# Patient Record
Sex: FEMALE | Race: WHITE | NOT HISPANIC OR LATINO | Employment: OTHER | URBAN - METROPOLITAN AREA
[De-identification: names, ages, dates, MRNs, and addresses within clinical notes are randomized per-mention and may not be internally consistent; named-entity substitution may affect disease eponyms.]

---

## 2017-08-14 ENCOUNTER — RECORDS - HEALTHEAST (OUTPATIENT)
Dept: LAB | Facility: CLINIC | Age: 57
End: 2017-08-14

## 2017-08-14 LAB
CHOLEST SERPL-MCNC: 222 MG/DL
FASTING STATUS PATIENT QL REPORTED: ABNORMAL
HDLC SERPL-MCNC: 45 MG/DL
LDLC SERPL CALC-MCNC: 131 MG/DL
TRIGL SERPL-MCNC: 232 MG/DL

## 2017-08-23 ENCOUNTER — RECORDS - HEALTHEAST (OUTPATIENT)
Dept: ADMINISTRATIVE | Facility: OTHER | Age: 57
End: 2017-08-23

## 2017-08-23 LAB
BKR LAB AP ABNORMAL BLEEDING: NO
BKR LAB AP BIRTH CONTROL/HORMONES: ABNORMAL
BKR LAB AP CERVICAL APPEARANCE: NORMAL
BKR LAB AP GYN ADEQUACY: ABNORMAL
BKR LAB AP GYN INTERPRETATION: ABNORMAL
BKR LAB AP HPV REFLEX: ABNORMAL
BKR LAB AP LMP: 2010
BKR LAB AP PATIENT STATUS: ABNORMAL
BKR LAB AP PREVIOUS ABNORMAL: ABNORMAL
BKR LAB AP PREVIOUS NORMAL: ABNORMAL
HIGH RISK?: YES
PATH REPORT.COMMENTS IMP SPEC: ABNORMAL
RESULT FLAG (HE HISTORICAL CONVERSION): ABNORMAL

## 2017-08-28 LAB
HPV INTERPRETATION - HISTORICAL: NORMAL
HPV INTERPRETER - HISTORICAL: NORMAL

## 2019-04-29 ENCOUNTER — RECORDS - HEALTHEAST (OUTPATIENT)
Dept: LAB | Facility: CLINIC | Age: 59
End: 2019-04-29

## 2019-04-29 LAB
ALBUMIN SERPL-MCNC: 4.2 G/DL (ref 3.5–5)
ALP SERPL-CCNC: 92 U/L (ref 45–120)
ALT SERPL W P-5'-P-CCNC: 16 U/L (ref 0–45)
ANION GAP SERPL CALCULATED.3IONS-SCNC: 16 MMOL/L (ref 5–18)
AST SERPL W P-5'-P-CCNC: 18 U/L (ref 0–40)
BILIRUB SERPL-MCNC: 0.6 MG/DL (ref 0–1)
BUN SERPL-MCNC: 12 MG/DL (ref 8–22)
CALCIUM SERPL-MCNC: 10.2 MG/DL (ref 8.5–10.5)
CHLORIDE BLD-SCNC: 100 MMOL/L (ref 98–107)
CHOLEST SERPL-MCNC: 244 MG/DL
CO2 SERPL-SCNC: 20 MMOL/L (ref 22–31)
CREAT SERPL-MCNC: 0.69 MG/DL (ref 0.6–1.1)
FASTING STATUS PATIENT QL REPORTED: ABNORMAL
GFR SERPL CREATININE-BSD FRML MDRD: >60 ML/MIN/1.73M2
GLUCOSE BLD-MCNC: 66 MG/DL (ref 70–125)
HDLC SERPL-MCNC: 52 MG/DL
LDLC SERPL CALC-MCNC: 152 MG/DL
POTASSIUM BLD-SCNC: 4.2 MMOL/L (ref 3.5–5)
PROT SERPL-MCNC: 7.5 G/DL (ref 6–8)
SODIUM SERPL-SCNC: 136 MMOL/L (ref 136–145)
TRIGL SERPL-MCNC: 199 MG/DL
TSH SERPL DL<=0.005 MIU/L-ACNC: 0.96 UIU/ML (ref 0.3–5)

## 2019-04-30 LAB
HPV SOURCE: NORMAL
HUMAN PAPILLOMA VIRUS 16 DNA: NEGATIVE
HUMAN PAPILLOMA VIRUS 18 DNA: NEGATIVE
HUMAN PAPILLOMA VIRUS FINAL DIAGNOSIS: NORMAL
HUMAN PAPILLOMA VIRUS OTHER HR: NEGATIVE
SPECIMEN DESCRIPTION: NORMAL

## 2019-05-06 LAB
BKR LAB AP ABNORMAL BLEEDING: NO
BKR LAB AP BIRTH CONTROL/HORMONES: NORMAL
BKR LAB AP CERVICAL APPEARANCE: NORMAL
BKR LAB AP GYN ADEQUACY: NORMAL
BKR LAB AP GYN INTERPRETATION: NORMAL
BKR LAB AP HPV REFLEX: NORMAL
BKR LAB AP LMP: NORMAL
BKR LAB AP PATIENT STATUS: NORMAL
BKR LAB AP PREVIOUS ABNORMAL: NORMAL
BKR LAB AP PREVIOUS NORMAL: NORMAL
HIGH RISK?: YES
PATH REPORT.COMMENTS IMP SPEC: NORMAL
RESULT FLAG (HE HISTORICAL CONVERSION): NORMAL

## 2021-05-27 ENCOUNTER — RECORDS - HEALTHEAST (OUTPATIENT)
Dept: ADMINISTRATIVE | Facility: CLINIC | Age: 61
End: 2021-05-27

## 2021-06-05 ENCOUNTER — RECORDS - HEALTHEAST (OUTPATIENT)
Dept: MAMMOGRAPHY | Facility: HOSPITAL | Age: 61
End: 2021-06-05

## 2021-06-05 DIAGNOSIS — N63.0 LUMP OR MASS IN BREAST: ICD-10-CM

## 2021-07-22 ENCOUNTER — RECORDS - HEALTHEAST (OUTPATIENT)
Dept: SCHEDULING | Facility: CLINIC | Age: 61
End: 2021-07-22

## 2021-07-22 DIAGNOSIS — Z12.31 OTHER SCREENING MAMMOGRAM: ICD-10-CM

## 2022-02-14 ENCOUNTER — HOSPITAL ENCOUNTER (OUTPATIENT)
Dept: MAMMOGRAPHY | Facility: CLINIC | Age: 62
Discharge: HOME | End: 2022-02-14
Attending: FAMILY MEDICINE | Admitting: FAMILY MEDICINE
Payer: COMMERCIAL

## 2022-02-14 DIAGNOSIS — Z12.31 VISIT FOR SCREENING MAMMOGRAM: ICD-10-CM

## 2022-02-14 PROCEDURE — 77067 SCR MAMMO BI INCL CAD: CPT

## 2022-02-21 ENCOUNTER — HOSPITAL ENCOUNTER (OUTPATIENT)
Dept: MAMMOGRAPHY | Facility: CLINIC | Age: 62
Discharge: HOME | End: 2022-02-21
Attending: FAMILY MEDICINE | Admitting: FAMILY MEDICINE
Payer: COMMERCIAL

## 2022-02-21 DIAGNOSIS — R92.0 MICROCALCIFICATION OF BREAST: ICD-10-CM

## 2022-02-21 PROCEDURE — 77065 DX MAMMO INCL CAD UNI: CPT | Mod: LT

## 2022-02-23 ENCOUNTER — LAB REQUISITION (OUTPATIENT)
Dept: LAB | Facility: CLINIC | Age: 62
End: 2022-02-23

## 2022-02-23 DIAGNOSIS — E78.2 MIXED HYPERLIPIDEMIA: ICD-10-CM

## 2022-02-23 DIAGNOSIS — T14.8XXA OTHER INJURY OF UNSPECIFIED BODY REGION, INITIAL ENCOUNTER: ICD-10-CM

## 2022-02-23 DIAGNOSIS — R87.612 LOW GRADE SQUAMOUS INTRAEPITHELIAL LESION ON CYTOLOGIC SMEAR OF CERVIX (LGSIL): ICD-10-CM

## 2022-02-23 DIAGNOSIS — I10 ESSENTIAL (PRIMARY) HYPERTENSION: ICD-10-CM

## 2022-02-23 DIAGNOSIS — E03.9 HYPOTHYROIDISM, UNSPECIFIED: ICD-10-CM

## 2022-02-23 LAB
ERYTHROCYTE [DISTWIDTH] IN BLOOD BY AUTOMATED COUNT: 14.3 % (ref 10–15)
HCT VFR BLD AUTO: 39.2 % (ref 35–47)
HGB BLD-MCNC: 13.9 G/DL (ref 11.7–15.7)
MCH RBC QN AUTO: 40.2 PG (ref 26.5–33)
MCHC RBC AUTO-ENTMCNC: 35.5 G/DL (ref 31.5–36.5)
MCV RBC AUTO: 113 FL (ref 78–100)
PLATELET # BLD AUTO: 236 10E3/UL (ref 150–450)
RBC # BLD AUTO: 3.46 10E6/UL (ref 3.8–5.2)
WBC # BLD AUTO: 7.5 10E3/UL (ref 4–11)

## 2022-02-23 PROCEDURE — 80061 LIPID PANEL: CPT | Performed by: FAMILY MEDICINE

## 2022-02-23 PROCEDURE — G0123 SCREEN CERV/VAG THIN LAYER: HCPCS | Performed by: FAMILY MEDICINE

## 2022-02-23 PROCEDURE — 84443 ASSAY THYROID STIM HORMONE: CPT | Performed by: FAMILY MEDICINE

## 2022-02-23 PROCEDURE — 87624 HPV HI-RISK TYP POOLED RSLT: CPT | Performed by: FAMILY MEDICINE

## 2022-02-23 PROCEDURE — G0124 SCREEN C/V THIN LAYER BY MD: HCPCS | Performed by: PATHOLOGY

## 2022-02-23 PROCEDURE — 82310 ASSAY OF CALCIUM: CPT | Performed by: FAMILY MEDICINE

## 2022-02-23 PROCEDURE — 85027 COMPLETE CBC AUTOMATED: CPT | Performed by: FAMILY MEDICINE

## 2022-02-24 LAB
ANION GAP SERPL CALCULATED.3IONS-SCNC: 10 MMOL/L (ref 5–18)
BUN SERPL-MCNC: 11 MG/DL (ref 8–22)
CALCIUM SERPL-MCNC: 10.5 MG/DL (ref 8.5–10.5)
CHLORIDE BLD-SCNC: 102 MMOL/L (ref 98–107)
CHOLEST SERPL-MCNC: 188 MG/DL
CO2 SERPL-SCNC: 24 MMOL/L (ref 22–31)
CREAT SERPL-MCNC: 0.65 MG/DL (ref 0.6–1.1)
GFR SERPL CREATININE-BSD FRML MDRD: >90 ML/MIN/1.73M2
GLUCOSE BLD-MCNC: 108 MG/DL (ref 70–125)
HDLC SERPL-MCNC: 56 MG/DL
LDLC SERPL CALC-MCNC: 97 MG/DL
POTASSIUM BLD-SCNC: 3.5 MMOL/L (ref 3.5–5)
SODIUM SERPL-SCNC: 136 MMOL/L (ref 136–145)
TRIGL SERPL-MCNC: 176 MG/DL
TSH SERPL DL<=0.005 MIU/L-ACNC: 0.82 UIU/ML (ref 0.3–5)

## 2022-02-25 LAB
HUMAN PAPILLOMA VIRUS 16 DNA: NEGATIVE
HUMAN PAPILLOMA VIRUS 18 DNA: NEGATIVE
HUMAN PAPILLOMA VIRUS FINAL DIAGNOSIS: NORMAL
HUMAN PAPILLOMA VIRUS OTHER HR: NEGATIVE

## 2022-03-03 LAB
BKR LAB AP GYN ADEQUACY: ABNORMAL
BKR LAB AP GYN INTERPRETATION: ABNORMAL
BKR LAB AP HPV REFLEX: ABNORMAL
BKR LAB AP LMP: ABNORMAL
BKR LAB AP PREVIOUS ABNL DX: ABNORMAL
BKR LAB AP PREVIOUS ABNORMAL: ABNORMAL
PATH REPORT.COMMENTS IMP SPEC: ABNORMAL
PATH REPORT.COMMENTS IMP SPEC: ABNORMAL
PATH REPORT.RELEVANT HX SPEC: ABNORMAL

## 2022-03-04 ENCOUNTER — ANCILLARY PROCEDURE (OUTPATIENT)
Dept: MAMMOGRAPHY | Facility: CLINIC | Age: 62
End: 2022-03-04
Attending: FAMILY MEDICINE
Payer: COMMERCIAL

## 2022-03-04 DIAGNOSIS — R92.0 MICROCALCIFICATION OF LEFT BREAST ON MAMMOGRAM: ICD-10-CM

## 2022-03-04 PROCEDURE — 250N000009 HC RX 250: Performed by: FAMILY MEDICINE

## 2022-03-04 PROCEDURE — 272N000715 MA STEREOTACTIC BREAST BIOPSY VACUUM LT

## 2022-03-04 PROCEDURE — 88305 TISSUE EXAM BY PATHOLOGIST: CPT | Mod: TC | Performed by: FAMILY MEDICINE

## 2022-03-04 RX ORDER — LIDOCAINE HYDROCHLORIDE AND EPINEPHRINE 10; 10 MG/ML; UG/ML
10 INJECTION, SOLUTION INFILTRATION; PERINEURAL ONCE
Status: COMPLETED | OUTPATIENT
Start: 2022-03-04 | End: 2022-03-04

## 2022-03-04 RX ADMIN — LIDOCAINE HYDROCHLORIDE 10 ML: 10 INJECTION, SOLUTION EPIDURAL; INFILTRATION; INTRACAUDAL; PERINEURAL at 11:09

## 2022-03-04 RX ADMIN — LIDOCAINE HYDROCHLORIDE,EPINEPHRINE BITARTRATE 10 ML: 10; .01 INJECTION, SOLUTION INFILTRATION; PERINEURAL at 11:09

## 2022-03-04 NOTE — DISCHARGE INSTRUCTIONS

## 2022-03-07 ENCOUNTER — TELEPHONE (OUTPATIENT)
Dept: MAMMOGRAPHY | Facility: CLINIC | Age: 62
End: 2022-03-07
Payer: COMMERCIAL

## 2022-03-07 LAB
PATH REPORT.COMMENTS IMP SPEC: NORMAL
PATH REPORT.FINAL DX SPEC: NORMAL
PATH REPORT.GROSS SPEC: NORMAL
PATH REPORT.MICROSCOPIC SPEC OTHER STN: NORMAL
PATH REPORT.RELEVANT HX SPEC: NORMAL
PHOTO IMAGE: NORMAL

## 2022-03-07 PROCEDURE — 88305 TISSUE EXAM BY PATHOLOGIST: CPT | Mod: 26 | Performed by: PATHOLOGY

## 2022-03-07 NOTE — TELEPHONE ENCOUNTER
At the request of the Breast Center Radiologist, Dr. Garcia,  I phoned patient and discussed the following benign breast biopsy results:    BREAST, LEFT, 1200, MIDDLE DEPTH, STEREOTACTIC GUIDED NEEDLE CORE BIOPSY:        - FIBROADENOMA WITH MICROCALCIFICATIONS       Patient was advised that she may return to her routine breast screening schedule.    Patient denies any concerns at her biopsy site. Questions were answered and my phone number given if she has further questions or concerns.  Ordering provider was informed of these results.  Patient verbalized understanding and agrees with the plan of care.         Lillian Ga RN, BSN, PHN  Breast Center Imaging Nurse Coordinator   [facility]  [address]  [place] [address]  [phone]

## 2022-12-23 ENCOUNTER — APPOINTMENT (OUTPATIENT)
Dept: CT IMAGING | Facility: HOSPITAL | Age: 62
End: 2022-12-23
Attending: EMERGENCY MEDICINE
Payer: COMMERCIAL

## 2022-12-23 ENCOUNTER — HOSPITAL ENCOUNTER (EMERGENCY)
Facility: HOSPITAL | Age: 62
Discharge: HOME | End: 2022-12-23
Attending: EMERGENCY MEDICINE | Admitting: EMERGENCY MEDICINE
Payer: COMMERCIAL

## 2022-12-23 ENCOUNTER — APPOINTMENT (OUTPATIENT)
Dept: RADIOLOGY | Facility: HOSPITAL | Age: 62
End: 2022-12-23
Attending: EMERGENCY MEDICINE
Payer: COMMERCIAL

## 2022-12-23 VITALS
RESPIRATION RATE: 32 BRPM | HEIGHT: 67 IN | TEMPERATURE: 98 F | DIASTOLIC BLOOD PRESSURE: 60 MMHG | WEIGHT: 180 LBS | HEART RATE: 71 BPM | OXYGEN SATURATION: 95 % | BODY MASS INDEX: 28.25 KG/M2 | SYSTOLIC BLOOD PRESSURE: 125 MMHG

## 2022-12-23 DIAGNOSIS — R07.9 CHEST PAIN, UNSPECIFIED TYPE: ICD-10-CM

## 2022-12-23 LAB
ALBUMIN SERPL BCG-MCNC: 4 G/DL (ref 3.5–5.2)
ALP SERPL-CCNC: 89 U/L (ref 35–104)
ALT SERPL W P-5'-P-CCNC: 11 U/L (ref 10–35)
ANION GAP SERPL CALCULATED.3IONS-SCNC: 10 MMOL/L (ref 7–15)
APTT PPP: 30 SECONDS (ref 22–38)
AST SERPL W P-5'-P-CCNC: 20 U/L (ref 10–35)
BASOPHILS # BLD AUTO: 0 10E3/UL (ref 0–0.2)
BASOPHILS NFR BLD AUTO: 0 %
BILIRUB SERPL-MCNC: 0.7 MG/DL
BUN SERPL-MCNC: 6.8 MG/DL (ref 8–23)
CALCIUM SERPL-MCNC: 9.8 MG/DL (ref 8.8–10.2)
CHLORIDE SERPL-SCNC: 100 MMOL/L (ref 98–107)
CREAT SERPL-MCNC: 0.5 MG/DL (ref 0.51–0.95)
D DIMER PPP FEU-MCNC: 0.79 UG/ML FEU (ref 0–0.5)
DEPRECATED HCO3 PLAS-SCNC: 26 MMOL/L (ref 22–29)
EOSINOPHIL # BLD AUTO: 0 10E3/UL (ref 0–0.7)
EOSINOPHIL NFR BLD AUTO: 0 %
ERYTHROCYTE [DISTWIDTH] IN BLOOD BY AUTOMATED COUNT: 17.1 % (ref 10–15)
GFR SERPL CREATININE-BSD FRML MDRD: >90 ML/MIN/1.73M2
GLUCOSE SERPL-MCNC: 104 MG/DL (ref 70–99)
HCT VFR BLD AUTO: 35.2 % (ref 35–47)
HGB BLD-MCNC: 12.5 G/DL (ref 11.7–15.7)
HOLD SPECIMEN: NORMAL
IMM GRANULOCYTES # BLD: 0 10E3/UL
IMM GRANULOCYTES NFR BLD: 0 %
INR PPP: 1.05 (ref 0.85–1.15)
LIPASE SERPL-CCNC: 12 U/L (ref 13–60)
LYMPHOCYTES # BLD AUTO: 0.7 10E3/UL (ref 0.8–5.3)
LYMPHOCYTES NFR BLD AUTO: 15 %
MAGNESIUM SERPL-MCNC: 1.8 MG/DL (ref 1.7–2.3)
MCH RBC QN AUTO: 39.2 PG (ref 26.5–33)
MCHC RBC AUTO-ENTMCNC: 35.5 G/DL (ref 31.5–36.5)
MCV RBC AUTO: 110 FL (ref 78–100)
MONOCYTES # BLD AUTO: 0.3 10E3/UL (ref 0–1.3)
MONOCYTES NFR BLD AUTO: 7 %
NEUTROPHILS # BLD AUTO: 3.5 10E3/UL (ref 1.6–8.3)
NEUTROPHILS NFR BLD AUTO: 78 %
NRBC # BLD AUTO: 0 10E3/UL
NRBC BLD AUTO-RTO: 0 /100
PLATELET # BLD AUTO: 234 10E3/UL (ref 150–450)
POTASSIUM SERPL-SCNC: 3.7 MMOL/L (ref 3.4–5.3)
PROT SERPL-MCNC: 6.7 G/DL (ref 6.4–8.3)
RBC # BLD AUTO: 3.19 10E6/UL (ref 3.8–5.2)
SODIUM SERPL-SCNC: 136 MMOL/L (ref 136–145)
TROPONIN T SERPL HS-MCNC: 7 NG/L
TROPONIN T SERPL HS-MCNC: 8 NG/L
WBC # BLD AUTO: 4.5 10E3/UL (ref 4–11)

## 2022-12-23 PROCEDURE — 36415 COLL VENOUS BLD VENIPUNCTURE: CPT | Performed by: EMERGENCY MEDICINE

## 2022-12-23 PROCEDURE — 71045 X-RAY EXAM CHEST 1 VIEW: CPT

## 2022-12-23 PROCEDURE — 85610 PROTHROMBIN TIME: CPT | Performed by: EMERGENCY MEDICINE

## 2022-12-23 PROCEDURE — 84132 ASSAY OF SERUM POTASSIUM: CPT | Performed by: EMERGENCY MEDICINE

## 2022-12-23 PROCEDURE — 82947 ASSAY GLUCOSE BLOOD QUANT: CPT | Performed by: EMERGENCY MEDICINE

## 2022-12-23 PROCEDURE — 84484 ASSAY OF TROPONIN QUANT: CPT | Performed by: EMERGENCY MEDICINE

## 2022-12-23 PROCEDURE — 250N000013 HC RX MED GY IP 250 OP 250 PS 637: Performed by: EMERGENCY MEDICINE

## 2022-12-23 PROCEDURE — 250N000011 HC RX IP 250 OP 636: Performed by: EMERGENCY MEDICINE

## 2022-12-23 PROCEDURE — 93005 ELECTROCARDIOGRAM TRACING: CPT | Performed by: EMERGENCY MEDICINE

## 2022-12-23 PROCEDURE — 85379 FIBRIN DEGRADATION QUANT: CPT | Performed by: EMERGENCY MEDICINE

## 2022-12-23 PROCEDURE — 71275 CT ANGIOGRAPHY CHEST: CPT

## 2022-12-23 PROCEDURE — 99285 EMERGENCY DEPT VISIT HI MDM: CPT | Mod: 25

## 2022-12-23 PROCEDURE — 250N000009 HC RX 250: Performed by: EMERGENCY MEDICINE

## 2022-12-23 PROCEDURE — 85025 COMPLETE CBC W/AUTO DIFF WBC: CPT | Performed by: EMERGENCY MEDICINE

## 2022-12-23 PROCEDURE — 83735 ASSAY OF MAGNESIUM: CPT | Performed by: EMERGENCY MEDICINE

## 2022-12-23 PROCEDURE — 85730 THROMBOPLASTIN TIME PARTIAL: CPT | Performed by: EMERGENCY MEDICINE

## 2022-12-23 PROCEDURE — 96374 THER/PROPH/DIAG INJ IV PUSH: CPT | Mod: 59

## 2022-12-23 PROCEDURE — 96375 TX/PRO/DX INJ NEW DRUG ADDON: CPT | Mod: 59

## 2022-12-23 PROCEDURE — 83690 ASSAY OF LIPASE: CPT | Performed by: EMERGENCY MEDICINE

## 2022-12-23 RX ORDER — ONDANSETRON 2 MG/ML
4 INJECTION INTRAMUSCULAR; INTRAVENOUS ONCE
Status: COMPLETED | OUTPATIENT
Start: 2022-12-23 | End: 2022-12-23

## 2022-12-23 RX ORDER — NITROGLYCERIN 0.4 MG/1
0.4 TABLET SUBLINGUAL EVERY 5 MIN PRN
Status: DISCONTINUED | OUTPATIENT
Start: 2022-12-23 | End: 2022-12-23 | Stop reason: HOSPADM

## 2022-12-23 RX ORDER — KETOROLAC TROMETHAMINE 15 MG/ML
15 INJECTION, SOLUTION INTRAMUSCULAR; INTRAVENOUS ONCE
Status: COMPLETED | OUTPATIENT
Start: 2022-12-23 | End: 2022-12-23

## 2022-12-23 RX ORDER — IOPAMIDOL 755 MG/ML
100 INJECTION, SOLUTION INTRAVASCULAR ONCE
Status: COMPLETED | OUTPATIENT
Start: 2022-12-23 | End: 2022-12-23

## 2022-12-23 RX ORDER — ACETAMINOPHEN 325 MG/1
650 TABLET ORAL ONCE
Status: COMPLETED | OUTPATIENT
Start: 2022-12-23 | End: 2022-12-23

## 2022-12-23 RX ADMIN — FAMOTIDINE 20 MG: 10 INJECTION, SOLUTION INTRAVENOUS at 03:39

## 2022-12-23 RX ADMIN — KETOROLAC TROMETHAMINE 15 MG: 15 INJECTION, SOLUTION INTRAMUSCULAR; INTRAVENOUS at 04:56

## 2022-12-23 RX ADMIN — NITROGLYCERIN 0.4 MG: 0.4 TABLET, ORALLY DISINTEGRATING SUBLINGUAL at 02:55

## 2022-12-23 RX ADMIN — IOPAMIDOL 100 ML: 755 INJECTION, SOLUTION INTRAVENOUS at 05:14

## 2022-12-23 RX ADMIN — ONDANSETRON 4 MG: 2 INJECTION INTRAMUSCULAR; INTRAVENOUS at 02:39

## 2022-12-23 RX ADMIN — ALUMINA, MAGNESIA, AND SIMETHICONE ORAL SUSPENSION REGULAR STRENGTH 30 ML: 1200; 1200; 120 SUSPENSION ORAL at 03:40

## 2022-12-23 RX ADMIN — ACETAMINOPHEN 650 MG: 325 TABLET ORAL at 03:07

## 2022-12-23 ASSESSMENT — ENCOUNTER SYMPTOMS
VOMITING: 0
NUMBNESS: 1
SHORTNESS OF BREATH: 1
NAUSEA: 1
DIAPHORESIS: 0
LIGHT-HEADEDNESS: 1

## 2022-12-23 ASSESSMENT — ACTIVITIES OF DAILY LIVING (ADL)
ADLS_ACUITY_SCORE: 35
ADLS_ACUITY_SCORE: 35

## 2022-12-23 NOTE — ED NOTES
Bed: JN-10  Expected date: 12/23/22  Expected time: 2:09 AM  Means of arrival: Ambulance  Comments:  WBL  63 yo F chest pain

## 2022-12-23 NOTE — ED TRIAGE NOTES
Patient presents via EMS, ongoing chest pain since 1800 last night. States it started like 'indigetstion' but hasn't gone away. Tingling-like feeling bilateral to hands noted. Rates chest pain 5/10.     Triage Assessment     Row Name 12/23/22 0224       Triage Assessment (Adult)    Airway WDL WDL       Respiratory WDL    Respiratory WDL WDL       Skin Circulation/Temperature WDL    Skin Circulation/Temperature WDL WDL       Cardiac WDL    Cardiac WDL X;chest pain;rhythm    Pulse Rate & Regularity radial pulse regular    Cardiac Rhythm NSR;ST       Chest Pain Assessment    Chest Pain Location midsternal    Chest Pain Radiation hand    Character aching    Precipitating Factors at rest       Cognitive/Neuro/Behavioral WDL    Cognitive/Neuro/Behavioral WDL WDL

## 2022-12-23 NOTE — ED NOTES
"EMERGENCY DEPARTMENT SIGN OUT NOTE        ED COURSE AND MEDICAL DECISION MAKING  Patient was signed out to me by Dr Jamie Shook at 5:00 AM    In brief, Linh Mustafa is a 62 year old female who initially presented via EMS for evaluation of chest pain.     Around 6:00 pm last evening~8 hours ago, the patient had a sudden onset of center left chest pain with accompanied  bilateral hand numbness. The patient was cooking when this happened. She went to bed but was awaken to ongoing worsening chest pain at 1:00 am~an hour ago. She initially thought this was indigestion but her symptoms didn't go away. This has never happened before. Patient also feels lightheaded and nauseous but has not vomited. Patient took 2 baby aspirin. Currently, the patient endorses being mildly short of breath. Chest pain is still present and describes it as \"something sitting on her chest\" and a \"squeezing\" chest pain. Patient is a smoker and has 2 alcoholic drinks per day. Denies medication changes, however she only takes them when she remembers. Denies diaphoresis and any other symptoms. No known history of heart disease.     At time of sign out, disposition was pending CT PE results and troponin.    Update: Serial troponin is negative and CT PE study is unremarkable.  Referral placed to rapid access cardiology by Dr. Shook and we will plan for discharge with return precautions at this time.    FINAL IMPRESSION    1. Chest pain, unspecified type        ED MEDS  Medications   nitroGLYcerin (NITROSTAT) sublingual tablet 0.4 mg (0.4 mg Sublingual Given 12/23/22 0255)   ondansetron (ZOFRAN) injection 4 mg (4 mg Intravenous Given 12/23/22 0239)   acetaminophen (TYLENOL) tablet 650 mg (650 mg Oral Given 12/23/22 0307)   lidocaine (viscous) (XYLOCAINE) 2 % 15 mL, alum & mag hydroxide-simethicone (MAALOX) 15 mL GI Cocktail (30 mLs Oral Given 12/23/22 0340)   famotidine (PEPCID) injection 20 mg (20 mg Intravenous Given 12/23/22 0339)   ketorolac " (TORADOL) injection 15 mg (15 mg Intravenous Given 12/23/22 5606)   iopamidol (ISOVUE-370) solution 100 mL (100 mLs Intravenous Given 12/23/22 7344)       LAB  Labs Ordered and Resulted from Time of ED Arrival to Time of ED Departure   COMPREHENSIVE METABOLIC PANEL - Abnormal       Result Value    Sodium 136      Potassium 3.7      Chloride 100      Carbon Dioxide (CO2) 26      Anion Gap 10      Urea Nitrogen 6.8 (*)     Creatinine 0.50 (*)     Calcium 9.8      Glucose 104 (*)     Alkaline Phosphatase 89      AST 20      ALT 11      Protein Total 6.7      Albumin 4.0      Bilirubin Total 0.7      GFR Estimate >90     CBC WITH PLATELETS AND DIFFERENTIAL - Abnormal    WBC Count 4.5      RBC Count 3.19 (*)     Hemoglobin 12.5      Hematocrit 35.2       (*)     MCH 39.2 (*)     MCHC 35.5      RDW 17.1 (*)     Platelet Count 234      % Neutrophils 78      % Lymphocytes 15      % Monocytes 7      % Eosinophils 0      % Basophils 0      % Immature Granulocytes 0      NRBCs per 100 WBC 0      Absolute Neutrophils 3.5      Absolute Lymphocytes 0.7 (*)     Absolute Monocytes 0.3      Absolute Eosinophils 0.0      Absolute Basophils 0.0      Absolute Immature Granulocytes 0.0      Absolute NRBCs 0.0     LIPASE - Abnormal    Lipase 12 (*)    D DIMER QUANTITATIVE - Abnormal    D-Dimer Quantitative 0.79 (*)    INR - Normal    INR 1.05     PARTIAL THROMBOPLASTIN TIME - Normal    aPTT 30     MAGNESIUM - Normal    Magnesium 1.8     TROPONIN T, HIGH SENSITIVITY - Normal    Troponin T, High Sensitivity 8     TROPONIN T, HIGH SENSITIVITY       EKG      RADIOLOGY    CT Chest Pulmonary Embolism w Contrast   Final Result   IMPRESSION:   1.  No evidence of pulmonary embolus or other acute process in the chest.      XR Chest Port 1 View   Final Result   IMPRESSION: Shallow inspiration. Cardiomediastinal silhouette within normal limits. No focal consolidation or significant effusion. Atherosclerotic aorta.          DISCHARGE MEDS  New  Prescriptions    No medications on file       Juanita Frank M.D.  [facility] EMERGENCY DEPARTMENT  [facility] [id number] [phone]     Juanita Frank MD  12/23/22 0570

## 2022-12-23 NOTE — ED PROVIDER NOTES
EMERGENCY DEPARTMENT ENCOUNTER      NAME: Cary Silverman  AGE: 62 year old female  YOB: 1960  MRN: [id number]  EVALUATION DATE & TIME: 2022  2:19 AM    PCP: No primary care provider on file.    ED PROVIDER: Jamie Shook DO      Chief Complaint   Patient presents with     Chest Pain         FINAL IMPRESSION:  1. Chest pain, unspecified type          ED COURSE & MEDICAL DECISION MAKIN-year-old female presented to the ED for evaluation of substernal chest pressure that began yesterday evening and worsened in the middle of the night while laying in bed.  The patient reports associated nausea.  She also reports experiencing shortness of breath followed by bilateral hand and foot paresthesias.  Upon arrival to the ED the patient was noted to be hypertensive but she was otherwise hemodynamically stable.  She was anxious and slightly uncomfortable appearing.  However, she did not appear to be in acute distress.  The patient's physical exam was unremarkable.  Following her initial evaluation the patient was given sublingual nitroglycerin to treat her chest pressure.    An EKG was obtained which revealed a normal sinus rhythm with diffuse ST segment depressions noted.  There is no previous EKG available for comparison.      The patient reported no change in her chest pain with the 2 doses of sublingual nitroglycerin.  The patient was then given a GI cocktail and IV famotidine.    CBC, BMP, reticulocyte panel, lipase, troponin, and magnesium were all reassuring.  Chest x-ray was nondiagnostic.      The patient was reevaluated and informed of the initial laboratory EKG, and chest x-ray results.  The patient did not appear to be in any obvious distress.  However, she again noted minimal change in her pain with the GI cocktail and IV famotidine.  Patient was given a dose of IV Toradol to treat her pain    The patient's D-dimer was positive.  The patient will be sent down for a chest CT scan to  evaluate for possible PE.  A 2-hour repeat troponin is also pending.  The patient's care was turned over to Dr. Salamanca who will follow up on the CT and troponin results and disposition the patient accordingly.    Pertinent Labs & Imaging studies reviewed. (See chart for details)  2:30 AM I met with the patient to gather history and to perform my initial exam. We discussed plans for the ED course, including diagnostic testing and treatment.       At the conclusion of the encounter I discussed the results of all of the tests and the disposition. The questions were answered. The patient or family acknowledged understanding and was agreeable with the care plan.   Medical Decision Making    History:    Supplemental history from: Family Member/Significant Other    Work Up:    Chart documentation includes differential considered and any EKGs or imaging independently interpreted by provider.    Complicating factors:    Care impacted by chronic illness: N/A    Care affected by social determinants of health: N/A      PPE worn: n95 mask, goggles    MEDICATIONS GIVEN IN THE EMERGENCY:  Medications   nitroGLYcerin (NITROSTAT) sublingual tablet 0.4 mg (0.4 mg Sublingual Given 12/23/22 0255)   iopamidol (ISOVUE-370) solution 100 mL (has no administration in time range)   ondansetron (ZOFRAN) injection 4 mg (4 mg Intravenous Given 12/23/22 0239)   acetaminophen (TYLENOL) tablet 650 mg (650 mg Oral Given 12/23/22 0307)   lidocaine (viscous) (XYLOCAINE) 2 % 15 mL, alum & mag hydroxide-simethicone (MAALOX) 15 mL GI Cocktail (30 mLs Oral Given 12/23/22 0340)   famotidine (PEPCID) injection 20 mg (20 mg Intravenous Given 12/23/22 0339)   ketorolac (TORADOL) injection 15 mg (15 mg Intravenous Given 12/23/22 0456)       NEW PRESCRIPTIONS STARTED AT TODAY'S ER VISIT  New Prescriptions    No medications on file          =================================================================    HPI    Patient information was obtained from:  "patient    Use of : N/A       Cary Silverman is a 62 year old female with no pertinent history who presents to this ED via EMS for evaluation of chest pain.      Around 6:00 pm last evening~8 hours ago, the patient had a sudden onset of center left chest pain with accompanied  bilateral hand numbness. The patient was cooking when this happened. She went to bed but was awaken to ongoing worsening chest pain at 1:00 am~an hour ago. She initially thought this was indigestion but her symptoms didn't go away. This has never happened before. Patient also feels lightheaded and nauseous but has not vomited. Patient took 2 baby aspirin. Currently, the patient endorses being mildly short of breath. Chest pain is still present and describes it as \"something sitting on her chest\" and a \"squeezing\" chest pain. Patient is a smoker and has 2 alcoholic drinks per day. Denies medication changes, however she only takes them when she remembers. Denies diaphoresis and any other symptoms. No known history of heart disease.      REVIEW OF SYSTEMS   Review of Systems   Constitutional: Negative for diaphoresis.   Respiratory: Positive for shortness of breath.    Cardiovascular: Positive for chest pain (center left chest pain).   Gastrointestinal: Positive for nausea. Negative for vomiting.   Neurological: Positive for light-headedness and numbness (bilateral hand numbness).   All other systems reviewed and are negative.       PAST MEDICAL HISTORY:  History reviewed. No pertinent past medical history.    PAST SURGICAL HISTORY:  History reviewed. No pertinent surgical history.        CURRENT MEDICATIONS:    No current outpatient medications on file.      ALLERGIES:  No Known Allergies    FAMILY HISTORY:  No family history on file.    SOCIAL HISTORY:   Social History     Socioeconomic History     Marital status: Single     Spouse name: None     Number of children: None     Years of education: None     Highest education level: None " "      VITALS:  /62   Pulse 80   Temp 98  F (36.7  C) (Oral)   Resp 29   Ht 1.702 m (5' 7\")   Wt 81.6 kg (180 lb)   SpO2 94%   BMI 28.19 kg/m      PHYSICAL EXAM    General presentation: Alert, Vital signs reviewed. NAD. Mildly anxious appearing.  HENT: ENT inspection is normal. Oropharynx is moist and clear.   Eye: Pupils are equal and reactive to light. EOMI  Neck: The neck is supple, with full ROM, with no evidence of meningismus.  Pulmonary: Currently in no acute respiratory distress. Normal, non labored respirations, the lung sounds are normal with good equal air movement. Clear to auscultation bilaterally.   Circulatory: Regular rate and rhythm. Peripheral pulses are strong and equal. No murmurs, rubs, or gallops.   Abdominal: The abdomen is soft. Nontender. No rigidity, guarding, or rebound. Bowel sounds normal.   Neurologic: Alert, oriented to person, place, and time. No motor deficit. No sensory deficit. Cranial nerves II through XII are intact.  Musculoskeletal: No extremity tenderness. Full range of motion in all extremities. No extremity edema.   Skin: Skin color is normal. No rash. Warm. Dry to touch.      LAB:  All pertinent labs reviewed and interpreted.  Results for orders placed or performed during the hospital encounter of 12/23/22   XR Chest Port 1 View    Impression    IMPRESSION: Shallow inspiration. Cardiomediastinal silhouette within normal limits. No focal consolidation or significant effusion. Atherosclerotic aorta.   Extra Blue Top Tube   Result Value Ref Range    Hold Specimen JIC    Extra Red Top Tube   Result Value Ref Range    Hold Specimen JIC    Extra Green Top (Lithium Heparin) Tube   Result Value Ref Range    Hold Specimen JIC    Extra Purple Top Tube   Result Value Ref Range    Hold Specimen JIC    Result Value Ref Range    INR 1.05 0.85 - 1.15   Partial thromboplastin time   Result Value Ref Range    aPTT 30 22 - 38 Seconds   Comprehensive metabolic panel   Result Value " Ref Range    Sodium 136 136 - 145 mmol/L    Potassium 3.7 3.4 - 5.3 mmol/L    Chloride 100 98 - 107 mmol/L    Carbon Dioxide (CO2) 26 22 - 29 mmol/L    Anion Gap 10 7 - 15 mmol/L    Urea Nitrogen 6.8 (L) 8.0 - 23.0 mg/dL    Creatinine 0.50 (L) 0.51 - 0.95 mg/dL    Calcium 9.8 8.8 - 10.2 mg/dL    Glucose 104 (H) 70 - 99 mg/dL    Alkaline Phosphatase 89 35 - 104 U/L    AST 20 10 - 35 U/L    ALT 11 10 - 35 U/L    Protein Total 6.7 6.4 - 8.3 g/dL    Albumin 4.0 3.5 - 5.2 g/dL    Bilirubin Total 0.7 <=1.2 mg/dL    GFR Estimate >90 >60 mL/min/1.73m2   Result Value Ref Range    Magnesium 1.8 1.7 - 2.3 mg/dL   Result Value Ref Range    Troponin T, High Sensitivity 8 <=14 ng/L   CBC with platelets and differential   Result Value Ref Range    WBC Count 4.5 4.0 - 11.0 10e3/uL    RBC Count 3.19 (L) 3.80 - 5.20 10e6/uL    Hemoglobin 12.5 11.7 - 15.7 g/dL    Hematocrit 35.2 35.0 - 47.0 %     (H) 78 - 100 fL    MCH 39.2 (H) 26.5 - 33.0 pg    MCHC 35.5 31.5 - 36.5 g/dL    RDW 17.1 (H) 10.0 - 15.0 %    Platelet Count 234 150 - 450 10e3/uL    % Neutrophils 78 %    % Lymphocytes 15 %    % Monocytes 7 %    % Eosinophils 0 %    % Basophils 0 %    % Immature Granulocytes 0 %    NRBCs per 100 WBC 0 <1 /100    Absolute Neutrophils 3.5 1.6 - 8.3 10e3/uL    Absolute Lymphocytes 0.7 (L) 0.8 - 5.3 10e3/uL    Absolute Monocytes 0.3 0.0 - 1.3 10e3/uL    Absolute Eosinophils 0.0 0.0 - 0.7 10e3/uL    Absolute Basophils 0.0 0.0 - 0.2 10e3/uL    Absolute Immature Granulocytes 0.0 <=0.4 10e3/uL    Absolute NRBCs 0.0 10e3/uL   Result Value Ref Range    Lipase 12 (L) 13 - 60 U/L   D dimer quantitative   Result Value Ref Range    D-Dimer Quantitative 0.79 (H) 0.00 - 0.50 ug/mL FEU       RADIOLOGY:  Reviewed all pertinent imaging. Please see official radiology report.  XR Chest Port 1 View   Final Result   IMPRESSION: Shallow inspiration. Cardiomediastinal silhouette within normal limits. No focal consolidation or significant effusion.  Atherosclerotic aorta.      CT Chest Pulmonary Embolism w Contrast    (Results Pending)       EKG:    Normal sinus rhythm.  Rate of 94.  Normal QRS.  Normal QT.  Diffuse ST segment changes noted.  No previous EKGs available for comparison    I have independently reviewed and interpreted the EKG(s) documented above.      I, Twyla Mai , am serving as a scribe to document services personally performed by Jamie Shook DO based on my observation and the provider's statements to me. I, Jamie Shook, attest that Twyla Mia is acting in a scribe capacity, has observed my performance of the services and has documented them in accordance with my direction.    Jamie Shook DO  Emergency Medicine  [facility] EMERGENCY DEPARTMENT  [address] [place] [id number] [phone]       Jamie Shook DO  12/23/22 1037

## 2022-12-23 NOTE — ED NOTES
Pt returned from CT, re-hooked to monitors.  Pt states left cp remains and  doesn't feel like pain med did anything.

## 2023-01-03 ENCOUNTER — OFFICE VISIT (OUTPATIENT)
Dept: CARDIOLOGY | Facility: CLINIC | Age: 63
End: 2023-01-03
Payer: COMMERCIAL

## 2023-01-03 VITALS
BODY MASS INDEX: 26.34 KG/M2 | HEIGHT: 67 IN | SYSTOLIC BLOOD PRESSURE: 156 MMHG | WEIGHT: 167.8 LBS | RESPIRATION RATE: 16 BRPM | HEART RATE: 95 BPM | DIASTOLIC BLOOD PRESSURE: 72 MMHG | OXYGEN SATURATION: 97 %

## 2023-01-03 DIAGNOSIS — Z72.0 TOBACCO ABUSE: ICD-10-CM

## 2023-01-03 DIAGNOSIS — I25.10 CORONARY ARTERY CALCIFICATION SEEN ON CAT SCAN: ICD-10-CM

## 2023-01-03 DIAGNOSIS — I10 PRIMARY HYPERTENSION: ICD-10-CM

## 2023-01-03 DIAGNOSIS — R07.9 CHEST PAIN, UNSPECIFIED TYPE: Primary | ICD-10-CM

## 2023-01-03 PROCEDURE — 99204 OFFICE O/P NEW MOD 45 MIN: CPT | Performed by: INTERNAL MEDICINE

## 2023-01-03 RX ORDER — LEVOTHYROXINE SODIUM 200 UG/1
200 TABLET ORAL DAILY
COMMUNITY

## 2023-01-03 RX ORDER — AMLODIPINE BESYLATE 5 MG/1
TABLET ORAL
COMMUNITY
End: 2023-01-19

## 2023-01-03 NOTE — PROGRESS NOTES
Thank you, Dr. Jamie Shook, for asking the [facility] team to see Ms. Linh Mustafa to evaluate chest discomfort.    Assessment/Recommendations   Assessment:    1.  Chest discomfort, etiology unclear.  ECG in the ED reportedly showed diffuse ST abnormality; however, the EKG is not in the electronic system and cannot be reviewed.  2 troponin levels were within normal limits.  She does have moderate coronary artery calcification by chest CT as well as evidence of emphysema which she was not aware of.  At this point, recommended stress echo study to evaluate for ischemic etiology as she continues to have intermittent episodes of chest discomfort.  2.  Coronary artery calcification, indicative of cholesterol plaquing in the coronary arteries but does not assess severity.  Again we will pursue stress echo study.  If this is normal, would suggest her lesions are less than 70% stenosis.  She will need risk factor modification with continued good control of her blood pressure as well as good lipid control.  Also encourage smoking cessation.  3.  Labile hypertension.  Blood pressure was initially elevated but came back into normal range at the end of her visit.  No change in amlodipine dosing.  4.  Ongoing tobacco abuse.  Patient continues to smoke 3/4 to 1 pack of cigarettes per day and has done so since age of 18.  CT scan demonstrates evidence of emphysema.  This may be contributing to chest tightness.    Plan:  1.  Continue current medications  2.  Schedule stress echo study with further recommendations to follow  3.  Encourage smoking cessation.  Follow-up with primary care regarding lipids and chest CT findings.       History of Present Illness    Ms. Linh Mustafa is a 62 year old female with history of essential hypertension and mild hypercholesterolemia who recently presented to the ED for evaluation of chest discomfort.  Patient states she was awakened in the early morning hours with  "substernal discomfort without clear radiation into the jaw, back or arms.  Did feel short of breath.  Cannot recall what that the discomfort was increased by deep breaths or movement.  In the ED, she reportedly had subtle diffuse ST depression on ECG although ECG was not transmitted into the electronic system and is not reviewable.  Was given 2 sublingual nitroglycerin without benefit along with a GI cocktail and IV famotidine again without benefit.  2 troponins drawn 2 hours apart were within normal limits.  Subsequently discharged after IV Toradol to be seen in outpatient clinic.  Has had intermittent episodes of chest discomfort since then which do appear to occur during periods of increased stress.  Happens both with activity as well as at rest.    ECG (personally reviewed): No ECG available for review    Cardiac Imaging Studies (personally reviewed): No prior cardiac imaging     Physical Examination Review of Systems   BP (!) 156/72 (BP Location: Left arm, Patient Position: Sitting, Cuff Size: Adult Regular)   Pulse 95   Resp 16   Ht 1.702 m (5' 7\")   Wt 76.1 kg (167 lb 12.8 oz)   SpO2 97%   BMI 26.28 kg/m    Body mass index is 26.28 kg/m .  Wt Readings from Last 3 Encounters:   01/03/23 76.1 kg (167 lb 12.8 oz)   12/23/22 81.6 kg (180 lb)     General Appearance:   Awake, Alert, No acute distress.   HEENT:  No scleral icterus; the mucous membranes were pink and moist.   Neck: No cervical bruits or jugular venous distention    Chest: The spine was straight. The chest was symmetric.   Lungs:   Respirations unlabored; the lungs are clear to auscultation. No wheezing   Cardiovascular:    Regular rate and rhythm.  S1, S2 normal.  No murmur or gallop   Abdomen:  No organomegaly, masses, bruits, or tenderness. Bowels sounds are present   Extremities:  No peripheral edema, clubbing or cyanosis   Skin: No xanthelasma. Warm, Dry.   Musculoskeletal: No tenderness.   Neurologic: Mood and affect are appropriate.    " "Enc Vitals  BP: (!) 156/72  Pulse: 95  Resp: 16  SpO2: 97 %  Weight: 76.1 kg (167 lb 12.8 oz)  Height: 170.2 cm (5' 7\")                                         Medical History  Surgical History Family History Social History   Past Medical History:   Diagnosis Date     Hyperlipidemia      Hypertension     Past Surgical History:   Procedure Laterality Date     BIOPSY BREAST Right     age 30 benign fibrous    Family History   Problem Relation Age of Onset     Chronic Obstructive Pulmonary Disease Father      Colon Cancer Paternal Grandmother 70    Social History     Socioeconomic History     Marital status: Single     Spouse name: Not on file     Number of children: Not on file     Years of education: Not on file     Highest education level: Not on file   Occupational History     Not on file   Tobacco Use     Smoking status: Every Day     Packs/day: 1.00     Types: Cigarettes     Start date: 1977     Smokeless tobacco: Not on file   Substance and Sexual Activity     Alcohol use: Not on file     Comment: occasional     Drug use: Never     Sexual activity: Not on file   Other Topics Concern     Not on file   Social History Narrative    ** Merged History Encounter **          Social Determinants of Health     Financial Resource Strain: Not on file   Food Insecurity: Not on file   Transportation Needs: Not on file   Physical Activity: Not on file   Stress: Not on file   Social Connections: Not on file   Intimate Partner Violence: Not on file   Housing Stability: Not on file          Medications  Allergies   Current Outpatient Medications   Medication Sig Dispense Refill     amLODIPine (NORVASC) 5 MG tablet take 1 tablet by mouth every day for 30 days daily       levothyroxine (SYNTHROID/LEVOTHROID) 200 MCG tablet take 1 tablet by mouth every day for 30 days        Allergies   Allergen Reactions     Varenicline      Other reaction(s): crying         Lab Results    Chemistry/lipid CBC Cardiac Enzymes/BNP/TSH/INR   Recent " Labs   Lab Test 12/23/22 0238 02/23/22  1409   TRIG  --  176*   LDL  --  97   BUN 6.8* 11    136   CO2 26 24    Recent Labs   Lab Test 12/23/22 0238   WBC 4.5   HGB 12.5   HCT 35.2   *       Recent Labs   Lab Test 12/23/22 0238 02/23/22  1409   TSH  --  0.82   INR 1.05  --         A total of 44 minutes was spent reviewing patient's medical records, obtaining history and performing examination, as well as discussing diagnoses/ recommendations with patient and answering all questions.

## 2023-01-03 NOTE — LETTER
1/3/2023    Benedicto Oliveira MD  [address] [place] [address]    RE: Linh Mustafa       Dear Colleague,     I had the pleasure of seeing Linh Mustafa in the [facility].      Thank you, Dr. Jamie Shook, for asking the [facility] team to see Ms. Linh Mustafa to evaluate chest discomfort.    Assessment/Recommendations   Assessment:    1.  Chest discomfort, etiology unclear.  ECG in the ED reportedly showed diffuse ST abnormality; however, the EKG is not in the electronic system and cannot be reviewed.  2 troponin levels were within normal limits.  She does have moderate coronary artery calcification by chest CT as well as evidence of emphysema which she was not aware of.  At this point, recommended stress echo study to evaluate for ischemic etiology as she continues to have intermittent episodes of chest discomfort.  2.  Coronary artery calcification, indicative of cholesterol plaquing in the coronary arteries but does not assess severity.  Again we will pursue stress echo study.  If this is normal, would suggest her lesions are less than 70% stenosis.  She will need risk factor modification with continued good control of her blood pressure as well as good lipid control.  Also encourage smoking cessation.  3.  Labile hypertension.  Blood pressure was initially elevated but came back into normal range at the end of her visit.  No change in amlodipine dosing.  4.  Ongoing tobacco abuse.  Patient continues to smoke 3/4 to 1 pack of cigarettes per day and has done so since age of 18.  CT scan demonstrates evidence of emphysema.  This may be contributing to chest tightness.    Plan:  1.  Continue current medications  2.  Schedule stress echo study with further recommendations to follow  3.  Encourage smoking cessation.  Follow-up with primary care regarding lipids and chest CT findings.       History of Present Illness    Ms. Linh Mustafa is a 62 year old  "female with history of essential hypertension and mild hypercholesterolemia who recently presented to the ED for evaluation of chest discomfort.  Patient states she was awakened in the early morning hours with substernal discomfort without clear radiation into the jaw, back or arms.  Did feel short of breath.  Cannot recall what that the discomfort was increased by deep breaths or movement.  In the ED, she reportedly had subtle diffuse ST depression on ECG although ECG was not transmitted into the electronic system and is not reviewable.  Was given 2 sublingual nitroglycerin without benefit along with a GI cocktail and IV famotidine again without benefit.  2 troponins drawn 2 hours apart were within normal limits.  Subsequently discharged after IV Toradol to be seen in outpatient clinic.  Has had intermittent episodes of chest discomfort since then which do appear to occur during periods of increased stress.  Happens both with activity as well as at rest.    ECG (personally reviewed): No ECG available for review    Cardiac Imaging Studies (personally reviewed): No prior cardiac imaging     Physical Examination Review of Systems   BP (!) 156/72 (BP Location: Left arm, Patient Position: Sitting, Cuff Size: Adult Regular)   Pulse 95   Resp 16   Ht 1.702 m (5' 7\")   Wt 76.1 kg (167 lb 12.8 oz)   SpO2 97%   BMI 26.28 kg/m    Body mass index is 26.28 kg/m .  Wt Readings from Last 3 Encounters:   01/03/23 76.1 kg (167 lb 12.8 oz)   12/23/22 81.6 kg (180 lb)     General Appearance:   Awake, Alert, No acute distress.   HEENT:  No scleral icterus; the mucous membranes were pink and moist.   Neck: No cervical bruits or jugular venous distention    Chest: The spine was straight. The chest was symmetric.   Lungs:   Respirations unlabored; the lungs are clear to auscultation. No wheezing   Cardiovascular:    Regular rate and rhythm.  S1, S2 normal.  No murmur or gallop   Abdomen:  No organomegaly, masses, bruits, or " "tenderness. Bowels sounds are present   Extremities:  No peripheral edema, clubbing or cyanosis   Skin: No xanthelasma. Warm, Dry.   Musculoskeletal: No tenderness.   Neurologic: Mood and affect are appropriate.    Enc Vitals  BP: (!) 156/72  Pulse: 95  Resp: 16  SpO2: 97 %  Weight: 76.1 kg (167 lb 12.8 oz)  Height: 170.2 cm (5' 7\")                                         Medical History  Surgical History Family History Social History   Past Medical History:   Diagnosis Date     Hyperlipidemia      Hypertension     Past Surgical History:   Procedure Laterality Date     BIOPSY BREAST Right     age 30 benign fibrous    Family History   Problem Relation Age of Onset     Chronic Obstructive Pulmonary Disease Father      Colon Cancer Paternal Grandmother 70    Social History     Socioeconomic History     Marital status: Single     Spouse name: Not on file     Number of children: Not on file     Years of education: Not on file     Highest education level: Not on file   Occupational History     Not on file   Tobacco Use     Smoking status: Every Day     Packs/day: 1.00     Types: Cigarettes     Start date: 1977     Smokeless tobacco: Not on file   Substance and Sexual Activity     Alcohol use: Not on file     Comment: occasional     Drug use: Never     Sexual activity: Not on file   Other Topics Concern     Not on file   Social History Narrative    ** Merged History Encounter **          Social Determinants of Health     Financial Resource Strain: Not on file   Food Insecurity: Not on file   Transportation Needs: Not on file   Physical Activity: Not on file   Stress: Not on file   Social Connections: Not on file   Intimate Partner Violence: Not on file   Housing Stability: Not on file          Medications  Allergies   Current Outpatient Medications   Medication Sig Dispense Refill     amLODIPine (NORVASC) 5 MG tablet take 1 tablet by mouth every day for 30 days daily       levothyroxine (SYNTHROID/LEVOTHROID) 200 MCG " tablet take 1 tablet by mouth every day for 30 days        Allergies   Allergen Reactions     Varenicline      Other reaction(s): crying         Lab Results    Chemistry/lipid CBC Cardiac Enzymes/BNP/TSH/INR   Recent Labs   Lab Test 12/23/22 0238 02/23/22  1409   TRIG  --  176*   LDL  --  97   BUN 6.8* 11    136   CO2 26 24    Recent Labs   Lab Test 12/23/22 0238   WBC 4.5   HGB 12.5   HCT 35.2   *       Recent Labs   Lab Test 12/23/22 0238 02/23/22  1409   TSH  --  0.82   INR 1.05  --         A total of 44 minutes was spent reviewing patient's medical records, obtaining history and performing examination, as well as discussing diagnoses/ recommendations with patient and answering all questions.                    Thank you for allowing me to participate in the care of your patient.      Sincerely,     Fauzia Nuñez MD     [facility]  cc:   Jamie Shook DO  [address] [place] [address]

## 2023-01-17 ENCOUNTER — HOSPITAL ENCOUNTER (OUTPATIENT)
Dept: CARDIOLOGY | Facility: HOSPITAL | Age: 63
Discharge: HOME | End: 2023-01-17
Attending: INTERNAL MEDICINE | Admitting: INTERNAL MEDICINE
Payer: COMMERCIAL

## 2023-01-17 DIAGNOSIS — R07.9 CHEST PAIN, UNSPECIFIED TYPE: ICD-10-CM

## 2023-01-17 PROCEDURE — 93325 DOPPLER ECHO COLOR FLOW MAPG: CPT | Mod: TC

## 2023-01-17 PROCEDURE — 93016 CV STRESS TEST SUPVJ ONLY: CPT | Performed by: INTERNAL MEDICINE

## 2023-01-17 PROCEDURE — 93321 DOPPLER ECHO F-UP/LMTD STD: CPT | Mod: 26 | Performed by: INTERNAL MEDICINE

## 2023-01-17 PROCEDURE — 93018 CV STRESS TEST I&R ONLY: CPT | Performed by: INTERNAL MEDICINE

## 2023-01-17 PROCEDURE — 93350 STRESS TTE ONLY: CPT | Mod: 26 | Performed by: INTERNAL MEDICINE

## 2023-01-17 PROCEDURE — 93321 DOPPLER ECHO F-UP/LMTD STD: CPT | Mod: TC

## 2023-01-17 PROCEDURE — C8928 TTE W OR W/O FOL W/CON,STRES: HCPCS

## 2023-01-17 PROCEDURE — 93352 ADMIN ECG CONTRAST AGENT: CPT | Performed by: INTERNAL MEDICINE

## 2023-01-17 PROCEDURE — 93325 DOPPLER ECHO COLOR FLOW MAPG: CPT | Mod: 26 | Performed by: INTERNAL MEDICINE

## 2023-01-17 PROCEDURE — 255N000002 HC RX 255 OP 636: Performed by: INTERNAL MEDICINE

## 2023-01-17 RX ADMIN — PERFLUTREN 5 ML: 6.52 INJECTION, SUSPENSION INTRAVENOUS at 09:40

## 2023-01-19 DIAGNOSIS — I10 PRIMARY HYPERTENSION: Primary | ICD-10-CM

## 2023-01-19 DIAGNOSIS — R07.9 CHEST PAIN, UNSPECIFIED TYPE: ICD-10-CM

## 2023-01-19 DIAGNOSIS — I25.10 CORONARY ARTERY CALCIFICATION SEEN ON CAT SCAN: ICD-10-CM

## 2023-01-19 RX ORDER — AMLODIPINE BESYLATE 5 MG/1
10 TABLET ORAL DAILY
Start: 2023-01-19 | End: 2023-01-23

## 2023-01-22 LAB
ABO/RH(D): NORMAL
ANTIBODY SCREEN: NEGATIVE
SPECIMEN EXPIRATION DATE: NORMAL

## 2023-01-23 ENCOUNTER — LAB (OUTPATIENT)
Dept: CARDIOLOGY | Facility: CLINIC | Age: 63
End: 2023-01-23
Payer: COMMERCIAL

## 2023-01-23 ENCOUNTER — OFFICE VISIT (OUTPATIENT)
Dept: CARDIOLOGY | Facility: CLINIC | Age: 63
End: 2023-01-23
Payer: COMMERCIAL

## 2023-01-23 ENCOUNTER — PREP FOR PROCEDURE (OUTPATIENT)
Dept: CARDIOLOGY | Facility: CLINIC | Age: 63
End: 2023-01-23

## 2023-01-23 ENCOUNTER — TELEPHONE (OUTPATIENT)
Dept: CARDIOLOGY | Facility: CLINIC | Age: 63
End: 2023-01-23

## 2023-01-23 VITALS
DIASTOLIC BLOOD PRESSURE: 79 MMHG | BODY MASS INDEX: 25.43 KG/M2 | HEART RATE: 94 BPM | SYSTOLIC BLOOD PRESSURE: 131 MMHG | WEIGHT: 162 LBS | HEIGHT: 67 IN | OXYGEN SATURATION: 95 %

## 2023-01-23 DIAGNOSIS — Z01.812 PRE-PROCEDURE LAB EXAM: ICD-10-CM

## 2023-01-23 DIAGNOSIS — R94.39 ABNORMAL STRESS ECHOCARDIOGRAM: Primary | ICD-10-CM

## 2023-01-23 DIAGNOSIS — E78.00 HYPERCHOLESTEROLEMIA: ICD-10-CM

## 2023-01-23 DIAGNOSIS — I25.10 CORONARY ARTERY CALCIFICATION SEEN ON CAT SCAN: ICD-10-CM

## 2023-01-23 DIAGNOSIS — R94.39 ABNORMAL STRESS ECHOCARDIOGRAM: ICD-10-CM

## 2023-01-23 DIAGNOSIS — R07.9 CHEST PAIN, UNSPECIFIED TYPE: ICD-10-CM

## 2023-01-23 DIAGNOSIS — I10 PRIMARY HYPERTENSION: ICD-10-CM

## 2023-01-23 LAB
ANION GAP SERPL CALCULATED.3IONS-SCNC: 8 MMOL/L (ref 5–18)
BUN SERPL-MCNC: 12 MG/DL (ref 8–22)
CALCIUM SERPL-MCNC: 10.6 MG/DL (ref 8.5–10.5)
CHLORIDE BLD-SCNC: 104 MMOL/L (ref 98–107)
CO2 SERPL-SCNC: 25 MMOL/L (ref 22–31)
CREAT SERPL-MCNC: 0.64 MG/DL (ref 0.6–1.1)
ERYTHROCYTE [DISTWIDTH] IN BLOOD BY AUTOMATED COUNT: 16.6 % (ref 10–15)
GFR SERPL CREATININE-BSD FRML MDRD: >90 ML/MIN/1.73M2
GLUCOSE BLD-MCNC: 109 MG/DL (ref 70–125)
HCT VFR BLD AUTO: 38.1 % (ref 35–47)
HGB BLD-MCNC: 13.6 G/DL (ref 11.7–15.7)
MCH RBC QN AUTO: 39.2 PG (ref 26.5–33)
MCHC RBC AUTO-ENTMCNC: 35.7 G/DL (ref 31.5–36.5)
MCV RBC AUTO: 110 FL (ref 78–100)
PLATELET # BLD AUTO: 237 10E3/UL (ref 150–450)
POTASSIUM BLD-SCNC: 3.9 MMOL/L (ref 3.5–5)
RBC # BLD AUTO: 3.47 10E6/UL (ref 3.8–5.2)
SODIUM SERPL-SCNC: 137 MMOL/L (ref 136–145)
WBC # BLD AUTO: 7.3 10E3/UL (ref 4–11)

## 2023-01-23 PROCEDURE — 99215 OFFICE O/P EST HI 40 MIN: CPT | Performed by: INTERNAL MEDICINE

## 2023-01-23 PROCEDURE — 85027 COMPLETE CBC AUTOMATED: CPT

## 2023-01-23 PROCEDURE — 86900 BLOOD TYPING SEROLOGIC ABO: CPT

## 2023-01-23 PROCEDURE — 86850 RBC ANTIBODY SCREEN: CPT

## 2023-01-23 PROCEDURE — 36415 COLL VENOUS BLD VENIPUNCTURE: CPT

## 2023-01-23 PROCEDURE — 86901 BLOOD TYPING SEROLOGIC RH(D): CPT

## 2023-01-23 PROCEDURE — 80048 BASIC METABOLIC PNL TOTAL CA: CPT

## 2023-01-23 RX ORDER — DIAZEPAM 10 MG
10 TABLET ORAL
Status: CANCELLED | OUTPATIENT
Start: 2023-01-25

## 2023-01-23 RX ORDER — ASPIRIN 325 MG
325 TABLET ORAL ONCE
Status: CANCELLED | OUTPATIENT
Start: 2023-01-25 | End: 2023-01-23

## 2023-01-23 RX ORDER — ASPIRIN 81 MG/1
81 TABLET, CHEWABLE ORAL DAILY
COMMUNITY
End: 2024-05-01

## 2023-01-23 RX ORDER — ASPIRIN 81 MG/1
243 TABLET, CHEWABLE ORAL ONCE
Status: CANCELLED | OUTPATIENT
Start: 2023-01-25

## 2023-01-23 RX ORDER — LIDOCAINE 40 MG/G
CREAM TOPICAL
Status: CANCELLED | OUTPATIENT
Start: 2023-01-23

## 2023-01-23 RX ORDER — FENTANYL CITRATE 50 UG/ML
25 INJECTION, SOLUTION INTRAMUSCULAR; INTRAVENOUS
Status: CANCELLED | OUTPATIENT
Start: 2023-01-25

## 2023-01-23 RX ORDER — SODIUM CHLORIDE 9 MG/ML
INJECTION, SOLUTION INTRAVENOUS CONTINUOUS
Status: CANCELLED | OUTPATIENT
Start: 2023-01-25

## 2023-01-23 RX ORDER — NITROGLYCERIN 0.4 MG/1
TABLET SUBLINGUAL
Qty: 25 TABLET | Refills: 3 | Status: SHIPPED | OUTPATIENT
Start: 2023-01-23 | End: 2024-05-01

## 2023-01-23 RX ORDER — ATORVASTATIN CALCIUM 40 MG/1
40 TABLET, FILM COATED ORAL DAILY
Qty: 30 TABLET | Refills: 6 | Status: SHIPPED | OUTPATIENT
Start: 2023-01-23 | End: 2023-02-15

## 2023-01-23 RX ORDER — AMLODIPINE BESYLATE 10 MG/1
10 TABLET ORAL DAILY
Qty: 30 TABLET | Refills: 6 | Status: SHIPPED | OUTPATIENT
Start: 2023-01-23 | End: 2023-02-15

## 2023-01-23 RX ORDER — METOPROLOL SUCCINATE 25 MG/1
25 TABLET, EXTENDED RELEASE ORAL DAILY
Qty: 30 TABLET | Refills: 3 | Status: SHIPPED | OUTPATIENT
Start: 2023-01-23 | End: 2023-02-15

## 2023-01-23 NOTE — PROGRESS NOTES
Thank you, Dr. Oliveira, for asking the [facility] team to see Ms. Linh Mustafa to follow-up on abnormal stress echo study.    Assessment/Recommendations   Assessment:    1.  Abnormal stress echo study.  This demonstrated subtle area of hypokinesis involving the anteroseptal wall with no improvement in overall LV function.  She did report chest discomfort and severe shortness of breath with exercise which resolved in recovery.  No ischemic ECG changes.  Given her symptoms and coronary artery calcification, I feel it would be best to proceed with angiography to define her anatomy and potential need for revascularization.  I did ask her to increase her dose of amlodipine to 10 mg daily late last week.  She continues to report frequent episodes of chest discomfort.  We will begin low-dose beta-blocker and have asked her to make certain she is taking a baby aspirin every day.  We will plan to pursue the angiogram as soon as possible.  We will also provide a prescription for sublingual nitroglycerin to have on hand.  2.  Essential hypertension, improved on amlodipine.  Can we will add low-dose beta-blocker which will help with heart rate control.  3.  Hypercholesterolemia.  Her last lipid profile was nearly a year ago demonstrating an LDL in the 90s.  Will initiate atorvastatin 40 mg daily to drive LDL below 70.  4.  Ongoing tobacco abuse.  Patient needs strong encouragement regarding importance of smoking cessation given her coronary artery disease as well as finding of emphysema on stress test done in the ED.    Plan:  1.  Continue current dose of amlodipine.  Add metoprolol succinate 25 mg daily.  2.  Initiate atorvastatin 40 mg daily to lower LDL below 70.  3.  Schedule coronary angiography as soon as possible to define her anatomy and potential need for revascularization.  Risks and benefits have been discussed and she is agreeable to proceed.       History of Present Illness    Ms.  "Linh Mustafa is a 62 year old female with history of essential, labile hypertension, mild hypercholesterolemia and ongoing tobacco abuse who I saw in early January in our rapid access clinic.  It was unclear whether her symptoms were related to poor blood pressure control; however, given risk factors including moderate coronary calcification on chest CT, I recommended a stress echo study to evaluate for an ischemic cause.  This was performed recently and was felt to be abnormal with the patient complaining of 6 out of 10 chest discomfort with evidence of low exercise capacity, hypertensive response and no increase in LV function with exercise along with suggestion of anteroseptal hypokinesis.  She is now here to discuss those results.  She continues to report episodes of chest discomfort on a daily basis occurring throughout most days.  Cannot clearly associate it with breathing, activity.  Did get better with 2 baby aspirin this morning.  Was advised last week to increase her amlodipine to 10 mg daily.  Unclear whether this provides any benefit.    ECG (personally reviewed): No ECG today    Cardiac Imaging Studies (personally reviewed):   Procedure  Stress Echo Complete. Definity (NDC #66064-885) given intravenously.  ______________________________________________________________________________  Interpretation Summary  1. Abnormal stress echocardiogram without evidence of stress induced ischemia.     2. Normal resting LV systolic performance with an ejection fraction of 55-60%.  There is no improvement of LV systolic function with hint of anterior septal  hypokinesia with exercise  3. Nondiagnostic but suspicious for ischemia EKG changes  4. Anginal chest pain reported with exercise.  5. Poor functional capacity for age.  6. Hypertensive response to exercise       Physical Examination Review of Systems   /79   Pulse 94   Ht 1.702 m (5' 7\")   Wt 73.5 kg (162 lb)   SpO2 95%   BMI 25.37 kg/m    Body " "mass index is 25.37 kg/m .  Wt Readings from Last 3 Encounters:   01/23/23 73.5 kg (162 lb)   01/03/23 76.1 kg (167 lb 12.8 oz)   12/23/22 81.6 kg (180 lb)     General Appearance:   Awake, Alert, No acute distress.   HEENT:  No scleral icterus; the mucous membranes were pink and moist.   Neck: No cervical bruits or jugular venous distention    Chest: The spine was straight. The chest was symmetric.  Moderate kyphosis noted.   Lungs:   Respirations unlabored.  There are diminished breath sounds in both lung fields.  No wheezes.   Cardiovascular:    Regular rate and rhythm.  S1, S2 normal.  No murmur or gallop   Abdomen:  No organomegaly, masses, bruits, or tenderness. Bowels sounds are present   Extremities:  No peripheral edema bilaterally   Skin: No xanthelasma. Warm, Dry.   Musculoskeletal: No tenderness.   Neurologic: Mood and affect are appropriate.    Enc Vitals  BP: 131/79  Pulse: 94  SpO2: 95 %  Weight: 73.5 kg (162 lb)  Height: 170.2 cm (5' 7\")                                         Medical History  Surgical History Family History Social History   Past Medical History:   Diagnosis Date     Hyperlipidemia      Hypertension     Past Surgical History:   Procedure Laterality Date     BIOPSY BREAST Right     age 30 benign fibrous    Family History   Problem Relation Age of Onset     Chronic Obstructive Pulmonary Disease Father      Colon Cancer Paternal Grandmother 70    Social History     Socioeconomic History     Marital status: Single     Spouse name: Not on file     Number of children: Not on file     Years of education: Not on file     Highest education level: Not on file   Occupational History     Not on file   Tobacco Use     Smoking status: Every Day     Packs/day: 1.00     Types: Cigarettes     Start date: 1977     Smokeless tobacco: Not on file   Substance and Sexual Activity     Alcohol use: Not on file     Comment: occasional     Drug use: Never     Sexual activity: Not on file   Other Topics Concern "     Not on file   Social History Narrative    ** Merged History Encounter **          Social Determinants of Health     Financial Resource Strain: Not on file   Food Insecurity: Not on file   Transportation Needs: Not on file   Physical Activity: Not on file   Stress: Not on file   Social Connections: Not on file   Intimate Partner Violence: Not on file   Housing Stability: Not on file          Medications  Allergies   Current Outpatient Medications   Medication Sig Dispense Refill     amLODIPine (NORVASC) 10 MG tablet Take 1 tablet (10 mg) by mouth daily 30 tablet 6     aspirin (ASA) 81 MG chewable tablet Take 81 mg by mouth daily       atorvastatin (LIPITOR) 40 MG tablet Take 1 tablet (40 mg) by mouth daily 30 tablet 6     levothyroxine (SYNTHROID/LEVOTHROID) 200 MCG tablet take 1 tablet by mouth every day for 30 days        Allergies   Allergen Reactions     Varenicline      Other reaction(s): crying         Lab Results    Chemistry/lipid CBC Cardiac Enzymes/BNP/TSH/INR   Recent Labs   Lab Test 12/23/22 0238 02/23/22  1409   TRIG  --  176*   LDL  --  97   BUN 6.8* 11    136   CO2 26 24    Recent Labs   Lab Test 12/23/22 0238   WBC 4.5   HGB 12.5   HCT 35.2   *       Recent Labs   Lab Test 12/23/22 0238 02/23/22  1409   TSH  --  0.82   INR 1.05  --         A total of 53 minutes was spent reviewing patient's medical records, obtaining history and performing examination, as well as discussing diagnoses/ recommendations with patient and answering all questions.

## 2023-01-23 NOTE — TELEPHONE ENCOUNTER
Linh Mustafa  [address]  [place] [facility] [address]  [phone] (home)     PCP:  Benedicto Oliveira  H&P completed by:  Dr. Nuñez 1/23/2023  Admit date 1/25/2023 Arrival time:  06:30  Anticoagulation:  none  Previous PCI: No  Bypass Grafts: No  Renal Issues: No  Diabetic?: No  Device?: No   Ambulation status: independent    Reason for Visit:  Patient seen for pre-procedure education in preparation for: Coronary Angiogram with Possible PCI    Procedure Prep:  EKG results obtained, dated: To be completed on day of cath lab procedure  Hemogram results obtained: Completed on 1/23/2023  Basic Metabolic Panel results obtained: Completed on 1/23/2023  Pertinent cardiac test results: abnormal stress echo 1/17/2023      Patient Education    1. Your arrival time is 06:30.  Location is [facility] - [address] [place] [id number] - Main Entrance of the Hospital  2. Please plan on being at the hospital all day.  3. At any time, emergencies and/or urgent cases may come up which could delay the start of your procedure.    COVID Testing Instructions  *Mandatory COVID Testing:   ALL Patients will need to complete a COVID test no sooner than 4 days prior to their procedure (regardless of vaccination status).      To schedule COVID testing Please call [phone]    If you want to complete this at an outside facility please call them to find out if they will have the results within the appropriate time frame and their fax number.  You will need to provide us with that information so we can send the order.    The facility completing the test will need to fax the results to [phone]    If you are running into and issues please call us.     Pre-procedure instructions  Take your temperature in the morning prior to coming in.  If your temperature is 100 F please call  [facility] [phone] and notify them.  If you do not have access to a thermometer at home, please come in for testing.  If  you are running a temperature your procedure may be rescheduled.  Patient instructed to not Eat or Drink after midnight.  Patient instructed to shower the evening before or the morning of the procedure.  Patient instructed to arrange for transportation home following procedure from a responsible family member or friend. No driving for at least 24 hours.  Patient instructed to have a responsible adult with them for 24 hours post-procedure.  Post-procedure follow up process.  Conscious sedation discussed.      Pre-procedure medication instructions.  Continue medications as scheduled, with a small amount of water on the day of the procedure unless indicated. (NO Diabetic Medications or Blood Thinners)  Pt instructed not to consume Alcohol, Tobacco, Caffeine, or Carbonated beverages 12 hours prior to procedure.  Patient instructed to take 325 mg of Aspirin the night before and morning of procedure: Yes  Other medication: instructed to only take metoprolol, amlodipine, levothyroxine a.m. of the procedure.    Patient's daughter, Yulisa, will be the  the day of the procedure and will stay with her for 24 hours after.    Patient states understanding of procedure and agrees to proceed.    *PATIENTS RECORDS AVAILABLE IN [employer] UNLESS OTHERWISE INDICATED*      There is no problem list on file for this patient.      Current Outpatient Medications   Medication Sig Dispense Refill     amLODIPine (NORVASC) 10 MG tablet Take 1 tablet (10 mg) by mouth daily 30 tablet 6     aspirin (ASA) 81 MG chewable tablet Take 81 mg by mouth daily       atorvastatin (LIPITOR) 40 MG tablet Take 1 tablet (40 mg) by mouth daily 30 tablet 6     levothyroxine (SYNTHROID/LEVOTHROID) 200 MCG tablet take 1 tablet by mouth every day for 30 days       metoprolol succinate ER (TOPROL XL) 25 MG 24 hr tablet Take 1 tablet (25 mg) by mouth daily 30 tablet 3     nitroGLYcerin (NITROSTAT) 0.4 MG sublingual tablet For chest pain place 1 tablet under the  tongue every 5 minutes for 3 doses. If symptoms persist 5 minutes after 1st dose call 439. 25 tablet 3       Allergies   Allergen Reactions     Varenicline      Other reaction(s): crying     Debby Palacios Kelly, RN  Case type: LHC/CA POSS PCI     Procedure Physician(s): TAMIKA/KASSIDY     Procedure Date and Patient Arrival Time: 1/25 6:30AM ARRIVAL     H&P: 1/23 KML     Alerts: NONE     Pre-Procedure Lab Appt: 1/23  [facility]     THANKS!   ELENA REESE RN on 1/23/2023 at 2:37 PM

## 2023-01-23 NOTE — LETTER
1/23/2023    Benedicto Oliveira MD  [address] [place] [address]    RE: Linh Norwoodcayetano       Dear Colleague,     I had the pleasure of seeing Linh Mustafa in the [facility].      Thank you, Dr. Oliveira, for asking the [facility] team to see Ms. Linh Mustafa to follow-up on abnormal stress echo study.    Assessment/Recommendations   Assessment:    1.  Abnormal stress echo study.  This demonstrated subtle area of hypokinesis involving the anteroseptal wall with no improvement in overall LV function.  She did report chest discomfort and severe shortness of breath with exercise which resolved in recovery.  No ischemic ECG changes.  Given her symptoms and coronary artery calcification, I feel it would be best to proceed with angiography to define her anatomy and potential need for revascularization.  I did ask her to increase her dose of amlodipine to 10 mg daily late last week.  She continues to report frequent episodes of chest discomfort.  We will begin low-dose beta-blocker and have asked her to make certain she is taking a baby aspirin every day.  We will plan to pursue the angiogram as soon as possible.  We will also provide a prescription for sublingual nitroglycerin to have on hand.  2.  Essential hypertension, improved on amlodipine.  Can we will add low-dose beta-blocker which will help with heart rate control.  3.  Hypercholesterolemia.  Her last lipid profile was nearly a year ago demonstrating an LDL in the 90s.  Will initiate atorvastatin 40 mg daily to drive LDL below 70.  4.  Ongoing tobacco abuse.  Patient needs strong encouragement regarding importance of smoking cessation given her coronary artery disease as well as finding of emphysema on stress test done in the ED.    Plan:  1.  Continue current dose of amlodipine.  Add metoprolol succinate 25 mg daily.  2.  Initiate atorvastatin 40 mg daily to lower LDL below 70.  3.  Schedule coronary  angiography as soon as possible to define her anatomy and potential need for revascularization.  Risks and benefits have been discussed and she is agreeable to proceed.       History of Present Illness    Ms. Linh Mustafa is a 62 year old female with history of essential, labile hypertension, mild hypercholesterolemia and ongoing tobacco abuse who I saw in early January in our rapid access clinic.  It was unclear whether her symptoms were related to poor blood pressure control; however, given risk factors including moderate coronary calcification on chest CT, I recommended a stress echo study to evaluate for an ischemic cause.  This was performed recently and was felt to be abnormal with the patient complaining of 6 out of 10 chest discomfort with evidence of low exercise capacity, hypertensive response and no increase in LV function with exercise along with suggestion of anteroseptal hypokinesis.  She is now here to discuss those results.  She continues to report episodes of chest discomfort on a daily basis occurring throughout most days.  Cannot clearly associate it with breathing, activity.  Did get better with 2 baby aspirin this morning.  Was advised last week to increase her amlodipine to 10 mg daily.  Unclear whether this provides any benefit.    ECG (personally reviewed): No ECG today    Cardiac Imaging Studies (personally reviewed):   Procedure  Stress Echo Complete. Definity (NDC #57621-436) given intravenously.  ______________________________________________________________________________  Interpretation Summary  1. Abnormal stress echocardiogram without evidence of stress induced ischemia.     2. Normal resting LV systolic performance with an ejection fraction of 55-60%.  There is no improvement of LV systolic function with hint of anterior septal  hypokinesia with exercise  3. Nondiagnostic but suspicious for ischemia EKG changes  4. Anginal chest pain reported with exercise.  5. Poor functional  "capacity for age.  6. Hypertensive response to exercise       Physical Examination Review of Systems   /79   Pulse 94   Ht 1.702 m (5' 7\")   Wt 73.5 kg (162 lb)   SpO2 95%   BMI 25.37 kg/m    Body mass index is 25.37 kg/m .  Wt Readings from Last 3 Encounters:   01/23/23 73.5 kg (162 lb)   01/03/23 76.1 kg (167 lb 12.8 oz)   12/23/22 81.6 kg (180 lb)     General Appearance:   Awake, Alert, No acute distress.   HEENT:  No scleral icterus; the mucous membranes were pink and moist.   Neck: No cervical bruits or jugular venous distention    Chest: The spine was straight. The chest was symmetric.  Moderate kyphosis noted.   Lungs:   Respirations unlabored.  There are diminished breath sounds in both lung fields.  No wheezes.   Cardiovascular:    Regular rate and rhythm.  S1, S2 normal.  No murmur or gallop   Abdomen:  No organomegaly, masses, bruits, or tenderness. Bowels sounds are present   Extremities:  No peripheral edema bilaterally   Skin: No xanthelasma. Warm, Dry.   Musculoskeletal: No tenderness.   Neurologic: Mood and affect are appropriate.    Enc Vitals  BP: 131/79  Pulse: 94  SpO2: 95 %  Weight: 73.5 kg (162 lb)  Height: 170.2 cm (5' 7\")                                         Medical History  Surgical History Family History Social History   Past Medical History:   Diagnosis Date     Hyperlipidemia      Hypertension     Past Surgical History:   Procedure Laterality Date     BIOPSY BREAST Right     age 30 benign fibrous    Family History   Problem Relation Age of Onset     Chronic Obstructive Pulmonary Disease Father      Colon Cancer Paternal Grandmother 70    Social History     Socioeconomic History     Marital status: Single     Spouse name: Not on file     Number of children: Not on file     Years of education: Not on file     Highest education level: Not on file   Occupational History     Not on file   Tobacco Use     Smoking status: Every Day     Packs/day: 1.00     Types: Cigarettes     " Start date: 1977     Smokeless tobacco: Not on file   Substance and Sexual Activity     Alcohol use: Not on file     Comment: occasional     Drug use: Never     Sexual activity: Not on file   Other Topics Concern     Not on file   Social History Narrative    ** Merged History Encounter **          Social Determinants of Health     Financial Resource Strain: Not on file   Food Insecurity: Not on file   Transportation Needs: Not on file   Physical Activity: Not on file   Stress: Not on file   Social Connections: Not on file   Intimate Partner Violence: Not on file   Housing Stability: Not on file          Medications  Allergies   Current Outpatient Medications   Medication Sig Dispense Refill     amLODIPine (NORVASC) 10 MG tablet Take 1 tablet (10 mg) by mouth daily 30 tablet 6     aspirin (ASA) 81 MG chewable tablet Take 81 mg by mouth daily       atorvastatin (LIPITOR) 40 MG tablet Take 1 tablet (40 mg) by mouth daily 30 tablet 6     levothyroxine (SYNTHROID/LEVOTHROID) 200 MCG tablet take 1 tablet by mouth every day for 30 days        Allergies   Allergen Reactions     Varenicline      Other reaction(s): crying         Lab Results    Chemistry/lipid CBC Cardiac Enzymes/BNP/TSH/INR   Recent Labs   Lab Test 12/23/22 0238 02/23/22  1409   TRIG  --  176*   LDL  --  97   BUN 6.8* 11    136   CO2 26 24    Recent Labs   Lab Test 12/23/22 0238   WBC 4.5   HGB 12.5   HCT 35.2   *       Recent Labs   Lab Test 12/23/22 0238 02/23/22  1409   TSH  --  0.82   INR 1.05  --         A total of 53 minutes was spent reviewing patient's medical records, obtaining history and performing examination, as well as discussing diagnoses/ recommendations with patient and answering all questions.                      Thank you for allowing me to participate in the care of your patient.      Sincerely,     Fauzia Nuñez MD     [facility]  cc:   No referring provider  defined for this encounter.

## 2023-01-23 NOTE — H&P (VIEW-ONLY)
Thank you, Dr. Oliveira, for asking the [facility] team to see Ms. Linh Mustafa to follow-up on abnormal stress echo study.    Assessment/Recommendations   Assessment:    1.  Abnormal stress echo study.  This demonstrated subtle area of hypokinesis involving the anteroseptal wall with no improvement in overall LV function.  She did report chest discomfort and severe shortness of breath with exercise which resolved in recovery.  No ischemic ECG changes.  Given her symptoms and coronary artery calcification, I feel it would be best to proceed with angiography to define her anatomy and potential need for revascularization.  I did ask her to increase her dose of amlodipine to 10 mg daily late last week.  She continues to report frequent episodes of chest discomfort.  We will begin low-dose beta-blocker and have asked her to make certain she is taking a baby aspirin every day.  We will plan to pursue the angiogram as soon as possible.  We will also provide a prescription for sublingual nitroglycerin to have on hand.  2.  Essential hypertension, improved on amlodipine.  Can we will add low-dose beta-blocker which will help with heart rate control.  3.  Hypercholesterolemia.  Her last lipid profile was nearly a year ago demonstrating an LDL in the 90s.  Will initiate atorvastatin 40 mg daily to drive LDL below 70.  4.  Ongoing tobacco abuse.  Patient needs strong encouragement regarding importance of smoking cessation given her coronary artery disease as well as finding of emphysema on stress test done in the ED.    Plan:  1.  Continue current dose of amlodipine.  Add metoprolol succinate 25 mg daily.  2.  Initiate atorvastatin 40 mg daily to lower LDL below 70.  3.  Schedule coronary angiography as soon as possible to define her anatomy and potential need for revascularization.  Risks and benefits have been discussed and she is agreeable to proceed.       History of Present Illness    Ms.  "Linh Mustafa is a 62 year old female with history of essential, labile hypertension, mild hypercholesterolemia and ongoing tobacco abuse who I saw in early January in our rapid access clinic.  It was unclear whether her symptoms were related to poor blood pressure control; however, given risk factors including moderate coronary calcification on chest CT, I recommended a stress echo study to evaluate for an ischemic cause.  This was performed recently and was felt to be abnormal with the patient complaining of 6 out of 10 chest discomfort with evidence of low exercise capacity, hypertensive response and no increase in LV function with exercise along with suggestion of anteroseptal hypokinesis.  She is now here to discuss those results.  She continues to report episodes of chest discomfort on a daily basis occurring throughout most days.  Cannot clearly associate it with breathing, activity.  Did get better with 2 baby aspirin this morning.  Was advised last week to increase her amlodipine to 10 mg daily.  Unclear whether this provides any benefit.    ECG (personally reviewed): No ECG today    Cardiac Imaging Studies (personally reviewed):   Procedure  Stress Echo Complete. Definity (NDC #24454-810) given intravenously.  ______________________________________________________________________________  Interpretation Summary  1. Abnormal stress echocardiogram without evidence of stress induced ischemia.     2. Normal resting LV systolic performance with an ejection fraction of 55-60%.  There is no improvement of LV systolic function with hint of anterior septal  hypokinesia with exercise  3. Nondiagnostic but suspicious for ischemia EKG changes  4. Anginal chest pain reported with exercise.  5. Poor functional capacity for age.  6. Hypertensive response to exercise       Physical Examination Review of Systems   /79   Pulse 94   Ht 1.702 m (5' 7\")   Wt 73.5 kg (162 lb)   SpO2 95%   BMI 25.37 kg/m    Body " "mass index is 25.37 kg/m .  Wt Readings from Last 3 Encounters:   01/23/23 73.5 kg (162 lb)   01/03/23 76.1 kg (167 lb 12.8 oz)   12/23/22 81.6 kg (180 lb)     General Appearance:   Awake, Alert, No acute distress.   HEENT:  No scleral icterus; the mucous membranes were pink and moist.   Neck: No cervical bruits or jugular venous distention    Chest: The spine was straight. The chest was symmetric.  Moderate kyphosis noted.   Lungs:   Respirations unlabored.  There are diminished breath sounds in both lung fields.  No wheezes.   Cardiovascular:    Regular rate and rhythm.  S1, S2 normal.  No murmur or gallop   Abdomen:  No organomegaly, masses, bruits, or tenderness. Bowels sounds are present   Extremities:  No peripheral edema bilaterally   Skin: No xanthelasma. Warm, Dry.   Musculoskeletal: No tenderness.   Neurologic: Mood and affect are appropriate.    Enc Vitals  BP: 131/79  Pulse: 94  SpO2: 95 %  Weight: 73.5 kg (162 lb)  Height: 170.2 cm (5' 7\")                                         Medical History  Surgical History Family History Social History   Past Medical History:   Diagnosis Date     Hyperlipidemia      Hypertension     Past Surgical History:   Procedure Laterality Date     BIOPSY BREAST Right     age 30 benign fibrous    Family History   Problem Relation Age of Onset     Chronic Obstructive Pulmonary Disease Father      Colon Cancer Paternal Grandmother 70    Social History     Socioeconomic History     Marital status: Single     Spouse name: Not on file     Number of children: Not on file     Years of education: Not on file     Highest education level: Not on file   Occupational History     Not on file   Tobacco Use     Smoking status: Every Day     Packs/day: 1.00     Types: Cigarettes     Start date: 1977     Smokeless tobacco: Not on file   Substance and Sexual Activity     Alcohol use: Not on file     Comment: occasional     Drug use: Never     Sexual activity: Not on file   Other Topics Concern "     Not on file   Social History Narrative    ** Merged History Encounter **          Social Determinants of Health     Financial Resource Strain: Not on file   Food Insecurity: Not on file   Transportation Needs: Not on file   Physical Activity: Not on file   Stress: Not on file   Social Connections: Not on file   Intimate Partner Violence: Not on file   Housing Stability: Not on file          Medications  Allergies   Current Outpatient Medications   Medication Sig Dispense Refill     amLODIPine (NORVASC) 10 MG tablet Take 1 tablet (10 mg) by mouth daily 30 tablet 6     aspirin (ASA) 81 MG chewable tablet Take 81 mg by mouth daily       atorvastatin (LIPITOR) 40 MG tablet Take 1 tablet (40 mg) by mouth daily 30 tablet 6     levothyroxine (SYNTHROID/LEVOTHROID) 200 MCG tablet take 1 tablet by mouth every day for 30 days        Allergies   Allergen Reactions     Varenicline      Other reaction(s): crying         Lab Results    Chemistry/lipid CBC Cardiac Enzymes/BNP/TSH/INR   Recent Labs   Lab Test 12/23/22 0238 02/23/22  1409   TRIG  --  176*   LDL  --  97   BUN 6.8* 11    136   CO2 26 24    Recent Labs   Lab Test 12/23/22 0238   WBC 4.5   HGB 12.5   HCT 35.2   *       Recent Labs   Lab Test 12/23/22 0238 02/23/22  1409   TSH  --  0.82   INR 1.05  --         A total of 53 minutes was spent reviewing patient's medical records, obtaining history and performing examination, as well as discussing diagnoses/ recommendations with patient and answering all questions.

## 2023-01-25 ENCOUNTER — HOSPITAL ENCOUNTER (OUTPATIENT)
Facility: HOSPITAL | Age: 63
Discharge: HOME | End: 2023-01-25
Attending: INTERNAL MEDICINE | Admitting: INTERNAL MEDICINE
Payer: COMMERCIAL

## 2023-01-25 VITALS
RESPIRATION RATE: 20 BRPM | TEMPERATURE: 98.5 F | DIASTOLIC BLOOD PRESSURE: 58 MMHG | OXYGEN SATURATION: 93 % | HEART RATE: 56 BPM | HEIGHT: 67 IN | SYSTOLIC BLOOD PRESSURE: 123 MMHG | BODY MASS INDEX: 25.9 KG/M2 | WEIGHT: 165 LBS

## 2023-01-25 DIAGNOSIS — E78.00 HYPERCHOLESTEROLEMIA: ICD-10-CM

## 2023-01-25 DIAGNOSIS — R07.9 CHEST PAIN, UNSPECIFIED TYPE: ICD-10-CM

## 2023-01-25 DIAGNOSIS — R94.39 ABNORMAL STRESS ECHOCARDIOGRAM: ICD-10-CM

## 2023-01-25 DIAGNOSIS — I10 PRIMARY HYPERTENSION: ICD-10-CM

## 2023-01-25 DIAGNOSIS — I25.10 CORONARY ARTERY CALCIFICATION SEEN ON CAT SCAN: ICD-10-CM

## 2023-01-25 LAB
ATRIAL RATE - MUSE: 58 BPM
DIASTOLIC BLOOD PRESSURE - MUSE: NORMAL MMHG
INTERPRETATION ECG - MUSE: NORMAL
P AXIS - MUSE: 30 DEGREES
PR INTERVAL - MUSE: 170 MS
QRS DURATION - MUSE: 74 MS
QT - MUSE: 428 MS
QTC - MUSE: 420 MS
R AXIS - MUSE: -3 DEGREES
SYSTOLIC BLOOD PRESSURE - MUSE: NORMAL MMHG
T AXIS - MUSE: 24 DEGREES
VENTRICULAR RATE- MUSE: 58 BPM

## 2023-01-25 PROCEDURE — C1894 INTRO/SHEATH, NON-LASER: HCPCS | Performed by: INTERNAL MEDICINE

## 2023-01-25 PROCEDURE — 93005 ELECTROCARDIOGRAM TRACING: CPT

## 2023-01-25 PROCEDURE — 250N000011 HC RX IP 250 OP 636: Performed by: INTERNAL MEDICINE

## 2023-01-25 PROCEDURE — 93458 L HRT ARTERY/VENTRICLE ANGIO: CPT | Mod: 26 | Performed by: INTERNAL MEDICINE

## 2023-01-25 PROCEDURE — 93458 L HRT ARTERY/VENTRICLE ANGIO: CPT | Performed by: INTERNAL MEDICINE

## 2023-01-25 PROCEDURE — 99152 MOD SED SAME PHYS/QHP 5/>YRS: CPT | Performed by: INTERNAL MEDICINE

## 2023-01-25 PROCEDURE — 255N000002 HC RX 255 OP 636: Performed by: INTERNAL MEDICINE

## 2023-01-25 PROCEDURE — 250N000009 HC RX 250: Performed by: INTERNAL MEDICINE

## 2023-01-25 PROCEDURE — 258N000003 HC RX IP 258 OP 636: Performed by: INTERNAL MEDICINE

## 2023-01-25 PROCEDURE — 999N000054 HC STATISTIC EKG NON-CHARGEABLE

## 2023-01-25 PROCEDURE — 93010 ELECTROCARDIOGRAM REPORT: CPT | Performed by: INTERNAL MEDICINE

## 2023-01-25 PROCEDURE — C1887 CATHETER, GUIDING: HCPCS | Performed by: INTERNAL MEDICINE

## 2023-01-25 PROCEDURE — 250N000013 HC RX MED GY IP 250 OP 250 PS 637: Performed by: INTERNAL MEDICINE

## 2023-01-25 PROCEDURE — 272N000001 HC OR GENERAL SUPPLY STERILE: Performed by: INTERNAL MEDICINE

## 2023-01-25 RX ORDER — OXYCODONE HYDROCHLORIDE 5 MG/1
5 TABLET ORAL EVERY 4 HOURS PRN
Status: DISCONTINUED | OUTPATIENT
Start: 2023-01-25 | End: 2023-01-25 | Stop reason: HOSPADM

## 2023-01-25 RX ORDER — OXYCODONE HYDROCHLORIDE 5 MG/1
10 TABLET ORAL EVERY 4 HOURS PRN
Status: DISCONTINUED | OUTPATIENT
Start: 2023-01-25 | End: 2023-01-25 | Stop reason: HOSPADM

## 2023-01-25 RX ORDER — LIDOCAINE 40 MG/G
CREAM TOPICAL
Status: DISCONTINUED | OUTPATIENT
Start: 2023-01-25 | End: 2023-01-25 | Stop reason: HOSPADM

## 2023-01-25 RX ORDER — FENTANYL CITRATE 50 UG/ML
25 INJECTION, SOLUTION INTRAMUSCULAR; INTRAVENOUS
Status: DISCONTINUED | OUTPATIENT
Start: 2023-01-25 | End: 2023-01-25 | Stop reason: HOSPADM

## 2023-01-25 RX ORDER — ACETAMINOPHEN 325 MG/1
650 TABLET ORAL EVERY 4 HOURS PRN
Status: DISCONTINUED | OUTPATIENT
Start: 2023-01-25 | End: 2023-01-25 | Stop reason: HOSPADM

## 2023-01-25 RX ORDER — IODIXANOL 320 MG/ML
INJECTION, SOLUTION INTRAVASCULAR
Status: DISCONTINUED | OUTPATIENT
Start: 2023-01-25 | End: 2023-01-25 | Stop reason: HOSPADM

## 2023-01-25 RX ORDER — ATROPINE SULFATE 0.1 MG/ML
0.5 INJECTION INTRAVENOUS
Status: DISCONTINUED | OUTPATIENT
Start: 2023-01-25 | End: 2023-01-25 | Stop reason: HOSPADM

## 2023-01-25 RX ORDER — FLUMAZENIL 0.1 MG/ML
0.2 INJECTION, SOLUTION INTRAVENOUS
Status: DISCONTINUED | OUTPATIENT
Start: 2023-01-25 | End: 2023-01-25 | Stop reason: HOSPADM

## 2023-01-25 RX ORDER — DIAZEPAM 10 MG
10 TABLET ORAL
Status: COMPLETED | OUTPATIENT
Start: 2023-01-25 | End: 2023-01-25

## 2023-01-25 RX ORDER — SODIUM CHLORIDE 9 MG/ML
INJECTION, SOLUTION INTRAVENOUS CONTINUOUS
Status: DISCONTINUED | OUTPATIENT
Start: 2023-01-25 | End: 2023-01-25 | Stop reason: HOSPADM

## 2023-01-25 RX ORDER — ASPIRIN 81 MG/1
243 TABLET, CHEWABLE ORAL ONCE
Status: COMPLETED | OUTPATIENT
Start: 2023-01-25 | End: 2023-01-25

## 2023-01-25 RX ORDER — ASPIRIN 325 MG
325 TABLET ORAL ONCE
Status: COMPLETED | OUTPATIENT
Start: 2023-01-25 | End: 2023-01-25

## 2023-01-25 RX ORDER — FENTANYL CITRATE 50 UG/ML
INJECTION, SOLUTION INTRAMUSCULAR; INTRAVENOUS
Status: DISCONTINUED | OUTPATIENT
Start: 2023-01-25 | End: 2023-01-25 | Stop reason: HOSPADM

## 2023-01-25 RX ORDER — NALOXONE HYDROCHLORIDE 0.4 MG/ML
0.4 INJECTION, SOLUTION INTRAMUSCULAR; INTRAVENOUS; SUBCUTANEOUS
Status: DISCONTINUED | OUTPATIENT
Start: 2023-01-25 | End: 2023-01-25 | Stop reason: HOSPADM

## 2023-01-25 RX ORDER — NALOXONE HYDROCHLORIDE 0.4 MG/ML
0.2 INJECTION, SOLUTION INTRAMUSCULAR; INTRAVENOUS; SUBCUTANEOUS
Status: DISCONTINUED | OUTPATIENT
Start: 2023-01-25 | End: 2023-01-25 | Stop reason: HOSPADM

## 2023-01-25 RX ADMIN — DIAZEPAM 10 MG: 5 TABLET ORAL at 08:12

## 2023-01-25 RX ADMIN — SODIUM CHLORIDE: 9 INJECTION, SOLUTION INTRAVENOUS at 07:43

## 2023-01-25 ASSESSMENT — ACTIVITIES OF DAILY LIVING (ADL)
ADLS_ACUITY_SCORE: 35
ADLS_ACUITY_SCORE: 35
ADLS_ACUITY_SCORE: 33
ADLS_ACUITY_SCORE: 35

## 2023-01-25 ASSESSMENT — EJECTION FRACTION: EF_VALUE: .19

## 2023-01-25 NOTE — PRE-PROCEDURE
GENERAL PRE-PROCEDURE:   Procedure:  Coronary angiogram with possible PCI, [facility]  Date/Time:  1/25/2023 7:03 AM    Written consent obtained?: Yes    Risks and benefits: Risks, benefits and alternatives were discussed    Consent given by:  Patient  Patient states understanding of procedure being performed: Yes    Patient's understanding of procedure matches consent: Yes    Procedure consent matches procedure scheduled: Yes    Expected level of sedation:  Moderate  Appropriately NPO:  Yes  ASA Class:  3 (abnormal stress echo, moderate coronary calcifications on CT scan, HTN, HLD, current tobacco use)  Mallampati  :  Grade 2- soft palate, base of uvula, tonsillar pillars, and portion of posterior pharyngeal wall visible  Lungs:  Lungs clear with good breath sounds bilaterally  Heart:  Normal heart sounds and rate  History & Physical reviewed:  History and physical reviewed and no updates needed  Statement of review:  I have reviewed the lab findings, diagnostic data, medications, and the plan for sedation

## 2023-01-25 NOTE — INTERVAL H&P NOTE
"I have reviewed the surgical (or preoperative) H&P that is linked to this encounter, and examined the patient. There are no significant changes    Clinical Conditions Present on Arrival:  Clinically Significant Risk Factors Present on Admission            # Hypercalcemia: Highest Ca = 10.6 mg/dL in last 2 days, will monitor as appropriate         # Overweight: Estimated body mass index is 25.84 kg/m  as calculated from the following:    Height as of this encounter: 1.702 m (5' 7\").    Weight as of this encounter: 74.8 kg (165 lb).       "

## 2023-01-25 NOTE — PROGRESS NOTES
Admitted to [facility] for Coronary Angiogram. Pt states a good understanding and agrees to proceed. Prepped and ready.

## 2023-01-25 NOTE — Clinical Note
The DP pulses are 2+ bilaterally. The PT pulses are 2+ bilaterally. The radial pulses are 2+ bilaterally.

## 2023-01-25 NOTE — DISCHARGE INSTRUCTIONS

## 2023-02-03 ENCOUNTER — DOCUMENTATION ONLY (OUTPATIENT)
Dept: OTHER | Facility: CLINIC | Age: 63
End: 2023-02-03
Payer: COMMERCIAL

## 2023-02-14 DIAGNOSIS — E78.00 HYPERCHOLESTEROLEMIA: ICD-10-CM

## 2023-02-14 DIAGNOSIS — I25.10 CORONARY ARTERY CALCIFICATION SEEN ON CAT SCAN: ICD-10-CM

## 2023-02-14 DIAGNOSIS — I10 PRIMARY HYPERTENSION: ICD-10-CM

## 2023-02-14 DIAGNOSIS — R07.9 CHEST PAIN, UNSPECIFIED TYPE: ICD-10-CM

## 2023-02-14 DIAGNOSIS — R94.39 ABNORMAL STRESS ECHOCARDIOGRAM: ICD-10-CM

## 2023-02-15 RX ORDER — METOPROLOL SUCCINATE 25 MG/1
TABLET, EXTENDED RELEASE ORAL
Qty: 30 TABLET | Refills: 3 | Status: SHIPPED | OUTPATIENT
Start: 2023-02-15 | End: 2023-02-20

## 2023-02-15 RX ORDER — AMLODIPINE BESYLATE 10 MG/1
10 TABLET ORAL DAILY
Qty: 30 TABLET | Refills: 6 | Status: SHIPPED | OUTPATIENT
Start: 2023-02-15 | End: 2023-08-15

## 2023-02-15 RX ORDER — ATORVASTATIN CALCIUM 40 MG/1
TABLET, FILM COATED ORAL
Qty: 30 TABLET | Refills: 6 | Status: SHIPPED | OUTPATIENT
Start: 2023-02-15 | End: 2023-08-15

## 2023-02-20 DIAGNOSIS — I10 PRIMARY HYPERTENSION: ICD-10-CM

## 2023-02-20 DIAGNOSIS — R94.39 ABNORMAL STRESS ECHOCARDIOGRAM: ICD-10-CM

## 2023-02-20 DIAGNOSIS — I25.10 CORONARY ARTERY CALCIFICATION SEEN ON CAT SCAN: ICD-10-CM

## 2023-02-20 RX ORDER — METOPROLOL SUCCINATE 25 MG/1
TABLET, EXTENDED RELEASE ORAL
Qty: 90 TABLET | Refills: 1 | Status: SHIPPED | OUTPATIENT
Start: 2023-02-20 | End: 2024-05-01

## 2023-03-28 ENCOUNTER — OFFICE VISIT (OUTPATIENT)
Dept: CARDIOLOGY | Facility: CLINIC | Age: 63
End: 2023-03-28
Payer: COMMERCIAL

## 2023-03-28 VITALS
BODY MASS INDEX: 25.27 KG/M2 | WEIGHT: 161 LBS | DIASTOLIC BLOOD PRESSURE: 60 MMHG | RESPIRATION RATE: 16 BRPM | HEART RATE: 92 BPM | HEIGHT: 67 IN | SYSTOLIC BLOOD PRESSURE: 124 MMHG

## 2023-03-28 DIAGNOSIS — I10 PRIMARY HYPERTENSION: ICD-10-CM

## 2023-03-28 DIAGNOSIS — I25.10 CORONARY ARTERY CALCIFICATION SEEN ON CAT SCAN: Primary | ICD-10-CM

## 2023-03-28 DIAGNOSIS — E78.00 HYPERCHOLESTEROLEMIA: ICD-10-CM

## 2023-03-28 DIAGNOSIS — R07.9 CHEST PAIN, UNSPECIFIED TYPE: ICD-10-CM

## 2023-03-28 PROCEDURE — 99213 OFFICE O/P EST LOW 20 MIN: CPT | Performed by: INTERNAL MEDICINE

## 2023-03-28 RX ORDER — FLUTICASONE PROPIONATE 50 MCG
1-2 SPRAY, SUSPENSION (ML) NASAL PRN
COMMUNITY
Start: 2022-12-10 | End: 2024-06-05

## 2023-03-28 NOTE — PATIENT INSTRUCTIONS
Decrease metoprolol to every other day dosing for 7 days then stop  Continue other medications as is  Follow up with PCP as planned  Follow up with me as needed

## 2023-03-28 NOTE — PROGRESS NOTES
Thank you, Dr. Benedicto Oliveira, for asking the [facility] team to see Ms. Linh Mustafa to follow-up on recent coronary angiogram for abnormal stress echo study and exertional dyspnea.    Assessment/Recommendations   Assessment:    1.  Abnormal stress echo with exertional dyspnea and chest discomfort.  Patient underwent coronary angiography in January demonstrating no significant epicardial disease.  Has been feeling well but continues to report episodes of chest discomfort which tend to come on with eating or emotional stress.  She will be following up with primary care later this week and likely would benefit from a GI evaluation.  If this is unrevealing, would consider the possibility of microvascular disease for her symptoms.  Amlodipine does not provide vascular dilatation and should help with that.  We also talked about trying sublingual nitroglycerin prior to any activity that might precipitate discomfort but will hold on that until any further GI evaluation is undertaken.  2.  Essential hypertension, well controlled.  We did add metoprolol succinate because of the concern of epicardial disease which was not identified.  I suggested we discontinue the metoprolol and will have her take it every other day for a week then discontinue.  She should remain on current dose of amlodipine.  3.  Hypercholesterolemia with previous LDL in the 90s.  She was initiated on atorvastatin 40 mg daily.  We will follow-up with primary care regarding lipids.    Plan:  1.  Decrease metoprolol to every other day dosing for 7 days then discontinue  2.  Continue all other medications as his  3.  Follow-up with primary care for lipid profile as well as to review her symptoms.  Consider GI evaluation.  Follow-up.     History of Present Illness    Ms. Linh Mustafa is a 62 year old female with history of essential hypertension, hypercholesterolemia and tobacco abuse who was seen in December for symptoms of  "exertional dyspnea and chest discomfort.  Subsequently underwent stress echo study demonstrating an area of hypokinesis involving the anteroseptal wall with no improvement in overall LV function with exercise.  This led to coronary angiography demonstrating no evidence of significant epicardial disease.  Since her angiogram in January, she reports continued episodes of chest discomfort although most appear to occur after eating or with emotional stress.  Denies any clear exertional chest discomfort.    ECG (personally reviewed): No ECG today    Cardiac Imaging Studies (personally reviewed): No new imaging     Physical Examination Review of Systems   /60 (BP Location: Left arm, Patient Position: Sitting, Cuff Size: Adult Regular)   Pulse 92   Resp 16   Ht 1.702 m (5' 7\")   Wt 73 kg (161 lb)   BMI 25.22 kg/m    Body mass index is 25.22 kg/m .  Wt Readings from Last 3 Encounters:   03/28/23 73 kg (161 lb)   01/25/23 74.8 kg (165 lb)   01/23/23 73.5 kg (162 lb)     General Appearance:   Awake, Alert, No acute distress.   HEENT:  No scleral icterus; the mucous membranes were pink and moist.   Neck: No cervical bruits or jugular venous distention    Chest: The spine was straight. The chest was symmetric.   Lungs:   Respirations unlabored; the lungs are clear to auscultation. No wheezing   Cardiovascular:    Regular rate and rhythm.  S1, S2 normal.  No murmur or gallop   Abdomen:  No organomegaly, masses, bruits, or tenderness. Bowels sounds are present   Extremities:  No peripheral edema bilaterally   Skin: No xanthelasma. Warm, Dry.   Musculoskeletal: No tenderness.   Neurologic: Mood and affect are appropriate.    Enc Vitals  BP: 124/60  Pulse: 92  Resp: 16  Weight: 73 kg (161 lb)  Height: 170.2 cm (5' 7\")                                         Medical History  Surgical History Family History Social History   Past Medical History:   Diagnosis Date     Hyperlipidemia      Hypertension     Past Surgical " History:   Procedure Laterality Date     BIOPSY BREAST Right     age 30 benign fibrous     CV CORONARY ANGIOGRAM N/A 1/25/2023    Procedure: Coronary Angiogram;  Surgeon: Lukas Irizarry MD;  Location: [facility] LAB CV     CV LEFT HEART CATH N/A 1/25/2023    Procedure: Left Heart Catheterization;  Surgeon: Lukas Irizarry MD;  Location: [facility] LAB CV    Family History   Problem Relation Age of Onset     Chronic Obstructive Pulmonary Disease Father      Colon Cancer Paternal Grandmother 70    Social History     Socioeconomic History     Marital status: Single     Spouse name: Not on file     Number of children: Not on file     Years of education: Not on file     Highest education level: Not on file   Occupational History     Not on file   Tobacco Use     Smoking status: Every Day     Packs/day: 1.00     Types: Cigarettes     Start date: 1977     Smokeless tobacco: Not on file   Vaping Use     Vaping Use: Never used   Substance and Sexual Activity     Alcohol use: Not on file     Comment: occasional     Drug use: Never     Sexual activity: Not on file   Other Topics Concern     Not on file   Social History Narrative    ** Merged History Encounter **          Social Determinants of Health     Financial Resource Strain: Not on file   Food Insecurity: Not on file   Transportation Needs: Not on file   Physical Activity: Not on file   Stress: Not on file   Social Connections: Not on file   Intimate Partner Violence: Not on file   Housing Stability: Not on file          Medications  Allergies   Current Outpatient Medications   Medication Sig Dispense Refill     amLODIPine (NORVASC) 10 MG tablet TAKE 1 TABLET (10 MG) BY MOUTH DAILY. 30 tablet 6     aspirin (ASA) 81 MG chewable tablet Take 81 mg by mouth daily       atorvastatin (LIPITOR) 40 MG tablet TAKE 1 TABLET BY MOUTH EVERY DAY 30 tablet 6     levothyroxine (SYNTHROID/LEVOTHROID) 200 MCG tablet take 1 tablet by mouth every day for 30 days       metoprolol  succinate ER (TOPROL XL) 25 MG 24 hr tablet TAKE 1 TABLET BY MOUTH EVERY DAY 90 tablet 1     nitroGLYcerin (NITROSTAT) 0.4 MG sublingual tablet For chest pain place 1 tablet under the tongue every 5 minutes for 3 doses. If symptoms persist 5 minutes after 1st dose call 911. 25 tablet 3     fluticasone (FLONASE) 50 MCG/ACT nasal spray Spray 1-2 sprays into one nostril alternating nostrils as needed        No Known Allergies      Lab Results    Chemistry/lipid CBC Cardiac Enzymes/BNP/TSH/INR   Recent Labs   Lab Test 01/23/23  1451 12/23/22  0238 02/23/22  1409   TRIG  --   --  176*   LDL  --   --  97   BUN 12   < > 11      < > 136   CO2 25   < > 24    < > = values in this interval not displayed.    Recent Labs   Lab Test 01/23/23  1451   WBC 7.3   HGB 13.6   HCT 38.1   *       Recent Labs   Lab Test 12/23/22  0238 02/23/22  1409   TSH  --  0.82   INR 1.05  --         A total of 25 minutes was spent reviewing patient's medical records, obtaining history and performing examination, as well as discussing diagnoses/ recommendations with patient and answering all questions.

## 2023-03-28 NOTE — LETTER
3/28/2023    Benedicto Oliveira MD  [address] [place] [address]    RE: Linh LEONARD Ramsey       Dear Colleague,     I had the pleasure of seeing Linh Mustafa in the [facility].      Thank you, Dr. Benedicto Oliveira, for asking the [facility] team to see Ms. Linh Mustafa to follow-up on recent coronary angiogram for abnormal stress echo study and exertional dyspnea.    Assessment/Recommendations   Assessment:    1.  Abnormal stress echo with exertional dyspnea and chest discomfort.  Patient underwent coronary angiography in January demonstrating no significant epicardial disease.  Has been feeling well but continues to report episodes of chest discomfort which tend to come on with eating or emotional stress.  She will be following up with primary care later this week and likely would benefit from a GI evaluation.  If this is unrevealing, would consider the possibility of microvascular disease for her symptoms.  Amlodipine does not provide vascular dilatation and should help with that.  We also talked about trying sublingual nitroglycerin prior to any activity that might precipitate discomfort but will hold on that until any further GI evaluation is undertaken.  2.  Essential hypertension, well controlled.  We did add metoprolol succinate because of the concern of epicardial disease which was not identified.  I suggested we discontinue the metoprolol and will have her take it every other day for a week then discontinue.  She should remain on current dose of amlodipine.  3.  Hypercholesterolemia with previous LDL in the 90s.  She was initiated on atorvastatin 40 mg daily.  We will follow-up with primary care regarding lipids.    Plan:  1.  Decrease metoprolol to every other day dosing for 7 days then discontinue  2.  Continue all other medications as his  3.  Follow-up with primary care for lipid profile as well as to review her symptoms.  Consider GI evaluation.  " Follow-up.     History of Present Illness    Ms. Linh Mustafa is a 62 year old female with history of essential hypertension, hypercholesterolemia and tobacco abuse who was seen in December for symptoms of exertional dyspnea and chest discomfort.  Subsequently underwent stress echo study demonstrating an area of hypokinesis involving the anteroseptal wall with no improvement in overall LV function with exercise.  This led to coronary angiography demonstrating no evidence of significant epicardial disease.  Since her angiogram in January, she reports continued episodes of chest discomfort although most appear to occur after eating or with emotional stress.  Denies any clear exertional chest discomfort.    ECG (personally reviewed): No ECG today    Cardiac Imaging Studies (personally reviewed): No new imaging     Physical Examination Review of Systems   /60 (BP Location: Left arm, Patient Position: Sitting, Cuff Size: Adult Regular)   Pulse 92   Resp 16   Ht 1.702 m (5' 7\")   Wt 73 kg (161 lb)   BMI 25.22 kg/m    Body mass index is 25.22 kg/m .  Wt Readings from Last 3 Encounters:   03/28/23 73 kg (161 lb)   01/25/23 74.8 kg (165 lb)   01/23/23 73.5 kg (162 lb)     General Appearance:   Awake, Alert, No acute distress.   HEENT:  No scleral icterus; the mucous membranes were pink and moist.   Neck: No cervical bruits or jugular venous distention    Chest: The spine was straight. The chest was symmetric.   Lungs:   Respirations unlabored; the lungs are clear to auscultation. No wheezing   Cardiovascular:    Regular rate and rhythm.  S1, S2 normal.  No murmur or gallop   Abdomen:  No organomegaly, masses, bruits, or tenderness. Bowels sounds are present   Extremities:  No peripheral edema bilaterally   Skin: No xanthelasma. Warm, Dry.   Musculoskeletal: No tenderness.   Neurologic: Mood and affect are appropriate.    Enc Vitals  BP: 124/60  Pulse: 92  Resp: 16  Weight: 73 kg (161 lb)  Height: 170.2 cm " "(5' 7\")                                         Medical History  Surgical History Family History Social History   Past Medical History:   Diagnosis Date     Hyperlipidemia      Hypertension     Past Surgical History:   Procedure Laterality Date     BIOPSY BREAST Right     age 30 benign fibrous     CV CORONARY ANGIOGRAM N/A 1/25/2023    Procedure: Coronary Angiogram;  Surgeon: Lukas Irizarry MD;  Location: [facility] CATH LAB CV     CV LEFT HEART CATH N/A 1/25/2023    Procedure: Left Heart Catheterization;  Surgeon: Lukas Irizarry MD;  Location: [facility] CATH LAB CV    Family History   Problem Relation Age of Onset     Chronic Obstructive Pulmonary Disease Father      Colon Cancer Paternal Grandmother 70    Social History     Socioeconomic History     Marital status: Single     Spouse name: Not on file     Number of children: Not on file     Years of education: Not on file     Highest education level: Not on file   Occupational History     Not on file   Tobacco Use     Smoking status: Every Day     Packs/day: 1.00     Types: Cigarettes     Start date: 1977     Smokeless tobacco: Not on file   Vaping Use     Vaping Use: Never used   Substance and Sexual Activity     Alcohol use: Not on file     Comment: occasional     Drug use: Never     Sexual activity: Not on file   Other Topics Concern     Not on file   Social History Narrative    ** Merged History Encounter **          Social Determinants of Health     Financial Resource Strain: Not on file   Food Insecurity: Not on file   Transportation Needs: Not on file   Physical Activity: Not on file   Stress: Not on file   Social Connections: Not on file   Intimate Partner Violence: Not on file   Housing Stability: Not on file          Medications  Allergies   Current Outpatient Medications   Medication Sig Dispense Refill     amLODIPine (NORVASC) 10 MG tablet TAKE 1 TABLET (10 MG) BY MOUTH DAILY. 30 tablet 6     aspirin (ASA) 81 MG chewable tablet Take 81 mg by mouth " daily       atorvastatin (LIPITOR) 40 MG tablet TAKE 1 TABLET BY MOUTH EVERY DAY 30 tablet 6     levothyroxine (SYNTHROID/LEVOTHROID) 200 MCG tablet take 1 tablet by mouth every day for 30 days       metoprolol succinate ER (TOPROL XL) 25 MG 24 hr tablet TAKE 1 TABLET BY MOUTH EVERY DAY 90 tablet 1     nitroGLYcerin (NITROSTAT) 0.4 MG sublingual tablet For chest pain place 1 tablet under the tongue every 5 minutes for 3 doses. If symptoms persist 5 minutes after 1st dose call 911. 25 tablet 3     fluticasone (FLONASE) 50 MCG/ACT nasal spray Spray 1-2 sprays into one nostril alternating nostrils as needed        No Known Allergies      Lab Results    Chemistry/lipid CBC Cardiac Enzymes/BNP/TSH/INR   Recent Labs   Lab Test 01/23/23  1451 12/23/22  0238 02/23/22  1409   TRIG  --   --  176*   LDL  --   --  97   BUN 12   < > 11      < > 136   CO2 25   < > 24    < > = values in this interval not displayed.    Recent Labs   Lab Test 01/23/23  1451   WBC 7.3   HGB 13.6   HCT 38.1   *       Recent Labs   Lab Test 12/23/22  0238 02/23/22  1409   TSH  --  0.82   INR 1.05  --         A total of 25 minutes was spent reviewing patient's medical records, obtaining history and performing examination, as well as discussing diagnoses/ recommendations with patient and answering all questions.                      Thank you for allowing me to participate in the care of your patient.      Sincerely,     Fauzia Nuñez MD     [facility]  cc: No referring provider defined for this encounter.

## 2023-04-06 ENCOUNTER — LAB REQUISITION (OUTPATIENT)
Dept: LAB | Facility: CLINIC | Age: 63
End: 2023-04-06

## 2023-04-06 DIAGNOSIS — E78.2 MIXED HYPERLIPIDEMIA: ICD-10-CM

## 2023-04-06 DIAGNOSIS — E03.9 HYPOTHYROIDISM, UNSPECIFIED: ICD-10-CM

## 2023-04-06 DIAGNOSIS — I10 ESSENTIAL (PRIMARY) HYPERTENSION: ICD-10-CM

## 2023-04-06 DIAGNOSIS — R68.2 DRY MOUTH, UNSPECIFIED: ICD-10-CM

## 2023-04-06 LAB
ANION GAP SERPL CALCULATED.3IONS-SCNC: 12 MMOL/L (ref 7–15)
BUN SERPL-MCNC: 11 MG/DL (ref 8–23)
CALCIUM SERPL-MCNC: 10.3 MG/DL (ref 8.8–10.2)
CHLORIDE SERPL-SCNC: 102 MMOL/L (ref 98–107)
CHOLEST SERPL-MCNC: 146 MG/DL
CREAT SERPL-MCNC: 0.54 MG/DL (ref 0.51–0.95)
DEPRECATED HCO3 PLAS-SCNC: 23 MMOL/L (ref 22–29)
GFR SERPL CREATININE-BSD FRML MDRD: >90 ML/MIN/1.73M2
GLUCOSE SERPL-MCNC: 94 MG/DL (ref 70–99)
HDLC SERPL-MCNC: 54 MG/DL
LDLC SERPL CALC-MCNC: 65 MG/DL
NONHDLC SERPL-MCNC: 92 MG/DL
POTASSIUM SERPL-SCNC: 3.9 MMOL/L (ref 3.4–5.3)
SODIUM SERPL-SCNC: 137 MMOL/L (ref 136–145)
TRIGL SERPL-MCNC: 135 MG/DL
TSH SERPL DL<=0.005 MIU/L-ACNC: <0.01 UIU/ML (ref 0.3–4.2)

## 2023-04-06 PROCEDURE — 87624 HPV HI-RISK TYP POOLED RSLT: CPT | Performed by: FAMILY MEDICINE

## 2023-04-06 PROCEDURE — 84443 ASSAY THYROID STIM HORMONE: CPT | Performed by: FAMILY MEDICINE

## 2023-04-06 PROCEDURE — 80061 LIPID PANEL: CPT | Performed by: FAMILY MEDICINE

## 2023-04-06 PROCEDURE — 86235 NUCLEAR ANTIGEN ANTIBODY: CPT | Performed by: FAMILY MEDICINE

## 2023-04-06 PROCEDURE — G0145 SCR C/V CYTO,THINLAYER,RESCR: HCPCS | Performed by: FAMILY MEDICINE

## 2023-04-06 PROCEDURE — G0124 SCREEN C/V THIN LAYER BY MD: HCPCS | Performed by: PATHOLOGY

## 2023-04-06 PROCEDURE — 80048 BASIC METABOLIC PNL TOTAL CA: CPT | Performed by: FAMILY MEDICINE

## 2023-04-11 LAB
BKR LAB AP GYN ADEQUACY: ABNORMAL
BKR LAB AP GYN INTERPRETATION: ABNORMAL
BKR LAB AP HPV REFLEX: ABNORMAL
BKR LAB AP PREVIOUS ABNL DX: ABNORMAL
BKR LAB AP PREVIOUS ABNORMAL: ABNORMAL
ENA SS-A AB SER IA-ACNC: <0.5 U/ML
ENA SS-A AB SER IA-ACNC: NEGATIVE
ENA SS-B IGG SER IA-ACNC: 0.9 U/ML
ENA SS-B IGG SER IA-ACNC: NEGATIVE
PATH REPORT.COMMENTS IMP SPEC: ABNORMAL
PATH REPORT.COMMENTS IMP SPEC: ABNORMAL
PATH REPORT.RELEVANT HX SPEC: ABNORMAL

## 2023-05-14 ENCOUNTER — HEALTH MAINTENANCE LETTER (OUTPATIENT)
Age: 63
End: 2023-05-14

## 2023-06-29 ENCOUNTER — ANCILLARY PROCEDURE (OUTPATIENT)
Dept: MAMMOGRAPHY | Facility: CLINIC | Age: 63
End: 2023-06-29
Attending: FAMILY MEDICINE
Payer: COMMERCIAL

## 2023-06-29 DIAGNOSIS — Z12.31 VISIT FOR SCREENING MAMMOGRAM: ICD-10-CM

## 2023-06-29 PROCEDURE — 77067 SCR MAMMO BI INCL CAD: CPT

## 2023-08-15 DIAGNOSIS — E78.00 HYPERCHOLESTEROLEMIA: ICD-10-CM

## 2023-08-15 DIAGNOSIS — I25.10 CORONARY ARTERY CALCIFICATION SEEN ON CAT SCAN: Primary | ICD-10-CM

## 2023-08-15 DIAGNOSIS — R07.9 CHEST PAIN, UNSPECIFIED TYPE: ICD-10-CM

## 2023-08-15 DIAGNOSIS — I10 PRIMARY HYPERTENSION: ICD-10-CM

## 2023-08-15 DIAGNOSIS — I25.10 CORONARY ARTERY CALCIFICATION SEEN ON CAT SCAN: ICD-10-CM

## 2023-08-15 RX ORDER — AMLODIPINE BESYLATE 10 MG/1
10 TABLET ORAL DAILY
Qty: 90 TABLET | Refills: 1 | Status: SHIPPED | OUTPATIENT
Start: 2023-08-15 | End: 2024-05-28

## 2023-08-15 RX ORDER — ATORVASTATIN CALCIUM 40 MG/1
TABLET, FILM COATED ORAL
Qty: 90 TABLET | Refills: 1 | Status: SHIPPED | OUTPATIENT
Start: 2023-08-15 | End: 2024-05-01

## 2023-10-10 ENCOUNTER — LAB REQUISITION (OUTPATIENT)
Dept: LAB | Facility: CLINIC | Age: 63
End: 2023-10-10

## 2023-10-10 DIAGNOSIS — E03.9 HYPOTHYROIDISM, UNSPECIFIED: ICD-10-CM

## 2023-10-10 PROCEDURE — 84443 ASSAY THYROID STIM HORMONE: CPT | Performed by: STUDENT IN AN ORGANIZED HEALTH CARE EDUCATION/TRAINING PROGRAM

## 2023-10-11 LAB — TSH SERPL DL<=0.005 MIU/L-ACNC: 0.08 UIU/ML (ref 0.3–4.2)

## 2023-11-29 ENCOUNTER — LAB REQUISITION (OUTPATIENT)
Dept: LAB | Facility: CLINIC | Age: 63
End: 2023-11-29

## 2023-11-29 DIAGNOSIS — I10 ESSENTIAL (PRIMARY) HYPERTENSION: ICD-10-CM

## 2023-11-29 PROCEDURE — 80053 COMPREHEN METABOLIC PANEL: CPT | Performed by: NURSE PRACTITIONER

## 2023-11-30 LAB
ALBUMIN SERPL BCG-MCNC: 4.2 G/DL (ref 3.5–5.2)
ALP SERPL-CCNC: 117 U/L (ref 40–150)
ALT SERPL W P-5'-P-CCNC: 13 U/L (ref 0–50)
ANION GAP SERPL CALCULATED.3IONS-SCNC: 11 MMOL/L (ref 7–15)
AST SERPL W P-5'-P-CCNC: 20 U/L (ref 0–45)
BILIRUB SERPL-MCNC: 0.6 MG/DL
BUN SERPL-MCNC: 7.9 MG/DL (ref 8–23)
CALCIUM SERPL-MCNC: 10.3 MG/DL (ref 8.8–10.2)
CHLORIDE SERPL-SCNC: 101 MMOL/L (ref 98–107)
CREAT SERPL-MCNC: 0.59 MG/DL (ref 0.51–0.95)
DEPRECATED HCO3 PLAS-SCNC: 23 MMOL/L (ref 22–29)
EGFRCR SERPLBLD CKD-EPI 2021: >90 ML/MIN/1.73M2
GLUCOSE SERPL-MCNC: 101 MG/DL (ref 70–99)
POTASSIUM SERPL-SCNC: 4.5 MMOL/L (ref 3.4–5.3)
PROT SERPL-MCNC: 7.3 G/DL (ref 6.4–8.3)
SODIUM SERPL-SCNC: 135 MMOL/L (ref 135–145)

## 2023-12-06 ENCOUNTER — HOSPITAL ENCOUNTER (EMERGENCY)
Facility: HOSPITAL | Age: 63
Discharge: HOME | End: 2023-12-06
Attending: FAMILY MEDICINE | Admitting: FAMILY MEDICINE
Payer: COMMERCIAL

## 2023-12-06 VITALS
RESPIRATION RATE: 28 BRPM | SYSTOLIC BLOOD PRESSURE: 114 MMHG | OXYGEN SATURATION: 92 % | HEART RATE: 90 BPM | DIASTOLIC BLOOD PRESSURE: 57 MMHG | TEMPERATURE: 97.9 F

## 2023-12-06 DIAGNOSIS — T78.2XXA ANAPHYLAXIS, INITIAL ENCOUNTER: ICD-10-CM

## 2023-12-06 PROCEDURE — 99291 CRITICAL CARE FIRST HOUR: CPT

## 2023-12-06 PROCEDURE — 99285 EMERGENCY DEPT VISIT HI MDM: CPT | Mod: 25

## 2023-12-06 PROCEDURE — 96372 THER/PROPH/DIAG INJ SC/IM: CPT | Mod: XS

## 2023-12-06 PROCEDURE — 250N000011 HC RX IP 250 OP 636: Mod: JZ | Performed by: FAMILY MEDICINE

## 2023-12-06 PROCEDURE — 96361 HYDRATE IV INFUSION ADD-ON: CPT

## 2023-12-06 PROCEDURE — 93005 ELECTROCARDIOGRAM TRACING: CPT | Performed by: FAMILY MEDICINE

## 2023-12-06 PROCEDURE — 96375 TX/PRO/DX INJ NEW DRUG ADDON: CPT

## 2023-12-06 PROCEDURE — 96374 THER/PROPH/DIAG INJ IV PUSH: CPT

## 2023-12-06 PROCEDURE — 250N000009 HC RX 250: Performed by: FAMILY MEDICINE

## 2023-12-06 PROCEDURE — 258N000003 HC RX IP 258 OP 636: Performed by: FAMILY MEDICINE

## 2023-12-06 RX ORDER — PREDNISONE 20 MG/1
TABLET ORAL
Qty: 10 TABLET | Refills: 0 | Status: SHIPPED | OUTPATIENT
Start: 2023-12-06 | End: 2024-05-01

## 2023-12-06 RX ORDER — DIPHENHYDRAMINE HYDROCHLORIDE 50 MG/ML
25 INJECTION INTRAMUSCULAR; INTRAVENOUS ONCE
Status: COMPLETED | OUTPATIENT
Start: 2023-12-06 | End: 2023-12-06

## 2023-12-06 RX ORDER — METHYLPREDNISOLONE SODIUM SUCCINATE 125 MG/2ML
125 INJECTION, POWDER, LYOPHILIZED, FOR SOLUTION INTRAMUSCULAR; INTRAVENOUS ONCE
Status: COMPLETED | OUTPATIENT
Start: 2023-12-06 | End: 2023-12-06

## 2023-12-06 RX ORDER — LORAZEPAM 2 MG/ML
0.5 INJECTION INTRAMUSCULAR ONCE
Status: COMPLETED | OUTPATIENT
Start: 2023-12-06 | End: 2023-12-06

## 2023-12-06 RX ADMIN — DIPHENHYDRAMINE HYDROCHLORIDE 25 MG: 50 INJECTION INTRAMUSCULAR; INTRAVENOUS at 19:36

## 2023-12-06 RX ADMIN — LORAZEPAM 0.5 MG: 2 INJECTION INTRAMUSCULAR; INTRAVENOUS at 20:05

## 2023-12-06 RX ADMIN — EPINEPHRINE 0.3 MG: 1 INJECTION INTRAMUSCULAR; INTRAVENOUS; SUBCUTANEOUS at 19:25

## 2023-12-06 RX ADMIN — METHYLPREDNISOLONE SODIUM SUCCINATE 125 MG: 125 INJECTION, POWDER, FOR SOLUTION INTRAMUSCULAR; INTRAVENOUS at 19:42

## 2023-12-06 RX ADMIN — FAMOTIDINE 20 MG: 10 INJECTION, SOLUTION INTRAVENOUS at 19:38

## 2023-12-06 RX ADMIN — SODIUM CHLORIDE 1000 ML: 9 INJECTION, SOLUTION INTRAVENOUS at 19:34

## 2023-12-06 ASSESSMENT — ACTIVITIES OF DAILY LIVING (ADL): ADLS_ACUITY_SCORE: 35

## 2023-12-07 NOTE — DISCHARGE INSTRUCTIONS
Take Benadryl every 6 hours for 24 hours    Take prednisone as prescribed starting tomorrow morning    Do not take amoxicillin

## 2023-12-07 NOTE — ED TRIAGE NOTES
Pt took amoxicillin for the first time at 1500 today.  30 min later she started having sob, dizziness, and itchy/red skin. She has grunting with exhalation.

## 2023-12-07 NOTE — ED PROVIDER NOTES
EMERGENCY DEPARTMENT ENCOUNTER      NAME: Linh Mustafa  AGE: 63 year old female  YOB: 1960  MRN: [id number]  EVALUATION DATE & TIME: No admission date for patient encounter.    PCP: Benedicto Oliveira    ED PROVIDER: Nick Sauceda M.D.    Chief Complaint   Patient presents with    Allergic Reaction       FINAL IMPRESSION:  1. Anaphylaxis, initial encounter        ED COURSE & MEDICAL DECISION MAKING:    Pertinent Labs & Imaging studies independently interpreted by me. (See chart for details)  7:15 PM Patient seen and examined, external records reviewed.  7:56 PM rechecked, she says her breathing feels better and her rash is improved, however she does complain of some chest pressure.  EKG does not show any acute ischemic changes, patient has a history of anxiety and states this feels similar.  Ativan IV as ordered.  8:37 PM patient rechecked, she is feeling better.  Rashes resolved, no breathing difficulty, no abdominal pain.  Patient is stable for discharge with continued prednisone and antihistamines and outpatient follow-up.      At the conclusion of the encounter I discussed the results of all of the tests and the disposition. The questions were answered. The patient or family acknowledged understanding and was agreeable with the care plan.     Medical Decision Making    History:  Supplemental history from: Documented in chart, if applicable  External Record(s) reviewed: Documented in chart, if applicable.    Work Up:  Chart documentation includes differential considered and any EKGs or imaging independently interpreted by provider, where specified.  In additional to work up documented, I considered the following work up: Documented in chart, if applicable.    External consultation:  Discussion of management with another provider: Documented in chart, if applicable    Complicating factors:  Care impacted by chronic illness: Hyperlipidemia, Hypertension, and Smoking / Nicotine Use  Care  affected by social determinants of health: Access to Medical Care    Disposition considerations: Discharge. I prescribed additional prescription strength medication(s) as charted. I considered admission, but discharged patient after significant clinical improvement.        EKG:    Performed at: 7:53 PM  Impression: Left anterior fascicular block, no acute ischemic changes  Rate: 80  Rhythm: Sinus  Axis: Normal  NM Interval: 140  QRS Interval: 72  QTc Interval: 424  ST Changes: No acute ischemic changes  Comparison: January 2023, no acute changes    I have independently reviewed and interpreted the EKG(s) documented above.    PROCEDURES:     Critical Care     Performed by: Dr. Nick Sauceda  Total critical care time: 40 minutes  Critical care was necessary to treat or prevent imminent or life-threatening deterioration of the following conditions: Anaphylaxis with IM epinephrine given  Critical care was time spent personally by me on the following activities: development of treatment plan with patient or surrogate, discussions with consultants, examination of patient, evaluation of patient's response to treatment, obtaining history from patient or surrogate, ordering and performing treatments and interventions, ordering and review of laboratory studies, ordering and review of radiographic studies, re-evaluation of patient's condition and monitoring for potential decompensation.  Critical care time was exclusive of separately billable procedures and treating other patients.      MEDICATIONS GIVEN IN THE EMERGENCY:  Medications   diphenhydrAMINE (BENADRYL) injection 25 mg (25 mg Intravenous $Given 12/6/23 1936)   methylPREDNISolone sodium succinate (solu-MEDROL) injection 125 mg (125 mg Intravenous $Given by Other 12/6/23 1942)   famotidine (PEPCID) injection 20 mg (20 mg Intravenous $Given 12/6/23 1938)   EPINEPHrine (ADRENALIN) kit 0.3 mg (0.3 mg Intramuscular $Given 12/6/23 1925)   sodium chloride 0.9% BOLUS 1,000  mL (0 mLs Intravenous Stopped 12/6/23 2035)   LORazepam (ATIVAN) injection 0.5 mg (0.5 mg Intravenous $Given 12/6/23 2005)       NEW PRESCRIPTIONS STARTED AT TODAY'S ER VISIT  New Prescriptions    PREDNISONE (DELTASONE) 20 MG TABLET    Take two tablets (= 40mg) each day for 5 (five) days       =================================================================    HPI    Patient information was obtained from: EMS and patient      Linh Mustafa is a 63 year old female with a pertinent history of HTN, HLD, tobacco use, who presents to this ED via walk-in with family for evaluation of allergic reaction.    Patient reports that she has been dealing with cough, congestion, and cold symptoms for a couple of days so she thought it was a sinus infection. She had a televisit today and she was prescribed Amoxicillin. Patient has never had amoxicillin before. At 5 PM today, she took the amoxicillin and then about 30 minutes after, she developed sudden onset itchy diffuse rash and shortness of breath. She also associates some intermittent diarrhea. She hasn't taken any anti-histamines PTA.    She otherwise denies associating vomiting, nausea, and lip swelling. She denies any other allergies to medications. There were no other concerns/complaints at this time.     Shx: She is a smoker.      REVIEW OF SYSTEMS   Review of Systems   All other systems reviewed and negative    PAST MEDICAL HISTORY:  Past Medical History:   Diagnosis Date    Hyperlipidemia     Hypertension        PAST SURGICAL HISTORY:  Past Surgical History:   Procedure Laterality Date    BIOPSY BREAST Right     age 30 benign fibrous    CV CORONARY ANGIOGRAM N/A 1/25/2023    Procedure: Coronary Angiogram;  Surgeon: Lukas Irizarry MD;  Location: [facility] CV    CV LEFT HEART CATH N/A 1/25/2023    Procedure: Left Heart Catheterization;  Surgeon: Lukas Irizarry MD;  Location: [facility] CV       CURRENT MEDICATIONS:    No current  facility-administered medications for this encounter.     Current Outpatient Medications   Medication    predniSONE (DELTASONE) 20 MG tablet    amLODIPine (NORVASC) 10 MG tablet    aspirin (ASA) 81 MG chewable tablet    atorvastatin (LIPITOR) 40 MG tablet    fluticasone (FLONASE) 50 MCG/ACT nasal spray    levothyroxine (SYNTHROID/LEVOTHROID) 200 MCG tablet    metoprolol succinate ER (TOPROL XL) 25 MG 24 hr tablet    nitroGLYcerin (NITROSTAT) 0.4 MG sublingual tablet       ALLERGIES:  Allergies   Allergen Reactions    Amoxicillin Shortness Of Breath, Itching and Rash       FAMILY HISTORY:  Family History   Problem Relation Age of Onset    Chronic Obstructive Pulmonary Disease Father     Colon Cancer Paternal Grandmother 70       SOCIAL HISTORY:   Social History     Socioeconomic History    Marital status: Single   Tobacco Use    Smoking status: Every Day     Packs/day: 1     Types: Cigarettes     Start date: 1977   Vaping Use    Vaping Use: Never used   Substance and Sexual Activity    Drug use: Never   Social History Narrative    ** Merged History Encounter **            VITALS:  /58   Pulse 83   Temp 97.9  F (36.6  C) (Oral)   Resp 26   SpO2 92%     PHYSICAL EXAM:  Physical Exam  Vitals and nursing note reviewed.   Constitutional:       Appearance: Normal appearance.   HENT:      Head: Normocephalic and atraumatic.      Right Ear: External ear normal.      Left Ear: External ear normal.      Nose: Nose normal.      Mouth/Throat:      Mouth: Mucous membranes are moist.   Eyes:      Extraocular Movements: Extraocular movements intact.      Conjunctiva/sclera: Conjunctivae normal.      Pupils: Pupils are equal, round, and reactive to light.   Cardiovascular:      Rate and Rhythm: Regular rhythm. Tachycardia present.   Pulmonary:      Effort: Pulmonary effort is normal.      Breath sounds: Normal breath sounds. No wheezing or rales.   Abdominal:      General: Abdomen is flat. There is no distension.       Palpations: Abdomen is soft.      Tenderness: There is no abdominal tenderness. There is no guarding.   Musculoskeletal:         General: Normal range of motion.      Cervical back: Normal range of motion and neck supple.      Right lower leg: No edema.      Left lower leg: No edema.   Lymphadenopathy:      Cervical: No cervical adenopathy.   Skin:     General: Skin is warm and dry.      Comments: Diffuse flushing of the skin.   Neurological:      General: No focal deficit present.      Mental Status: She is alert and oriented to person, place, and time. Mental status is at baseline.      Comments: No gross focal neurologic deficits   Psychiatric:         Mood and Affect: Mood normal.         Behavior: Behavior normal.         Thought Content: Thought content normal.          LAB:  All pertinent labs reviewed and interpreted.       RADIOLOGY:  Reviewed all pertinent imaging. Please see official radiology report.  No orders to display       I, Makayla Pineda, am serving as a scribe to document services personally performed by Dr. Sauceda based on my observation and the provider's statements to me. INick MD attest that Makayla Pineda is acting in a scribe capacity, has observed my performance of the services and has documented them in accordance with my direction.    Nick aSuceda M.D.  Emergency Medicine  [facility] EMERGENCY DEPARTMENT  [address] [id number] [phone]  Dept: [phone]       Nick Sauceda MD  12/06/23 4217

## 2023-12-09 LAB
ATRIAL RATE - MUSE: 80 BPM
DIASTOLIC BLOOD PRESSURE - MUSE: 79 MMHG
INTERPRETATION ECG - MUSE: NORMAL
P AXIS - MUSE: 67 DEGREES
PR INTERVAL - MUSE: 140 MS
QRS DURATION - MUSE: 72 MS
QT - MUSE: 368 MS
QTC - MUSE: 424 MS
R AXIS - MUSE: -63 DEGREES
SYSTOLIC BLOOD PRESSURE - MUSE: 186 MMHG
T AXIS - MUSE: 55 DEGREES
VENTRICULAR RATE- MUSE: 80 BPM

## 2024-01-17 ENCOUNTER — LAB REQUISITION (OUTPATIENT)
Dept: LAB | Facility: CLINIC | Age: 64
End: 2024-01-17

## 2024-01-17 DIAGNOSIS — E03.9 HYPOTHYROIDISM, UNSPECIFIED: ICD-10-CM

## 2024-01-17 PROCEDURE — 84443 ASSAY THYROID STIM HORMONE: CPT | Performed by: NURSE PRACTITIONER

## 2024-01-18 LAB — TSH SERPL DL<=0.005 MIU/L-ACNC: 0.02 UIU/ML (ref 0.3–4.2)

## 2024-02-06 ENCOUNTER — TRANSFERRED RECORDS (OUTPATIENT)
Dept: HEALTH INFORMATION MANAGEMENT | Facility: CLINIC | Age: 64
End: 2024-02-06
Payer: COMMERCIAL

## 2024-02-06 LAB — PHQ9 SCORE: 2

## 2024-02-15 ENCOUNTER — TRANSFERRED RECORDS (OUTPATIENT)
Dept: HEALTH INFORMATION MANAGEMENT | Facility: CLINIC | Age: 64
End: 2024-02-15
Payer: COMMERCIAL

## 2024-02-19 ENCOUNTER — HOSPITAL ENCOUNTER (OUTPATIENT)
Facility: HOSPITAL | Age: 64
End: 2024-02-19
Attending: INTERNAL MEDICINE | Admitting: INTERNAL MEDICINE
Payer: COMMERCIAL

## 2024-02-23 ENCOUNTER — TRANSFERRED RECORDS (OUTPATIENT)
Dept: HEALTH INFORMATION MANAGEMENT | Facility: CLINIC | Age: 64
End: 2024-02-23
Payer: COMMERCIAL

## 2024-02-27 ENCOUNTER — TRANSCRIBE ORDERS (OUTPATIENT)
Dept: OTHER | Age: 64
End: 2024-02-27

## 2024-02-27 ENCOUNTER — MEDICAL CORRESPONDENCE (OUTPATIENT)
Dept: HEALTH INFORMATION MANAGEMENT | Facility: CLINIC | Age: 64
End: 2024-02-27
Payer: COMMERCIAL

## 2024-02-27 ENCOUNTER — TRANSFERRED RECORDS (OUTPATIENT)
Dept: HEALTH INFORMATION MANAGEMENT | Facility: CLINIC | Age: 64
End: 2024-02-27
Payer: COMMERCIAL

## 2024-02-27 DIAGNOSIS — C15.4 PRIMARY SQUAMOUS CELL CARCINOMA OF MIDDLE THIRD OF ESOPHAGUS (H): Primary | ICD-10-CM

## 2024-02-28 ENCOUNTER — PATIENT OUTREACH (OUTPATIENT)
Dept: SURGERY | Facility: CLINIC | Age: 64
End: 2024-02-28

## 2024-02-28 NOTE — PROGRESS NOTES
New Patient Oncology Nurse Navigator Note     Referring provider: Dr Chilel, Oncology    Referring Clinic/Organization: [facility]      Referred to: Thoracic Surgery    Requested provider (if applicable): First available - did not specify     Referral Received: 02/27/24       Evaluation for : esophageal cancer     Clinical History (per Nurse review of records provided):      * 2/15/2024 EGD ([facility])                        * 2/23/2024 CT chest ([facility])          * 2/27/2024 Pt met w/ [facility] Dr Chilel to establish care. MD plans further staging with PET & EUS, referrals to Radiation Onc and Thoracic Surgeon at [facility].      * 3/4/2024 PET planned? ______ [facility]?      * 4/5/2024 EUS by [facility] Dr Pennington at [facility] location_______      Clinical Assessment / Barriers to Care (Per Nurse):    Will offer next available Thoracic Surgeon (Dr Martinez 3/7/24 at [facility]) after PET planned 3/4. Or schedule per pt preference.        Records Location: [facility]   Faxed - Media tab/Scanned     Records Needed:     Outside images and path listed above    Additional testing needed prior to consult:     PET preferred        Lance Jasso, RN, BSN, OCN  Oncology New Patient Nurse Navigator   [facility]  [phone]

## 2024-03-07 ENCOUNTER — TRANSFERRED RECORDS (OUTPATIENT)
Dept: HEALTH INFORMATION MANAGEMENT | Facility: CLINIC | Age: 64
End: 2024-03-07
Payer: COMMERCIAL

## 2024-03-08 ENCOUNTER — LAB REQUISITION (OUTPATIENT)
Dept: LAB | Facility: CLINIC | Age: 64
End: 2024-03-08

## 2024-03-08 DIAGNOSIS — E03.9 HYPOTHYROIDISM, UNSPECIFIED: ICD-10-CM

## 2024-03-08 PROCEDURE — 84443 ASSAY THYROID STIM HORMONE: CPT | Performed by: NURSE PRACTITIONER

## 2024-03-09 LAB — TSH SERPL DL<=0.005 MIU/L-ACNC: 0.08 UIU/ML (ref 0.3–4.2)

## 2024-03-11 ENCOUNTER — TRANSFERRED RECORDS (OUTPATIENT)
Dept: HEALTH INFORMATION MANAGEMENT | Facility: CLINIC | Age: 64
End: 2024-03-11
Payer: COMMERCIAL

## 2024-03-12 NOTE — TELEPHONE ENCOUNTER
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  They will get that pushed over [facility]SA.  Pt also had a consult for radiation yesterday  12:11 PM   CB from Pt and she stated she was diagnosed at [facility] via endosopic Bx    To request slides the direct fax is [phone]    March 15, 2024  10:18 AM  Call from Aixa at [facility], slides are currently with [facility] and will be sent here as soon as they are returned.        Records Requested  TJ   Clinic name Comments/Action Taken   [facility] 2024 10:28 AM    Called and requested CT to be pushed to PACS and for report to be faxed   10:42 AM  Resolved to PACS   [facility] 2024 10:28 AM    Called and requested all records to be faxed   12:32 PM  Received and sent to HIM   [facility] 2024 10:36 AM  Called and requested PET be pushed to PACS  10:42 AM  Resolved to PACS   [facility] 2024   Called and requested endoscopic Bx report   2024  Report received and faxed to HIM fo urgent upload.  Emailed to  as appt is today.  Faxed request for pathology slides Trk [phone]  March 15, 2024  See note above

## 2024-03-14 ENCOUNTER — PRE VISIT (OUTPATIENT)
Dept: PULMONOLOGY | Facility: CLINIC | Age: 64
End: 2024-03-14

## 2024-03-14 ENCOUNTER — ONCOLOGY VISIT (OUTPATIENT)
Dept: PULMONOLOGY | Facility: CLINIC | Age: 64
End: 2024-03-14
Payer: COMMERCIAL

## 2024-03-14 VITALS
BODY MASS INDEX: 23.54 KG/M2 | DIASTOLIC BLOOD PRESSURE: 62 MMHG | OXYGEN SATURATION: 98 % | WEIGHT: 150 LBS | HEART RATE: 75 BPM | SYSTOLIC BLOOD PRESSURE: 124 MMHG | HEIGHT: 67 IN

## 2024-03-14 DIAGNOSIS — C15.4 MALIGNANT NEOPLASM OF MIDDLE THIRD OF ESOPHAGUS (H): Primary | ICD-10-CM

## 2024-03-14 PROCEDURE — 99204 OFFICE O/P NEW MOD 45 MIN: CPT | Performed by: THORACIC SURGERY (CARDIOTHORACIC VASCULAR SURGERY)

## 2024-03-14 RX ORDER — FAMOTIDINE 20 MG/1
20 TABLET, FILM COATED ORAL 2 TIMES DAILY
COMMUNITY

## 2024-03-14 NOTE — PROGRESS NOTES
THORACIC SURGERY - NEW PATIENT OFFICE VISIT      Dear Dr. Chilel,    I saw Linh Mustafa at your request in consultation for the evaluation and treatment of a squamous cell carcinoma.     HPI  Linh Mustafa is a 63 year old female who started to have heartburn and epigastric discomfort in December/January and has had unintentional weight loss of ~15 lbs. She underwent EGD and PET CT. Mrs. Mustafa is scheduled for EUS on 4/5/2024).    Previsit Tests   EGD with biopsy (Dr. Burleson, 2/15/2024): Mid-esophageal mass from 26-31 cm with a 7 mm cratered lesion, suspected short segment BE (37-40 cm); biopsy = moderately-differentiated squamous cell carcinoma; biopsy at 38 cm = Auguste's mucosa, no dysplasia  PET (3/7/2024): Mid-esophageal lesion extends about 6 cm in length with SUV of 24, no metastatic disease     PMH  Reviewed, as below    Past Medical History:   Diagnosis Date    Hyperlipidemia     Hypertension         PSH  Reviewed, as below    Past Surgical History:   Procedure Laterality Date    BIOPSY BREAST Right     age 30 benign fibrous    CV CORONARY ANGIOGRAM N/A 1/25/2023    Procedure: Coronary Angiogram;  Surgeon: Lukas Irizarry MD;  Location: [facility] CV    CV LEFT HEART CATH N/A 1/25/2023    Procedure: Left Heart Catheterization;  Surgeon: Lukas Irizarry MD;  Location: [facility] CATH LAB CV      Current Outpatient Medications   Medication    amLODIPine (NORVASC) 10 MG tablet    aspirin (ASA) 81 MG chewable tablet    atorvastatin (LIPITOR) 40 MG tablet    fluticasone (FLONASE) 50 MCG/ACT nasal spray    levothyroxine (SYNTHROID/LEVOTHROID) 200 MCG tablet    metoprolol succinate ER (TOPROL XL) 25 MG 24 hr tablet    nitroGLYcerin (NITROSTAT) 0.4 MG sublingual tablet    predniSONE (DELTASONE) 20 MG tablet     No current facility-administered medications for this visit.         ETOH 1 drink/week  TOB current smoker     Physical examination  /62 (BP Location: Left arm, Patient Position:  "Chair, Cuff Size: Adult Regular)   Pulse 75   Ht 1.702 m (5' 7\")   Wt 68 kg (150 lb)   SpO2 98%   BMI 23.49 kg/m     No palpable cervical LN.    From a personal perspective, she is here with her daughter, Yulisa.      Code Status: Full Code    Health Care Proxy: We discussed this and gave her a form.    IMPRESSION (C15.4) Malignant neoplasm of middle third of esophagus (H)  (primary encounter diagnosis)    This person is a 63 year old female with a clinical T3N0M0 (stage II) squamous cell carcinoma of the mid-esophagus    PLAN  I spent 45 min on the date of the encounter in chart review, patient visit, review of tests, documentation and/or discussion with other providers about the issues documented above. I reviewed the plan as follows:    1) Expedite EUS: She will undergo EUS by Dr. Kramer on 3/21.  2) Surgery: I plan to see her back ~3-4 weeks after completion of neoadjuvant chemo-radiotherapy. We will then schedule her for a j-tube placement followed by a three-hole esophagectomy.    Today we discussed in general terms what surgery entails. We also talked about the absolute necessity to quit smoking for > 4 weeks before surgery. The risk of an esophagectomy in a current smoker is too high. Ms. Mustafa verbalized understanding of this and was in agreement.    I discussed her case with Dr. Chilel. He will call me after she completes neoadjuvant treatment, and then I'll see her in clinic with a PET. I have ordered the PET and clinic follow-up already.      I appreciate the opportunity to participate in the care of your patient and will keep you updated.      Sincerely,    Bryant Martinez MD    "

## 2024-03-15 ENCOUNTER — PREP FOR PROCEDURE (OUTPATIENT)
Dept: GASTROENTEROLOGY | Facility: CLINIC | Age: 64
End: 2024-03-15
Payer: COMMERCIAL

## 2024-03-15 ENCOUNTER — PATIENT OUTREACH (OUTPATIENT)
Dept: GASTROENTEROLOGY | Facility: CLINIC | Age: 64
End: 2024-03-15
Payer: COMMERCIAL

## 2024-03-15 DIAGNOSIS — C15.9 ESOPHAGEAL CANCER (H): Primary | ICD-10-CM

## 2024-03-15 NOTE — TELEPHONE ENCOUNTER
Per Dr Kramer- please add this as a third case for next Thursday in my OR 17.     Physician: Nia   Timing: 3/21  Scope time needed:40 minutes   Anesthesia:MAC   Dx: Esophageal squamous cell carcinoma   Tier:Tier 2 - Not life/limb threatening but potential for  patient morbidity/mortality or adverse  patient/disease outcome if  surgery/procedure not done within 30 day   Location: [facility]   Header of letter for pt communication:    Endoscopic examination with ultrasound for cancer staging.       Called to discuss with patient.     Explained they can expect a call from  for date of procedure, OR will call 1-2 days prior to procedure date with arrival time, will need a , someone to stay with them for 24 hours and should stay in town for 24 hours (within 45 min of Hospital) post procedure    Patient needs to get pre-op physical completed. If outside [facility] system will need physical faxed to number [phone]     If you do not get a preop physical, your procedure could be cancelled, patient voiced understanding*    Preop Plan:see [facility] note from 2-29, includes ROS    Does patient have any history of gastric bypass/gastric surgery/altered panc/bili anatomy?no    Any recent Covid symptoms or positive covid test?no    Does patient have [employer] insurance?:no    Med Review    Blood thinner -  no  ASA - no  Diabetic - no  Any meds by injection or mouth for weight loss or diabetes-no    Patient Education r/t procedure:no    A pre-op nurse will call 1-2 days prior to the procedure.    NPO/Prep:   Adults and Children of all ages may consume solids up to 8 hours prior to arrival time - may consume clear liquids up to 1 hour prior to arrival time.    Other specific details/comments:none     Verbalized understanding of all instructions. All questions answered.     Procedure order placed, message routed to OR / Endo

## 2024-03-21 ENCOUNTER — ANESTHESIA (OUTPATIENT)
Dept: SURGERY | Facility: CLINIC | Age: 64
End: 2024-03-21
Payer: COMMERCIAL

## 2024-03-21 ENCOUNTER — HOSPITAL ENCOUNTER (OUTPATIENT)
Facility: CLINIC | Age: 64
Discharge: HOME | End: 2024-03-21
Attending: INTERNAL MEDICINE | Admitting: INTERNAL MEDICINE
Payer: COMMERCIAL

## 2024-03-21 ENCOUNTER — ANESTHESIA EVENT (OUTPATIENT)
Dept: SURGERY | Facility: CLINIC | Age: 64
End: 2024-03-21
Payer: COMMERCIAL

## 2024-03-21 VITALS
OXYGEN SATURATION: 97 % | DIASTOLIC BLOOD PRESSURE: 76 MMHG | TEMPERATURE: 98.2 F | BODY MASS INDEX: 23.56 KG/M2 | WEIGHT: 150.13 LBS | RESPIRATION RATE: 14 BRPM | HEIGHT: 67 IN | SYSTOLIC BLOOD PRESSURE: 119 MMHG

## 2024-03-21 PROCEDURE — 88305 TISSUE EXAM BY PATHOLOGIST: CPT | Mod: 26 | Performed by: PATHOLOGY

## 2024-03-21 PROCEDURE — 88172 CYTP DX EVAL FNA 1ST EA SITE: CPT | Mod: 26 | Performed by: PATHOLOGY

## 2024-03-21 PROCEDURE — 360N000075 HC SURGERY LEVEL 2, PER MIN: Performed by: INTERNAL MEDICINE

## 2024-03-21 PROCEDURE — 88305 TISSUE EXAM BY PATHOLOGIST: CPT | Mod: TC | Performed by: INTERNAL MEDICINE

## 2024-03-21 PROCEDURE — 43239 EGD BIOPSY SINGLE/MULTIPLE: CPT | Performed by: ANESTHESIOLOGY

## 2024-03-21 PROCEDURE — 258N000003 HC RX IP 258 OP 636: Performed by: NURSE ANESTHETIST, CERTIFIED REGISTERED

## 2024-03-21 PROCEDURE — 272N000001 HC OR GENERAL SUPPLY STERILE: Performed by: INTERNAL MEDICINE

## 2024-03-21 PROCEDURE — 250N000009 HC RX 250: Performed by: NURSE ANESTHETIST, CERTIFIED REGISTERED

## 2024-03-21 PROCEDURE — 43239 EGD BIOPSY SINGLE/MULTIPLE: CPT | Performed by: NURSE ANESTHETIST, CERTIFIED REGISTERED

## 2024-03-21 PROCEDURE — 370N000017 HC ANESTHESIA TECHNICAL FEE, PER MIN: Performed by: INTERNAL MEDICINE

## 2024-03-21 PROCEDURE — 250N000009 HC RX 250: Performed by: INTERNAL MEDICINE

## 2024-03-21 PROCEDURE — 88173 CYTOPATH EVAL FNA REPORT: CPT | Mod: 26 | Performed by: PATHOLOGY

## 2024-03-21 PROCEDURE — 250N000011 HC RX IP 250 OP 636: Performed by: NURSE ANESTHETIST, CERTIFIED REGISTERED

## 2024-03-21 PROCEDURE — 999N000141 HC STATISTIC PRE-PROCEDURE NURSING ASSESSMENT: Performed by: INTERNAL MEDICINE

## 2024-03-21 PROCEDURE — 710N000012 HC RECOVERY PHASE 2, PER MINUTE: Performed by: INTERNAL MEDICINE

## 2024-03-21 RX ORDER — NALOXONE HYDROCHLORIDE 0.4 MG/ML
0.2 INJECTION, SOLUTION INTRAMUSCULAR; INTRAVENOUS; SUBCUTANEOUS
Status: DISCONTINUED | OUTPATIENT
Start: 2024-03-21 | End: 2024-03-21 | Stop reason: HOSPADM

## 2024-03-21 RX ORDER — LIDOCAINE HYDROCHLORIDE 20 MG/ML
INJECTION, SOLUTION INFILTRATION; PERINEURAL PRN
Status: DISCONTINUED | OUTPATIENT
Start: 2024-03-21 | End: 2024-03-21

## 2024-03-21 RX ORDER — LIDOCAINE 40 MG/G
CREAM TOPICAL
Status: DISCONTINUED | OUTPATIENT
Start: 2024-03-21 | End: 2024-03-21 | Stop reason: HOSPADM

## 2024-03-21 RX ORDER — ALBUTEROL SULFATE 0.83 MG/ML
2.5 SOLUTION RESPIRATORY (INHALATION) EVERY 4 HOURS PRN
Status: CANCELLED | OUTPATIENT
Start: 2024-03-21

## 2024-03-21 RX ORDER — HYDROMORPHONE HCL IN WATER/PF 6 MG/30 ML
0.2 PATIENT CONTROLLED ANALGESIA SYRINGE INTRAVENOUS EVERY 5 MIN PRN
Status: CANCELLED | OUTPATIENT
Start: 2024-03-21

## 2024-03-21 RX ORDER — ONDANSETRON 2 MG/ML
INJECTION INTRAMUSCULAR; INTRAVENOUS PRN
Status: DISCONTINUED | OUTPATIENT
Start: 2024-03-21 | End: 2024-03-21

## 2024-03-21 RX ORDER — LABETALOL HYDROCHLORIDE 5 MG/ML
10 INJECTION, SOLUTION INTRAVENOUS
Status: CANCELLED | OUTPATIENT
Start: 2024-03-21

## 2024-03-21 RX ORDER — HYDRALAZINE HYDROCHLORIDE 20 MG/ML
2.5-5 INJECTION INTRAMUSCULAR; INTRAVENOUS EVERY 10 MIN PRN
Status: CANCELLED | OUTPATIENT
Start: 2024-03-21

## 2024-03-21 RX ORDER — OXYCODONE HYDROCHLORIDE 10 MG/1
10 TABLET ORAL
Status: DISCONTINUED | OUTPATIENT
Start: 2024-03-21 | End: 2024-03-21 | Stop reason: HOSPADM

## 2024-03-21 RX ORDER — ONDANSETRON 4 MG/1
4 TABLET, ORALLY DISINTEGRATING ORAL EVERY 30 MIN PRN
Status: CANCELLED | OUTPATIENT
Start: 2024-03-21

## 2024-03-21 RX ORDER — FENTANYL CITRATE 50 UG/ML
25 INJECTION, SOLUTION INTRAMUSCULAR; INTRAVENOUS EVERY 5 MIN PRN
Status: CANCELLED | OUTPATIENT
Start: 2024-03-21

## 2024-03-21 RX ORDER — ONDANSETRON 2 MG/ML
4 INJECTION INTRAMUSCULAR; INTRAVENOUS EVERY 30 MIN PRN
Status: CANCELLED | OUTPATIENT
Start: 2024-03-21

## 2024-03-21 RX ORDER — ONDANSETRON 4 MG/1
4 TABLET, ORALLY DISINTEGRATING ORAL EVERY 6 HOURS PRN
Status: DISCONTINUED | OUTPATIENT
Start: 2024-03-21 | End: 2024-03-21 | Stop reason: HOSPADM

## 2024-03-21 RX ORDER — IOPAMIDOL 510 MG/ML
INJECTION, SOLUTION INTRAVASCULAR PRN
Status: DISCONTINUED | OUTPATIENT
Start: 2024-03-21 | End: 2024-03-21 | Stop reason: HOSPADM

## 2024-03-21 RX ORDER — DEXAMETHASONE SODIUM PHOSPHATE 4 MG/ML
4 INJECTION, SOLUTION INTRA-ARTICULAR; INTRALESIONAL; INTRAMUSCULAR; INTRAVENOUS; SOFT TISSUE
Status: CANCELLED | OUTPATIENT
Start: 2024-03-21

## 2024-03-21 RX ORDER — ONDANSETRON 4 MG/1
4 TABLET, ORALLY DISINTEGRATING ORAL EVERY 30 MIN PRN
Status: DISCONTINUED | OUTPATIENT
Start: 2024-03-21 | End: 2024-03-21 | Stop reason: HOSPADM

## 2024-03-21 RX ORDER — FENTANYL CITRATE 50 UG/ML
50 INJECTION, SOLUTION INTRAMUSCULAR; INTRAVENOUS EVERY 5 MIN PRN
Status: CANCELLED | OUTPATIENT
Start: 2024-03-21

## 2024-03-21 RX ORDER — ACETAMINOPHEN 325 MG/1
975 TABLET ORAL
Status: CANCELLED | OUTPATIENT
Start: 2024-03-21

## 2024-03-21 RX ORDER — NALOXONE HYDROCHLORIDE 0.4 MG/ML
0.4 INJECTION, SOLUTION INTRAMUSCULAR; INTRAVENOUS; SUBCUTANEOUS
Status: DISCONTINUED | OUTPATIENT
Start: 2024-03-21 | End: 2024-03-21 | Stop reason: HOSPADM

## 2024-03-21 RX ORDER — ONDANSETRON 2 MG/ML
4 INJECTION INTRAMUSCULAR; INTRAVENOUS EVERY 6 HOURS PRN
Status: DISCONTINUED | OUTPATIENT
Start: 2024-03-21 | End: 2024-03-21 | Stop reason: HOSPADM

## 2024-03-21 RX ORDER — SODIUM CHLORIDE, SODIUM LACTATE, POTASSIUM CHLORIDE, CALCIUM CHLORIDE 600; 310; 30; 20 MG/100ML; MG/100ML; MG/100ML; MG/100ML
INJECTION, SOLUTION INTRAVENOUS CONTINUOUS
Status: CANCELLED | OUTPATIENT
Start: 2024-03-21

## 2024-03-21 RX ORDER — ONDANSETRON 2 MG/ML
4 INJECTION INTRAMUSCULAR; INTRAVENOUS EVERY 30 MIN PRN
Status: DISCONTINUED | OUTPATIENT
Start: 2024-03-21 | End: 2024-03-21 | Stop reason: HOSPADM

## 2024-03-21 RX ORDER — DIPHENHYDRAMINE HYDROCHLORIDE 50 MG/ML
25 INJECTION INTRAMUSCULAR; INTRAVENOUS EVERY 6 HOURS PRN
Status: CANCELLED | OUTPATIENT
Start: 2024-03-21

## 2024-03-21 RX ORDER — FLUMAZENIL 0.1 MG/ML
0.2 INJECTION, SOLUTION INTRAVENOUS
Status: DISCONTINUED | OUTPATIENT
Start: 2024-03-21 | End: 2024-03-21 | Stop reason: HOSPADM

## 2024-03-21 RX ORDER — NALOXONE HYDROCHLORIDE 0.4 MG/ML
0.1 INJECTION, SOLUTION INTRAMUSCULAR; INTRAVENOUS; SUBCUTANEOUS
Status: DISCONTINUED | OUTPATIENT
Start: 2024-03-21 | End: 2024-03-21 | Stop reason: HOSPADM

## 2024-03-21 RX ORDER — PROPOFOL 10 MG/ML
INJECTION, EMULSION INTRAVENOUS PRN
Status: DISCONTINUED | OUTPATIENT
Start: 2024-03-21 | End: 2024-03-21

## 2024-03-21 RX ORDER — OXYCODONE HYDROCHLORIDE 5 MG/1
5 TABLET ORAL
Status: DISCONTINUED | OUTPATIENT
Start: 2024-03-21 | End: 2024-03-21 | Stop reason: HOSPADM

## 2024-03-21 RX ORDER — HYDROMORPHONE HCL IN WATER/PF 6 MG/30 ML
0.4 PATIENT CONTROLLED ANALGESIA SYRINGE INTRAVENOUS EVERY 5 MIN PRN
Status: CANCELLED | OUTPATIENT
Start: 2024-03-21

## 2024-03-21 RX ORDER — PROPOFOL 10 MG/ML
INJECTION, EMULSION INTRAVENOUS CONTINUOUS PRN
Status: DISCONTINUED | OUTPATIENT
Start: 2024-03-21 | End: 2024-03-21

## 2024-03-21 RX ORDER — SODIUM CHLORIDE, SODIUM LACTATE, POTASSIUM CHLORIDE, CALCIUM CHLORIDE 600; 310; 30; 20 MG/100ML; MG/100ML; MG/100ML; MG/100ML
INJECTION, SOLUTION INTRAVENOUS CONTINUOUS PRN
Status: DISCONTINUED | OUTPATIENT
Start: 2024-03-21 | End: 2024-03-21

## 2024-03-21 RX ORDER — NALOXONE HYDROCHLORIDE 0.4 MG/ML
0.1 INJECTION, SOLUTION INTRAMUSCULAR; INTRAVENOUS; SUBCUTANEOUS
Status: CANCELLED | OUTPATIENT
Start: 2024-03-21

## 2024-03-21 RX ORDER — DIPHENHYDRAMINE HCL 25 MG
25 CAPSULE ORAL EVERY 6 HOURS PRN
Status: CANCELLED | OUTPATIENT
Start: 2024-03-21

## 2024-03-21 RX ORDER — DIMENHYDRINATE 50 MG/ML
25 INJECTION, SOLUTION INTRAMUSCULAR; INTRAVENOUS
Status: CANCELLED | OUTPATIENT
Start: 2024-03-21

## 2024-03-21 RX ADMIN — PROPOFOL 100 MCG/KG/MIN: 10 INJECTION, EMULSION INTRAVENOUS at 11:50

## 2024-03-21 RX ADMIN — PROPOFOL 30 MG: 10 INJECTION, EMULSION INTRAVENOUS at 12:23

## 2024-03-21 RX ADMIN — PROPOFOL 20 MG: 10 INJECTION, EMULSION INTRAVENOUS at 12:03

## 2024-03-21 RX ADMIN — PROPOFOL 50 MG: 10 INJECTION, EMULSION INTRAVENOUS at 12:00

## 2024-03-21 RX ADMIN — LIDOCAINE HYDROCHLORIDE 100 MG: 20 INJECTION, SOLUTION INFILTRATION; PERINEURAL at 11:46

## 2024-03-21 RX ADMIN — PROPOFOL 30 MG: 10 INJECTION, EMULSION INTRAVENOUS at 12:06

## 2024-03-21 RX ADMIN — MIDAZOLAM 2 MG: 1 INJECTION INTRAMUSCULAR; INTRAVENOUS at 11:40

## 2024-03-21 RX ADMIN — PROPOFOL 175 MCG/KG/MIN: 10 INJECTION, EMULSION INTRAVENOUS at 13:05

## 2024-03-21 RX ADMIN — TOPICAL ANESTHETIC 1 EACH: 200 SPRAY DENTAL; PERIODONTAL at 11:46

## 2024-03-21 RX ADMIN — ONDANSETRON 4 MG: 2 INJECTION INTRAMUSCULAR; INTRAVENOUS at 11:40

## 2024-03-21 RX ADMIN — PROPOFOL 30 MG: 10 INJECTION, EMULSION INTRAVENOUS at 12:18

## 2024-03-21 RX ADMIN — SODIUM CHLORIDE, POTASSIUM CHLORIDE, SODIUM LACTATE AND CALCIUM CHLORIDE: 600; 310; 30; 20 INJECTION, SOLUTION INTRAVENOUS at 11:40

## 2024-03-21 RX ADMIN — TOPICAL ANESTHETIC 1 EACH: 200 SPRAY DENTAL; PERIODONTAL at 11:40

## 2024-03-21 RX ADMIN — PROPOFOL 50 MG: 10 INJECTION, EMULSION INTRAVENOUS at 12:33

## 2024-03-21 RX ADMIN — PROPOFOL 30 MG: 10 INJECTION, EMULSION INTRAVENOUS at 12:27

## 2024-03-21 ASSESSMENT — ACTIVITIES OF DAILY LIVING (ADL)
ADLS_ACUITY_SCORE: 29
ADLS_ACUITY_SCORE: 27
ADLS_ACUITY_SCORE: 29

## 2024-03-21 NOTE — DISCHARGE INSTRUCTIONS
After You Have Sedation During Surgery  What should I do after surgery?  You should rest and relax for the next 24 hours. Avoid risky (hazardous) and difficult (strenuous) activity. A responsible adult caregiver should stay with you overnight, after your surgery.  Don't drive or use any heavy equipment for 24 hours after your surgery. Even if you feel normal, your reactions may be affected by the sleep medicine given to you.  Don't drink alcohol or make any important decisions for 24 hours after surgery.  Slowly get back to your regular diet, as you feel able to do so.  How should I expect to feel?  It's normal to feel dizzy, light-headed, or faint for up to a full day after surgery, or while taking pain medicine. If this happens:   Sit down for a few minutes before standing.  Have someone help you when you get up to walk or use the bathroom.  If you have nausea (feel sick to your stomach) and/or vomit (throw up) after sedation (anesthesia):  Drink clear liquids (such as apple juice, ginger ale, broth, or 7-Up) until you feel better.  If you feel sick to your stomach, or you keep vomiting for 24 hours, please call the doctor.  What else should I know?  You might have a dry mouth, a sore throat, muscle aches, or have trouble sleeping. These issues should go away after 24 hours.  Please contact your doctor if you have any other symptoms that concern you, such as fever, pain, bleeding, fluid drainage, swelling, or headache. Or if it has been over 8 to 10 hours since surgery and you still aren't able to pee (urinate).  If you have a history of sleep apnea, it's extremely important that you use your CPAP machine for the next 24 hours when you nap or sleep.      To contact a doctor, call Dr Kramer at the GI clinic at [phone] or:    '   [phone] and ask for the resident on call for GI or Gastroenterology (answered 24 hours a day)  '   Emergency Department:    [facility]: [phone]       (TTY for hearing  impaired: [phone])    [facility]: [phone]       (TTY for hearing impaired: [phone])    For informational purposes only. Not to replace the advice of your health care provider. Copyright   2023 [facility]. All rights reserved. Clinically reviewed by Jose Juan Partida MD. [employer] [id number] - 07/23.

## 2024-03-21 NOTE — ANESTHESIA CARE TRANSFER NOTE
Patient: Linh Mustafa    Procedure: Procedure(s):  ENDOSCOPIC ULTRASOUND, ESOPHAGOSCOPY / UPPER GASTROINTESTINAL TRACT (GI), ENDOSCOPY WITH BIOPSY, FINE NEEDLE ASPIRATION       Diagnosis: Esophageal cancer (H) [C15.9]  Diagnosis Additional Information: No value filed.    Anesthesia Type:   MAC     Note:      Level of Consciousness: awake  Oxygen Supplementation: nasal cannula  Level of Supplemental Oxygen (L/min / FiO2): 4  Independent Airway: airway patency satisfactory and stable  Dentition: dentition unchanged (returned dentures to patient)  Vital Signs Stable: post-procedure vital signs reviewed and stable  Report to RN Given: handoff report given  Patient transferred to: Phase II    Handoff Report: Identifed the Patient, Identified the Reponsible Provider, Reviewed the pertinent medical history, Discussed the surgical course, Reviewed Intra-OP anesthesia mangement and issues during anesthesia, Set expectations for post-procedure period and Allowed opportunity for questions and acknowledgement of understanding      Vitals:  Vitals Value Taken Time   BP 84/68 03/21/24 1318   Temp 98.3    Pulse 72    Resp 18    SpO2 100 % 03/21/24 1318       Electronically Signed By: MASON Ramos CRNA  March 21, 2024  1:24 PM

## 2024-03-21 NOTE — ANESTHESIA PREPROCEDURE EVALUATION
Anesthesia Pre-Procedure Evaluation    Patient: Linh Mustafa   MRN: [id number] : 1960        Procedure : Procedure(s):  ENDOSCOPIC ULTRASOUND, ESOPHAGOSCOPY / UPPER GASTROINTESTINAL TRACT (GI)          Past Medical History:   Diagnosis Date    Hyperlipidemia     Hypertension       Past Surgical History:   Procedure Laterality Date    BIOPSY BREAST Right     age 30 benign fibrous    CV CORONARY ANGIOGRAM N/A 2023    Procedure: Coronary Angiogram;  Surgeon: Lukas Irizarry MD;  Location: [facility]    CV LEFT HEART CATH N/A 2023    Procedure: Left Heart Catheterization;  Surgeon: Lukas Irizarry MD;  Location: [facility]      Allergies   Allergen Reactions    Amoxicillin Shortness Of Breath, Itching and Rash    Penicillins       Social History     Tobacco Use    Smoking status: Every Day     Packs/day: 1     Types: Cigarettes     Start date:     Smokeless tobacco: Never   Substance Use Topics    Alcohol use: Not on file     Comment: occasional      Wt Readings from Last 1 Encounters:   24 68 kg (150 lb)        Current Outpatient Medications   Medication Instructions    amLODIPine (NORVASC) 10 mg, Oral, DAILY    aspirin (ASA) 81 mg, Oral, DAILY    atorvastatin (LIPITOR) 40 MG tablet TAKE 1 TABLET BY MOUTH EVERY DAY    esomeprazole (NEXIUM) 20 mg, Oral, 2 TIMES DAILY, Take 30-60 minutes before eating.    famotidine (PEPCID) 20 mg, Oral, 2 TIMES DAILY    fluticasone (FLONASE) 50 MCG/ACT nasal spray 1-2 sprays, Alternating Nostrils, PRN    levothyroxine (SYNTHROID/LEVOTHROID) 200 MCG tablet take 1 tablet by mouth every day for 30 days    metoprolol succinate ER (TOPROL XL) 25 MG 24 hr tablet TAKE 1 TABLET BY MOUTH EVERY DAY    nitroGLYcerin (NITROSTAT) 0.4 MG sublingual tablet For chest pain place 1 tablet under the tongue every 5 minutes for 3 doses. If symptoms persist 5 minutes after 1st dose call 911.    predniSONE (DELTASONE) 20 MG tablet Take two tablets (=  40mg) each day for 5 (five) days       Anesthesia Evaluation   Pt has had prior anesthetic.         ROS/MED HX  ENT/Pulmonary:  - neg pulmonary ROS     Neurologic:  - neg neurologic ROS     Cardiovascular:     (+)  hypertension- -   -  - -                                 Previous cardiac testing   Echo: Date: 1/17/23 Results:  Interpretation Summary  1. Abnormal stress echocardiogram without evidence of stress induced ischemia.     2. Normal resting LV systolic performance with an ejection fraction of 55-60%.  There is no improvement of LV systolic function with hint of anterior septal  hypokinesia with exercise  3. Nondiagnostic but suspicious for ischemia EKG changes  4. Anginal chest pain reported with exercise.  5. Poor functional capacity for age.  6. Hypertensive response to exercise     Electrocardiogram: Baseline ECG reveals normal sinus rhythm without evidence  of prior myocardial injury. There are diffuse nonspecific ST-T abnormalities.  With exercise, there there is further ST segment depression. There is a short  run of VT in recovery and ST segment depression becomes more pronounced.     Baseline echocardiogram: Technically adequate images were obtained in the  standard quad-screen format. LV systolic performance is normal with a visually  estimated ejection fraction of 55-60%. There is normal regional wall motion.  No significant valvular heart disease is identified on limited screening  Doppler.     Postexercise echocardiogram: Technically adequate images were obtained  immediately post exercise in the standard quad-screen format. LV systolic  performance does not improve with exercise. There there is a hint of  anteroseptal hypokinesia.  _______________________________________________________________    Stress Test:  Date: Results:    ECG Reviewed:  Date: 1/17/24 Results:  NSR- wnl  Cath:  Date: 1/25/23 Results:    Coronary Findings    Diagnostic  Dominance: Right  Left Main  The vessel was  "visualized by selective angiography and is moderate in size. There was 0% vessel disease.    Left Anterior Descending  The vessel was visualized by selective angiography and is moderate in size. There was 0% vessel disease.    Left Circumflex  Dominant, normal    Right Coronary Artery  Small, normal    Intervention    No interventions have been documented.    Hemodynamics    LVEDP = 13mm Hg        METS/Exercise Tolerance:     Hematologic:       Musculoskeletal:       GI/Hepatic:  - neg GI/hepatic ROS     Renal/Genitourinary:  - neg Renal ROS     Endo:  - neg endo ROS     Psychiatric/Substance Use:       Infectious Disease:       Malignancy: Comment: Esophageal cancer  (+) Malignancy, History of Other.    Other:            Physical Exam    Airway        Mallampati: I   TM distance: > 3 FB   Neck ROM: full   Mouth opening: > 3 cm    Respiratory Devices and Support         Dental     Comment: Dentures firmly glued in place.  Patient would like to not remove them unless necessary.    (+) Edentulous and Removable bridges or other hardware      Cardiovascular   cardiovascular exam normal          Pulmonary   pulmonary exam normal                OUTSIDE LABS:  CBC:   Lab Results   Component Value Date    WBC 7.3 01/23/2023    WBC 4.5 12/23/2022    HGB 13.6 01/23/2023    HGB 12.5 12/23/2022    HCT 38.1 01/23/2023    HCT 35.2 12/23/2022     01/23/2023     12/23/2022     BMP:   Lab Results   Component Value Date     11/29/2023     04/06/2023    POTASSIUM 4.5 11/29/2023    POTASSIUM 3.9 04/06/2023    CHLORIDE 101 11/29/2023    CHLORIDE 102 04/06/2023    CO2 23 11/29/2023    CO2 23 04/06/2023    BUN 7.9 (L) 11/29/2023    BUN 11.0 04/06/2023    CR 0.59 11/29/2023    CR 0.54 04/06/2023     (H) 11/29/2023    GLC 94 04/06/2023     COAGS:   Lab Results   Component Value Date    PTT 30 12/23/2022    INR 1.05 12/23/2022     POC: No results found for: \"BGM\", \"HCG\", \"HCGS\"  HEPATIC:   Lab Results "   Component Value Date    ALBUMIN 4.2 11/29/2023    PROTTOTAL 7.3 11/29/2023    ALT 13 11/29/2023    AST 20 11/29/2023    ALKPHOS 117 11/29/2023    BILITOTAL 0.6 11/29/2023     OTHER:   Lab Results   Component Value Date    VAIBHAV 10.3 (H) 11/29/2023    MAG 1.8 12/23/2022    LIPASE 12 (L) 12/23/2022    TSH 0.08 (L) 03/08/2024         Anesthesia Plan    ASA Status:  3    NPO Status:  NPO Appropriate    Anesthesia Type: MAC.     - Reason for MAC: immobility needed, straight local not clinically adequate   Induction: Propofol, Intravenous.   Maintenance: TIVA.        Consents    Anesthesia Plan(s) and associated risks, benefits, and realistic alternatives discussed. Questions answered and patient/representative(s) expressed understanding.     - Discussed:     - Discussed with:  Patient      - Extended Intubation/Ventilatory Support Discussed: No.      - Patient is DNR/DNI Status: No     Use of blood products discussed: No .     Postoperative Care    Pain management: IV analgesics, Oral pain medications.   PONV prophylaxis: Ondansetron (or other 5HT-3), Dexamethasone or Solumedrol     Comments:               Cecy Feliciano MD    I have reviewed the pertinent notes and labs in the chart from the past 30 days and (re)examined the patient.  Any updates or changes from those notes are reflected in this note.             # Drug Induced Platelet Defect: home medication list includes an antiplatelet medication

## 2024-03-21 NOTE — ANESTHESIA POSTPROCEDURE EVALUATION
Patient: Linh Mustafa    Procedure: Procedure(s):  ENDOSCOPIC ULTRASOUND, ESOPHAGOSCOPY / UPPER GASTROINTESTINAL TRACT (GI), ENDOSCOPY WITH BIOPSY, FINE NEEDLE ASPIRATION       Anesthesia Type:  MAC    Note:  Disposition: Outpatient   Postop Pain Control: Uneventful            Sign Out: Well controlled pain   PONV: No   Neuro/Psych: Uneventful            Sign Out: Acceptable/Baseline neuro status   Airway/Respiratory: Uneventful            Sign Out: Acceptable/Baseline resp. status   CV/Hemodynamics: Uneventful            Sign Out: Acceptable CV status; No obvious hypovolemia; No obvious fluid overload   Other NRE: NONE   DID A NON-ROUTINE EVENT OCCUR? No       Last vitals:  Vitals Value Taken Time   BP 93/60 03/21/24 1319   Temp 36.8  C (98.2  F) 03/21/24 1319   Pulse     Resp 14 03/21/24 1319   SpO2 97 % 03/21/24 1319       Electronically Signed By: Cecy Feliciano MD  March 21, 2024  1:48 PM

## 2024-03-21 NOTE — BRIEF OP NOTE
[facility]    Brief Operative Note    Pre-operative diagnosis: Esophageal cancer (H) [C15.9]  Post-operative diagnosis Same as pre-operative diagnosis, second suspicious esophageal lesion.    Procedure: ENDOSCOPIC ULTRASOUND, ESOPHAGOSCOPY / UPPER GASTROINTESTINAL TRACT (GI), ENDOSCOPY WITH BIOPSY, FINE NEEDLE ASPIRATION, N/A - Esophagus    Surgeon: Surgeon(s) and Role:     * Damon Kramer MD - Primary  Anesthesia: MAC   Estimated Blood Loss: Minimal    Drains: None  Specimens:   ID Type Source Tests Collected by Time Destination   1 : proximal esophageal plaque Tissue Esophagus SURGICAL PATHOLOGY EXAM Damon Kramer MD 3/21/2024 12:13 PM    2 : 8L Fine Needle Aspiration Lymph Node(s) FINE NEEDLE ASPIRATE Damon Kramer MD 3/21/2024  1:06 PM      Findings:   Endoscopy showed a flat, nodular pale plaque on the left lateral wall of the proximal esophagus. This was betweem 15 - 19 cm and 1/3 circumferential. The UES was at 14 cm. There is 1 cm of uninvolved esophagus. Multiple forceps biopsies were obtained.    Then known mass was on the right lateral esophageal wall between 24-30 cm. This had raised margins and was 50% circumferrential.    The GE junction was at 35 cm, with a 2 cm tongue of Auguste's anteriorly (C0M2) without highrisk features.    The diaphragm was at 40 cm. Hill grade IV.    The stomach and duodenum were normal.    Ultrasound exam showed the midesophageal tumor to be early T3.  Two 5 mm nodes were seen adjacent to the distal esophageal wall. One was sampled and preliminarily benign.    No focal liver or left adrenal lesions were seen.    The proximal esophageal lesion was not sonographically visualized.    Multiple gallbladder stones were seen.    EUS was otherwise normal.  Complications: None.  Implants: * No implants in log *    CLIFF Kramer MD  Professor of Medicine  Division of Gastroenterology, Hepatology and  Nutrition  [facility]

## 2024-03-22 ENCOUNTER — TELEPHONE (OUTPATIENT)
Dept: GASTROENTEROLOGY | Facility: CLINIC | Age: 64
End: 2024-03-22
Payer: COMMERCIAL

## 2024-03-22 LAB
PATH REPORT.COMMENTS IMP SPEC: NORMAL
PATH REPORT.FINAL DX SPEC: NORMAL
PATH REPORT.FINAL DX SPEC: NORMAL
PATH REPORT.GROSS SPEC: NORMAL
PATH REPORT.GROSS SPEC: NORMAL
PATH REPORT.MICROSCOPIC SPEC OTHER STN: NORMAL
PATH REPORT.MICROSCOPIC SPEC OTHER STN: NORMAL
PATH REPORT.RELEVANT HX SPEC: NORMAL
PATH REPORT.RELEVANT HX SPEC: NORMAL
PHOTO IMAGE: NORMAL
UPPER EUS: NORMAL

## 2024-03-22 NOTE — TELEPHONE ENCOUNTER
"Biopsies from the proximal esophageal plaque returned showing \"at least squamous cell carcinoma in-situ\".  The lymph node samples were benign.    I have called and updated the pt.    Previously discussed with Dr. Martinez - will update with final path.    Previously discussed and reviewed imaging with Dr. King, who felt that he could potentially perform ESD resection of this lesion. I will have my office arrange for further evaluation with him.    CLIFF Kramer MD  Professor of Medicine  Division of Gastroenterology, Hepatology and Nutrition  [facility]    "

## 2024-03-25 ENCOUNTER — PREP FOR PROCEDURE (OUTPATIENT)
Dept: GASTROENTEROLOGY | Facility: CLINIC | Age: 64
End: 2024-03-25
Payer: COMMERCIAL

## 2024-03-25 ENCOUNTER — PATIENT OUTREACH (OUTPATIENT)
Dept: GASTROENTEROLOGY | Facility: CLINIC | Age: 64
End: 2024-03-25
Payer: COMMERCIAL

## 2024-03-25 DIAGNOSIS — C15.9 SCC (SQUAMOUS CELL CARCINOMA OF ESOPHAGUS) (H): Primary | ICD-10-CM

## 2024-03-25 NOTE — TELEPHONE ENCOUNTER
Called pt to discuss Dr Kramer's request for Dr King to perform next procedure.    Procedure/Imaging/Clinic: EGD with ESD   Physician: Fernando   Timing: ASAP   Scope time needed: 120 min   Anesthesia: Gen   Dx: esophageal squamous cell carcinoma   Tier: 2   Location: [facility] OR   Header of letter for pt communication: EGD to removed early esophageal cancer in the top part of the esophagus     Pt in agreement with 4/2/24 procedure date    Explained OR will call 1-2 days prior to procedure date with arrival time, will need a , someone to stay with them for 24 hours and should stay in town for 24 hours (within 45 min of Hospital) post procedure    Patient needs to get pre-op physical completed. If outside  health system will need physical faxed to number [phone]     If you do not get a preop physical, your procedure could be cancelled, patient voiced understanding*    Preop Plan: 3/8/24 pre op completed at [facility]    Any recent Covid symptoms or positive covid test? Discussed to monitor for symptoms and test if symptomatic    Does patient have [employer] insurance?: [facility]    Med Review    Blood thinner -  none  ASA - none  Diabetic - none  Any meds by injection or mouth for weight loss or diabetes-none    Patient Education r/t procedure: mychart    A pre-op nurse will call 1-2 days prior to the procedure.    Verbalized understanding of all instructions. All questions answered.     Procedure order placed, message routed to OR     Saranya Choudhury, RN, BSN,   [facility]  Care coordinator

## 2024-03-25 NOTE — TELEPHONE ENCOUNTER
Action    Action Taken 3/25/24  Slides from NANCY ODELL received, sent to 5th floor lab @ [facility]  2:46 PM

## 2024-03-25 NOTE — TELEPHONE ENCOUNTER
Confirmed procedure plan for 4/2 with pt, also asked she call [facility] to cancel procedure plans with them on 4/5. Pt verbalized understanding.

## 2024-03-26 ENCOUNTER — LAB REQUISITION (OUTPATIENT)
Dept: LAB | Facility: CLINIC | Age: 64
End: 2024-03-26
Payer: COMMERCIAL

## 2024-03-26 PROCEDURE — 88321 CONSLTJ&REPRT SLD PREP ELSWR: CPT | Performed by: PATHOLOGY

## 2024-03-27 ENCOUNTER — PATIENT OUTREACH (OUTPATIENT)
Dept: SURGERY | Facility: CLINIC | Age: 64
End: 2024-03-27
Payer: COMMERCIAL

## 2024-03-27 ENCOUNTER — TELEPHONE (OUTPATIENT)
Dept: GASTROENTEROLOGY | Facility: CLINIC | Age: 64
End: 2024-03-27
Payer: COMMERCIAL

## 2024-03-27 NOTE — TELEPHONE ENCOUNTER
"----- Message from Saranya Choudhury RN sent at 3/27/2024 10:03 AM CDT -----  Regarding: FW: Esophageal path back.  Jules Elizabeth,    This is the patient that Holden would like to move to Monday 4/1, last case of the day. Would you be able to call her to update her of this change? If it will not work for her please let me know.    Orin    ----- Message -----  From: Holden King MD  Sent: 3/25/2024   2:01 PM CDT  To: Anitra Santana RN; Bryant Martinez MD; #  Subject: RE: Esophageal path back.                        We're looking at 4/2 for the procedure.    ----- Message -----  From: Bryant Matrinez MD  Sent: 3/25/2024   1:56 PM CDT  To: Anitra Santana RN; Damon Kramer MD; #  Subject: RE: Esophageal path back.                        Thank you.    Her oncologist is with [facility].   Please just let me know when she is having her ESD and I'll notify the oncologist.    Wan  ----- Message -----  From: Holden King MD  Sent: 3/25/2024   8:45 AM CDT  To: Bryant Martinez MD; #  Subject: RE: Esophageal path back.                        Ayo Sr spoke to the patient already about this. Let's start looking for a spot for her.    Please assist in scheduling:     Procedure/Imaging/Clinic: EGD with ESD  Physician: Fernando  Timing: ASAP  Scope time needed: 120 min  Anesthesia: Gen  Dx: esophageal squamous cell carcinoma  Tier: 2  Location: [facility] OR  Header of letter for pt communication: EGD to removed early esophageal cancer in the top part of the esophagus       Thanks  Holden    ----- Message -----  From: Damon Kramer MD  Sent: 3/22/2024   4:07 PM CDT  To: Bryant Martinez MD; #  Subject: Esophageal path back.                            Wan:     The path from the plaque in the proximal esophagus returned \"at least squamous cell carcinoma in-situ\".    I updated the pt. I told her we need to hold off on chemo and radiation until this is addressed.    I can't find any scheduled " rad onc or med onc consults scheduled here. Not sure who else to notify.    Holden - can you please follow-up re further assessment for ESD that we had discussed?  Orin - please review with Holden.    CLIFF Kramer MD  Professor of Medicine  Division of Gastroenterology, Hepatology and Nutrition  [facility]

## 2024-03-27 NOTE — TELEPHONE ENCOUNTER
"[facility]: Thoracic Surgery                                                                                  Received call from patient's daughter, Yulisa. Yulisa verbalized she is calling to confirm patient's treatment team and plan of care. Reports that she was informed by patient's oncologist in [place] that patient \"is no longer his patient and they need to call the [facility]\" and all patient's chemo and radiation treatments were canceled.     Reviewed patient's chart with daughter on the phone.      Patient currently scheduled to have  EGD with ESD scheduled with Dr King 4/2.  Explained that Dr Martinez plans to present her case at our Multidisciplinary Tumor Board once final pathology is back from that procedure. Briefly explained what our Multidisciplinary Tumor Board and how it is utilized to assist in determining next best steps for patients. Informed daughter that someone from our team will contact them with the team's consensus and recommendations.     Informed daughter that Dr Martinez has been in contact with patient's oncologist. Daughter verbalized her understanding.     Daughter verbalized desire to move oncology management to the [facility] if possible. Reports they no longer want oncology care at [facility].    Provided daughter with writer's direct contact information. Daughter appreciated writer's time.    Anitra Santana RN, BSN  Thoracic Surgery RN Care Coordinator    "

## 2024-03-27 NOTE — TELEPHONE ENCOUNTER
Spoke to pt- stated that she does not think her  can do 4/1 and would like to stay on 4/2. Explained that I would provide that info to [facility].

## 2024-03-28 LAB
PATH REPORT.COMMENTS IMP SPEC: NORMAL
PATH REPORT.FINAL DX SPEC: NORMAL
PATH REPORT.GROSS SPEC: NORMAL
PATH REPORT.MICROSCOPIC SPEC OTHER STN: NORMAL
PATH REPORT.RELEVANT HX SPEC: NORMAL
PATH REPORT.RELEVANT HX SPEC: NORMAL
PATH REPORT.SITE OF ORIGIN SPEC: NORMAL

## 2024-03-29 RX ORDER — ACETAMINOPHEN 325 MG/1
325-650 TABLET ORAL EVERY 6 HOURS PRN
COMMUNITY
End: 2024-05-28 | Stop reason: ALTCHOICE

## 2024-04-01 ENCOUNTER — ANESTHESIA EVENT (OUTPATIENT)
Dept: SURGERY | Facility: CLINIC | Age: 64
End: 2024-04-01
Payer: COMMERCIAL

## 2024-04-01 RX ORDER — IODINE AND POTASSIUM IODIDE 50; 100 MG/ML; MG/ML
LIQUID ORAL ONCE
Status: CANCELLED | OUTPATIENT
Start: 2024-04-01

## 2024-04-01 NOTE — ANESTHESIA PREPROCEDURE EVALUATION
Anesthesia Pre-Procedure Evaluation    Patient: Linh Mustafa   MRN: [id number] : 1960        Procedure : Procedure(s):  ESOPHAGOGASTRODUODENOSCOPY, WITH SUBMUCOSAL RESECTION          Past Medical History:   Diagnosis Date    Hyperlipidemia     Hypertension       Past Surgical History:   Procedure Laterality Date    BIOPSY BREAST Right     age 30 benign fibrous    CV CORONARY ANGIOGRAM N/A 2023    Procedure: Coronary Angiogram;  Surgeon: Lukas Irizarry MD;  Location: [facility] CV    CV LEFT HEART CATH N/A 2023    Procedure: Left Heart Catheterization;  Surgeon: Lukas Irizarry MD;  Location: [facility] CV    ENDOSCOPIC ULTRASOUND UPPER GASTROINTESTINAL TRACT (GI) N/A 3/21/2024    Procedure: ENDOSCOPIC ULTRASOUND, ESOPHAGOSCOPY / UPPER GASTROINTESTINAL TRACT (GI), ENDOSCOPY WITH BIOPSY, FINE NEEDLE ASPIRATION;  Surgeon: Damon Kramer MD;  Location: [facility] OR    ENDOSCOPIC ULTRASOUND, ESOPHAGOSCOPY / UPPER GASTROINTESTINAL TRACT (GI), ENDOSCOPY WITH BIOPSY, FINE NEEDLE ASPIRATION  2024      Allergies   Allergen Reactions    Amoxicillin Shortness Of Breath, Itching and Rash    Penicillins       Social History     Tobacco Use    Smoking status: Every Day     Packs/day: 1     Types: Cigarettes     Start date:     Smokeless tobacco: Never   Substance Use Topics    Alcohol use: Not on file     Comment: occasional      Wt Readings from Last 1 Encounters:   24 68.1 kg (150 lb 2.1 oz)        Anesthesia Evaluation   Pt has had prior anesthetic.         ROS/MED HX  ENT/Pulmonary:  - neg pulmonary ROS     Neurologic:  - neg neurologic ROS     Cardiovascular:     (+)  hypertension- -   -  - -                                 Previous cardiac testing   Echo: Date: 23 Results:  Interpretation Summary  1. Abnormal stress echocardiogram without evidence of stress induced ischemia.     2. Normal resting LV systolic performance with an ejection fraction of  55-60%.  There is no improvement of LV systolic function with hint of anterior septal  hypokinesia with exercise  3. Nondiagnostic but suspicious for ischemia EKG changes  4. Anginal chest pain reported with exercise.  5. Poor functional capacity for age.  6. Hypertensive response to exercise     Electrocardiogram: Baseline ECG reveals normal sinus rhythm without evidence  of prior myocardial injury. There are diffuse nonspecific ST-T abnormalities.  With exercise, there there is further ST segment depression. There is a short  run of VT in recovery and ST segment depression becomes more pronounced.     Baseline echocardiogram: Technically adequate images were obtained in the  standard quad-screen format. LV systolic performance is normal with a visually  estimated ejection fraction of 55-60%. There is normal regional wall motion.  No significant valvular heart disease is identified on limited screening  Doppler.     Postexercise echocardiogram: Technically adequate images were obtained  immediately post exercise in the standard quad-screen format. LV systolic  performance does not improve with exercise. There there is a hint of  anteroseptal hypokinesia.  _______________________________________________________________    Stress Test:  Date: Results:    ECG Reviewed:  Date: 1/17/24 Results:  NSR- wnl  Cath:  Date: 1/25/23 Results:    Coronary Findings    Diagnostic  Dominance: Right  Left Main  The vessel was visualized by selective angiography and is moderate in size. There was 0% vessel disease.    Left Anterior Descending  The vessel was visualized by selective angiography and is moderate in size. There was 0% vessel disease.    Left Circumflex  Dominant, normal    Right Coronary Artery  Small, normal    Intervention    No interventions have been documented.    Hemodynamics    LVEDP = 13mm Hg     (-) murmur   METS/Exercise Tolerance: >4 METS    Hematologic:       Musculoskeletal:       GI/Hepatic:  - neg  "GI/hepatic ROS     Renal/Genitourinary:  - neg Renal ROS     Endo:  - neg endo ROS     Psychiatric/Substance Use:       Infectious Disease:       Malignancy: Comment: Esophageal cancer  (+) Malignancy, History of Other.    Other:            Physical Exam    Airway        Mallampati: II   TM distance: > 3 FB   Neck ROM: full   Mouth opening: > 3 cm    Respiratory Devices and Support         Dental       (+) Edentulous      Cardiovascular          Rhythm and rate: regular and normal (-) no murmur    Pulmonary   pulmonary exam normal        breath sounds clear to auscultation           OUTSIDE LABS:  CBC:   Lab Results   Component Value Date    WBC 7.3 01/23/2023    WBC 4.5 12/23/2022    HGB 13.6 01/23/2023    HGB 12.5 12/23/2022    HCT 38.1 01/23/2023    HCT 35.2 12/23/2022     01/23/2023     12/23/2022     BMP:   Lab Results   Component Value Date     11/29/2023     04/06/2023    POTASSIUM 4.5 11/29/2023    POTASSIUM 3.9 04/06/2023    CHLORIDE 101 11/29/2023    CHLORIDE 102 04/06/2023    CO2 23 11/29/2023    CO2 23 04/06/2023    BUN 7.9 (L) 11/29/2023    BUN 11.0 04/06/2023    CR 0.59 11/29/2023    CR 0.54 04/06/2023     (H) 11/29/2023    GLC 94 04/06/2023     COAGS:   Lab Results   Component Value Date    PTT 30 12/23/2022    INR 1.05 12/23/2022     POC: No results found for: \"BGM\", \"HCG\", \"HCGS\"  HEPATIC:   Lab Results   Component Value Date    ALBUMIN 4.2 11/29/2023    PROTTOTAL 7.3 11/29/2023    ALT 13 11/29/2023    AST 20 11/29/2023    ALKPHOS 117 11/29/2023    BILITOTAL 0.6 11/29/2023     OTHER:   Lab Results   Component Value Date    VAIBHAV 10.3 (H) 11/29/2023    MAG 1.8 12/23/2022    LIPASE 12 (L) 12/23/2022    TSH 0.08 (L) 03/08/2024       Anesthesia Plan    ASA Status:  3    NPO Status:  NPO Appropriate    Anesthesia Type: General.     - Airway: ETT   Induction: Intravenous, Propofol.   Maintenance: Balanced.        Consents    Anesthesia Plan(s) and associated risks, benefits, " and realistic alternatives discussed. Questions answered and patient/representative(s) expressed understanding.     - Discussed:     - Discussed with:  Patient      - Extended Intubation/Ventilatory Support Discussed: No.      - Patient is DNR/DNI Status: No     Use of blood products discussed: No .     Postoperative Care    Pain management: IV analgesics, Oral pain medications, Multi-modal analgesia.   PONV prophylaxis: Ondansetron (or other 5HT-3), Dexamethasone or Solumedrol     Comments:    Other Comments: Discussed plan for general anesthetic with Oral ETT. Discussed risks of sore throat, post op pain/nausea, oropharyngeal damage, rare major complications.             Julio Tolliver MD    I have reviewed the pertinent notes and labs in the chart from the past 30 days and (re)examined the patient.  Any updates or changes from those notes are reflected in this note.

## 2024-04-02 ENCOUNTER — HOSPITAL ENCOUNTER (OUTPATIENT)
Facility: CLINIC | Age: 64
Discharge: HOME | End: 2024-04-02
Attending: INTERNAL MEDICINE | Admitting: INTERNAL MEDICINE
Payer: COMMERCIAL

## 2024-04-02 ENCOUNTER — ANESTHESIA (OUTPATIENT)
Dept: SURGERY | Facility: CLINIC | Age: 64
End: 2024-04-02
Payer: COMMERCIAL

## 2024-04-02 VITALS
DIASTOLIC BLOOD PRESSURE: 71 MMHG | HEIGHT: 67 IN | BODY MASS INDEX: 23.74 KG/M2 | HEART RATE: 54 BPM | TEMPERATURE: 98 F | OXYGEN SATURATION: 96 % | WEIGHT: 151.24 LBS | RESPIRATION RATE: 18 BRPM | SYSTOLIC BLOOD PRESSURE: 123 MMHG

## 2024-04-02 DIAGNOSIS — C15.9 SCC (SQUAMOUS CELL CARCINOMA OF ESOPHAGUS) (H): ICD-10-CM

## 2024-04-02 DIAGNOSIS — C15.9 SQUAMOUS CELL CARCINOMA OF ESOPHAGUS (H): Primary | ICD-10-CM

## 2024-04-02 LAB — UPPER GI ENDOSCOPY: NORMAL

## 2024-04-02 PROCEDURE — 88309 TISSUE EXAM BY PATHOLOGIST: CPT | Mod: TC | Performed by: INTERNAL MEDICINE

## 2024-04-02 PROCEDURE — 370N000017 HC ANESTHESIA TECHNICAL FEE, PER MIN: Performed by: INTERNAL MEDICINE

## 2024-04-02 PROCEDURE — 710N000010 HC RECOVERY PHASE 1, LEVEL 2, PER MIN: Performed by: INTERNAL MEDICINE

## 2024-04-02 PROCEDURE — 250N000009 HC RX 250: Performed by: INTERNAL MEDICINE

## 2024-04-02 PROCEDURE — 258N000003 HC RX IP 258 OP 636: Performed by: ANESTHESIOLOGY

## 2024-04-02 PROCEDURE — 250N000011 HC RX IP 250 OP 636: Performed by: ANESTHESIOLOGY

## 2024-04-02 PROCEDURE — 250N000011 HC RX IP 250 OP 636: Performed by: INTERNAL MEDICINE

## 2024-04-02 PROCEDURE — 272N000001 HC OR GENERAL SUPPLY STERILE: Performed by: INTERNAL MEDICINE

## 2024-04-02 PROCEDURE — 43254 EGD ENDO MUCOSAL RESECTION: CPT

## 2024-04-02 PROCEDURE — 999N000141 HC STATISTIC PRE-PROCEDURE NURSING ASSESSMENT: Performed by: INTERNAL MEDICINE

## 2024-04-02 PROCEDURE — 250N000009 HC RX 250: Performed by: ANESTHESIOLOGY

## 2024-04-02 PROCEDURE — 250N000013 HC RX MED GY IP 250 OP 250 PS 637: Performed by: INTERNAL MEDICINE

## 2024-04-02 PROCEDURE — 88309 TISSUE EXAM BY PATHOLOGIST: CPT | Mod: 26 | Performed by: PATHOLOGY

## 2024-04-02 PROCEDURE — 250N000013 HC RX MED GY IP 250 OP 250 PS 637: Performed by: ANESTHESIOLOGY

## 2024-04-02 PROCEDURE — 250N000025 HC SEVOFLURANE, PER MIN: Performed by: INTERNAL MEDICINE

## 2024-04-02 PROCEDURE — 710N000012 HC RECOVERY PHASE 2, PER MINUTE: Performed by: INTERNAL MEDICINE

## 2024-04-02 PROCEDURE — 360N000075 HC SURGERY LEVEL 2, PER MIN: Performed by: INTERNAL MEDICINE

## 2024-04-02 PROCEDURE — 43254 EGD ENDO MUCOSAL RESECTION: CPT | Performed by: ANESTHESIOLOGY

## 2024-04-02 RX ORDER — NALOXONE HYDROCHLORIDE 0.4 MG/ML
0.1 INJECTION, SOLUTION INTRAMUSCULAR; INTRAVENOUS; SUBCUTANEOUS
Status: DISCONTINUED | OUTPATIENT
Start: 2024-04-02 | End: 2024-04-02 | Stop reason: HOSPADM

## 2024-04-02 RX ORDER — PROCHLORPERAZINE MALEATE 5 MG
10 TABLET ORAL EVERY 6 HOURS PRN
Status: DISCONTINUED | OUTPATIENT
Start: 2024-04-02 | End: 2024-04-02 | Stop reason: HOSPADM

## 2024-04-02 RX ORDER — LIDOCAINE HYDROCHLORIDE 20 MG/ML
INJECTION, SOLUTION INFILTRATION; PERINEURAL PRN
Status: DISCONTINUED | OUTPATIENT
Start: 2024-04-02 | End: 2024-04-02

## 2024-04-02 RX ORDER — LIDOCAINE 40 MG/G
CREAM TOPICAL
Status: DISCONTINUED | OUTPATIENT
Start: 2024-04-02 | End: 2024-04-02 | Stop reason: HOSPADM

## 2024-04-02 RX ORDER — HYDROMORPHONE HCL IN WATER/PF 6 MG/30 ML
0.4 PATIENT CONTROLLED ANALGESIA SYRINGE INTRAVENOUS EVERY 5 MIN PRN
Status: DISCONTINUED | OUTPATIENT
Start: 2024-04-02 | End: 2024-04-02 | Stop reason: HOSPADM

## 2024-04-02 RX ORDER — HYDROMORPHONE HCL IN WATER/PF 6 MG/30 ML
0.2 PATIENT CONTROLLED ANALGESIA SYRINGE INTRAVENOUS EVERY 5 MIN PRN
Status: DISCONTINUED | OUTPATIENT
Start: 2024-04-02 | End: 2024-04-02 | Stop reason: HOSPADM

## 2024-04-02 RX ORDER — SODIUM CHLORIDE, SODIUM LACTATE, POTASSIUM CHLORIDE, CALCIUM CHLORIDE 600; 310; 30; 20 MG/100ML; MG/100ML; MG/100ML; MG/100ML
INJECTION, SOLUTION INTRAVENOUS CONTINUOUS
Status: DISCONTINUED | OUTPATIENT
Start: 2024-04-02 | End: 2024-04-02 | Stop reason: HOSPADM

## 2024-04-02 RX ORDER — TRIAMCINOLONE ACETONIDE 40 MG/ML
40 INJECTION, SUSPENSION INTRA-ARTICULAR; INTRAMUSCULAR ONCE
Status: COMPLETED | OUTPATIENT
Start: 2024-04-02 | End: 2024-04-02

## 2024-04-02 RX ORDER — HYDRALAZINE HYDROCHLORIDE 20 MG/ML
2.5-5 INJECTION INTRAMUSCULAR; INTRAVENOUS EVERY 10 MIN PRN
Status: DISCONTINUED | OUTPATIENT
Start: 2024-04-02 | End: 2024-04-02 | Stop reason: HOSPADM

## 2024-04-02 RX ORDER — FENTANYL CITRATE 50 UG/ML
25 INJECTION, SOLUTION INTRAMUSCULAR; INTRAVENOUS EVERY 5 MIN PRN
Status: DISCONTINUED | OUTPATIENT
Start: 2024-04-02 | End: 2024-04-02 | Stop reason: HOSPADM

## 2024-04-02 RX ORDER — ONDANSETRON 2 MG/ML
4 INJECTION INTRAMUSCULAR; INTRAVENOUS EVERY 6 HOURS PRN
Status: DISCONTINUED | OUTPATIENT
Start: 2024-04-02 | End: 2024-04-02 | Stop reason: HOSPADM

## 2024-04-02 RX ORDER — ONDANSETRON 2 MG/ML
4 INJECTION INTRAMUSCULAR; INTRAVENOUS EVERY 30 MIN PRN
Status: DISCONTINUED | OUTPATIENT
Start: 2024-04-02 | End: 2024-04-02 | Stop reason: HOSPADM

## 2024-04-02 RX ORDER — IODINE AND POTASSIUM IODIDE 50; 100 MG/ML; MG/ML
LIQUID ORAL PRN
Status: DISCONTINUED | OUTPATIENT
Start: 2024-04-02 | End: 2024-04-02 | Stop reason: HOSPADM

## 2024-04-02 RX ORDER — OXYCODONE HYDROCHLORIDE 5 MG/1
5 TABLET ORAL EVERY 4 HOURS PRN
Status: DISCONTINUED | OUTPATIENT
Start: 2024-04-02 | End: 2024-04-02 | Stop reason: HOSPADM

## 2024-04-02 RX ORDER — DEXMEDETOMIDINE HYDROCHLORIDE 4 UG/ML
INJECTION, SOLUTION INTRAVENOUS PRN
Status: DISCONTINUED | OUTPATIENT
Start: 2024-04-02 | End: 2024-04-02

## 2024-04-02 RX ORDER — SODIUM CHLORIDE, SODIUM LACTATE, POTASSIUM CHLORIDE, CALCIUM CHLORIDE 600; 310; 30; 20 MG/100ML; MG/100ML; MG/100ML; MG/100ML
INJECTION, SOLUTION INTRAVENOUS CONTINUOUS PRN
Status: DISCONTINUED | OUTPATIENT
Start: 2024-04-02 | End: 2024-04-02

## 2024-04-02 RX ORDER — FENTANYL CITRATE 50 UG/ML
50 INJECTION, SOLUTION INTRAMUSCULAR; INTRAVENOUS EVERY 5 MIN PRN
Status: DISCONTINUED | OUTPATIENT
Start: 2024-04-02 | End: 2024-04-02 | Stop reason: HOSPADM

## 2024-04-02 RX ORDER — GLYCOPYRROLATE 0.2 MG/ML
INJECTION, SOLUTION INTRAMUSCULAR; INTRAVENOUS PRN
Status: DISCONTINUED | OUTPATIENT
Start: 2024-04-02 | End: 2024-04-02

## 2024-04-02 RX ORDER — FENTANYL CITRATE 50 UG/ML
INJECTION, SOLUTION INTRAMUSCULAR; INTRAVENOUS PRN
Status: DISCONTINUED | OUTPATIENT
Start: 2024-04-02 | End: 2024-04-02

## 2024-04-02 RX ORDER — ONDANSETRON 4 MG/1
4 TABLET, ORALLY DISINTEGRATING ORAL EVERY 30 MIN PRN
Status: DISCONTINUED | OUTPATIENT
Start: 2024-04-02 | End: 2024-04-02 | Stop reason: HOSPADM

## 2024-04-02 RX ORDER — ONDANSETRON 4 MG/1
4 TABLET, ORALLY DISINTEGRATING ORAL EVERY 6 HOURS PRN
Status: DISCONTINUED | OUTPATIENT
Start: 2024-04-02 | End: 2024-04-02 | Stop reason: HOSPADM

## 2024-04-02 RX ORDER — ONDANSETRON 2 MG/ML
INJECTION INTRAMUSCULAR; INTRAVENOUS PRN
Status: DISCONTINUED | OUTPATIENT
Start: 2024-04-02 | End: 2024-04-02

## 2024-04-02 RX ORDER — METOPROLOL TARTRATE 1 MG/ML
1-2 INJECTION, SOLUTION INTRAVENOUS EVERY 5 MIN PRN
Status: DISCONTINUED | OUTPATIENT
Start: 2024-04-02 | End: 2024-04-02 | Stop reason: HOSPADM

## 2024-04-02 RX ORDER — DEXAMETHASONE SODIUM PHOSPHATE 4 MG/ML
INJECTION, SOLUTION INTRA-ARTICULAR; INTRALESIONAL; INTRAMUSCULAR; INTRAVENOUS; SOFT TISSUE PRN
Status: DISCONTINUED | OUTPATIENT
Start: 2024-04-02 | End: 2024-04-02

## 2024-04-02 RX ORDER — PROPOFOL 10 MG/ML
INJECTION, EMULSION INTRAVENOUS PRN
Status: DISCONTINUED | OUTPATIENT
Start: 2024-04-02 | End: 2024-04-02

## 2024-04-02 RX ORDER — OXYCODONE HYDROCHLORIDE 10 MG/1
10 TABLET ORAL EVERY 4 HOURS PRN
Status: DISCONTINUED | OUTPATIENT
Start: 2024-04-02 | End: 2024-04-02 | Stop reason: HOSPADM

## 2024-04-02 RX ORDER — ONDANSETRON 2 MG/ML
4 INJECTION INTRAMUSCULAR; INTRAVENOUS
Status: DISCONTINUED | OUTPATIENT
Start: 2024-04-02 | End: 2024-04-02 | Stop reason: HOSPADM

## 2024-04-02 RX ORDER — FENTANYL CITRATE 50 UG/ML
25 INJECTION, SOLUTION INTRAMUSCULAR; INTRAVENOUS
Status: DISCONTINUED | OUTPATIENT
Start: 2024-04-02 | End: 2024-04-02 | Stop reason: HOSPADM

## 2024-04-02 RX ORDER — FLUMAZENIL 0.1 MG/ML
0.2 INJECTION, SOLUTION INTRAVENOUS
Status: DISCONTINUED | OUTPATIENT
Start: 2024-04-02 | End: 2024-04-02 | Stop reason: HOSPADM

## 2024-04-02 RX ADMIN — Medication 10 MG: at 10:25

## 2024-04-02 RX ADMIN — FENTANYL CITRATE 50 MCG: 50 INJECTION INTRAMUSCULAR; INTRAVENOUS at 09:28

## 2024-04-02 RX ADMIN — PHENYLEPHRINE HYDROCHLORIDE 0.5 MCG/KG/MIN: 10 INJECTION INTRAVENOUS at 09:05

## 2024-04-02 RX ADMIN — Medication 20 MG: at 09:32

## 2024-04-02 RX ADMIN — Medication 1 LOZENGE: at 11:59

## 2024-04-02 RX ADMIN — FENTANYL CITRATE 25 MCG: 50 INJECTION, SOLUTION INTRAMUSCULAR; INTRAVENOUS at 11:45

## 2024-04-02 RX ADMIN — DEXMEDETOMIDINE HYDROCHLORIDE 20 MCG: 100 INJECTION, SOLUTION INTRAVENOUS at 08:29

## 2024-04-02 RX ADMIN — SODIUM CHLORIDE, POTASSIUM CHLORIDE, SODIUM LACTATE AND CALCIUM CHLORIDE: 600; 310; 30; 20 INJECTION, SOLUTION INTRAVENOUS at 08:31

## 2024-04-02 RX ADMIN — PHENYLEPHRINE HYDROCHLORIDE 100 MCG: 10 INJECTION INTRAVENOUS at 10:49

## 2024-04-02 RX ADMIN — FENTANYL CITRATE 50 MCG: 50 INJECTION INTRAMUSCULAR; INTRAVENOUS at 08:59

## 2024-04-02 RX ADMIN — ONDANSETRON 4 MG: 2 INJECTION INTRAMUSCULAR; INTRAVENOUS at 13:15

## 2024-04-02 RX ADMIN — PHENYLEPHRINE HYDROCHLORIDE 200 MCG: 10 INJECTION INTRAVENOUS at 08:52

## 2024-04-02 RX ADMIN — DEXMEDETOMIDINE HYDROCHLORIDE 12 MCG: 100 INJECTION, SOLUTION INTRAVENOUS at 10:58

## 2024-04-02 RX ADMIN — FENTANYL CITRATE 25 MCG: 50 INJECTION, SOLUTION INTRAMUSCULAR; INTRAVENOUS at 11:30

## 2024-04-02 RX ADMIN — SUGAMMADEX 150 MG: 100 INJECTION, SOLUTION INTRAVENOUS at 10:49

## 2024-04-02 RX ADMIN — LIDOCAINE HYDROCHLORIDE 80 MG: 20 INJECTION, SOLUTION INFILTRATION; PERINEURAL at 08:43

## 2024-04-02 RX ADMIN — GLYCOPYRROLATE 0.2 MG: 0.2 INJECTION, SOLUTION INTRAMUSCULAR; INTRAVENOUS at 09:41

## 2024-04-02 RX ADMIN — Medication 50 MG: at 08:43

## 2024-04-02 RX ADMIN — DEXAMETHASONE SODIUM PHOSPHATE 4 MG: 4 INJECTION, SOLUTION INTRA-ARTICULAR; INTRALESIONAL; INTRAMUSCULAR; INTRAVENOUS; SOFT TISSUE at 08:57

## 2024-04-02 RX ADMIN — OXYCODONE HYDROCHLORIDE 5 MG: 5 TABLET ORAL at 11:58

## 2024-04-02 RX ADMIN — PHENYLEPHRINE HYDROCHLORIDE 100 MCG: 10 INJECTION INTRAVENOUS at 09:01

## 2024-04-02 RX ADMIN — PROPOFOL 150 MG: 10 INJECTION, EMULSION INTRAVENOUS at 08:43

## 2024-04-02 RX ADMIN — Medication 1 LOZENGE: at 11:54

## 2024-04-02 RX ADMIN — FENTANYL CITRATE 25 MCG: 50 INJECTION, SOLUTION INTRAMUSCULAR; INTRAVENOUS at 13:07

## 2024-04-02 RX ADMIN — ONDANSETRON 4 MG: 2 INJECTION INTRAMUSCULAR; INTRAVENOUS at 08:57

## 2024-04-02 RX ADMIN — FENTANYL CITRATE 25 MCG: 50 INJECTION, SOLUTION INTRAMUSCULAR; INTRAVENOUS at 11:17

## 2024-04-02 ASSESSMENT — ACTIVITIES OF DAILY LIVING (ADL)
ADLS_ACUITY_SCORE: 22
ADLS_ACUITY_SCORE: 22
ADLS_ACUITY_SCORE: 20
ADLS_ACUITY_SCORE: 23
ADLS_ACUITY_SCORE: 22

## 2024-04-02 NOTE — BRIEF OP NOTE
[facility]    Brief Operative Note    Pre-operative diagnosis: SCC (squamous cell carcinoma of esophagus) (H) [C15.9]  Post-operative diagnosis Same as pre-operative diagnosis    Procedure: ESOPHAGOGASTRODUODENOSCOPY, WITH SUBMUCOSAL RESECTION, N/A - Esophagus    Surgeon: Surgeon(s) and Role:     * Holden King MD - Primary  Anesthesia: General   Estimated Blood Loss: Minimal    Drains: None  Specimens:   ID Type Source Tests Collected by Time Destination   1 : Proximal esophageal Plaque Tissue Esophagus, Proximal SURGICAL PATHOLOGY EXAM Holden King MD 4/2/2024  9:37 AM      Findings:       Ulcerated esophageal mass (known T3 invasive SCC) at 24-30 cm.    Proximal esophageal plaque from 16-20 cm. Stained with Lugol's solution to demarcate margins. <50% of circumference. ESD performed with en bloc removal.    Final resection site ~2/3rd's the circumference therefore resection bed injected with a total for 40 mg Triamcinolone to reduce the risk of stricture development.      Complications: None.  Implants: * No implants in log *    Recommendations:  - Discharge home  - Full liquid diet today, soft mechanical diet tomorrow, regular diet day after  - Await pathology results  - Would wait to start chemoradiation at least 2 weeks out from today's procedure to avoid inhibiting wound healing. High chance for proximal stricture development.  - Resume home medications including aspirin 81 mg today      Holden King MD  [facility]  Division of Gastroenterology and Hepatology  [facility] [address] [place], [place] [address]

## 2024-04-02 NOTE — ANESTHESIA POSTPROCEDURE EVALUATION
Patient: Linh Mustafa    Procedure: Procedure(s):  ESOPHAGOGASTRODUODENOSCOPY, WITH SUBMUCOSAL RESECTION       Anesthesia Type:  General    Note:  Disposition: Outpatient   Postop Pain Control: Uneventful            Sign Out: Well controlled pain   PONV: No   Neuro/Psych: Uneventful            Sign Out: Acceptable/Baseline neuro status   Airway/Respiratory: Uneventful            Sign Out: Acceptable/Baseline resp. status   CV/Hemodynamics: Uneventful            Sign Out: Acceptable CV status; No obvious hypovolemia; No obvious fluid overload   Other NRE:    DID A NON-ROUTINE EVENT OCCUR? No           Last vitals:  Vitals Value Taken Time   /70 04/02/24 1215   Temp 36.6  C (97.9  F) 04/02/24 1105   Pulse 53 04/02/24 1201   Resp 18 04/02/24 1215   SpO2 96 % 04/02/24 1226   Vitals shown include unfiled device data.    Electronically Signed By: Julio Tolliver MD  April 2, 2024  3:11 PM

## 2024-04-02 NOTE — ANESTHESIA CARE TRANSFER NOTE
Patient: Linh Mustafa    Procedure: Procedure(s):  ESOPHAGOGASTRODUODENOSCOPY, WITH SUBMUCOSAL RESECTION       Diagnosis: SCC (squamous cell carcinoma of esophagus) (H) [C15.9]  Diagnosis Additional Information: No value filed.    Anesthesia Type:   General     Note:    Oropharynx: oropharynx clear of all foreign objects  Level of Consciousness: awake  Oxygen Supplementation: face mask  Level of Supplemental Oxygen (L/min / FiO2): 6  Independent Airway: airway patency satisfactory and stable  Dentition: dentition unchanged  Vital Signs Stable: post-procedure vital signs reviewed and stable  Report to RN Given: handoff report given  Patient transferred to: PACU    Handoff Report: Identifed the Patient, Identified the Reponsible Provider, Reviewed the pertinent medical history, Discussed the surgical course, Reviewed Intra-OP anesthesia mangement and issues during anesthesia, Set expectations for post-procedure period and Allowed opportunity for questions and acknowledgement of understanding      Vitals:  Vitals Value Taken Time   /62 04/02/24 1105   Temp 36.6  C (97.9  F) 04/02/24 1105   Pulse 57 04/02/24 1109   Resp 18 04/02/24 1109   SpO2 100 % 04/02/24 1109   Vitals shown include unfiled device data.    Electronically Signed By: MASON Schaefer CRNA  April 2, 2024  11:09 AM

## 2024-04-02 NOTE — DISCHARGE INSTRUCTIONS
- Full liquid diet today, soft mechanical diet tomorrow, regular diet day after  - Await pathology results  - Would wait to start chemoradiation at least 2 weeks out from today's procedure to avoid inhibiting wound healing. High chance for proximal stricture development.  - Resume home medications including aspirin 81 mg today

## 2024-04-02 NOTE — ANESTHESIA PROCEDURE NOTES
Airway       Patient location during procedure: OR       Procedure Start/Stop Times: 4/2/2024 8:46 AM  Staff -        Anesthesiologist:  Julio Tolliver MD       CRNA: Nav Mcgrath APRN CRNA       Performed By: CRNA  Consent for Airway        Urgency: elective  Indications and Patient Condition       Indications for airway management: eliza-procedural       Induction type:inhalational       Mask difficulty assessment: 2 - vent by mask + OA or adjuvant +/- NMBA    Final Airway Details       Final airway type: endotracheal airway       Successful airway: ETT - single  Endotracheal Airway Details        ETT size (mm): 7.0       Cuffed: yes       Successful intubation technique: direct laryngoscopy       DL Blade Type: Rivers 2       Grade View of Cords: 1       Adjucts: stylet       Position: Right       Measured from: lips       Secured at (cm): 21       Bite block used: Molar    Post intubation assessment        Placement verified by: capnometry, equal breath sounds and chest rise        Number of attempts at approach: 1       Number of other approaches attempted: 0       Secured with: tape       Ease of procedure: easy       Dentition: Unchanged (edentulous)    Medication(s) Administered   Medication Administration Time: 4/2/2024 8:46 AM

## 2024-04-03 ENCOUNTER — PATIENT OUTREACH (OUTPATIENT)
Dept: SURGERY | Facility: CLINIC | Age: 64
End: 2024-04-03
Payer: COMMERCIAL

## 2024-04-03 NOTE — TUMOR CONFERENCE
"Thoracic Tumor Conference      Patient Name: Linh Mustafa    Reason for conference discussion (brief overview): a 63 year old female with a clinical T3N0M0 (stage II) squamous cell carcinoma of the mid-esophagus.  Started to have heartburn and epigastric discomfort in December/January and has had unintentional weight loss of ~15 lbs. She underwent EGD and PET CT.  Saw Dr Martinez in clinic on 3/14. Had EUS 3/22 which incidentally found proximal esophageal plaque with pathology of \"at least squamous cell carcinoma in-situ\".  The lymph node samples were benign.  Dr. King, performed an ESOPHAGOGASTRODUODENOSCOPY, WITH SUBMUCOSAL RESECTION on 4/2/2024 with complete resection and negative margins.    Specific Question:  Plan? Does the incidental finding of the plaque change the radiation field (since complete resection leaves just the main section)?  Needs to be established with a new oncology team with [facility] (requesting to move care here)    Pertinent Histology:    EGD with resection (4/2/2024):   A. PROXIMAL ESOPHAGEAL PLAQUE, SUBMUCOSAL RESECTION :  - Squamous mucosa with high-grade dysplasia/carcinoma in situ  - All resection margins negative for dysplasia  - No definite invasion identified    EGD with biopsy (Dr. Burleson, 2/15/2024): Mid-esophageal mass from 26-31 cm with a 7 mm cratered lesion, suspected short segment BE (37-40 cm); biopsy = moderately-differentiated squamous cell carcinoma; biopsy at 38 cm = Auguste's mucosa, no dysplasia    PET (3/7/2024): Mid-esophageal lesion extends about 6 cm in length with SUV of 24, no metastatic disease     EUS (3/21/2024)  ESOPHAGUS, PROXIMAL PLAQUE, BIOPSY:  - At least squamous cell carcinoma in-situ  (Sections of proximal esophageal plaque show at least squamous cell carcinoma in situ.  The features are worrisome; however, no definite submucosal invasion is identified.)    Referring Physician: Bryant Martinez MD    The patient's case was presented at the " multidisciplinary conference for the above noted reason.  There was a consensus recommendation for the following actions:     The team's consensus is that patient needs Neoadjuvant therapy.  Per Dr Berg (Rad Onc), due to complete resection of the proximal plaqued submucosal, this will not change the recommended radiation treatment.     Already scheduled for new consult visits with Med Onc Dr Burch 4/18 and Rad Onc Dr Berg 4/16.   Recommending to see if appointments can be moved up sooner. (Messages sent)      Case Lead:  Bryant Martinez MD    Interventional Radiology Staff Present: N/A       Tumor Conference Information  Tumor Conference: Thoracic  Specialties Present: Medical oncology, Radiation oncology, Pathology, Radiology, Surgery  Patient Status: Prospective  Stage: clinical T3N0M0 (stage II) squamous cell carcinoma of the mid-esophagus  Treatment to Date: Biopsy, Surgery  Clinical Trials: Discussed (see comment)  Genetic Testing Discussed/Recommended?: No  Supportive Care Services Discussed/Recommended?: Yes  Recommended Plan: Follows evidence-based guidelines  Did the review exceed 30 minutes?: did not           Documentation / Disclaimer Cancer Tumor Board Note  Cancer tumor board recommendations do not override what is determined to be reasonable care and treatment, which is dependent on the circumstances of a patient's case; the patient's medical, social, and personal concerns; and the clinical judgment of the oncologist [physician].

## 2024-04-03 NOTE — TELEPHONE ENCOUNTER
[facility]: Thoracic Surgery                                                                                       Called patient to inform her that Dr Martinez plans to have her case discussed at our upcoming multidisciplinary Tumor Board conference next Tues 4/9/2024. Explained they will review her case and pathology from recent procedure and provided recommendations on next steps.   Confirmed that patient would like to have her oncology treatment transferred to the [facility]. Informed patient the Dr Martinez has entered referrals for both Med Oncology and Rad Oncology, plan will be determined by Tumor Board conference, want to get her scheduled.   Patient verbalized her understanding and appreciated writer's call and update.     Anitra Santana RN, BSN  Thoracic Surgery RN Care Coordinator

## 2024-04-04 ENCOUNTER — TRANSCRIBE ORDERS (OUTPATIENT)
Dept: ONCOLOGY | Facility: CLINIC | Age: 64
End: 2024-04-04
Payer: COMMERCIAL

## 2024-04-04 ENCOUNTER — PATIENT OUTREACH (OUTPATIENT)
Dept: ONCOLOGY | Facility: CLINIC | Age: 64
End: 2024-04-04
Payer: COMMERCIAL

## 2024-04-04 DIAGNOSIS — C15.9 ESOPHAGEAL CANCER (H): Primary | ICD-10-CM

## 2024-04-04 NOTE — PROGRESS NOTES
New Patient Radiation Oncology Nurse Navigator Note     Referring provider:   Shruthi Garcia APRN CNS  [facility]  [facility]  [phone]  Dr. Martinez       Referred to (specialty): Radiation Oncology    Requested provider (if applicable):   [facility]     Date Referral Received:   4/4/24     Evaluation for :   esophageal     Clinical History (per Nurse review of records provided):    Patient with esophageal cancer, initially seen at [facility] and then seen by Dr. Martinez with thoracic surgery.    EGD with biopsy (Dr. Burleson, 2/15/2024): Mid-esophageal mass from 26-31 cm with a 7 mm cratered lesion, suspected short segment BE (37-40 cm); biopsy = moderately-differentiated squamous cell carcinoma; biopsy at 38 cm = Auguste's mucosa, no dysplasia  PET (3/7/2024): Mid-esophageal lesion extends about 6 cm in length with SUV of 24, no metastatic disease      Surgery: Dr. Martinez plan to see her back ~3-4 weeks after completion of neoadjuvant chemo-radiotherapy. We will then schedule her for a j-tube placement followed by a three-hole esophagectomy.    Patient had     Will be discussed at Tumor Board on 4/9/2024         Records Location (Care Everywhere, Media, etc.):   [facility]     Previous radiation treatment: NO     Additional testing needed prior to consult: tumor board discussion 4/9    I called Linh to discuss referral to oncology.  I introduced my role and reviewed what this consult visit will entail/what to expect.  I reviewed the location and gave contact numbers including new patient scheduling and clinic phone numbers.   Linh,  has no other questions at this time.    I forwarded on referral with scheduling instructions for the following PLAN:    HOLD: 4/16 Dr. Berg @ [facility], 130-230, NEW, in person  HOLD: 4/18 Dr. Burch @ [facility], 11-12, NEW, in person     I warm transferred call to our new patient scheduling to finalize appointment.    I also  alerted Dr. Berg about the tumor board discussion plan.  Dr. Burch is out of office until 4/16.    Sonja Norris, RN, BSN  Oncology New Patient Nurse Navigator   [facility]  [phone]

## 2024-04-08 LAB
PATH REPORT.COMMENTS IMP SPEC: ABNORMAL
PATH REPORT.COMMENTS IMP SPEC: ABNORMAL
PATH REPORT.COMMENTS IMP SPEC: YES
PATH REPORT.FINAL DX SPEC: ABNORMAL
PATH REPORT.GROSS SPEC: ABNORMAL
PATH REPORT.MICROSCOPIC SPEC OTHER STN: ABNORMAL
PATH REPORT.RELEVANT HX SPEC: ABNORMAL
PHOTO IMAGE: ABNORMAL

## 2024-04-08 NOTE — RESULT ENCOUNTER NOTE
I called the patient and reviewed her pathology. See EGD report for details. Pathology returned as HGD/carcinoma in situ. All margins negative. I explained for the proximal esophageal lesion this was a curative resection. She will be meeting with medical and radiation oncology next week for neoadjuvant therapy for her distal T3 esophageal SCC. Did well post-procedure without issue.    Holden King MD  [facility]  Division of Gastroenterology and Hepatology  [facility] [address] [phone]

## 2024-04-09 ENCOUNTER — TUMOR CONFERENCE (OUTPATIENT)
Dept: ONCOLOGY | Facility: CLINIC | Age: 64
End: 2024-04-09
Payer: COMMERCIAL

## 2024-04-14 NOTE — TELEPHONE ENCOUNTER
MEDICAL RECORDS REQUEST   Radiation Oncology  [address] [address] [address]  [place] [address]  Fax: [phone]          FUTURE VISIT INFORMATION                                                   Linh Mustafa, : 1960 scheduled for future visit at [facility] Radiation Oncology    RECORDS REQUESTED FOR VISIT                                                         Appt Info          Pre Visit Info April 15, 2024 11:02 AM    Referring Provider/Location:    Dx and Code: Esophageal cancer (H) [C15.9]  Appt Date:  24  Appt Type:  Radiation Oncology  Provider:  Mireille Berg  Records:    Call to [facility] and Pt had a radiation consult on 3.11.24 w/Dr Orozco and on Office consult - both are in Epic    Bx:  See below    Imaging: PET @ [facility] and Chest CT @ [facility] 24 are both in PACS.  Both reports are in [facility]     GI/ESOPHAGEAL/COLON + RECTAL     OFFICE NOTE from PCP     OFFICE NOTE from medical oncologist External: [facility] Consult:  24: Dr. Дмитрий Chilel   OFFICE NOTE from gastroenterology  Consult:    F/U:   OFFICE NOTE from thoracic surgeon [facility] 24: Dr. Bryant Martinez   CHEMOTHERAPY NOTES/LOG     OPERATIVE REPORTS (include Colonoscopy & EUS) Epic 24: EGD  24: EUS   MEDICATION LIST [facility]    LABS     PATHOLOGY REPORTS Report in [facility] Surg Path:  24: [id number]  24: [id number]    FNA:  24: [id number]     ANYTHING RELATED TO DIAGNOSIS Epic Most recent 24   IMAGING (NEED IMAGES & REPORT)     CT SCANS PACS 24, 22: CT Chest  05/11/15: CT CAP   PET PACS 24: PET CT Skull   PREVIOUS RADIATION     WHAT HEALTHCARE FACILITY External: [facility]    RADIATION ONCOLOGIST NOTES External: [facility] Consult:  24: Dr. Jabier Orozco   RADIATION TREATMENT SUMMARY NOTES     RADIATION TREATMENT PLAN     DVH = DOSE VOLUME HISTOGRAM     DICOM CD (TX PLANNING CT, TX PLAN, STRUCTURE SET) *If no DICOM avail ask for DRRs     *[facility] [place] Radiation  Oncology patients send to [facility] with ATTN: ALE/Blake Dosi/Physics    *only [facility] patient's, use [facility]

## 2024-04-15 NOTE — TELEPHONE ENCOUNTER
Pre Visit April 15, 2024  10:42 AM   Referring Provider/Location:  Bryant Martinez MD   Dx and Code: Esophageal cancer (H) [C15.9]   Appt Date:  4.18.24  Provider: Kathy Burch    Internal Referral, No outside records needed

## 2024-04-15 NOTE — PROGRESS NOTES
RADIATION ONCOLOGY CONSULTATION  DATE OF VISIT: Apr 16, 2024    Linh Mustafa  MRN: [id number]    PROBLEM:    Ms. Linh Mustafa was seen for initial consultation in the Department of Radiation Oncology on 4/16/2024 at the request of Dr. 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    HISTORY OF PRESENT ILLNESS:     Linh Mustafa is a 63-year-old woman who presented in January 2024 with symptoms of heartburn, epigastric discomfort and unintentional weight loss of approximately 15 pounds.  Initial evaluation included EGD on 2/15/2024 with Dr. Burleosn which demonstrated an ulcerated mid esophageal mass extending from 26-31 cm from the incisors.  Additionally, there was a short segment of Auguste's esophagus from 37-40 cm with biopsy taken at 38 cm.  Pathology demonstrated invasive squamous cell carcinoma, moderately differentiated, MMR intact of the mid esophageal mass.  The distal esophageal biopsy demonstrated metaplastic columnar epithelium with goblet cells compatible with Auguste's esophagus, negative for dysplasia.    CT of the chest on 2//2024 demonstrated a segmental circumferential thickening of the mid esophagus measuring 5 cm in craniocaudal length and 2.4 x 1.8 cm in transverse diameter with contiguous involvement of the mediastinal fat.  Tumor directly abuts the trachea and aortic arch.  There was a mildly enlarged precarinal node measuring 1.2 x 1.0 cm with at least 4 other mediastinal lymph nodes slightly prominent but not pathologically enlarged and considered indeterminant.  There was a noncalcified 4 mm nodule in the right upper lobe.    PET CT scan on 3/7/2024 showed markedly FDG avid wall thickening of the mid thoracic esophagus spanning approximately 6 cm (SUV max 24.1).  There was no evidence of FDG avid metastatic disease.    On March 21, 2024, she underwent upper endoscopic ultrasound for pretreatment staging with Dr. Kramer.  Endoscopy showed a flat, nodular  pale plaque on the left lateral wall of the proximal esophagus between 15-19 cm (just below the UES) and involving one third of the circumference of the esophagus.  This area was biopsied.  The known esophageal mass in the mid esophagus was on the right anterior lateral esophageal wall between 24-30 cm with raised margins and 50% circumferential.  The GE junction was at 35 cm with a 2 cm tongue of Auguste's anteriorly without high risk features.  The diaphragm was at 40 cm.  Ultrasound exam demonstrated the mid esophageal tumor to be early T3 with a mass measuring 6-7 mm in thickness.  There were two 4 x 6 mm nodes seen adjacent to the distal esophageal wall at 36 and 37 cm 1 node was sampled.  The other was directly adjacent to the left ventricular wall and appeared identical to the first biopsied node.  Pathology of the proximal esophageal plaque demonstrated at least squamous cell carcinoma in situ.  The lymph node biopsy was negative for malignancy.    On 4/2/2024, the patient underwent upper GI endoscopy for endoscopic resection of the proximal squamous cell carcinoma in situ lesion.  Pathology of the resected lesion demonstrated squamous mucosa with high-grade dysplasia/carcinoma and site to with all resection margins negative for dysplasia.    Her case was discussed in multidisciplinary tumor conference with recommendation for neoadjuvant chemoradiation to the mid esophageal lesion.  Given complete resection of the upper esophageal lesion, this area does not require further treatment.    Today, she presents with her brother, who is here from [place]. Overall, she is doing fairly well. Eating is difficult and she is currently drinking supplements and eating softer foods. She has lost weight due to difficulty eating and pain - 20 to 30 lbs. She is a current 1 ppd smoker.    PAST MEDICAL HISTORY:   Past Medical History:   Diagnosis Date    Hyperlipidemia     Hypertension      PAST SURGICAL HISTORY:   Past  Surgical History:   Procedure Laterality Date    BIOPSY BREAST Right     age 30 benign fibrous    CV CORONARY ANGIOGRAM N/A 01/25/2023    Procedure: Coronary Angiogram;  Surgeon: Lukas Irizarry MD;  Location: [facility] CV    CV LEFT HEART CATH N/A 01/25/2023    Procedure: Left Heart Catheterization;  Surgeon: Lukas Irizarry MD;  Location: [facility] CV    ENDOSCOPIC ULTRASOUND UPPER GASTROINTESTINAL TRACT (GI) N/A 3/21/2024    Procedure: ENDOSCOPIC ULTRASOUND, ESOPHAGOSCOPY / UPPER GASTROINTESTINAL TRACT (GI), ENDOSCOPY WITH BIOPSY, FINE NEEDLE ASPIRATION;  Surgeon: Damon Kramer MD;  Location: [facility] OR    ENDOSCOPIC ULTRASOUND, ESOPHAGOSCOPY / UPPER GASTROINTESTINAL TRACT (GI), ENDOSCOPY WITH BIOPSY, FINE NEEDLE ASPIRATION  03/21/2024    ESOPHAGOSCOPY, GASTROSCOPY, DUODENOSCOPY (EGD), SUBMUCOSA RESECTION N/A 4/2/2024    Procedure: ESOPHAGOGASTRODUODENOSCOPY, WITH SUBMUCOSAL RESECTION;  Surgeon: Holden King MD;  Location: [facility] OR     CHEMOTHERAPY HISTORY: None     PAST RADIATION THERAPY HISTORY: None    IMPLANTABLE CARDIAC DEVICE: None    MEDICATIONS:   Current Outpatient Medications   Medication Sig Dispense Refill    acetaminophen (TYLENOL) 325 MG tablet Take 325-650 mg by mouth every 6 hours as needed for mild pain      amLODIPine (NORVASC) 10 MG tablet TAKE 1 TABLET (10 MG) BY MOUTH DAILY. 90 tablet 1    aspirin (ASA) 81 MG chewable tablet Take 81 mg by mouth daily      atorvastatin (LIPITOR) 40 MG tablet TAKE 1 TABLET BY MOUTH EVERY DAY 90 tablet 1    esomeprazole (NEXIUM) 20 MG DR capsule Take 20 mg by mouth 2 times daily Take 30-60 minutes before eating.      famotidine (PEPCID) 20 MG tablet Take 20 mg by mouth 2 times daily      fluticasone (FLONASE) 50 MCG/ACT nasal spray Spray 1-2 sprays into one nostril alternating nostrils as needed      levothyroxine (SYNTHROID/LEVOTHROID) 200 MCG tablet take 1 tablet by mouth every day for 30 days      metoprolol succinate ER (TOPROL XL) 25  MG 24 hr tablet TAKE 1 TABLET BY MOUTH EVERY DAY 90 tablet 1    nitroGLYcerin (NITROSTAT) 0.4 MG sublingual tablet For chest pain place 1 tablet under the tongue every 5 minutes for 3 doses. If symptoms persist 5 minutes after 1st dose call 911. 25 tablet 3    predniSONE (DELTASONE) 20 MG tablet Take two tablets (= 40mg) each day for 5 (five) days 10 tablet 0    sucralfate (CARAFATE) 1 GM/10ML suspension Take 10 mLs (1 g) by mouth 4 times daily 500 mL 1    traMADol (ULTRAM) 50 MG tablet Take 1 tablet (50 mg) by mouth every 6 hours as needed for severe pain 20 tablet 0      ALLERGIES:   Allergies as of 04/16/2024 - Reviewed 04/02/2024   Allergen Reaction Noted    Amoxicillin Shortness Of Breath, Itching, and Rash 12/06/2023    Penicillins  03/14/2024     SOCIAL HISTORY:      Social History     Socioeconomic History    Marital status: Single     Spouse name: Not on file    Number of children: Not on file    Years of education: Not on file    Highest education level: Not on file   Occupational History    Not on file   Tobacco Use    Smoking status: Every Day     Current packs/day: 1.00     Average packs/day: 1 pack/day for 47.3 years (47.3 ttl pk-yrs)     Types: Cigarettes     Start date: 1977    Smokeless tobacco: Never   Vaping Use    Vaping status: Never Used   Substance and Sexual Activity    Alcohol use: Not on file     Comment: occasional    Drug use: Never    Sexual activity: Not on file   Other Topics Concern    Not on file   Social History Narrative    ** Merged History Encounter **          Social Determinants of Health     Financial Resource Strain: Not on file   Food Insecurity: No Food Insecurity (2/24/2024)    Received from [facility]    Hunger Vital Sign     Worried About Running Out of Food in the Last Year: Never true     Ran Out of Food in the Last Year: Never true   Transportation Needs: No Transportation Needs (2/24/2024)    Received from [facility]    PRAJULIO -  Transportation     Lack of Transportation (Medical): No     Lack of Transportation (Non-Medical): No   Physical Activity: Inactive (2024)    Received from [facility]    Exercise Vital Sign     Days of Exercise per Week: 0 days     Minutes of Exercise per Session: 0 min   Stress: Not on file   Social Connections: Not on file   Interpersonal Safety: Not on file   Housing Stability: Low Risk  (2024)    Received from [facility]    Housing Stability     What is your living situation today?: I have a steady place to live     FAMILY HISTORY:   Family History   Problem Relation Age of Onset    Chronic Obstructive Pulmonary Disease Father     Colon Cancer Paternal Grandmother 70     REVIEW OF SYMPTOMS:  A full 12-point review of systems was performed. All pertinent positives and negatives are noted in HPI.    FUNCTIONAL STATUS: ECO    PHYSICAL EXAMINATION:    /72   Pulse 72   Wt 66.7 kg (147 lb)   BMI 23.02 kg/m    Exam:  General: Alert, oriented, in no acute distress  HEENT: Normocephalic, no icterus, no mucosal lesion of the oral cavity  Neck: No thyromegaly or adenopathy  Respiratory: Breathing comfortably on room air, no wheezing  Cardiovascular: Regular rate, extremities warm and well perfused  Abdomen: Nondistended  Extremities: No edema or cyanosis  Skin: No visible rash  Neuro: CN II-XII intact    IMAGING/PATH:   Imaging: per HPI, personally reviewed outside images and reports and am in agreement    Path: per [facility], personally reviewed and in agreement  Consult Report                                    Case: [id number]                                   Authorizing Provider:  Bryant Martinez,  Collected:           2024 02:32 PM                                  MD                                                                           Ordering Location:     [facility]     Received:            2024 02:33 PM                                   [facility]                                                       Pathologist:           Rio Holly MD                                                                 Specimen:    Consult Slide, NR-                                                                  Final Diagnosis   [place]15/24):  A.  Distal esophagus, biopsy:  -Metaplastic columnar epithelium with goblet cells compatible with Auguste's esophagus  -Negative for dysplasia  -No esophageal squamous mucosa identified     B.  Mid esophageal mass, biopsy:  -Invasive squamous cell carcinoma, moderately differentiated  -MMR intact by immunohistochemistry     04/02/2024 Spec [id number]  Final Diagnosis  A. PROXIMAL ESOPHAGEAL PLAQUE, SUBMUCOSAL RESECTION :  - Squamous mucosa with high-grade dysplasia/carcinoma in situ  - All resection margins negative for dysplasia  - No definite invasion identified    Labs: per HPI, personally reviewed and in agreement  Lab Results   Component Value Date    WBC 9.5 04/18/2024    HGB 13.2 04/18/2024    HCT 39.4 04/18/2024    MCV 94 04/18/2024     04/18/2024     Lab Results   Component Value Date    CR 0.67 04/18/2024     Lab Results   Component Value Date     04/18/2024    POTASSIUM 4.5 04/18/2024    CHLORIDE 102 04/18/2024    CO2 25 04/18/2024    GLC 94 04/18/2024     Lab Results   Component Value Date    AST 16 04/18/2024    ALT 16 04/18/2024    ALKPHOS 106 04/18/2024    BILITOTAL 0.4 04/18/2024     Lab Results   Component Value Date    TSH 0.19 (L) 04/18/2024        ASSESSMENT AND PLAN:   Linh Mustafa is a 63-year-old woman with new diagnosis of clinical stage T3 N0 M0 (stage II) 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 Upper esophageal plaque was carcinoma in situ and was completely excised. Consensus at Thoracic tumor board was that the Tis N0 M0 lesion did not require any further treatment.    The rationale for recommending neoadjuvant chemoradiation was discussed with the patient and her brother. I also reviewed the anticipated acute side effects, potential long term risks and expected outcome with radiation to the midthoracic esophagus. Discussed that Dr. Martinez does plan to place a gastrojejunostomy tube after radiation but we may need to place the tube earlier if she has difficulty maintaining her weight. She will meet with Nutrition on a routine basis during her radiation for support.    She is still smoking but has cut back to only occasional use. Goal is stop completely which will help minimize her acute toxicity profile.     Treatment planning simulation will be scheduled within the next week.    We appreciate the opportunity to participate in Ms. Mustafa's care.  Please call with questions.    The overall time I spent on direct patient care including self review of records, labs, and radiographic studies, as well as, direct face-to-face interaction with the patient and coordination of care with other providers was 90 minutes.    Mireille Berg MD  Department of Radiation Oncology  [facility]     CC  Patient Care Team:  Clinic, [facility] as PCP - General  Benedicto Oliveira MD ([facility])  Fauzia Nuñez MD as MD (Cardiovascular Disease)  Fauzia Nuñez MD as Assigned Heart and Vascular Provider  Bryant Martinez MD as MD (Cardiovascular & Thoracic Surgery)  Holden King MD as MD (Gastroenterology)  Damon Kramer MD as MD (Gastroenterology)  Anitra Santana, JENNIFER as Specialty Care Coordinator (Hematology & Oncology)  Kathy Burch MD as MD (Hematology & Oncology)  Verónica Sharma, RN as Specialty Care Coordinator (Hematology &  Oncology)  KELSEY MARQUEZ

## 2024-04-16 ENCOUNTER — OFFICE VISIT (OUTPATIENT)
Dept: RADIATION ONCOLOGY | Facility: CLINIC | Age: 64
End: 2024-04-16
Attending: THORACIC SURGERY (CARDIOTHORACIC VASCULAR SURGERY)
Payer: COMMERCIAL

## 2024-04-16 ENCOUNTER — PRE VISIT (OUTPATIENT)
Dept: RADIATION ONCOLOGY | Facility: CLINIC | Age: 64
End: 2024-04-16
Payer: COMMERCIAL

## 2024-04-16 VITALS
WEIGHT: 147 LBS | DIASTOLIC BLOOD PRESSURE: 72 MMHG | BODY MASS INDEX: 23.02 KG/M2 | SYSTOLIC BLOOD PRESSURE: 138 MMHG | HEART RATE: 72 BPM

## 2024-04-16 DIAGNOSIS — C15.9 ESOPHAGEAL CANCER (H): ICD-10-CM

## 2024-04-16 DIAGNOSIS — C15.4 MALIGNANT NEOPLASM OF MIDDLE THIRD OF ESOPHAGUS (H): Primary | ICD-10-CM

## 2024-04-16 PROCEDURE — 99417 PROLNG OP E/M EACH 15 MIN: CPT | Performed by: RADIOLOGY

## 2024-04-16 PROCEDURE — G0463 HOSPITAL OUTPT CLINIC VISIT: HCPCS | Mod: 25 | Performed by: RADIOLOGY

## 2024-04-16 PROCEDURE — 99205 OFFICE O/P NEW HI 60 MIN: CPT | Performed by: RADIOLOGY

## 2024-04-16 ASSESSMENT — ENCOUNTER SYMPTOMS
ABDOMINAL PAIN: 1
MUSCULOSKELETAL NEGATIVE: 1
CARDIOVASCULAR NEGATIVE: 1
NEUROLOGICAL NEGATIVE: 1
RESPIRATORY NEGATIVE: 1
EYES NEGATIVE: 1
WEIGHT LOSS: 1
NERVOUS/ANXIOUS: 1

## 2024-04-16 NOTE — PROGRESS NOTES
HPI    INITIAL PATIENT ASSESSMENT    Diagnosis: Cancer    Prior radiation therapy: None    Prior chemotherapy: None    Prior hormonal therapy:No    Pain Eval:  Current history of pain associated with this visit:   Intensity: 8/10  Current: aching  Location: Esophagus  Treatment: Tylenol    Psychosocial  Living arrangements: Lives with   Fall Risk: independent   referral needs: Not needed    Advanced Directive: Yes - Location: On file  Implantable Cardiac Device? No      Review of Systems   Constitutional:  Positive for malaise/fatigue and weight loss.   HENT: Negative.     Eyes: Negative.    Respiratory: Negative.     Cardiovascular: Negative.    Gastrointestinal:  Positive for abdominal pain.   Genitourinary: Negative.    Musculoskeletal: Negative.    Skin: Negative.    Neurological: Negative.    Endo/Heme/Allergies: Negative.    Psychiatric/Behavioral:  The patient is nervous/anxious.

## 2024-04-16 NOTE — LETTER
4/16/2024         RE: Linh Mustafa  [address]  [facility] [address]        Dear Colleague,    Thank you for referring your patient, Linh Mustafa, to the [facility] RADIATION ONCOLOGY. Please see a copy of my visit note below.    RADIATION ONCOLOGY CONSULTATION  DATE OF VISIT: Apr 16, 2024    Linh Mustafa  MRN: [id number]    PROBLEM:    Ms. Linh Mustafa was seen for initial consultation in the Department of Radiation Oncology on 4/16/2024 at the request of Dr. 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    HISTORY OF PRESENT ILLNESS:     Linh Mustafa is a 63-year-old woman who presented in January 2024 with symptoms of heartburn, epigastric discomfort and unintentional weight loss of approximately 15 pounds.  Initial evaluation included EGD on 2/15/2024 with Dr. Burleson which demonstrated an ulcerated mid esophageal mass extending from 26-31 cm from the incisors.  Additionally, there was a short segment of Auguste's esophagus from 37-40 cm with biopsy taken at 38 cm.  Pathology demonstrated invasive squamous cell carcinoma, moderately differentiated, MMR intact of the mid esophageal mass.  The distal esophageal biopsy demonstrated metaplastic columnar epithelium with goblet cells compatible with Auguste's esophagus, negative for dysplasia.    CT of the chest on 2//2024 demonstrated a segmental circumferential thickening of the mid esophagus measuring 5 cm in craniocaudal length and 2.4 x 1.8 cm in transverse diameter with contiguous involvement of the mediastinal fat.  Tumor directly abuts the trachea and aortic arch.  There was a mildly enlarged precarinal node measuring 1.2 x 1.0 cm with at least 4 other mediastinal lymph nodes slightly prominent but not pathologically enlarged and considered indeterminant.  There was a noncalcified 4 mm nodule in the right upper lobe.    PET CT scan on 3/7/2024 showed markedly FDG avid wall thickening of the  mid thoracic esophagus spanning approximately 6 cm (SUV max 24.1).  There was no evidence of FDG avid metastatic disease.    On March 21, 2024, she underwent upper endoscopic ultrasound for pretreatment staging with Dr. Kramer.  Endoscopy showed a flat, nodular pale plaque on the left lateral wall of the proximal esophagus between 15-19 cm (just below the UES) and involving one third of the circumference of the esophagus.  This area was biopsied.  The known esophageal mass in the mid esophagus was on the right anterior lateral esophageal wall between 24-30 cm with raised margins and 50% circumferential.  The GE junction was at 35 cm with a 2 cm tongue of Auguste's anteriorly without high risk features.  The diaphragm was at 40 cm.  Ultrasound exam demonstrated the mid esophageal tumor to be early T3 with a mass measuring 6-7 mm in thickness.  There were two 4 x 6 mm nodes seen adjacent to the distal esophageal wall at 36 and 37 cm 1 node was sampled.  The other was directly adjacent to the left ventricular wall and appeared identical to the first biopsied node.  Pathology of the proximal esophageal plaque demonstrated at least squamous cell carcinoma in situ.  The lymph node biopsy was negative for malignancy.    On 4/2/2024, the patient underwent upper GI endoscopy for endoscopic resection of the proximal squamous cell carcinoma in situ lesion.  Pathology of the resected lesion demonstrated squamous mucosa with high-grade dysplasia/carcinoma and site to with all resection margins negative for dysplasia.    Her case was discussed in multidisciplinary tumor conference with recommendation for neoadjuvant chemoradiation to the mid esophageal lesion.  Given complete resection of the upper esophageal lesion, this area does not require further treatment.    Today, she presents with her brother, who is here from [place]. Overall, she is doing fairly well. Eating is difficult and she is currently drinking supplements and  eating softer foods. She has lost weight due to difficulty eating and pain - 20 to 30 lbs. She is a current 1 ppd smoker.    PAST MEDICAL HISTORY:   Past Medical History:   Diagnosis Date     Hyperlipidemia      Hypertension      PAST SURGICAL HISTORY:   Past Surgical History:   Procedure Laterality Date     BIOPSY BREAST Right     age 30 benign fibrous     CV CORONARY ANGIOGRAM N/A 01/25/2023    Procedure: Coronary Angiogram;  Surgeon: Lukas Irizarry MD;  Location: [facility] CV     CV LEFT HEART CATH N/A 01/25/2023    Procedure: Left Heart Catheterization;  Surgeon: Lukas Irizarry MD;  Location: [facility] CV     ENDOSCOPIC ULTRASOUND UPPER GASTROINTESTINAL TRACT (GI) N/A 3/21/2024    Procedure: ENDOSCOPIC ULTRASOUND, ESOPHAGOSCOPY / UPPER GASTROINTESTINAL TRACT (GI), ENDOSCOPY WITH BIOPSY, FINE NEEDLE ASPIRATION;  Surgeon: Damon Kramer MD;  Location: [facility] OR     ENDOSCOPIC ULTRASOUND, ESOPHAGOSCOPY / UPPER GASTROINTESTINAL TRACT (GI), ENDOSCOPY WITH BIOPSY, FINE NEEDLE ASPIRATION  03/21/2024     ESOPHAGOSCOPY, GASTROSCOPY, DUODENOSCOPY (EGD), SUBMUCOSA RESECTION N/A 4/2/2024    Procedure: ESOPHAGOGASTRODUODENOSCOPY, WITH SUBMUCOSAL RESECTION;  Surgeon: Holden King MD;  Location: [facility] OR     CHEMOTHERAPY HISTORY: None     PAST RADIATION THERAPY HISTORY: None    IMPLANTABLE CARDIAC DEVICE: None    MEDICATIONS:   Current Outpatient Medications   Medication Sig Dispense Refill     acetaminophen (TYLENOL) 325 MG tablet Take 325-650 mg by mouth every 6 hours as needed for mild pain       amLODIPine (NORVASC) 10 MG tablet TAKE 1 TABLET (10 MG) BY MOUTH DAILY. 90 tablet 1     aspirin (ASA) 81 MG chewable tablet Take 81 mg by mouth daily       atorvastatin (LIPITOR) 40 MG tablet TAKE 1 TABLET BY MOUTH EVERY DAY 90 tablet 1     esomeprazole (NEXIUM) 20 MG DR capsule Take 20 mg by mouth 2 times daily Take 30-60 minutes before eating.       famotidine (PEPCID) 20 MG tablet Take 20 mg by mouth 2  times daily       fluticasone (FLONASE) 50 MCG/ACT nasal spray Spray 1-2 sprays into one nostril alternating nostrils as needed       levothyroxine (SYNTHROID/LEVOTHROID) 200 MCG tablet take 1 tablet by mouth every day for 30 days       metoprolol succinate ER (TOPROL XL) 25 MG 24 hr tablet TAKE 1 TABLET BY MOUTH EVERY DAY 90 tablet 1     nitroGLYcerin (NITROSTAT) 0.4 MG sublingual tablet For chest pain place 1 tablet under the tongue every 5 minutes for 3 doses. If symptoms persist 5 minutes after 1st dose call 911. 25 tablet 3     predniSONE (DELTASONE) 20 MG tablet Take two tablets (= 40mg) each day for 5 (five) days 10 tablet 0     sucralfate (CARAFATE) 1 GM/10ML suspension Take 10 mLs (1 g) by mouth 4 times daily 500 mL 1     traMADol (ULTRAM) 50 MG tablet Take 1 tablet (50 mg) by mouth every 6 hours as needed for severe pain 20 tablet 0      ALLERGIES:   Allergies as of 04/16/2024 - Reviewed 04/02/2024   Allergen Reaction Noted     Amoxicillin Shortness Of Breath, Itching, and Rash 12/06/2023     Penicillins  03/14/2024     SOCIAL HISTORY:      Social History     Socioeconomic History     Marital status: Single     Spouse name: Not on file     Number of children: Not on file     Years of education: Not on file     Highest education level: Not on file   Occupational History     Not on file   Tobacco Use     Smoking status: Every Day     Current packs/day: 1.00     Average packs/day: 1 pack/day for 47.3 years (47.3 ttl pk-yrs)     Types: Cigarettes     Start date: 1977     Smokeless tobacco: Never   Vaping Use     Vaping status: Never Used   Substance and Sexual Activity     Alcohol use: Not on file     Comment: occasional     Drug use: Never     Sexual activity: Not on file   Other Topics Concern     Not on file   Social History Narrative    ** Merged History Encounter **          Social Determinants of Health     Financial Resource Strain: Not on file   Food Insecurity: No Food Insecurity (2/24/2024)     Received from [facility]    Hunger Vital Sign      Worried About Running Out of Food in the Last Year: Never true      Ran Out of Food in the Last Year: Never true   Transportation Needs: No Transportation Needs (2024)    Received from [facility]    PRAPARE - Transportation      Lack of Transportation (Medical): No      Lack of Transportation (Non-Medical): No   Physical Activity: Inactive (2024)    Received from [facility]    Exercise Vital Sign      Days of Exercise per Week: 0 days      Minutes of Exercise per Session: 0 min   Stress: Not on file   Social Connections: Not on file   Interpersonal Safety: Not on file   Housing Stability: Low Risk  (2024)    Received from [facility]    Housing Stability      What is your living situation today?: I have a steady place to live     FAMILY HISTORY:   Family History   Problem Relation Age of Onset     Chronic Obstructive Pulmonary Disease Father      Colon Cancer Paternal Grandmother 70     REVIEW OF SYMPTOMS:  A full 12-point review of systems was performed. All pertinent positives and negatives are noted in HPI.    FUNCTIONAL STATUS: ECO    PHYSICAL EXAMINATION:    /72   Pulse 72   Wt 66.7 kg (147 lb)   BMI 23.02 kg/m    Exam:  General: Alert, oriented, in no acute distress  HEENT: Normocephalic, no icterus, no mucosal lesion of the oral cavity  Neck: No thyromegaly or adenopathy  Respiratory: Breathing comfortably on room air, no wheezing  Cardiovascular: Regular rate, extremities warm and well perfused  Abdomen: Nondistended  Extremities: No edema or cyanosis  Skin: No visible rash  Neuro: CN II-XII intact    IMAGING/PATH:   Imaging: per [facility], personally reviewed outside images and reports and am in agreement    Path: per [facility], personally reviewed and in agreement  Consult Report                                    Case: [id number]                                   Authorizing Provider:   Bryant Martinez,  Collected:           03/26/2024 02:32 PM                                  MD                                                                           Ordering Location:     [facility]     Received:            03/26/2024 02:33 PM                                  [facility]                                                       Pathologist:           Rio Holly MD                                                                 Specimen:    Consult Slide, XG-                                                                  Final Diagnosis   [place]/15/24):  A.  Distal esophagus, biopsy:  -Metaplastic columnar epithelium with goblet cells compatible with Auguste's esophagus  -Negative for dysplasia  -No esophageal squamous mucosa identified     B.  Mid esophageal mass, biopsy:  -Invasive squamous cell carcinoma, moderately differentiated  -MMR intact by immunohistochemistry     04/02/2024 Spec [id number]  Final Diagnosis  A. PROXIMAL ESOPHAGEAL PLAQUE, SUBMUCOSAL RESECTION :  - Squamous mucosa with high-grade dysplasia/carcinoma in situ  - All resection margins negative for dysplasia  - No definite invasion identified    Labs: per HPI, personally reviewed and in agreement  Lab Results   Component Value Date    WBC 9.5 04/18/2024    HGB 13.2 04/18/2024    HCT 39.4 04/18/2024    MCV 94 04/18/2024     04/18/2024     Lab Results   Component Value Date    CR 0.67 04/18/2024     Lab Results   Component Value Date     04/18/2024    POTASSIUM 4.5 04/18/2024    CHLORIDE 102 04/18/2024    CO2 25 04/18/2024    GLC 94 04/18/2024     Lab Results   Component Value Date    AST 16 04/18/2024    ALT 16 04/18/2024    ALKPHOS 106 04/18/2024    BILITOTAL 0.4 04/18/2024     Lab Results   Component Value Date     TSH 0.19 (L) 04/18/2024        ASSESSMENT AND PLAN:   Linh Mustafa is a 63-year-old woman with new diagnosis of clinical stage T3 N0 M0 (stage II) 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 Upper esophageal plaque was carcinoma in situ and was completely excised. Consensus at Thoracic tumor board was that the Tis N0 M0 lesion did not require any further treatment.    The rationale for recommending neoadjuvant chemoradiation was discussed with the patient and her brother. I also reviewed the anticipated acute side effects, potential long term risks and expected outcome with radiation to the midthoracic esophagus. Discussed that Dr. Martinez does plan to place a gastrojejunostomy tube after radiation but we may need to place the tube earlier if she has difficulty maintaining her weight. She will meet with Nutrition on a routine basis during her radiation for support.    She is still smoking but has cut back to only occasional use. Goal is stop completely which will help minimize her acute toxicity profile.     Treatment planning simulation will be scheduled within the next week.    We appreciate the opportunity to participate in Ms. Mustafa's care.  Please call with questions.    The overall time I spent on direct patient care including self review of records, labs, and radiographic studies, as well as, direct face-to-face interaction with the patient and coordination of care with other providers was 90 minutes.    Mireille Berg MD  Department of Radiation Oncology  [facility]     CC  Patient Care Team:  Clinic, [facility] as PCP - Benedicto Deal MD ([facility])  Fauzia Nuñez MD as MD (Cardiovascular Disease)  Fauzia Nuñez MD as Assigned Heart and Vascular Provider  Bryant Martinez MD as MD (Cardiovascular & Thoracic  Surgery)  Holden King MD as MD (Gastroenterology)  Damon Kramer MD as MD (Gastroenterology)  Anitra Santana, JENNIFER as Specialty Care Coordinator (Hematology & Oncology)  Kathy Burch MD as MD (Hematology & Oncology)  Verónica Sharma, RN as Specialty Care Coordinator (Hematology & Oncology)  KELSEY MARQUEZ        [facility]    INITIAL PATIENT ASSESSMENT    Diagnosis: Cancer    Prior radiation therapy: None    Prior chemotherapy: None    Prior hormonal therapy:No    Pain Eval:  Current history of pain associated with this visit:   Intensity: 8/10  Current: aching  Location: Esophagus  Treatment: Tylenol    Psychosocial  Living arrangements: Lives with   Fall Risk: independent   referral needs: Not needed    Advanced Directive: Yes - Location: On file  Implantable Cardiac Device? No      Review of Systems   Constitutional:  Positive for malaise/fatigue and weight loss.   HENT: Negative.     Eyes: Negative.    Respiratory: Negative.     Cardiovascular: Negative.    Gastrointestinal:  Positive for abdominal pain.   Genitourinary: Negative.    Musculoskeletal: Negative.    Skin: Negative.    Neurological: Negative.    Endo/Heme/Allergies: Negative.    Psychiatric/Behavioral:  The patient is nervous/anxious.                  Again, thank you for allowing me to participate in the care of your patient.        Sincerely,        Mireille Berg MD

## 2024-04-18 ENCOUNTER — PRE VISIT (OUTPATIENT)
Dept: ONCOLOGY | Facility: CLINIC | Age: 64
End: 2024-04-18
Payer: COMMERCIAL

## 2024-04-18 ENCOUNTER — ONCOLOGY VISIT (OUTPATIENT)
Dept: ONCOLOGY | Facility: CLINIC | Age: 64
End: 2024-04-18
Attending: CLINICAL NURSE SPECIALIST
Payer: COMMERCIAL

## 2024-04-18 ENCOUNTER — PATIENT OUTREACH (OUTPATIENT)
Dept: ONCOLOGY | Facility: CLINIC | Age: 64
End: 2024-04-18

## 2024-04-18 ENCOUNTER — LAB (OUTPATIENT)
Dept: LAB | Facility: CLINIC | Age: 64
End: 2024-04-18
Attending: CLINICAL NURSE SPECIALIST
Payer: COMMERCIAL

## 2024-04-18 VITALS
HEIGHT: 67 IN | OXYGEN SATURATION: 96 % | RESPIRATION RATE: 18 BRPM | DIASTOLIC BLOOD PRESSURE: 73 MMHG | SYSTOLIC BLOOD PRESSURE: 124 MMHG | WEIGHT: 148.9 LBS | TEMPERATURE: 98.3 F | BODY MASS INDEX: 23.37 KG/M2 | HEART RATE: 89 BPM

## 2024-04-18 DIAGNOSIS — D75.89 MACROCYTOSIS: ICD-10-CM

## 2024-04-18 DIAGNOSIS — C15.9 SQUAMOUS CELL CARCINOMA OF ESOPHAGUS (H): Primary | ICD-10-CM

## 2024-04-18 DIAGNOSIS — C15.9 SQUAMOUS CELL CARCINOMA OF ESOPHAGUS (H): ICD-10-CM

## 2024-04-18 DIAGNOSIS — E03.9 HYPOTHYROIDISM, UNSPECIFIED TYPE: ICD-10-CM

## 2024-04-18 LAB
ALBUMIN SERPL BCG-MCNC: 4.4 G/DL (ref 3.5–5.2)
ALP SERPL-CCNC: 106 U/L (ref 40–150)
ALT SERPL W P-5'-P-CCNC: 16 U/L (ref 0–50)
ANION GAP SERPL CALCULATED.3IONS-SCNC: 10 MMOL/L (ref 7–15)
AST SERPL W P-5'-P-CCNC: 16 U/L (ref 0–45)
BASOPHILS # BLD AUTO: 0 10E3/UL (ref 0–0.2)
BASOPHILS NFR BLD AUTO: 0 %
BILIRUB SERPL-MCNC: 0.4 MG/DL
BUN SERPL-MCNC: 17.5 MG/DL (ref 8–23)
CALCIUM SERPL-MCNC: 10.6 MG/DL (ref 8.8–10.2)
CHLORIDE SERPL-SCNC: 102 MMOL/L (ref 98–107)
CREAT SERPL-MCNC: 0.67 MG/DL (ref 0.51–0.95)
DEPRECATED HCO3 PLAS-SCNC: 25 MMOL/L (ref 22–29)
EGFRCR SERPLBLD CKD-EPI 2021: >90 ML/MIN/1.73M2
EOSINOPHIL # BLD AUTO: 0 10E3/UL (ref 0–0.7)
EOSINOPHIL NFR BLD AUTO: 0 %
ERYTHROCYTE [DISTWIDTH] IN BLOOD BY AUTOMATED COUNT: 14.8 % (ref 10–15)
FOLATE SERPL-MCNC: 5.4 NG/ML (ref 4.6–34.8)
GLUCOSE SERPL-MCNC: 94 MG/DL (ref 70–99)
HCT VFR BLD AUTO: 39.4 % (ref 35–47)
HGB BLD-MCNC: 13.2 G/DL (ref 11.7–15.7)
IMM GRANULOCYTES # BLD: 0 10E3/UL
IMM GRANULOCYTES NFR BLD: 0 %
LYMPHOCYTES # BLD AUTO: 2.6 10E3/UL (ref 0.8–5.3)
LYMPHOCYTES NFR BLD AUTO: 28 %
MCH RBC QN AUTO: 31.6 PG (ref 26.5–33)
MCHC RBC AUTO-ENTMCNC: 33.5 G/DL (ref 31.5–36.5)
MCV RBC AUTO: 94 FL (ref 78–100)
MONOCYTES # BLD AUTO: 0.5 10E3/UL (ref 0–1.3)
MONOCYTES NFR BLD AUTO: 6 %
NEUTROPHILS # BLD AUTO: 6.2 10E3/UL (ref 1.6–8.3)
NEUTROPHILS NFR BLD AUTO: 66 %
NRBC # BLD AUTO: 0 10E3/UL
NRBC BLD AUTO-RTO: 0 /100
PLATELET # BLD AUTO: 277 10E3/UL (ref 150–450)
POTASSIUM SERPL-SCNC: 4.5 MMOL/L (ref 3.4–5.3)
PROT SERPL-MCNC: 7.8 G/DL (ref 6.4–8.3)
RBC # BLD AUTO: 4.18 10E6/UL (ref 3.8–5.2)
RETICS # AUTO: 0.05 10E6/UL (ref 0.03–0.1)
RETICS/RBC NFR AUTO: 1.2 % (ref 0.5–2)
SODIUM SERPL-SCNC: 137 MMOL/L (ref 135–145)
T4 FREE SERPL-MCNC: 1.7 NG/DL (ref 0.9–1.7)
TSH SERPL DL<=0.005 MIU/L-ACNC: 0.19 UIU/ML (ref 0.3–4.2)
WBC # BLD AUTO: 9.5 10E3/UL (ref 4–11)

## 2024-04-18 PROCEDURE — 84443 ASSAY THYROID STIM HORMONE: CPT

## 2024-04-18 PROCEDURE — 82746 ASSAY OF FOLIC ACID SERUM: CPT

## 2024-04-18 PROCEDURE — 82607 VITAMIN B-12: CPT

## 2024-04-18 PROCEDURE — 84450 TRANSFERASE (AST) (SGOT): CPT

## 2024-04-18 PROCEDURE — G0463 HOSPITAL OUTPT CLINIC VISIT: HCPCS | Performed by: INTERNAL MEDICINE

## 2024-04-18 PROCEDURE — 99205 OFFICE O/P NEW HI 60 MIN: CPT | Performed by: INTERNAL MEDICINE

## 2024-04-18 PROCEDURE — 85045 AUTOMATED RETICULOCYTE COUNT: CPT

## 2024-04-18 PROCEDURE — 84439 ASSAY OF FREE THYROXINE: CPT

## 2024-04-18 PROCEDURE — 36415 COLL VENOUS BLD VENIPUNCTURE: CPT

## 2024-04-18 PROCEDURE — 99207 BLOOD MORPHOLOGY PATHOLOGIST REVIEW: CPT | Performed by: STUDENT IN AN ORGANIZED HEALTH CARE EDUCATION/TRAINING PROGRAM

## 2024-04-18 PROCEDURE — 82040 ASSAY OF SERUM ALBUMIN: CPT

## 2024-04-18 PROCEDURE — 85048 AUTOMATED LEUKOCYTE COUNT: CPT

## 2024-04-18 RX ORDER — TRAMADOL HYDROCHLORIDE 50 MG/1
50 TABLET ORAL EVERY 6 HOURS PRN
Qty: 20 TABLET | Refills: 0 | Status: SHIPPED | OUTPATIENT
Start: 2024-04-18 | End: 2024-05-01 | Stop reason: SINTOL

## 2024-04-18 RX ORDER — SUCRALFATE ORAL 1 G/10ML
1 SUSPENSION ORAL 4 TIMES DAILY
Qty: 500 ML | Refills: 1 | Status: SHIPPED | OUTPATIENT
Start: 2024-04-18 | End: 2024-05-20

## 2024-04-18 ASSESSMENT — PAIN SCALES - GENERAL: PAINLEVEL: SEVERE PAIN (6)

## 2024-04-18 NOTE — PATIENT INSTRUCTIONS
Labs today    IR referral for port placement    Refer to Nutritionist    Schedule weekly carboplatin/taxol with start date of radiation    See me or Margie weekly while getting chemotherapy    Start tramadol as needed for pain    Start sucralfate 4 times daily

## 2024-04-18 NOTE — PROGRESS NOTES
"[facility]: Cancer Care Plan of Care Education Note                                    Discussion with Patient:                                                      Met with Lita to discuss chemotherapy and resources available at the [facility]. Provided patient with \"My Cancer Guidebook\", and [facility] printouts on carbo/taxol. Reviewed administration, side effects (including myelosuppression, nausea/vomiting, diarrhea/constipation, hair loss, mouth sores) and ongoing symptom management by APPs in clinic. Provided phone numbers for triage and after hours care. Highlighted steps to expect when getting chemotherapy (check in, labs, pre- meds, infusion), Discussed that chemo treatment may be delayed a day or two due to blood counts, infusion schedule or patient's need to modify. Included a one page resource of symptoms and when to contact the Cancer Clinic with questions or concerns.  Reviewed chemotherapy safety guidelines.    Lita signed Authorization to Discuss paperwork. Discussed utilizing MyChart to assist in managing appointments and asking future questions of health care team. Requested it not be used for symptom and side effect management. Instructed Lita to call our Nurse Triage Team. Contact information provided.     Discussed ports and handout given.  IR will contact her to schedule and educate.     Answered all Lita's questions to her stated satisfaction.                Goals          General     Other (pt-stated)      Notes - Note created  4/18/2024  1:02 PM by Verónica Sharma, JENNIFER     Goal Statement: I will use my clinic and care team resources as directed.  Date Goal set: 4/18/2024  Barriers:  n/a  Strengths: support  Date to Achieve By: ongoing  Patient expressed understanding of goal: Yes  Action steps to achieve this goal:  I will contact triage with new, worsening or uncontrolled symptoms.   I will contact triage with temperature over 100.4  I will not send " urgent or symptomatic messages through [employer].                Assessment:                                                      Assessment completed with:: Patient    Plan of Care Education   Yearly learning assessment completed?: Yes (see Education tab)  Diagnosis:: esophagus cancer  Does patient understand diagnosis?: Yes  Tx plan/regimen:: Chemoradiation with carbo/taxol  Does patient understand treatment plan/regimen?: Yes  Vascular access education provided for:: Port  Side effect education:: Diarrhea/Constipation;Lab value monitoring (anemia, neutropenia, thrombocytopenia);Fatigue;Hair loss;Mouth sores;Mylosuppression;Infection;Neuropathy;Nausea/Vomiting  Supportive services education provided for:: Nutrition  Safety/self care at home reviewed with patient:: Yes  Coping - concerns/fears reviewed with patient:: Yes  Plan of Care:: Treatment schedule  When to call provider:: Bleeding;Uncontrolled nausea/vomiting;New/worsening pain;Increased shortness of breath;Shaking chills;Temperature >100.4F;Uncontrolled diarrhea/constipation  Reasons for deferring treatment reviewed with patient:: Yes    Evaluation of Learning  Readiness:: Acceptance  Method:: Booklet/Handout;Explanation  Response:: Verbalizes understanding    RNCC Initial:     Initial  Current living arrangement:: I live alone (brother will stay with her during chemoradiation.)  Informal Support system:: Children;Family  Equipment Currently Used at Home: none  Bed or wheelchair confined:: No  Mobility Status: Independent  Transportation means:: Regular car  Knowledgeable about how to use meds:: Yes  Medication side effects suspected:: No         Intervention/Education provided during outreach:                                                       Follow up call in 1-2 weeks  Patient to follow up as scheduled at next appt  Patient to call/[employer] message with updates  Confirmed patient has clinic and triage numbers    HEIDY Mayfield, RN  RN Care  Coordinator  [facility]

## 2024-04-18 NOTE — NURSING NOTE
"Oncology Rooming Note    April 18, 2024 10:55 AM   Linh Mustafa is a 63 year old female who presents for:    Chief Complaint   Patient presents with    Oncology Clinic Visit     New Eval for Esophageal Cancer     Initial Vitals: /73   Pulse 89   Temp 98.3  F (36.8  C) (Oral)   Resp 18   Ht 1.702 m (5' 7\")   Wt 67.5 kg (148 lb 14.4 oz)   SpO2 96%   BMI 23.32 kg/m   Estimated body mass index is 23.32 kg/m  as calculated from the following:    Height as of this encounter: 1.702 m (5' 7\").    Weight as of this encounter: 67.5 kg (148 lb 14.4 oz). Body surface area is 1.79 meters squared.  Severe Pain (6) Comment: Data Unavailable   No LMP recorded. Patient is postmenopausal.  Allergies reviewed: Yes  Medications reviewed: Yes    Medications: Medication refills not needed today.  Pharmacy name entered into CR2: [id number] - [address]    Frailty Screening:   Is the patient here for a new oncology consult visit in cancer care? 2. No      Clinical concerns: none       Anneliese Martinez MA             "

## 2024-04-18 NOTE — PROGRESS NOTES
Oncology initial visit:  Date on this visit: 4/18/2024    Linh Mustafa  is referred by Dr.Heather Denice Garcia for an oncology consultation. She requires evaluation for new diagnosis of esophageal cancer.    Primary Physician: Clinic, [facility]     History Of Present Illness:  Ms. Mustafa is a 63 year old female who presents with new diagnosis of esophageal cancer.    She is here with  her brother.  She mentioned that since November 2023 she started to notice pain upon swallowing and it was basically pain which limited her food intake.  This was progressively getting worse.  She had further workup as mentioned below.    2/15/2024.  EGD showed an ulcerated mid esophageal mass extending from 26-31 cm from the incisors.  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  Pathology from the mid esophageal mass showed moderately differentiated invasive squamous cell carcinoma, MMR IHC intact.    3/7/2024.  PET/CT showed markedly FDG avid wall thickening of the mid thoracic esophagus with SUV max 24.1.  No evidence of metastatic disease.    3/21/2024.  Upper EUS.  Endoscopy showed a flat nodule in the proximal esophagus between 15-19 cm and one third circumferential.  Upper esophageal sphincter was at 14 cm.  Biopsies from this showed high-grade dysplasia/carcinoma in situ.      Normal esophageal squamous cell cancer causing maybe esophagus on the right anterolateral esophageal wall between 24-30 cm.  It was 50% circumferential.  The GE junction was at 35 cm with 2 cm tongues of Auguste's anteriorly without high risk features.    Ultrasound exam showed the mid esophageal tumor to be probably T3. Two 4 x 6 mm nodes were seen adjacent to the distal esophageal wall at 36 and 37 cm.  Both an identical appearance and one was sampled.  This lymph node biopsy was benign.    By EUS it was staged as T3 N0 M0 tumor.    4/2/2024.  Because of finding of carcinoma in situ in the  proximal esophagus, he had endoscopic resection of the proximal squamous cell carcinoma in situ lesion performed with en bloc removal.    The final pathology showed squamous mucosa with high-grade dysplasia/carcinoma in situ with all margins negative for dysplasia.  No definite invasion was identified. ESD was considered curative for this lesion.    His case was also discussed in the tumor conference with the plan to proceed with neoadjuvant chemotherapy/radiation followed by definitive surgery.      She has already met with Dr. Martinez.    She continues to have pain upon swallowing.  She does not feel food getting stuck per se.  Denies nausea or vomiting.  Has some constipation.  She has lost about 20 pounds since November 2023.  She feels a little fatigued but remains decently functional.  Denies any fevers.  No infections.  No shortness of breath.  Denies neuropathy.  No new swellings.  Denies any bleeding.        ECOG 0-1    ROS:  A comprehensive ROS was otherwise neg         Past Medical/Surgical History:  Past Medical History:   Diagnosis Date    Hyperlipidemia     Hypertension    Hypothyroidism- hx of PATEL at the age of 16 for hyperthyroidism    Past Surgical History:   Procedure Laterality Date    BIOPSY BREAST Right     age 30 benign fibrous    CV CORONARY ANGIOGRAM N/A 01/25/2023    Procedure: Coronary Angiogram;  Surgeon: Lukas Irizarry MD;  Location: [facility]    CV LEFT HEART CATH N/A 01/25/2023    Procedure: Left Heart Catheterization;  Surgeon: Lukas Irizarry MD;  Location: [facility]    ENDOSCOPIC ULTRASOUND UPPER GASTROINTESTINAL TRACT (GI) N/A 3/21/2024    Procedure: ENDOSCOPIC ULTRASOUND, ESOPHAGOSCOPY / UPPER GASTROINTESTINAL TRACT (GI), ENDOSCOPY WITH BIOPSY, FINE NEEDLE ASPIRATION;  Surgeon: Damon Kramer MD;  Location: [facility]    ENDOSCOPIC ULTRASOUND, ESOPHAGOSCOPY / UPPER GASTROINTESTINAL TRACT (GI), ENDOSCOPY WITH BIOPSY, FINE NEEDLE ASPIRATION  03/21/2024     ESOPHAGOSCOPY, GASTROSCOPY, DUODENOSCOPY (EGD), SUBMUCOSA RESECTION N/A 4/2/2024    Procedure: ESOPHAGOGASTRODUODENOSCOPY, WITH SUBMUCOSAL RESECTION;  Surgeon: Holden King MD;  Location: [facility] OR     Cancer History:   As above    Allergies:  Allergies as of 04/18/2024 - Reviewed 04/18/2024   Allergen Reaction Noted    Amoxicillin Shortness Of Breath, Itching, and Rash 12/06/2023    Penicillins  03/14/2024     Current Medications:  Current Outpatient Medications   Medication Sig Dispense Refill    acetaminophen (TYLENOL) 325 MG tablet Take 325-650 mg by mouth every 6 hours as needed for mild pain      amLODIPine (NORVASC) 10 MG tablet TAKE 1 TABLET (10 MG) BY MOUTH DAILY. 90 tablet 1    aspirin (ASA) 81 MG chewable tablet Take 81 mg by mouth daily      atorvastatin (LIPITOR) 40 MG tablet TAKE 1 TABLET BY MOUTH EVERY DAY 90 tablet 1    esomeprazole (NEXIUM) 20 MG DR capsule Take 20 mg by mouth 2 times daily Take 30-60 minutes before eating.      famotidine (PEPCID) 20 MG tablet Take 20 mg by mouth 2 times daily      fluticasone (FLONASE) 50 MCG/ACT nasal spray Spray 1-2 sprays into one nostril alternating nostrils as needed      levothyroxine (SYNTHROID/LEVOTHROID) 200 MCG tablet take 1 tablet by mouth every day for 30 days      metoprolol succinate ER (TOPROL XL) 25 MG 24 hr tablet TAKE 1 TABLET BY MOUTH EVERY DAY 90 tablet 1    nitroGLYcerin (NITROSTAT) 0.4 MG sublingual tablet For chest pain place 1 tablet under the tongue every 5 minutes for 3 doses. If symptoms persist 5 minutes after 1st dose call 911. 25 tablet 3    predniSONE (DELTASONE) 20 MG tablet Take two tablets (= 40mg) each day for 5 (five) days 10 tablet 0      Family History:  Family History   Problem Relation Age of Onset    Chronic Obstructive Pulmonary Disease Father     Colon Cancer Paternal Grandmother 70     1 daughter- healthy    Social History:  Social History     Socioeconomic History    Marital status: Single     Spouse name: Not on file     "Number of children: Not on file    Years of education: Not on file    Highest education level: Not on file   Occupational History    Not on file   Tobacco Use    Smoking status: Every Day     Current packs/day: 1.00     Average packs/day: 1 pack/day for 47.3 years (47.3 ttl pk-yrs)     Types: Cigarettes     Start date: 1977    Smokeless tobacco: Never   Vaping Use    Vaping status: Never Used   Substance and Sexual Activity    Alcohol use: Not on file     Comment: occasional    Drug use: Never    Sexual activity: Not on file   Other Topics Concern    Not on file   Social History Narrative    ** Merged History Encounter **          Social Determinants of Health     Financial Resource Strain: Not on file   Food Insecurity: No Food Insecurity (2/24/2024)    Received from [facility]    Hunger Vital Sign     Worried About Running Out of Food in the Last Year: Never true     Ran Out of Food in the Last Year: Never true   Transportation Needs: No Transportation Needs (2/24/2024)    Received from [facility]    PRAPARE - Transportation     Lack of Transportation (Medical): No     Lack of Transportation (Non-Medical): No   Physical Activity: Inactive (2/24/2024)    Received from [facility]    Exercise Vital Sign     Days of Exercise per Week: 0 days     Minutes of Exercise per Session: 0 min   Stress: Not on file   Social Connections: Not on file   Interpersonal Safety: Not on file   Housing Stability: Low Risk  (2/24/2024)    Received from [facility]    Housing Stability     What is your living situation today?: I have a steady place to live   Chronic smoker- now smokes occasionally. No etoh. Lives alone. Currently brother staying with her        Physical Exam:  /73   Pulse 89   Temp 98.3  F (36.8  C) (Oral)   Resp 18   Ht 1.702 m (5' 7\")   Wt 67.5 kg (148 lb 14.4 oz)   SpO2 96%   BMI 23.32 kg/m      Wt Readings from Last 4 Encounters:   04/18/24 67.5 kg " (148 lb 14.4 oz)   04/16/24 66.7 kg (147 lb)   04/02/24 68.6 kg (151 lb 3.8 oz)   03/21/24 68.1 kg (150 lb 2.1 oz)       CONSTITUTIONAL: no acute distress.  She does have a hoarse voice.  EYES: PERRLA, no palor or icterus.   ENT/MOUTH: no mouth lesions. Ears normal  CVS: s1s2 no m r g .   RESPIRATORY: clear to auscultation b/l  GI: soft non tender no hepatosplenomegaly  NEURO: AAOX3  Grossly non focal neuro exam  INTEGUMENT: no obvious rashes  LYMPHATIC: no palpable cervical, supraclavicular, axillary or inguinal LAD  MUSCULOSKELETAL: Unremarkable. No bony tenderness.   EXTREMITIES: no edema  PSYCH: Mentation, mood and affect are normal. Decision making capacity is intact    Laboratory/Imaging Studies      Reviewed    1/23/2023  CBC shows WBC 7.3 with hemoglobin 13.6.  Platelets 237.  .    11/29/2023.  Chemistry unremarkable except calcium 10.3.    3/8/2023.  TSH 0.08.          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  MMR IHC intact.    We discussed that treatment would be with curative intent.      Agree with chemotherapy/radiation in the neoadjuvant setting.  We discussed need for port placement.  We discussed the rational schedule and potential side effects of weekly carboplatin/Taxol.  About 10 to 12 weeks after completing chemotherapy/radiation, we will repeat PET/CT and consider definitive surgery.  We will start carboplatin/Taxol concomitantly with the start of radiation.    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  Now s/p endoscopic resection with clear margins free of dysplasia.  This has been adequately treated.  Continue to observe.    Pain upon swallowing.  I will prescribe sucralfate.  We also did discussed trying tramadol as needed for the pain.  I advised her that it can cause her constipation so she should use stool softeners like Senokot.    Nutrition.  She has had weight loss.  Advised to eat small  frequent meals high in protein and follow with nutritionist.    Macrocytosis.  Previously she had macrocytosis.  Check peripheral blood smear.  Will also check B12 and folate levels.  Will also check thyroid functions.    Smoking.  She has been a chronic smoker and now smokes only occasionally.  Congratulated her on her efforts of significantly cutting down smoking and advised her to completely quit.      See me or Margie before second dose of chemotherapy.    I answered all of her and her brother's questions to their satisfaction.  They are agreeable and comfortable with the plan.    Kathy Burch MD

## 2024-04-18 NOTE — LETTER
4/18/2024         RE: Linh Mustafa  [address]  [facility] [address]        Dear Colleague,    Thank you for referring your patient, Linh Mustafa, to the [facility]. Please see a copy of my visit note below.    Oncology initial visit:  Date on this visit: 4/18/2024    Linh Mustafa  is referred by Dr.Heather Denice Garcia for an oncology consultation. She requires evaluation for new diagnosis of esophageal cancer.    Primary Physician: Clinic, [facility]     History Of Present Illness:  Ms. Mustafa is a 63 year old female who presents with new diagnosis of esophageal cancer.    She is here with  her brother.  She mentioned that since November 2023 she started to notice pain upon swallowing and it was basically pain which limited her food intake.  This was progressively getting worse.  She had further workup as mentioned below.    2/15/2024.  EGD showed an ulcerated mid esophageal mass extending from 26-31 cm from the incisors.  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  Pathology from the mid esophageal mass showed moderately differentiated invasive squamous cell carcinoma, MMR IHC intact.    3/7/2024.  PET/CT showed markedly FDG avid wall thickening of the mid thoracic esophagus with SUV max 24.1.  No evidence of metastatic disease.    3/21/2024.  Upper EUS.  Endoscopy showed a flat nodule in the proximal esophagus between 15-19 cm and one third circumferential.  Upper esophageal sphincter was at 14 cm.  Biopsies from this showed high-grade dysplasia/carcinoma in situ.      Normal esophageal squamous cell cancer causing maybe esophagus on the right anterolateral esophageal wall between 24-30 cm.  It was 50% circumferential.  The GE junction was at 35 cm with 2 cm tongues of Auguste's anteriorly without high risk features.    Ultrasound exam showed the mid esophageal tumor to be probably T3. Two 4 x 6 mm nodes  were seen adjacent to the distal esophageal wall at 36 and 37 cm.  Both an identical appearance and one was sampled.  This lymph node biopsy was benign.    By EUS it was staged as T3 N0 M0 tumor.    4/2/2024.  Because of finding of carcinoma in situ in the proximal esophagus, he had endoscopic resection of the proximal squamous cell carcinoma in situ lesion performed with en bloc removal.    The final pathology showed squamous mucosa with high-grade dysplasia/carcinoma in situ with all margins negative for dysplasia.  No definite invasion was identified. ESD was considered curative for this lesion.    His case was also discussed in the tumor conference with the plan to proceed with neoadjuvant chemotherapy/radiation followed by definitive surgery.      She has already met with Dr. Martinez.    She continues to have pain upon swallowing.  She does not feel food getting stuck per se.  Denies nausea or vomiting.  Has some constipation.  She has lost about 20 pounds since November 2023.  She feels a little fatigued but remains decently functional.  Denies any fevers.  No infections.  No shortness of breath.  Denies neuropathy.  No new swellings.  Denies any bleeding.        ECOG 0-1    ROS:  A comprehensive ROS was otherwise neg         Past Medical/Surgical History:  Past Medical History:   Diagnosis Date    Hyperlipidemia     Hypertension    Hypothyroidism- hx of PATEL at the age of 16 for hyperthyroidism    Past Surgical History:   Procedure Laterality Date    BIOPSY BREAST Right     age 30 benign fibrous    CV CORONARY ANGIOGRAM N/A 01/25/2023    Procedure: Coronary Angiogram;  Surgeon: Lukas Irizarry MD;  Location: [facility]    CV LEFT HEART CATH N/A 01/25/2023    Procedure: Left Heart Catheterization;  Surgeon: Lukas Irizarry MD;  Location: [facility]    ENDOSCOPIC ULTRASOUND UPPER GASTROINTESTINAL TRACT (GI) N/A 3/21/2024    Procedure: ENDOSCOPIC ULTRASOUND, ESOPHAGOSCOPY / UPPER  GASTROINTESTINAL TRACT (GI), ENDOSCOPY WITH BIOPSY, FINE NEEDLE ASPIRATION;  Surgeon: Damon Kramer MD;  Location: [facility]    ENDOSCOPIC ULTRASOUND, ESOPHAGOSCOPY / UPPER GASTROINTESTINAL TRACT (GI), ENDOSCOPY WITH BIOPSY, FINE NEEDLE ASPIRATION  03/21/2024    ESOPHAGOSCOPY, GASTROSCOPY, DUODENOSCOPY (EGD), SUBMUCOSA RESECTION N/A 4/2/2024    Procedure: ESOPHAGOGASTRODUODENOSCOPY, WITH SUBMUCOSAL RESECTION;  Surgeon: Holden King MD;  Location: [facility]     Cancer History:   As above    Allergies:  Allergies as of 04/18/2024 - Reviewed 04/18/2024   Allergen Reaction Noted    Amoxicillin Shortness Of Breath, Itching, and Rash 12/06/2023    Penicillins  03/14/2024     Current Medications:  Current Outpatient Medications   Medication Sig Dispense Refill    acetaminophen (TYLENOL) 325 MG tablet Take 325-650 mg by mouth every 6 hours as needed for mild pain      amLODIPine (NORVASC) 10 MG tablet TAKE 1 TABLET (10 MG) BY MOUTH DAILY. 90 tablet 1    aspirin (ASA) 81 MG chewable tablet Take 81 mg by mouth daily      atorvastatin (LIPITOR) 40 MG tablet TAKE 1 TABLET BY MOUTH EVERY DAY 90 tablet 1    esomeprazole (NEXIUM) 20 MG DR capsule Take 20 mg by mouth 2 times daily Take 30-60 minutes before eating.      famotidine (PEPCID) 20 MG tablet Take 20 mg by mouth 2 times daily      fluticasone (FLONASE) 50 MCG/ACT nasal spray Spray 1-2 sprays into one nostril alternating nostrils as needed      levothyroxine (SYNTHROID/LEVOTHROID) 200 MCG tablet take 1 tablet by mouth every day for 30 days      metoprolol succinate ER (TOPROL XL) 25 MG 24 hr tablet TAKE 1 TABLET BY MOUTH EVERY DAY 90 tablet 1    nitroGLYcerin (NITROSTAT) 0.4 MG sublingual tablet For chest pain place 1 tablet under the tongue every 5 minutes for 3 doses. If symptoms persist 5 minutes after 1st dose call 911. 25 tablet 3    predniSONE (DELTASONE) 20 MG tablet Take two tablets (= 40mg) each day for 5 (five) days 10 tablet 0      Family History:  Family  History   Problem Relation Age of Onset    Chronic Obstructive Pulmonary Disease Father     Colon Cancer Paternal Grandmother 70     1 daughter- healthy    Social History:  Social History     Socioeconomic History    Marital status: Single     Spouse name: Not on file    Number of children: Not on file    Years of education: Not on file    Highest education level: Not on file   Occupational History    Not on file   Tobacco Use    Smoking status: Every Day     Current packs/day: 1.00     Average packs/day: 1 pack/day for 47.3 years (47.3 ttl pk-yrs)     Types: Cigarettes     Start date: 1977    Smokeless tobacco: Never   Vaping Use    Vaping status: Never Used   Substance and Sexual Activity    Alcohol use: Not on file     Comment: occasional    Drug use: Never    Sexual activity: Not on file   Other Topics Concern    Not on file   Social History Narrative    ** Merged History Encounter **          Social Determinants of Health     Financial Resource Strain: Not on file   Food Insecurity: No Food Insecurity (2/24/2024)    Received from [facility]    Hunger Vital Sign     Worried About Running Out of Food in the Last Year: Never true     Ran Out of Food in the Last Year: Never true   Transportation Needs: No Transportation Needs (2/24/2024)    Received from [facility]    PRAPARE - Transportation     Lack of Transportation (Medical): No     Lack of Transportation (Non-Medical): No   Physical Activity: Inactive (2/24/2024)    Received from [facility]    Exercise Vital Sign     Days of Exercise per Week: 0 days     Minutes of Exercise per Session: 0 min   Stress: Not on file   Social Connections: Not on file   Interpersonal Safety: Not on file   Housing Stability: Low Risk  (2/24/2024)    Received from [facility]    Housing Stability     What is your living situation today?: I have a steady place to live   Chronic smoker- now smokes occasionally. No etoh. Lives  "alone. Currently brother staying with her        Physical Exam:  /73   Pulse 89   Temp 98.3  F (36.8  C) (Oral)   Resp 18   Ht 1.702 m (5' 7\")   Wt 67.5 kg (148 lb 14.4 oz)   SpO2 96%   BMI 23.32 kg/m      Wt Readings from Last 4 Encounters:   04/18/24 67.5 kg (148 lb 14.4 oz)   04/16/24 66.7 kg (147 lb)   04/02/24 68.6 kg (151 lb 3.8 oz)   03/21/24 68.1 kg (150 lb 2.1 oz)       CONSTITUTIONAL: no acute distress.  She does have a hoarse voice.  EYES: PERRLA, no palor or icterus.   ENT/MOUTH: no mouth lesions. Ears normal  CVS: s1s2 no m r g .   RESPIRATORY: clear to auscultation b/l  GI: soft non tender no hepatosplenomegaly  NEURO: AAOX3  Grossly non focal neuro exam  INTEGUMENT: no obvious rashes  LYMPHATIC: no palpable cervical, supraclavicular, axillary or inguinal LAD  MUSCULOSKELETAL: Unremarkable. No bony tenderness.   EXTREMITIES: no edema  PSYCH: Mentation, mood and affect are normal. Decision making capacity is intact    Laboratory/Imaging Studies      Reviewed    1/23/2023  CBC shows WBC 7.3 with hemoglobin 13.6.  Platelets 237.  .    11/29/2023.  Chemistry unremarkable except calcium 10.3.    3/8/2023.  TSH 0.08.          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  MMR IHC intact.    We discussed that treatment would be with curative intent.      Agree with chemotherapy/radiation in the neoadjuvant setting.  We discussed need for port placement.  We discussed the rational schedule and potential side effects of weekly carboplatin/Taxol.  About 10 to 12 weeks after completing chemotherapy/radiation, we will repeat PET/CT and consider definitive surgery.  We will start carboplatin/Taxol concomitantly with the start of radiation.    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  Now s/p endoscopic resection with clear margins free of dysplasia.  This has been adequately treated.  Continue to " observe.    Pain upon swallowing.  I will prescribe sucralfate.  We also did discussed trying tramadol as needed for the pain.  I advised her that it can cause her constipation so she should use stool softeners like Senokot.    Nutrition.  She has had weight loss.  Advised to eat small frequent meals high in protein and follow with nutritionist.    Macrocytosis.  Previously she had macrocytosis.  Check peripheral blood smear.  Will also check B12 and folate levels.  Will also check thyroid functions.    Smoking.  She has been a chronic smoker and now smokes only occasionally.  Congratulated her on her efforts of significantly cutting down smoking and advised her to completely quit.      See me or Margie before second dose of chemotherapy.    I answered all of her and her brother's questions to their satisfaction.  They are agreeable and comfortable with the plan.    Kathy Burch MD

## 2024-04-18 NOTE — NURSING NOTE
Chief Complaint   Patient presents with    Labs Only    Blood Draw     Labs drawn via  by RN.     Labs collected from venipuncture by RN.     Lizette Chakraborty RN

## 2024-04-19 ENCOUNTER — OFFICE VISIT (OUTPATIENT)
Dept: RADIATION ONCOLOGY | Facility: CLINIC | Age: 64
End: 2024-04-19
Attending: THORACIC SURGERY (CARDIOTHORACIC VASCULAR SURGERY)
Payer: COMMERCIAL

## 2024-04-19 ENCOUNTER — HOSPITAL ENCOUNTER (OUTPATIENT)
Dept: CT IMAGING | Facility: CLINIC | Age: 64
Discharge: HOME | End: 2024-04-19
Attending: RADIOLOGY | Admitting: RADIOLOGY
Payer: COMMERCIAL

## 2024-04-19 DIAGNOSIS — C15.9 MALIGNANT NEOPLASM OF ESOPHAGUS, UNSPECIFIED LOCATION (H): Primary | ICD-10-CM

## 2024-04-19 DIAGNOSIS — C15.4 MALIGNANT NEOPLASM OF MIDDLE THIRD OF ESOPHAGUS (H): ICD-10-CM

## 2024-04-19 LAB — VIT B12 SERPL-MCNC: 220 PG/ML (ref 232–1245)

## 2024-04-19 PROCEDURE — 77014 CT THERAPY CORRELATE: CPT | Mod: TC

## 2024-04-19 PROCEDURE — 77334 RADIATION TREATMENT AID(S): CPT | Mod: 26 | Performed by: RADIOLOGY

## 2024-04-19 PROCEDURE — 77334 RADIATION TREATMENT AID(S): CPT | Performed by: RADIOLOGY

## 2024-04-19 NOTE — LETTER
4/19/2024         RE: Linh Mustafa  [address]  [place] [address]        Dear Colleague,    Thank you for referring your patient, Linh Mustafa, to the [facility] RADIATION ONCOLOGY. Please see a copy of my visit note below.    Radiation Therapy Patient Education    Person involved with teaching: Patient    Patient educational needs for self management of treatment-related side effects assessment completed.  EPIC Patient Ed tab contains Patient Learning Assessment    Education Materials Given  Radiation Therapy and You    Educational Topics Discussed  Side effects expected, Pain management, Skin care, Nutrition and weight loss, and When to call MD/RN    Response To Teaching  Verbalizes understanding    GYN Only  Vaginal Dilator-given and educated: N/A    Referrals sent: None, patient declined dietician    Chemotherapy?  Yes: Notified medical oncology of start date 05/01/24         Again, thank you for allowing me to participate in the care of your patient.        Sincerely,        Kinjal Mckeon

## 2024-04-19 NOTE — PROGRESS NOTES
Radiation Therapy Patient Education    Person involved with teaching: Patient    Patient educational needs for self management of treatment-related side effects assessment completed.  [facility] Patient Ed tab contains Patient Learning Assessment    Education Materials Given  Radiation Therapy and You    Educational Topics Discussed  Side effects expected, Pain management, Skin care, Nutrition and weight loss, and When to call MD/RN    Response To Teaching  Verbalizes understanding    GYN Only  Vaginal Dilator-given and educated: N/A    Referrals sent: None, patient declined dietician    Chemotherapy?  Yes: Notified medical oncology of start date 05/01/24

## 2024-04-22 ENCOUNTER — MYC MEDICAL ADVICE (OUTPATIENT)
Dept: INTERVENTIONAL RADIOLOGY/VASCULAR | Facility: CLINIC | Age: 64
End: 2024-04-22
Payer: COMMERCIAL

## 2024-04-22 DIAGNOSIS — C15.9 SQUAMOUS CELL CARCINOMA OF ESOPHAGUS (H): Primary | ICD-10-CM

## 2024-04-22 LAB
PATH REPORT.COMMENTS IMP SPEC: NORMAL
PATH REPORT.FINAL DX SPEC: NORMAL
PATH REPORT.MICROSCOPIC SPEC OTHER STN: NORMAL
PATH REPORT.MICROSCOPIC SPEC OTHER STN: NORMAL
PATH REPORT.RELEVANT HX SPEC: NORMAL

## 2024-04-22 RX ORDER — METHYLPREDNISOLONE SODIUM SUCCINATE 125 MG/2ML
125 INJECTION, POWDER, LYOPHILIZED, FOR SOLUTION INTRAMUSCULAR; INTRAVENOUS
Status: CANCELLED
Start: 2024-04-25

## 2024-04-22 RX ORDER — DIPHENHYDRAMINE HCL 25 MG
50 CAPSULE ORAL ONCE
Status: CANCELLED
Start: 2024-04-25

## 2024-04-22 RX ORDER — ALBUTEROL SULFATE 90 UG/1
1-2 AEROSOL, METERED RESPIRATORY (INHALATION)
Status: CANCELLED
Start: 2024-04-25

## 2024-04-22 RX ORDER — DIPHENHYDRAMINE HYDROCHLORIDE 50 MG/ML
50 INJECTION INTRAMUSCULAR; INTRAVENOUS
Status: CANCELLED
Start: 2024-04-25

## 2024-04-22 RX ORDER — MEPERIDINE HYDROCHLORIDE 25 MG/ML
25 INJECTION INTRAMUSCULAR; INTRAVENOUS; SUBCUTANEOUS EVERY 30 MIN PRN
Status: CANCELLED | OUTPATIENT
Start: 2024-04-25

## 2024-04-22 RX ORDER — HEPARIN SODIUM,PORCINE 10 UNIT/ML
5-20 VIAL (ML) INTRAVENOUS DAILY PRN
Status: CANCELLED | OUTPATIENT
Start: 2024-04-25

## 2024-04-22 RX ORDER — EPINEPHRINE 1 MG/ML
0.3 INJECTION, SOLUTION INTRAMUSCULAR; SUBCUTANEOUS EVERY 5 MIN PRN
Status: CANCELLED | OUTPATIENT
Start: 2024-04-25

## 2024-04-22 RX ORDER — ALBUTEROL SULFATE 0.83 MG/ML
2.5 SOLUTION RESPIRATORY (INHALATION)
Status: CANCELLED | OUTPATIENT
Start: 2024-04-25

## 2024-04-22 RX ORDER — HEPARIN SODIUM (PORCINE) LOCK FLUSH IV SOLN 100 UNIT/ML 100 UNIT/ML
5 SOLUTION INTRAVENOUS
Status: CANCELLED | OUTPATIENT
Start: 2024-04-25

## 2024-04-23 ENCOUNTER — MYC MEDICAL ADVICE (OUTPATIENT)
Dept: ONCOLOGY | Facility: CLINIC | Age: 64
End: 2024-04-23
Payer: COMMERCIAL

## 2024-04-23 ENCOUNTER — PATIENT OUTREACH (OUTPATIENT)
Dept: ONCOLOGY | Facility: CLINIC | Age: 64
End: 2024-04-23
Payer: COMMERCIAL

## 2024-04-23 DIAGNOSIS — E53.8 VITAMIN B12 DEFICIENCY (NON ANEMIC): Primary | ICD-10-CM

## 2024-04-23 DIAGNOSIS — C15.9 SQUAMOUS CELL CARCINOMA OF ESOPHAGUS (H): Primary | ICD-10-CM

## 2024-04-23 RX ORDER — EPINEPHRINE 1 MG/ML
0.3 INJECTION, SOLUTION INTRAMUSCULAR; SUBCUTANEOUS EVERY 5 MIN PRN
Status: CANCELLED | OUTPATIENT
Start: 2024-05-23

## 2024-04-23 RX ORDER — HEPARIN SODIUM (PORCINE) LOCK FLUSH IV SOLN 100 UNIT/ML 100 UNIT/ML
5 SOLUTION INTRAVENOUS
Status: CANCELLED | OUTPATIENT
Start: 2024-05-09

## 2024-04-23 RX ORDER — HEPARIN SODIUM,PORCINE 10 UNIT/ML
5-20 VIAL (ML) INTRAVENOUS DAILY PRN
Status: CANCELLED | OUTPATIENT
Start: 2024-05-23

## 2024-04-23 RX ORDER — METHYLPREDNISOLONE SODIUM SUCCINATE 125 MG/2ML
125 INJECTION, POWDER, LYOPHILIZED, FOR SOLUTION INTRAMUSCULAR; INTRAVENOUS
Status: CANCELLED
Start: 2024-05-02

## 2024-04-23 RX ORDER — ALBUTEROL SULFATE 0.83 MG/ML
2.5 SOLUTION RESPIRATORY (INHALATION)
Status: CANCELLED | OUTPATIENT
Start: 2024-05-02

## 2024-04-23 RX ORDER — DIPHENHYDRAMINE HYDROCHLORIDE 50 MG/ML
50 INJECTION INTRAMUSCULAR; INTRAVENOUS
Status: CANCELLED
Start: 2024-05-02

## 2024-04-23 RX ORDER — ALBUTEROL SULFATE 0.83 MG/ML
2.5 SOLUTION RESPIRATORY (INHALATION)
Status: CANCELLED | OUTPATIENT
Start: 2024-05-23

## 2024-04-23 RX ORDER — EPINEPHRINE 1 MG/ML
0.3 INJECTION, SOLUTION INTRAMUSCULAR; SUBCUTANEOUS EVERY 5 MIN PRN
Status: CANCELLED | OUTPATIENT
Start: 2024-05-09

## 2024-04-23 RX ORDER — MEPERIDINE HYDROCHLORIDE 25 MG/ML
25 INJECTION INTRAMUSCULAR; INTRAVENOUS; SUBCUTANEOUS EVERY 30 MIN PRN
Status: CANCELLED | OUTPATIENT
Start: 2024-05-09

## 2024-04-23 RX ORDER — MEPERIDINE HYDROCHLORIDE 25 MG/ML
25 INJECTION INTRAMUSCULAR; INTRAVENOUS; SUBCUTANEOUS EVERY 30 MIN PRN
Status: CANCELLED | OUTPATIENT
Start: 2024-05-16

## 2024-04-23 RX ORDER — DIPHENHYDRAMINE HCL 25 MG
50 CAPSULE ORAL ONCE
Status: CANCELLED
Start: 2024-05-16

## 2024-04-23 RX ORDER — HEPARIN SODIUM (PORCINE) LOCK FLUSH IV SOLN 100 UNIT/ML 100 UNIT/ML
5 SOLUTION INTRAVENOUS
Status: CANCELLED | OUTPATIENT
Start: 2024-05-16

## 2024-04-23 RX ORDER — ALBUTEROL SULFATE 0.83 MG/ML
2.5 SOLUTION RESPIRATORY (INHALATION)
Status: CANCELLED | OUTPATIENT
Start: 2024-05-09

## 2024-04-23 RX ORDER — ALBUTEROL SULFATE 90 UG/1
1-2 AEROSOL, METERED RESPIRATORY (INHALATION)
Status: CANCELLED
Start: 2024-05-09

## 2024-04-23 RX ORDER — DIPHENHYDRAMINE HYDROCHLORIDE 50 MG/ML
50 INJECTION INTRAMUSCULAR; INTRAVENOUS
Status: CANCELLED
Start: 2024-05-23

## 2024-04-23 RX ORDER — HEPARIN SODIUM,PORCINE 10 UNIT/ML
5-20 VIAL (ML) INTRAVENOUS DAILY PRN
Status: CANCELLED | OUTPATIENT
Start: 2024-05-02

## 2024-04-23 RX ORDER — DIPHENHYDRAMINE HYDROCHLORIDE 50 MG/ML
50 INJECTION INTRAMUSCULAR; INTRAVENOUS
Status: CANCELLED
Start: 2024-05-16

## 2024-04-23 RX ORDER — METHYLPREDNISOLONE SODIUM SUCCINATE 125 MG/2ML
125 INJECTION, POWDER, LYOPHILIZED, FOR SOLUTION INTRAMUSCULAR; INTRAVENOUS
Status: CANCELLED
Start: 2024-05-16

## 2024-04-23 RX ORDER — ALBUTEROL SULFATE 90 UG/1
1-2 AEROSOL, METERED RESPIRATORY (INHALATION)
Status: CANCELLED
Start: 2024-05-23

## 2024-04-23 RX ORDER — EPINEPHRINE 1 MG/ML
0.3 INJECTION, SOLUTION INTRAMUSCULAR; SUBCUTANEOUS EVERY 5 MIN PRN
Status: CANCELLED | OUTPATIENT
Start: 2024-05-02

## 2024-04-23 RX ORDER — ALBUTEROL SULFATE 0.83 MG/ML
2.5 SOLUTION RESPIRATORY (INHALATION)
Status: CANCELLED | OUTPATIENT
Start: 2024-05-16

## 2024-04-23 RX ORDER — DIPHENHYDRAMINE HCL 25 MG
50 CAPSULE ORAL ONCE
Status: CANCELLED
Start: 2024-05-02

## 2024-04-23 RX ORDER — METHYLPREDNISOLONE SODIUM SUCCINATE 125 MG/2ML
125 INJECTION, POWDER, LYOPHILIZED, FOR SOLUTION INTRAMUSCULAR; INTRAVENOUS
Status: CANCELLED
Start: 2024-05-23

## 2024-04-23 RX ORDER — DIPHENHYDRAMINE HYDROCHLORIDE 50 MG/ML
50 INJECTION INTRAMUSCULAR; INTRAVENOUS
Status: CANCELLED
Start: 2024-05-09

## 2024-04-23 RX ORDER — DIPHENHYDRAMINE HCL 25 MG
50 CAPSULE ORAL ONCE
Status: CANCELLED
Start: 2024-05-09

## 2024-04-23 RX ORDER — HEPARIN SODIUM,PORCINE 10 UNIT/ML
5-20 VIAL (ML) INTRAVENOUS DAILY PRN
Status: CANCELLED | OUTPATIENT
Start: 2024-05-09

## 2024-04-23 RX ORDER — ALBUTEROL SULFATE 90 UG/1
1-2 AEROSOL, METERED RESPIRATORY (INHALATION)
Status: CANCELLED
Start: 2024-05-16

## 2024-04-23 RX ORDER — HEPARIN SODIUM (PORCINE) LOCK FLUSH IV SOLN 100 UNIT/ML 100 UNIT/ML
5 SOLUTION INTRAVENOUS
Status: CANCELLED | OUTPATIENT
Start: 2024-05-23

## 2024-04-23 RX ORDER — ALBUTEROL SULFATE 90 UG/1
1-2 AEROSOL, METERED RESPIRATORY (INHALATION)
Status: CANCELLED
Start: 2024-05-02

## 2024-04-23 RX ORDER — METHYLPREDNISOLONE SODIUM SUCCINATE 125 MG/2ML
125 INJECTION, POWDER, LYOPHILIZED, FOR SOLUTION INTRAMUSCULAR; INTRAVENOUS
Status: CANCELLED
Start: 2024-05-09

## 2024-04-23 RX ORDER — HEPARIN SODIUM,PORCINE 10 UNIT/ML
5-20 VIAL (ML) INTRAVENOUS DAILY PRN
Status: CANCELLED | OUTPATIENT
Start: 2024-05-16

## 2024-04-23 RX ORDER — EPINEPHRINE 1 MG/ML
0.3 INJECTION, SOLUTION INTRAMUSCULAR; SUBCUTANEOUS EVERY 5 MIN PRN
Status: CANCELLED | OUTPATIENT
Start: 2024-05-16

## 2024-04-23 RX ORDER — MEPERIDINE HYDROCHLORIDE 25 MG/ML
25 INJECTION INTRAMUSCULAR; INTRAVENOUS; SUBCUTANEOUS EVERY 30 MIN PRN
Status: CANCELLED | OUTPATIENT
Start: 2024-05-02

## 2024-04-23 RX ORDER — CYANOCOBALAMIN (VITAMIN B-12) 2500 MCG
2500 TABLET, SUBLINGUAL SUBLINGUAL DAILY
Qty: 90 TABLET | Refills: 3 | Status: SHIPPED | OUTPATIENT
Start: 2024-04-23

## 2024-04-23 RX ORDER — HEPARIN SODIUM (PORCINE) LOCK FLUSH IV SOLN 100 UNIT/ML 100 UNIT/ML
5 SOLUTION INTRAVENOUS
Status: CANCELLED | OUTPATIENT
Start: 2024-05-02

## 2024-04-23 RX ORDER — DIPHENHYDRAMINE HCL 25 MG
50 CAPSULE ORAL ONCE
Status: CANCELLED
Start: 2024-05-23

## 2024-04-23 RX ORDER — MEPERIDINE HYDROCHLORIDE 25 MG/ML
25 INJECTION INTRAMUSCULAR; INTRAVENOUS; SUBCUTANEOUS EVERY 30 MIN PRN
Status: CANCELLED | OUTPATIENT
Start: 2024-05-23

## 2024-04-23 NOTE — PROGRESS NOTES
[facility]: Cancer Care Short Note                                                                                          Outgoing Call:   Phone call to Lita.  Dr. Burch reviewed her labs.  Her vitamin B12 level is low and he prescribed sublingual vitamin B12 for her. This was sent to her local [facility].    Assessment completed with:: Patient    Plan of Care Education   Tx plan/regimen:: sublingual vitamin B12    Evaluation of Learning  Patient Education Provided: Yes  Readiness:: Acceptance  Method:: Explanation  Response:: Verbalizes understanding      Verónica Sharma RN, BSN  RN Care Coordinator   [facility]

## 2024-04-23 NOTE — PROGRESS NOTES
"  Interventional Radiology - Pre Procedure Chart Review  [facility]  Apr 24, 2024      Procedure Requested: Port placement  Requested by: Kathy Burch MD       History and Physical Reviewed: H&P documented within 30 days (by Kathy Burch MD on 4/18/24).  I have personally reviewed the patient's medical history and have updated the medical record as necessary.    HPI: 63 year old patient with new diagnosis of esophageal cancer. Plans for neoadjuvant chemotherapy/radiation. Presenting for port placement.    Clinical notes reviewed    Pertinent medications reviewed:   Anticoagulation: asa per chart review   Antibiotic: clindamycin ordered.    Allergies   Allergen Reactions    Amoxicillin Shortness Of Breath, Itching and Rash    Penicillins        EXAM:  There were no vitals taken for this visit.  Not assessed    LABS:  INR (no units)   Date Value   12/23/2022 1.05       Lab Results   Component Value Date    WBC 9.5 04/18/2024    HGB 13.2 04/18/2024     04/18/2024       No results found for: \"CREATININE\"    No components found for: \"K\"      PLAN:  Planning for Port placement in IR.     Radiologist to further review procedure with patient.    EMR reviewed. No formal assessment completed.     Total time: 15 minutes       Mary Batista PA-C  Interventional Radiology    "

## 2024-04-24 ENCOUNTER — HOSPITAL ENCOUNTER (OUTPATIENT)
Dept: INTERVENTIONAL RADIOLOGY/VASCULAR | Facility: CLINIC | Age: 64
Discharge: HOME | End: 2024-04-24
Attending: INTERNAL MEDICINE | Admitting: RADIOLOGY
Payer: COMMERCIAL

## 2024-04-24 VITALS
HEART RATE: 56 BPM | RESPIRATION RATE: 28 BRPM | TEMPERATURE: 97.7 F | DIASTOLIC BLOOD PRESSURE: 63 MMHG | SYSTOLIC BLOOD PRESSURE: 136 MMHG | OXYGEN SATURATION: 98 %

## 2024-04-24 DIAGNOSIS — C15.9 SQUAMOUS CELL CARCINOMA OF ESOPHAGUS (H): ICD-10-CM

## 2024-04-24 PROCEDURE — 272N000500 HC NEEDLE CR2

## 2024-04-24 PROCEDURE — 99152 MOD SED SAME PHYS/QHP 5/>YRS: CPT

## 2024-04-24 PROCEDURE — 250N000009 HC RX 250: Performed by: RADIOLOGY

## 2024-04-24 PROCEDURE — 250N000011 HC RX IP 250 OP 636: Performed by: PHYSICIAN ASSISTANT

## 2024-04-24 PROCEDURE — 250N000011 HC RX IP 250 OP 636: Performed by: RADIOLOGY

## 2024-04-24 PROCEDURE — C1769 GUIDE WIRE: HCPCS

## 2024-04-24 PROCEDURE — 36561 INSERT TUNNELED CV CATH: CPT

## 2024-04-24 PROCEDURE — 250N000009 HC RX 250: Performed by: PHYSICIAN ASSISTANT

## 2024-04-24 PROCEDURE — 272N000147 HC KIT CR7

## 2024-04-24 PROCEDURE — C1788 PORT, INDWELLING, IMP: HCPCS

## 2024-04-24 RX ORDER — HEPARIN SODIUM (PORCINE) LOCK FLUSH IV SOLN 100 UNIT/ML 100 UNIT/ML
5 SOLUTION INTRAVENOUS EVERY 8 HOURS
Status: DISCONTINUED | OUTPATIENT
Start: 2024-04-24 | End: 2024-04-25 | Stop reason: HOSPADM

## 2024-04-24 RX ORDER — NALOXONE HYDROCHLORIDE 0.4 MG/ML
0.4 INJECTION, SOLUTION INTRAMUSCULAR; INTRAVENOUS; SUBCUTANEOUS
Status: DISCONTINUED | OUTPATIENT
Start: 2024-04-24 | End: 2024-04-25 | Stop reason: HOSPADM

## 2024-04-24 RX ORDER — LIDOCAINE HYDROCHLORIDE AND EPINEPHRINE 10; 10 MG/ML; UG/ML
10 INJECTION, SOLUTION INFILTRATION; PERINEURAL ONCE
Status: COMPLETED | OUTPATIENT
Start: 2024-04-24 | End: 2024-04-24

## 2024-04-24 RX ORDER — FENTANYL CITRATE 50 UG/ML
25-50 INJECTION, SOLUTION INTRAMUSCULAR; INTRAVENOUS EVERY 5 MIN PRN
Status: DISCONTINUED | OUTPATIENT
Start: 2024-04-24 | End: 2024-04-25 | Stop reason: HOSPADM

## 2024-04-24 RX ORDER — HEPARIN SODIUM 200 [USP'U]/100ML
1 INJECTION, SOLUTION INTRAVENOUS EVERY 5 MIN PRN
Status: DISCONTINUED | OUTPATIENT
Start: 2024-04-24 | End: 2024-04-25 | Stop reason: HOSPADM

## 2024-04-24 RX ORDER — FLUMAZENIL 0.1 MG/ML
0.2 INJECTION, SOLUTION INTRAVENOUS
Status: DISCONTINUED | OUTPATIENT
Start: 2024-04-24 | End: 2024-04-25 | Stop reason: HOSPADM

## 2024-04-24 RX ORDER — HEPARIN SODIUM (PORCINE) LOCK FLUSH IV SOLN 100 UNIT/ML 100 UNIT/ML
5-10 SOLUTION INTRAVENOUS
Status: CANCELLED | OUTPATIENT
Start: 2024-04-24

## 2024-04-24 RX ORDER — CLINDAMYCIN PHOSPHATE 900 MG/50ML
900 INJECTION, SOLUTION INTRAVENOUS
Status: COMPLETED | OUTPATIENT
Start: 2024-04-24 | End: 2024-04-24

## 2024-04-24 RX ORDER — HEPARIN SODIUM,PORCINE 10 UNIT/ML
5-10 VIAL (ML) INTRAVENOUS EVERY 24 HOURS
Status: CANCELLED | OUTPATIENT
Start: 2024-04-24

## 2024-04-24 RX ORDER — NALOXONE HYDROCHLORIDE 0.4 MG/ML
0.2 INJECTION, SOLUTION INTRAMUSCULAR; INTRAVENOUS; SUBCUTANEOUS
Status: DISCONTINUED | OUTPATIENT
Start: 2024-04-24 | End: 2024-04-25 | Stop reason: HOSPADM

## 2024-04-24 RX ORDER — LIDOCAINE 40 MG/G
CREAM TOPICAL
Status: DISCONTINUED | OUTPATIENT
Start: 2024-04-24 | End: 2024-04-25 | Stop reason: HOSPADM

## 2024-04-24 RX ORDER — HEPARIN SODIUM,PORCINE 10 UNIT/ML
5-10 VIAL (ML) INTRAVENOUS
Status: CANCELLED | OUTPATIENT
Start: 2024-04-24

## 2024-04-24 RX ORDER — SODIUM CHLORIDE 9 MG/ML
INJECTION, SOLUTION INTRAVENOUS CONTINUOUS
Status: DISCONTINUED | OUTPATIENT
Start: 2024-04-24 | End: 2024-04-25 | Stop reason: HOSPADM

## 2024-04-24 RX ADMIN — LIDOCAINE HYDROCHLORIDE,EPINEPHRINE BITARTRATE 10 ML: 10; .01 INJECTION, SOLUTION INFILTRATION; PERINEURAL at 14:36

## 2024-04-24 RX ADMIN — FENTANYL CITRATE 25 MCG: 50 INJECTION, SOLUTION INTRAMUSCULAR; INTRAVENOUS at 14:31

## 2024-04-24 RX ADMIN — LIDOCAINE HYDROCHLORIDE 20 ML: 10 INJECTION, SOLUTION INFILTRATION; PERINEURAL at 14:28

## 2024-04-24 RX ADMIN — FENTANYL CITRATE 50 MCG: 50 INJECTION, SOLUTION INTRAMUSCULAR; INTRAVENOUS at 14:27

## 2024-04-24 RX ADMIN — MIDAZOLAM HYDROCHLORIDE 1 MG: 1 INJECTION, SOLUTION INTRAMUSCULAR; INTRAVENOUS at 14:27

## 2024-04-24 RX ADMIN — HEPARIN 5 ML: 100 SYRINGE at 14:42

## 2024-04-24 RX ADMIN — CLINDAMYCIN PHOSPHATE 900 MG: 900 INJECTION, SOLUTION INTRAVENOUS at 13:35

## 2024-04-24 RX ADMIN — MIDAZOLAM HYDROCHLORIDE 1 MG: 1 INJECTION, SOLUTION INTRAMUSCULAR; INTRAVENOUS at 14:31

## 2024-04-24 RX ADMIN — SODIUM CHLORIDE 1 BAG: 9 INJECTION, SOLUTION INTRAVENOUS at 14:23

## 2024-04-24 RX ADMIN — FENTANYL CITRATE 25 MCG: 50 INJECTION, SOLUTION INTRAMUSCULAR; INTRAVENOUS at 14:41

## 2024-04-24 NOTE — PROGRESS NOTES
Interventional Radiology Brief Post Procedure Note    Procedure: port    Proceduralist: Aruna Carmen MD    Time Out: Prior to the start of the procedure and with procedural staff participation, I verbally confirmed the patient s identity using two indicators, relevant allergies, that the procedure was appropriate and matched the consent or emergent situation, and that the correct equipment/implants were available. Immediately prior to starting the procedure I conducted the Time Out with the procedural staff and re-confirmed the patient s name, procedure, and site/side. (The Joint Commission universal protocol was followed.)  Yes    Medications   Medication Event Details Admin User Admin Time       Sedation: IR Nurse Monitored Care   Post Procedure Summary:  Prior to the start of the procedure and with procedural staff participation, I verbally confirmed the patient s identity using two indicators, relevant allergies, that the procedure was appropriate and matched the consent or emergent situation, and that the correct equipment/implants were available. Immediately prior to starting the procedure I conducted the Time Out with the procedural staff and re-confirmed the patient s name, procedure, and site/side. (The Joint Commission universal protocol was followed.)  Yes       Sedatives: Fentanyl and Midazolam (Versed)    Vital signs, airway and pulse oximetry were monitored and remained stable throughout the procedure and sedation was maintained until the procedure was complete.  The patient was monitored by staff until sedation discharge criteria were met.    Patient tolerance: Patient tolerated the procedure well with no immediate complications.        Findings: port in good position    Estimated Blood Loss: Minimal    Fluoroscopy Time: 0.4 minute(s)    SPECIMENS: None    Complications: 1. None     Condition: Stable    Plan: may use port now    Comments: See dictated procedure note for full details.    Aruna  Mohit Carmen MD

## 2024-04-24 NOTE — DISCHARGE INSTRUCTIONS
Port Placement Procedure Discharge Instructions:  You had a port placed. A port is a small medical device that is placed under the skin and is connected to a vein with a catheter (thin, flexible tube). Ports can be used to administer IV medications (including chemotherapy), fluids or blood products or for blood lab draws. Please follow the below instructions after your procedure:    Care Instructions:  - If you received sedation for your procedure, do not drive or operate heavy machinery for the rest of the day.  - You may shower beginning tomorrow (post procedure day #1). Do not scrub site until well healed; pat dry gently with a towel.  - You likely have skin adhesive over your port site. Skin adhesive works like a bandage to keep the site covered and protected. Do not use antibiotic ointment or creams/lotions over adhesive as it can break it down. The skin adhesive will peel off on its own (typically in 5-14 days).  - Avoid submerging the port site under water (ex: tub baths, Jacuzzis, lakes, hot tubs and pools) for 10 days or until your site is well healed.  - You may have some discomfort, minimal swelling, redness and/or bruising at your port site/procedure site. You may take over the counter pain medication for discomfort (follow the package directions) or apply an ice pack wrapped in a towel over the site (rotating 20 minutes with ice pack on and 20 minutes with ice pack off) for comfort as needed. It can take several days for these to resolve.  - Avoid heavy lifting (greater than 10 pounds) and strenuous activities for 2 days following your procedure.   - If you experience significant bleeding at site, apply pressure with hands above the clavicle bone, sit upright and seek immediate medical assistance.  - Ports need to be flushed approximately every 4-6 weeks, if not being used more frequently. Follow up with the provider who ordered your port placement for further instructions for this.    Seek medical  evaluation or contact Trini MEIER RN Line at [phone] if you experience the following:  - Uncontrolled bleeding from port site  - Fever (greater than 101 F (38.3C))  - Purulent (yellow/green/foul smelling) drainage from port insertion site  - Increasing pain at port site  - Increasing redness at port site

## 2024-04-24 NOTE — PROGRESS NOTES
Interventional Radiology Pre-Procedure Sedation Assessment   Time of Assessment: 2:13 PM    Expected Level: Moderate Sedation    Indication: Sedation is required for the following type of Procedure:  port    Sedation and procedural consent: Risks, benefits and alternatives were discussed with Patient    PO Intake: Appropriately NPO for procedure    ASA Class: Class 2 - MILD SYSTEMIC DISEASE, NO ACUTE PROBLEMS, NO FUNCTIONAL LIMITATIONS.    Mallampati: Grade 2:  Soft palate, base of uvula, tonsillar pillars, and portion of posterior pharyngeal wall visible    Lungs: Lungs Clear with good breath sounds bilaterally    Heart: Normal heart sounds and rate    History and physical reviewed and no updates needed. I have reviewed the lab findings, diagnostic data, medications, and the plan for sedation. I have determined this patient to be an appropriate candidate for the planned sedation and procedure and have reassessed the patient IMMEDIATELY PRIOR to sedation and procedure.    Aruna Carmen MD

## 2024-04-24 NOTE — IR NOTE
Patient Name: Linh Mustafa  Medical Record Number: [id number]  Today's Date: 4/24/2024    Procedure: IR Port placement  Proceduralist: Dr Carmen  Pathology present: no    Procedure Start: 1426  Procedure end: 1444  Sedation medications administered: Fentanyl 100mcg Versed 2mg     Report given to: NA  : No    Other Notes: Pt arrived to IR room  from pre/post rm2. Consent reviewed. Pt denies any questions or concerns regarding procedure. Pt positioned supine and monitored per protocol. Pt tolerated procedure without any noted complications. Pt transferred back to pre/post rm 2.    Successful Right sided chest port placement. [employer] 8F SmartPort Plastic REF# [id number] LOT#[id number] EXP 2027-02-28    Desi Glover RN

## 2024-04-30 RX ORDER — PROCHLORPERAZINE MALEATE 10 MG
10 TABLET ORAL EVERY 6 HOURS PRN
Qty: 30 TABLET | Refills: 2 | Status: SHIPPED | OUTPATIENT
Start: 2024-04-30 | End: 2024-06-26

## 2024-04-30 NOTE — PROGRESS NOTES
Oncology/Hematology Visit Note  May 1, 2024    Reason for Visit: follow up of esophageal cancer    History of Present Illness: Linh Mustafa is a 63 year old female with esophageal cancer. Since November 2023 she started to notice pain upon swallowing and it was basically pain which limited her food intake.  This was progressively getting worse.  She had further workup as mentioned below.     2/15/2024.  EGD showed an ulcerated mid esophageal mass extending from 26-31 cm from the incisors.  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  Pathology from the mid esophageal mass showed moderately differentiated invasive squamous cell carcinoma, MMR IHC intact.     3/7/2024.  PET/CT showed markedly FDG avid wall thickening of the mid thoracic esophagus with SUV max 24.1.  No evidence of metastatic disease.     3/21/2024.  Upper EUS.  Endoscopy showed a flat nodule in the proximal esophagus between 15-19 cm and one third circumferential.  Upper esophageal sphincter was at 14 cm.  Biopsies from this showed high-grade dysplasia/carcinoma in situ.       Normal esophageal squamous cell cancer causing maybe esophagus on the right anterolateral esophageal wall between 24-30 cm.  It was 50% circumferential.  The GE junction was at 35 cm with 2 cm tongues of Auguste's anteriorly without high risk features.     Ultrasound exam showed the mid esophageal tumor to be probably T3. Two 4 x 6 mm nodes were seen adjacent to the distal esophageal wall at 36 and 37 cm.  Both an identical appearance and one was sampled.  This lymph node biopsy was benign.     By EUS it was staged as T3 N0 M0 tumor.     4/2/2024.  Because of finding of carcinoma in situ in the proximal esophagus, he had endoscopic resection of the proximal squamous cell carcinoma in situ lesion performed with en bloc removal.    The final pathology showed squamous mucosa with high-grade dysplasia/carcinoma in situ with all margins negative  for dysplasia.  No definite invasion was identified. ESD was considered curative for this lesion.     His case was also discussed in the tumor conference with the plan to proceed with neoadjuvant chemotherapy/radiation followed by definitive surgery.       Please see previous notes for further details on the patient's history. She comes in today for routine follow up prior to cycle 1 carboplatin and Taxol.    Interval History:  Patient reports that she has central pain in her chest with swallowing and certain position changes.  She tried the tramadol a couple of times at night but then had nausea in the morning so has not been taking this.  She has been taking 1000 mg of Tylenol every 4 hours.  She denies any trouble with food sticking, though does have some pain when swallowing pills.  She was previously smoking 1 pack/day and is now down to 1/2 pack/day.  She reports having right ear pain over the last month.  She has constipation with firm stools about every 3 days.  She has been taking fiber Gummies without much relief.  She did  some Colace, but has not yet started it.  She reports her sleep quality is fair due to waking up frequently at night due to pain.  She reports some stress related to her cancer diagnosis as well as planning for her father's .  She denies other concerns.    Review of Systems:  Patient denies any of the following except if noted above: fevers, chills, difficulty with energy, vision or hearing changes, dyspnea, abdominal pain, current nausea, vomiting, diarrhea, urinary concerns, headaches, numbness, or tingling.    Current Outpatient Medications   Medication Sig Dispense Refill    ondansetron (ZOFRAN) 8 MG tablet Take 1 tablet (8 mg) by mouth every 8 hours as needed for nausea 30 tablet 3    oxyCODONE (ROXICODONE) 5 MG tablet Take 1-2 tablets (5-10 mg) by mouth every 4 hours 30 tablet 0    acetaminophen (TYLENOL) 325 MG tablet Take 325-650 mg by mouth every 6 hours as needed  for mild pain      amLODIPine (NORVASC) 10 MG tablet TAKE 1 TABLET (10 MG) BY MOUTH DAILY. 90 tablet 1    esomeprazole (NEXIUM) 20 MG DR capsule Take 20 mg by mouth 2 times daily Take 30-60 minutes before eating.      famotidine (PEPCID) 20 MG tablet Take 20 mg by mouth 2 times daily      fluticasone (FLONASE) 50 MCG/ACT nasal spray Spray 1-2 sprays into one nostril alternating nostrils as needed      levothyroxine (SYNTHROID/LEVOTHROID) 200 MCG tablet take 1 tablet by mouth every day for 30 days      prochlorperazine (COMPAZINE) 10 MG tablet Take 1 tablet (10 mg) by mouth every 6 hours as needed for nausea or vomiting 30 tablet 2    sucralfate (CARAFATE) 1 GM/10ML suspension Take 10 mLs (1 g) by mouth 4 times daily 500 mL 1    vitamin B-12 (CYANOCOBALAMIN) 2500 MCG sublingual tablet Take 1 tablet (2,500 mcg) by mouth daily 90 tablet 3     Physical Examination:  General: The patient is a pleasant female in no acute distress. She is here today with her brother, Medina.   /76 (BP Location: Right arm, Patient Position: Sitting, Cuff Size: Adult Regular)   Pulse 83   Temp 98.5  F (36.9  C) (Oral)   Resp 16   Wt 67.3 kg (148 lb 4.8 oz)   SpO2 97%   BMI 23.23 kg/m    Wt Readings from Last 10 Encounters:   05/01/24 67.3 kg (148 lb 4.8 oz)   04/18/24 67.5 kg (148 lb 14.4 oz)   04/16/24 66.7 kg (147 lb)   04/02/24 68.6 kg (151 lb 3.8 oz)   03/21/24 68.1 kg (150 lb 2.1 oz)   03/14/24 68 kg (150 lb)   03/28/23 73 kg (161 lb)   01/25/23 74.8 kg (165 lb)   01/23/23 73.5 kg (162 lb)   01/03/23 76.1 kg (167 lb 12.8 oz)   HEENT: EOMI. Sclerae are anicteric.  Lymph: Neck is supple with no lymphadenopathy in the cervical or supraclavicular areas.   Heart: Regular rate and rhythm.   Lungs: Clear to auscultation bilaterally.   Abdomen: Bowel sounds present, soft, nontender with no palpable hepatosplenomegaly or masses.   Extremities: No lower extremity edema noted bilaterally.   Neuro: Cranial nerves II through XII are grossly  intact.  Skin: No rashes, petechiae, or bruising noted on exposed skin.    Laboratory Data:  Most Recent 3 CBC's:  Recent Labs   Lab Test 05/01/24  0854 04/18/24  1251 01/23/23  1451 12/23/22  0238   WBC 7.6 9.5 7.3 4.5   HGB 12.4 13.2 13.6 12.5   MCV 95 94 110* 110*    277 237 234   ANEUTAUTO 5.1 6.2  --  3.5    Most Recent 3 BMP's:  Recent Labs   Lab Test 05/01/24  0854 04/18/24  1251 11/29/23  1125    137 135   POTASSIUM 4.1 4.5 4.5   CHLORIDE 104 102 101   CO2 23 25 23   BUN 15.8 17.5 7.9*   CR 0.57 0.67 0.59   ANIONGAP 10 10 11   VAIBHAV 10.5* 10.6* 10.3*   * 94 101*   PROTTOTAL 7.2 7.8 7.3   ALBUMIN 4.2 4.4 4.2    Most Recent 2 LFT's:  Recent Labs   Lab Test 05/01/24  0854 04/18/24  1251   AST 14 16   ALT 11 16   ALKPHOS 96 106   BILITOTAL 0.4 0.4   I reviewed the above labs today.    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  MMR IHC intact. She is starting neoadjuvant treatment today with concurrent chemoradiation with weekly carboplatin and Taxol. Side effects and their management have been reviewed with the patient. About 10 to 12 weeks after completing chemotherapy/radiation, we will repeat PET/CT and consider definitive surgery. She will follow-up with us weekly during her treatment.     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  Now s/p endoscopic resection with clear margins free of dysplasia.  This has been adequately treated.  Continue to observe.     Odynophagia. Secondary to cancer. She did not tolerate tramadol due to nausea. Will trial oxycodone. Discussed the potential for sedation and constipation. Advised to take no more than 4 grams Tylenol/day.      Constipation. She will continue with fiber gummies and start on Colace 1 tablet bid.     Vitamin B12 deficiency with macrocytosis. Now on vitamin B12 tablets. Will recheck vitamin B12 level in August.     Nutrition.  She has had weight loss.   Advised to eat small frequent meals high in protein. She has met with a dietician.      Tobacco abuse. Now down to 1/2 ppd. Congratulated her on her efforts of significantly cutting down smoking and advised her to completely quit.  She has nicotine patches available at home.    Hypercalcemia. Present since at least January 2023. Recheck today is mildly elevated. Will check vitamin D and PTH levels. Question if related to prior treatment of thyroid, as below.     Hypothyroidism. hx of PATEL at the age of 16 for hyperthyroidism. On levothyroxine as managed by her PCP. Last TSH was low with a normal free T4. Recommend reviewing with her PCP.     Margie Ritchie PA-C  [facility]  [address] [place] [address]  [phone]    40 minutes spent on the date of the encounter doing chart review, review of test results, interpretation of tests, patient visit, and documentation     The longitudinal plan of care for the diagnosis of esophageal cancer as documented were addressed during this visit. Due to the added complexity in care, I will continue to support Linh in the subsequent management and with ongoing continuity of care.    Addendum: PTH returned elevated. I suspect she has hyperparathyroidism as a sequela of her prior thyroid radiation. I will refer her to see endocrine.

## 2024-05-01 ENCOUNTER — OFFICE VISIT (OUTPATIENT)
Dept: RADIATION ONCOLOGY | Facility: CLINIC | Age: 64
End: 2024-05-01
Attending: RADIOLOGY
Payer: COMMERCIAL

## 2024-05-01 ENCOUNTER — ONCOLOGY VISIT (OUTPATIENT)
Dept: ONCOLOGY | Facility: CLINIC | Age: 64
End: 2024-05-01
Attending: INTERNAL MEDICINE
Payer: COMMERCIAL

## 2024-05-01 ENCOUNTER — APPOINTMENT (OUTPATIENT)
Dept: LAB | Facility: CLINIC | Age: 64
End: 2024-05-01
Payer: COMMERCIAL

## 2024-05-01 VITALS — BODY MASS INDEX: 23.83 KG/M2 | HEIGHT: 66 IN

## 2024-05-01 VITALS
DIASTOLIC BLOOD PRESSURE: 76 MMHG | BODY MASS INDEX: 23.23 KG/M2 | HEART RATE: 83 BPM | OXYGEN SATURATION: 97 % | TEMPERATURE: 98.5 F | SYSTOLIC BLOOD PRESSURE: 131 MMHG | RESPIRATION RATE: 16 BRPM | WEIGHT: 148.3 LBS

## 2024-05-01 DIAGNOSIS — C15.9 SQUAMOUS CELL CARCINOMA OF ESOPHAGUS (H): Primary | ICD-10-CM

## 2024-05-01 DIAGNOSIS — C15.4 MALIGNANT NEOPLASM OF MIDDLE THIRD OF ESOPHAGUS (H): Primary | ICD-10-CM

## 2024-05-01 DIAGNOSIS — K22.89 ESOPHAGEAL PAIN: ICD-10-CM

## 2024-05-01 DIAGNOSIS — E83.52 HYPERCALCEMIA: ICD-10-CM

## 2024-05-01 DIAGNOSIS — E21.3 HYPERPARATHYROIDISM (H): ICD-10-CM

## 2024-05-01 LAB
ALBUMIN SERPL BCG-MCNC: 4.2 G/DL (ref 3.5–5.2)
ALP SERPL-CCNC: 96 U/L (ref 40–150)
ALT SERPL W P-5'-P-CCNC: 11 U/L (ref 0–50)
ANION GAP SERPL CALCULATED.3IONS-SCNC: 10 MMOL/L (ref 7–15)
AST SERPL W P-5'-P-CCNC: 14 U/L (ref 0–45)
BASOPHILS # BLD AUTO: 0 10E3/UL (ref 0–0.2)
BASOPHILS NFR BLD AUTO: 0 %
BILIRUB SERPL-MCNC: 0.4 MG/DL
BUN SERPL-MCNC: 15.8 MG/DL (ref 8–23)
CALCIUM SERPL-MCNC: 10.5 MG/DL (ref 8.8–10.2)
CHLORIDE SERPL-SCNC: 104 MMOL/L (ref 98–107)
CREAT SERPL-MCNC: 0.57 MG/DL (ref 0.51–0.95)
DEPRECATED HCO3 PLAS-SCNC: 23 MMOL/L (ref 22–29)
EGFRCR SERPLBLD CKD-EPI 2021: >90 ML/MIN/1.73M2
EOSINOPHIL # BLD AUTO: 0.1 10E3/UL (ref 0–0.7)
EOSINOPHIL NFR BLD AUTO: 1 %
ERYTHROCYTE [DISTWIDTH] IN BLOOD BY AUTOMATED COUNT: 15.6 % (ref 10–15)
GLUCOSE SERPL-MCNC: 110 MG/DL (ref 70–99)
HCT VFR BLD AUTO: 36.6 % (ref 35–47)
HGB BLD-MCNC: 12.4 G/DL (ref 11.7–15.7)
IMM GRANULOCYTES # BLD: 0 10E3/UL
IMM GRANULOCYTES NFR BLD: 0 %
LYMPHOCYTES # BLD AUTO: 1.9 10E3/UL (ref 0.8–5.3)
LYMPHOCYTES NFR BLD AUTO: 25 %
MCH RBC QN AUTO: 32.2 PG (ref 26.5–33)
MCHC RBC AUTO-ENTMCNC: 33.9 G/DL (ref 31.5–36.5)
MCV RBC AUTO: 95 FL (ref 78–100)
MONOCYTES # BLD AUTO: 0.5 10E3/UL (ref 0–1.3)
MONOCYTES NFR BLD AUTO: 6 %
NEUTROPHILS # BLD AUTO: 5.1 10E3/UL (ref 1.6–8.3)
NEUTROPHILS NFR BLD AUTO: 68 %
NRBC # BLD AUTO: 0 10E3/UL
NRBC BLD AUTO-RTO: 0 /100
PLATELET # BLD AUTO: 246 10E3/UL (ref 150–450)
POTASSIUM SERPL-SCNC: 4.1 MMOL/L (ref 3.4–5.3)
PROT SERPL-MCNC: 7.2 G/DL (ref 6.4–8.3)
PTH-INTACT SERPL-MCNC: 121 PG/ML (ref 15–65)
RBC # BLD AUTO: 3.85 10E6/UL (ref 3.8–5.2)
SODIUM SERPL-SCNC: 137 MMOL/L (ref 135–145)
VIT D+METAB SERPL-MCNC: 16 NG/ML (ref 20–50)
WBC # BLD AUTO: 7.6 10E3/UL (ref 4–11)

## 2024-05-01 PROCEDURE — 82652 VIT D 1 25-DIHYDROXY: CPT | Performed by: PHYSICIAN ASSISTANT

## 2024-05-01 PROCEDURE — 99215 OFFICE O/P EST HI 40 MIN: CPT | Performed by: PHYSICIAN ASSISTANT

## 2024-05-01 PROCEDURE — 82040 ASSAY OF SERUM ALBUMIN: CPT | Performed by: INTERNAL MEDICINE

## 2024-05-01 PROCEDURE — 82542 COL CHROMOTOGRAPHY QUAL/QUAN: CPT | Performed by: PHYSICIAN ASSISTANT

## 2024-05-01 PROCEDURE — 96375 TX/PRO/DX INJ NEW DRUG ADDON: CPT

## 2024-05-01 PROCEDURE — G0463 HOSPITAL OUTPT CLINIC VISIT: HCPCS | Mod: 25 | Performed by: PHYSICIAN ASSISTANT

## 2024-05-01 PROCEDURE — 36591 DRAW BLOOD OFF VENOUS DEVICE: CPT | Performed by: INTERNAL MEDICINE

## 2024-05-01 PROCEDURE — 83970 ASSAY OF PARATHORMONE: CPT | Performed by: PHYSICIAN ASSISTANT

## 2024-05-01 PROCEDURE — 250N000011 HC RX IP 250 OP 636: Performed by: PHYSICIAN ASSISTANT

## 2024-05-01 PROCEDURE — 77386 HC IMRT TREATMENT DELIVERY, COMPLEX: CPT | Performed by: RADIOLOGY

## 2024-05-01 PROCEDURE — 258N000003 HC RX IP 258 OP 636: Performed by: INTERNAL MEDICINE

## 2024-05-01 PROCEDURE — 96417 CHEMO IV INFUS EACH ADDL SEQ: CPT

## 2024-05-01 PROCEDURE — 36415 COLL VENOUS BLD VENIPUNCTURE: CPT | Performed by: PHYSICIAN ASSISTANT

## 2024-05-01 PROCEDURE — 250N000013 HC RX MED GY IP 250 OP 250 PS 637: Performed by: INTERNAL MEDICINE

## 2024-05-01 PROCEDURE — 82306 VITAMIN D 25 HYDROXY: CPT | Performed by: PHYSICIAN ASSISTANT

## 2024-05-01 PROCEDURE — 36591 DRAW BLOOD OFF VENOUS DEVICE: CPT | Performed by: PHYSICIAN ASSISTANT

## 2024-05-01 PROCEDURE — 96367 TX/PROPH/DG ADDL SEQ IV INF: CPT

## 2024-05-01 PROCEDURE — 250N000011 HC RX IP 250 OP 636: Performed by: INTERNAL MEDICINE

## 2024-05-01 PROCEDURE — 96413 CHEMO IV INFUSION 1 HR: CPT

## 2024-05-01 PROCEDURE — 85025 COMPLETE CBC W/AUTO DIFF WBC: CPT | Performed by: INTERNAL MEDICINE

## 2024-05-01 RX ORDER — HEPARIN SODIUM,PORCINE 10 UNIT/ML
5-20 VIAL (ML) INTRAVENOUS DAILY PRN
Status: CANCELLED | OUTPATIENT
Start: 2024-05-01

## 2024-05-01 RX ORDER — HEPARIN SODIUM (PORCINE) LOCK FLUSH IV SOLN 100 UNIT/ML 100 UNIT/ML
5 SOLUTION INTRAVENOUS
Status: DISCONTINUED | OUTPATIENT
Start: 2024-05-01 | End: 2024-05-01 | Stop reason: HOSPADM

## 2024-05-01 RX ORDER — DIPHENHYDRAMINE HCL 25 MG
50 CAPSULE ORAL ONCE
Status: COMPLETED | OUTPATIENT
Start: 2024-05-01 | End: 2024-05-01

## 2024-05-01 RX ORDER — ONDANSETRON 8 MG/1
8 TABLET, FILM COATED ORAL EVERY 8 HOURS PRN
Qty: 30 TABLET | Refills: 3 | Status: SHIPPED | OUTPATIENT
Start: 2024-05-01 | End: 2024-05-13

## 2024-05-01 RX ORDER — HEPARIN SODIUM (PORCINE) LOCK FLUSH IV SOLN 100 UNIT/ML 100 UNIT/ML
5 SOLUTION INTRAVENOUS
Status: CANCELLED | OUTPATIENT
Start: 2024-05-01

## 2024-05-01 RX ORDER — OXYCODONE HYDROCHLORIDE 5 MG/1
5-10 TABLET ORAL EVERY 4 HOURS
Qty: 30 TABLET | Refills: 0 | Status: SHIPPED | OUTPATIENT
Start: 2024-05-01 | End: 2024-05-13

## 2024-05-01 RX ADMIN — Medication 5 ML: at 08:49

## 2024-05-01 RX ADMIN — FAMOTIDINE 20 MG: 10 INJECTION INTRAVENOUS at 10:15

## 2024-05-01 RX ADMIN — Medication 5 ML: at 12:50

## 2024-05-01 RX ADMIN — PACLITAXEL 90 MG: 6 INJECTION, SOLUTION INTRAVENOUS at 11:14

## 2024-05-01 RX ADMIN — SODIUM CHLORIDE 250 ML: 9 INJECTION, SOLUTION INTRAVENOUS at 10:14

## 2024-05-01 RX ADMIN — CARBOPLATIN 250 MG: 600 INJECTION, SOLUTION INTRAVENOUS at 12:20

## 2024-05-01 RX ADMIN — DEXAMETHASONE SODIUM PHOSPHATE: 10 INJECTION, SOLUTION INTRAMUSCULAR; INTRAVENOUS at 10:23

## 2024-05-01 RX ADMIN — DIPHENHYDRAMINE HYDROCHLORIDE 50 MG: 25 CAPSULE ORAL at 10:16

## 2024-05-01 ASSESSMENT — PAIN SCALES - GENERAL: PAINLEVEL: NO PAIN (0)

## 2024-05-01 NOTE — NURSING NOTE
"Oncology Rooming Note    May 1, 2024 9:08 AM   Linh Mustafa is a 63 year old female who presents for:    Chief Complaint   Patient presents with    Port Draw     Port accessed and labs drawn by rn in lab. Vital signs taken.    Oncology Clinic Visit     Squamous Cell Carcinoma of the Esophagus     Initial Vitals: /76 (BP Location: Right arm, Patient Position: Sitting, Cuff Size: Adult Regular)   Pulse 83   Temp 98.5  F (36.9  C) (Oral)   Resp 16   Wt 67.3 kg (148 lb 4.8 oz)   SpO2 97%   BMI 23.23 kg/m   Estimated body mass index is 23.23 kg/m  as calculated from the following:    Height as of 4/18/24: 1.702 m (5' 7\").    Weight as of this encounter: 67.3 kg (148 lb 4.8 oz). Body surface area is 1.78 meters squared.  No Pain (0) Comment: Data Unavailable   No LMP recorded. Patient is postmenopausal.  Allergies reviewed: Yes  Medications reviewed: Yes    Medications: Medication refills not needed today.  Pharmacy name entered into [employer]: [id number] - [address]    Frailty Screening:   Is the patient here for a new oncology consult visit in cancer care? 2. No      Clinical concerns: None       Debbi Gutierrez LPN  5/1/2024              "

## 2024-05-01 NOTE — NURSING NOTE
"Chief Complaint   Patient presents with    Port Draw     Port accessed and labs drawn by rn in lab. Vital signs taken.     Port accessed by RN in lab with 20g 3/4\" gripper needle, labs drawn, port flushed with saline and heparin, vitals checked, pt checked in for next appointment.    Fauzia Catherine RN    "

## 2024-05-01 NOTE — PROGRESS NOTES
Infusion Nursing Note:  Linh Mustafa presents today for Cycle 1 Day 1 Paclitaxel and Carboplatin.    Patient seen by provider today: Yes: Margie Ritchie PA-C   present during visit today: Not Applicable.    Note: Patient was seen and assessed by Margie Ritchie PA-C in clinic prior to infusion. Patient offers no additional complaints or concerns. Patient agreeable to treatment plan today.    Pt new to infusion today. Teaching completed previously by Verónica Sharma and reinforced by this RN. All medications reviewed and questions answered. Pt oriented to infusion room, call light, bathrooms and unit routines. Pt aware to call [facility] with chills and/or temperature >100.4F, uncontrolled nausea/vomiting/diarrhea, shortness of breath, chest pain, bleeding or any questions/concerns. Sent home with thermometer.    Intravenous Access:  Implanted Port.    Treatment Conditions:  Lab Results   Component Value Date    HGB 12.4 05/01/2024    WBC 7.6 05/01/2024    ANEUTAUTO 5.1 05/01/2024     05/01/2024        Lab Results   Component Value Date     05/01/2024    POTASSIUM 4.1 05/01/2024    MAG 1.8 12/23/2022    CR 0.57 05/01/2024    VAIBHAV 10.5 (H) 05/01/2024    BILITOTAL 0.4 05/01/2024    ALBUMIN 4.2 05/01/2024    ALT 11 05/01/2024    AST 14 05/01/2024     Results reviewed, labs MET treatment parameters, ok to proceed with treatment.    Post Infusion Assessment:  Patient tolerated infusion without incident.  Blood return noted pre and post infusion.  Site patent and intact, free from redness, edema or discomfort.  No evidence of extravasations.  Access discontinued per protocol.     Discharge Plan:   Prescription refills given for Zofran and Compazine.  Discharge instructions reviewed with: Patient.  Patient and/or family verbalized understanding of discharge instructions and all questions answered.  Copy of AVS reviewed with patient and/or family.  Patient will return 05/07/24 for next  appointment.  Patient discharged in stable condition accompanied by: self and brother.  Departure Mode: Ambulatory.      Agnieszka Dia RN

## 2024-05-01 NOTE — LETTER
5/1/2024         RE: Linh Mustafa  [address]  [facility] [address]        Dear Colleague,    Thank you for referring your patient, Linh Mustafa, to the [facility]. Please see a copy of my visit note below.    Oncology/Hematology Visit Note  May 1, 2024    Reason for Visit: follow up of esophageal cancer    History of Present Illness: Linh Mustafa is a 63 year old female with esophageal cancer. Since November 2023 she started to notice pain upon swallowing and it was basically pain which limited her food intake.  This was progressively getting worse.  She had further workup as mentioned below.     2/15/2024.  EGD showed an ulcerated mid esophageal mass extending from 26-31 cm from the incisors.  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  Pathology from the mid esophageal mass showed moderately differentiated invasive squamous cell carcinoma, MMR IHC intact.     3/7/2024.  PET/CT showed markedly FDG avid wall thickening of the mid thoracic esophagus with SUV max 24.1.  No evidence of metastatic disease.     3/21/2024.  Upper EUS.  Endoscopy showed a flat nodule in the proximal esophagus between 15-19 cm and one third circumferential.  Upper esophageal sphincter was at 14 cm.  Biopsies from this showed high-grade dysplasia/carcinoma in situ.       Normal esophageal squamous cell cancer causing maybe esophagus on the right anterolateral esophageal wall between 24-30 cm.  It was 50% circumferential.  The GE junction was at 35 cm with 2 cm tongues of Auguste's anteriorly without high risk features.     Ultrasound exam showed the mid esophageal tumor to be probably T3. Two 4 x 6 mm nodes were seen adjacent to the distal esophageal wall at 36 and 37 cm.  Both an identical appearance and one was sampled.  This lymph node biopsy was benign.     By EUS it was staged as T3 N0 M0 tumor.     4/2/2024.  Because of finding of carcinoma in  situ in the proximal esophagus, he had endoscopic resection of the proximal squamous cell carcinoma in situ lesion performed with en bloc removal.    The final pathology showed squamous mucosa with high-grade dysplasia/carcinoma in situ with all margins negative for dysplasia.  No definite invasion was identified. ESD was considered curative for this lesion.     His case was also discussed in the tumor conference with the plan to proceed with neoadjuvant chemotherapy/radiation followed by definitive surgery.       Please see previous notes for further details on the patient's history. She comes in today for routine follow up prior to cycle 1 carboplatin and Taxol.    Interval History:  Patient reports that she has central pain in her chest with swallowing and certain position changes.  She tried the tramadol a couple of times at night but then had nausea in the morning so has not been taking this.  She has been taking 1000 mg of Tylenol every 4 hours.  She denies any trouble with food sticking, though does have some pain when swallowing pills.  She was previously smoking 1 pack/day and is now down to 1/2 pack/day.  She reports having right ear pain over the last month.  She has constipation with firm stools about every 3 days.  She has been taking fiber Gummies without much relief.  She did  some Colace, but has not yet started it.  She reports her sleep quality is fair due to waking up frequently at night due to pain.  She reports some stress related to her cancer diagnosis as well as planning for her father's .  She denies other concerns.    Review of Systems:  Patient denies any of the following except if noted above: fevers, chills, difficulty with energy, vision or hearing changes, dyspnea, abdominal pain, current nausea, vomiting, diarrhea, urinary concerns, headaches, numbness, or tingling.    Current Outpatient Medications   Medication Sig Dispense Refill    ondansetron (ZOFRAN) 8 MG tablet  Take 1 tablet (8 mg) by mouth every 8 hours as needed for nausea 30 tablet 3    oxyCODONE (ROXICODONE) 5 MG tablet Take 1-2 tablets (5-10 mg) by mouth every 4 hours 30 tablet 0    acetaminophen (TYLENOL) 325 MG tablet Take 325-650 mg by mouth every 6 hours as needed for mild pain      amLODIPine (NORVASC) 10 MG tablet TAKE 1 TABLET (10 MG) BY MOUTH DAILY. 90 tablet 1    esomeprazole (NEXIUM) 20 MG DR capsule Take 20 mg by mouth 2 times daily Take 30-60 minutes before eating.      famotidine (PEPCID) 20 MG tablet Take 20 mg by mouth 2 times daily      fluticasone (FLONASE) 50 MCG/ACT nasal spray Spray 1-2 sprays into one nostril alternating nostrils as needed      levothyroxine (SYNTHROID/LEVOTHROID) 200 MCG tablet take 1 tablet by mouth every day for 30 days      prochlorperazine (COMPAZINE) 10 MG tablet Take 1 tablet (10 mg) by mouth every 6 hours as needed for nausea or vomiting 30 tablet 2    sucralfate (CARAFATE) 1 GM/10ML suspension Take 10 mLs (1 g) by mouth 4 times daily 500 mL 1    vitamin B-12 (CYANOCOBALAMIN) 2500 MCG sublingual tablet Take 1 tablet (2,500 mcg) by mouth daily 90 tablet 3     Physical Examination:  General: The patient is a pleasant female in no acute distress. She is here today with her brother, Medina.   /76 (BP Location: Right arm, Patient Position: Sitting, Cuff Size: Adult Regular)   Pulse 83   Temp 98.5  F (36.9  C) (Oral)   Resp 16   Wt 67.3 kg (148 lb 4.8 oz)   SpO2 97%   BMI 23.23 kg/m    Wt Readings from Last 10 Encounters:   05/01/24 67.3 kg (148 lb 4.8 oz)   04/18/24 67.5 kg (148 lb 14.4 oz)   04/16/24 66.7 kg (147 lb)   04/02/24 68.6 kg (151 lb 3.8 oz)   03/21/24 68.1 kg (150 lb 2.1 oz)   03/14/24 68 kg (150 lb)   03/28/23 73 kg (161 lb)   01/25/23 74.8 kg (165 lb)   01/23/23 73.5 kg (162 lb)   01/03/23 76.1 kg (167 lb 12.8 oz)   HEENT: EOMI. Sclerae are anicteric.  Lymph: Neck is supple with no lymphadenopathy in the cervical or supraclavicular areas.   Heart: Regular  rate and rhythm.   Lungs: Clear to auscultation bilaterally.   Abdomen: Bowel sounds present, soft, nontender with no palpable hepatosplenomegaly or masses.   Extremities: No lower extremity edema noted bilaterally.   Neuro: Cranial nerves II through XII are grossly intact.  Skin: No rashes, petechiae, or bruising noted on exposed skin.    Laboratory Data:  Most Recent 3 CBC's:  Recent Labs   Lab Test 05/01/24  0854 04/18/24  1251 01/23/23  1451 12/23/22  0238   WBC 7.6 9.5 7.3 4.5   HGB 12.4 13.2 13.6 12.5   MCV 95 94 110* 110*    277 237 234   ANEUTAUTO 5.1 6.2  --  3.5    Most Recent 3 BMP's:  Recent Labs   Lab Test 05/01/24  0854 04/18/24  1251 11/29/23  1125    137 135   POTASSIUM 4.1 4.5 4.5   CHLORIDE 104 102 101   CO2 23 25 23   BUN 15.8 17.5 7.9*   CR 0.57 0.67 0.59   ANIONGAP 10 10 11   VAIBHAV 10.5* 10.6* 10.3*   * 94 101*   PROTTOTAL 7.2 7.8 7.3   ALBUMIN 4.2 4.4 4.2    Most Recent 2 LFT's:  Recent Labs   Lab Test 05/01/24  0854 04/18/24  1251   AST 14 16   ALT 11 16   ALKPHOS 96 106   BILITOTAL 0.4 0.4   I reviewed the above labs today.    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  MMR IHC intact. She is starting neoadjuvant treatment today with concurrent chemoradiation with weekly carboplatin and Taxol. Side effects and their management have been reviewed with the patient. About 10 to 12 weeks after completing chemotherapy/radiation, we will repeat PET/CT and consider definitive surgery. She will follow-up with us weekly during her treatment.     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  Now s/p endoscopic resection with clear margins free of dysplasia.  This has been adequately treated.  Continue to observe.     Odynophagia. Secondary to cancer. She did not tolerate tramadol due to nausea. Will trial oxycodone. Discussed the potential for sedation and constipation. Advised to take no more than  4 grams Tylenol/day.      Constipation. She will continue with fiber gummies and start on Colace 1 tablet bid.     Vitamin B12 deficiency with macrocytosis. Now on vitamin B12 tablets. Will recheck vitamin B12 level in August.     Nutrition.  She has had weight loss.  Advised to eat small frequent meals high in protein. She has met with a dietician.      Tobacco abuse. Now down to 1/2 ppd. Congratulated her on her efforts of significantly cutting down smoking and advised her to completely quit.  She has nicotine patches available at home.    Hypercalcemia. Present since at least January 2023. Recheck today is mildly elevated. Will check vitamin D and PTH levels. Question if related to prior treatment of thyroid, as below.     Hypothyroidism. hx of PATEL at the age of 16 for hyperthyroidism. On levothyroxine as managed by her PCP. Last TSH was low with a normal free T4. Recommend reviewing with her PCP.     Margie Ritchie PA-C  [facility]  [address], [place] [address] [phone]    32 minutes spent on the date of the encounter doing chart review, review of test results, interpretation of tests, patient visit, and documentation     The longitudinal plan of care for the diagnosis of esophageal cancer as documented were addressed during this visit. Due to the added complexity in care, I will continue to support Linh in the subsequent management and with ongoing continuity of care.

## 2024-05-01 NOTE — ADDENDUM NOTE
Addended by: BILLY CONCEPCION on: 5/1/2024 12:31 PM     Modules accepted: Orders, Level of Service

## 2024-05-01 NOTE — LETTER
5/1/2024         RE: Linh Mustafa  [address]  [place] [address]        Dear Colleague,    Thank you for referring your patient, Linh Mustafa, to the [facility] RADIATION ONCOLOGY. Please see a copy of my visit note below.    Treatment verification set up.      Again, thank you for allowing me to participate in the care of your patient.        Sincerely,        Mireille Berg MD

## 2024-05-01 NOTE — PATIENT INSTRUCTIONS
[facility] Triage and after hours / weekends / holidays:  [phone]    Please call the triage or after hours line if you experience a temperature greater than or equal to 100.4, shaking chills, have uncontrolled nausea, vomiting and/or diarrhea, dizziness, shortness of breath, chest pain, bleeding, unexplained bruising, or if you have any other new/concerning symptoms, questions or concerns.      If you are having any concerning symptoms or wish to speak to a provider before your next infusion visit, please call triage to notify them so we can adequately serve you.     If you need a refill on a narcotic prescription or other medication, please call before your infusion appointment.

## 2024-05-02 ENCOUNTER — APPOINTMENT (OUTPATIENT)
Dept: RADIATION ONCOLOGY | Facility: CLINIC | Age: 64
End: 2024-05-02
Attending: RADIOLOGY
Payer: COMMERCIAL

## 2024-05-02 LAB — 1,25(OH)2D SERPL-MCNC: 83 PG/ML (ref 19.9–79.3)

## 2024-05-02 PROCEDURE — 77386 HC IMRT TREATMENT DELIVERY, COMPLEX: CPT | Performed by: RADIOLOGY

## 2024-05-02 PROCEDURE — 77014 PR CT GUIDE FOR PLACEMENT RADIATION THERAPY FIELDS: CPT | Mod: 26 | Performed by: RADIOLOGY

## 2024-05-03 ENCOUNTER — APPOINTMENT (OUTPATIENT)
Dept: RADIATION ONCOLOGY | Facility: CLINIC | Age: 64
End: 2024-05-03
Attending: RADIOLOGY
Payer: COMMERCIAL

## 2024-05-03 ENCOUNTER — TELEPHONE (OUTPATIENT)
Dept: ENDOCRINOLOGY | Facility: CLINIC | Age: 64
End: 2024-05-03
Payer: COMMERCIAL

## 2024-05-03 PROCEDURE — 77014 PR CT GUIDE FOR PLACEMENT RADIATION THERAPY FIELDS: CPT | Mod: 26 | Performed by: RADIOLOGY

## 2024-05-03 PROCEDURE — 77386 HC IMRT TREATMENT DELIVERY, COMPLEX: CPT | Performed by: RADIOLOGY

## 2024-05-03 NOTE — TELEPHONE ENCOUNTER
Patient confirmed scheduled appointment:  Date: 5/10   Time: 3:20 pm   Visit type: New endocrine   Provider: Lesly   [place] [facility] [place]  Testing/imaging: NA   Additional notes: Spoke to pt and scheduled per Elaine's request below   Elaine Rey, RN  [id number]  Please offer this patient a new spot on 5/10 with Dr. Grace for New Endocrine.    Ina Son on 5/3/2024 at 1:15 PM

## 2024-05-06 ENCOUNTER — OFFICE VISIT (OUTPATIENT)
Dept: RADIATION ONCOLOGY | Facility: CLINIC | Age: 64
End: 2024-05-06
Attending: RADIOLOGY
Payer: COMMERCIAL

## 2024-05-06 ENCOUNTER — PATIENT OUTREACH (OUTPATIENT)
Dept: ONCOLOGY | Facility: CLINIC | Age: 64
End: 2024-05-06

## 2024-05-06 VITALS
HEART RATE: 81 BPM | WEIGHT: 145 LBS | DIASTOLIC BLOOD PRESSURE: 65 MMHG | BODY MASS INDEX: 23.3 KG/M2 | RESPIRATION RATE: 16 BRPM | SYSTOLIC BLOOD PRESSURE: 98 MMHG

## 2024-05-06 DIAGNOSIS — C15.4 MALIGNANT NEOPLASM OF MIDDLE THIRD OF ESOPHAGUS (H): Primary | ICD-10-CM

## 2024-05-06 LAB — PTH RELATED PROT SERPL-SCNC: 2.8 PMOL/L

## 2024-05-06 PROCEDURE — 77386 HC IMRT TREATMENT DELIVERY, COMPLEX: CPT | Performed by: RADIOLOGY

## 2024-05-06 NOTE — LETTER
2024         RE: Linh Mustafa  [address]  [facility] [address]        Dear Colleague,    Thank you for referring your patient, Linh Mustafa, to the [facility] RADIATION ONCOLOGY. Please see a copy of my visit note below.    RADIATION ONCOLOGY WEEKLY ON TREATMENT VISIT   Encounter Date: May 6, 2024    Patient Name: Linh Mustafa  MRN: [id number]  : 1960     Disease and Stage: Clinical stage T3 N0 M0 (stage II) moderately differentiated squamous cell carcinoma of the mid esophagus and pTis N0 squamous cell carcinoma of the upper esophagus, completely excised  Treatment Site: Esophagus  Current Dose/Planned Total Dose: [720] cGy / [4500] cGy with dose painting to the primary tumor at 200 cGy/fraction to 5000 cGy    Concurrent Chemotherapy: Yes  Drug and Frequency: Carbo/Taxol    Medical Oncologist: Kathy Burch MD  Surgeon: Bryant Martinez MD    Subjective: Ms. Mustafa presents to clinic today for her weekly on-treatment visit. Overall, she is tolerating treatment well. Pain is under some control with oxycodone 5 mg as needed, acetaminophen and GI cocktail.    Nursing ROS:   Nutrition Alteration  Diet Type: Patient's Preference  Skin  Skin Reaction: 0 - No changes     ENT and Mouth Exam  Mucositis - Current: 0 - None   ENT/Mouth Note:  (Soft foods)  Cardiovascular  Respiratory effort: 1 - Normal - without distress  Gastrointestinal  Nausea: 0 - None     Psychosocial  Mood - Anxiety: 0 - Normal  Pain Assessment  0-10 Pain Scale: 0    PEG Tube: No  Electronic Cardiac Implant: No     Objective:   BP 98/65   Pulse 81   Resp 16   Wt 65.8 kg (145 lb)   BMI 23.30 kg/m    Gen: Appears well, NAD  Skin: No erythema    Laboratory:  Lab Results   Component Value Date    WBC 7.6 2024    HGB 12.4 2024    HCT 36.6 2024    MCV 95 2024     2024     Lab Results   Component Value Date    CR 0.57 2024     Lab Results   Component Value  Date     05/01/2024    POTASSIUM 4.1 05/01/2024    CHLORIDE 104 05/01/2024    CO2 23 05/01/2024     (H) 05/01/2024     Lab Results   Component Value Date    AST 14 05/01/2024    ALT 11 05/01/2024    ALKPHOS 96 05/01/2024    BILITOTAL 0.4 05/01/2024     Magnesium   Date Value Ref Range Status   12/23/2022 1.8 1.7 - 2.3 mg/dL Final       Treatment-related toxicities (CTCAE v5.0):  Anorexia: Grade 0: No toxicity  Fatigue: Grade 0: No toxicity  Nausea: Grade 1: Loss of appetite without alteration in eating habits  Pain: Grade 1: Mild pain  Esophagitis: Grade 0: No toxicity  Dermatitis: Grade 0: No toxicity    ED visits/Hospitalizations: None    Missed Treatments:  None    Mosaiq chart and setup information reviewed  IGRT images reviewed    Medication Review  Med list reviewed with patient?: Yes  Med list printed and given: Offered and declined    Assessment:    Ms. 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  She is tolerating treatment well.    Plan:   1.  Continue treatment as planned  2.  Suggested taking GI cocktail just before eating      Mireille Berg  Department of Radiation Oncology  [facility]

## 2024-05-06 NOTE — PROGRESS NOTES
RADIATION ONCOLOGY WEEKLY ON TREATMENT VISIT   Encounter Date: May 6, 2024    Patient Name: Linh Mustafa  MRN: [id number]  : 1960     Disease and Stage: Clinical stage T3 N0 M0 (stage II) moderately differentiated squamous cell carcinoma of the mid esophagus and pTis N0 squamous cell carcinoma of the upper esophagus, completely excised  Treatment Site: Esophagus  Current Dose/Planned Total Dose: [720] cGy / [4500] cGy with dose painting to the primary tumor at 200 cGy/fraction to 5000 cGy    Concurrent Chemotherapy: Yes  Drug and Frequency: Carbo/Taxol    Medical Oncologist: Kathy Burch MD  Surgeon: Bryant Martinez MD    Subjective: Ms. Mustafa presents to clinic today for her weekly on-treatment visit. Overall, she is tolerating treatment well. Pain is under some control with oxycodone 5 mg as needed, acetaminophen and GI cocktail.    Nursing ROS:   Nutrition Alteration  Diet Type: Patient's Preference  Skin  Skin Reaction: 0 - No changes     ENT and Mouth Exam  Mucositis - Current: 0 - None   ENT/Mouth Note:  (Soft foods)  Cardiovascular  Respiratory effort: 1 - Normal - without distress  Gastrointestinal  Nausea: 0 - None     Psychosocial  Mood - Anxiety: 0 - Normal  Pain Assessment  0-10 Pain Scale: 0    PEG Tube: No  Electronic Cardiac Implant: No     Objective:   BP 98/65   Pulse 81   Resp 16   Wt 65.8 kg (145 lb)   BMI 23.30 kg/m    Gen: Appears well, NAD  Skin: No erythema    Laboratory:  Lab Results   Component Value Date    WBC 7.6 2024    HGB 12.4 2024    HCT 36.6 2024    MCV 95 2024     2024     Lab Results   Component Value Date    CR 0.57 2024     Lab Results   Component Value Date     2024    POTASSIUM 4.1 2024    CHLORIDE 104 2024    CO2 23 2024     (H) 2024     Lab Results   Component Value Date    AST 14 2024    ALT 11 2024    ALKPHOS 96 2024    BILITOTAL 0.4 2024      Magnesium   Date Value Ref Range Status   12/23/2022 1.8 1.7 - 2.3 mg/dL Final       Treatment-related toxicities (CTCAE v5.0):  Anorexia: Grade 0: No toxicity  Fatigue: Grade 0: No toxicity  Nausea: Grade 1: Loss of appetite without alteration in eating habits  Pain: Grade 1: Mild pain  Esophagitis: Grade 0: No toxicity  Dermatitis: Grade 0: No toxicity    ED visits/Hospitalizations: None    Missed Treatments:  None    Mosaiq chart and setup information reviewed  IGRT images reviewed    Medication Review  Med list reviewed with patient?: Yes  Med list printed and given: Offered and declined    Assessment:    Ms. 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  She is tolerating treatment well.    Plan:   1.  Continue treatment as planned  2.  Suggested taking GI cocktail just before eating      Mireille Berg  Department of Radiation Oncology  [facility]

## 2024-05-06 NOTE — PROGRESS NOTES
"[facility]: Cancer Care Follow-Up Note                                    Discussion with Patient:                                                      Phone call to Lita to follow up after her first carbo/taxol infusion last week.  She states that it went well.  She felt a bit \"out of it\" for an hour or two and that resolved.  She noted a great appetite after the infusion.    Discussed using the triage team for help with symptom or side effect management if needed going forward.     Goals          General     Other (pt-stated)      Notes - Note created  4/18/2024  1:02 PM by Verónica Sharma RN     Goal Statement: I will use my clinic and care team resources as directed.  Date Goal set: 4/18/2024  Barriers:  n/a  Strengths: support  Date to Achieve By: ongoing  Patient expressed understanding of goal: Yes  Action steps to achieve this goal:  I will contact triage with new, worsening or uncontrolled symptoms.   I will contact triage with temperature over 100.4  I will not send urgent or symptomatic messages through [facility].                Dates of Treatment:                                                      Infusion given in last 28 days       Administered MAR Action Medication Dose Rate Visit    05/01/2024 11:14 New Bag PACLitaxel (TAXOL) 90 mg in sodium chloride 0.9% in non-PVC container 290 mL infusion 90 mg 290 mL/hr Infusion Therapy Visit on 05/01/2024 in [facility]    05/01/2024 12:20 New Bag CARBOplatin 250 mg in sodium chloride 0.9 % 300 mL infusion 250 mg 600 mL/hr Infusion Therapy Visit on 05/01/2024 in [facility]            Assessment:                                                        RNCC Short Symptom Review:     Assessment completed with:: Patient    General/Short Assessment  Is the patient experiencing any new or worsening symptoms?: No  Discussion with patient: Reviewed how and when to contact clinic;Reviewed " patient's future appointments      Patient Coping  Accepting    Clinic Utilization  Patient Aware of Next Appointment?: Yes    Other Patient Concerns  Other Patient Reported Concerns: No    Intervention/Education provided during outreach:                                                       Patient to follow up as scheduled at next appt  Patient to call/S[facility]sthart message with updates  Confirmed patient has clinic and triage numbers    ALLAN MayfieldN, RN  RN Care Coordinator  [facility]

## 2024-05-07 ENCOUNTER — APPOINTMENT (OUTPATIENT)
Dept: LAB | Facility: CLINIC | Age: 64
End: 2024-05-07
Attending: PHYSICIAN ASSISTANT
Payer: COMMERCIAL

## 2024-05-07 ENCOUNTER — INFUSION THERAPY VISIT (OUTPATIENT)
Dept: ONCOLOGY | Facility: CLINIC | Age: 64
End: 2024-05-07
Attending: INTERNAL MEDICINE
Payer: COMMERCIAL

## 2024-05-07 ENCOUNTER — ONCOLOGY VISIT (OUTPATIENT)
Dept: ONCOLOGY | Facility: CLINIC | Age: 64
End: 2024-05-07
Attending: PHYSICIAN ASSISTANT
Payer: COMMERCIAL

## 2024-05-07 ENCOUNTER — APPOINTMENT (OUTPATIENT)
Dept: RADIATION ONCOLOGY | Facility: CLINIC | Age: 64
End: 2024-05-07
Attending: RADIOLOGY
Payer: COMMERCIAL

## 2024-05-07 VITALS
HEIGHT: 66 IN | TEMPERATURE: 98 F | SYSTOLIC BLOOD PRESSURE: 108 MMHG | WEIGHT: 147 LBS | DIASTOLIC BLOOD PRESSURE: 70 MMHG | RESPIRATION RATE: 16 BRPM | BODY MASS INDEX: 23.63 KG/M2 | HEART RATE: 78 BPM | OXYGEN SATURATION: 98 %

## 2024-05-07 DIAGNOSIS — E03.9 HYPOTHYROIDISM, UNSPECIFIED TYPE: ICD-10-CM

## 2024-05-07 DIAGNOSIS — K59.00 CONSTIPATION, UNSPECIFIED CONSTIPATION TYPE: ICD-10-CM

## 2024-05-07 DIAGNOSIS — K22.89 ESOPHAGEAL PAIN: ICD-10-CM

## 2024-05-07 DIAGNOSIS — C15.9 SQUAMOUS CELL CARCINOMA OF ESOPHAGUS (H): Primary | ICD-10-CM

## 2024-05-07 DIAGNOSIS — E21.3 HYPERPARATHYROIDISM (H): ICD-10-CM

## 2024-05-07 LAB
ALBUMIN SERPL BCG-MCNC: 4.1 G/DL (ref 3.5–5.2)
ALP SERPL-CCNC: 93 U/L (ref 40–150)
ALT SERPL W P-5'-P-CCNC: 22 U/L (ref 0–50)
ANION GAP SERPL CALCULATED.3IONS-SCNC: 9 MMOL/L (ref 7–15)
AST SERPL W P-5'-P-CCNC: 16 U/L (ref 0–45)
BASOPHILS # BLD AUTO: 0 10E3/UL (ref 0–0.2)
BASOPHILS NFR BLD AUTO: 1 %
BILIRUB SERPL-MCNC: 0.6 MG/DL
BUN SERPL-MCNC: 23.4 MG/DL (ref 8–23)
CALCIUM SERPL-MCNC: 10.1 MG/DL (ref 8.8–10.2)
CHLORIDE SERPL-SCNC: 103 MMOL/L (ref 98–107)
CREAT SERPL-MCNC: 0.6 MG/DL (ref 0.51–0.95)
DEPRECATED HCO3 PLAS-SCNC: 24 MMOL/L (ref 22–29)
EGFRCR SERPLBLD CKD-EPI 2021: >90 ML/MIN/1.73M2
EOSINOPHIL # BLD AUTO: 0 10E3/UL (ref 0–0.7)
EOSINOPHIL NFR BLD AUTO: 1 %
ERYTHROCYTE [DISTWIDTH] IN BLOOD BY AUTOMATED COUNT: 15.3 % (ref 10–15)
GLUCOSE SERPL-MCNC: 114 MG/DL (ref 70–99)
HCT VFR BLD AUTO: 35 % (ref 35–47)
HGB BLD-MCNC: 11.9 G/DL (ref 11.7–15.7)
IMM GRANULOCYTES # BLD: 0 10E3/UL
IMM GRANULOCYTES NFR BLD: 1 %
LYMPHOCYTES # BLD AUTO: 1.6 10E3/UL (ref 0.8–5.3)
LYMPHOCYTES NFR BLD AUTO: 27 %
MCH RBC QN AUTO: 32.5 PG (ref 26.5–33)
MCHC RBC AUTO-ENTMCNC: 34 G/DL (ref 31.5–36.5)
MCV RBC AUTO: 96 FL (ref 78–100)
MONOCYTES # BLD AUTO: 0.3 10E3/UL (ref 0–1.3)
MONOCYTES NFR BLD AUTO: 5 %
NEUTROPHILS # BLD AUTO: 3.8 10E3/UL (ref 1.6–8.3)
NEUTROPHILS NFR BLD AUTO: 65 %
NRBC # BLD AUTO: 0 10E3/UL
NRBC BLD AUTO-RTO: 0 /100
PLATELET # BLD AUTO: 211 10E3/UL (ref 150–450)
POTASSIUM SERPL-SCNC: 4.3 MMOL/L (ref 3.4–5.3)
PROT SERPL-MCNC: 7.1 G/DL (ref 6.4–8.3)
RBC # BLD AUTO: 3.66 10E6/UL (ref 3.8–5.2)
SODIUM SERPL-SCNC: 136 MMOL/L (ref 135–145)
WBC # BLD AUTO: 5.8 10E3/UL (ref 4–11)

## 2024-05-07 PROCEDURE — 96367 TX/PROPH/DG ADDL SEQ IV INF: CPT

## 2024-05-07 PROCEDURE — 250N000011 HC RX IP 250 OP 636: Performed by: PHYSICIAN ASSISTANT

## 2024-05-07 PROCEDURE — 77386 HC IMRT TREATMENT DELIVERY, COMPLEX: CPT | Performed by: RADIOLOGY

## 2024-05-07 PROCEDURE — 36591 DRAW BLOOD OFF VENOUS DEVICE: CPT | Performed by: INTERNAL MEDICINE

## 2024-05-07 PROCEDURE — 80053 COMPREHEN METABOLIC PANEL: CPT | Performed by: INTERNAL MEDICINE

## 2024-05-07 PROCEDURE — 96375 TX/PRO/DX INJ NEW DRUG ADDON: CPT

## 2024-05-07 PROCEDURE — 99214 OFFICE O/P EST MOD 30 MIN: CPT | Performed by: PHYSICIAN ASSISTANT

## 2024-05-07 PROCEDURE — 250N000013 HC RX MED GY IP 250 OP 250 PS 637: Performed by: INTERNAL MEDICINE

## 2024-05-07 PROCEDURE — 250N000011 HC RX IP 250 OP 636: Performed by: INTERNAL MEDICINE

## 2024-05-07 PROCEDURE — 96417 CHEMO IV INFUS EACH ADDL SEQ: CPT

## 2024-05-07 PROCEDURE — 77336 RADIATION PHYSICS CONSULT: CPT | Performed by: RADIOLOGY

## 2024-05-07 PROCEDURE — G0463 HOSPITAL OUTPT CLINIC VISIT: HCPCS | Mod: 25 | Performed by: PHYSICIAN ASSISTANT

## 2024-05-07 PROCEDURE — 258N000003 HC RX IP 258 OP 636: Performed by: INTERNAL MEDICINE

## 2024-05-07 PROCEDURE — 85048 AUTOMATED LEUKOCYTE COUNT: CPT | Performed by: INTERNAL MEDICINE

## 2024-05-07 PROCEDURE — G2211 COMPLEX E/M VISIT ADD ON: HCPCS | Performed by: PHYSICIAN ASSISTANT

## 2024-05-07 PROCEDURE — 96413 CHEMO IV INFUSION 1 HR: CPT

## 2024-05-07 PROCEDURE — 77427 RADIATION TX MANAGEMENT X5: CPT | Performed by: RADIOLOGY

## 2024-05-07 RX ORDER — DIPHENHYDRAMINE HCL 25 MG
50 CAPSULE ORAL ONCE
Status: COMPLETED | OUTPATIENT
Start: 2024-05-07 | End: 2024-05-07

## 2024-05-07 RX ORDER — POLYETHYLENE GLYCOL 3350 17 G/17G
1 POWDER, FOR SOLUTION ORAL DAILY
Qty: 510 G | Refills: 3 | Status: SHIPPED | OUTPATIENT
Start: 2024-05-07 | End: 2024-06-26

## 2024-05-07 RX ORDER — HEPARIN SODIUM (PORCINE) LOCK FLUSH IV SOLN 100 UNIT/ML 100 UNIT/ML
5 SOLUTION INTRAVENOUS
Status: DISCONTINUED | OUTPATIENT
Start: 2024-05-07 | End: 2024-05-07 | Stop reason: HOSPADM

## 2024-05-07 RX ORDER — HEPARIN SODIUM (PORCINE) LOCK FLUSH IV SOLN 100 UNIT/ML 100 UNIT/ML
5 SOLUTION INTRAVENOUS ONCE
Status: COMPLETED | OUTPATIENT
Start: 2024-05-07 | End: 2024-05-07

## 2024-05-07 RX ADMIN — DEXAMETHASONE SODIUM PHOSPHATE: 10 INJECTION, SOLUTION INTRAMUSCULAR; INTRAVENOUS at 13:34

## 2024-05-07 RX ADMIN — Medication 5 ML: at 16:02

## 2024-05-07 RX ADMIN — Medication 5 ML: at 12:13

## 2024-05-07 RX ADMIN — SODIUM CHLORIDE 250 ML: 9 INJECTION, SOLUTION INTRAVENOUS at 13:31

## 2024-05-07 RX ADMIN — SODIUM CHLORIDE 90 MG: 9 INJECTION, SOLUTION INTRAVENOUS at 14:02

## 2024-05-07 RX ADMIN — FAMOTIDINE 20 MG: 10 INJECTION INTRAVENOUS at 13:32

## 2024-05-07 RX ADMIN — DIPHENHYDRAMINE HYDROCHLORIDE 50 MG: 25 CAPSULE ORAL at 13:31

## 2024-05-07 RX ADMIN — CARBOPLATIN 250 MG: 10 INJECTION, SOLUTION INTRAVENOUS at 15:30

## 2024-05-07 ASSESSMENT — PAIN SCALES - GENERAL: PAINLEVEL: NO PAIN (0)

## 2024-05-07 NOTE — PROGRESS NOTES
Oncology/Hematology Visit Note  May 7, 2024    Reason for Visit: follow up of esophageal cancer    History of Present Illness: Linh Mustafa is a 63 year old female with esophageal cancer. Since November 2023 she started to notice pain upon swallowing and it was basically pain which limited her food intake.  This was progressively getting worse.  She had further workup as mentioned below.     2/15/2024.  EGD showed an ulcerated mid esophageal mass extending from 26-31 cm from the incisors.  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  Pathology from the mid esophageal mass showed moderately differentiated invasive squamous cell carcinoma, MMR IHC intact.     3/7/2024.  PET/CT showed markedly FDG avid wall thickening of the mid thoracic esophagus with SUV max 24.1.  No evidence of metastatic disease.     3/21/2024.  Upper EUS.  Endoscopy showed a flat nodule in the proximal esophagus between 15-19 cm and one third circumferential.  Upper esophageal sphincter was at 14 cm.  Biopsies from this showed high-grade dysplasia/carcinoma in situ.       Normal esophageal squamous cell cancer causing maybe esophagus on the right anterolateral esophageal wall between 24-30 cm.  It was 50% circumferential.  The GE junction was at 35 cm with 2 cm tongues of Auguste's anteriorly without high risk features.     Ultrasound exam showed the mid esophageal tumor to be probably T3. Two 4 x 6 mm nodes were seen adjacent to the distal esophageal wall at 36 and 37 cm.  Both an identical appearance and one was sampled.  This lymph node biopsy was benign.     By EUS it was staged as T3 N0 M0 tumor.     4/2/2024.  Because of finding of carcinoma in situ in the proximal esophagus, he had endoscopic resection of the proximal squamous cell carcinoma in situ lesion performed with en bloc removal.    The final pathology showed squamous mucosa with high-grade dysplasia/carcinoma in situ with all margins negative  for dysplasia.  No definite invasion was identified. ESD was considered curative for this lesion.     His case was also discussed in the tumor conference with the plan to proceed with neoadjuvant chemotherapy/radiation followed by definitive surgery.       Please see previous notes for further details on the patient's history. She started on concurrent chemoradiation with carboplatin and Taxol on 5/1/24. She comes in today for routine follow up prior to cycle 1 day 8 carboplatin and Taxol.    Interval History:  Patient denies any side effects from her first week of chemotherapy.  She has had some intermittent headaches that have improved with Tylenol and drinking caffeinated beverages.  She reports that yesterday she did have an episode of increased central chest pain.  She is currently taking 5 mg of oxycodone at bedtime and taking Tylenol throughout the day. She wakes up at night with pain.  She has been leery to take more oxycodone during the day.  She is taking 1 Colace at bedtime and having bowel movements every 4 to 5 days.  She reports her energy is fair, though she is rarely taking naps.  She has been eating fairly well and is also drinking 1 Ensure most days.  She continues to smoke 1/2 pack/day.  She has noticed some mild lightheadedness after radiation.  She denies other concerns.    Review of Systems:  Patient denies any of the following except if noted above: fevers, chills, vision or hearing changes, dyspnea, abdominal pain, nausea, vomiting, diarrhea, urinary concerns, numbness, or tingling.    Current Outpatient Medications   Medication Sig Dispense Refill    acetaminophen (TYLENOL) 325 MG tablet Take 325-650 mg by mouth every 6 hours as needed for mild pain      amLODIPine (NORVASC) 10 MG tablet TAKE 1 TABLET (10 MG) BY MOUTH DAILY. 90 tablet 1    esomeprazole (NEXIUM) 20 MG DR capsule Take 20 mg by mouth 2 times daily Take 30-60 minutes before eating.      famotidine (PEPCID) 20 MG tablet Take 20  "mg by mouth 2 times daily      fluticasone (FLONASE) 50 MCG/ACT nasal spray Spray 1-2 sprays into one nostril alternating nostrils as needed      levothyroxine (SYNTHROID/LEVOTHROID) 200 MCG tablet take 1 tablet by mouth every day for 30 days      ondansetron (ZOFRAN) 8 MG tablet Take 1 tablet (8 mg) by mouth every 8 hours as needed for nausea 30 tablet 3    oxyCODONE (ROXICODONE) 5 MG tablet Take 1-2 tablets (5-10 mg) by mouth every 4 hours 30 tablet 0    polyethylene glycol (MIRALAX) 17 GM/Dose powder Take 17 g (1 Capful) by mouth daily 510 g 3    prochlorperazine (COMPAZINE) 10 MG tablet Take 1 tablet (10 mg) by mouth every 6 hours as needed for nausea or vomiting 30 tablet 2    sucralfate (CARAFATE) 1 GM/10ML suspension Take 10 mLs (1 g) by mouth 4 times daily 500 mL 1    vitamin B-12 (CYANOCOBALAMIN) 2500 MCG sublingual tablet Take 1 tablet (2,500 mcg) by mouth daily 90 tablet 3     Physical Examination:  General: The patient is a pleasant female in no acute distress. She is here today with her brother, Medina.   /70   Pulse 78   Temp 98  F (36.7  C)   Resp 16   Ht 1.68 m (5' 6.14\")   Wt 66.7 kg (147 lb)   SpO2 98%   BMI 23.62 kg/m    Wt Readings from Last 10 Encounters:   05/07/24 66.7 kg (147 lb)   05/06/24 65.8 kg (145 lb)   05/01/24 67.3 kg (148 lb 4.8 oz)   04/18/24 67.5 kg (148 lb 14.4 oz)   04/16/24 66.7 kg (147 lb)   04/02/24 68.6 kg (151 lb 3.8 oz)   03/21/24 68.1 kg (150 lb 2.1 oz)   03/14/24 68 kg (150 lb)   03/28/23 73 kg (161 lb)   01/25/23 74.8 kg (165 lb)   HEENT: EOMI. Sclerae are anicteric.  Lymph: Neck is supple with no lymphadenopathy in the cervical or supraclavicular areas.   Heart: Regular rate and rhythm.   Lungs: Clear to auscultation bilaterally.   Abdomen: Bowel sounds present, soft, nontender with no palpable hepatosplenomegaly or masses.   Extremities: No lower extremity edema noted bilaterally.   Neuro: Cranial nerves II through XII are grossly intact.  Skin: No rashes, " petechiae, or bruising noted on exposed skin.    Laboratory Data:  Most Recent 3 CBC's:  Recent Labs   Lab Test 05/07/24  1219 05/01/24  0854 04/18/24  1251   WBC 5.8 7.6 9.5   HGB 11.9 12.4 13.2   MCV 96 95 94    246 277   ANEUTAUTO 3.8 5.1 6.2    Most Recent 3 BMP's:  Recent Labs   Lab Test 05/07/24  1219 05/01/24  0854 04/18/24  1251    137 137   POTASSIUM 4.3 4.1 4.5   CHLORIDE 103 104 102   CO2 24 23 25   BUN 23.4* 15.8 17.5   CR 0.60 0.57 0.67   ANIONGAP 9 10 10   VAIBHAV 10.1 10.5* 10.6*   * 110* 94   PROTTOTAL 7.1 7.2 7.8   ALBUMIN 4.1 4.2 4.4    Most Recent 2 LFT's:  Recent Labs   Lab Test 05/07/24  1219 05/01/24  0854   AST 16 14   ALT 22 11   ALKPHOS 93 96   BILITOTAL 0.6 0.4   I reviewed the above labs today.    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  MMR IHC intact. She started neoadjuvant treatment on 5/1/24 with concurrent chemoradiation with weekly carboplatin and Taxol. She tolerated week 1 fairly well. She will continue with day 8 today. About 10 to 12 weeks after completing chemotherapy/radiation, we will repeat PET/CT and consider definitive surgery. She will follow-up with us weekly during her treatment.     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  Now s/p endoscopic resection with clear margins free of dysplasia.  This has been adequately treated.  Continue to observe.     Odynophagia. Secondary to cancer. She did not tolerate tramadol due to nausea. Recommend more liberal use of oxycodone. Recommend taking 10 mg at bedtime and 2.5-5 mg per dose during the day. Previously, advised to take no more than 4 grams Tylenol/day.      Constipation. She will continue with fiber gummies and Colace 1 tablet at bedtime. She will also start on MiraLax once/day.     Vitamin B12 deficiency with macrocytosis. Now on vitamin B12 tablets. Will recheck vitamin B12 level in August.     Nutrition.  She  has had weight loss.  Advised to eat small frequent meals high in protein. She has met with a dietician. Weight has stabilized.      Tobacco abuse. Now down to 1/2 ppd.  She has nicotine patches available at home.    Hypercalcemia. Secondary to hyperparathyroidism. Suspect this is as a sequela of her prior thyroid radiation. She will be seeing endocrine on 5/10.     Hypothyroidism. hx of PATEL at the age of 16 for hyperthyroidism. On levothyroxine as managed by her PCP. Last TSH was low with a normal free T4. Will defer to endocrine on management.     Hypertension. Now with hypotension. Was on amlodipine 5 mg daily. Will have her hold for now. She will monitor her BP at home every couple of days and let us know if  or greater consistently.     Margie Ritchie PA-C  [facility]  [address] [place] [id number] [phone]    The longitudinal plan of care for the diagnosis of esophageal cancer as documented were addressed during this visit. Due to the added complexity in care, I will continue to support Linh in the subsequent management and with ongoing continuity of care.

## 2024-05-07 NOTE — NURSING NOTE
"Oncology Rooming Note    May 7, 2024 12:19 PM   Linh Mustafa is a 63 year old female who presents for:    Chief Complaint   Patient presents with    Port Draw     Labs collected from port by RN. Vitals taken. Checked in for appointment(s).     Oncology Clinic Visit     [facility] RETURN - SQUAMOUS CELL CARCINOMA OF ESOPHAGUS     Initial Vitals: /70   Pulse 78   Temp 98  F (36.7  C)   Resp 16   Ht 1.68 m (5' 6.14\")   Wt 66.7 kg (147 lb)   SpO2 98%   BMI 23.62 kg/m   Estimated body mass index is 23.62 kg/m  as calculated from the following:    Height as of this encounter: 1.68 m (5' 6.14\").    Weight as of this encounter: 66.7 kg (147 lb). Body surface area is 1.76 meters squared.  No Pain (0) Comment: Data Unavailable   No LMP recorded. Patient is postmenopausal.  Allergies reviewed: Yes  Medications reviewed: Yes    Medications: Medication refills not needed today.  Pharmacy name entered into [email or web address]: [id number] - [address]    Frailty Screening:   Is the patient here for a new oncology consult visit in cancer care? 2. No      Boy Dickson LPN              "

## 2024-05-07 NOTE — NURSING NOTE
Chief Complaint   Patient presents with    Port Draw     Labs collected from port by RN. Vitals taken. Checked in for appointment(s).      Port accessed with 20 gauge 3/4 inch flat needle by RN, labs collected, line flushed with saline and heparin.  Vitals taken. Pt checked in for appointment(s).     Vilma Vazquez RN

## 2024-05-07 NOTE — PATIENT INSTRUCTIONS
[facility] Triage and after hours / weekends / holidays:  [phone]    Please call the triage or after hours line if you experience a temperature greater than or equal to 100.4, shaking chills, have uncontrolled nausea, vomiting and/or diarrhea, dizziness, shortness of breath, chest pain, bleeding, unexplained bruising, or if you have any other new/concerning symptoms, questions or concerns.      If you are having any concerning symptoms or wish to speak to a provider before your next infusion visit, please call triage to notify them so we can adequately serve you.     If you need a refill on a narcotic prescription or other medication, please call before your infusion appointment.

## 2024-05-07 NOTE — PROGRESS NOTES
Infusion Nursing Note:  Linh Mustafa presents today for Cycle 1 Day 8 Paclitaxel (#2) and Carboplatin.    Patient seen by provider today: Yes: Margie Ritchie PA-C   present during visit today: Not Applicable.    Note: Patient was seen and assessed by Margie Ritchie PA-C in clinic prior to infusion. Patient offers no additional complaints or concerns. Patient agreeable to treatment plan today.    Intravenous Access:  Implanted Port.    Treatment Conditions:  Lab Results   Component Value Date    HGB 11.9 05/07/2024    WBC 5.8 05/07/2024    ANEUTAUTO 3.8 05/07/2024     05/07/2024        Lab Results   Component Value Date     05/07/2024    POTASSIUM 4.3 05/07/2024    MAG 1.8 12/23/2022    CR 0.60 05/07/2024    VAIBHAV 10.1 05/07/2024    BILITOTAL 0.6 05/07/2024    ALBUMIN 4.1 05/07/2024    ALT 22 05/07/2024    AST 16 05/07/2024     Results reviewed, labs MET treatment parameters, ok to proceed with treatment.    Post Infusion Assessment:  Patient tolerated infusion without incident.  Blood return noted pre and post infusion.  Site patent and intact, free from redness, edema or discomfort.  No evidence of extravasations.  Access discontinued per protocol.     Discharge Plan:   Prescription refills given for Miralax.  Discharge instructions reviewed with: Patient and Family.  Patient and/or family verbalized understanding of discharge instructions and all questions answered.  AVS to patient via [employer].  Patient will return 05/13/24 for next appointment.   Patient discharged in stable condition accompanied by: self and brother.  Departure Mode: Ambulatory.      Agnieszka Dia RN

## 2024-05-07 NOTE — LETTER
5/7/2024         RE: Linh Mustafa  [address]  [facility] [address]        Dear Colleague,    Thank you for referring your patient, Linh Mustafa, to the [facility]. Please see a copy of my visit note below.    Oncology/Hematology Visit Note  May 7, 2024    Reason for Visit: follow up of esophageal cancer    History of Present Illness: Linh Mustafa is a 63 year old female with esophageal cancer. Since November 2023 she started to notice pain upon swallowing and it was basically pain which limited her food intake.  This was progressively getting worse.  She had further workup as mentioned below.     2/15/2024.  EGD showed an ulcerated mid esophageal mass extending from 26-31 cm from the incisors.  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  Pathology from the mid esophageal mass showed moderately differentiated invasive squamous cell carcinoma, MMR IHC intact.     3/7/2024.  PET/CT showed markedly FDG avid wall thickening of the mid thoracic esophagus with SUV max 24.1.  No evidence of metastatic disease.     3/21/2024.  Upper EUS.  Endoscopy showed a flat nodule in the proximal esophagus between 15-19 cm and one third circumferential.  Upper esophageal sphincter was at 14 cm.  Biopsies from this showed high-grade dysplasia/carcinoma in situ.       Normal esophageal squamous cell cancer causing maybe esophagus on the right anterolateral esophageal wall between 24-30 cm.  It was 50% circumferential.  The GE junction was at 35 cm with 2 cm tongues of Auguste's anteriorly without high risk features.     Ultrasound exam showed the mid esophageal tumor to be probably T3. Two 4 x 6 mm nodes were seen adjacent to the distal esophageal wall at 36 and 37 cm.  Both an identical appearance and one was sampled.  This lymph node biopsy was benign.     By EUS it was staged as T3 N0 M0 tumor.     4/2/2024.  Because of finding of carcinoma in  situ in the proximal esophagus, he had endoscopic resection of the proximal squamous cell carcinoma in situ lesion performed with en bloc removal.    The final pathology showed squamous mucosa with high-grade dysplasia/carcinoma in situ with all margins negative for dysplasia.  No definite invasion was identified. ESD was considered curative for this lesion.     His case was also discussed in the tumor conference with the plan to proceed with neoadjuvant chemotherapy/radiation followed by definitive surgery.       Please see previous notes for further details on the patient's history. She comes in today for routine follow up prior to cycle 1 carboplatin and Taxol.    Interval History:          Patient reports that she has central pain in her chest with swallowing and certain position changes.  She tried the tramadol a couple of times at night but then had nausea in the morning so has not been taking this.  She has been taking 1000 mg of Tylenol every 4 hours.  She denies any trouble with food sticking, though does have some pain when swallowing pills.  She was previously smoking 1 pack/day and is now down to 1/2 pack/day.  She reports having right ear pain over the last month.  She has constipation with firm stools about every 3 days.  She has been taking fiber Gummies without much relief.  She did  some Colace, but has not yet started it.  She reports her sleep quality is fair due to waking up frequently at night due to pain.  She reports some stress related to her cancer diagnosis as well as planning for her father's .  She denies other concerns.    Review of Systems:  Patient denies any of the following except if noted above: fevers, chills, difficulty with energy, vision or hearing changes, dyspnea, abdominal pain, current nausea, vomiting, diarrhea, urinary concerns, headaches, numbness, or tingling.    Current Outpatient Medications   Medication Sig Dispense Refill    acetaminophen (TYLENOL) 325  "MG tablet Take 325-650 mg by mouth every 6 hours as needed for mild pain      amLODIPine (NORVASC) 10 MG tablet TAKE 1 TABLET (10 MG) BY MOUTH DAILY. 90 tablet 1    esomeprazole (NEXIUM) 20 MG DR capsule Take 20 mg by mouth 2 times daily Take 30-60 minutes before eating.      famotidine (PEPCID) 20 MG tablet Take 20 mg by mouth 2 times daily      fluticasone (FLONASE) 50 MCG/ACT nasal spray Spray 1-2 sprays into one nostril alternating nostrils as needed      levothyroxine (SYNTHROID/LEVOTHROID) 200 MCG tablet take 1 tablet by mouth every day for 30 days      ondansetron (ZOFRAN) 8 MG tablet Take 1 tablet (8 mg) by mouth every 8 hours as needed for nausea 30 tablet 3    oxyCODONE (ROXICODONE) 5 MG tablet Take 1-2 tablets (5-10 mg) by mouth every 4 hours 30 tablet 0    polyethylene glycol (MIRALAX) 17 GM/Dose powder Take 17 g (1 Capful) by mouth daily 510 g 3    prochlorperazine (COMPAZINE) 10 MG tablet Take 1 tablet (10 mg) by mouth every 6 hours as needed for nausea or vomiting 30 tablet 2    sucralfate (CARAFATE) 1 GM/10ML suspension Take 10 mLs (1 g) by mouth 4 times daily 500 mL 1    vitamin B-12 (CYANOCOBALAMIN) 2500 MCG sublingual tablet Take 1 tablet (2,500 mcg) by mouth daily 90 tablet 3     Physical Examination:  General: The patient is a pleasant female in no acute distress. She is here today with her brother, Medina.   /70   Pulse 78   Temp 98  F (36.7  C)   Resp 16   Ht 1.68 m (5' 6.14\")   Wt 66.7 kg (147 lb)   SpO2 98%   BMI 23.62 kg/m    Wt Readings from Last 10 Encounters:   05/07/24 66.7 kg (147 lb)   05/06/24 65.8 kg (145 lb)   05/01/24 67.3 kg (148 lb 4.8 oz)   04/18/24 67.5 kg (148 lb 14.4 oz)   04/16/24 66.7 kg (147 lb)   04/02/24 68.6 kg (151 lb 3.8 oz)   03/21/24 68.1 kg (150 lb 2.1 oz)   03/14/24 68 kg (150 lb)   03/28/23 73 kg (161 lb)   01/25/23 74.8 kg (165 lb)   HEENT: EOMI. Sclerae are anicteric.  Lymph: Neck is supple with no lymphadenopathy in the cervical or supraclavicular " areas.   Heart: Regular rate and rhythm.   Lungs: Clear to auscultation bilaterally.   Abdomen: Bowel sounds present, soft, nontender with no palpable hepatosplenomegaly or masses.   Extremities: No lower extremity edema noted bilaterally.   Neuro: Cranial nerves II through XII are grossly intact.  Skin: No rashes, petechiae, or bruising noted on exposed skin.    Laboratory Data:  Most Recent 3 CBC's:  Recent Labs   Lab Test 05/07/24  1219 05/01/24  0854 04/18/24  1251   WBC 5.8 7.6 9.5   HGB 11.9 12.4 13.2   MCV 96 95 94    246 277   ANEUTAUTO 3.8 5.1 6.2    Most Recent 3 BMP's:  Recent Labs   Lab Test 05/01/24  0854 04/18/24  1251 11/29/23  1125    137 135   POTASSIUM 4.1 4.5 4.5   CHLORIDE 104 102 101   CO2 23 25 23   BUN 15.8 17.5 7.9*   CR 0.57 0.67 0.59   ANIONGAP 10 10 11   VAIBHAV 10.5* 10.6* 10.3*   * 94 101*   PROTTOTAL 7.2 7.8 7.3   ALBUMIN 4.2 4.4 4.2    Most Recent 2 LFT's:  Recent Labs   Lab Test 05/01/24  0854 04/18/24  1251   AST 14 16   ALT 11 16   ALKPHOS 96 106   BILITOTAL 0.4 0.4   I reviewed the above labs today.    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  MMR IHC intact. She is starting neoadjuvant treatment today with concurrent chemoradiation with weekly carboplatin and Taxol. Side effects and their management have been reviewed with the patient. About 10 to 12 weeks after completing chemotherapy/radiation, we will repeat PET/CT and consider definitive surgery. She will follow-up with us weekly during her treatment.     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  Now s/p endoscopic resection with clear margins free of dysplasia.  This has been adequately treated.  Continue to observe.     Odynophagia. Secondary to cancer. She did not tolerate tramadol due to nausea. Will trial oxycodone. Discussed the potential for sedation and constipation. Advised to take no more than 4 grams  Tylenol/day.      Constipation. She will continue with fiber gummies and start on Colace 1 tablet bid.     Vitamin B12 deficiency with macrocytosis. Now on vitamin B12 tablets. Will recheck vitamin B12 level in August.     Nutrition.  She has had weight loss.  Advised to eat small frequent meals high in protein. She has met with a dietician.      Tobacco abuse. Now down to 1/2 ppd. Congratulated her on her efforts of significantly cutting down smoking and advised her to completely quit.  She has nicotine patches available at home.    Hypercalcemia. Present since at least January 2023. Recheck today is mildly elevated. Will check vitamin D and PTH levels. Question if related to prior treatment of thyroid, as below.   Addendum: PTH returned elevated. I suspect she has hyperparathyroidism as a sequela of her prior thyroid radiation. I will refer her to see endocrine.     Hypothyroidism. hx of PATEL at the age of 16 for hyperthyroidism. On levothyroxine as managed by her PCP. Last TSH was low with a normal free T4. Recommend reviewing with her PCP.     Margie Ritchie PA-C  [facility]  [address] [place] [id number]  [phone]    ___ minutes spent on the date of the encounter doing chart review, review of test results, interpretation of tests, patient visit, and documentation     The longitudinal plan of care for the diagnosis of esophageal cancer as documented were addressed during this visit. Due to the added complexity in care, I will continue to support Linh in the subsequent management and with ongoing continuity of care.        Oncology/Hematology Visit Note  May 7, 2024    Reason for Visit: follow up of esophageal cancer    History of Present Illness: Linh Mustafa is a 63 year old female with esophageal cancer. Since November 2023 she started to notice pain upon swallowing and it was basically pain which limited her food intake.  This was progressively getting worse.  She had further  workup as mentioned below.     2/15/2024.  EGD showed an ulcerated mid esophageal mass extending from 26-31 cm from the incisors.  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  Pathology from the mid esophageal mass showed moderately differentiated invasive squamous cell carcinoma, MMR IHC intact.     3/7/2024.  PET/CT showed markedly FDG avid wall thickening of the mid thoracic esophagus with SUV max 24.1.  No evidence of metastatic disease.     3/21/2024.  Upper EUS.  Endoscopy showed a flat nodule in the proximal esophagus between 15-19 cm and one third circumferential.  Upper esophageal sphincter was at 14 cm.  Biopsies from this showed high-grade dysplasia/carcinoma in situ.       Normal esophageal squamous cell cancer causing maybe esophagus on the right anterolateral esophageal wall between 24-30 cm.  It was 50% circumferential.  The GE junction was at 35 cm with 2 cm tongues of Auguste's anteriorly without high risk features.     Ultrasound exam showed the mid esophageal tumor to be probably T3. Two 4 x 6 mm nodes were seen adjacent to the distal esophageal wall at 36 and 37 cm.  Both an identical appearance and one was sampled.  This lymph node biopsy was benign.     By EUS it was staged as T3 N0 M0 tumor.     4/2/2024.  Because of finding of carcinoma in situ in the proximal esophagus, he had endoscopic resection of the proximal squamous cell carcinoma in situ lesion performed with en bloc removal.    The final pathology showed squamous mucosa with high-grade dysplasia/carcinoma in situ with all margins negative for dysplasia.  No definite invasion was identified. ESD was considered curative for this lesion.     His case was also discussed in the tumor conference with the plan to proceed with neoadjuvant chemotherapy/radiation followed by definitive surgery.       Please see previous notes for further details on the patient's history. She started on concurrent chemoradiation  with carboplatin and Taxol on 5/1/24. She comes in today for routine follow up prior to cycle 1 day 8 carboplatin and Taxol.    Interval History:  Patient denies any side effects from her first week of chemotherapy.  She has had some intermittent headaches that have improved with Tylenol and drinking caffeinated beverages.  She reports that yesterday she did have an episode of increased central chest pain.  She is currently taking 5 mg of oxycodone at bedtime and taking Tylenol throughout the day. She wakes up at night with pain.  She has been leery to take more oxycodone during the day.  She is taking 1 Colace at bedtime and having bowel movements every 4 to 5 days.  She reports her energy is fair, though she is rarely taking naps.  She has been eating fairly well and is also drinking 1 Ensure most days.  She continues to smoke 1/2 pack/day.  She has noticed some mild lightheadedness after radiation.  She denies other concerns.    Review of Systems:  Patient denies any of the following except if noted above: fevers, chills, vision or hearing changes, dyspnea, abdominal pain, nausea, vomiting, diarrhea, urinary concerns, numbness, or tingling.    Current Outpatient Medications   Medication Sig Dispense Refill    acetaminophen (TYLENOL) 325 MG tablet Take 325-650 mg by mouth every 6 hours as needed for mild pain      amLODIPine (NORVASC) 10 MG tablet TAKE 1 TABLET (10 MG) BY MOUTH DAILY. 90 tablet 1    esomeprazole (NEXIUM) 20 MG DR capsule Take 20 mg by mouth 2 times daily Take 30-60 minutes before eating.      famotidine (PEPCID) 20 MG tablet Take 20 mg by mouth 2 times daily      fluticasone (FLONASE) 50 MCG/ACT nasal spray Spray 1-2 sprays into one nostril alternating nostrils as needed      levothyroxine (SYNTHROID/LEVOTHROID) 200 MCG tablet take 1 tablet by mouth every day for 30 days      ondansetron (ZOFRAN) 8 MG tablet Take 1 tablet (8 mg) by mouth every 8 hours as needed for nausea 30 tablet 3    oxyCODONE  "(ROXICODONE) 5 MG tablet Take 1-2 tablets (5-10 mg) by mouth every 4 hours 30 tablet 0    polyethylene glycol (MIRALAX) 17 GM/Dose powder Take 17 g (1 Capful) by mouth daily 510 g 3    prochlorperazine (COMPAZINE) 10 MG tablet Take 1 tablet (10 mg) by mouth every 6 hours as needed for nausea or vomiting 30 tablet 2    sucralfate (CARAFATE) 1 GM/10ML suspension Take 10 mLs (1 g) by mouth 4 times daily 500 mL 1    vitamin B-12 (CYANOCOBALAMIN) 2500 MCG sublingual tablet Take 1 tablet (2,500 mcg) by mouth daily 90 tablet 3     Physical Examination:  General: The patient is a pleasant female in no acute distress. She is here today with her brother, Medina.   /70   Pulse 78   Temp 98  F (36.7  C)   Resp 16   Ht 1.68 m (5' 6.14\")   Wt 66.7 kg (147 lb)   SpO2 98%   BMI 23.62 kg/m    Wt Readings from Last 10 Encounters:   05/07/24 66.7 kg (147 lb)   05/06/24 65.8 kg (145 lb)   05/01/24 67.3 kg (148 lb 4.8 oz)   04/18/24 67.5 kg (148 lb 14.4 oz)   04/16/24 66.7 kg (147 lb)   04/02/24 68.6 kg (151 lb 3.8 oz)   03/21/24 68.1 kg (150 lb 2.1 oz)   03/14/24 68 kg (150 lb)   03/28/23 73 kg (161 lb)   01/25/23 74.8 kg (165 lb)   HEENT: EOMI. Sclerae are anicteric.  Lymph: Neck is supple with no lymphadenopathy in the cervical or supraclavicular areas.   Heart: Regular rate and rhythm.   Lungs: Clear to auscultation bilaterally.   Abdomen: Bowel sounds present, soft, nontender with no palpable hepatosplenomegaly or masses.   Extremities: No lower extremity edema noted bilaterally.   Neuro: Cranial nerves II through XII are grossly intact.  Skin: No rashes, petechiae, or bruising noted on exposed skin.    Laboratory Data:  Most Recent 3 CBC's:  Recent Labs   Lab Test 05/07/24  1219 05/01/24  0854 04/18/24  1251   WBC 5.8 7.6 9.5   HGB 11.9 12.4 13.2   MCV 96 95 94    246 277   ANEUTAUTO 3.8 5.1 6.2    Most Recent 3 BMP's:  Recent Labs   Lab Test 05/01/24  0854 04/18/24  1251 11/29/23  1125    137 135 "   POTASSIUM 4.1 4.5 4.5   CHLORIDE 104 102 101   CO2 23 25 23   BUN 15.8 17.5 7.9*   CR 0.57 0.67 0.59   ANIONGAP 10 10 11   VAIBHAV 10.5* 10.6* 10.3*   * 94 101*   PROTTOTAL 7.2 7.8 7.3   ALBUMIN 4.2 4.4 4.2    Most Recent 2 LFT's:  Recent Labs   Lab Test 05/01/24  0854 04/18/24  1251   AST 14 16   ALT 11 16   ALKPHOS 96 106   BILITOTAL 0.4 0.4   I reviewed the above labs today.    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  MMR IHC intact. She started neoadjuvant treatment on 5/1/24 with concurrent chemoradiation with weekly carboplatin and Taxol. She tolerated week 1 fairly well. She will continue with day 8 today. About 10 to 12 weeks after completing chemotherapy/radiation, we will repeat PET/CT and consider definitive surgery. She will follow-up with us weekly during her treatment.     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  Now s/p endoscopic resection with clear margins free of dysplasia.  This has been adequately treated.  Continue to observe.     Odynophagia. Secondary to cancer. She did not tolerate tramadol due to nausea. Recommend more liberal use of oxycodone. Recommend taking 10 mg at bedtime and 2.5-5 mg per dose during the day. Previously, advised to take no more than 4 grams Tylenol/day.      Constipation. She will continue with fiber gummies and Colace 1 tablet at bedtime. She will also start on MiraLax once/day.     Vitamin B12 deficiency with macrocytosis. Now on vitamin B12 tablets. Will recheck vitamin B12 level in August.     Nutrition.  She has had weight loss.  Advised to eat small frequent meals high in protein. She has met with a dietician. Weight has stabilized.      Tobacco abuse. Now down to 1/2 ppd.  She has nicotine patches available at home.    Hypercalcemia. Secondary to hyperparathyroidism. Suspect this is as a sequela of her prior thyroid radiation. She will be seeing endocrine on  5/10.     Hypothyroidism. hx of PATEL at the age of 16 for hyperthyroidism. On levothyroxine as managed by her PCP. Last TSH was low with a normal free T4. Will defer to endocrine on management.     Hypertension. Now with hypotension. Was on amlodipine 5 mg daily. Will have her hold for now. She will monitor her BP at home every couple of days and let us know if  or greater consistently.     Margie Ritchie PA-C  [facility]  [address] [place] [id number] [phone]    The longitudinal plan of care for the diagnosis of esophageal cancer as documented were addressed during this visit. Due to the added complexity in care, I will continue to support Linh in the subsequent management and with ongoing continuity of care.

## 2024-05-08 ENCOUNTER — APPOINTMENT (OUTPATIENT)
Dept: RADIATION ONCOLOGY | Facility: CLINIC | Age: 64
End: 2024-05-08
Attending: RADIOLOGY
Payer: COMMERCIAL

## 2024-05-08 PROCEDURE — 77014 PR CT GUIDE FOR PLACEMENT RADIATION THERAPY FIELDS: CPT | Mod: 26 | Performed by: RADIOLOGY

## 2024-05-08 PROCEDURE — 77386 HC IMRT TREATMENT DELIVERY, COMPLEX: CPT | Performed by: RADIOLOGY

## 2024-05-08 NOTE — CONFIDENTIAL NOTE
RECORDS RECEIVED FROM: internal    DATE RECEIVED:  5.10.24   NOTES (FOR ALL VISITS) STATUS DETAILS   OFFICE NOTES from referring provider    Margie Ritchie PA-C      MEDICATION LIST internal     IMAGING      XR (Chest) internal  12.23.22   CT (HEAD/NECK/CHEST/ABDOMEN) internal  [facility]- 2.23.24, 12.23.22   LABS     DIABETES: HBGA1C, CREATININE, FASTING LIPIDS, MICROALBUMIN URINE, POTASSIUM, TSH, T4    THYROID: TSH, T4, CBC, THYRODLONULIN, TOTAL T3, FREE T4, CALCITONIN, CEA internal  Cmp- 5/7/24  Cbc- 5/7/24  PArathyroid horm- 5.1.24  Vitamin D- 5.1.24  TSH/T4- 4/18/24  FNA- 3.26.24  Received labs- 1.3.23

## 2024-05-09 ENCOUNTER — APPOINTMENT (OUTPATIENT)
Dept: RADIATION ONCOLOGY | Facility: CLINIC | Age: 64
End: 2024-05-09
Attending: RADIOLOGY
Payer: COMMERCIAL

## 2024-05-09 PROCEDURE — 77014 PR CT GUIDE FOR PLACEMENT RADIATION THERAPY FIELDS: CPT | Mod: 26 | Performed by: RADIOLOGY

## 2024-05-09 PROCEDURE — 77386 HC IMRT TREATMENT DELIVERY, COMPLEX: CPT | Performed by: RADIOLOGY

## 2024-05-10 ENCOUNTER — APPOINTMENT (OUTPATIENT)
Dept: RADIATION ONCOLOGY | Facility: CLINIC | Age: 64
End: 2024-05-10
Attending: RADIOLOGY
Payer: COMMERCIAL

## 2024-05-10 ENCOUNTER — PRE VISIT (OUTPATIENT)
Dept: ENDOCRINOLOGY | Facility: CLINIC | Age: 64
End: 2024-05-10

## 2024-05-10 PROCEDURE — 77386 HC IMRT TREATMENT DELIVERY, COMPLEX: CPT | Performed by: RADIOLOGY

## 2024-05-10 PROCEDURE — 77014 PR CT GUIDE FOR PLACEMENT RADIATION THERAPY FIELDS: CPT | Mod: 26 | Performed by: RADIOLOGY

## 2024-05-12 ENCOUNTER — HEALTH MAINTENANCE LETTER (OUTPATIENT)
Age: 64
End: 2024-05-12

## 2024-05-13 ENCOUNTER — APPOINTMENT (OUTPATIENT)
Dept: LAB | Facility: CLINIC | Age: 64
End: 2024-05-13
Attending: INTERNAL MEDICINE
Payer: COMMERCIAL

## 2024-05-13 ENCOUNTER — INFUSION THERAPY VISIT (OUTPATIENT)
Dept: ONCOLOGY | Facility: CLINIC | Age: 64
End: 2024-05-13
Attending: INTERNAL MEDICINE
Payer: COMMERCIAL

## 2024-05-13 ENCOUNTER — APPOINTMENT (OUTPATIENT)
Dept: RADIATION ONCOLOGY | Facility: CLINIC | Age: 64
End: 2024-05-13
Attending: RADIOLOGY
Payer: COMMERCIAL

## 2024-05-13 VITALS
RESPIRATION RATE: 16 BRPM | WEIGHT: 146.3 LBS | TEMPERATURE: 98 F | OXYGEN SATURATION: 99 % | BODY MASS INDEX: 23.51 KG/M2 | SYSTOLIC BLOOD PRESSURE: 85 MMHG | DIASTOLIC BLOOD PRESSURE: 60 MMHG | HEART RATE: 92 BPM

## 2024-05-13 VITALS — DIASTOLIC BLOOD PRESSURE: 80 MMHG | HEART RATE: 85 BPM | SYSTOLIC BLOOD PRESSURE: 156 MMHG

## 2024-05-13 DIAGNOSIS — C15.9 SQUAMOUS CELL CARCINOMA OF ESOPHAGUS (H): Primary | ICD-10-CM

## 2024-05-13 DIAGNOSIS — K22.89 ESOPHAGEAL PAIN: ICD-10-CM

## 2024-05-13 DIAGNOSIS — I95.9 HYPOTENSION, UNSPECIFIED HYPOTENSION TYPE: ICD-10-CM

## 2024-05-13 LAB
ALBUMIN SERPL BCG-MCNC: 3.9 G/DL (ref 3.5–5.2)
ALP SERPL-CCNC: 85 U/L (ref 40–150)
ALT SERPL W P-5'-P-CCNC: 15 U/L (ref 0–50)
ANION GAP SERPL CALCULATED.3IONS-SCNC: 10 MMOL/L (ref 7–15)
AST SERPL W P-5'-P-CCNC: 16 U/L (ref 0–45)
BASOPHILS # BLD AUTO: 0 10E3/UL (ref 0–0.2)
BASOPHILS NFR BLD AUTO: 0 %
BILIRUB SERPL-MCNC: 0.5 MG/DL
BUN SERPL-MCNC: 17.5 MG/DL (ref 8–23)
CALCIUM SERPL-MCNC: 10 MG/DL (ref 8.8–10.2)
CHLORIDE SERPL-SCNC: 105 MMOL/L (ref 98–107)
CREAT SERPL-MCNC: 0.62 MG/DL (ref 0.51–0.95)
DEPRECATED HCO3 PLAS-SCNC: 23 MMOL/L (ref 22–29)
EGFRCR SERPLBLD CKD-EPI 2021: >90 ML/MIN/1.73M2
EOSINOPHIL # BLD AUTO: 0 10E3/UL (ref 0–0.7)
EOSINOPHIL NFR BLD AUTO: 1 %
ERYTHROCYTE [DISTWIDTH] IN BLOOD BY AUTOMATED COUNT: 15.2 % (ref 10–15)
GLUCOSE SERPL-MCNC: 132 MG/DL (ref 70–99)
HCT VFR BLD AUTO: 32.1 % (ref 35–47)
HGB BLD-MCNC: 10.8 G/DL (ref 11.7–15.7)
IMM GRANULOCYTES # BLD: 0 10E3/UL
IMM GRANULOCYTES NFR BLD: 1 %
LYMPHOCYTES # BLD AUTO: 0.9 10E3/UL (ref 0.8–5.3)
LYMPHOCYTES NFR BLD AUTO: 22 %
MCH RBC QN AUTO: 32.6 PG (ref 26.5–33)
MCHC RBC AUTO-ENTMCNC: 33.6 G/DL (ref 31.5–36.5)
MCV RBC AUTO: 97 FL (ref 78–100)
MONOCYTES # BLD AUTO: 0.3 10E3/UL (ref 0–1.3)
MONOCYTES NFR BLD AUTO: 7 %
NEUTROPHILS # BLD AUTO: 2.8 10E3/UL (ref 1.6–8.3)
NEUTROPHILS NFR BLD AUTO: 69 %
NRBC # BLD AUTO: 0 10E3/UL
NRBC BLD AUTO-RTO: 0 /100
PLATELET # BLD AUTO: 186 10E3/UL (ref 150–450)
POTASSIUM SERPL-SCNC: 3.9 MMOL/L (ref 3.4–5.3)
PROT SERPL-MCNC: 6.6 G/DL (ref 6.4–8.3)
RBC # BLD AUTO: 3.31 10E6/UL (ref 3.8–5.2)
SODIUM SERPL-SCNC: 138 MMOL/L (ref 135–145)
WBC # BLD AUTO: 4.1 10E3/UL (ref 4–11)

## 2024-05-13 PROCEDURE — 85025 COMPLETE CBC W/AUTO DIFF WBC: CPT | Performed by: INTERNAL MEDICINE

## 2024-05-13 PROCEDURE — G2211 COMPLEX E/M VISIT ADD ON: HCPCS

## 2024-05-13 PROCEDURE — 250N000011 HC RX IP 250 OP 636

## 2024-05-13 PROCEDURE — 96375 TX/PRO/DX INJ NEW DRUG ADDON: CPT

## 2024-05-13 PROCEDURE — G0463 HOSPITAL OUTPT CLINIC VISIT: HCPCS | Mod: 25

## 2024-05-13 PROCEDURE — 96417 CHEMO IV INFUS EACH ADDL SEQ: CPT

## 2024-05-13 PROCEDURE — 99215 OFFICE O/P EST HI 40 MIN: CPT

## 2024-05-13 PROCEDURE — 258N000003 HC RX IP 258 OP 636: Performed by: INTERNAL MEDICINE

## 2024-05-13 PROCEDURE — 36591 DRAW BLOOD OFF VENOUS DEVICE: CPT | Performed by: INTERNAL MEDICINE

## 2024-05-13 PROCEDURE — 250N000013 HC RX MED GY IP 250 OP 250 PS 637: Performed by: INTERNAL MEDICINE

## 2024-05-13 PROCEDURE — 258N000003 HC RX IP 258 OP 636

## 2024-05-13 PROCEDURE — 250N000011 HC RX IP 250 OP 636: Performed by: INTERNAL MEDICINE

## 2024-05-13 PROCEDURE — 77386 HC IMRT TREATMENT DELIVERY, COMPLEX: CPT | Performed by: RADIOLOGY

## 2024-05-13 PROCEDURE — 96413 CHEMO IV INFUSION 1 HR: CPT

## 2024-05-13 PROCEDURE — 82040 ASSAY OF SERUM ALBUMIN: CPT | Performed by: INTERNAL MEDICINE

## 2024-05-13 RX ORDER — DIPHENHYDRAMINE HCL 25 MG
50 CAPSULE ORAL ONCE
Status: COMPLETED | OUTPATIENT
Start: 2024-05-13 | End: 2024-05-13

## 2024-05-13 RX ORDER — ONDANSETRON 8 MG/1
8 TABLET, FILM COATED ORAL EVERY 8 HOURS PRN
Qty: 30 TABLET | Refills: 3 | Status: SHIPPED | OUTPATIENT
Start: 2024-05-13 | End: 2024-05-28

## 2024-05-13 RX ORDER — HEPARIN SODIUM (PORCINE) LOCK FLUSH IV SOLN 100 UNIT/ML 100 UNIT/ML
5 SOLUTION INTRAVENOUS
Status: DISCONTINUED | OUTPATIENT
Start: 2024-05-13 | End: 2024-05-13 | Stop reason: HOSPADM

## 2024-05-13 RX ORDER — HEPARIN SODIUM (PORCINE) LOCK FLUSH IV SOLN 100 UNIT/ML 100 UNIT/ML
500 SOLUTION INTRAVENOUS ONCE
Status: COMPLETED | OUTPATIENT
Start: 2024-05-13 | End: 2024-05-13

## 2024-05-13 RX ORDER — OXYCODONE HYDROCHLORIDE 5 MG/1
5-10 TABLET ORAL EVERY 4 HOURS
Qty: 30 TABLET | Refills: 0 | Status: SHIPPED | OUTPATIENT
Start: 2024-05-13 | End: 2024-05-20

## 2024-05-13 RX ADMIN — CARBOPLATIN 250 MG: 600 INJECTION, SOLUTION INTRAVENOUS at 16:53

## 2024-05-13 RX ADMIN — Medication 500 UNITS: at 13:46

## 2024-05-13 RX ADMIN — FAMOTIDINE 20 MG: 10 INJECTION INTRAVENOUS at 15:13

## 2024-05-13 RX ADMIN — SODIUM CHLORIDE 1000 ML: 9 INJECTION, SOLUTION INTRAVENOUS at 15:12

## 2024-05-13 RX ADMIN — DIPHENHYDRAMINE HYDROCHLORIDE 50 MG: 25 CAPSULE ORAL at 15:13

## 2024-05-13 RX ADMIN — DEXAMETHASONE SODIUM PHOSPHATE: 10 INJECTION, SOLUTION INTRAMUSCULAR; INTRAVENOUS at 15:15

## 2024-05-13 RX ADMIN — PACLITAXEL 90 MG: 6 INJECTION, SOLUTION INTRAVENOUS at 15:42

## 2024-05-13 RX ADMIN — Medication 5 ML: at 17:32

## 2024-05-13 ASSESSMENT — PAIN SCALES - GENERAL: PAINLEVEL: NO PAIN (0)

## 2024-05-13 NOTE — PROGRESS NOTES
Oncology/Hematology Visit Note  May 13, 2024    Reason for Visit: follow up of esophageal cancer    History of Present Illness: Linh Mustafa is a 63 year old female with esophageal cancer. Since November 2023 she started to notice pain upon swallowing and it was basically pain which limited her food intake.  This was progressively getting worse.  She had further workup as mentioned below.     2/15/2024.  EGD showed an ulcerated mid esophageal mass extending from 26-31 cm from the incisors.  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  Pathology from the mid esophageal mass showed moderately differentiated invasive squamous cell carcinoma, MMR IHC intact.     3/7/2024.  PET/CT showed markedly FDG avid wall thickening of the mid thoracic esophagus with SUV max 24.1.  No evidence of metastatic disease.     3/21/2024.  Upper EUS.  Endoscopy showed a flat nodule in the proximal esophagus between 15-19 cm and one third circumferential.  Upper esophageal sphincter was at 14 cm.  Biopsies from this showed high-grade dysplasia/carcinoma in situ.       Normal esophageal squamous cell cancer causing maybe esophagus on the right anterolateral esophageal wall between 24-30 cm.  It was 50% circumferential.  The GE junction was at 35 cm with 2 cm tongues of Auguste's anteriorly without high risk features.     Ultrasound exam showed the mid esophageal tumor to be probably T3. Two 4 x 6 mm nodes were seen adjacent to the distal esophageal wall at 36 and 37 cm.  Both an identical appearance and one was sampled.  This lymph node biopsy was benign.     By EUS it was staged as T3 N0 M0 tumor.     4/2/2024.  Because of finding of carcinoma in situ in the proximal esophagus, she had endoscopic resection of the proximal squamous cell carcinoma in situ lesion performed with en bloc removal.    The final pathology showed squamous mucosa with high-grade dysplasia/carcinoma in situ with all margins  negative for dysplasia.  No definite invasion was identified. ESD was considered curative for this lesion.     Case was also discussed in the tumor conference with the plan to proceed with neoadjuvant chemotherapy/radiation followed by definitive surgery.       Please see previous notes for further details on the patient's history. She started on concurrent chemoradiation with carboplatin and Taxol on 5/1/24. She comes in today for routine follow up prior to cycle 1 day 15 carboplatin and Taxol.    Interval History:  -Feels lightheaded every day for a few hours after radiation. Checks her blood pressure daily in the morning and readings are adequate, but does not check in the afternoon after radiation.   -Energy level is poor, sleeping a lot. Denies any shortness of breath, chest pain or palpitations.   -Throat pain is controlled with Tylenol during the day. She is taking oxycodone at bedtime and often wakes up during the night due to pain. She repeats a dose of oxycodone and is able to fall back asleep.   -taking ondansetron 30 minutes prior to oxycodone dose, but denies any nausea. No reflux. Taking Carafate as prescribed.   -Was having issues with constipation, but started drinking pear juice with good results. Stopped taking Miralax as it was not helpful.   -Swallowing is tolerable, still eating food. Stops eating due to pain, not because stomach is full. Drinking 1 ensure daily on the way to radiation. Feels she is drinking adequate fluids.   -Denies mouth sores or skin concerns.       Review of Systems:  Patient denies any of the following except if noted above: fevers, chills, vision or hearing changes, dyspnea, abdominal pain, nausea, vomiting, diarrhea, urinary concerns, numbness, or tingling.    Current Outpatient Medications   Medication Sig Dispense Refill    ondansetron (ZOFRAN) 8 MG tablet Take 1 tablet (8 mg) by mouth every 8 hours as needed for nausea 30 tablet 3    oxyCODONE (ROXICODONE) 5 MG tablet  Take 1-2 tablets (5-10 mg) by mouth every 4 hours 30 tablet 0    acetaminophen (TYLENOL) 325 MG tablet Take 325-650 mg by mouth every 6 hours as needed for mild pain      amLODIPine (NORVASC) 10 MG tablet TAKE 1 TABLET (10 MG) BY MOUTH DAILY. 90 tablet 1    esomeprazole (NEXIUM) 20 MG DR capsule Take 20 mg by mouth 2 times daily Take 30-60 minutes before eating.      famotidine (PEPCID) 20 MG tablet Take 20 mg by mouth 2 times daily      fluticasone (FLONASE) 50 MCG/ACT nasal spray Spray 1-2 sprays into one nostril alternating nostrils as needed      levothyroxine (SYNTHROID/LEVOTHROID) 200 MCG tablet take 1 tablet by mouth every day for 30 days      polyethylene glycol (MIRALAX) 17 GM/Dose powder Take 17 g (1 Capful) by mouth daily 510 g 3    prochlorperazine (COMPAZINE) 10 MG tablet Take 1 tablet (10 mg) by mouth every 6 hours as needed for nausea or vomiting 30 tablet 2    sucralfate (CARAFATE) 1 GM/10ML suspension Take 10 mLs (1 g) by mouth 4 times daily 500 mL 1    vitamin B-12 (CYANOCOBALAMIN) 2500 MCG sublingual tablet Take 1 tablet (2,500 mcg) by mouth daily 90 tablet 3     Physical Examination:  General: The patient is a pleasant female in no acute distress. She is here today with her brother, Medina.   BP (!) 85/60 (Patient Position: Standing)   Pulse 92   Temp 98  F (36.7  C) (Oral)   Resp 16   Wt 66.4 kg (146 lb 4.8 oz)   SpO2 99%   BMI 23.51 kg/m    Wt Readings from Last 10 Encounters:   05/13/24 66.4 kg (146 lb 4.8 oz)   05/07/24 66.7 kg (147 lb)   05/06/24 65.8 kg (145 lb)   05/01/24 67.3 kg (148 lb 4.8 oz)   04/18/24 67.5 kg (148 lb 14.4 oz)   04/16/24 66.7 kg (147 lb)   04/02/24 68.6 kg (151 lb 3.8 oz)   03/21/24 68.1 kg (150 lb 2.1 oz)   03/14/24 68 kg (150 lb)   03/28/23 73 kg (161 lb)   HEENT: EOMI. Sclerae are anicteric.Oral mucosa pink and moist without lesions or thrush.   Lymph: Neck is supple with no lymphadenopathy in the cervical or supraclavicular areas.   Heart: Regular rate and  rhythm.   Lungs: Clear to auscultation bilaterally, normal work of breathing.   Abdomen: Bowel sounds present, soft, nontender with no palpable hepatosplenomegaly or masses.   Extremities: No lower extremity edema noted bilaterally.   Neuro: Cranial nerves II through XII are grossly intact.  Skin: No rashes, petechiae, or bruising noted on exposed skin.    Laboratory Data:  Most Recent 3 CBC's:  Recent Labs   Lab Test 05/13/24  1343 05/07/24  1219 05/01/24  0854   WBC 4.1 5.8 7.6   HGB 10.8* 11.9 12.4   MCV 97 96 95    211 246   ANEUTAUTO 2.8 3.8 5.1    Most Recent 3 BMP's:  Recent Labs   Lab Test 05/13/24  1343 05/07/24  1219 05/01/24  0854    136 137   POTASSIUM 3.9 4.3 4.1   CHLORIDE 105 103 104   CO2 23 24 23   BUN 17.5 23.4* 15.8   CR 0.62 0.60 0.57   ANIONGAP 10 9 10   VAIBHAV 10.0 10.1 10.5*   * 114* 110*   PROTTOTAL 6.6 7.1 7.2   ALBUMIN 3.9 4.1 4.2    Most Recent 2 LFT's:  Recent Labs   Lab Test 05/13/24  1343 05/07/24  1219   AST 16 16   ALT 15 22   ALKPHOS 85 93   BILITOTAL 0.5 0.6   I reviewed the above labs today.    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  MMR IHC intact. She started neoadjuvant treatment on 5/1/24 with concurrent chemoradiation with weekly carboplatin and Taxol. Tolerating well with fatigue and throat pain.  -Today's labs reviewed, proceed with day 15 Taxol, carboplatin. Return to clinic in 1 week for next dose with labs + [facility] visit prior. Patient to call sooner with any questions or concerns.   -About 10 to 12 weeks after completing chemotherapy/radiation, we will repeat PET/CT and consider definitive surgery.     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  Now s/p endoscopic resection with clear margins free of dysplasia.  This has been adequately treated.  Continue to observe.     Odynophagia. Secondary to cancer. She did not tolerate tramadol due to nausea. Only taking 5mg  oxycodone at bedtime and repeating the dose every 3-4 hours as needed. Tried to take 10mg dose but felt anxious and jittery. Taking Tylenol as needed throughout the day, reviewed dosing limits. Instructed her to call triage if pain is not well controlled.     Constipation. Stopped PRN medications as she had good relief drinking pear juice. Ok to continue this as needed, can also add Miralax and Senna as needed. No abdominal pain or cramping.     Vitamin B12 deficiency with macrocytosis. Now on vitamin B12 tablets. Will recheck vitamin B12 level in August.     Nutrition.  Advised to eat small frequent meals high in protein. She has met with a dietician. Weight is overall stable, lost ~1lb per week the since starting treatment.       Tobacco abuse. Now down to 1/2 ppd.  She has nicotine patches available at home.    Hypercalcemia. Secondary to hyperparathyroidism. Suspect this is as a sequela of her prior thyroid radiation. She was scheduled to see endocrinology on 5/10 but had to cancel the appointment, she is working on rescheduling. Calcium improved to 10.0 today. Continue to monitor.     Hypothyroidism. hx of PATEL at the age of 16 for hyperthyroidism. On levothyroxine as managed by her PCP. Last TSH was low with a normal free T4. Will defer to endocrine on management.     Hypertension. Now with hypotension. Was on amlodipine 5 mg daily. Continue holding for now. Continue to monitor blood pressure at home, checking in the afternoon after radiation when she feels dizzy/lightheaded. Will give 1L NS bolus with chemotherapy today. Discussed that she can be scheduled for additional IVFs if needed. She will monitor her BP at home every couple of days and let us know if  or greater consistently.       The longitudinal plan of care for the diagnosis(es)/condition(s) as documented were addressed during this visit. Due to the added complexity in care, I will continue to support Lita in the subsequent management and  with ongoing continuity of care.      35 minutes spent on the date of the encounter doing chart review, review of test results, interpretation of tests, patient visit, and documentation     MASON Chang CNP

## 2024-05-13 NOTE — PROGRESS NOTES
Infusion Nursing Note:  Linh Mustafa presents today for Cycle 1 Day 15 Paclitaxel, Carboplatin, and IV fluids.    Patient seen by provider today: Yes: Elaine Ibanez NP   present during visit today: Not Applicable.    Note: Orthostatics rechecked after patient received some IV fluids.  BP improved and patient no longer felt lightheaded when she stood up.       Intravenous Access:  Implanted Port.    Treatment Conditions:  Lab Results   Component Value Date    HGB 10.8 (L) 05/13/2024    WBC 4.1 05/13/2024    ANEUTAUTO 2.8 05/13/2024     05/13/2024        Lab Results   Component Value Date     05/13/2024    POTASSIUM 3.9 05/13/2024    MAG 1.8 12/23/2022    CR 0.62 05/13/2024    VAIBHAV 10.0 05/13/2024    BILITOTAL 0.5 05/13/2024    ALBUMIN 3.9 05/13/2024    ALT 15 05/13/2024    AST 16 05/13/2024       Results reviewed, labs MET treatment parameters, ok to proceed with treatment.    Post Infusion Assessment:  Patient tolerated infusion without incident.  Blood return noted pre and post infusion.  Site patent and intact, free from redness, edema or discomfort.  No evidence of extravasations.  Access discontinued per protocol.     Discharge Plan:   Patient declined prescription refills.  Discharge instructions reviewed with: Patient and Family.  Patient and/or family verbalized understanding of discharge instructions and all questions answered.  AVS to patient via [employer].  Patient will return 5/20/24 for next appointment.   Patient discharged in stable condition accompanied by: son.  Departure Mode: Ambulatory.      KAT HUNTER RN

## 2024-05-13 NOTE — Clinical Note
5/13/2024         RE: Linh Mustafa  [address]  [facility] [address]        Dear Colleague,    Thank you for referring your patient, Linh Mustafa, to the [facility]. Please see a copy of my visit note below.    Oncology/Hematology Visit Note  May 13, 2024    Reason for Visit: follow up of esophageal cancer    History of Present Illness: Linh Mustafa is a 63 year old female with esophageal cancer. Since November 2023 she started to notice pain upon swallowing and it was basically pain which limited her food intake.  This was progressively getting worse.  She had further workup as mentioned below.     2/15/2024.  EGD showed an ulcerated mid esophageal mass extending from 26-31 cm from the incisors.  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  Pathology from the mid esophageal mass showed moderately differentiated invasive squamous cell carcinoma, MMR IHC intact.     3/7/2024.  PET/CT showed markedly FDG avid wall thickening of the mid thoracic esophagus with SUV max 24.1.  No evidence of metastatic disease.     3/21/2024.  Upper EUS.  Endoscopy showed a flat nodule in the proximal esophagus between 15-19 cm and one third circumferential.  Upper esophageal sphincter was at 14 cm.  Biopsies from this showed high-grade dysplasia/carcinoma in situ.       Normal esophageal squamous cell cancer causing maybe esophagus on the right anterolateral esophageal wall between 24-30 cm.  It was 50% circumferential.  The GE junction was at 35 cm with 2 cm tongues of Auguste's anteriorly without high risk features.     Ultrasound exam showed the mid esophageal tumor to be probably T3. Two 4 x 6 mm nodes were seen adjacent to the distal esophageal wall at 36 and 37 cm.  Both an identical appearance and one was sampled.  This lymph node biopsy was benign.     By EUS it was staged as T3 N0 M0 tumor.     4/2/2024.  Because of finding of carcinoma in  situ in the proximal esophagus, she had endoscopic resection of the proximal squamous cell carcinoma in situ lesion performed with en bloc removal.    The final pathology showed squamous mucosa with high-grade dysplasia/carcinoma in situ with all margins negative for dysplasia.  No definite invasion was identified. ESD was considered curative for this lesion.     Case was also discussed in the tumor conference with the plan to proceed with neoadjuvant chemotherapy/radiation followed by definitive surgery.       Please see previous notes for further details on the patient's history. She started on concurrent chemoradiation with carboplatin and Taxol on 5/1/24. She comes in today for routine follow up prior to cycle 1 day 15 carboplatin and Taxol.    Interval History:        ***   Patient denies any side effects from her first week of chemotherapy.  She has had some intermittent headaches that have improved with Tylenol and drinking caffeinated beverages.  She reports that yesterday she did have an episode of increased central chest pain.  She is currently taking 5 mg of oxycodone at bedtime and taking Tylenol throughout the day. She wakes up at night with pain.  She has been leery to take more oxycodone during the day.  She is taking 1 Colace at bedtime and having bowel movements every 4 to 5 days.  She reports her energy is fair, though she is rarely taking naps.  She has been eating fairly well and is also drinking 1 Ensure most days.  She continues to smoke 1/2 pack/day.  She has noticed some mild lightheadedness after radiation.  She denies other concerns.    Review of Systems:  Patient denies any of the following except if noted above: fevers, chills, vision or hearing changes, dyspnea, abdominal pain, nausea, vomiting, diarrhea, urinary concerns, numbness, or tingling.    Current Outpatient Medications   Medication Sig Dispense Refill    acetaminophen (TYLENOL) 325 MG tablet Take 325-650 mg by mouth every 6  hours as needed for mild pain      amLODIPine (NORVASC) 10 MG tablet TAKE 1 TABLET (10 MG) BY MOUTH DAILY. 90 tablet 1    esomeprazole (NEXIUM) 20 MG DR capsule Take 20 mg by mouth 2 times daily Take 30-60 minutes before eating.      famotidine (PEPCID) 20 MG tablet Take 20 mg by mouth 2 times daily      fluticasone (FLONASE) 50 MCG/ACT nasal spray Spray 1-2 sprays into one nostril alternating nostrils as needed      levothyroxine (SYNTHROID/LEVOTHROID) 200 MCG tablet take 1 tablet by mouth every day for 30 days      ondansetron (ZOFRAN) 8 MG tablet Take 1 tablet (8 mg) by mouth every 8 hours as needed for nausea 30 tablet 3    oxyCODONE (ROXICODONE) 5 MG tablet Take 1-2 tablets (5-10 mg) by mouth every 4 hours 30 tablet 0    polyethylene glycol (MIRALAX) 17 GM/Dose powder Take 17 g (1 Capful) by mouth daily 510 g 3    prochlorperazine (COMPAZINE) 10 MG tablet Take 1 tablet (10 mg) by mouth every 6 hours as needed for nausea or vomiting 30 tablet 2    sucralfate (CARAFATE) 1 GM/10ML suspension Take 10 mLs (1 g) by mouth 4 times daily 500 mL 1    vitamin B-12 (CYANOCOBALAMIN) 2500 MCG sublingual tablet Take 1 tablet (2,500 mcg) by mouth daily 90 tablet 3     Physical Examination:  General: The patient is a pleasant female in no acute distress. She is here today with her brother, Medina.   BP (!) 85/60 (Patient Position: Standing)   Pulse 92   Temp 98  F (36.7  C) (Oral)   Resp 16   Wt 66.4 kg (146 lb 4.8 oz)   SpO2 99%   BMI 23.51 kg/m    Wt Readings from Last 10 Encounters:   05/13/24 66.4 kg (146 lb 4.8 oz)   05/07/24 66.7 kg (147 lb)   05/06/24 65.8 kg (145 lb)   05/01/24 67.3 kg (148 lb 4.8 oz)   04/18/24 67.5 kg (148 lb 14.4 oz)   04/16/24 66.7 kg (147 lb)   04/02/24 68.6 kg (151 lb 3.8 oz)   03/21/24 68.1 kg (150 lb 2.1 oz)   03/14/24 68 kg (150 lb)   03/28/23 73 kg (161 lb)   HEENT: EOMI. Sclerae are anicteric.  Lymph: Neck is supple with no lymphadenopathy in the cervical or supraclavicular areas.   Heart:  Regular rate and rhythm.   Lungs: Clear to auscultation bilaterally.   Abdomen: Bowel sounds present, soft, nontender with no palpable hepatosplenomegaly or masses.   Extremities: No lower extremity edema noted bilaterally.   Neuro: Cranial nerves II through XII are grossly intact.  Skin: No rashes, petechiae, or bruising noted on exposed skin.    Laboratory Data:  Most Recent 3 CBC's:  Recent Labs   Lab Test 05/13/24  1343 05/07/24  1219 05/01/24  0854   WBC 4.1 5.8 7.6   HGB 10.8* 11.9 12.4   MCV 97 96 95    211 246   ANEUTAUTO 2.8 3.8 5.1    Most Recent 3 BMP's:  Recent Labs   Lab Test 05/07/24  1219 05/01/24  0854 04/18/24  1251    137 137   POTASSIUM 4.3 4.1 4.5   CHLORIDE 103 104 102   CO2 24 23 25   BUN 23.4* 15.8 17.5   CR 0.60 0.57 0.67   ANIONGAP 9 10 10   VAIBHAV 10.1 10.5* 10.6*   * 110* 94   PROTTOTAL 7.1 7.2 7.8   ALBUMIN 4.1 4.2 4.4    Most Recent 2 LFT's:  Recent Labs   Lab Test 05/07/24  1219 05/01/24  0854   AST 16 14   ALT 22 11   ALKPHOS 93 96   BILITOTAL 0.6 0.4   I reviewed the above labs today.    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  MMR IHC intact. She started neoadjuvant treatment on 5/1/24 with concurrent chemoradiation with weekly carboplatin and Taxol. She tolerated week 1 fairly well. She will continue with day 8 today. About 10 to 12 weeks after completing chemotherapy/radiation, we will repeat PET/CT and consider definitive surgery. She will follow-up with us weekly during her treatment.     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  Now s/p endoscopic resection with clear margins free of dysplasia.  This has been adequately treated.  Continue to observe.     Odynophagia. Secondary to cancer. She did not tolerate tramadol due to nausea. Recommend more liberal use of oxycodone. Recommend taking 10 mg at bedtime and 2.5-5 mg per dose during the day. Previously, advised to take  no more than 4 grams Tylenol/day.      Constipation. She will continue with fiber gummies and Colace 1 tablet at bedtime. She will also start on MiraLax once/day.     Vitamin B12 deficiency with macrocytosis. Now on vitamin B12 tablets. Will recheck vitamin B12 level in August.     Nutrition.  She has had weight loss.  Advised to eat small frequent meals high in protein. She has met with a dietician. Weight has stabilized.      Tobacco abuse. Now down to 1/2 ppd.  She has nicotine patches available at home.    Hypercalcemia. Secondary to hyperparathyroidism. Suspect this is as a sequela of her prior thyroid radiation. She will be seeing endocrine on 5/10.     Hypothyroidism. hx of PATEL at the age of 16 for hyperthyroidism. On levothyroxine as managed by her PCP. Last TSH was low with a normal free T4. Will defer to endocrine on management.     Hypertension. Now with hypotension. Was on amlodipine 5 mg daily. Will have her hold for now. She will monitor her BP at home every couple of days and let us know if  or greater consistently.       *** minutes spent on the date of the encounter doing chart review, review of test results, interpretation of tests, patient visit, and documentation     MASON Chang CNP        Oncology/Hematology Visit Note  May 13, 2024    Reason for Visit: follow up of esophageal cancer    History of Present Illness: Linh Mustafa is a 63 year old female with esophageal cancer. Since November 2023 she started to notice pain upon swallowing and it was basically pain which limited her food intake.  This was progressively getting worse.  She had further workup as mentioned below.     2/15/2024.  EGD showed an ulcerated mid esophageal mass extending from 26-31 cm from the incisors.  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  Pathology from the mid esophageal mass showed moderately differentiated invasive squamous cell carcinoma, MMR IHC intact.      3/7/2024.  PET/CT showed markedly FDG avid wall thickening of the mid thoracic esophagus with SUV max 24.1.  No evidence of metastatic disease.     3/21/2024.  Upper EUS.  Endoscopy showed a flat nodule in the proximal esophagus between 15-19 cm and one third circumferential.  Upper esophageal sphincter was at 14 cm.  Biopsies from this showed high-grade dysplasia/carcinoma in situ.       Normal esophageal squamous cell cancer causing maybe esophagus on the right anterolateral esophageal wall between 24-30 cm.  It was 50% circumferential.  The GE junction was at 35 cm with 2 cm tongues of Auguste's anteriorly without high risk features.     Ultrasound exam showed the mid esophageal tumor to be probably T3. Two 4 x 6 mm nodes were seen adjacent to the distal esophageal wall at 36 and 37 cm.  Both an identical appearance and one was sampled.  This lymph node biopsy was benign.     By EUS it was staged as T3 N0 M0 tumor.     4/2/2024.  Because of finding of carcinoma in situ in the proximal esophagus, she had endoscopic resection of the proximal squamous cell carcinoma in situ lesion performed with en bloc removal.    The final pathology showed squamous mucosa with high-grade dysplasia/carcinoma in situ with all margins negative for dysplasia.  No definite invasion was identified. ESD was considered curative for this lesion.     Case was also discussed in the tumor conference with the plan to proceed with neoadjuvant chemotherapy/radiation followed by definitive surgery.       Please see previous notes for further details on the patient's history. She started on concurrent chemoradiation with carboplatin and Taxol on 5/1/24. She comes in today for routine follow up prior to cycle 1 day 15 carboplatin and Taxol.    Interval History:  -Feels lightheaded every day for a few hours after radiation. Checks her blood pressure daily in the morning and readings are adequate, but does not check in the afternoon after  radiation.   -Energy level is poor, sleeping a lot. Denies any shortness of breath, chest pain or palpitations.   -Throat pain is controlled with Tylenol during the day. She is taking oxycodone at bedtime and often wakes up during the night due to pain. She repeats a dose of oxycodone and is able to fall back asleep.   -taking ondansetron 30 minutes prior to oxycodone dose, but denies any nausea. No reflux. Taking Carafate as prescribed.   -Was having issues with constipation, but started drinking pear juice with good results. Stopped taking Miralax as it was not helpful.   -Swallowing is tolerable, still eating food. Stops eating due to pain, not because stomach is full. Drinking 1 ensure daily on the way to radiation. Feels she is drinking adequate fluids.   -Denies mouth sores or skin concerns.       Review of Systems:  Patient denies any of the following except if noted above: fevers, chills, vision or hearing changes, dyspnea, abdominal pain, nausea, vomiting, diarrhea, urinary concerns, numbness, or tingling.    Current Outpatient Medications   Medication Sig Dispense Refill     ondansetron (ZOFRAN) 8 MG tablet Take 1 tablet (8 mg) by mouth every 8 hours as needed for nausea 30 tablet 3     oxyCODONE (ROXICODONE) 5 MG tablet Take 1-2 tablets (5-10 mg) by mouth every 4 hours 30 tablet 0     acetaminophen (TYLENOL) 325 MG tablet Take 325-650 mg by mouth every 6 hours as needed for mild pain       amLODIPine (NORVASC) 10 MG tablet TAKE 1 TABLET (10 MG) BY MOUTH DAILY. 90 tablet 1     esomeprazole (NEXIUM) 20 MG DR capsule Take 20 mg by mouth 2 times daily Take 30-60 minutes before eating.       famotidine (PEPCID) 20 MG tablet Take 20 mg by mouth 2 times daily       fluticasone (FLONASE) 50 MCG/ACT nasal spray Spray 1-2 sprays into one nostril alternating nostrils as needed       levothyroxine (SYNTHROID/LEVOTHROID) 200 MCG tablet take 1 tablet by mouth every day for 30 days       polyethylene glycol (MIRALAX) 17  GM/Dose powder Take 17 g (1 Capful) by mouth daily 510 g 3     prochlorperazine (COMPAZINE) 10 MG tablet Take 1 tablet (10 mg) by mouth every 6 hours as needed for nausea or vomiting 30 tablet 2     sucralfate (CARAFATE) 1 GM/10ML suspension Take 10 mLs (1 g) by mouth 4 times daily 500 mL 1     vitamin B-12 (CYANOCOBALAMIN) 2500 MCG sublingual tablet Take 1 tablet (2,500 mcg) by mouth daily 90 tablet 3     Physical Examination:  General: The patient is a pleasant female in no acute distress. She is here today with her brother, Medina.   BP (!) 85/60 (Patient Position: Standing)   Pulse 92   Temp 98  F (36.7  C) (Oral)   Resp 16   Wt 66.4 kg (146 lb 4.8 oz)   SpO2 99%   BMI 23.51 kg/m    Wt Readings from Last 10 Encounters:   05/13/24 66.4 kg (146 lb 4.8 oz)   05/07/24 66.7 kg (147 lb)   05/06/24 65.8 kg (145 lb)   05/01/24 67.3 kg (148 lb 4.8 oz)   04/18/24 67.5 kg (148 lb 14.4 oz)   04/16/24 66.7 kg (147 lb)   04/02/24 68.6 kg (151 lb 3.8 oz)   03/21/24 68.1 kg (150 lb 2.1 oz)   03/14/24 68 kg (150 lb)   03/28/23 73 kg (161 lb)   HEENT: EOMI. Sclerae are anicteric.Oral mucosa pink and moist without lesions or thrush.   Lymph: Neck is supple with no lymphadenopathy in the cervical or supraclavicular areas.   Heart: Regular rate and rhythm.   Lungs: Clear to auscultation bilaterally, normal work of breathing.   Abdomen: Bowel sounds present, soft, nontender with no palpable hepatosplenomegaly or masses.   Extremities: No lower extremity edema noted bilaterally.   Neuro: Cranial nerves II through XII are grossly intact.  Skin: No rashes, petechiae, or bruising noted on exposed skin.    Laboratory Data:  Most Recent 3 CBC's:  Recent Labs   Lab Test 05/13/24  1343 05/07/24  1219 05/01/24  0854   WBC 4.1 5.8 7.6   HGB 10.8* 11.9 12.4   MCV 97 96 95    211 246   ANEUTAUTO 2.8 3.8 5.1    Most Recent 3 BMP's:  Recent Labs   Lab Test 05/13/24  1343 05/07/24  1219 05/01/24  0854    136 137   POTASSIUM 3.9 4.3  4.1   CHLORIDE 105 103 104   CO2 23 24 23   BUN 17.5 23.4* 15.8   CR 0.62 0.60 0.57   ANIONGAP 10 9 10   VAIBHAV 10.0 10.1 10.5*   * 114* 110*   PROTTOTAL 6.6 7.1 7.2   ALBUMIN 3.9 4.1 4.2    Most Recent 2 LFT's:  Recent Labs   Lab Test 05/13/24  1343 05/07/24  1219   AST 16 16   ALT 15 22   ALKPHOS 85 93   BILITOTAL 0.5 0.6   I reviewed the above labs today.    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  MMR IHC intact. She started neoadjuvant treatment on 5/1/24 with concurrent chemoradiation with weekly carboplatin and Taxol. Tolerating well with fatigue and throat pain.  -Today's labs reviewed, proceed with day 15 Taxol, carboplatin. Return to clinic in 1 week for next dose with labs + [facility] visit prior. Patient to call sooner with any questions or concerns.   -About 10 to 12 weeks after completing chemotherapy/radiation, we will repeat PET/CT and consider definitive surgery.     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  Now s/p endoscopic resection with clear margins free of dysplasia.  This has been adequately treated.  Continue to observe.     Odynophagia. Secondary to cancer. She did not tolerate tramadol due to nausea. Only taking 5mg oxycodone at bedtime and repeating the dose every 3-4 hours as needed. Tried to take 10mg dose but felt anxious and jittery. Taking Tylenol as needed throughout the day, reviewed dosing limits. Instructed her to call triage if pain is not well controlled.     Constipation. Stopped PRN medications as she had good relief drinking pear juice. Ok to continue this as needed, can also add Miralax and Senna as needed. No abdominal pain or cramping.     Vitamin B12 deficiency with macrocytosis. Now on vitamin B12 tablets. Will recheck vitamin B12 level in August.     Nutrition.  Advised to eat small frequent meals high in protein. She has met with a dietician. Weight is overall stable, lost  ~1lb per week the since starting treatment.       Tobacco abuse. Now down to 1/2 ppd.  She has nicotine patches available at home.    Hypercalcemia. Secondary to hyperparathyroidism. Suspect this is as a sequela of her prior thyroid radiation. She was scheduled to see endocrinology on 5/10 but had to cancel the appointment, she is working on rescheduling. Calcium improved to 10.0 today. Continue to monitor.     Hypothyroidism. hx of PATEL at the age of 16 for hyperthyroidism. On levothyroxine as managed by her PCP. Last TSH was low with a normal free T4. Will defer to endocrine on management.     Hypertension. Now with hypotension. Was on amlodipine 5 mg daily. Continue holding for now. Continue to monitor blood pressure at home, checking in the afternoon after radiation when she feels dizzy/lightheaded. Will give 1L NS bolus with chemotherapy today. Discussed that she can be scheduled for additional IVFs if needed. She will monitor her BP at home every couple of days and let us know if  or greater consistently.       *** minutes spent on the date of the encounter doing chart review, review of test results, interpretation of tests, patient visit, and documentation     MASON Chang CNP          Again, thank you for allowing me to participate in the care of your patient.        Sincerely,        MASON Chang CNP

## 2024-05-14 ENCOUNTER — APPOINTMENT (OUTPATIENT)
Dept: RADIATION ONCOLOGY | Facility: CLINIC | Age: 64
End: 2024-05-14
Attending: RADIOLOGY
Payer: COMMERCIAL

## 2024-05-14 PROCEDURE — 77336 RADIATION PHYSICS CONSULT: CPT | Performed by: RADIOLOGY

## 2024-05-14 PROCEDURE — 77386 HC IMRT TREATMENT DELIVERY, COMPLEX: CPT | Performed by: RADIOLOGY

## 2024-05-14 PROCEDURE — 77014 PR CT GUIDE FOR PLACEMENT RADIATION THERAPY FIELDS: CPT | Mod: 26 | Performed by: RADIOLOGY

## 2024-05-14 NOTE — PROGRESS NOTES
RADIATION ONCOLOGY WEEKLY ON TREATMENT VISIT   Encounter Date: May 15, 2024    Patient Name: Linh Mustafa  MRN: [id number]  : 1960     Disease and Stage: Clinical stage T3 N0 M0 (stage II) moderately differentiated squamous cell carcinoma of the mid esophagus and pTis N0 squamous cell carcinoma of the upper esophagus, completely excised  Treatment Site: Esophagus  Current Dose/Planned Total Dose: [1980] cGy / [4500] cGy with dose painting to the primary tumor at 200 cGy/fraction to 5000 cGy    Concurrent Chemotherapy: Yes  Drug and Frequency: Carbo/Taxol    Medical Oncologist: Kathy Burch MD  Surgeon: Bryant Martinez MD    Subjective: Ms. Mustafa presents to clinic today for her weekly on-treatment visit. Overall, she is tolerating treatment well. Pain is under some control with oxycodone 5 mg as needed, acetaminophen and GI cocktail.    Nursing ROS:   Nutrition Alteration  Diet Type: Patient's Preference  Skin  Skin Reaction: 0 - No changes     ENT and Mouth Exam  Mucositis - Current: 0 - None   ENT/Mouth Note:  (Soft foods)  Cardiovascular  Respiratory effort: 1 - Normal - without distress  Gastrointestinal  Nausea: 0 - None     Psychosocial  Mood - Anxiety: 0 - Normal  Pain Assessment  0-10 Pain Scale: 0     PEG Tube: No  Electronic Cardiac Implant: No     Objective:   BP (!) 142/72   Pulse 79   Wt 66.7 kg (147 lb)   BMI 23.62 kg/m    Gen: Appears well, NAD  Skin: No erythema    Laboratory:  Lab Results   Component Value Date    WBC 4.1 2024    HGB 10.8 (L) 2024    HCT 32.1 (L) 2024    MCV 97 2024     2024     Lab Results   Component Value Date    CR 0.62 2024     Lab Results   Component Value Date     2024    POTASSIUM 3.9 2024    CHLORIDE 105 2024    CO2 23 2024     (H) 2024     Lab Results   Component Value Date    AST 16 2024    ALT 15 2024    ALKPHOS 85 2024    BILITOTAL 0.5  05/13/2024     Magnesium   Date Value Ref Range Status   12/23/2022 1.8 1.7 - 2.3 mg/dL Final       Treatment-related toxicities (CTCAE v5.0):  Anorexia: Grade 0: No toxicity  Fatigue: Grade 0: No toxicity  Nausea: Grade 1: Loss of appetite without alteration in eating habits  Pain: Grade 1: Mild pain  Esophagitis: Grade 0: No toxicity  Dermatitis: Grade 0: No toxicity    ED visits/Hospitalizations: None    Missed Treatments:  None    Mosaiq chart and setup information reviewed  IGRT images reviewed    Medication Review  Med list reviewed with patient?: Yes  Med list printed and given: Offered and declined    Assessment:    Ms. 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  She is tolerating treatment well.    Plan:   1.  Continue treatment as planned  2.  Continue GI cocktail just before eating    MARTA Angeles MD PGY4    Physician Attestation  Ms. Mustafa was seen and examined by me. I agree with the findings and plan of care as documented in the note. Note above by Dr. Angeles  was reviewed and edited by me and reflects our mutual findings and plan of care.  I personally reviewed the available treatment setup images and vital signs listed.     Mireille Berg MD  Department of Radiation Oncology  [facility]

## 2024-05-15 ENCOUNTER — OFFICE VISIT (OUTPATIENT)
Dept: RADIATION ONCOLOGY | Facility: CLINIC | Age: 64
End: 2024-05-15
Attending: RADIOLOGY
Payer: COMMERCIAL

## 2024-05-15 VITALS
SYSTOLIC BLOOD PRESSURE: 142 MMHG | WEIGHT: 147 LBS | DIASTOLIC BLOOD PRESSURE: 72 MMHG | BODY MASS INDEX: 23.62 KG/M2 | HEART RATE: 79 BPM

## 2024-05-15 DIAGNOSIS — C15.4 MALIGNANT NEOPLASM OF MIDDLE THIRD OF ESOPHAGUS (H): Primary | ICD-10-CM

## 2024-05-15 PROCEDURE — 77386 HC IMRT TREATMENT DELIVERY, COMPLEX: CPT | Performed by: RADIOLOGY

## 2024-05-15 NOTE — LETTER
5/15/2024         RE: Linh Mustafa  [address]  [facility] [address]        Dear Colleague,    Thank you for referring your patient, Linh Mustafa, to the [facility] RADIATION ONCOLOGY. Please see a copy of my visit note below.    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 Mustafa presents to clinic today for her weekly on-treatment visit. Overall, she is tolerating treatment well. Pain is under some control with oxycodone 5 mg as needed, acetaminophen and GI cocktail.    Nursing ROS:   Nutrition Alteration  Diet Type: Patient's Preference  Skin  Skin Reaction: 0 - No changes     ENT and Mouth Exam  Mucositis - Current: 0 - None   ENT/Mouth Note:  (Soft foods)  Cardiovascular  Respiratory effort: 1 - Normal - without distress  Gastrointestinal  Nausea: 0 - None     Psychosocial  Mood - Anxiety: 0 - Normal  Pain Assessment  0-10 Pain Scale: 0     PEG Tube: No  Electronic Cardiac Implant: No     Objective:   BP (!) 142/72   Pulse 79   Wt 66.7 kg (147 lb)   BMI 23.62 kg/m    Gen: Appears well, NAD  Skin: No erythema    Laboratory:  Lab Results   Component Value Date    WBC 4.1 2024    HGB 10.8 (L) 2024    HCT 32.1 (L) 2024    MCV 97 2024     2024     Lab Results   Component Value Date    CR 0.62 2024     Lab Results   Component  Value Date     05/13/2024    POTASSIUM 3.9 05/13/2024    CHLORIDE 105 05/13/2024    CO2 23 05/13/2024     (H) 05/13/2024     Lab Results   Component Value Date    AST 16 05/13/2024    ALT 15 05/13/2024    ALKPHOS 85 05/13/2024    BILITOTAL 0.5 05/13/2024     Magnesium   Date Value Ref Range Status   12/23/2022 1.8 1.7 - 2.3 mg/dL Final       Treatment-related toxicities (CTCAE v5.0):  Anorexia: Grade 0: No toxicity  Fatigue: Grade 0: No toxicity  Nausea: Grade 1: Loss of appetite without alteration in eating habits  Pain: Grade 1: Mild pain  Esophagitis: Grade 0: No toxicity  Dermatitis: Grade 0: No toxicity    ED visits/Hospitalizations: None    Missed Treatments:  None    Mosaiq chart and setup information reviewed  IGRT images reviewed    Medication Review  Med list reviewed with patient?: Yes  Med list printed and given: Offered and declined    Assessment:    Ms. 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  She is tolerating treatment well.    Plan:   1.  Continue treatment as planned  2.  Continue GI cocktail just before eating    MARTA Angeles MD PGY4    Physician Attestation  Ms. Mustafa was seen and examined by me. I agree with the findings and plan of care as documented in the note. Note above by Dr. Angeles  was reviewed and edited by me and reflects our mutual findings and plan of care.  I personally reviewed the available treatment setup images and vital signs listed.     Mireille Berg MD  Department of Radiation Oncology  [facility]              Again, thank you for allowing me to participate in the care of your patient.        Sincerely,        Mireille Berg MD

## 2024-05-16 ENCOUNTER — APPOINTMENT (OUTPATIENT)
Dept: RADIATION ONCOLOGY | Facility: CLINIC | Age: 64
End: 2024-05-16
Attending: RADIOLOGY
Payer: COMMERCIAL

## 2024-05-16 PROCEDURE — 77386 HC IMRT TREATMENT DELIVERY, COMPLEX: CPT | Performed by: RADIOLOGY

## 2024-05-16 PROCEDURE — 77014 PR CT GUIDE FOR PLACEMENT RADIATION THERAPY FIELDS: CPT | Mod: 26 | Performed by: RADIOLOGY

## 2024-05-17 ENCOUNTER — APPOINTMENT (OUTPATIENT)
Dept: RADIATION ONCOLOGY | Facility: CLINIC | Age: 64
End: 2024-05-17
Attending: RADIOLOGY
Payer: COMMERCIAL

## 2024-05-17 PROCEDURE — 77386 HC IMRT TREATMENT DELIVERY, COMPLEX: CPT | Performed by: RADIOLOGY

## 2024-05-17 PROCEDURE — 77014 PR CT GUIDE FOR PLACEMENT RADIATION THERAPY FIELDS: CPT | Mod: 26 | Performed by: RADIOLOGY

## 2024-05-20 ENCOUNTER — ONCOLOGY VISIT (OUTPATIENT)
Dept: ONCOLOGY | Facility: CLINIC | Age: 64
End: 2024-05-20
Attending: INTERNAL MEDICINE
Payer: COMMERCIAL

## 2024-05-20 ENCOUNTER — APPOINTMENT (OUTPATIENT)
Dept: RADIATION ONCOLOGY | Facility: CLINIC | Age: 64
End: 2024-05-20
Attending: RADIOLOGY
Payer: COMMERCIAL

## 2024-05-20 ENCOUNTER — APPOINTMENT (OUTPATIENT)
Dept: LAB | Facility: CLINIC | Age: 64
End: 2024-05-20
Attending: INTERNAL MEDICINE
Payer: COMMERCIAL

## 2024-05-20 VITALS
BODY MASS INDEX: 23.4 KG/M2 | SYSTOLIC BLOOD PRESSURE: 106 MMHG | HEART RATE: 74 BPM | WEIGHT: 145.6 LBS | DIASTOLIC BLOOD PRESSURE: 61 MMHG | TEMPERATURE: 98 F | OXYGEN SATURATION: 100 %

## 2024-05-20 VITALS
SYSTOLIC BLOOD PRESSURE: 132 MMHG | DIASTOLIC BLOOD PRESSURE: 74 MMHG | WEIGHT: 145 LBS | HEART RATE: 82 BPM | BODY MASS INDEX: 23.3 KG/M2

## 2024-05-20 DIAGNOSIS — K22.89 ESOPHAGEAL PAIN: ICD-10-CM

## 2024-05-20 DIAGNOSIS — C15.9 SQUAMOUS CELL CARCINOMA OF ESOPHAGUS (H): Primary | ICD-10-CM

## 2024-05-20 DIAGNOSIS — C15.4 MALIGNANT NEOPLASM OF MIDDLE THIRD OF ESOPHAGUS (H): Primary | ICD-10-CM

## 2024-05-20 LAB
ALBUMIN SERPL BCG-MCNC: 3.9 G/DL (ref 3.5–5.2)
ALP SERPL-CCNC: 83 U/L (ref 40–150)
ALT SERPL W P-5'-P-CCNC: 21 U/L (ref 0–50)
ANION GAP SERPL CALCULATED.3IONS-SCNC: 8 MMOL/L (ref 7–15)
AST SERPL W P-5'-P-CCNC: 15 U/L (ref 0–45)
BASOPHILS # BLD AUTO: 0 10E3/UL (ref 0–0.2)
BASOPHILS NFR BLD AUTO: 1 %
BILIRUB SERPL-MCNC: 0.4 MG/DL
BUN SERPL-MCNC: 16.4 MG/DL (ref 8–23)
CALCIUM SERPL-MCNC: 9.9 MG/DL (ref 8.8–10.2)
CHLORIDE SERPL-SCNC: 104 MMOL/L (ref 98–107)
CREAT SERPL-MCNC: 0.63 MG/DL (ref 0.51–0.95)
DEPRECATED HCO3 PLAS-SCNC: 25 MMOL/L (ref 22–29)
EGFRCR SERPLBLD CKD-EPI 2021: >90 ML/MIN/1.73M2
EOSINOPHIL # BLD AUTO: 0 10E3/UL (ref 0–0.7)
EOSINOPHIL NFR BLD AUTO: 0 %
ERYTHROCYTE [DISTWIDTH] IN BLOOD BY AUTOMATED COUNT: 15.3 % (ref 10–15)
GLUCOSE SERPL-MCNC: 115 MG/DL (ref 70–99)
HCT VFR BLD AUTO: 30.5 % (ref 35–47)
HGB BLD-MCNC: 10.2 G/DL (ref 11.7–15.7)
IMM GRANULOCYTES # BLD: 0 10E3/UL
IMM GRANULOCYTES NFR BLD: 1 %
LYMPHOCYTES # BLD AUTO: 0.6 10E3/UL (ref 0.8–5.3)
LYMPHOCYTES NFR BLD AUTO: 19 %
MCH RBC QN AUTO: 32.3 PG (ref 26.5–33)
MCHC RBC AUTO-ENTMCNC: 33.4 G/DL (ref 31.5–36.5)
MCV RBC AUTO: 97 FL (ref 78–100)
MONOCYTES # BLD AUTO: 0.4 10E3/UL (ref 0–1.3)
MONOCYTES NFR BLD AUTO: 11 %
NEUTROPHILS # BLD AUTO: 2.2 10E3/UL (ref 1.6–8.3)
NEUTROPHILS NFR BLD AUTO: 68 %
NRBC # BLD AUTO: 0 10E3/UL
NRBC BLD AUTO-RTO: 0 /100
PLATELET # BLD AUTO: 173 10E3/UL (ref 150–450)
POTASSIUM SERPL-SCNC: 3.8 MMOL/L (ref 3.4–5.3)
PROT SERPL-MCNC: 6.5 G/DL (ref 6.4–8.3)
RBC # BLD AUTO: 3.16 10E6/UL (ref 3.8–5.2)
SODIUM SERPL-SCNC: 137 MMOL/L (ref 135–145)
WBC # BLD AUTO: 3.2 10E3/UL (ref 4–11)

## 2024-05-20 PROCEDURE — 99214 OFFICE O/P EST MOD 30 MIN: CPT

## 2024-05-20 PROCEDURE — 258N000003 HC RX IP 258 OP 636: Performed by: INTERNAL MEDICINE

## 2024-05-20 PROCEDURE — G2211 COMPLEX E/M VISIT ADD ON: HCPCS

## 2024-05-20 PROCEDURE — 36591 DRAW BLOOD OFF VENOUS DEVICE: CPT | Performed by: INTERNAL MEDICINE

## 2024-05-20 PROCEDURE — 85025 COMPLETE CBC W/AUTO DIFF WBC: CPT | Performed by: INTERNAL MEDICINE

## 2024-05-20 PROCEDURE — 250N000013 HC RX MED GY IP 250 OP 250 PS 637: Performed by: INTERNAL MEDICINE

## 2024-05-20 PROCEDURE — 96375 TX/PRO/DX INJ NEW DRUG ADDON: CPT

## 2024-05-20 PROCEDURE — 77386 HC IMRT TREATMENT DELIVERY, COMPLEX: CPT | Performed by: RADIOLOGY

## 2024-05-20 PROCEDURE — 250N000011 HC RX IP 250 OP 636: Performed by: INTERNAL MEDICINE

## 2024-05-20 PROCEDURE — 96413 CHEMO IV INFUSION 1 HR: CPT

## 2024-05-20 PROCEDURE — 250N000011 HC RX IP 250 OP 636

## 2024-05-20 PROCEDURE — G0463 HOSPITAL OUTPT CLINIC VISIT: HCPCS

## 2024-05-20 PROCEDURE — 80053 COMPREHEN METABOLIC PANEL: CPT | Performed by: INTERNAL MEDICINE

## 2024-05-20 PROCEDURE — 96417 CHEMO IV INFUS EACH ADDL SEQ: CPT

## 2024-05-20 RX ORDER — HEPARIN SODIUM (PORCINE) LOCK FLUSH IV SOLN 100 UNIT/ML 100 UNIT/ML
5 SOLUTION INTRAVENOUS ONCE
Status: COMPLETED | OUTPATIENT
Start: 2024-05-20 | End: 2024-05-20

## 2024-05-20 RX ORDER — OXYCODONE HYDROCHLORIDE 5 MG/1
5-10 TABLET ORAL EVERY 4 HOURS
Qty: 60 TABLET | Refills: 0 | Status: SHIPPED | OUTPATIENT
Start: 2024-05-20 | End: 2024-05-28

## 2024-05-20 RX ORDER — SUCRALFATE ORAL 1 G/10ML
1 SUSPENSION ORAL 4 TIMES DAILY
Qty: 500 ML | Refills: 1 | Status: SHIPPED | OUTPATIENT
Start: 2024-05-20 | End: 2024-06-26

## 2024-05-20 RX ORDER — HEPARIN SODIUM (PORCINE) LOCK FLUSH IV SOLN 100 UNIT/ML 100 UNIT/ML
5 SOLUTION INTRAVENOUS
Status: DISCONTINUED | OUTPATIENT
Start: 2024-05-20 | End: 2024-05-20 | Stop reason: HOSPADM

## 2024-05-20 RX ORDER — DIPHENHYDRAMINE HCL 25 MG
50 CAPSULE ORAL ONCE
Status: COMPLETED | OUTPATIENT
Start: 2024-05-20 | End: 2024-05-20

## 2024-05-20 RX ORDER — BUSPIRONE HYDROCHLORIDE 5 MG/1
5 TABLET ORAL DAILY
COMMUNITY
Start: 2024-05-17 | End: 2024-07-03

## 2024-05-20 RX ADMIN — DEXAMETHASONE SODIUM PHOSPHATE: 10 INJECTION, SOLUTION INTRAMUSCULAR; INTRAVENOUS at 14:20

## 2024-05-20 RX ADMIN — SODIUM CHLORIDE 250 ML: 9 INJECTION, SOLUTION INTRAVENOUS at 14:17

## 2024-05-20 RX ADMIN — FAMOTIDINE 20 MG: 10 INJECTION INTRAVENOUS at 14:18

## 2024-05-20 RX ADMIN — DIPHENHYDRAMINE HYDROCHLORIDE 50 MG: 25 CAPSULE ORAL at 14:17

## 2024-05-20 RX ADMIN — PACLITAXEL 90 MG: 6 INJECTION, SOLUTION INTRAVENOUS at 14:47

## 2024-05-20 RX ADMIN — Medication 5 ML: at 13:04

## 2024-05-20 RX ADMIN — Medication 5 ML: at 16:22

## 2024-05-20 RX ADMIN — CARBOPLATIN 250 MG: 600 INJECTION, SOLUTION INTRAVENOUS at 15:51

## 2024-05-20 ASSESSMENT — PAIN SCALES - GENERAL: PAINLEVEL: MODERATE PAIN (5)

## 2024-05-20 NOTE — LETTER
2024         RE: Linh Mustafa  [address]  [facility] [address]        Dear Colleague,    Thank you for referring your patient, Linh Mustafa, to the [facility] RADIATION ONCOLOGY. Please see a copy of my visit note below.    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 Mustafa presents to clinic today for her weekly on-treatment visit. Overall, she is tolerating treatment well. Pain is under some control with oxycodone 5 mg which she is taking before bed and in the middle of the night. She is also taking acetaminophen.  She has worse pain with swallowing for which she is using Magic Mouthwash before meals -- this only lasts for about 10 minutes before the sensation returns. She is having trouble with oral intake, so will start using high protein high calorie supplementation.     Nursing ROS:   Nutrition Alteration  Diet Type: Patient's Preference  Skin  Skin Reaction: 0 - No changes     ENT and Mouth Exam  Mucositis - Current: 0 - None   ENT/Mouth Note:  (sore throat)  Cardiovascular  Respiratory effort: 1 - Normal - without distress  Gastrointestinal  Nausea: 0 - None     Psychosocial  Mood - Anxiety: 0 - Normal  Pain Assessment  0-10 Pain Scale: 4  Pain Treatment: Oxycodone     PEG Tube: No  Electronic Cardiac Implant: No     Objective:   /74   Pulse 82    Wt 65.8 kg (145 lb)   BMI 23.30 kg/m    Gen: Appears well, NAD  Skin: No erythema    Laboratory:  Lab Results   Component Value Date    WBC 4.1 05/13/2024    HGB 10.8 (L) 05/13/2024    HCT 32.1 (L) 05/13/2024    MCV 97 05/13/2024     05/13/2024     Lab Results   Component Value Date    CR 0.62 05/13/2024     Lab Results   Component Value Date     05/13/2024    POTASSIUM 3.9 05/13/2024    CHLORIDE 105 05/13/2024    CO2 23 05/13/2024     (H) 05/13/2024     Lab Results   Component Value Date    AST 16 05/13/2024    ALT 15 05/13/2024    ALKPHOS 85 05/13/2024    BILITOTAL 0.5 05/13/2024     Magnesium   Date Value Ref Range Status   12/23/2022 1.8 1.7 - 2.3 mg/dL Final       Treatment-related toxicities (CTCAE v5.0):  Anorexia: Grade 2: Oral intake altered without significant weight loss or malnutrition; oral nutritional supplements indicated  Fatigue: Grade 1: Fatigue relieved by rest  Nausea: Grade 2: Oral intake decreased without significant weight loss, dehydration or malnutrition  Pain: Grade 1: Mild pain  Esophagitis: Grade 2: Symptomatic; altered eating/swallowing; oral supplements indicated  Dermatitis: Grade 1: Faint erythema or dry desquamation    ED visits/Hospitalizations: None    Missed Treatments:  None    Mosaiq chart and setup information reviewed  IGRT images reviewed    Medication Review  Med list reviewed with patient?: Yes  Med list printed and given: Offered and declined    Assessment:    Ms. 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  She is tolerating treatment well.    Plan:   1.  Continue treatment as planned  2.  Continue GI cocktail just before eating  3.  We agree with starting diet supplementation with protein shakes  4.  Counseled on sun protection  5.  Oxycodone 5 mg may take every 4-6 hours as needed    MARTA Angeles MD  PGY4    Physician Attestation  Ms. Mustafa was seen and examined by me. I agree with the findings and plan of care as documented in the note. Note above by Dr. Angeles  was reviewed and edited by me and reflects our mutual findings and plan of care.  I personally reviewed the available treatment setup images and vital signs listed.     Mireille Berg MD  Department of Radiation Oncology  [facility]

## 2024-05-20 NOTE — PROGRESS NOTES
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 Mustafa presents to clinic today for her weekly on-treatment visit. Overall, she is tolerating treatment well. Pain is under some control with oxycodone 5 mg which she is taking before bed and in the middle of the night. She is also taking acetaminophen.  She has worse pain with swallowing for which she is using Magic Mouthwash before meals -- this only lasts for about 10 minutes before the sensation returns. She is having trouble with oral intake, so will start using high protein high calorie supplementation.     Nursing ROS:   Nutrition Alteration  Diet Type: Patient's Preference  Skin  Skin Reaction: 0 - No changes     ENT and Mouth Exam  Mucositis - Current: 0 - None   ENT/Mouth Note:  (sore throat)  Cardiovascular  Respiratory effort: 1 - Normal - without distress  Gastrointestinal  Nausea: 0 - None     Psychosocial  Mood - Anxiety: 0 - Normal  Pain Assessment  0-10 Pain Scale: 4  Pain Treatment: Oxycodone     PEG Tube: No  Electronic Cardiac Implant: No     Objective:   /74   Pulse 82   Wt 65.8 kg (145 lb)   BMI 23.30 kg/m    Gen: Appears well, NAD  Skin: No erythema    Laboratory:  Lab Results   Component Value Date    WBC 4.1 2024    HGB 10.8 (L) 2024    HCT 32.1 (L) 2024    MCV 97 2024     2024     Lab  Results   Component Value Date    CR 0.62 05/13/2024     Lab Results   Component Value Date     05/13/2024    POTASSIUM 3.9 05/13/2024    CHLORIDE 105 05/13/2024    CO2 23 05/13/2024     (H) 05/13/2024     Lab Results   Component Value Date    AST 16 05/13/2024    ALT 15 05/13/2024    ALKPHOS 85 05/13/2024    BILITOTAL 0.5 05/13/2024     Magnesium   Date Value Ref Range Status   12/23/2022 1.8 1.7 - 2.3 mg/dL Final       Treatment-related toxicities (CTCAE v5.0):  Anorexia: Grade 2: Oral intake altered without significant weight loss or malnutrition; oral nutritional supplements indicated  Fatigue: Grade 1: Fatigue relieved by rest  Nausea: Grade 2: Oral intake decreased without significant weight loss, dehydration or malnutrition  Pain: Grade 1: Mild pain  Esophagitis: Grade 2: Symptomatic; altered eating/swallowing; oral supplements indicated  Dermatitis: Grade 1: Faint erythema or dry desquamation    ED visits/Hospitalizations: None    Missed Treatments:  None    Mosaiq chart and setup information reviewed  IGRT images reviewed    Medication Review  Med list reviewed with patient?: Yes  Med list printed and given: Offered and declined    Assessment:    Ms. 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  She is tolerating treatment well.    Plan:   1.  Continue treatment as planned  2.  Continue GI cocktail just before eating  3.  We agree with starting diet supplementation with protein shakes  4.  Counseled on sun protection  5.  Oxycodone 5 mg may take every 4-6 hours as needed    MARTA Angeles MD PGY4    Physician Attestation  Ms. Mustafa was seen and examined by me. I agree with the findings and plan of care as documented in the note. Note above by Dr. Angeles  was reviewed and edited by me and reflects our mutual findings and plan of care.  I personally  reviewed the available treatment setup images and vital signs listed.     Mireille Berg MD  Department of Radiation Oncology  [facility]

## 2024-05-20 NOTE — Clinical Note
5/20/2024         RE: Linh Mustafa  [address]  [facility] [address]        Dear Colleague,    Thank you for referring your patient, Linh Mustafa, to the [facility] CANCER CLINIC. Please see a copy of my visit note below.    Oncology/Hematology Visit Note  May 20, 2024    Reason for Visit: follow up of esophageal cancer    History of Present Illness: Linh Mustafa is a 64 year old female with esophageal cancer. Since November 2023 she started to notice pain upon swallowing and it was basically pain which limited her food intake.  This was progressively getting worse.  She had further workup as mentioned below.     2/15/2024.  EGD showed an ulcerated mid esophageal mass extending from 26-31 cm from the incisors.  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  Pathology from the mid esophageal mass showed moderately differentiated invasive squamous cell carcinoma, MMR IHC intact.     3/7/2024.  PET/CT showed markedly FDG avid wall thickening of the mid thoracic esophagus with SUV max 24.1.  No evidence of metastatic disease.     3/21/2024.  Upper EUS.  Endoscopy showed a flat nodule in the proximal esophagus between 15-19 cm and one third circumferential.  Upper esophageal sphincter was at 14 cm.  Biopsies from this showed high-grade dysplasia/carcinoma in situ.       Normal esophageal squamous cell cancer causing maybe esophagus on the right anterolateral esophageal wall between 24-30 cm.  It was 50% circumferential.  The GE junction was at 35 cm with 2 cm tongues of Auguste's anteriorly without high risk features.     Ultrasound exam showed the mid esophageal tumor to be probably T3. Two 4 x 6 mm nodes were seen adjacent to the distal esophageal wall at 36 and 37 cm.  Both an identical appearance and one was sampled.  This lymph node biopsy was benign.     By EUS it was staged as T3 N0 M0 tumor.     4/2/2024.  Because of finding of carcinoma in  situ in the proximal esophagus, she had endoscopic resection of the proximal squamous cell carcinoma in situ lesion performed with en bloc removal.    The final pathology showed squamous mucosa with high-grade dysplasia/carcinoma in situ with all margins negative for dysplasia.  No definite invasion was identified. ESD was considered curative for this lesion.     Case was also discussed in the tumor conference with the plan to proceed with neoadjuvant chemotherapy/radiation followed by definitive surgery.       Please see previous notes for further details on the patient's history. She started on concurrent chemoradiation with carboplatin and Taxol on 5/1/24. She comes in today for routine follow up prior to cycle 1 day 15 carboplatin and Taxol.    Interval History:    Lita presents today for close follow up prior to day 22 paclitaxel, carboplatin.    Over the last week, she has been feeling an increase in fatigue.   Swallowing has also gotten more painful. She continues eating soft foods and taking Carafate prior to eating. Carafate is helpful but does not last very long. She has only been taking oxycodone prior to   More tired  Swallowing is harder     Still taking zofran prior to oxy  No nausea   Maintainig her weight, radiation recommended a high-calorie protein supplement she is going to add     Still lightheaded after radiation daily, no change. Resolves after a few hours. Drinknig plenty of fluids, blood pressures have still been wnl.   ***  -Feels lightheaded every day for a few hours after radiation. Checks her blood pressure daily in the morning and readings are adequate, but does not check in the afternoon after radiation.   -Energy level is poor, sleeping a lot. Denies any shortness of breath, chest pain or palpitations.   -Throat pain is controlled with Tylenol during the day. She is taking oxycodone at bedtime and often wakes up during the night due to pain. She repeats a dose of oxycodone and is  able to fall back asleep.   -taking ondansetron 30 minutes prior to oxycodone dose, but denies any nausea. No reflux. Taking Carafate as prescribed.   -Was having issues with constipation, but started drinking pear juice with good results. Stopped taking Miralax as it was not helpful.   -Swallowing is tolerable, still eating food. Stops eating due to pain, not because stomach is full. Drinking 1 ensure daily on the way to radiation. Feels she is drinking adequate fluids.   -Denies mouth sores or skin concerns.       Review of Systems:  Patient denies any of the following except if noted above: fevers, chills, vision or hearing changes, dyspnea, abdominal pain, nausea, vomiting, diarrhea, urinary concerns, numbness, or tingling.    Current Outpatient Medications   Medication Sig Dispense Refill    acetaminophen (TYLENOL) 325 MG tablet Take 325-650 mg by mouth every 6 hours as needed for mild pain      amLODIPine (NORVASC) 10 MG tablet TAKE 1 TABLET (10 MG) BY MOUTH DAILY. 90 tablet 1    esomeprazole (NEXIUM) 20 MG DR capsule Take 20 mg by mouth 2 times daily Take 30-60 minutes before eating.      famotidine (PEPCID) 20 MG tablet Take 20 mg by mouth 2 times daily      fluticasone (FLONASE) 50 MCG/ACT nasal spray Spray 1-2 sprays into one nostril alternating nostrils as needed      levothyroxine (SYNTHROID/LEVOTHROID) 200 MCG tablet take 1 tablet by mouth every day for 30 days      ondansetron (ZOFRAN) 8 MG tablet Take 1 tablet (8 mg) by mouth every 8 hours as needed for nausea 30 tablet 3    oxyCODONE (ROXICODONE) 5 MG tablet Take 1-2 tablets (5-10 mg) by mouth every 4 hours 30 tablet 0    polyethylene glycol (MIRALAX) 17 GM/Dose powder Take 17 g (1 Capful) by mouth daily 510 g 3    prochlorperazine (COMPAZINE) 10 MG tablet Take 1 tablet (10 mg) by mouth every 6 hours as needed for nausea or vomiting 30 tablet 2    sucralfate (CARAFATE) 1 GM/10ML suspension Take 10 mLs (1 g) by mouth 4 times daily 500 mL 1    vitamin  B-12 (CYANOCOBALAMIN) 2500 MCG sublingual tablet Take 1 tablet (2,500 mcg) by mouth daily 90 tablet 3     Physical Examination:  General: The patient is a pleasant female in no acute distress. She is here today with her brother, Medina.   There were no vitals taken for this visit.  Wt Readings from Last 10 Encounters:   05/15/24 66.7 kg (147 lb)   05/13/24 66.4 kg (146 lb 4.8 oz)   05/07/24 66.7 kg (147 lb)   05/06/24 65.8 kg (145 lb)   05/01/24 67.3 kg (148 lb 4.8 oz)   04/18/24 67.5 kg (148 lb 14.4 oz)   04/16/24 66.7 kg (147 lb)   04/02/24 68.6 kg (151 lb 3.8 oz)   03/21/24 68.1 kg (150 lb 2.1 oz)   03/14/24 68 kg (150 lb)   HEENT: EOMI. Sclerae are anicteric.Oral mucosa pink and moist without lesions or thrush.   Lymph: Neck is supple with no lymphadenopathy in the cervical or supraclavicular areas.   Heart: Regular rate and rhythm.   Lungs: Clear to auscultation bilaterally, normal work of breathing.   Abdomen: Bowel sounds present, soft, nontender with no palpable hepatosplenomegaly or masses.   Extremities: No lower extremity edema noted bilaterally.   Neuro: Cranial nerves II through XII are grossly intact.  Skin: No rashes, petechiae, or bruising noted on exposed skin.    Laboratory Data:  Most Recent 3 CBC's:  Recent Labs   Lab Test 05/13/24  1343 05/07/24  1219 05/01/24  0854   WBC 4.1 5.8 7.6   HGB 10.8* 11.9 12.4   MCV 97 96 95    211 246   ANEUTAUTO 2.8 3.8 5.1    Most Recent 3 BMP's:  Recent Labs   Lab Test 05/13/24  1343 05/07/24  1219 05/01/24  0854    136 137   POTASSIUM 3.9 4.3 4.1   CHLORIDE 105 103 104   CO2 23 24 23   BUN 17.5 23.4* 15.8   CR 0.62 0.60 0.57   ANIONGAP 10 9 10   VAIBHAV 10.0 10.1 10.5*   * 114* 110*   PROTTOTAL 6.6 7.1 7.2   ALBUMIN 3.9 4.1 4.2    Most Recent 2 LFT's:  Recent Labs   Lab Test 05/13/24  1343 05/07/24  1219   AST 16 16   ALT 15 22   ALKPHOS 85 93   BILITOTAL 0.5 0.6   I reviewed the above labs today.    Assessment and Plan:  Stage II moderately  differentiated squamous cell carcinoma of the midesophagus ( uT3 N0 M0 ).  MMR IHC intact. She started neoadjuvant treatment on 5/1/24 with concurrent chemoradiation with weekly carboplatin and Taxol. Tolerating well with fatigue and throat pain.  -Today's labs reviewed, proceed with day *** 15 Taxol, carboplatin. Return to clinic in 1 week for next dose with labs + [facility] visit prior. Patient to call sooner with any questions or concerns.   -About 10 to 12 weeks after completing chemotherapy/radiation, we will repeat PET/CT and consider definitive surgery.     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  Now s/p endoscopic resection with clear margins free of dysplasia.  This has been adequately treated.  Continue to observe.     Odynophagia. Secondary to cancer. She did not tolerate tramadol due to nausea. Only taking 5mg oxycodone at bedtime and repeating the dose every 3-4 hours as needed. Tried to take 10mg dose but felt anxious and jittery. Taking Tylenol as needed throughout the day, reviewed dosing limits. Instructed her to call triage if pain is not well controlled.     Constipation. Stopped PRN medications as she had good relief drinking pear juice. Ok to continue this as needed, can also add Miralax and Senna as needed. No abdominal pain or cramping.     Vitamin B12 deficiency with macrocytosis. Now on vitamin B12 tablets. Will recheck vitamin B12 level in August.     Nutrition.  Advised to eat small frequent meals high in protein. She has met with a dietician. Weight is overall stable, lost ~1lb per week the since starting treatment.       Tobacco abuse. Now down to 1/2 ppd.  She has nicotine patches available at home.    Hypercalcemia. Secondary to hyperparathyroidism. Suspect this is as a sequela of her prior thyroid radiation. She was scheduled to see endocrinology on 5/10 but had to cancel the appointment, she is working on rescheduling. Calcium improved to 10.0 today. Continue to  monitor.     Hypothyroidism. hx of PATEL at the age of 16 for hyperthyroidism. On levothyroxine as managed by her PCP. Last TSH was low with a normal free T4. Will defer to endocrine on management.     Hypertension. Now with hypotension. Was on amlodipine 5 mg daily. Continue holding for now. Continue to monitor blood pressure at home, checking in the afternoon after radiation when she feels dizzy/lightheaded. Will give 1L NS bolus with chemotherapy today. Discussed that she can be scheduled for additional IVFs if needed. She will monitor her BP at home every couple of days and let us know if  or greater consistently.       The longitudinal plan of care for the diagnosis(es)/condition(s) as documented were addressed during this visit. Due to the added complexity in care, I will continue to support Lita in the subsequent management and with ongoing continuity of care.      *** minutes spent on the date of the encounter doing chart review, review of test results, interpretation of tests, patient visit, and documentation     MASON Chang CNP          Again, thank you for allowing me to participate in the care of your patient.        Sincerely,        MASON Chang CNP

## 2024-05-20 NOTE — PROGRESS NOTES
Oncology/Hematology Visit Note  May 20, 2024    Reason for Visit: follow up of esophageal cancer    History of Present Illness: Linh Mustafa is a 64 year old female with esophageal cancer. Since November 2023 she started to notice pain upon swallowing and it was basically pain which limited her food intake.  This was progressively getting worse.  She had further workup as mentioned below.     2/15/2024.  EGD showed an ulcerated mid esophageal mass extending from 26-31 cm from the incisors.  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  Pathology from the mid esophageal mass showed moderately differentiated invasive squamous cell carcinoma, MMR IHC intact.     3/7/2024.  PET/CT showed markedly FDG avid wall thickening of the mid thoracic esophagus with SUV max 24.1.  No evidence of metastatic disease.     3/21/2024.  Upper EUS.  Endoscopy showed a flat nodule in the proximal esophagus between 15-19 cm and one third circumferential.  Upper esophageal sphincter was at 14 cm.  Biopsies from this showed high-grade dysplasia/carcinoma in situ.       Normal esophageal squamous cell cancer causing maybe esophagus on the right anterolateral esophageal wall between 24-30 cm.  It was 50% circumferential.  The GE junction was at 35 cm with 2 cm tongues of Auguste's anteriorly without high risk features.     Ultrasound exam showed the mid esophageal tumor to be probably T3. Two 4 x 6 mm nodes were seen adjacent to the distal esophageal wall at 36 and 37 cm.  Both an identical appearance and one was sampled.  This lymph node biopsy was benign.     By EUS it was staged as T3 N0 M0 tumor.     4/2/2024.  Because of finding of carcinoma in situ in the proximal esophagus, she had endoscopic resection of the proximal squamous cell carcinoma in situ lesion performed with en bloc removal.    The final pathology showed squamous mucosa with high-grade dysplasia/carcinoma in situ with all margins  negative for dysplasia.  No definite invasion was identified. ESD was considered curative for this lesion.     Case was also discussed in the tumor conference with the plan to proceed with neoadjuvant chemotherapy/radiation followed by definitive surgery.       Please see previous notes for further details on the patient's history. She started on concurrent chemoradiation with carboplatin and Taxol on 5/1/24.     She comes in today for routine follow up prior to cycle 1 day 29 carboplatin and Taxol. She is here today with her brother, Medina.     Interval History:    -Over the last week, she has been feeling an increase in fatigue.   -Swallowing has also gotten more painful. She continues eating soft foods and taking Carafate prior to eating. Carafate is helpful but does not last very long. She has only been taking oxycodone prior to bedtime.   -Still taking zofran prior to oxy, no nausea. Maintaining her weight, radiation recommended a high-calorie protein supplement she is going to add.   -Still lightheaded after radiation daily, no change. Resolves after a few hours. Drinking adequate fluids, blood pressures have still been wnl.   -no bowel or bladder concerns  -no skin concerns, no mouth sores        Review of Systems:  Patient denies any of the following except if noted above: fevers, chills, vision or hearing changes, dyspnea, abdominal pain, nausea, vomiting, diarrhea, urinary concerns, numbness, or tingling.    Current Outpatient Medications   Medication Sig Dispense Refill    busPIRone (BUSPAR) 5 MG tablet       oxyCODONE (ROXICODONE) 5 MG tablet Take 1-2 tablets (5-10 mg) by mouth every 4 hours 60 tablet 0    sucralfate (CARAFATE) 1 GM/10ML suspension Take 10 mLs (1 g) by mouth 4 times daily 500 mL 1    acetaminophen (TYLENOL) 325 MG tablet Take 325-650 mg by mouth every 6 hours as needed for mild pain      amLODIPine (NORVASC) 10 MG tablet TAKE 1 TABLET (10 MG) BY MOUTH DAILY. 90 tablet 1    esomeprazole  (NEXIUM) 20 MG DR capsule Take 20 mg by mouth 2 times daily Take 30-60 minutes before eating.      famotidine (PEPCID) 20 MG tablet Take 20 mg by mouth 2 times daily      fluticasone (FLONASE) 50 MCG/ACT nasal spray Spray 1-2 sprays into one nostril alternating nostrils as needed      levothyroxine (SYNTHROID/LEVOTHROID) 200 MCG tablet take 1 tablet by mouth every day for 30 days      ondansetron (ZOFRAN) 8 MG tablet Take 1 tablet (8 mg) by mouth every 8 hours as needed for nausea 30 tablet 3    polyethylene glycol (MIRALAX) 17 GM/Dose powder Take 17 g (1 Capful) by mouth daily 510 g 3    prochlorperazine (COMPAZINE) 10 MG tablet Take 1 tablet (10 mg) by mouth every 6 hours as needed for nausea or vomiting 30 tablet 2    vitamin B-12 (CYANOCOBALAMIN) 2500 MCG sublingual tablet Take 1 tablet (2,500 mcg) by mouth daily 90 tablet 3     Physical Examination:  General: The patient is a pleasant female in no acute distress.   /61   Pulse 74   Temp 98  F (36.7  C) (Oral)   Wt 66 kg (145 lb 9.6 oz)   SpO2 100%   BMI 23.40 kg/m    Wt Readings from Last 10 Encounters:   05/20/24 66 kg (145 lb 9.6 oz)   05/20/24 65.8 kg (145 lb)   05/15/24 66.7 kg (147 lb)   05/13/24 66.4 kg (146 lb 4.8 oz)   05/07/24 66.7 kg (147 lb)   05/06/24 65.8 kg (145 lb)   05/01/24 67.3 kg (148 lb 4.8 oz)   04/18/24 67.5 kg (148 lb 14.4 oz)   04/16/24 66.7 kg (147 lb)   04/02/24 68.6 kg (151 lb 3.8 oz)   HEENT: EOMI. Sclerae are anicteric.Oral mucosa pink and moist without lesions or thrush.   Lymph: Neck is supple with no lymphadenopathy in the cervical or supraclavicular areas.   Heart: Regular rate and rhythm.   Lungs: Clear to auscultation bilaterally, normal work of breathing.   Abdomen: Bowel sounds present, soft, nontender with no palpable hepatosplenomegaly or masses.   Extremities: No lower extremity edema noted bilaterally.   Neuro: Cranial nerves II through XII are grossly intact.  Skin: No rashes, petechiae, or bruising noted on  exposed skin.    Laboratory Data:  Most Recent 3 CBC's:  Recent Labs   Lab Test 05/20/24  1303 05/13/24  1343 05/07/24  1219   WBC 3.2* 4.1 5.8   HGB 10.2* 10.8* 11.9   MCV 97 97 96    186 211   ANEUTAUTO 2.2 2.8 3.8    Most Recent 3 BMP's:  Recent Labs   Lab Test 05/20/24  1303 05/13/24  1343 05/07/24  1219    138 136   POTASSIUM 3.8 3.9 4.3   CHLORIDE 104 105 103   CO2 25 23 24   BUN 16.4 17.5 23.4*   CR 0.63 0.62 0.60   ANIONGAP 8 10 9   VAIBHAV 9.9 10.0 10.1   * 132* 114*   PROTTOTAL 6.5 6.6 7.1   ALBUMIN 3.9 3.9 4.1    Most Recent 2 LFT's:  Recent Labs   Lab Test 05/20/24  1303 05/13/24  1343   AST 15 16   ALT 21 15   ALKPHOS 83 85   BILITOTAL 0.4 0.5   I reviewed the above labs today.    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  MMR IHC intact. She started neoadjuvant treatment on 5/1/24 with concurrent chemoradiation with weekly carboplatin and Taxol. Tolerating well with fatigue and throat pain.  -Today's labs reviewed, proceed with day 22 Taxol, carboplatin. Return to clinic in 1 week for next dose with labs + [facility] visit prior. Patient to call sooner with any questions or concerns.   -About 10 to 12 weeks after completing chemotherapy/radiation, we will repeat PET/CT and consider definitive surgery.     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  Now s/p endoscopic resection with clear margins free of dysplasia.  This has been adequately treated.  Continue to observe.     Odynophagia. Secondary to cancer. She did not tolerate tramadol due to nausea. Only taking 5mg oxycodone at bedtime and repeating the dose every 3-4 hours as needed. Tried to take 10mg dose but felt anxious and jittery. Taking Tylenol as needed throughout the day, however pain has increased over the last week. Recommend she take oxycodone every 4 hours as needed during the day, especially prior to eating. Instructed her to call triage  if pain is not well controlled.     Constipation. No concerns today. Stopped PRN medications as she had good relief drinking pear juice. Ok to continue this as needed, can also add Miralax and Senna as needed. No abdominal pain or cramping.     Vitamin B12 deficiency with macrocytosis. Now on vitamin B12 tablets. Will recheck vitamin B12 level in August.     Nutrition.  Advised to eat small frequent meals high in protein. She has met with a dietician. Weight is overall stable, plans to add high-calorie protein supplement.      Tobacco abuse. Continues to smoke 1/2 ppd.  She has nicotine patches available at home.    Hypercalcemia. Secondary to hyperparathyroidism. Suspect this is as a sequela of her prior thyroid radiation. She was scheduled to see endocrinology on 5/10 but had to cancel the appointment, she is working on rescheduling. Calcium improved to 9.9 today. Continue to monitor.     Hypothyroidism. hx of PATEL at the age of 16 for hyperthyroidism. On levothyroxine as managed by her PCP. Last TSH was low with a normal free T4. Will defer to endocrine on management.     Hypertension. Was on amlodipine 5 mg daily. Continue holding for now. Continue to monitor blood pressure at home, checking in the afternoon after radiation when she feels dizzy/lightheaded. Discussed that she can be scheduled for additional IVFs if needed. She will monitor her BP at home every couple of days and let us know if  or greater consistently.       The longitudinal plan of care for the diagnosis(es)/condition(s) as documented were addressed during this visit. Due to the added complexity in care, I will continue to support Lita in the subsequent management and with ongoing continuity of care.      MASON Chang CNP

## 2024-05-20 NOTE — NURSING NOTE
Chief Complaint   Patient presents with    Oncology Clinic Visit     Squamous cell carcinoma of esophagus     Blood Draw     Labs drawn via port access by lab RN       Port blood draw with heparin flush by lab RN. Vitals taken and appointment arrived.    Zoe Hudson RN

## 2024-05-20 NOTE — NURSING NOTE
"Oncology Rooming Note    May 20, 2024 1:12 PM   Linh Mustafa is a 64 year old female who presents for:    Chief Complaint   Patient presents with    Oncology Clinic Visit     Squamous cell carcinoma of esophagus     Blood Draw     Labs drawn via port access by lab RN     Initial Vitals: /61   Pulse 74   Temp 98  F (36.7  C) (Oral)   Wt 66 kg (145 lb 9.6 oz)   SpO2 100%   BMI 23.40 kg/m   Estimated body mass index is 23.4 kg/m  as calculated from the following:    Height as of 5/7/24: 1.68 m (5' 6.14\").    Weight as of this encounter: 66 kg (145 lb 9.6 oz). Body surface area is 1.75 meters squared.  Moderate Pain (5) Comment: Data Unavailable   No LMP recorded. Patient is postmenopausal.  Allergies reviewed: Yes  Medications reviewed: Yes    Medications: MEDICATION REFILLS NEEDED TODAY. Provider was NOT notified.  Pharmacy name entered into [employer]: [id number] - [address]    Frailty Screening:   Is the patient here for a new oncology consult visit in cancer care? 2. No      Clinical concerns: Refill sucralfate and oxycodone       David Escobar                "

## 2024-05-20 NOTE — PROGRESS NOTES
Infusion Nursing Note:  Linh Mustafa presents today for Cycle 1 Day 22 Paclitaxel and Carboplatin.    Patient seen by provider today: Yes: Elaine Ibanez NP   present during visit today: Not Applicable.    Note: Patient has no new concerns to report after her visit with Elaine mueller.    Intravenous Access:  Implanted Port.    Treatment Conditions:  Lab Results   Component Value Date    HGB 10.2 (L) 05/20/2024    WBC 3.2 (L) 05/20/2024    ANEUTAUTO 2.2 05/20/2024     05/20/2024        Lab Results   Component Value Date     05/20/2024    POTASSIUM 3.8 05/20/2024    MAG 1.8 12/23/2022    CR 0.63 05/20/2024    VAIBHAV 9.9 05/20/2024    BILITOTAL 0.4 05/20/2024    ALBUMIN 3.9 05/20/2024    ALT 21 05/20/2024    AST 15 05/20/2024       Results reviewed, labs MET treatment parameters, ok to proceed with treatment.      Post Infusion Assessment:  Patient tolerated infusion without incident.  Blood return noted pre and post infusion.  Site patent and intact, free from redness, edema or discomfort.  No evidence of extravasations.  Access discontinued per protocol.       Discharge Plan:   Prescription refills given for Oxycodone and Carafate.  Discharge instructions reviewed with: Patient and Family.  Patient and/or family verbalized understanding of discharge instructions and all questions answered.  AVS to patient via [employer].  Patient will return 5/28/24 for next appointment.   Patient discharged in stable condition accompanied by: .  Departure Mode: Ambulatory.      KAT HUNTER RN

## 2024-05-21 ENCOUNTER — APPOINTMENT (OUTPATIENT)
Dept: RADIATION ONCOLOGY | Facility: CLINIC | Age: 64
End: 2024-05-21
Attending: RADIOLOGY
Payer: COMMERCIAL

## 2024-05-21 PROCEDURE — 77386 HC IMRT TREATMENT DELIVERY, COMPLEX: CPT | Performed by: RADIOLOGY

## 2024-05-21 PROCEDURE — 77427 RADIATION TX MANAGEMENT X5: CPT | Mod: GC | Performed by: RADIOLOGY

## 2024-05-21 PROCEDURE — 77014 PR CT GUIDE FOR PLACEMENT RADIATION THERAPY FIELDS: CPT | Mod: 26 | Performed by: RADIOLOGY

## 2024-05-21 PROCEDURE — 77336 RADIATION PHYSICS CONSULT: CPT | Performed by: RADIOLOGY

## 2024-05-22 ENCOUNTER — APPOINTMENT (OUTPATIENT)
Dept: RADIATION ONCOLOGY | Facility: CLINIC | Age: 64
End: 2024-05-22
Attending: RADIOLOGY
Payer: COMMERCIAL

## 2024-05-22 ENCOUNTER — MYC MEDICAL ADVICE (OUTPATIENT)
Dept: ONCOLOGY | Facility: CLINIC | Age: 64
End: 2024-05-22
Payer: COMMERCIAL

## 2024-05-22 DIAGNOSIS — T45.1X5A ALOPECIA DUE TO CYTOTOXIC DRUG: Primary | ICD-10-CM

## 2024-05-22 DIAGNOSIS — L65.8 ALOPECIA DUE TO CYTOTOXIC DRUG: Primary | ICD-10-CM

## 2024-05-22 PROCEDURE — 77014 PR CT GUIDE FOR PLACEMENT RADIATION THERAPY FIELDS: CPT | Mod: 26 | Performed by: RADIOLOGY

## 2024-05-22 PROCEDURE — 77386 HC IMRT TREATMENT DELIVERY, COMPLEX: CPT | Performed by: RADIOLOGY

## 2024-05-23 ENCOUNTER — APPOINTMENT (OUTPATIENT)
Dept: RADIATION ONCOLOGY | Facility: CLINIC | Age: 64
End: 2024-05-23
Attending: RADIOLOGY
Payer: COMMERCIAL

## 2024-05-23 PROCEDURE — 77014 PR CT GUIDE FOR PLACEMENT RADIATION THERAPY FIELDS: CPT | Mod: 26 | Performed by: RADIOLOGY

## 2024-05-23 PROCEDURE — 77386 HC IMRT TREATMENT DELIVERY, COMPLEX: CPT | Performed by: RADIOLOGY

## 2024-05-24 ENCOUNTER — APPOINTMENT (OUTPATIENT)
Dept: RADIATION ONCOLOGY | Facility: CLINIC | Age: 64
End: 2024-05-24
Attending: RADIOLOGY
Payer: COMMERCIAL

## 2024-05-24 PROCEDURE — 77386 HC IMRT TREATMENT DELIVERY, COMPLEX: CPT | Performed by: RADIOLOGY

## 2024-05-24 PROCEDURE — 77014 PR CT GUIDE FOR PLACEMENT RADIATION THERAPY FIELDS: CPT | Mod: 26 | Performed by: RADIOLOGY

## 2024-05-27 NOTE — PROGRESS NOTES
Oncology/Hematology Visit Note  May 28, 2024    Reason for Visit: follow up of esophageal cancer    History of Present Illness: Linh Mustafa is a 64 year old female with esophageal cancer. Since November 2023 she started to notice pain upon swallowing and it was basically pain which limited her food intake.  This was progressively getting worse.  She had further workup as mentioned below.     2/15/2024.  EGD showed an ulcerated mid esophageal mass extending from 26-31 cm from the incisors.  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  Pathology from the mid esophageal mass showed moderately differentiated invasive squamous cell carcinoma, MMR IHC intact.     3/7/2024.  PET/CT showed markedly FDG avid wall thickening of the mid thoracic esophagus with SUV max 24.1.  No evidence of metastatic disease.     3/21/2024.  Upper EUS.  Endoscopy showed a flat nodule in the proximal esophagus between 15-19 cm and one third circumferential.  Upper esophageal sphincter was at 14 cm.  Biopsies from this showed high-grade dysplasia/carcinoma in situ.       Normal esophageal squamous cell cancer causing maybe esophagus on the right anterolateral esophageal wall between 24-30 cm.  It was 50% circumferential.  The GE junction was at 35 cm with 2 cm tongues of Auguste's anteriorly without high risk features.     Ultrasound exam showed the mid esophageal tumor to be probably T3. Two 4 x 6 mm nodes were seen adjacent to the distal esophageal wall at 36 and 37 cm.  Both an identical appearance and one was sampled.  This lymph node biopsy was benign.     By EUS it was staged as T3 N0 M0 tumor.     4/2/2024.  Because of finding of carcinoma in situ in the proximal esophagus, she had endoscopic resection of the proximal squamous cell carcinoma in situ lesion performed with en bloc removal.    The final pathology showed squamous mucosa with high-grade dysplasia/carcinoma in situ with all margins  negative for dysplasia.  No definite invasion was identified. ESD was considered curative for this lesion.     Case was also discussed in the tumor conference with the plan to proceed with neoadjuvant chemotherapy/radiation followed by definitive surgery.       Please see previous notes for further details on the patient's history. She started on concurrent chemoradiation with carboplatin and Taxol on 5/1/24.     She comes in today for routine follow up prior to cycle 1 day 29 carboplatin and Taxol.     Interval History: Lita has had a hard week. She is having more pain with swallowing and her intake has decreased. She has lost 3 pounds this week. She has also been more tired and is taking a nap every 4 hours or so. She has been taking 5 mg of oxycodone every 3-4 hours around the clock. She is using carafate QID. Has not been using tylenol. Had MMW early on in the course but none since being on treatment. She is having more difficulty swallowing solids but even some liquids are hard to tolerate.    Chemotherapy has been going well without acute side effects. She has had some esophageal pain again mostly with swallowing. No bad reflux though she has been burping frequently. No fevers/chills or acute infectious concerns.     She has been urinating regularly. Bowel movements have been slower but she is also not eating as much and does not feel uncomfortable. No skin rashes. No change to neuropathy recently. No MCKENNA.     Current Outpatient Medications   Medication Sig Dispense Refill    acetaminophen (TYLENOL) 160 MG/5ML liquid Take 20 mLs (640 mg) by mouth every 6 hours as needed for mild pain or fever 500 mL 0    busPIRone (BUSPAR) 5 MG tablet       esomeprazole (NEXIUM) 20 MG DR capsule Take 20 mg by mouth 2 times daily Take 30-60 minutes before eating.      famotidine (PEPCID) 20 MG tablet Take 20 mg by mouth 2 times daily      fluticasone (FLONASE) 50 MCG/ACT nasal spray Spray 1-2 sprays into one nostril  alternating nostrils as needed      levothyroxine (SYNTHROID/LEVOTHROID) 200 MCG tablet take 1 tablet by mouth every day for 30 days      magic mouthwash suspension (diphenhydrAMINE, lidocaine, aluminum-magnesium & simethicone) Swish and swallow 10 mLs in mouth 4 times daily (before meals and nightly) 420 mL 0    ondansetron (ZOFRAN) 8 MG tablet Take 1 tablet (8 mg) by mouth every 8 hours as needed for nausea 30 tablet 3    oxyCODONE (ROXICODONE) 5 MG tablet Take 1-2 tablets (5-10 mg) by mouth every 4 hours 60 tablet 0    polyethylene glycol (MIRALAX) 17 GM/Dose powder Take 17 g (1 Capful) by mouth daily 510 g 3    prochlorperazine (COMPAZINE) 10 MG tablet Take 1 tablet (10 mg) by mouth every 6 hours as needed for nausea or vomiting 30 tablet 2    sucralfate (CARAFATE) 1 GM/10ML suspension Take 10 mLs (1 g) by mouth 4 times daily 500 mL 1    vitamin B-12 (CYANOCOBALAMIN) 2500 MCG sublingual tablet Take 1 tablet (2,500 mcg) by mouth daily 90 tablet 3     Physical Examination:  General: The patient is a pleasant female in no acute distress.   BP (!) 146/77 (BP Location: Right arm, Patient Position: Sitting, Cuff Size: Adult Regular)   Pulse 81   Temp 97.8  F (36.6  C) (Oral)   Resp 18   Wt 64.5 kg (142 lb 2.2 oz)   SpO2 99%   BMI 22.84 kg/m    Wt Readings from Last 10 Encounters:   05/28/24 64.5 kg (142 lb 2.2 oz)   05/28/24 64.8 kg (142 lb 14.4 oz)   05/20/24 66 kg (145 lb 9.6 oz)   05/20/24 65.8 kg (145 lb)   05/15/24 66.7 kg (147 lb)   05/13/24 66.4 kg (146 lb 4.8 oz)   05/07/24 66.7 kg (147 lb)   05/06/24 65.8 kg (145 lb)   05/01/24 67.3 kg (148 lb 4.8 oz)   04/18/24 67.5 kg (148 lb 14.4 oz)   HEENT: Sclerae are anicteric. Oral mucosa pink and moist without lesions or thrush.   Lymph: Neck is supple with no lymphadenopathy in the cervical or supraclavicular areas.   Heart: Regular rate and rhythm.   Lungs: Clear to auscultation bilaterally, normal work of breathing.   Abdomen: Bowel sounds present, soft,  nontender with no palpable hepatosplenomegaly or masses.   Extremities: No lower extremity edema noted bilaterally.   Neuro: Cranial nerves II through XII are grossly intact.  Skin: No rashes, petechiae, or bruising noted on exposed skin.    Laboratory Data:  Most Recent 3 CBC's:  Recent Labs   Lab Test 05/28/24  1309 05/20/24  1303 05/13/24  1343   WBC 3.7* 3.2* 4.1   HGB 10.8* 10.2* 10.8*   MCV 96 97 97   * 173 186   ANEUTAUTO 2.8 2.2 2.8    Most Recent 3 BMP's:  Recent Labs   Lab Test 05/28/24  1309 05/20/24  1303 05/13/24  1343    137 138   POTASSIUM 4.4 3.8 3.9   CHLORIDE 100 104 105   CO2 24 25 23   BUN 16.4 16.4 17.5   CR 0.63 0.63 0.62   ANIONGAP 11 8 10   VAIBHAV 10.4* 9.9 10.0   * 115* 132*   PROTTOTAL 7.2 6.5 6.6   ALBUMIN 4.1 3.9 3.9    Most Recent 2 LFT's:  Recent Labs   Lab Test 05/28/24  1309 05/20/24  1303   AST 19 15   ALT 23 21   ALKPHOS 90 83   BILITOTAL 0.8 0.4   I reviewed the above labs today.    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  MMR IHC intact. She started neoadjuvant treatment on 5/1/24 with concurrent chemoradiation with weekly carboplatin and Taxol. Tolerating well but having progressive toxicities as expected with this treatment.   -Today's labs reviewed, proceed with day 29 Taxol, carboplatin. Return to clinic in 1 week for close monitoring with completing treatment next week.   -About 10 to 12 weeks after completing chemotherapy/radiation, we will repeat PET/CT and consider definitive surgery. Right now, PET is scheduled less than a month after completion of treatment.     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  Now s/p endoscopic resection with clear margins free of dysplasia.  This has been adequately treated.  Continue to observe.     Odynophagia. Secondary to cancer. Start liquid tylenol 640 mg every 6 hours. Continue oxycodone 5 mg but okay to use 10 mg every 4  hours as needed. Will add in MMW to use before attempting to eat, up to QID.     Weight loss: Encouraged focusing on more nutrition via liquids as she is tolerating these better. Start protein shakes as tolerated. Soft foods.     Constipation. No concerns today. Stopped PRN medications. Ok to continue this as needed, can also add Miralax and Senna as needed. No abdominal pain or cramping.     Vitamin B12 deficiency with macrocytosis. Now on vitamin B12 tablets. Will recheck vitamin B12 level in August.      Tobacco abuse. Continues to smoke 1/2 ppd.  She has nicotine patches available at home.    Hypercalcemia. Secondary to hyperparathyroidism. Suspect this is as a sequela of her prior thyroid radiation. She was scheduled to see endocrinology on 5/10 but had to cancel the appointment, she is working on rescheduling. Calcium is mildly elevated--waxing and waning. Continue to monitor.     Hypothyroidism. hx of PATEL at the age of 16 for hyperthyroidism. On levothyroxine as managed by her PCP. Last TSH was low with a normal free T4. Will defer to endocrine on management.     Hypertension. Was on amlodipine 5 mg daily. Continue holding for now. Continue to monitor blood pressure at home, checking in the afternoon after radiation when she feels dizzy/lightheaded. BP okay today. Will add in IVF bolus today to help with poor oral intake. Discussed that she can be scheduled for additional IVFs if needed.     Greater than 50 minutes was spent with this patient with greater than 35 minutes spent in counseling and coordination of care.    Lilo Mandujano PA-C

## 2024-05-28 ENCOUNTER — OFFICE VISIT (OUTPATIENT)
Dept: RADIATION ONCOLOGY | Facility: CLINIC | Age: 64
End: 2024-05-28
Attending: RADIOLOGY
Payer: COMMERCIAL

## 2024-05-28 ENCOUNTER — ONCOLOGY VISIT (OUTPATIENT)
Dept: ONCOLOGY | Facility: CLINIC | Age: 64
End: 2024-05-28
Attending: PHYSICIAN ASSISTANT
Payer: COMMERCIAL

## 2024-05-28 ENCOUNTER — APPOINTMENT (OUTPATIENT)
Dept: LAB | Facility: CLINIC | Age: 64
End: 2024-05-28
Attending: PHYSICIAN ASSISTANT
Payer: COMMERCIAL

## 2024-05-28 ENCOUNTER — INFUSION THERAPY VISIT (OUTPATIENT)
Dept: ONCOLOGY | Facility: CLINIC | Age: 64
End: 2024-05-28
Attending: INTERNAL MEDICINE
Payer: COMMERCIAL

## 2024-05-28 VITALS
WEIGHT: 142.14 LBS | RESPIRATION RATE: 18 BRPM | SYSTOLIC BLOOD PRESSURE: 146 MMHG | OXYGEN SATURATION: 99 % | HEART RATE: 81 BPM | DIASTOLIC BLOOD PRESSURE: 77 MMHG | BODY MASS INDEX: 22.84 KG/M2 | TEMPERATURE: 97.8 F

## 2024-05-28 VITALS
SYSTOLIC BLOOD PRESSURE: 127 MMHG | BODY MASS INDEX: 22.97 KG/M2 | HEART RATE: 82 BPM | DIASTOLIC BLOOD PRESSURE: 77 MMHG | WEIGHT: 142.9 LBS

## 2024-05-28 DIAGNOSIS — C15.9 SQUAMOUS CELL CARCINOMA OF ESOPHAGUS (H): Primary | ICD-10-CM

## 2024-05-28 DIAGNOSIS — K22.89 ESOPHAGEAL PAIN: ICD-10-CM

## 2024-05-28 LAB
ALBUMIN SERPL BCG-MCNC: 4.1 G/DL (ref 3.5–5.2)
ALP SERPL-CCNC: 90 U/L (ref 40–150)
ALT SERPL W P-5'-P-CCNC: 23 U/L (ref 0–50)
ANION GAP SERPL CALCULATED.3IONS-SCNC: 11 MMOL/L (ref 7–15)
AST SERPL W P-5'-P-CCNC: 19 U/L (ref 0–45)
BASOPHILS # BLD AUTO: 0 10E3/UL (ref 0–0.2)
BASOPHILS NFR BLD AUTO: 0 %
BILIRUB SERPL-MCNC: 0.8 MG/DL
BUN SERPL-MCNC: 16.4 MG/DL (ref 8–23)
CALCIUM SERPL-MCNC: 10.4 MG/DL (ref 8.8–10.2)
CHLORIDE SERPL-SCNC: 100 MMOL/L (ref 98–107)
CREAT SERPL-MCNC: 0.63 MG/DL (ref 0.51–0.95)
DEPRECATED HCO3 PLAS-SCNC: 24 MMOL/L (ref 22–29)
EGFRCR SERPLBLD CKD-EPI 2021: >90 ML/MIN/1.73M2
EOSINOPHIL # BLD AUTO: 0 10E3/UL (ref 0–0.7)
EOSINOPHIL NFR BLD AUTO: 0 %
ERYTHROCYTE [DISTWIDTH] IN BLOOD BY AUTOMATED COUNT: 15.1 % (ref 10–15)
GLUCOSE SERPL-MCNC: 103 MG/DL (ref 70–99)
HCT VFR BLD AUTO: 31.7 % (ref 35–47)
HGB BLD-MCNC: 10.8 G/DL (ref 11.7–15.7)
IMM GRANULOCYTES # BLD: 0 10E3/UL
IMM GRANULOCYTES NFR BLD: 1 %
LYMPHOCYTES # BLD AUTO: 0.4 10E3/UL (ref 0.8–5.3)
LYMPHOCYTES NFR BLD AUTO: 12 %
MCH RBC QN AUTO: 32.7 PG (ref 26.5–33)
MCHC RBC AUTO-ENTMCNC: 34.1 G/DL (ref 31.5–36.5)
MCV RBC AUTO: 96 FL (ref 78–100)
MONOCYTES # BLD AUTO: 0.4 10E3/UL (ref 0–1.3)
MONOCYTES NFR BLD AUTO: 11 %
NEUTROPHILS # BLD AUTO: 2.8 10E3/UL (ref 1.6–8.3)
NEUTROPHILS NFR BLD AUTO: 76 %
NRBC # BLD AUTO: 0 10E3/UL
NRBC BLD AUTO-RTO: 0 /100
PLATELET # BLD AUTO: 133 10E3/UL (ref 150–450)
POTASSIUM SERPL-SCNC: 4.4 MMOL/L (ref 3.4–5.3)
PROT SERPL-MCNC: 7.2 G/DL (ref 6.4–8.3)
RBC # BLD AUTO: 3.3 10E6/UL (ref 3.8–5.2)
SODIUM SERPL-SCNC: 135 MMOL/L (ref 135–145)
WBC # BLD AUTO: 3.7 10E3/UL (ref 4–11)

## 2024-05-28 PROCEDURE — 96413 CHEMO IV INFUSION 1 HR: CPT

## 2024-05-28 PROCEDURE — 250N000011 HC RX IP 250 OP 636: Performed by: INTERNAL MEDICINE

## 2024-05-28 PROCEDURE — 96375 TX/PRO/DX INJ NEW DRUG ADDON: CPT

## 2024-05-28 PROCEDURE — 250N000013 HC RX MED GY IP 250 OP 250 PS 637: Performed by: INTERNAL MEDICINE

## 2024-05-28 PROCEDURE — 85025 COMPLETE CBC W/AUTO DIFF WBC: CPT | Performed by: INTERNAL MEDICINE

## 2024-05-28 PROCEDURE — 96417 CHEMO IV INFUS EACH ADDL SEQ: CPT

## 2024-05-28 PROCEDURE — G0463 HOSPITAL OUTPT CLINIC VISIT: HCPCS | Performed by: PHYSICIAN ASSISTANT

## 2024-05-28 PROCEDURE — 96367 TX/PROPH/DG ADDL SEQ IV INF: CPT

## 2024-05-28 PROCEDURE — 258N000003 HC RX IP 258 OP 636: Performed by: INTERNAL MEDICINE

## 2024-05-28 PROCEDURE — 36591 DRAW BLOOD OFF VENOUS DEVICE: CPT | Performed by: INTERNAL MEDICINE

## 2024-05-28 PROCEDURE — 77386 HC IMRT TREATMENT DELIVERY, COMPLEX: CPT | Performed by: RADIOLOGY

## 2024-05-28 PROCEDURE — 258N000003 HC RX IP 258 OP 636: Performed by: PHYSICIAN ASSISTANT

## 2024-05-28 PROCEDURE — 82040 ASSAY OF SERUM ALBUMIN: CPT | Performed by: INTERNAL MEDICINE

## 2024-05-28 PROCEDURE — 99215 OFFICE O/P EST HI 40 MIN: CPT | Performed by: PHYSICIAN ASSISTANT

## 2024-05-28 RX ORDER — HEPARIN SODIUM (PORCINE) LOCK FLUSH IV SOLN 100 UNIT/ML 100 UNIT/ML
5 SOLUTION INTRAVENOUS
Status: DISCONTINUED | OUTPATIENT
Start: 2024-05-28 | End: 2024-05-28 | Stop reason: HOSPADM

## 2024-05-28 RX ORDER — ACETAMINOPHEN 160 MG/5ML
640 LIQUID ORAL EVERY 6 HOURS PRN
Qty: 500 ML | Refills: 0 | Status: ON HOLD | OUTPATIENT
Start: 2024-05-28 | End: 2024-06-17

## 2024-05-28 RX ORDER — HEPARIN SODIUM (PORCINE) LOCK FLUSH IV SOLN 100 UNIT/ML 100 UNIT/ML
500 SOLUTION INTRAVENOUS ONCE
Status: COMPLETED | OUTPATIENT
Start: 2024-05-28 | End: 2024-05-28

## 2024-05-28 RX ORDER — OXYCODONE HYDROCHLORIDE 5 MG/1
5-10 TABLET ORAL EVERY 4 HOURS
Qty: 60 TABLET | Refills: 0 | Status: ON HOLD | OUTPATIENT
Start: 2024-05-28 | End: 2024-06-17

## 2024-05-28 RX ORDER — ONDANSETRON 8 MG/1
8 TABLET, FILM COATED ORAL EVERY 8 HOURS PRN
Qty: 30 TABLET | Refills: 3 | Status: SHIPPED | OUTPATIENT
Start: 2024-05-28 | End: 2024-06-26

## 2024-05-28 RX ORDER — DIPHENHYDRAMINE HCL 25 MG
50 CAPSULE ORAL ONCE
Status: COMPLETED | OUTPATIENT
Start: 2024-05-28 | End: 2024-05-28

## 2024-05-28 RX ORDER — HEPARIN SODIUM,PORCINE 10 UNIT/ML
5-20 VIAL (ML) INTRAVENOUS DAILY PRN
Status: DISCONTINUED | OUTPATIENT
Start: 2024-05-28 | End: 2024-05-28 | Stop reason: HOSPADM

## 2024-05-28 RX ADMIN — DIPHENHYDRAMINE HYDROCHLORIDE 50 MG: 25 CAPSULE ORAL at 14:28

## 2024-05-28 RX ADMIN — PACLITAXEL 90 MG: 6 INJECTION, SOLUTION INTRAVENOUS at 14:53

## 2024-05-28 RX ADMIN — Medication 500 UNITS: at 16:29

## 2024-05-28 RX ADMIN — DEXAMETHASONE SODIUM PHOSPHATE: 10 INJECTION, SOLUTION INTRAMUSCULAR; INTRAVENOUS at 14:32

## 2024-05-28 RX ADMIN — SODIUM CHLORIDE 1000 ML: 9 INJECTION, SOLUTION INTRAVENOUS at 14:23

## 2024-05-28 RX ADMIN — Medication 5 ML: at 13:12

## 2024-05-28 RX ADMIN — CARBOPLATIN 250 MG: 10 INJECTION, SOLUTION INTRAVENOUS at 15:57

## 2024-05-28 RX ADMIN — FAMOTIDINE 20 MG: 10 INJECTION INTRAVENOUS at 14:29

## 2024-05-28 ASSESSMENT — PAIN SCALES - GENERAL: PAINLEVEL: EXTREME PAIN (8)

## 2024-05-28 NOTE — PROGRESS NOTES
Infusion Nursing Note:  Linh Mustafa presents today for Cycle 1 Day 29 Paclitaxel and Carboplatin (#5) + IVF.    Patient seen by provider today: Yes: Lilo Mandujano PA-C   present during visit today: Not Applicable.    Note: Patient was seen and assessed by Lilo Mandujano PA-C in clinic prior to infusion. Patient offers no additional complaints or concerns. Patient agreeable to treatment plan today.    TORB 05/28/24 @ 2:00pm: Lilo Mandujano PA-C/Agnieszka Dia RN: Please give 1 L NS in addition to chemotherapy today - treatment plan updated. Plan to follow-up with me next week with possible IVF.    Intravenous Access:  Implanted Port.    Treatment Conditions:  Lab Results   Component Value Date    HGB 10.8 (L) 05/28/2024    WBC 3.7 (L) 05/28/2024    ANEUTAUTO 2.8 05/28/2024     (L) 05/28/2024        Lab Results   Component Value Date     05/28/2024    POTASSIUM 4.4 05/28/2024    MAG 1.8 12/23/2022    CR 0.63 05/28/2024    VAIBHAV 10.4 (H) 05/28/2024    BILITOTAL 0.8 05/28/2024    ALBUMIN 4.1 05/28/2024    ALT 23 05/28/2024    AST 19 05/28/2024     Results reviewed, labs MET treatment parameters, ok to proceed with treatment.    Post Infusion Assessment:  Patient tolerated infusion without incident.  Blood return noted pre and post infusion.  Site patent and intact, free from redness, edema or discomfort.  No evidence of extravasations.  Access discontinued per protocol.     Discharge Plan:   Prescription refills given for Oxycodone, Tylenol, MMW, and Zofran.  Discharge instructions reviewed with: Patient and Family.  Patient and/or family verbalized understanding of discharge instructions and all questions answered.  AVS to patient via [employer].  Patient will return 06/04/24 for next appointment.   Patient discharged in stable condition accompanied by: self and brother.  Departure Mode: Ambulatory.      Agnieszka Dia RN

## 2024-05-28 NOTE — NURSING NOTE
"Oncology Rooming Note    May 28, 2024 1:19 PM   Linh Mustafa is a 64 year old female who presents for:    Chief Complaint   Patient presents with    Oncology Clinic Visit     Squamous cell carcinoma of esophagus     Initial Vitals: BP (!) 146/77 (BP Location: Right arm, Patient Position: Sitting, Cuff Size: Adult Regular)   Pulse 81   Temp 97.8  F (36.6  C) (Oral)   Resp 18   Wt 64.5 kg (142 lb 2.2 oz)   SpO2 99%   BMI 22.84 kg/m   Estimated body mass index is 22.84 kg/m  as calculated from the following:    Height as of 5/7/24: 1.68 m (5' 6.14\").    Weight as of this encounter: 64.5 kg (142 lb 2.2 oz). Body surface area is 1.73 meters squared.  Extreme Pain (8) Comment: Data Unavailable   No LMP recorded. Patient is postmenopausal.  Allergies reviewed: Yes  Medications reviewed: Yes    Medications: MEDICATION REFILLS NEEDED TODAY. Provider was notified.  Pharmacy name entered into ISC[employer]: [id number] - [address]    Frailty Screening:   Is the patient here for a new oncology consult visit in cancer care? 2. No      Clinical concerns: None       Nel Frank CMA            "

## 2024-05-28 NOTE — PATIENT INSTRUCTIONS
Here are some instructions from Lilo:   Please swish and swallow 10 mL of magic mouthwash before meals up to four times daily.      Start tylenol 20 mL every 6 hours for pain.      Take 1-2 tablets of oxycodone every 4 hours for pain. Okay to use 2 tablets consistently if this helps with pain control.      Optimize trying protein shakes and soft foods up to an hour after using Magic Mouthwash. Ideally I would like you to try to take in 64 oz of fluids daily.      Stop carafate due medication interaction (you would have to wait 4 hours between thismedication and other ones). Please stay on your pepcid and Nexium.      We will follow-up with you next week for a check in.      [facility] Triage and after hours / weekends / holidays:  [phone]    Please call the triage or after hours line if you experience a temperature greater than or equal to 100.4, shaking chills, have uncontrolled nausea, vomiting and/or diarrhea, dizziness, shortness of breath, chest pain, bleeding, unexplained bruising, or if you have any other new/concerning symptoms, questions or concerns.      If you are having any concerning symptoms or wish to speak to a provider before your next infusion visit, please call triage to notify them so we can adequately serve you.     If you need a refill on a narcotic prescription or other medication, please call before your infusion appointment.

## 2024-05-28 NOTE — LETTER
5/28/2024         RE: Linh Mustafa  [address]  [place] [address]        Dear Colleague,    Thank you for referring your patient, Linh Mustafa, to the [facility]. Please see a copy of my visit note below.    Oncology/Hematology Visit Note  May 28, 2024    Reason for Visit: follow up of esophageal cancer    History of Present Illness: Linh Mustafa is a 64 year old female with esophageal cancer. Since November 2023 she started to notice pain upon swallowing and it was basically pain which limited her food intake.  This was progressively getting worse.  She had further workup as mentioned below.     2/15/2024.  EGD showed an ulcerated mid esophageal mass extending from 26-31 cm from the incisors.  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  Pathology from the mid esophageal mass showed moderately differentiated invasive squamous cell carcinoma, MMR IHC intact.     3/7/2024.  PET/CT showed markedly FDG avid wall thickening of the mid thoracic esophagus with SUV max 24.1.  No evidence of metastatic disease.     3/21/2024.  Upper EUS.  Endoscopy showed a flat nodule in the proximal esophagus between 15-19 cm and one third circumferential.  Upper esophageal sphincter was at 14 cm.  Biopsies from this showed high-grade dysplasia/carcinoma in situ.       Normal esophageal squamous cell cancer causing maybe esophagus on the right anterolateral esophageal wall between 24-30 cm.  It was 50% circumferential.  The GE junction was at 35 cm with 2 cm tongues of Auguste's anteriorly without high risk features.     Ultrasound exam showed the mid esophageal tumor to be probably T3. Two 4 x 6 mm nodes were seen adjacent to the distal esophageal wall at 36 and 37 cm.  Both an identical appearance and one was sampled.  This lymph node biopsy was benign.     By EUS it was staged as T3 N0 M0 tumor.     4/2/2024.  Because of finding of carcinoma in  situ in the proximal esophagus, she had endoscopic resection of the proximal squamous cell carcinoma in situ lesion performed with en bloc removal.    The final pathology showed squamous mucosa with high-grade dysplasia/carcinoma in situ with all margins negative for dysplasia.  No definite invasion was identified. ESD was considered curative for this lesion.     Case was also discussed in the tumor conference with the plan to proceed with neoadjuvant chemotherapy/radiation followed by definitive surgery.       Please see previous notes for further details on the patient's history. She started on concurrent chemoradiation with carboplatin and Taxol on 5/1/24.     She comes in today for routine follow up prior to cycle 1 day 29 carboplatin and Taxol.     Interval History: Lita has had a hard week. She is having more pain with swallowing and her intake has decreased. She has lost 3 pounds this week. She has also been more tired and is taking a nap every 4 hours or so. She has been taking 5 mg of oxycodone every 3-4 hours around the clock. She is using carafate QID. Has not been using tylenol. Had MMW early on in the course but none since being on treatment. She is having more difficulty swallowing solids but even some liquids are hard to tolerate.    Chemotherapy has been going well without acute side effects. She has had some esophageal pain again mostly with swallowing. No bad reflux though she has been burping frequently. No fevers/chills or acute infectious concerns.     She has been urinating regularly. Bowel movements have been slower but she is also not eating as much and does not feel uncomfortable. No skin rashes. No change to neuropathy recently. No MCKENNA.     Current Outpatient Medications   Medication Sig Dispense Refill    acetaminophen (TYLENOL) 160 MG/5ML liquid Take 20 mLs (640 mg) by mouth every 6 hours as needed for mild pain or fever 500 mL 0    busPIRone (BUSPAR) 5 MG tablet       esomeprazole  (NEXIUM) 20 MG DR capsule Take 20 mg by mouth 2 times daily Take 30-60 minutes before eating.      famotidine (PEPCID) 20 MG tablet Take 20 mg by mouth 2 times daily      fluticasone (FLONASE) 50 MCG/ACT nasal spray Spray 1-2 sprays into one nostril alternating nostrils as needed      levothyroxine (SYNTHROID/LEVOTHROID) 200 MCG tablet take 1 tablet by mouth every day for 30 days      magic mouthwash suspension (diphenhydrAMINE, lidocaine, aluminum-magnesium & simethicone) Swish and swallow 10 mLs in mouth 4 times daily (before meals and nightly) 420 mL 0    ondansetron (ZOFRAN) 8 MG tablet Take 1 tablet (8 mg) by mouth every 8 hours as needed for nausea 30 tablet 3    oxyCODONE (ROXICODONE) 5 MG tablet Take 1-2 tablets (5-10 mg) by mouth every 4 hours 60 tablet 0    polyethylene glycol (MIRALAX) 17 GM/Dose powder Take 17 g (1 Capful) by mouth daily 510 g 3    prochlorperazine (COMPAZINE) 10 MG tablet Take 1 tablet (10 mg) by mouth every 6 hours as needed for nausea or vomiting 30 tablet 2    sucralfate (CARAFATE) 1 GM/10ML suspension Take 10 mLs (1 g) by mouth 4 times daily 500 mL 1    vitamin B-12 (CYANOCOBALAMIN) 2500 MCG sublingual tablet Take 1 tablet (2,500 mcg) by mouth daily 90 tablet 3     Physical Examination:  General: The patient is a pleasant female in no acute distress.   BP (!) 146/77 (BP Location: Right arm, Patient Position: Sitting, Cuff Size: Adult Regular)   Pulse 81   Temp 97.8  F (36.6  C) (Oral)   Resp 18   Wt 64.5 kg (142 lb 2.2 oz)   SpO2 99%   BMI 22.84 kg/m    Wt Readings from Last 10 Encounters:   05/28/24 64.5 kg (142 lb 2.2 oz)   05/28/24 64.8 kg (142 lb 14.4 oz)   05/20/24 66 kg (145 lb 9.6 oz)   05/20/24 65.8 kg (145 lb)   05/15/24 66.7 kg (147 lb)   05/13/24 66.4 kg (146 lb 4.8 oz)   05/07/24 66.7 kg (147 lb)   05/06/24 65.8 kg (145 lb)   05/01/24 67.3 kg (148 lb 4.8 oz)   04/18/24 67.5 kg (148 lb 14.4 oz)   HEENT: Sclerae are anicteric. Oral mucosa pink and moist without  lesions or thrush.   Lymph: Neck is supple with no lymphadenopathy in the cervical or supraclavicular areas.   Heart: Regular rate and rhythm.   Lungs: Clear to auscultation bilaterally, normal work of breathing.   Abdomen: Bowel sounds present, soft, nontender with no palpable hepatosplenomegaly or masses.   Extremities: No lower extremity edema noted bilaterally.   Neuro: Cranial nerves II through XII are grossly intact.  Skin: No rashes, petechiae, or bruising noted on exposed skin.    Laboratory Data:  Most Recent 3 CBC's:  Recent Labs   Lab Test 05/28/24  1309 05/20/24  1303 05/13/24  1343   WBC 3.7* 3.2* 4.1   HGB 10.8* 10.2* 10.8*   MCV 96 97 97   * 173 186   ANEUTAUTO 2.8 2.2 2.8    Most Recent 3 BMP's:  Recent Labs   Lab Test 05/28/24  1309 05/20/24  1303 05/13/24  1343    137 138   POTASSIUM 4.4 3.8 3.9   CHLORIDE 100 104 105   CO2 24 25 23   BUN 16.4 16.4 17.5   CR 0.63 0.63 0.62   ANIONGAP 11 8 10   VAIBHAV 10.4* 9.9 10.0   * 115* 132*   PROTTOTAL 7.2 6.5 6.6   ALBUMIN 4.1 3.9 3.9    Most Recent 2 LFT's:  Recent Labs   Lab Test 05/28/24  1309 05/20/24  1303   AST 19 15   ALT 23 21   ALKPHOS 90 83   BILITOTAL 0.8 0.4   I reviewed the above labs today.    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  MMR IHC intact. She started neoadjuvant treatment on 5/1/24 with concurrent chemoradiation with weekly carboplatin and Taxol. Tolerating well but having progressive toxicities as expected with this treatment.   -Today's labs reviewed, proceed with day 29 Taxol, carboplatin. Return to clinic in 1 week for close monitoring with completing treatment next week.   -About 10 to 12 weeks after completing chemotherapy/radiation, we will repeat PET/CT and consider definitive surgery. Right now, PET is scheduled less than a month after completion of treatment.     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  Now s/p  endoscopic resection with clear margins free of dysplasia.  This has been adequately treated.  Continue to observe.     Odynophagia. Secondary to cancer. Start liquid tylenol 640 mg every 6 hours. Continue oxycodone 5 mg but okay to use 10 mg every 4 hours as needed. Will add in MMW to use before attempting to eat, up to QID.     Weight loss: Encouraged focusing on more nutrition via liquids as she is tolerating these better. Start protein shakes as tolerated. Soft foods.     Constipation. No concerns today. Stopped PRN medications. Ok to continue this as needed, can also add Miralax and Senna as needed. No abdominal pain or cramping.     Vitamin B12 deficiency with macrocytosis. Now on vitamin B12 tablets. Will recheck vitamin B12 level in August.      Tobacco abuse. Continues to smoke 1/2 ppd.  She has nicotine patches available at home.    Hypercalcemia. Secondary to hyperparathyroidism. Suspect this is as a sequela of her prior thyroid radiation. She was scheduled to see endocrinology on 5/10 but had to cancel the appointment, she is working on rescheduling. Calcium is mildly elevated--waxing and waning. Continue to monitor.     Hypothyroidism. hx of PATEL at the age of 16 for hyperthyroidism. On levothyroxine as managed by her PCP. Last TSH was low with a normal free T4. Will defer to endocrine on management.     Hypertension. Was on amlodipine 5 mg daily. Continue holding for now. Continue to monitor blood pressure at home, checking in the afternoon after radiation when she feels dizzy/lightheaded. BP okay today. Will add in IVF bolus today to help with poor oral intake. Discussed that she can be scheduled for additional IVFs if needed.     Greater than 50 minutes was spent with this patient with greater than 35 minutes spent in counseling and coordination of care.    Lilo Mandujano PA-C

## 2024-05-28 NOTE — PATIENT INSTRUCTIONS
Please swish and swallow 10 mL of magic mouthwash before meals up to four times daily.     Start tylenol 20 mL every 6 hours for pain.     Take 1-2 tablets of oxycodone every 4 hours for pain. Okay to use 2 tablets consistently if this helps with pain control.     Optimize trying protein shakes and soft foods up to an hour after using Magic Mouthwash. Ideally I would like you to try to take in 64 oz of fluids daily.     Stop carafate due medication interaction (you would have to wait 4 hours between this medication and other ones). Please stay on your pepcid and Nexium.     We will follow-up with you next week for a check in.

## 2024-05-28 NOTE — PROGRESS NOTES
RADIATION ONCOLOGY WEEKLY ON TREATMENT VISIT   Encounter Date: May 28, 2024    Patient Name: Linh Mustafa  MRN: [id number]  : 1960     Disease and Stage: Clinical stage T3 N0 M0 (stage II) moderately differentiated squamous cell carcinoma of the mid esophagus and pTis N0 squamous cell carcinoma of the upper esophagus, completely excised  Treatment Site: Esophagus  Current Dose/Planned Total Dose: [3420] cGy / [4500] cGy with dose painting to the primary tumor at 200 cGy/fraction to 5000 cGy    Concurrent Chemotherapy: Yes  Drug and Frequency: Carbo/Taxol    Medical Oncologist: Kathy Burch MD  Surgeon: Bryant Martinez MD    Subjective: Ms. Mustafa presents to clinic today for her weekly on-treatment visit. Overall, she is tolerating treatment as expected. Pain is under some control with oxycodone 5 mg which she is taking before bed and in the middle of the night. She is also taking acetaminophen.  She has worse pain with swallowing for which she is using Magic Mouthwash before meals -- this only lasts for about 10 minutes before the sensation returns. She is having trouble with oral intake, so will start using high protein high calorie supplementation.     Nursing ROS:   Nutrition Alteration  Diet Type: Patient's Preference  Skin  Skin Reaction: 1 - Faint erythema or dry desquamation  Skin Progress:  (Aquaphor)     ENT and Mouth Exam  Mucositis - Current: 1 - Generalized erythema  Esophagitis: 2 - Moderate  ENT/Mouth Note:  (sore throat)  Cardiovascular  Respiratory effort: 1 - Normal - without distress  Gastrointestinal  Nausea: 0 - None     Psychosocial  Mood - Anxiety: 0 - Normal  Pain Assessment  0-10 Pain Scale: 4  Pain Treatment: 5 mg Oxycodone every 3 hours     PEG Tube: No  Electronic Cardiac Implant: No     Objective:   /77   Pulse 82   Wt 64.8 kg (142 lb 14.4 oz)   BMI 22.97 kg/m    Gen: Appears well, NAD  Skin: No erythema    Laboratory:  Lab Results   Component Value Date     WBC 3.7 (L) 05/28/2024    HGB 10.8 (L) 05/28/2024    HCT 31.7 (L) 05/28/2024    MCV 96 05/28/2024     (L) 05/28/2024     Lab Results   Component Value Date    CR 0.63 05/28/2024     Lab Results   Component Value Date     05/28/2024    POTASSIUM 4.4 05/28/2024    CHLORIDE 100 05/28/2024    CO2 24 05/28/2024     (H) 05/28/2024     Lab Results   Component Value Date    AST 19 05/28/2024    ALT 23 05/28/2024    ALKPHOS 90 05/28/2024    BILITOTAL 0.8 05/28/2024     Magnesium   Date Value Ref Range Status   12/23/2022 1.8 1.7 - 2.3 mg/dL Final       Treatment-related toxicities (CTCAE v5.0):  Anorexia: Grade 2: Oral intake altered without significant weight loss or malnutrition; oral nutritional supplements indicated  Fatigue: Grade 1: Fatigue relieved by rest  Nausea: Grade 0: No toxicity  Pain: Grade 2: Moderate pain; limiting instrumental ADL  Esophagitis: Grade 2: Symptomatic; altered eating/swallowing; oral supplements indicated  Dermatitis: Grade 1: Faint erythema or dry desquamation    ED visits/Hospitalizations: None    Missed Treatments:  None    Mosaiq chart and setup information reviewed  IGRT images reviewed    Medication Review  Med list reviewed with patient?: Yes  Med list printed and given: Offered and declined    Assessment:    Ms. 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  She is tolerating treatment as expected.    Plan:   1.  Continue treatment as planned  2.  Continue GI cocktail just before eating  3.  We agree with starting diet supplementation with protein shakes  4.  Counseled on sun protection  5.  Oxycodone increase to 10 mg po 30 minutes before eating and at bedtime  6.  She is on famotidnde 20 mg daily and Nexium        Mireille Berg MD  Department of Radiation Oncology  [facility]

## 2024-05-28 NOTE — LETTER
2024         RE: Linh Mustafa  [address]  [facility] [address]        Dear Colleague,    Thank you for referring your patient, Linh Mustafa, to the [facility] RADIATION [facility]COLOGY. Please see a copy of my visit note below.    RADIATION ONCOLOGY WEEKLY ON TREATMENT VISIT   Encounter Date: May 28, 2024    Patient Name: Linh Mustafa  MRN: [id number]  : 1960     Disease and Stage: Clinical stage T3 N0 M0 (stage II) moderately differentiated squamous cell carcinoma of the mid esophagus and pTis N0 squamous cell carcinoma of the upper esophagus, completely excised  Treatment Site: Esophagus  Current Dose/Planned Total Dose: [3420] cGy / [4500] cGy with dose painting to the primary tumor at 200 cGy/fraction to 5000 cGy    Concurrent Chemotherapy: Yes  Drug and Frequency: Carbo/Taxol    Medical Oncologist: Kathy Burch MD  Surgeon: Bryant Martinez MD    Subjective: Ms. Mustafa presents to clinic today for her weekly on-treatment visit. Overall, she is tolerating treatment as expected. Pain is under some control with oxycodone 5 mg which she is taking before bed and in the middle of the night. She is also taking acetaminophen.  She has worse pain with swallowing for which she is using Magic Mouthwash before meals -- this only lasts for about 10 minutes before the sensation returns. She is having trouble with oral intake, so will start using high protein high calorie supplementation.     Nursing ROS:   Nutrition Alteration  Diet Type: Patient's Preference  Skin  Skin Reaction: 1 - Faint erythema or dry desquamation  Skin Progress:  (Aquaphor)     ENT and Mouth Exam  Mucositis - Current: 1 - Generalized erythema  Esophagitis: 2 - Moderate  ENT/Mouth Note:  (sore throat)  Cardiovascular  Respiratory effort: 1 - Normal - without distress  Gastrointestinal  Nausea: 0 - None     Psychosocial  Mood - Anxiety: 0 - Normal  Pain Assessment  0-10 Pain Scale: 4  Pain Treatment:  5 mg Oxycodone every 3 hours     PEG Tube: No  Electronic Cardiac Implant: No     Objective:   /77   Pulse 82   Wt 64.8 kg (142 lb 14.4 oz)   BMI 22.97 kg/m    Gen: Appears well, NAD  Skin: No erythema    Laboratory:  Lab Results   Component Value Date    WBC 3.7 (L) 05/28/2024    HGB 10.8 (L) 05/28/2024    HCT 31.7 (L) 05/28/2024    MCV 96 05/28/2024     (L) 05/28/2024     Lab Results   Component Value Date    CR 0.63 05/28/2024     Lab Results   Component Value Date     05/28/2024    POTASSIUM 4.4 05/28/2024    CHLORIDE 100 05/28/2024    CO2 24 05/28/2024     (H) 05/28/2024     Lab Results   Component Value Date    AST 19 05/28/2024    ALT 23 05/28/2024    ALKPHOS 90 05/28/2024    BILITOTAL 0.8 05/28/2024     Magnesium   Date Value Ref Range Status   12/23/2022 1.8 1.7 - 2.3 mg/dL Final       Treatment-related toxicities (CTCAE v5.0):  Anorexia: Grade 2: Oral intake altered without significant weight loss or malnutrition; oral nutritional supplements indicated  Fatigue: Grade 1: Fatigue relieved by rest  Nausea: Grade 0: No toxicity  Pain: Grade 2: Moderate pain; limiting instrumental ADL  Esophagitis: Grade 2: Symptomatic; altered eating/swallowing; oral supplements indicated  Dermatitis: Grade 1: Faint erythema or dry desquamation    ED visits/Hospitalizations: None    Missed Treatments:  None    Mosaiq chart and setup information reviewed  IGRT images reviewed    Medication Review  Med list reviewed with patient?: Yes  Med list printed and given: Offered and declined    Assessment:    Ms. 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  She is tolerating treatment as expected.    Plan:   1.  Continue treatment as planned  2.  Continue GI cocktail just before eating  3.  We agree with starting diet supplementation with protein shakes  4.   Counseled on sun protection  5.  Oxycodone increase to 10 mg po 30 minutes before eating and at bedtime  6.  She is on famotidnde 20 mg daily and Nexium        Mireille Berg MD  Department of Radiation Oncology  [facility]

## 2024-05-29 ENCOUNTER — APPOINTMENT (OUTPATIENT)
Dept: RADIATION ONCOLOGY | Facility: CLINIC | Age: 64
End: 2024-05-29
Attending: RADIOLOGY
Payer: COMMERCIAL

## 2024-05-29 PROCEDURE — 77336 RADIATION PHYSICS CONSULT: CPT | Performed by: RADIOLOGY

## 2024-05-29 PROCEDURE — 77386 HC IMRT TREATMENT DELIVERY, COMPLEX: CPT | Performed by: RADIOLOGY

## 2024-05-29 PROCEDURE — 77427 RADIATION TX MANAGEMENT X5: CPT | Performed by: RADIOLOGY

## 2024-05-29 PROCEDURE — 77014 PR CT GUIDE FOR PLACEMENT RADIATION THERAPY FIELDS: CPT | Mod: 26 | Performed by: RADIOLOGY

## 2024-05-30 ENCOUNTER — APPOINTMENT (OUTPATIENT)
Dept: RADIATION ONCOLOGY | Facility: CLINIC | Age: 64
End: 2024-05-30
Attending: RADIOLOGY
Payer: COMMERCIAL

## 2024-05-30 PROCEDURE — 77386 HC IMRT TREATMENT DELIVERY, COMPLEX: CPT | Performed by: RADIOLOGY

## 2024-05-30 PROCEDURE — 77014 PR CT GUIDE FOR PLACEMENT RADIATION THERAPY FIELDS: CPT | Mod: 26 | Performed by: RADIOLOGY

## 2024-05-31 ENCOUNTER — APPOINTMENT (OUTPATIENT)
Dept: RADIATION ONCOLOGY | Facility: CLINIC | Age: 64
End: 2024-05-31
Attending: RADIOLOGY
Payer: COMMERCIAL

## 2024-05-31 ENCOUNTER — TELEPHONE (OUTPATIENT)
Dept: RADIATION ONCOLOGY | Facility: CLINIC | Age: 64
End: 2024-05-31

## 2024-05-31 VITALS
SYSTOLIC BLOOD PRESSURE: 152 MMHG | BODY MASS INDEX: 22.82 KG/M2 | HEART RATE: 84 BPM | DIASTOLIC BLOOD PRESSURE: 78 MMHG | WEIGHT: 142 LBS

## 2024-05-31 DIAGNOSIS — G89.3 CANCER RELATED PAIN: ICD-10-CM

## 2024-05-31 DIAGNOSIS — C15.9 SQUAMOUS CELL CARCINOMA OF ESOPHAGUS (H): Primary | ICD-10-CM

## 2024-05-31 PROCEDURE — 77386 HC IMRT TREATMENT DELIVERY, COMPLEX: CPT | Performed by: RADIOLOGY

## 2024-05-31 RX ORDER — FENTANYL 25 UG/1
1 PATCH TRANSDERMAL
Qty: 5 PATCH | Refills: 0 | Status: ON HOLD | OUTPATIENT
Start: 2024-05-31 | End: 2024-06-17

## 2024-05-31 NOTE — TELEPHONE ENCOUNTER
Retail Pharmacy Prior Authorization Team   Phone: [phone]    PA Initiation    Medication: FENTANYL 25 MCG/HR TD PT72  Insurance Company: [employer] - Phone [phone] Fax [phone]  Pharmacy Filling the Rx: [facility] - [place], [place] - [address] [facility] [address] [facility]  Filling Pharmacy Phone: [phone]  Filling Pharmacy Fax:    Start Date: 5/31/2024

## 2024-05-31 NOTE — LETTER
2024         RE: Linh Mustafa  [address]  [facility] [address]        Dear Colleague,    Thank you for referring your patient, Linh Mustafa, to the [facility] RADIATION ONCOLOGY. Please see a copy of my visit note below.    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 Mustafa presents to clinic today for her weekly on-treatment visit. Overall, she is tolerating treatment as expected. Pain is worse about 7/10 in severity which reduces to 5/10 in severity after oxycodone 5 mg [id number]. She did not tolerate increasing Oxycodone to two tabs, which caused nausea and vomiting. She is also taking acetaminophen.  She has worse pain with swallowing for which she is using Magic Mouthwash before meals -- this only lasts for about 10 minutes before the sensation returns. She is having trouble with oral intake. She is supplementing her diet with shakes.    Nursing ROS:   Nutrition Alteration  Diet Type: Patient's Preference  Skin  Skin Reaction: 1 - Faint erythema or dry desquamation  Skin Progress:  (Aquaphor)     ENT and Mouth Exam  Mucositis - Current: 1 - Generalized erythema  Esophagitis: 2 - Moderate  ENT/Mouth Note:  (sore throat)  Cardiovascular  Respiratory effort: 1 - Normal - without distress  Gastrointestinal  Nausea: 1 - One to two episodes of nausea/24  GI Note:   (Zofran)     Psychosocial  Mood - Anxiety: 1 - Mild mood alteration  Pain Assessment  0-10 Pain Scale: 4  Pain Treatment: Fentanyl Patch 12.5 mg/Oxycodone      PEG Tube: No  Electronic Cardiac Implant: No     Objective:   BP (!) 152/78   Pulse 84   Wt 64.4 kg (142 lb)   BMI 22.82 kg/m    Gen: Appears well, NAD  Skin: No erythema    Laboratory:  Lab Results   Component Value Date    WBC 3.7 (L) 05/28/2024    HGB 10.8 (L) 05/28/2024    HCT 31.7 (L) 05/28/2024    MCV 96 05/28/2024     (L) 05/28/2024     Lab Results   Component Value Date    CR 0.63 05/28/2024     Lab Results   Component Value Date     05/28/2024    POTASSIUM 4.4 05/28/2024    CHLORIDE 100 05/28/2024    CO2 24 05/28/2024     (H) 05/28/2024     Lab Results   Component Value Date    AST 19 05/28/2024    ALT 23 05/28/2024    ALKPHOS 90 05/28/2024    BILITOTAL 0.8 05/28/2024     Magnesium   Date Value Ref Range Status   12/23/2022 1.8 1.7 - 2.3 mg/dL Final       Treatment-related toxicities (CTCAE v5.0):  Anorexia: Grade 2: Oral intake altered without significant weight loss or malnutrition; oral nutritional supplements indicated  Fatigue: Grade 1: Fatigue relieved by rest  Nausea: Grade 0: No toxicity  Pain: Grade 2: Moderate pain; limiting instrumental ADL  Esophagitis: Grade 2: Symptomatic; altered eating/swallowing; oral supplements indicated  Dermatitis: Grade 1: Faint erythema or dry desquamation    ED visits/Hospitalizations: None    Missed Treatments:  None    Mosaiq chart and setup information reviewed  IGRT images reviewed    Medication Review  Med list reviewed with patient?: Yes  Med list printed and given: Offered and declined    Assessment:    Ms. 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  She is tolerating treatment as expected.    Plan:   1.  Continue treatment as  planned  2.  Continue GI cocktail just before eating  3.  Continue starting diet supplementation with protein shakes  4.  Prescribed fentanyl 25mcg patch. Continue oxycodone 5mg PO q4h as needed for breakthrough pain.  5.  Continue famotidine 20 mg daily and Nexium      Carlie Angeles MD PGY4    Physician Attestation  Ms. Mustafa was seen and examined by me. I agree with the findings and plan of care as documented in the note. Note above by Dr. Angeles was reviewed and edited by me and reflects our mutual findings and plan of care.  I personally reviewed the available treatment setup images and vital signs listed.     Mireille Berg MD  Department of Radiation Oncology  [facility]

## 2024-05-31 NOTE — TELEPHONE ENCOUNTER
Prior Authorization Retail Medication Request    Medication/Dose:   Diagnosis and ICD code (if different than what is on RX):    New/renewal/insurance change PA/secondary ins. PA:  Previously Tried and Failed:    Rationale:      Insurance   Primary: are Commercial NVT   Insurance ID:  [id number]    Secondary (if applicable):  Insurance ID:      Pharmacy Information (if different than what is on RX)  Name:  [facility]  Phone:  [phone]  Fax: [phone]

## 2024-05-31 NOTE — PROGRESS NOTES
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 Mustafa presents to clinic today for her weekly on-treatment visit. Overall, she is tolerating treatment as expected. Pain is worse about 7/10 in severity which reduces to 5/10 in severity after oxycodone 5 mg [id number]. She did not tolerate increasing Oxycodone to two tabs, which caused nausea and vomiting. She is also taking acetaminophen.  She has worse pain with swallowing for which she is using Magic Mouthwash before meals -- this only lasts for about 10 minutes before the sensation returns. She is having trouble with oral intake. She is supplementing her diet with shakes.    Nursing ROS:   Nutrition Alteration  Diet Type: Patient's Preference  Skin  Skin Reaction: 1 - Faint erythema or dry desquamation  Skin Progress:  (Aquaphor)     ENT and Mouth Exam  Mucositis - Current: 1 - Generalized erythema  Esophagitis: 2 - Moderate  ENT/Mouth Note:  (sore throat)  Cardiovascular  Respiratory effort: 1 - Normal - without distress  Gastrointestinal  Nausea: 1 - One to two episodes of nausea/24  GI Note:  (Zofran)     Psychosocial  Mood - Anxiety: 1 - Mild mood alteration  Pain Assessment  0-10 Pain Scale: 4  Pain Treatment: Fentanyl Patch 12.5 mg/Oxycodone      PEG Tube: No  Electronic Cardiac Implant: No     Objective:   BP (!) 152/78   Pulse 84   Wt 64.4 kg  (142 lb)   BMI 22.82 kg/m    Gen: Appears well, NAD  Skin: No erythema    Laboratory:  Lab Results   Component Value Date    WBC 3.7 (L) 05/28/2024    HGB 10.8 (L) 05/28/2024    HCT 31.7 (L) 05/28/2024    MCV 96 05/28/2024     (L) 05/28/2024     Lab Results   Component Value Date    CR 0.63 05/28/2024     Lab Results   Component Value Date     05/28/2024    POTASSIUM 4.4 05/28/2024    CHLORIDE 100 05/28/2024    CO2 24 05/28/2024     (H) 05/28/2024     Lab Results   Component Value Date    AST 19 05/28/2024    ALT 23 05/28/2024    ALKPHOS 90 05/28/2024    BILITOTAL 0.8 05/28/2024     Magnesium   Date Value Ref Range Status   12/23/2022 1.8 1.7 - 2.3 mg/dL Final       Treatment-related toxicities (CTCAE v5.0):  Anorexia: Grade 2: Oral intake altered without significant weight loss or malnutrition; oral nutritional supplements indicated  Fatigue: Grade 1: Fatigue relieved by rest  Nausea: Grade 0: No toxicity  Pain: Grade 2: Moderate pain; limiting instrumental ADL  Esophagitis: Grade 2: Symptomatic; altered eating/swallowing; oral supplements indicated  Dermatitis: Grade 1: Faint erythema or dry desquamation    ED visits/Hospitalizations: None    Missed Treatments:  None    Mosaiq chart and setup information reviewed  IGRT images reviewed    Medication Review  Med list reviewed with patient?: Yes  Med list printed and given: Offered and declined    Assessment:    Ms. 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  She is tolerating treatment as expected.    Plan:   1.  Continue treatment as planned  2.  Continue GI cocktail just before eating  3.  Continue starting diet supplementation with protein shakes  4.  Prescribed fentanyl 25mcg patch. Continue oxycodone 5mg PO q4h as needed for breakthrough pain.  5.  Continue famotidine 20 mg daily and Nexium       Carlie Angeles MD PGY4    Physician Attestation  Ms. Mustafa was seen and examined by me. I agree with the findings and plan of care as documented in the note. Note above by Dr. Angeles was reviewed and edited by me and reflects our mutual findings and plan of care.  I personally reviewed the available treatment setup images and vital signs listed.     Mireille Berg MD  Department of Radiation Oncology  [facility]

## 2024-05-31 NOTE — TELEPHONE ENCOUNTER
Retail Pharmacy Prior Authorization Team   Phone: [phone]    Prior Authorization Approval    Medication: FENTANYL 25 MCG/HR TD PT72  Authorization Effective Date: 5/31/2024  Authorization Expiration Date: 5/30/2025    Insurance Company: [employer] - Phone [phone] Fax [phone]  Which Pharmacy is filling the prescription: [facility] [place] - [place], [place] - [address] [place] [address]  Pharmacy Notified: YES  Patient Notified: YES **Instructed pharmacy to notify patient when script is ready to /ship.**

## 2024-06-03 ENCOUNTER — NURSE TRIAGE (OUTPATIENT)
Dept: NURSING | Facility: CLINIC | Age: 64
End: 2024-06-03
Payer: COMMERCIAL

## 2024-06-03 ENCOUNTER — APPOINTMENT (OUTPATIENT)
Dept: RADIATION ONCOLOGY | Facility: CLINIC | Age: 64
End: 2024-06-03
Attending: RADIOLOGY
Payer: COMMERCIAL

## 2024-06-03 PROCEDURE — 77014 PR CT GUIDE FOR PLACEMENT RADIATION THERAPY FIELDS: CPT | Mod: 26 | Performed by: RADIOLOGY

## 2024-06-03 PROCEDURE — 77386 HC IMRT TREATMENT DELIVERY, COMPLEX: CPT | Performed by: RADIOLOGY

## 2024-06-04 ENCOUNTER — APPOINTMENT (OUTPATIENT)
Dept: CT IMAGING | Facility: CLINIC | Age: 64
DRG: 391 | End: 2024-06-04
Attending: EMERGENCY MEDICINE
Payer: COMMERCIAL

## 2024-06-04 ENCOUNTER — HOSPITAL ENCOUNTER (INPATIENT)
Facility: CLINIC | Age: 64
LOS: 14 days | Discharge: HOME HEALTH | DRG: 391 | End: 2024-06-18
Attending: EMERGENCY MEDICINE | Admitting: STUDENT IN AN ORGANIZED HEALTH CARE EDUCATION/TRAINING PROGRAM
Payer: COMMERCIAL

## 2024-06-04 ENCOUNTER — APPOINTMENT (OUTPATIENT)
Dept: SPEECH THERAPY | Facility: CLINIC | Age: 64
DRG: 391 | End: 2024-06-04
Payer: COMMERCIAL

## 2024-06-04 ENCOUNTER — APPOINTMENT (OUTPATIENT)
Dept: RADIATION ONCOLOGY | Facility: CLINIC | Age: 64
End: 2024-06-04
Attending: RADIOLOGY
Payer: COMMERCIAL

## 2024-06-04 ENCOUNTER — APPOINTMENT (OUTPATIENT)
Dept: ULTRASOUND IMAGING | Facility: CLINIC | Age: 64
DRG: 391 | End: 2024-06-04
Attending: EMERGENCY MEDICINE
Payer: COMMERCIAL

## 2024-06-04 DIAGNOSIS — R07.9 CHEST PAIN, UNSPECIFIED TYPE: Primary | ICD-10-CM

## 2024-06-04 DIAGNOSIS — R10.9 STOMACH ACHE: ICD-10-CM

## 2024-06-04 DIAGNOSIS — C15.9 SQUAMOUS CELL CARCINOMA OF ESOPHAGUS (H): ICD-10-CM

## 2024-06-04 DIAGNOSIS — C15.9 MALIGNANT NEOPLASM OF ESOPHAGUS, UNSPECIFIED LOCATION (H): ICD-10-CM

## 2024-06-04 LAB
ALBUMIN SERPL BCG-MCNC: 4.3 G/DL (ref 3.5–5.2)
ALP SERPL-CCNC: 98 U/L (ref 40–150)
ALT SERPL W P-5'-P-CCNC: 27 U/L (ref 0–50)
ANION GAP SERPL CALCULATED.3IONS-SCNC: 17 MMOL/L (ref 7–15)
AST SERPL W P-5'-P-CCNC: 22 U/L (ref 0–45)
BASOPHILS # BLD AUTO: 0 10E3/UL (ref 0–0.2)
BASOPHILS NFR BLD AUTO: 1 %
BILIRUB SERPL-MCNC: 1.2 MG/DL
BUN SERPL-MCNC: 21.3 MG/DL (ref 8–23)
CALCIUM SERPL-MCNC: 11.1 MG/DL (ref 8.8–10.2)
CHLORIDE SERPL-SCNC: 98 MMOL/L (ref 98–107)
CREAT SERPL-MCNC: 0.76 MG/DL (ref 0.51–0.95)
CRP SERPL-MCNC: 40.4 MG/L
DEPRECATED HCO3 PLAS-SCNC: 20 MMOL/L (ref 22–29)
EGFRCR SERPLBLD CKD-EPI 2021: 87 ML/MIN/1.73M2
EOSINOPHIL # BLD AUTO: 0 10E3/UL (ref 0–0.7)
EOSINOPHIL NFR BLD AUTO: 1 %
ERYTHROCYTE [DISTWIDTH] IN BLOOD BY AUTOMATED COUNT: 14.6 % (ref 10–15)
ERYTHROCYTE [SEDIMENTATION RATE] IN BLOOD BY WESTERGREN METHOD: 61 MM/HR (ref 0–30)
GLUCOSE SERPL-MCNC: 99 MG/DL (ref 70–99)
HCT VFR BLD AUTO: 31.7 % (ref 35–47)
HGB BLD-MCNC: 11 G/DL (ref 11.7–15.7)
IMM GRANULOCYTES # BLD: 0 10E3/UL
IMM GRANULOCYTES NFR BLD: 0 %
INR PPP: 0.98 (ref 0.85–1.15)
LACTATE SERPL-SCNC: 1.9 MMOL/L (ref 0.7–2)
LIPASE SERPL-CCNC: 11 U/L (ref 13–60)
LYMPHOCYTES # BLD AUTO: 0.4 10E3/UL (ref 0.8–5.3)
LYMPHOCYTES NFR BLD AUTO: 23 %
MCH RBC QN AUTO: 33.2 PG (ref 26.5–33)
MCHC RBC AUTO-ENTMCNC: 34.7 G/DL (ref 31.5–36.5)
MCV RBC AUTO: 96 FL (ref 78–100)
MONOCYTES # BLD AUTO: 0.2 10E3/UL (ref 0–1.3)
MONOCYTES NFR BLD AUTO: 10 %
NEUTROPHILS # BLD AUTO: 1.3 10E3/UL (ref 1.6–8.3)
NEUTROPHILS NFR BLD AUTO: 65 %
NRBC # BLD AUTO: 0 10E3/UL
NRBC BLD AUTO-RTO: 0 /100
NT-PROBNP SERPL-MCNC: 136 PG/ML (ref 0–900)
PLATELET # BLD AUTO: 137 10E3/UL (ref 150–450)
POTASSIUM SERPL-SCNC: 4.3 MMOL/L (ref 3.4–5.3)
PROT SERPL-MCNC: 7.5 G/DL (ref 6.4–8.3)
RBC # BLD AUTO: 3.31 10E6/UL (ref 3.8–5.2)
SODIUM SERPL-SCNC: 135 MMOL/L (ref 135–145)
TROPONIN T SERPL HS-MCNC: 14 NG/L
WBC # BLD AUTO: 1.9 10E3/UL (ref 4–11)

## 2024-06-04 PROCEDURE — 250N000011 HC RX IP 250 OP 636: Performed by: EMERGENCY MEDICINE

## 2024-06-04 PROCEDURE — 77386 HC IMRT TREATMENT DELIVERY, COMPLEX: CPT | Performed by: RADIOLOGY

## 2024-06-04 PROCEDURE — 99285 EMERGENCY DEPT VISIT HI MDM: CPT | Performed by: EMERGENCY MEDICINE

## 2024-06-04 PROCEDURE — 83880 ASSAY OF NATRIURETIC PEPTIDE: CPT | Performed by: EMERGENCY MEDICINE

## 2024-06-04 PROCEDURE — 83605 ASSAY OF LACTIC ACID: CPT | Performed by: EMERGENCY MEDICINE

## 2024-06-04 PROCEDURE — 36415 COLL VENOUS BLD VENIPUNCTURE: CPT | Performed by: EMERGENCY MEDICINE

## 2024-06-04 PROCEDURE — 83690 ASSAY OF LIPASE: CPT | Performed by: EMERGENCY MEDICINE

## 2024-06-04 PROCEDURE — 85652 RBC SED RATE AUTOMATED: CPT | Performed by: EMERGENCY MEDICINE

## 2024-06-04 PROCEDURE — 92526 ORAL FUNCTION THERAPY: CPT | Mod: GN

## 2024-06-04 PROCEDURE — 86140 C-REACTIVE PROTEIN: CPT | Performed by: EMERGENCY MEDICINE

## 2024-06-04 PROCEDURE — 74177 CT ABD & PELVIS W/CONTRAST: CPT

## 2024-06-04 PROCEDURE — 85025 COMPLETE CBC W/AUTO DIFF WBC: CPT | Performed by: EMERGENCY MEDICINE

## 2024-06-04 PROCEDURE — 96375 TX/PRO/DX INJ NEW DRUG ADDON: CPT | Performed by: EMERGENCY MEDICINE

## 2024-06-04 PROCEDURE — 250N000011 HC RX IP 250 OP 636

## 2024-06-04 PROCEDURE — 96374 THER/PROPH/DIAG INJ IV PUSH: CPT | Mod: 59 | Performed by: EMERGENCY MEDICINE

## 2024-06-04 PROCEDURE — 74177 CT ABD & PELVIS W/CONTRAST: CPT | Mod: 26 | Performed by: RADIOLOGY

## 2024-06-04 PROCEDURE — 120N000002 HC R&B MED SURG/OB UMMC

## 2024-06-04 PROCEDURE — 80053 COMPREHEN METABOLIC PANEL: CPT | Performed by: EMERGENCY MEDICINE

## 2024-06-04 PROCEDURE — 250N000013 HC RX MED GY IP 250 OP 250 PS 637

## 2024-06-04 PROCEDURE — 76705 ECHO EXAM OF ABDOMEN: CPT | Mod: 26 | Performed by: RADIOLOGY

## 2024-06-04 PROCEDURE — 99223 1ST HOSP IP/OBS HIGH 75: CPT | Mod: AI | Performed by: STUDENT IN AN ORGANIZED HEALTH CARE EDUCATION/TRAINING PROGRAM

## 2024-06-04 PROCEDURE — 71275 CT ANGIOGRAPHY CHEST: CPT | Mod: 26 | Performed by: RADIOLOGY

## 2024-06-04 PROCEDURE — 92610 EVALUATE SWALLOWING FUNCTION: CPT | Mod: GN

## 2024-06-04 PROCEDURE — 85610 PROTHROMBIN TIME: CPT | Performed by: EMERGENCY MEDICINE

## 2024-06-04 PROCEDURE — 84484 ASSAY OF TROPONIN QUANT: CPT | Performed by: EMERGENCY MEDICINE

## 2024-06-04 PROCEDURE — 71275 CT ANGIOGRAPHY CHEST: CPT

## 2024-06-04 PROCEDURE — 250N000009 HC RX 250: Performed by: EMERGENCY MEDICINE

## 2024-06-04 PROCEDURE — 76705 ECHO EXAM OF ABDOMEN: CPT

## 2024-06-04 RX ORDER — HYDROMORPHONE HYDROCHLORIDE 1 MG/ML
0.5 INJECTION, SOLUTION INTRAMUSCULAR; INTRAVENOUS; SUBCUTANEOUS ONCE
Status: COMPLETED | OUTPATIENT
Start: 2024-06-04 | End: 2024-06-04

## 2024-06-04 RX ORDER — LEVOTHYROXINE SODIUM 100 UG/1
100 TABLET ORAL DAILY
Status: DISCONTINUED | OUTPATIENT
Start: 2024-06-04 | End: 2024-06-18 | Stop reason: HOSPADM

## 2024-06-04 RX ORDER — DEXTROSE, SODIUM CHLORIDE, SODIUM LACTATE, POTASSIUM CHLORIDE, AND CALCIUM CHLORIDE 5; .6; .31; .03; .02 G/100ML; G/100ML; G/100ML; G/100ML; G/100ML
1000 INJECTION, SOLUTION INTRAVENOUS ONCE
Qty: 1000 ML | Refills: 0 | Status: COMPLETED | OUTPATIENT
Start: 2024-06-04 | End: 2024-06-04

## 2024-06-04 RX ORDER — DIPHENHYDRAMINE HYDROCHLORIDE AND LIDOCAINE HYDROCHLORIDE AND ALUMINUM HYDROXIDE AND MAGNESIUM HYDRO
10 KIT
Status: DISCONTINUED | OUTPATIENT
Start: 2024-06-04 | End: 2024-06-18 | Stop reason: HOSPADM

## 2024-06-04 RX ORDER — LEVOTHYROXINE SODIUM 100 UG/1
200 TABLET ORAL DAILY
Status: DISCONTINUED | OUTPATIENT
Start: 2024-06-05 | End: 2024-06-04

## 2024-06-04 RX ORDER — FLUTICASONE PROPIONATE 50 MCG
1-2 SPRAY, SUSPENSION (ML) NASAL DAILY
Status: DISCONTINUED | OUTPATIENT
Start: 2024-06-04 | End: 2024-06-04

## 2024-06-04 RX ORDER — PROCHLORPERAZINE 25 MG
25 SUPPOSITORY, RECTAL RECTAL EVERY 12 HOURS PRN
Status: DISCONTINUED | OUTPATIENT
Start: 2024-06-04 | End: 2024-06-18 | Stop reason: HOSPADM

## 2024-06-04 RX ORDER — PANTOPRAZOLE SODIUM 40 MG/1
40 TABLET, DELAYED RELEASE ORAL
Status: DISCONTINUED | OUTPATIENT
Start: 2024-06-04 | End: 2024-06-14

## 2024-06-04 RX ORDER — ACETAMINOPHEN 325 MG/1
650 TABLET ORAL EVERY 4 HOURS PRN
Status: DISCONTINUED | OUTPATIENT
Start: 2024-06-04 | End: 2024-06-05

## 2024-06-04 RX ORDER — SUCRALFATE ORAL 1 G/10ML
1 SUSPENSION ORAL 4 TIMES DAILY
Status: DISCONTINUED | OUTPATIENT
Start: 2024-06-04 | End: 2024-06-04

## 2024-06-04 RX ORDER — CALCIUM CARBONATE 500 MG/1
1000 TABLET, CHEWABLE ORAL 4 TIMES DAILY PRN
Status: DISCONTINUED | OUTPATIENT
Start: 2024-06-04 | End: 2024-06-18 | Stop reason: HOSPADM

## 2024-06-04 RX ORDER — HYDROMORPHONE HYDROCHLORIDE 1 MG/ML
0.3 INJECTION, SOLUTION INTRAMUSCULAR; INTRAVENOUS; SUBCUTANEOUS
Status: DISCONTINUED | OUTPATIENT
Start: 2024-06-04 | End: 2024-06-07

## 2024-06-04 RX ORDER — PROCHLORPERAZINE MALEATE 5 MG
10 TABLET ORAL EVERY 6 HOURS PRN
Status: DISCONTINUED | OUTPATIENT
Start: 2024-06-04 | End: 2024-06-18 | Stop reason: HOSPADM

## 2024-06-04 RX ORDER — MAGNESIUM HYDROXIDE/ALUMINUM HYDROXICE/SIMETHICONE 120; 1200; 1200 MG/30ML; MG/30ML; MG/30ML
15 SUSPENSION ORAL
Status: DISCONTINUED | OUTPATIENT
Start: 2024-06-04 | End: 2024-06-04

## 2024-06-04 RX ORDER — ONDANSETRON 2 MG/ML
4 INJECTION INTRAMUSCULAR; INTRAVENOUS ONCE
Status: COMPLETED | OUTPATIENT
Start: 2024-06-04 | End: 2024-06-04

## 2024-06-04 RX ORDER — OXYCODONE HYDROCHLORIDE 5 MG/1
5-10 TABLET ORAL EVERY 4 HOURS PRN
Status: DISCONTINUED | OUTPATIENT
Start: 2024-06-04 | End: 2024-06-07

## 2024-06-04 RX ORDER — NICOTINE 21 MG/24HR
1 PATCH, TRANSDERMAL 24 HOURS TRANSDERMAL DAILY
Status: DISCONTINUED | OUTPATIENT
Start: 2024-06-04 | End: 2024-06-18 | Stop reason: HOSPADM

## 2024-06-04 RX ORDER — FENTANYL 25 UG/1
25 PATCH TRANSDERMAL
Status: DISCONTINUED | OUTPATIENT
Start: 2024-06-04 | End: 2024-06-09

## 2024-06-04 RX ORDER — OXYCODONE HYDROCHLORIDE 5 MG/1
5-10 TABLET ORAL EVERY 4 HOURS
Status: DISCONTINUED | OUTPATIENT
Start: 2024-06-04 | End: 2024-06-04

## 2024-06-04 RX ORDER — DEXTROSE, SODIUM CHLORIDE, SODIUM LACTATE, POTASSIUM CHLORIDE, AND CALCIUM CHLORIDE 5; .6; .31; .03; .02 G/100ML; G/100ML; G/100ML; G/100ML; G/100ML
1000 INJECTION, SOLUTION INTRAVENOUS ONCE
Status: COMPLETED | OUTPATIENT
Start: 2024-06-04 | End: 2024-06-05

## 2024-06-04 RX ORDER — BUSPIRONE HYDROCHLORIDE 5 MG/1
5 TABLET ORAL DAILY
Status: DISCONTINUED | OUTPATIENT
Start: 2024-06-05 | End: 2024-06-18 | Stop reason: HOSPADM

## 2024-06-04 RX ORDER — ENOXAPARIN SODIUM 100 MG/ML
40 INJECTION SUBCUTANEOUS EVERY 24 HOURS
Status: DISCONTINUED | OUTPATIENT
Start: 2024-06-04 | End: 2024-06-18 | Stop reason: HOSPADM

## 2024-06-04 RX ORDER — AMOXICILLIN 250 MG
1 CAPSULE ORAL 2 TIMES DAILY PRN
Status: DISCONTINUED | OUTPATIENT
Start: 2024-06-04 | End: 2024-06-08

## 2024-06-04 RX ORDER — FAMOTIDINE 20 MG/1
20 TABLET, FILM COATED ORAL 2 TIMES DAILY
Status: DISCONTINUED | OUTPATIENT
Start: 2024-06-04 | End: 2024-06-18 | Stop reason: HOSPADM

## 2024-06-04 RX ORDER — LIDOCAINE 40 MG/G
CREAM TOPICAL
Status: DISCONTINUED | OUTPATIENT
Start: 2024-06-04 | End: 2024-06-18 | Stop reason: HOSPADM

## 2024-06-04 RX ORDER — ACETAMINOPHEN 325 MG/1
975 TABLET ORAL 3 TIMES DAILY
Status: DISCONTINUED | OUTPATIENT
Start: 2024-06-04 | End: 2024-06-05

## 2024-06-04 RX ORDER — ONDANSETRON 2 MG/ML
4 INJECTION INTRAMUSCULAR; INTRAVENOUS EVERY 6 HOURS PRN
Status: DISCONTINUED | OUTPATIENT
Start: 2024-06-04 | End: 2024-06-11

## 2024-06-04 RX ORDER — POLYETHYLENE GLYCOL 3350 17 G
2 POWDER IN PACKET (EA) ORAL
Status: DISCONTINUED | OUTPATIENT
Start: 2024-06-04 | End: 2024-06-18 | Stop reason: HOSPADM

## 2024-06-04 RX ORDER — AMOXICILLIN 250 MG
2 CAPSULE ORAL 2 TIMES DAILY PRN
Status: DISCONTINUED | OUTPATIENT
Start: 2024-06-04 | End: 2024-06-08

## 2024-06-04 RX ORDER — FENTANYL CITRATE 50 UG/ML
25 INJECTION, SOLUTION INTRAMUSCULAR; INTRAVENOUS ONCE
Status: COMPLETED | OUTPATIENT
Start: 2024-06-04 | End: 2024-06-04

## 2024-06-04 RX ORDER — ONDANSETRON 4 MG/1
4 TABLET, ORALLY DISINTEGRATING ORAL EVERY 6 HOURS PRN
Status: DISCONTINUED | OUTPATIENT
Start: 2024-06-04 | End: 2024-06-11

## 2024-06-04 RX ORDER — IOPAMIDOL 755 MG/ML
86 INJECTION, SOLUTION INTRAVASCULAR ONCE
Status: COMPLETED | OUTPATIENT
Start: 2024-06-04 | End: 2024-06-04

## 2024-06-04 RX ADMIN — HYDROMORPHONE HYDROCHLORIDE 0.3 MG: 1 INJECTION, SOLUTION INTRAMUSCULAR; INTRAVENOUS; SUBCUTANEOUS at 18:16

## 2024-06-04 RX ADMIN — FENTANYL CITRATE 25 MCG: 50 INJECTION, SOLUTION INTRAMUSCULAR; INTRAVENOUS at 03:30

## 2024-06-04 RX ADMIN — PANTOPRAZOLE SODIUM 40 MG: 40 TABLET, DELAYED RELEASE ORAL at 15:42

## 2024-06-04 RX ADMIN — HYDROMORPHONE HYDROCHLORIDE 0.5 MG: 1 INJECTION, SOLUTION INTRAMUSCULAR; INTRAVENOUS; SUBCUTANEOUS at 06:53

## 2024-06-04 RX ADMIN — OXYCODONE HYDROCHLORIDE 5 MG: 5 TABLET ORAL at 15:41

## 2024-06-04 RX ADMIN — DIPHENHYDRAMINE HYDROCHLORIDE AND LIDOCAINE HYDROCHLORIDE AND ALUMINUM HYDROXIDE AND MAGNESIUM HYDRO 10 ML: KIT at 20:13

## 2024-06-04 RX ADMIN — ONDANSETRON 4 MG: 2 INJECTION INTRAMUSCULAR; INTRAVENOUS at 07:34

## 2024-06-04 RX ADMIN — FAMOTIDINE 20 MG: 20 TABLET ORAL at 20:12

## 2024-06-04 RX ADMIN — FENTANYL 1 PATCH: 25 PATCH TRANSDERMAL at 15:40

## 2024-06-04 RX ADMIN — LEVOTHYROXINE SODIUM 100 MCG: 100 TABLET ORAL at 15:42

## 2024-06-04 RX ADMIN — SODIUM CHLORIDE, PRESERVATIVE FREE 78 ML: 5 INJECTION INTRAVENOUS at 04:28

## 2024-06-04 RX ADMIN — ACETAMINOPHEN 975 MG: 325 TABLET, FILM COATED ORAL at 20:12

## 2024-06-04 RX ADMIN — OXYCODONE HYDROCHLORIDE 5 MG: 5 TABLET ORAL at 20:16

## 2024-06-04 RX ADMIN — HYDROMORPHONE HYDROCHLORIDE 0.5 MG: 1 INJECTION, SOLUTION INTRAMUSCULAR; INTRAVENOUS; SUBCUTANEOUS at 04:03

## 2024-06-04 RX ADMIN — SODIUM CHLORIDE, SODIUM LACTATE, POTASSIUM CHLORIDE, CALCIUM CHLORIDE AND DEXTROSE MONOHYDRATE 1000 ML: 5; 600; 310; 30; 20 INJECTION, SOLUTION INTRAVENOUS at 20:13

## 2024-06-04 RX ADMIN — ENOXAPARIN SODIUM 40 MG: 40 INJECTION SUBCUTANEOUS at 20:12

## 2024-06-04 RX ADMIN — HYDROMORPHONE HYDROCHLORIDE 0.3 MG: 1 INJECTION, SOLUTION INTRAMUSCULAR; INTRAVENOUS; SUBCUTANEOUS at 13:36

## 2024-06-04 RX ADMIN — PROCHLORPERAZINE EDISYLATE 10 MG: 5 INJECTION INTRAMUSCULAR; INTRAVENOUS at 10:51

## 2024-06-04 RX ADMIN — SODIUM CHLORIDE, SODIUM LACTATE, POTASSIUM CHLORIDE, CALCIUM CHLORIDE AND DEXTROSE MONOHYDRATE 1000 ML: 5; 600; 310; 30; 20 INJECTION, SOLUTION INTRAVENOUS at 10:52

## 2024-06-04 RX ADMIN — IOPAMIDOL 86 ML: 755 INJECTION, SOLUTION INTRAVENOUS at 04:27

## 2024-06-04 ASSESSMENT — ACTIVITIES OF DAILY LIVING (ADL)
ADLS_ACUITY_SCORE: 35
ADLS_ACUITY_SCORE: 33
ADLS_ACUITY_SCORE: 35

## 2024-06-04 ASSESSMENT — COLUMBIA-SUICIDE SEVERITY RATING SCALE - C-SSRS
6. HAVE YOU EVER DONE ANYTHING, STARTED TO DO ANYTHING, OR PREPARED TO DO ANYTHING TO END YOUR LIFE?: NO
1. IN THE PAST MONTH, HAVE YOU WISHED YOU WERE DEAD OR WISHED YOU COULD GO TO SLEEP AND NOT WAKE UP?: NO
2. HAVE YOU ACTUALLY HAD ANY THOUGHTS OF KILLING YOURSELF IN THE PAST MONTH?: NO

## 2024-06-04 NOTE — ED NOTES
"     Emergency Department Patient Sign-out       Brief HPI:  This is a 64 year old female signed out to me by Dr. Rey .  See initial ED Provider note for details of the presentation.       Significant Events prior to my assuming care: Patient with esophageal cancer. Unable to eat. Recently finished chemo. CT chest/abd pending. Likely will need admission.      Exam:   Patient Vitals for the past 24 hrs:   BP Temp Temp src Pulse Resp SpO2 Height Weight   06/04/24 0203 -- -- -- -- -- -- 1.702 m (5' 7\") 64.4 kg (142 lb)   06/04/24 0200 (!) 156/67 98.3  F (36.8  C) Oral 91 18 100 % -- --           ED RESULTS:   Results for orders placed or performed during the hospital encounter of 06/04/24 (from the past 24 hour(s))   CBC with platelets differential     Status: None (In process)    Collection Time: 06/04/24  2:19 AM    Narrative    The following orders were created for panel order CBC with platelets differential.  Procedure                               Abnormality         Status                     ---------                               -----------         ------                     CBC with platelets and d...[[id number]]                      In process                   Please view results for these tests on the individual orders.       ED MEDICATIONS:   Medications   fentaNYL (PF) (SUBLIMAZE) injection 25 mcg (has no administration in time range)         Impression:  No diagnosis found.    Plan:    Patient has no acute changes found on workup today. She has ongoing wall thickening on CT which per radiology not significantly changed from prior study. The patient is not tolerating PO, is having too much pain and vomits any intake for the past several days. We will plan to admit for symptom control.        MD Evan Santiago, Jonathon Lao MD  06/04/24 0642    " Patient presents with:  Forms Completion: is here for forms that were to be filled out       HPI:  Presents for follow up of COVID infection for which she missed work from 8/2/2021-8/23/21.  Did present to ER on 8/7 and had positive COVID test (reports also Temp 97.8 °F (36.6 °C) (Temporal)   Resp 16   Ht 5' 5\" (1.651 m)   Wt 161 lb 6 oz (73.2 kg)   BMI 26.85 kg/m²   Constitutional: well developed, well nourished,in no apparent distress  HEENT: Normocephalic and atraumatic. Tympanic membranes clear bilat.

## 2024-06-04 NOTE — CONSULTS
Radiation Oncology    We met with Ms. Mustafa at bedside. She continues to have significant pain in her chest, not tolerant to fentanyl patch or increased dosing of oxycodone. She has developed progressive dysphagia and odynophagia, leading to weight loss and dehydration. She has vomiting with any PO intake. Thus, she presented to the ER today for evaluation.    We discussed at length that her issues are related to radiation toxicity. Her toxicities are not expected to greatly worsen over the next week. We do anticipate a decrease in cancer control if she were to forgo the final two radiation treatments. For these reasons, we encourage Lita to complete her last two treatments today and tomorrow.    She is likely to continue to have odynophagia and pain for the next 2-3 weeks. We have contacted Dr. Martinez to communicate about need for Jejunostomy ASAP. She should not have any other type of percutaneous feeding tube placed, due to need for esophagectomy in the near future.    Seen and discussed with Dr. Berg who agrees with above.  Please reach out if questions remain.    Carlie Angeles MD PGY4  [phone]    Physician Attestation  Ms. Mustafa was seen and examined by me. I agree with the findings and plan of care as documented in the note. Note above by Dr. Angeles  was reviewed and edited by me and reflects our mutual findings and plan of care.    Mireille Berg MD  Department of Radiation Oncology  [facility]

## 2024-06-04 NOTE — TELEPHONE ENCOUNTER
"Nurse Triage SBAR    Is this a 2nd Level Triage? NO    Situation:   vomiting    Background:   Pt has esophageal cancer.  Brother is calling concerned about patient. Consent to communicate verified.    Assessment:   Vomiting since 5/30, Thursday.  Not tolerating even small sips.  Not able to keep nausea medication down either.  Pt hasn't reported any blood in emesis.  Hasn't reported feeling flushed or feverish.    Protocol Recommended Disposition:   Go to ED Now    Recommendation:   Advised brother to bring patient to ED tonight.  Brother verbalized understanding.    Mary Alba, RN, BSN Nurse Triage Advisor 6/4/2024 12:09 AM        Reason for Disposition   [1] Neutropenia known or suspected (e.g., recent cancer chemotherapy) AND [2] vomiting    Additional Information   Negative: Shock suspected (e.g., cold/pale/clammy skin, too weak to stand, low BP, rapid pulse)   Negative: Difficult to awaken or acting confused (e.g., disoriented, slurred speech)   Negative: Sounds like a life-threatening emergency to the triager   Negative: [1] Vomiting AND [2] contains red blood or black (\"coffee ground\") material  (Exception: Few red streaks in vomit that only happened once.)   Negative: [1] Vomiting AND [2] recent head injury (within last 3 days)   Negative: [1] Vomiting AND [2] recent abdominal injury (within last 3 days)   Negative: [1] Vomiting AND [2] insulin-dependent diabetes (Type I) AND [3] glucose > 400 mg/dl (22 mmol/l)   Negative: Shock suspected (e.g., cold/pale/clammy skin, too weak to stand, low BP, rapid pulse)   Negative: Difficult to awaken or acting confused (e.g., disoriented, slurred speech)   Negative: Sounds like a life-threatening emergency to the triager   Negative: [1] Neutropenia known or suspected (e.g., recent cancer chemotherapy) AND [2] fever > 100.4 F (38.0 C)    Protocols used: Vomiting-A-AH, Cancer - Nausea and Vomiting-A-AH    "

## 2024-06-04 NOTE — H&P
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 Most recent radiation therapy was on 5/31/2024. Until now, patient has tolerated radiation treatment fairly well, but previously endorsed pain w/ swallowing. Reports pain and nausea after radiation therapy in the past, but chest pain significantly worse today. The pain improves when lying down. Patient denies fever, chills, SOB, hemoptysis and cough. Has significant pain with swallowing, which limits her PO intake. Diet consist predominantly of soft foods like pudding and shakes. Has two more radiation appointments planned. Patient asks if she still needs to complete these.     Review of Systems   The 10 point Review of Systems is negative other than noted in the HPI.    Past Medical History    I have reviewed this patient's medical history and updated it with pertinent information if needed.   Past Medical History:   Diagnosis Date    Hyperlipidemia     Hypertension         Past Surgical History   I have reviewed this patient's surgical history and updated it with pertinent information if needed.  Past Surgical History:   Procedure Laterality Date    BIOPSY BREAST Right     age 30 benign fibrous    CV CORONARY ANGIOGRAM N/A 01/25/2023    Procedure: Coronary Angiogram;  Surgeon: Lukas Irizarry MD;  Location: [facility] CV    CV LEFT HEART CATH N/A 01/25/2023    Procedure: Left Heart Catheterization;  Surgeon: Lukas Irizarry MD;  Location: [facility] CV    ENDOSCOPIC ULTRASOUND UPPER GASTROINTESTINAL TRACT (GI) N/A 3/21/2024     Procedure: ENDOSCOPIC ULTRASOUND, ESOPHAGOSCOPY / UPPER GASTROINTESTINAL TRACT (GI), ENDOSCOPY WITH BIOPSY, FINE NEEDLE ASPIRATION;  Surgeon: Damon Kramer MD;  Location: [facility] OR    ENDOSCOPIC ULTRASOUND, ESOPHAGOSCOPY / UPPER GASTROINTESTINAL TRACT (GI), ENDOSCOPY WITH BIOPSY, FINE NEEDLE ASPIRATION  03/21/2024    ESOPHAGOSCOPY, GASTROSCOPY, DUODENOSCOPY (EGD), SUBMUCOSA RESECTION N/A 4/2/2024    Procedure: ESOPHAGOGASTRODUODENOSCOPY, WITH SUBMUCOSAL RESECTION;  Surgeon: Holden King MD;  Location: [facility] OR    IR CHEST PORT PLACEMENT > 5 YRS OF AGE  4/24/2024        Social History   Social History     Tobacco Use    Smoking status: Every Day     Current packs/day: 1.00     Average packs/day: 1 pack/day for 47.4 years (47.4 ttl pk-yrs)     Types: Cigarettes     Start date: 1977    Smokeless tobacco: Never   Vaping Use    Vaping status: Never Used   Substance Use Topics    Drug use: Never       Family History   I have reviewed this patient's family history and updated it with pertinent information if needed.   Family History   Problem Relation Age of Onset    Chronic Obstructive Pulmonary Disease Father     Colon Cancer Paternal Grandmother 70       Prior to Admission Medications   Prior to Admission Medications   Prescriptions Last Dose Informant Patient Reported? Taking?   acetaminophen (TYLENOL) 160 MG/5ML liquid   No No   Sig: Take 20 mLs (640 mg) by mouth every 6 hours as needed for mild pain or fever   busPIRone (BUSPAR) 5 MG tablet   Yes No   esomeprazole (NEXIUM) 20 MG DR capsule   Yes No   Sig: Take 20 mg by mouth 2 times daily Take 30-60 minutes before eating.   famotidine (PEPCID) 20 MG tablet   Yes No   Sig: Take 20 mg by mouth 2 times daily   fentaNYL (DURAGESIC) 25 mcg/hr 72 hr patch   No No   Sig: Place 1 patch onto the skin every 72 hours For cancer related pain - please dispense amount requested   fluticasone (FLONASE) 50 MCG/ACT nasal spray   Yes No   Sig: Spray 1-2 sprays into one nostril  alternating nostrils as needed   levothyroxine (SYNTHROID/LEVOTHROID) 200 MCG tablet   Yes No   Sig: take 1 tablet by mouth every day for 30 days   magic mouthwash suspension (diphenhydrAMINE, lidocaine, aluminum-magnesium & simethicone)   No No   Sig: Swish and swallow 10 mLs in mouth 4 times daily (before meals and nightly)   naloxone (NARCAN) 4 MG/0.1ML nasal spray   No No   Sig: Spray 1 spray (4 mg) into one nostril alternating nostrils as needed for opioid reversal every 2-3 minutes until assistance arrives   ondansetron (ZOFRAN) 8 MG tablet   No No   Sig: Take 1 tablet (8 mg) by mouth every 8 hours as needed for nausea   oxyCODONE (ROXICODONE) 5 MG tablet   No No   Sig: Take 1-2 tablets (5-10 mg) by mouth every 4 hours   polyethylene glycol (MIRALAX) 17 GM/Dose powder   No No   Sig: Take 17 g (1 Capful) by mouth daily   prochlorperazine (COMPAZINE) 10 MG tablet   No No   Sig: Take 1 tablet (10 mg) by mouth every 6 hours as needed for nausea or vomiting   sucralfate (CARAFATE) 1 GM/10ML suspension   No No   Sig: Take 10 mLs (1 g) by mouth 4 times daily   vitamin B-12 (CYANOCOBALAMIN) 2500 MCG sublingual tablet   No No   Sig: Take 1 tablet (2,500 mcg) by mouth daily      Facility-Administered Medications: None     Allergies   Allergies   Allergen Reactions    Amoxicillin Shortness Of Breath, Itching and Rash    Penicillins        Physical Exam   Vital Signs: Temp: (P) 98.3  F (36.8  C) Temp src: (P) Oral BP: (P) 121/60 Pulse: (P) 80   Resp: (P) 18 SpO2: (P) 99 % O2 Device: (P) None (Room air)    Weight: 142 lbs 0 oz    General Appearance: Frail appearing. Alert. Intermittent distress from pain/nausea    Eyes: No conjunctivitis or scleral icterus   Head: atraumatic  Respiratory: Adequate oxygenation on room air   Cardiovascular: RRR with no murmurs, rubs, or gallops  GI: Soft, non-distended. No tenderness in all four quadrants.   Genitourinary: No hermosillo   Skin: Warm and well perfused. No pallor. Right chest port  in place w/o surrounding erythema.  Musculoskeletal: Moving all four extremities   Neurologic: No gross neurological deficits   Psychiatric: appropriate mood and affect    Assessment & Plan   Linh Mustafa is a 64 year old female with squamous cell carcinoma of the esophagus admitted on 6/4/202 for chest pain after radiation therapy on 5/31/2024. Initial workup negative for acute pulmonary or cardiac etiology. Chest pain likely from mucositis of the esophagus due to radiation therapy.     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 Reports pain, nausea, and vomiting after radiation therapy. Chest pain feels similar regarding quality and location to prior pain, but significantly more intense.  ED workup notable for normal troponin and BNP. CT Abd  and Chest demonstrated no acute pathology, including PE, aortic dissection, or esophageal perforation. Workup negative for cardiac etiology, GI perforation, and PE. Chest pain likely sequela of recent radiation causing mucositis of the esophagus, manifesting as chest pain. As such, will treat as acute on chronic mucositis and focus on pain control at this time.   - Consult Radiation Oncology regarding etiology pain management  and radiation course   - Tomorrow consult Heme/Onc to clarify chemotherapy status  - D5LR given poor PO intake  - May need feeding tube if within GOC  - Pain Control: PTA PRN oxycodone (5-10 mg q4h), IV Dilaudid PRN for breakthrough pain, PTA fentanyl (25 mcg/hr patch every 72 hours), PRN lidocaine 1%  - Pantoprazole (40 mg BID); PTA esomeprazole not on formulary   - PTA famotidine (20 mg BID)  - PTA Magic Mouthwash (4x daily)  - PTA PRN ondansetron (8 mg q8h)   - PTA PRN  compazine (10 mg q6h)  - PTA sucralfate (1g 4x daily)    Dysphagia   Odynophagia  Has significant pain with swallowing, which limits her PO intake. Diet consist predominantly of soft foods like pudding and shakes. Bedside swallow evaluation demonstrated normal oropharyngeal swallowing mechanisms with regular/thin liquids. Patient declined solids during the study.  - Thin liquid diet     Chronic Hypercalcemia   Ca elevated to 11.1 at admission. Most likely secondary to hyperparathyroidism with parathyroid hormone elevated to 121 on 5/1/2024. Had an appointment with endocrinology, but missed the appointment.   - Consider outpatient followup with endocrinology     Hypothyroidism   PTA levothyroxine (100 mcg)     Borderline macrocytic Anemia  Has slight anemia at presentation with Hgb of 11.0  - PTA vitamin B12 (2500 mcg daily)     Anxiety:  - PTA buspirone (5 mg daily)     Tobacco abuse.   Uses 1/2 ppd.    - Nicotine patch   - PRN nicotine gum    Diet: Regular Diet (thin liquid)   Fluids: LR with 5% dextrose 1L  Lines: Right chest Port   Mercado Catheter: Not present    DVT Prophylaxis: Enoxaparin (Lovenox) SQ  Code Status: Full Code     Expected Discharge Date: 06/06/2024                The patient was discussed with Dr. Jelena Nova, MS4    Resident/Fellow Attestation   I, Vandana Florentino MD, was present with the medical/[employer] student who participated in the service and in the documentation of the note.  I have verified the history and personally performed the physical exam and medical decision making.  I agree with the assessment and plan of care as documented in the note.      Vandana Florentino MD  Internal Medicine Resident        Data     I have personally reviewed the following data over the past 24 hrs:    1.9 (L)  \   11.0 (L)   / 137 (L)     135 98 21.3 /  99   4.3 20 (L) 0.76 \     ALT: 27 AST: 22 AP: 98 TBILI: 1.2   ALB: 4.3 TOT PROTEIN: 7.5 LIPASE: 11 (L)     Trop: 14 BNP: 136     Procal: N/A CRP:  40.40 (H) Lactic Acid: 1.9       INR:  0.98 PTT:  N/A   D-dimer:  N/A Fibrinogen:  N/A       Imaging results reviewed over the past 24 hrs:   Recent Results (from the past 24 hour(s))   CT Chest Pulmonary Embolism w Contrast    Narrative    EXAM: CT CHEST PULMONARY EMBOLISM W CONTRAST  LOCATION: [facility]  DATE: 6/4/2024    INDICATION: Pleuritic chest pain (hx esophageal cancer, radiation treatment), ? PE, esophageal abnormality, mediastinitis, pericarditis or other causes of severe chest pain  COMPARISON: 2/23/2024.  TECHNIQUE: CT chest pulmonary angiogram during arterial phase injection of IV contrast. Multiplanar reformats and MIP reconstructions were performed. Dose reduction techniques were used.   CONTRAST: 86 ml isovue 370    FINDINGS:  ANGIOGRAM CHEST: The central pulmonary arteries are large in caliber which can be seen with pulmonary artery hypertension. There is no pulmonary embolus. Thoracic aorta is negative for dissection. No CT evidence of right heart strain.    LUNGS AND PLEURA: Emphysema. Mild dependent atelectasis. No pneumothorax or pleural effusion.    MEDIASTINUM/AXILLAE: Right chest wall port. No lymph node enlargement. Wall thickening of the mid esophagus similar to the previous exam.    CORONARY ARTERY CALCIFICATION: Moderate.    UPPER ABDOMEN: Stones in the gallbladder. Bilateral adrenal gland nodules without significant change.    MUSCULOSKELETAL: Degenerative disease in the spine.      Impression    IMPRESSION:  1.  There is no pulmonary embolus, aortic aneurysm or dissection.  2.  Wall thickening of the mid esophagus similar to the previous exam. There is edema in the fat surrounding the mid esophagus. No extraluminal gas or fluid collection.  3.  Emphysema.   CT Abdomen Pelvis w Contrast    Narrative    EXAM: CT ABDOMEN PELVIS W CONTRAST  LOCATION: [facility]  DATE: 6/4/2024    INDICATION: severe  chest, to upper abd pain, hx esophageal cancer, radiation treatment  COMPARISON: None.  TECHNIQUE: CT scan of the abdomen and pelvis was performed following injection of IV contrast. Multiplanar reformats were obtained. Dose reduction techniques were used.  CONTRAST: 86ml isovue 370    FINDINGS:   LOWER CHEST: Lung bases clear.    HEPATOBILIARY: Subcentimeter hepatic hypodensities small for characterization. Cholelithiasis. Mild gallbladder wall thickening not excluded.    PANCREAS: Normal.    SPLEEN: Normal.    ADRENAL GLANDS: Thickening of the adrenal glands (left greater than right).    KIDNEYS/BLADDER: Normal.    BOWEL: Normal caliber. Diverticulosis.    LYMPH NODES: Prominent aortocaval node similar to prior.    VASCULATURE: Atherosclerotic vascular calcification.    PELVIC ORGANS: Normal.    MUSCULOSKELETAL: Degenerative change osseous structures.      Impression    IMPRESSION:   1.  Cholelithiasis. Mild gallbladder wall thickening not excluded.  2.  No bowel obstruction or abscess.  3.  Diverticulosis.  4.  Several hepatic hypodensities small for characterization   US Abdomen Limited (RUQ)    Narrative    EXAMINATION: Limited Abdominal Ultrasound, 6/4/2024 6:20 AM     COMPARISON: CT abdomen and pelvis from earlier same day    HISTORY: ? Cholecystitis     FINDINGS:   Fluid: No evidence of ascites or pleural effusions.    Liver: The liver demonstrates mildly increased echogenicity relative  to the right renal cortex, measuring 15.8 cm in craniocaudal  dimension. There is no focal mass. The main portal vein is patent with  antegrade flow.    Gallbladder: Cholelithiasis as seen on earlier abdominal CT. There is  no wall thickening, pericholecystic fluid, or positive sonographic  Balbuena's sign.    Bile Ducts: Both the intra- and extrahepatic biliary system are of  normal caliber.  The common bile duct measures 6 mm in diameter.    Pancreas: Visualized portions of the head and body of the pancreas  are  unremarkable.     Kidney: The right kidney measures 9.4 cm long. There is no  hydronephrosis or hydroureter, no shadowing renal calculi, cystic  lesion or mass.       Impression    IMPRESSION:    1. Cholelithiasis without sonographic findings to suggest acute  cholecystitis.  2. Mildly increased echogenicity throughout the hepatic parenchyma,  which can be seen in patients with hepatic steatosis.    I have personally reviewed the examination and initial interpretation  and I agree with the findings.    GIANA COOL MD         SYSTEM ID:  [id number]

## 2024-06-04 NOTE — ED TRIAGE NOTES
Chest pain, SOB, N/V for several days. Cancer patient, last chemo treatment was last week. Symptoms improve while laying down.     Triage Assessment (Adult)       Row Name 06/04/24 0202          Triage Assessment    Airway WDL WDL        Respiratory WDL    Respiratory WDL WDL        Peripheral/Neurovascular WDL    Peripheral Neurovascular WDL WDL        [place] Coma Scale    Best Eye Response 4-->(E4) spontaneous     Best Motor Response 6-->(M6) obeys commands     Best Verbal Response 5-->(V5) oriented     [place] Coma Scale Score 15

## 2024-06-04 NOTE — PROGRESS NOTES
06/04/24 1200   Appointment Info   Signing Clinician's Name / Credentials (SLP) Agnieszka Alvares MS CCC SLP   General Information   Onset of Illness/Injury or Date of Surgery 06/04/24   Referring Physician Vandana Florentino MD   Pertinent History of Current Problem Pt  is a 64 year old female who has a PMH notable for esophageal squamous cell carcinoma (being actively treated), HTN, who presented to hospital for chest discomfort. Clinical swallow evaluation completed per MD order.   Type of Evaluation   Type of Evaluation Swallow Evaluation   Oral Motor   Oral Musculature generally intact   Structural Abnormalities none present   Mucosal Quality adequate   Dentition (Oral Motor)   Dentition (Oral Motor) adequate dentition   Facial Symmetry (Oral Motor)   Facial Symmetry (Oral Motor) WNL   Lip Function (Oral Motor)   Lip Range of Motion (Oral Motor) WNL   Tongue Function (Oral Motor)   Tongue ROM (Oral Motor) WNL   Cough/Swallow/Gag Reflex (Oral Motor)   Volitional Throat Clear/Cough (Oral Motor) WNL   Volitional Swallow (Oral Motor) WNL   Vocal Quality/Secretion Management (Oral Motor)   Vocal Quality (Oral Motor) WFL   Secretion Management (Oral Motor) WNL   General Swallowing Observations   Past History of Dysphagia Pt currently undergoing tx for esophageal squamous cell carcinoma. She reports she just finished chemo on May 28 and has two treatments left of radiation. She reports that she has been experiencing pain with swallowing as well as vomiting now which is limiting her intake.   Current Diet/Method of Nutritional Intake (General Swallowing Observations, NIS) NPO   Swallowing Evaluation Clinical swallow evaluation   Clinical Swallow Evaluation   Feeding Assistance no assistance needed   Clinical Swallow Evaluation Textures Trialed thin liquids   Clinical Swallow Eval: Thin Liquid Texture Trial   Mode of Presentation, Thin Liquids straw;self-fed   Volume of Liquid or Food Presented 2 oz   Oral Phase of  Swallow WFL   Pharyngeal Phase of Swallow intact   Diagnostic Statement Adequate oral phase and no overt s/s aspiration   Esophageal Phase of Swallow   Patient reports or presents with symptoms of esophageal dysphagia Yes  (currently undergoing chemo and radiation for esophageal squamous cell carcinoma)   Swallowing Recommendations   Diet Consistency Recommendations regular diet;thin liquids (level 0)   Supervision Level for Intake patient independent   Mode of Delivery Recommendations bolus size, small;slow rate of intake   Postural Recommendations none   Swallowing Maneuver Recommendations alternate food and liquid intake   Monitoring/Assistance Required (Eating/Swallowing) stop eating activities when fatigue is present   Recommended Feeding/Eating Techniques (Swallow Eval) maintain upright sitting position for eating;provide 6 smaller meals throughout day   Medication Administration Recommendations, Swallowing (SLP) per pt preference, okay for whole with thin liquid   Instrumental Assessment Recommendations instrumental evaluation not recommended at this time   General Therapy Interventions   Planned Therapy Interventions Dysphagia Treatment   Dysphagia treatment Instruction of safe swallow strategies   Clinical Impression   Criteria for Skilled Therapeutic Interventions Met (SLP Eval) Yes, treatment indicated   SLP Diagnosis at least moderate esophageal dysphagia   Risks & Benefits of therapy have been explained evaluation/treatment results reviewed;care plan/treatment goals reviewed;risks/benefits reviewed;current/potential barriers reviewed;participants voiced agreement with care plan;participants included;patient   Clinical Impression Comments Bedside swallow evaluation completed per MD orders. Oral mechanism was unremarkable. Note that evaluation was limited as pt with no desire to trial solids given ongoing pain and vomiting with intake. With thin liquids she pt presents with functional oropharyngeal  swallowing mechanism and regular/thin liquid diet is recommended and pt to pick and choose softer foods from regular menu. Pt was assessed with thin liquids. Pt demonstrated functional oral phase, adequate bolus control, no oral residuals, was able to clear bolus with single swallow, no report of sticking sensation in throat, no change in vocal quality following PO trials, and no overt s/s aspiration noted. Pt demonstrates adequate understanding of swallow strategies as baseline. At home she sometimes will use magic mouthwash prior to PO to help reduce pain, she reports this somewhat helps however her intake is still very limited. Recommend a regular solids/thin liquid diet and pt to pick and choose softer foods as to not limit her in choices. Medication okay per pt preference, okay for whole with thin liquids. Pt should be fully alert and upright with all PO. Speech therapy will continue to follow to assess for ongoing diet tolerance.   SLP Total Evaluation Time   Eval: oral/pharyngeal swallow function, clinical swallow Minutes (60012) 20   SLP Goals   Therapy Frequency (SLP Eval) 5 times/week   SLP Goals Swallow   SLP: Safely tolerate diet without signs/symptoms of aspiration Regular diet;Thin liquids   Interventions   Interventions Quick Adds Swallowing Dysfunction   Swallowing Dysfunction &/or Oral Function for Feeding   Treatment of Swallowing Dysfunction &/or Oral Function for Feeding Minutes (28698) 5   Symptoms Noted During/After Treatment None   Treatment Detail/Skilled Intervention Bedside swallow evaluation complete. Functional oropharyngeal swallowing mechanism. See POC note for further details. Following evaluation SLP provided training on safe swallow strategies and aspiration signs/symptoms. Pt verbalized agreement and understanding with training provided. No ongoing speech therapy warranted.   SLP Discharge Planning   SLP Plan diet tolerance, strategies   SLP Rationale for DC Rec below baseline  swallow fucntion   SLP Brief overview of current status  Recommend a regular solids/thin liquid diet and pt to pick and choose softer foods as to not limit her in choices. Medication okay per pt preference, okay for whole with thin liquids. Pt should be fully alert and upright with all PO. Speech therapy will continue to follow to assess for ongoing diet tolerance.   Total Session Time   Total Session Time (sum of timed and untimed services) 25   Psychosocial Support   Trust Relationship/Rapport care explained;choices provided

## 2024-06-04 NOTE — PROGRESS NOTES
"Shift: 0915-1930  Pt presents to ED with chest pain, SOB, N/V for several days. Nausea improves with lying flat.    Past Medical History:   Diagnosis Date    Hyperlipidemia     Hypertension    Esophageal SCC, chemo previous week, continuing radiation    VS: /63 (BP Location: Left arm)   Pulse 65   Temp 98.4  F (36.9  C) (Oral)   Resp 18   Ht 1.702 m (5' 7\")   Wt 64.4 kg (142 lb)   SpO2 99%   BMI 22.24 kg/m    Pain: 8/10 midsternal, oxy + dilaudid  Neuro: AO4  Cardiac: WDL  Respiratory: WDL on RA  Diet/Appetite: Reg thin liquids, low input d/t nausea  /GI: Low output  LDAs: Port D5LR 100  Skin: WDL  Activity: SBA  Test/Procedures: N/A  Pertinent Labs/Lab Collection: N/A    Plan:   1L D5LR 100mL/h  Manage pain and nausea  "

## 2024-06-04 NOTE — ED PROVIDER NOTES
ED Provider Note  [facility]      History   No chief complaint on file.    HPI  Linh Mustafa is a 64 year old female who has a PMH notable for esophageal squamous cell carcinoma (being actively treated), HTN, et al.     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 She reports having had pain in her chest following radiation in the past, but not this severe/is currently worse than has been previously. Pain is worse sitting up, somewhat improved laying down. With this unable to take in PO. No fevers or chills. No cough or hemoptysis. No change to bowel/bladder. No neck/back sx's.  No other new symptoms or complaints at this time. Full ROS completed w/o additional findings.       Past Medical History  Past Medical History:   Diagnosis Date    Hyperlipidemia     Hypertension      Past Surgical History:   Procedure Laterality Date    BIOPSY BREAST Right     age 30 benign fibrous    CV CORONARY ANGIOGRAM N/A 01/25/2023    Procedure: Coronary Angiogram;  Surgeon: Lukas Irizarry MD;  Location: [facility] CV    CV LEFT HEART CATH N/A 01/25/2023    Procedure: Left Heart Catheterization;  Surgeon: Lukas Irizarry MD;  Location: [facility]    ENDOSCOPIC ULTRASOUND UPPER GASTROINTESTINAL TRACT (GI) N/A 3/21/2024    Procedure: ENDOSCOPIC ULTRASOUND, ESOPHAGOSCOPY / UPPER GASTROINTESTINAL TRACT (GI), ENDOSCOPY WITH BIOPSY, FINE NEEDLE ASPIRATION;  Surgeon:  Damon Kramer MD;  Location: [facility] OR    ENDOSCOPIC ULTRASOUND, ESOPHAGOSCOPY / UPPER GASTROINTESTINAL TRACT (GI), ENDOSCOPY WITH BIOPSY, FINE NEEDLE ASPIRATION  03/21/2024    ESOPHAGOSCOPY, GASTROSCOPY, DUODENOSCOPY (EGD), SUBMUCOSA RESECTION N/A 4/2/2024    Procedure: ESOPHAGOGASTRODUODENOSCOPY, WITH SUBMUCOSAL RESECTION;  Surgeon: Holden King MD;  Location: [facility] OR    IR CHEST PORT PLACEMENT > 5 YRS OF AGE  4/24/2024     acetaminophen (TYLENOL) 160 MG/5ML liquid  busPIRone (BUSPAR) 5 MG tablet  esomeprazole (NEXIUM) 20 MG DR capsule  famotidine (PEPCID) 20 MG tablet  fentaNYL (DURAGESIC) 25 mcg/hr 72 hr patch  fluticasone (FLONASE) 50 MCG/ACT nasal spray  levothyroxine (SYNTHROID/LEVOTHROID) 200 MCG tablet  magic mouthwash suspension (diphenhydrAMINE, lidocaine, aluminum-magnesium & simethicone)  naloxone (NARCAN) 4 MG/0.1ML nasal spray  ondansetron (ZOFRAN) 8 MG tablet  oxyCODONE (ROXICODONE) 5 MG tablet  polyethylene glycol (MIRALAX) 17 GM/Dose powder  prochlorperazine (COMPAZINE) 10 MG tablet  sucralfate (CARAFATE) 1 GM/10ML suspension  vitamin B-12 (CYANOCOBALAMIN) 2500 MCG sublingual tablet      Allergies   Allergen Reactions    Amoxicillin Shortness Of Breath, Itching and Rash    Penicillins      Family History  Family History   Problem Relation Age of Onset    Chronic Obstructive Pulmonary Disease Father     Colon Cancer Paternal Grandmother 70     Social History   Social History     Tobacco Use    Smoking status: Every Day     Current packs/day: 1.00     Average packs/day: 1 pack/day for 47.4 years (47.4 ttl pk-yrs)     Types: Cigarettes     Start date: 1977    Smokeless tobacco: Never   Vaping Use    Vaping status: Never Used   Substance Use Topics    Drug use: Never      Past medical history, past surgical history, medications, allergies, family history, and social history were reviewed with the patient. No additional pertinent items.   A complete review of systems was performed with pertinent  positives and negatives noted in the HPI, and all other systems negative.    Physical Exam      Physical Exam  CONSTITUTIONAL: Awake and alert. Non-toxic appearance. Appears uncomfortable.   HENT:   - Head: Normocephalic and atraumatic.   - Ears: External ear grossly normal.   - Nose: Nose normal. No rhinorrhea. No epistaxis.   - Mouth/Throat: MMM  EYES: Conjunctivae and lids are normal. No scleral icterus.   NECK: Normal range of motion and phonation normal. Neck supple.  No tracheal deviation, no stridor. No edema or erythema noted.  CARDIOVASCULAR: Normal rate, regular rhythm and no appreciable abnormal heart sounds.  PULMONARY/CHEST: Some increased RR/looks uncomfortable w/ breathing. No accessory muscle usage or stridor. No respiratory distress.  No appreciable abnormal breath sounds.  ABDOMEN: Soft, non-distended. No tenderness. No peritoneal findings, no rigidity, rebound or guarding.  No palpable masses or abnormal pulsatility appreciated.  MUSCULOSKELETAL: Extremities warm and seemingly well perfused. No edema or calf tenderness.  NEUROLOGIC: Awake, alert. Not disoriented. No seizure activity. GCS 15  SKIN: Skin is warm and dry. No rash noted. No diaphoresis. No pallor.   PSYCHIATRIC: Normal mood and affect. Speech and behavior normal. Thought processes linear. Cognition and memory are normal. No SI/HI reported.      ED Course, Procedures, & Data     Critical care was not performed.     Medical Decision Making  The patient's presentation was of high complexity (a chronic illness severe exacerbation, progression, or side effect of treatment).    The patient's evaluation involved:  review of 3+ test result(s) ordered prior to this encounter (see separate area of note for details)  ordering and/or review of 3+ test(s) in this encounter (see separate area of note for details)  discussion of management or test interpretation with another health professional (see separate area of note for details)    The  patient's management necessitated further care after sign-out to overnight EM physician (see their note for further management).    Assessment & Plan    IMPRESSION:   64 year old female w/ PMH notable for esophageal squamous cell carcinoma (being actively treated), HTN, et al. Clinically, patient appears nontoxic, but uncomfortable.  Otherwise on examination, has some increased RR. No other acute appearing findings on exam.  Ddx includes, but not limited to, esophageal pain in setting of known cancer, esophagitis, mediastinitis, pericarditis, PE, et al.     PLAN:   - Labs, chest/abdominal imaging  - Risks/benefits of pursuing imaging reviewed and accepted.   - Dispo pending ED course    RESULTS:  Pending at signout    INTERVENTIONS:   - IV Fentanyl    DISCUSSIONS:  - w/ Patient: I have reviewed the available findings, tentative plan with the patient and her loved one/guest. They expressed understanding and agreement with this plan. All questions answered to the best of our ability at this time.     SIGNOUT:  Patient signed out to overnight EM Physician.  IMPRESSION AT SHIFT CHANGE:  - Chest/abdominal pain, inability to take in PO, tachypnea  - Esophageal cancer  PENDING AT SHIFT CHANGE:   - Labs, chest/abdominal imaging  TENTATIVE PLAN:   - F/U pending studies  - Dispo pending ED course, likely admission for hydration, pain management, et al.     ______________________________________________________________________        Kinjal Rey MD  [facility] EMERGENCY DEPARTMENT  6/4/2024     Kinjal Rey MD  06/04/24 0534

## 2024-06-05 ENCOUNTER — APPOINTMENT (OUTPATIENT)
Dept: RADIATION ONCOLOGY | Facility: CLINIC | Age: 64
End: 2024-06-05
Attending: RADIOLOGY
Payer: COMMERCIAL

## 2024-06-05 ENCOUNTER — APPOINTMENT (OUTPATIENT)
Dept: SPEECH THERAPY | Facility: CLINIC | Age: 64
DRG: 391 | End: 2024-06-05
Payer: COMMERCIAL

## 2024-06-05 LAB
ALBUMIN SERPL BCG-MCNC: 3.4 G/DL (ref 3.5–5.2)
ALP SERPL-CCNC: 78 U/L (ref 40–150)
ALT SERPL W P-5'-P-CCNC: 18 U/L (ref 0–50)
ANION GAP SERPL CALCULATED.3IONS-SCNC: 10 MMOL/L (ref 7–15)
AST SERPL W P-5'-P-CCNC: 14 U/L (ref 0–45)
BILIRUB SERPL-MCNC: 0.7 MG/DL
BUN SERPL-MCNC: 11.9 MG/DL (ref 8–23)
CALCIUM SERPL-MCNC: 9.7 MG/DL (ref 8.8–10.2)
CHLORIDE SERPL-SCNC: 102 MMOL/L (ref 98–107)
CREAT SERPL-MCNC: 0.51 MG/DL (ref 0.51–0.95)
DEPRECATED HCO3 PLAS-SCNC: 23 MMOL/L (ref 22–29)
EGFRCR SERPLBLD CKD-EPI 2021: >90 ML/MIN/1.73M2
ERYTHROCYTE [DISTWIDTH] IN BLOOD BY AUTOMATED COUNT: 14.8 % (ref 10–15)
GLUCOSE SERPL-MCNC: 89 MG/DL (ref 70–99)
HCT VFR BLD AUTO: 26.5 % (ref 35–47)
HGB BLD-MCNC: 9.3 G/DL (ref 11.7–15.7)
MCH RBC QN AUTO: 34.3 PG (ref 26.5–33)
MCHC RBC AUTO-ENTMCNC: 35.1 G/DL (ref 31.5–36.5)
MCV RBC AUTO: 98 FL (ref 78–100)
PLATELET # BLD AUTO: 91 10E3/UL (ref 150–450)
POTASSIUM SERPL-SCNC: 3.7 MMOL/L (ref 3.4–5.3)
PROT SERPL-MCNC: 5.8 G/DL (ref 6.4–8.3)
RBC # BLD AUTO: 2.71 10E6/UL (ref 3.8–5.2)
SODIUM SERPL-SCNC: 135 MMOL/L (ref 135–145)
WBC # BLD AUTO: 1.4 10E3/UL (ref 4–11)

## 2024-06-05 PROCEDURE — 77427 RADIATION TX MANAGEMENT X5: CPT | Mod: GC | Performed by: RADIOLOGY

## 2024-06-05 PROCEDURE — 92526 ORAL FUNCTION THERAPY: CPT | Mod: GN

## 2024-06-05 PROCEDURE — 85014 HEMATOCRIT: CPT

## 2024-06-05 PROCEDURE — 250N000013 HC RX MED GY IP 250 OP 250 PS 637

## 2024-06-05 PROCEDURE — 250N000011 HC RX IP 250 OP 636

## 2024-06-05 PROCEDURE — 36415 COLL VENOUS BLD VENIPUNCTURE: CPT

## 2024-06-05 PROCEDURE — 99222 1ST HOSP IP/OBS MODERATE 55: CPT | Mod: GC | Performed by: STUDENT IN AN ORGANIZED HEALTH CARE EDUCATION/TRAINING PROGRAM

## 2024-06-05 PROCEDURE — 77336 RADIATION PHYSICS CONSULT: CPT | Performed by: RADIOLOGY

## 2024-06-05 PROCEDURE — 77386 HC IMRT TREATMENT DELIVERY, COMPLEX: CPT | Performed by: RADIOLOGY

## 2024-06-05 PROCEDURE — 99233 SBSQ HOSP IP/OBS HIGH 50: CPT | Mod: GC | Performed by: STUDENT IN AN ORGANIZED HEALTH CARE EDUCATION/TRAINING PROGRAM

## 2024-06-05 PROCEDURE — 80053 COMPREHEN METABOLIC PANEL: CPT

## 2024-06-05 PROCEDURE — 120N000002 HC R&B MED SURG/OB UMMC

## 2024-06-05 RX ORDER — ACETAMINOPHEN 325 MG/10.15ML
975 LIQUID ORAL 3 TIMES DAILY
Status: DISCONTINUED | OUTPATIENT
Start: 2024-06-05 | End: 2024-06-18 | Stop reason: HOSPADM

## 2024-06-05 RX ORDER — ACETAMINOPHEN 325 MG/10.15ML
325 LIQUID ORAL EVERY 4 HOURS PRN
Status: DISCONTINUED | OUTPATIENT
Start: 2024-06-05 | End: 2024-06-18 | Stop reason: HOSPADM

## 2024-06-05 RX ORDER — ENOXAPARIN SODIUM 100 MG/ML
40 INJECTION SUBCUTANEOUS EVERY 24 HOURS
Status: CANCELLED | OUTPATIENT
Start: 2024-06-05

## 2024-06-05 RX ADMIN — OXYCODONE HYDROCHLORIDE 10 MG: 5 TABLET ORAL at 22:25

## 2024-06-05 RX ADMIN — ONDANSETRON 4 MG: 2 INJECTION INTRAMUSCULAR; INTRAVENOUS at 04:07

## 2024-06-05 RX ADMIN — PROCHLORPERAZINE EDISYLATE 10 MG: 5 INJECTION INTRAMUSCULAR; INTRAVENOUS at 12:58

## 2024-06-05 RX ADMIN — BUSPIRONE HYDROCHLORIDE 5 MG: 5 TABLET ORAL at 07:35

## 2024-06-05 RX ADMIN — DOCUSATE SODIUM 50 MG AND SENNOSIDES 8.6 MG 2 TABLET: 8.6; 5 TABLET, FILM COATED ORAL at 15:21

## 2024-06-05 RX ADMIN — HYDROMORPHONE HYDROCHLORIDE 0.3 MG: 1 INJECTION, SOLUTION INTRAMUSCULAR; INTRAVENOUS; SUBCUTANEOUS at 01:08

## 2024-06-05 RX ADMIN — OXYCODONE HYDROCHLORIDE 10 MG: 5 TABLET ORAL at 06:44

## 2024-06-05 RX ADMIN — LEVOTHYROXINE SODIUM 100 MCG: 100 TABLET ORAL at 07:36

## 2024-06-05 RX ADMIN — HYDROMORPHONE HYDROCHLORIDE 0.3 MG: 1 INJECTION, SOLUTION INTRAMUSCULAR; INTRAVENOUS; SUBCUTANEOUS at 12:58

## 2024-06-05 RX ADMIN — HYDROMORPHONE HYDROCHLORIDE 0.3 MG: 1 INJECTION, SOLUTION INTRAMUSCULAR; INTRAVENOUS; SUBCUTANEOUS at 04:07

## 2024-06-05 RX ADMIN — DIPHENHYDRAMINE HYDROCHLORIDE AND LIDOCAINE HYDROCHLORIDE AND ALUMINUM HYDROXIDE AND MAGNESIUM HYDRO 10 ML: KIT at 12:02

## 2024-06-05 RX ADMIN — PROCHLORPERAZINE EDISYLATE 10 MG: 5 INJECTION INTRAMUSCULAR; INTRAVENOUS at 06:44

## 2024-06-05 RX ADMIN — HYDROMORPHONE HYDROCHLORIDE 0.3 MG: 1 INJECTION, SOLUTION INTRAMUSCULAR; INTRAVENOUS; SUBCUTANEOUS at 17:34

## 2024-06-05 RX ADMIN — ACETAMINOPHEN 975 MG: 325 TABLET, FILM COATED ORAL at 07:34

## 2024-06-05 RX ADMIN — HYDROMORPHONE HYDROCHLORIDE 0.3 MG: 1 INJECTION, SOLUTION INTRAMUSCULAR; INTRAVENOUS; SUBCUTANEOUS at 20:50

## 2024-06-05 RX ADMIN — OXYCODONE HYDROCHLORIDE 10 MG: 5 TABLET ORAL at 10:40

## 2024-06-05 RX ADMIN — FAMOTIDINE 20 MG: 20 TABLET ORAL at 07:35

## 2024-06-05 RX ADMIN — PANTOPRAZOLE SODIUM 40 MG: 40 TABLET, DELAYED RELEASE ORAL at 07:35

## 2024-06-05 RX ADMIN — PROCHLORPERAZINE EDISYLATE 10 MG: 5 INJECTION INTRAMUSCULAR; INTRAVENOUS at 20:50

## 2024-06-05 RX ADMIN — ACETAMINOPHEN 975 MG: 325 SOLUTION ORAL at 14:22

## 2024-06-05 RX ADMIN — FAMOTIDINE 20 MG: 20 TABLET ORAL at 20:50

## 2024-06-05 RX ADMIN — ONDANSETRON 4 MG: 2 INJECTION INTRAMUSCULAR; INTRAVENOUS at 10:39

## 2024-06-05 RX ADMIN — PANTOPRAZOLE SODIUM 40 MG: 40 TABLET, DELAYED RELEASE ORAL at 17:34

## 2024-06-05 RX ADMIN — HYDROMORPHONE HYDROCHLORIDE 0.3 MG: 1 INJECTION, SOLUTION INTRAMUSCULAR; INTRAVENOUS; SUBCUTANEOUS at 07:51

## 2024-06-05 RX ADMIN — OXYCODONE HYDROCHLORIDE 10 MG: 5 TABLET ORAL at 15:17

## 2024-06-05 RX ADMIN — ONDANSETRON 4 MG: 2 INJECTION INTRAMUSCULAR; INTRAVENOUS at 17:35

## 2024-06-05 ASSESSMENT — ACTIVITIES OF DAILY LIVING (ADL)
ADLS_ACUITY_SCORE: 35
ADLS_ACUITY_SCORE: 38
ADLS_ACUITY_SCORE: 35
ADLS_ACUITY_SCORE: 38
ADLS_ACUITY_SCORE: 38
ADLS_ACUITY_SCORE: 35
ADLS_ACUITY_SCORE: 38
ADLS_ACUITY_SCORE: 35
ADLS_ACUITY_SCORE: 38
ADLS_ACUITY_SCORE: 35
ADLS_ACUITY_SCORE: 38
ADLS_ACUITY_SCORE: 35
ADLS_ACUITY_SCORE: 38
ADLS_ACUITY_SCORE: 35
ADLS_ACUITY_SCORE: 35
ADLS_ACUITY_SCORE: 38

## 2024-06-05 NOTE — PLAN OF CARE
Speech Language Therapy Discharge Summary    Reason for therapy discharge:    All goals and outcomes met, no further needs identified.    Progress towards therapy goal(s). See goals on Care Plan in [facility] electronic health record for goal details.  Goals met    Therapy recommendation(s):    No further therapy is recommended.    Recommend pt continue regular diet and thin liquids. Pt should self-select softer textures, sit fully upright for all PO, take small sips/bites, slow pacing, and alternate between consistencies. No additional ST needs indicated for oropharyngeal dysphagia. Will complete orders.

## 2024-06-05 NOTE — PLAN OF CARE
"/81 (BP Location: Right arm)   Pulse 68   Temp 98.4  F (36.9  C) (Oral)   Resp 16   Ht 1.702 m (5' 7\")   Wt 64.4 kg (142 lb)   SpO2 98%   BMI 22.24 kg/m      Plan of care reviewed with: patient   Overall patient progress: no change    Time: [id number]  Status: admitted for chest pain after undergoing radiation.   Neuro/Pain: c/o epigastric area pain. Diaud 0.3 mg twice, Oxycodone 5 mg once,  Fentanyl patch behind L ear. Zofran for nausea.   Activity: Independent for activity.                                                                   Cardiac/vs: SR, afebrile, BP stable.   Respiratory: on room air sating >92%, LS clear all lobes.   GI/: no bm this shift. Active bowel sound. Voided in the bathroom.   Diet: NPO after midnight.   LDAs: port cath infusing TKO   Change this shift: none   Plan: continue on pain management, nausea control and follow POC.     "

## 2024-06-05 NOTE — PROGRESS NOTES
[facility]    Internal Medicine Progress Note - [facility]    Main Plans for Today   - Plan placement of jejunostomy tomorrow by thoracic surgery   - NPO at midnight and hold enoxaparin for procedure tomorrow  - Continue management of pain and nausea from radiation toxicity   - Proceed with final radiation treatment per Radiation Oncology's recommendations     Assessment & Plan   Linh Mustafa is a 64 year old female with squamous cell carcinoma of the esophagus admitted on 6/4/202 for chest pain after radiation therapy on 5/31/2024. Initial workup negative for acute pulmonary or cardiac etiology. Chest pain likely from mucositis of the esophagus due to radiation toxicity. Will proceed with the final two radiation treatments and placement of jejunostomy in preparation for esophagectomy.     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 Reports pain, nausea, and vomiting after radiation therapy. Chest pain feels similar regarding quality and location to prior pain, but significantly more intense.  ED workup notable for normal troponin and BNP. CT Abd  and Chest demonstrated no acute pathology, including PE, aortic dissection, or esophageal perforation. Workup negative for cardiac etiology, GI perforation, and PE.  Has significant pain with swallowing, which limits her PO intake. Diet consist predominantly of soft foods like pudding and shakes. Bedside swallow evaluation demonstrated normal oropharyngeal swallowing mechanisms with regular/thin liquids. Patient declined solids during the study. Patient will require placement of jejunostomy for planned esophagectomy.   - Consult Radiation, thank you  for recs   - Recommends completing her two final radiation treatments because stopping now would tangibly decrease cancer control. They anticipate that her symptoms due to radiation toxicity should not considerably worsen.   - Consult Thoracic Surgery (Dr. Martinez) for placement of jejunostomy    - NPO at midnight  - Pain Control: PTA PRN oxycodone (5-10 mg q4h), IV Dilaudid PRN for breakthrough pain (Q3H), PTA fentanyl (25 mcg/hr patch every 72 hours), PRN lidocaine 1%  - Pantoprazole (40 mg BID); PTA esomeprazole not on formulary   - PTA famotidine (20 mg BID)  - PTA Magic Mouthwash (4x daily)  - PTA PRN ondansetron (8 mg q8h)   - PTA PRN compazine (10 mg q6h)  - PTA sucralfate (1g 4x daily)  - Thin liquid diet     Leukopenia    Thrombocytopenia   At presentation, WBCs at 1.4 and platelets at 137. Suspect secondary to bone marrow suppression from radiation therapy and/or chemotherapy. Most recent dose of chemotherapy on 5/28/2024 with paclitaxel and carboplatin. On 6/5, platelets decreased to 91 after starting enoxaparin. Acute decrease is likely dilutional after receiving 2L of fluid and a concomitant drop in WBCs and RBCs.     Borderline macrocytic Anemia  Has slight anemia at presentation with Hgb of 11.0  - Hold PTA vitamin B12 (2500 mcg daily) given large size of pill (difficulty swallowing)    Chronic Hypercalcemia   Ca elevated to 11.1 at admission. Most likely secondary to hyperparathyroidism with parathyroid hormone elevated to 121 on 5/1/2024. Had an appointment with endocrinology, but missed the appointment.   - Outpatient followup with endocrinology      Hypothyroidism   PTA levothyroxine (100 mcg)      Anxiety:  - PTA buspirone (5 mg daily)      Tobacco abuse.   Uses 1/2 ppd.    - Nicotine patch   - PRN nicotine gum     The patient was discussed with Dr. Jelena Nova, MS4    Resident/Fellow Attestation   I, Vandana Florentino MD, was present with the medical/[employer] student who participated in  the service and in the documentation of the note.  I have verified the history and personally performed the physical exam and medical decision making.  I agree with the assessment and plan of care as documented in the note.      Vandana Florentino MD  Internal Medicine Resident    Diet: Regular Diet Adult Thin Liquids (level 0)  NPO per Anesthesia Guidelines for Procedure/Surgery Except for: Meds  Fluids: None  Lines: Right Chest Port  [place] Catheter: Not present      Interval History   No overnight events. Found patient sleeping this morning. Endorses moderate control of her pain and nausea. However, the nurse notes that she grimaces with a small movement.     Physical Exam   Vital Signs: Temp: 98.4  F (36.9  C) Temp src: Oral BP: 109/68 Pulse: 89   Resp: 16 SpO2: 97 % O2 Device: None (Room air)    Weight: 142 lbs 0 oz  General Appearance: Frail appearing. Alert. Intermittent distress from pain/nausea    Eyes: No conjunctivitis or scleral icterus   Head: atraumatic  Respiratory: Adequate oxygenation on room air   Cardiovascular: RRR with no murmurs, rubs, or gallops  GI: Soft, non-distended. No tenderness in all four quadrants.   Genitourinary: No [place]mosillo   Skin: Warm and well perfused. No pallor. Right chest port in place w/o surrounding erythema.  Musculoskeletal: Moving all four extremities   Neurologic: No gross neurological deficits   Psychiatric: appropriate mood and affect        Data   Recent Labs   Lab 06/05/24  0724 06/04/24  0219   WBC 1.4* 1.9*   HGB 9.3* 11.0*   MCV 98 96   PLT 91* 137*   INR  --  0.98    135   POTASSIUM 3.7 4.3   CHLORIDE 102 98   CO2 23 20*   BUN 11.9 21.3   CR 0.51 0.76   ANIONGAP 10 17*   VAIBHAV 9.7 11.1*   GLC 89 99   ALBUMIN 3.4* 4.3   PROTTOTAL 5.8* 7.5   BILITOTAL 0.7 1.2   ALKPHOS 78 98   ALT 18 27   AST 14 22   LIPASE  --  11*       Imaging results reviewed over the past 24 hrs:   No results found for this or any previous visit (from the past 24 hour(s)).

## 2024-06-05 NOTE — UTILIZATION REVIEW
"    Admission Status; Secondary Review Determination         Under the authority of the Utilization Management Committee, the utilization review process indicated a secondary review on the above patient.  The review outcome is based on review of the medical records, discussions with staff, and applying clinical experience noted on the date of the review.        (X)      Inpatient Status Appropriate - This patient's medical care is consistent with medical management for inpatient care and reasonable inpatient medical practice.      () Observation Status Appropriate - This patient does not meet hospital inpatient criteria and is placed in observation status. If this patient's primary payer is Medicare and was admitted as an inpatient, Condition Code 44 should be used and patient status changed to \"observation\".   () Admission Status NOT Appropriate - This patient's medical care is not consistent with medical management for Inpatient or Observation Status.          RATIONALE FOR DETERMINATION     \"Linh Mustafa is a 64 year old female with squamous cell carcinoma of the esophagus (currently receiving treatment), hypertension, hypercholesteremia and tobacco use who presents with chest pain after undergoing radiation. Most recent radiation therapy was on 5/31/2024. Until now, patient has tolerated radiation treatment fairly well, but previously endorsed pain w/ swallowing. Reports pain and nausea after radiation therapy in the past, but chest pain significantly worse today. The pain improves when lying down. Patient denies fever, chills, SOB, hemoptysis and cough. Has significant pain with swallowing, which limits her PO intake. Diet consist predominantly of soft foods like pudding and shakes. Has two more radiation appointments planned. Patient asks if she still needs to complete these. \"    The patient is getting several doses of IV dilaudid for pain and is likely to get a J tube due to poor oral intake in setting " of malignancy and is likely to stay > 2 MN and inpatient status is appropriate.    The severity of illness, intensity of service provided, expected LOS and risk for adverse outcome make the care complex, high risk and appropriate for hospital admission.        The information on this document is developed by the utilization review team in order for the business office to ensure compliance.  This only denotes the appropriateness of proper admission status and does not reflect the quality of care rendered.         The definitions of Inpatient Status and Observation Status used in making the determination above are those provided in the CMS Coverage Manual, Chapter 1 and Chapter 6, section 70.4.      Sincerely,     ARLYN HEMPHILL MD    Physician Advisor  Utilization Review/ Case Management  [facility].

## 2024-06-05 NOTE — MEDICATION SCRIBE - ADMISSION MEDICATION HISTORY
Medication Scribe Admission Medication History    Admission medication history is complete. The information provided in this note is only as accurate as the sources available at the time of the update.    Information Source(s): Patient via in-person    Pertinent Information: Pt reported taking medications on PTA medication list as directed.     Changes made to PTA medication list:  Added: None  Deleted: Fluticasone 50 mcg/act nasal spray.  Changed: None    Allergies reviewed with patient and updates made in EHR: yes    Medication History Completed By: Rashida Kulkarni 6/5/2024 2:36 AM    PTA Med List   Medication Sig Last Dose    acetaminophen (TYLENOL) 160 MG/5ML liquid Take 20 mLs (640 mg) by mouth every 6 hours as needed for mild pain or fever 6/3/2024 at am    busPIRone (BUSPAR) 5 MG tablet Take by mouth daily 6/3/2024 at am    esomeprazole (NEXIUM) 20 MG DR capsule Take 20 mg by mouth 2 times daily Take 30-60 minutes before eating. 6/3/2024 at am    famotidine (PEPCID) 20 MG tablet Take 20 mg by mouth 2 times daily 6/3/2024 at am    fentaNYL (DURAGESIC) 25 mcg/hr 72 hr patch Place 1 patch onto the skin every 72 hours For cancer related pain - please dispense amount requested 6/2/2024 at for 1hr    levothyroxine (SYNTHROID/LEVOTHROID) 200 MCG tablet take 1 tablet by mouth every day for 30 days 6/3/2024 at am    magic mouthwash suspension (diphenhydrAMINE, lidocaine, aluminum-magnesium & simethicone) Swish and swallow 10 mLs in mouth 4 times daily (before meals and nightly) Unknown    naloxone (NARCAN) 4 MG/0.1ML nasal spray Spray 1 spray (4 mg) into one nostril alternating nostrils as needed for opioid reversal every 2-3 minutes until assistance arrives  at has not used    ondansetron (ZOFRAN) 8 MG tablet Take 1 tablet (8 mg) by mouth every 8 hours as needed for nausea 6/3/2024 at am    oxyCODONE (ROXICODONE) 5 MG tablet Take 1-2 tablets (5-10 mg) by mouth every 4 hours Unknown    polyethylene glycol (MIRALAX)  17 GM/Dose powder Take 17 g (1 Capful) by mouth daily Unknown    prochlorperazine (COMPAZINE) 10 MG tablet Take 1 tablet (10 mg) by mouth every 6 hours as needed for nausea or vomiting Unknown    sucralfate (CARAFATE) 1 GM/10ML suspension Take 10 mLs (1 g) by mouth 4 times daily Unknown    vitamin B-12 (CYANOCOBALAMIN) 2500 MCG sublingual tablet Take 1 tablet (2,500 mcg) by mouth daily 6/3/2024 at am

## 2024-06-05 NOTE — CONSULTS
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 Until recently, patient has tolerated radiation treatment fairly well, but previously endorsed pain w/ swallowing. Reports significant pain and nausea after radiation therapy on 5/30, which continued to progress until presentation to the hospital yesterday. The pain and nausea improves when lying down.     Patient denies fever, chills, SOB, hemoptysis and cough. Has significant pain with swallowing, which limits her PO intake. Diet consist predominantly of soft foods like pudding and shakes. Rad onc recommending patient to finish last radiation dose today, stating that she will likely continue to have pain and dysphagia for another 2-3 weeks after which this should begin to improve.    A/P: Patient is a 64 year old female with Squamous cell carcinoma of the esophagus on neoadjuvant chemotherapy admitted with chest pain and dysphagia in the setting of radiation (last dose today, 6/5). Thoracic surgery consulted for placement of jejunostomy tube. Will plan for placement of J-tube in 1-2 days if patient agreeable. Subsequently plan for esophagectomy in 4-6 weeks.  - Okay for diet today  - NPO at midnight for possible J-tube placement tomorrow  - Multimodal pain control  - Thoracic surgery will continue to follow, please page with questions/concerns    Patient and plan discussed with fellow    Thank you for the opportunity to participate in the care of this patient.    PMH:  Past Medical History:   Diagnosis Date    Hyperlipidemia     Hypertension        PSH:  Past Surgical History:   Procedure Laterality Date    BIOPSY BREAST Right      age 30 benign fibrous    CV CORONARY ANGIOGRAM N/A 01/25/2023    Procedure: Coronary Angiogram;  Surgeon: Lukas Irizarry MD;  Location: [facility] CV    CV LEFT HEART CATH N/A 01/25/2023    Procedure: Left Heart Catheterization;  Surgeon: Lukas Irizarry MD;  Location: [facility] CV    ENDOSCOPIC ULTRASOUND UPPER GASTROINTESTINAL TRACT (GI) N/A 3/21/2024    Procedure: ENDOSCOPIC ULTRASOUND, ESOPHAGOSCOPY / UPPER GASTROINTESTINAL TRACT (GI), ENDOSCOPY WITH BIOPSY, FINE NEEDLE ASPIRATION;  Surgeon: Damon Kramer MD;  Location: [facility] OR    ENDOSCOPIC ULTRASOUND, ESOPHAGOSCOPY / UPPER GASTROINTESTINAL TRACT (GI), ENDOSCOPY WITH BIOPSY, FINE NEEDLE ASPIRATION  03/21/2024    ESOPHAGOSCOPY, GASTROSCOPY, DUODENOSCOPY (EGD), SUBMUCOSA RESECTION N/A 4/2/2024    Procedure: ESOPHAGOGASTRODUODENOSCOPY, WITH SUBMUCOSAL RESECTION;  Surgeon: Holden King MD;  Location: [facility] OR    IR CHEST PORT PLACEMENT > 5 YRS OF AGE  4/24/2024       FH:  family history includes Chronic Obstructive Pulmonary Disease in her father; Colon Cancer (age of onset: 70) in her paternal grandmother.    SH:  Current Tobacco use, 1ppd for 47.5 years  1-2 drinks EtOH per week  Denies Illicit drug use    Allergies:  Allergies   Allergen Reactions    Amoxicillin Shortness Of Breath, Itching and Rash    Penicillins        Current Meds:    Current Facility-Administered Medications:     acetaminophen (TYLENOL) oral liquid 325 mg, 325 mg, Oral, Q4H PRN, Vandana Florentino MD    acetaminophen (TYLENOL) oral liquid 975 mg, 975 mg, Oral, TID, Vandana Florentino MD    busPIRone (BUSPAR) tablet 5 mg, 5 mg, Oral, Daily, Vandana Florentino MD, 5 mg at 06/05/24 0735    calcium carbonate (TUMS) chewable tablet 1,000 mg, 1,000 mg, Oral, 4x Daily PRN, Vandana Florentino MD    enoxaparin ANTICOAGULANT (LOVENOX) injection 40 mg, 40 mg, Subcutaneous, Q24H, Vandana Florentino MD, 40 mg at 06/04/24 2012    famotidine (PEPCID) tablet 20 mg, 20 mg, Oral, BID,  Vandana Florentino MD, 20 mg at 06/05/24 0735    fentaNYL (DURAGESIC) 25 mcg/hr 72 hr patch 1 patch, 25 mcg, Transdermal, Q72H, 1 patch at 06/04/24 1540 **AND** fentaNYL (DURAGESIC) Patch in Place, , Transdermal, Q8H ELZBIETA, Vandana Florentino MD    HYDROmorphone (PF) (DILAUDID) injection 0.3 mg, 0.3 mg, Intravenous, Q3H PRN, Vandana Florentino MD, 0.3 mg at 06/05/24 0751    levothyroxine (SYNTHROID/LEVOTHROID) tablet 100 mcg, 100 mcg, Oral, Daily, Vandana Florentino MD, 100 mcg at 06/05/24 0736    lidocaine (LMX4) cream, , Topical, Q1H PRN, Vandana Florentino MD    lidocaine 1 % 0.1-1 mL, 0.1-1 mL, Other, Q1H PRN, Vandana Florentino MD    magic mouthwash suspension (diphenhydramine, lidocaine, aluminum-magnesium & simethicone), 10 mL, Swish & Swallow, 4x Daily AC & HS, Vandana Florentino MD, 10 mL at 06/04/24 2013    nicotine (COMMIT) lozenge 2 mg, 2 mg, Buccal, Q1H PRN, Vandana Florentino MD    nicotine (NICODERM CQ) 14 MG/24HR 24 hr patch 1 patch, 1 patch, Transdermal, Daily **FOLLOWED BY** [START ON 7/16/2024] nicotine (NICODERM CQ) 7 MG/24HR 24 hr patch 1 patch, 1 patch, Transdermal, Daily, Vandana Florentino MD    ondansetron (ZOFRAN ODT) ODT tab 4 mg, 4 mg, Oral, Q6H PRN **OR** ondansetron (ZOFRAN) injection 4 mg, 4 mg, Intravenous, Q6H PRN, Vandana Florentino MD, 4 mg at 06/05/24 0407    oxyCODONE (ROXICODONE) tablet 5-10 mg, 5-10 mg, Oral, Q4H PRN, Vandana Florentino MD, 10 mg at 06/05/24 0644    pantoprazole (PROTONIX) EC tablet 40 mg, 40 mg, Oral, BID AC, Vandana Florentino MD, 40 mg at 06/05/24 0735    prochlorperazine (COMPAZINE) injection 10 mg, 10 mg, Intravenous, Q6H PRN, 10 mg at 06/05/24 0644 **OR** prochlorperazine (COMPAZINE) tablet 10 mg, 10 mg, Oral, Q6H PRN **OR** prochlorperazine (COMPAZINE) suppository 25 mg, 25 mg, Rectal, Q12H PRN, Vandana Florentino MD    senna-docusate (SENOKOT-S/PERICOLACE) 8.6-50 MG per tablet 1 tablet, 1 tablet, Oral, BID PRN **OR** senna-docusate (SENOKOT-S/PERICOLACE) 8.6-50 MG per  tablet 2 tablet, 2 tablet, Oral, BID PRN, Vandana Florentino MD    sodium chloride (PF) 0.9% PF flush 3 mL, 3 mL, Intracatheter, Q8H, Vandana Florentino MD, 3 mL at 06/05/24 0107    sodium chloride (PF) 0.9% PF flush 3 mL, 3 mL, Intracatheter, q1 min prn, Vandana Florentino MD    Current Outpatient Medications:     acetaminophen (TYLENOL) 160 MG/5ML liquid, Take 20 mLs (640 mg) by mouth every 6 hours as needed for mild pain or fever, Disp: 500 mL, Rfl: 0    busPIRone (BUSPAR) 5 MG tablet, Take by mouth daily, Disp: , Rfl:     esomeprazole (NEXIUM) 20 MG DR capsule, Take 20 mg by mouth 2 times daily Take 30-60 minutes before eating., Disp: , Rfl:     famotidine (PEPCID) 20 MG tablet, Take 20 mg by mouth 2 times daily, Disp: , Rfl:     fentaNYL (DURAGESIC) 25 mcg/hr 72 hr patch, Place 1 patch onto the skin every 72 hours For cancer related pain - please dispense amount requested, Disp: 5 patch, Rfl: 0    levothyroxine (SYNTHROID/LEVOTHROID) 200 MCG tablet, take 1 tablet by mouth every day for 30 days, Disp: , Rfl:     magic mouthwash suspension (diphenhydrAMINE, lidocaine, aluminum-magnesium & simethicone), Swish and swallow 10 mLs in mouth 4 times daily (before meals and nightly), Disp: 420 mL, Rfl: 0    naloxone (NARCAN) 4 MG/0.1ML nasal spray, Spray 1 spray (4 mg) into one nostril alternating nostrils as needed for opioid reversal every 2-3 minutes until assistance arrives, Disp: 0.2 mL, Rfl: 1    ondansetron (ZOFRAN) 8 MG tablet, Take 1 tablet (8 mg) by mouth every 8 hours as needed for nausea, Disp: 30 tablet, Rfl: 3    oxyCODONE (ROXICODONE) 5 MG tablet, Take 1-2 tablets (5-10 mg) by mouth every 4 hours, Disp: 60 tablet, Rfl: 0    polyethylene glycol (MIRALAX) 17 GM/Dose powder, Take 17 g (1 Capful) by mouth daily, Disp: 510 g, Rfl: 3    prochlorperazine (COMPAZINE) 10 MG tablet, Take 1 tablet (10 mg) by mouth every 6 hours as needed for nausea or vomiting, Disp: 30 tablet, Rfl: 2    sucralfate (CARAFATE) 1 GM/10ML  suspension, Take 10 mLs (1 g) by mouth 4 times daily, Disp: 500 mL, Rfl: 1    vitamin B-12 (CYANOCOBALAMIN) 2500 MCG sublingual tablet, Take 1 tablet (2,500 mcg) by mouth daily, Disp: 90 tablet, Rfl: 3       ROS:  ROS: 10 point ROS neg other than the symptoms noted above in the HPI.    Physical Exam:  Temp:  [98.3  F (36.8  C)-98.4  F (36.9  C)] 98.4  F (36.9  C)  Pulse:  [65-89] 89  Resp:  [16-18] 16  BP: (109-127)/(60-81) 109/68  SpO2:  [97 %-99 %] 97 %    Gen: NAD, appears fatigued with intermittent bouts of discomfort  Lungs: CTAB, non-labored breathing on room air  CV: regular rhythm, normal rate on monitor  Abd: Soft, non-distended, non-tender  Ext: Warm and well perfused  Neuro: AOx3    Labs:  ABG No lab results found in last 7 days.  CBC  Recent Labs   Lab 06/05/24  0724 06/04/24 0219   WBC 1.4* 1.9*   HGB 9.3* 11.0*   PLT 91* 137*     BMP  Recent Labs   Lab 06/05/24  0724 06/04/24 0219    135   POTASSIUM 3.7 4.3   CHLORIDE 102 98   CO2 23 20*   BUN 11.9 21.3   CR 0.51 0.76   GLC 89 99     LFT  Recent Labs   Lab 06/05/24  0724 06/04/24 0219   AST 14 22   ALT 18 27   ALKPHOS 78 98   BILITOTAL 0.7 1.2   ALBUMIN 3.4* 4.3   INR  --  0.98     Pancreas  Recent Labs   Lab 06/04/24 0219   LIPASE 11*       Imaging:  Results for orders placed or performed during the hospital encounter of 06/04/24   CT Chest Pulmonary Embolism w Contrast    Impression    IMPRESSION:  1.  There is no pulmonary embolus, aortic aneurysm or dissection.  2.  Wall thickening of the mid esophagus similar to the previous exam. There is edema in the fat surrounding the mid esophagus. No extraluminal gas or fluid collection.  3.  Emphysema.   CT Abdomen Pelvis w Contrast    Impression    IMPRESSION:   1.  Cholelithiasis. Mild gallbladder wall thickening not excluded.  2.  No bowel obstruction or abscess.  3.  Diverticulosis.  4.  Several hepatic hypodensities small for characterization   US Abdomen Limited (RUQ)    Impression     IMPRESSION:    1. Cholelithiasis without sonographic findings to suggest acute  cholecystitis.  2. Mildly increased echogenicity throughout the hepatic parenchyma,  which can be seen in patients with hepatic steatosis.    I have personally reviewed the examination and initial interpretation  and I agree with the findings.    GIANA COOL MD         SYSTEM ID:  [id number]         Abdoulaye Powell MD  General Surgery PGY-1  Pager: [phone]

## 2024-06-06 ENCOUNTER — ANESTHESIA (OUTPATIENT)
Dept: SURGERY | Facility: CLINIC | Age: 64
DRG: 391 | End: 2024-06-06
Payer: COMMERCIAL

## 2024-06-06 ENCOUNTER — ANESTHESIA EVENT (OUTPATIENT)
Dept: SURGERY | Facility: CLINIC | Age: 64
DRG: 391 | End: 2024-06-06
Payer: COMMERCIAL

## 2024-06-06 ENCOUNTER — HOSPITAL ENCOUNTER (INPATIENT)
Facility: CLINIC | Age: 64
Setting detail: SURGERY ADMIT
End: 2024-06-06
Attending: THORACIC SURGERY (CARDIOTHORACIC VASCULAR SURGERY) | Admitting: THORACIC SURGERY (CARDIOTHORACIC VASCULAR SURGERY)
Payer: COMMERCIAL

## 2024-06-06 DIAGNOSIS — C15.9 MALIGNANT NEOPLASM OF ESOPHAGUS, UNSPECIFIED LOCATION (H): Primary | ICD-10-CM

## 2024-06-06 LAB
ACANTHOCYTES BLD QL SMEAR: ABNORMAL
ALBUMIN SERPL BCG-MCNC: 4 G/DL (ref 3.5–5.2)
ALP SERPL-CCNC: 99 U/L (ref 40–150)
ALT SERPL W P-5'-P-CCNC: 22 U/L (ref 0–50)
ANION GAP SERPL CALCULATED.3IONS-SCNC: 13 MMOL/L (ref 7–15)
AST SERPL W P-5'-P-CCNC: 17 U/L (ref 0–45)
ATRIAL RATE - MUSE: 54 BPM
AUER BODIES BLD QL SMEAR: ABNORMAL
BASO STIPL BLD QL SMEAR: ABNORMAL
BASOPHILS # BLD AUTO: 0 10E3/UL (ref 0–0.2)
BASOPHILS NFR BLD AUTO: 0 %
BILIRUB SERPL-MCNC: 0.7 MG/DL
BITE CELLS BLD QL SMEAR: ABNORMAL
BLISTER CELLS BLD QL SMEAR: ABNORMAL
BUN SERPL-MCNC: 9.8 MG/DL (ref 8–23)
BURR CELLS BLD QL SMEAR: ABNORMAL
CALCIUM SERPL-MCNC: 10.5 MG/DL (ref 8.8–10.2)
CHLORIDE SERPL-SCNC: 99 MMOL/L (ref 98–107)
CREAT SERPL-MCNC: 0.56 MG/DL (ref 0.51–0.95)
DACRYOCYTES BLD QL SMEAR: SLIGHT
DEPRECATED HCO3 PLAS-SCNC: 24 MMOL/L (ref 22–29)
DIASTOLIC BLOOD PRESSURE - MUSE: NORMAL MMHG
EGFRCR SERPLBLD CKD-EPI 2021: >90 ML/MIN/1.73M2
ELLIPTOCYTES BLD QL SMEAR: ABNORMAL
EOSINOPHIL # BLD AUTO: 0 10E3/UL (ref 0–0.7)
EOSINOPHIL NFR BLD AUTO: 1 %
ERYTHROCYTE [DISTWIDTH] IN BLOOD BY AUTOMATED COUNT: 14.7 % (ref 10–15)
FRAGMENTS BLD QL SMEAR: ABNORMAL
GLUCOSE BLDC GLUCOMTR-MCNC: 92 MG/DL (ref 70–99)
GLUCOSE SERPL-MCNC: 94 MG/DL (ref 70–99)
HCT VFR BLD AUTO: 32.3 % (ref 35–47)
HGB BLD-MCNC: 11 G/DL (ref 11.7–15.7)
HGB C CRYSTALS: ABNORMAL
HOWELL-JOLLY BOD BLD QL SMEAR: ABNORMAL
IMM GRANULOCYTES # BLD: 0 10E3/UL
IMM GRANULOCYTES NFR BLD: 1 %
INTERPRETATION ECG - MUSE: NORMAL
LYMPHOCYTES # BLD AUTO: 0.5 10E3/UL (ref 0.8–5.3)
LYMPHOCYTES NFR BLD AUTO: 23 %
MAGNESIUM SERPL-MCNC: 1.9 MG/DL (ref 1.7–2.3)
MCH RBC QN AUTO: 33.6 PG (ref 26.5–33)
MCHC RBC AUTO-ENTMCNC: 34.1 G/DL (ref 31.5–36.5)
MCV RBC AUTO: 99 FL (ref 78–100)
MONOCYTES # BLD AUTO: 0.2 10E3/UL (ref 0–1.3)
MONOCYTES NFR BLD AUTO: 10 %
NEUTROPHILS # BLD AUTO: 1.4 10E3/UL (ref 1.6–8.3)
NEUTROPHILS NFR BLD AUTO: 65 %
NEUTS HYPERSEG BLD QL SMEAR: ABNORMAL
NRBC # BLD AUTO: 0 10E3/UL
NRBC BLD AUTO-RTO: 0 /100
P AXIS - MUSE: 46 DEGREES
PHOSPHATE SERPL-MCNC: 2.8 MG/DL (ref 2.5–4.5)
PLAT MORPH BLD: ABNORMAL
PLATELET # BLD AUTO: 140 10E3/UL (ref 150–450)
POLYCHROMASIA BLD QL SMEAR: ABNORMAL
POTASSIUM SERPL-SCNC: 3.4 MMOL/L (ref 3.4–5.3)
POTASSIUM SERPL-SCNC: 4.2 MMOL/L (ref 3.4–5.3)
PR INTERVAL - MUSE: 154 MS
PROT SERPL-MCNC: 7.3 G/DL (ref 6.4–8.3)
QRS DURATION - MUSE: 84 MS
QT - MUSE: 440 MS
QTC - MUSE: 417 MS
R AXIS - MUSE: -4 DEGREES
RBC # BLD AUTO: 3.27 10E6/UL (ref 3.8–5.2)
RBC AGGLUT BLD QL: ABNORMAL
RBC MORPH BLD: ABNORMAL
ROULEAUX BLD QL SMEAR: ABNORMAL
SICKLE CELLS BLD QL SMEAR: ABNORMAL
SMUDGE CELLS BLD QL SMEAR: ABNORMAL
SODIUM SERPL-SCNC: 136 MMOL/L (ref 135–145)
SPHEROCYTES BLD QL SMEAR: ABNORMAL
STOMATOCYTES BLD QL SMEAR: ABNORMAL
SYSTOLIC BLOOD PRESSURE - MUSE: NORMAL MMHG
T AXIS - MUSE: 12 DEGREES
TARGETS BLD QL SMEAR: ABNORMAL
TOXIC GRANULES BLD QL SMEAR: ABNORMAL
VARIANT LYMPHS BLD QL SMEAR: ABNORMAL
VENTRICULAR RATE- MUSE: 54 BPM
WBC # BLD AUTO: 2.1 10E3/UL (ref 4–11)
WBC # BLD AUTO: 2.1 10E3/UL (ref 4–11)

## 2024-06-06 PROCEDURE — 43241 EGD TUBE/CATH INSERTION: CPT | Performed by: ANESTHESIOLOGY

## 2024-06-06 PROCEDURE — 84132 ASSAY OF SERUM POTASSIUM: CPT

## 2024-06-06 PROCEDURE — 250N000011 HC RX IP 250 OP 636

## 2024-06-06 PROCEDURE — 85048 AUTOMATED LEUKOCYTE COUNT: CPT

## 2024-06-06 PROCEDURE — 82040 ASSAY OF SERUM ALBUMIN: CPT

## 2024-06-06 PROCEDURE — 272N000001 HC OR GENERAL SUPPLY STERILE: Performed by: THORACIC SURGERY (CARDIOTHORACIC VASCULAR SURGERY)

## 2024-06-06 PROCEDURE — 250N000013 HC RX MED GY IP 250 OP 250 PS 637

## 2024-06-06 PROCEDURE — 250N000011 HC RX IP 250 OP 636: Mod: JZ

## 2024-06-06 PROCEDURE — 99233 SBSQ HOSP IP/OBS HIGH 50: CPT | Mod: GC | Performed by: STUDENT IN AN ORGANIZED HEALTH CARE EDUCATION/TRAINING PROGRAM

## 2024-06-06 PROCEDURE — 0DH67UZ INSERTION OF FEEDING DEVICE INTO STOMACH, VIA NATURAL OR ARTIFICIAL OPENING: ICD-10-PCS | Performed by: THORACIC SURGERY (CARDIOTHORACIC VASCULAR SURGERY)

## 2024-06-06 PROCEDURE — 370N000017 HC ANESTHESIA TECHNICAL FEE, PER MIN: Performed by: THORACIC SURGERY (CARDIOTHORACIC VASCULAR SURGERY)

## 2024-06-06 PROCEDURE — 36415 COLL VENOUS BLD VENIPUNCTURE: CPT

## 2024-06-06 PROCEDURE — 258N000003 HC RX IP 258 OP 636

## 2024-06-06 PROCEDURE — 36591 DRAW BLOOD OFF VENOUS DEVICE: CPT

## 2024-06-06 PROCEDURE — 83735 ASSAY OF MAGNESIUM: CPT

## 2024-06-06 PROCEDURE — 120N000002 HC R&B MED SURG/OB UMMC

## 2024-06-06 PROCEDURE — 85014 HEMATOCRIT: CPT

## 2024-06-06 PROCEDURE — 43241 EGD TUBE/CATH INSERTION: CPT | Performed by: NURSE ANESTHETIST, CERTIFIED REGISTERED

## 2024-06-06 PROCEDURE — 710N000010 HC RECOVERY PHASE 1, LEVEL 2, PER MIN: Performed by: THORACIC SURGERY (CARDIOTHORACIC VASCULAR SURGERY)

## 2024-06-06 PROCEDURE — 43241 EGD TUBE/CATH INSERTION: CPT | Mod: GC | Performed by: THORACIC SURGERY (CARDIOTHORACIC VASCULAR SURGERY)

## 2024-06-06 PROCEDURE — 250N000009 HC RX 250

## 2024-06-06 PROCEDURE — 250N000013 HC RX MED GY IP 250 OP 250 PS 637: Performed by: STUDENT IN AN ORGANIZED HEALTH CARE EDUCATION/TRAINING PROGRAM

## 2024-06-06 PROCEDURE — C1769 GUIDE WIRE: HCPCS | Performed by: THORACIC SURGERY (CARDIOTHORACIC VASCULAR SURGERY)

## 2024-06-06 PROCEDURE — 84100 ASSAY OF PHOSPHORUS: CPT

## 2024-06-06 PROCEDURE — 999N000141 HC STATISTIC PRE-PROCEDURE NURSING ASSESSMENT: Performed by: THORACIC SURGERY (CARDIOTHORACIC VASCULAR SURGERY)

## 2024-06-06 PROCEDURE — 360N000075 HC SURGERY LEVEL 2, PER MIN: Performed by: THORACIC SURGERY (CARDIOTHORACIC VASCULAR SURGERY)

## 2024-06-06 PROCEDURE — 272N000002 HC OR SUPPLY OTHER OPNP: Performed by: THORACIC SURGERY (CARDIOTHORACIC VASCULAR SURGERY)

## 2024-06-06 PROCEDURE — 250N000025 HC SEVOFLURANE, PER MIN: Performed by: THORACIC SURGERY (CARDIOTHORACIC VASCULAR SURGERY)

## 2024-06-06 RX ORDER — FENTANYL CITRATE 50 UG/ML
INJECTION, SOLUTION INTRAMUSCULAR; INTRAVENOUS PRN
Status: DISCONTINUED | OUTPATIENT
Start: 2024-06-06 | End: 2024-06-06

## 2024-06-06 RX ORDER — NALOXONE HYDROCHLORIDE 0.4 MG/ML
0.4 INJECTION, SOLUTION INTRAMUSCULAR; INTRAVENOUS; SUBCUTANEOUS
Status: DISCONTINUED | OUTPATIENT
Start: 2024-06-06 | End: 2024-06-18 | Stop reason: HOSPADM

## 2024-06-06 RX ORDER — DEXAMETHASONE SODIUM PHOSPHATE 4 MG/ML
INJECTION, SOLUTION INTRA-ARTICULAR; INTRALESIONAL; INTRAMUSCULAR; INTRAVENOUS; SOFT TISSUE PRN
Status: DISCONTINUED | OUTPATIENT
Start: 2024-06-06 | End: 2024-06-06

## 2024-06-06 RX ORDER — NALOXONE HYDROCHLORIDE 0.4 MG/ML
0.2 INJECTION, SOLUTION INTRAMUSCULAR; INTRAVENOUS; SUBCUTANEOUS
Status: DISCONTINUED | OUTPATIENT
Start: 2024-06-06 | End: 2024-06-18 | Stop reason: HOSPADM

## 2024-06-06 RX ORDER — LIDOCAINE 40 MG/G
CREAM TOPICAL
Status: DISCONTINUED | OUTPATIENT
Start: 2024-06-06 | End: 2024-06-06 | Stop reason: HOSPADM

## 2024-06-06 RX ORDER — ONDANSETRON 2 MG/ML
4 INJECTION INTRAMUSCULAR; INTRAVENOUS EVERY 30 MIN PRN
Status: DISCONTINUED | OUTPATIENT
Start: 2024-06-06 | End: 2024-06-06 | Stop reason: HOSPADM

## 2024-06-06 RX ORDER — ONDANSETRON 4 MG/1
4 TABLET, ORALLY DISINTEGRATING ORAL EVERY 30 MIN PRN
Status: DISCONTINUED | OUTPATIENT
Start: 2024-06-06 | End: 2024-06-06 | Stop reason: HOSPADM

## 2024-06-06 RX ORDER — EPHEDRINE SULFATE 50 MG/ML
INJECTION, SOLUTION INTRAMUSCULAR; INTRAVENOUS; SUBCUTANEOUS PRN
Status: DISCONTINUED | OUTPATIENT
Start: 2024-06-06 | End: 2024-06-06

## 2024-06-06 RX ORDER — SODIUM CHLORIDE, SODIUM LACTATE, POTASSIUM CHLORIDE, CALCIUM CHLORIDE 600; 310; 30; 20 MG/100ML; MG/100ML; MG/100ML; MG/100ML
INJECTION, SOLUTION INTRAVENOUS CONTINUOUS
Status: DISCONTINUED | OUTPATIENT
Start: 2024-06-06 | End: 2024-06-06 | Stop reason: HOSPADM

## 2024-06-06 RX ORDER — PROPOFOL 10 MG/ML
INJECTION, EMULSION INTRAVENOUS PRN
Status: DISCONTINUED | OUTPATIENT
Start: 2024-06-06 | End: 2024-06-06

## 2024-06-06 RX ORDER — FENTANYL CITRATE 50 UG/ML
50 INJECTION, SOLUTION INTRAMUSCULAR; INTRAVENOUS EVERY 5 MIN PRN
Status: DISCONTINUED | OUTPATIENT
Start: 2024-06-06 | End: 2024-06-06 | Stop reason: HOSPADM

## 2024-06-06 RX ORDER — HYDROMORPHONE HCL IN WATER/PF 6 MG/30 ML
0.4 PATIENT CONTROLLED ANALGESIA SYRINGE INTRAVENOUS EVERY 5 MIN PRN
Status: DISCONTINUED | OUTPATIENT
Start: 2024-06-06 | End: 2024-06-06 | Stop reason: HOSPADM

## 2024-06-06 RX ORDER — MAGNESIUM OXIDE 400 MG/1
400 TABLET ORAL EVERY 4 HOURS
Status: COMPLETED | OUTPATIENT
Start: 2024-06-06 | End: 2024-06-06

## 2024-06-06 RX ORDER — DEXTROSE MONOHYDRATE 100 MG/ML
INJECTION, SOLUTION INTRAVENOUS CONTINUOUS PRN
Status: DISCONTINUED | OUTPATIENT
Start: 2024-06-06 | End: 2024-06-18 | Stop reason: HOSPADM

## 2024-06-06 RX ORDER — ONDANSETRON 2 MG/ML
INJECTION INTRAMUSCULAR; INTRAVENOUS PRN
Status: DISCONTINUED | OUTPATIENT
Start: 2024-06-06 | End: 2024-06-06

## 2024-06-06 RX ORDER — FENTANYL CITRATE 50 UG/ML
25 INJECTION, SOLUTION INTRAMUSCULAR; INTRAVENOUS EVERY 5 MIN PRN
Status: DISCONTINUED | OUTPATIENT
Start: 2024-06-06 | End: 2024-06-06 | Stop reason: HOSPADM

## 2024-06-06 RX ORDER — DEXAMETHASONE SODIUM PHOSPHATE 4 MG/ML
4 INJECTION, SOLUTION INTRA-ARTICULAR; INTRALESIONAL; INTRAMUSCULAR; INTRAVENOUS; SOFT TISSUE
Status: DISCONTINUED | OUTPATIENT
Start: 2024-06-06 | End: 2024-06-06 | Stop reason: HOSPADM

## 2024-06-06 RX ORDER — HYDROMORPHONE HCL IN WATER/PF 6 MG/30 ML
0.2 PATIENT CONTROLLED ANALGESIA SYRINGE INTRAVENOUS EVERY 5 MIN PRN
Status: DISCONTINUED | OUTPATIENT
Start: 2024-06-06 | End: 2024-06-06 | Stop reason: HOSPADM

## 2024-06-06 RX ORDER — SODIUM CHLORIDE, SODIUM LACTATE, POTASSIUM CHLORIDE, CALCIUM CHLORIDE 600; 310; 30; 20 MG/100ML; MG/100ML; MG/100ML; MG/100ML
INJECTION, SOLUTION INTRAVENOUS CONTINUOUS PRN
Status: DISCONTINUED | OUTPATIENT
Start: 2024-06-06 | End: 2024-06-06

## 2024-06-06 RX ORDER — NALOXONE HYDROCHLORIDE 0.4 MG/ML
0.1 INJECTION, SOLUTION INTRAMUSCULAR; INTRAVENOUS; SUBCUTANEOUS
Status: DISCONTINUED | OUTPATIENT
Start: 2024-06-06 | End: 2024-06-06 | Stop reason: HOSPADM

## 2024-06-06 RX ADMIN — PHENYLEPHRINE HYDROCHLORIDE 100 MCG: 10 INJECTION INTRAVENOUS at 08:26

## 2024-06-06 RX ADMIN — ONDANSETRON 4 MG: 2 INJECTION INTRAMUSCULAR; INTRAVENOUS at 08:20

## 2024-06-06 RX ADMIN — EPHEDRINE SULFATE 10 MG: 5 INJECTION INTRAVENOUS at 08:30

## 2024-06-06 RX ADMIN — SODIUM CHLORIDE, POTASSIUM CHLORIDE, SODIUM LACTATE AND CALCIUM CHLORIDE: 600; 310; 30; 20 INJECTION, SOLUTION INTRAVENOUS at 08:12

## 2024-06-06 RX ADMIN — HYDROMORPHONE HYDROCHLORIDE 0.3 MG: 1 INJECTION, SOLUTION INTRAMUSCULAR; INTRAVENOUS; SUBCUTANEOUS at 16:15

## 2024-06-06 RX ADMIN — PROCHLORPERAZINE EDISYLATE 10 MG: 5 INJECTION INTRAMUSCULAR; INTRAVENOUS at 20:42

## 2024-06-06 RX ADMIN — ACETAMINOPHEN 325 MG: 325 SOLUTION ORAL at 18:31

## 2024-06-06 RX ADMIN — OXYCODONE HYDROCHLORIDE 10 MG: 5 TABLET ORAL at 02:51

## 2024-06-06 RX ADMIN — PANTOPRAZOLE SODIUM 40 MG: 40 TABLET, DELAYED RELEASE ORAL at 17:04

## 2024-06-06 RX ADMIN — FENTANYL CITRATE 50 MCG: 50 INJECTION, SOLUTION INTRAMUSCULAR; INTRAVENOUS at 09:31

## 2024-06-06 RX ADMIN — SUCCINYLCHOLINE CHLORIDE 100 MG: 20 INJECTION, SOLUTION INTRAMUSCULAR; INTRAVENOUS; PARENTERAL at 08:20

## 2024-06-06 RX ADMIN — PHENYLEPHRINE HYDROCHLORIDE 200 MCG: 10 INJECTION INTRAVENOUS at 08:31

## 2024-06-06 RX ADMIN — EPHEDRINE SULFATE 5 MG: 5 INJECTION INTRAVENOUS at 08:45

## 2024-06-06 RX ADMIN — PROPOFOL 90 MG: 10 INJECTION, EMULSION INTRAVENOUS at 08:20

## 2024-06-06 RX ADMIN — FENTANYL CITRATE 25 MCG: 50 INJECTION, SOLUTION INTRAMUSCULAR; INTRAVENOUS at 09:44

## 2024-06-06 RX ADMIN — OXYCODONE HYDROCHLORIDE 5 MG: 5 TABLET ORAL at 17:04

## 2024-06-06 RX ADMIN — ONDANSETRON 4 MG: 2 INJECTION INTRAMUSCULAR; INTRAVENOUS at 16:15

## 2024-06-06 RX ADMIN — HYDROMORPHONE HYDROCHLORIDE 0.4 MG: 0.2 INJECTION, SOLUTION INTRAMUSCULAR; INTRAVENOUS; SUBCUTANEOUS at 13:14

## 2024-06-06 RX ADMIN — PHENYLEPHRINE HYDROCHLORIDE 100 MCG: 10 INJECTION INTRAVENOUS at 08:28

## 2024-06-06 RX ADMIN — OXYCODONE HYDROCHLORIDE 10 MG: 5 TABLET ORAL at 21:18

## 2024-06-06 RX ADMIN — SODIUM CHLORIDE, POTASSIUM CHLORIDE, SODIUM LACTATE AND CALCIUM CHLORIDE: 600; 310; 30; 20 INJECTION, SOLUTION INTRAVENOUS at 09:44

## 2024-06-06 RX ADMIN — FENTANYL CITRATE 25 MCG: 50 INJECTION, SOLUTION INTRAMUSCULAR; INTRAVENOUS at 10:05

## 2024-06-06 RX ADMIN — HYDROMORPHONE HYDROCHLORIDE 0.3 MG: 1 INJECTION, SOLUTION INTRAMUSCULAR; INTRAVENOUS; SUBCUTANEOUS at 04:54

## 2024-06-06 RX ADMIN — MAGNESIUM OXIDE TAB 400 MG (241.3 MG ELEMENTAL MG) 400 MG: 400 (241.3 MG) TAB at 17:04

## 2024-06-06 RX ADMIN — DEXAMETHASONE SODIUM PHOSPHATE 8 MG: 4 INJECTION, SOLUTION INTRA-ARTICULAR; INTRALESIONAL; INTRAMUSCULAR; INTRAVENOUS; SOFT TISSUE at 08:20

## 2024-06-06 RX ADMIN — FENTANYL CITRATE 50 MCG: 50 INJECTION INTRAMUSCULAR; INTRAVENOUS at 08:16

## 2024-06-06 RX ADMIN — HYDROMORPHONE HYDROCHLORIDE 0.3 MG: 1 INJECTION, SOLUTION INTRAMUSCULAR; INTRAVENOUS; SUBCUTANEOUS at 00:44

## 2024-06-06 ASSESSMENT — ACTIVITIES OF DAILY LIVING (ADL)
ADLS_ACUITY_SCORE: 27
ADLS_ACUITY_SCORE: 27
ADLS_ACUITY_SCORE: 38
ADLS_ACUITY_SCORE: 27
ADLS_ACUITY_SCORE: 38
ADLS_ACUITY_SCORE: 27
ADLS_ACUITY_SCORE: 38
ADLS_ACUITY_SCORE: 27
ADLS_ACUITY_SCORE: 38
ADLS_ACUITY_SCORE: 38

## 2024-06-06 ASSESSMENT — LIFESTYLE VARIABLES: TOBACCO_USE: 1

## 2024-06-06 NOTE — PROGRESS NOTES
Admitted/transferred from: PACU  2 RN full  skin assessment completed by Steph Jhaveri, RN and Carlie HURLEY RN.  Skin assessment finding: NJ tube in L nare, R chest port   Interventions/actions: educated on importance of shifting weight      Bedside Emergency Equipment Present:  Suction Regulator: Yes  Suction Canister: Yes  Tubing between Regulator and Canister: Yes  O2 Regulator with Tree: Yes  Ambu Bag: Yes

## 2024-06-06 NOTE — BRIEF OP NOTE
[facility]    Brief Operative Note    Pre-operative diagnosis: Esophageal cancer (H) [C15.9]  Post-operative diagnosis Same as pre-operative diagnosis    Procedure: Esophagoscopy, Gastroscopy, Duodenoscopy (EGD), Nasojejunal Feeding Tube Placement, N/A - Esophagus    Surgeon: Surgeons and Role:     * Bryant Martinez MD - Primary  Anesthesia: General   Estimated Blood Loss: None    Drains: None  Specimens: * No specimens in log *  Findings:  Distal end of feeding tube visualized in stomach  Complications: None.  Implants: * No implants in log *    - placed cortrak 2 10Fr feeding tube, terminates in stomach, 120 at the nare, secured with bridle   - feeding tube okay to use

## 2024-06-06 NOTE — PROGRESS NOTES
[facility]    Internal Medicine Progress Note - [facility] Service    Main Plans for Today   - NG tube placed this morning. Originally planned for J-tube placement, but delayed placement due to low leukocyte count. Plan for J-tube in two weeks.   - Consult nutrition for tube feeds  - Monitoring electrolytes for refeeding syndrome     Assessment & Plan   Linh Mustafa is a 64 year old female with squamous cell carcinoma of the esophagus admitted on 6/4/202 for chest pain after radiation therapy on 5/31/2024. Initial workup negative for acute pulmonary or cardiac etiology. Chest pain likely from mucositis of the esophagus due to radiation toxicity. She completed final two radiation treatments and pain and nausea is well controlled with current drug regimen. Needs jejunostomy in preparation for esophagectomy.     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 Reports pain, nausea, and vomiting after radiation therapy. Chest pain feels similar regarding quality and location to prior pain, but significantly more intense.  ED workup notable for normal troponin and BNP. CT Abd  and Chest demonstrated no acute pathology, including PE, aortic dissection, or esophageal perforation. Workup negative for cardiac etiology, GI perforation, and PE.  Has significant pain with swallowing, which limits her PO intake. Diet consist predominantly of soft foods like pudding and shakes. Bedside swallow evaluation demonstrated normal oropharyngeal swallowing mechanisms with regular/thin liquids.   - Consult Radiation, thank you for recs   - Completed final two rounds of radiation therapy.   - Consult Thoracic  Surgery   - s/p esophagogastroscopy on 6/6 (inflamed esophageal mucosa)   - s/p NGT on 6/6   - Plan for J tube placement in 2 weeks pending improvement in leukocyte count  - Consult nutrition for tube feeds  - Osmolite 1.5 via NG-tube   - Monitor for refeeding syndrome (Daily K, Mg, and Phosphorus)  - Pain Control: PTA PRN oxycodone (5-10 mg q4h), IV Dilaudid PRN for breakthrough pain (Q3H), PTA fentanyl (25 mcg/hr patch every 72 hours), PRN lidocaine 1%  - Pantoprazole (40 mg BID); PTA esomeprazole not on formulary   - PTA famotidine (20 mg BID)  - PTA Magic Mouthwash (4x daily)  - PTA PRN ondansetron (8 mg q8h)   - PTA PRN compazine (10 mg q6h)  - PTA sucralfate (1g 4x daily)  - Per SLP, okay for regular diet w/ thin liquids    Pancytopenia  At presentation, WBCs at 1.4 and platelets at 137. Suspect secondary to bone marrow suppression from radiation therapy and/or chemotherapy. Most recent dose of chemotherapy on 5/28/2024 with paclitaxel and carboplatin. On 6/5, platelets decreased to 91 after starting enoxaparin. Acute decrease is likely dilutional after receiving 2L of fluid and a concomitant drop in WBCs and RBCs.   - Hold PTA vitamin B12 (2500 mcg daily) given large size of pill (difficulty swallowing)    Chronic Hypercalcemia   Ca elevated to 11.1 at admission. Most likely secondary to hyperparathyroidism with parathyroid hormone elevated to 121 on 5/1/2024. Had an appointment with endocrinology, but missed the appointment.   - Outpatient followup with endocrinology      Hypothyroidism   PTA levothyroxine (100 mcg)      Anxiety:  - PTA buspirone (5 mg daily)      Tobacco abuse.   Uses 1/2 ppd.    - Nicotine patch   - PRN nicotine gum     The patient was discussed with Dr. Jelena Nova, MS4    Resident/Fellow Attestation   I, Vandana Florentino MD, was present with the medical/[employer] student who participated in the service and in the documentation of the note.  I have verified the history and  personally performed the physical exam and medical decision making.  I agree with the assessment and plan of care as documented in the note.      Vandana Florentino MD    Diet: Thin Liquids + Tube feeds   Fluids: None  Lines: Right Chest Port  [place] Catheter: Not present      Interval History   No overnight events. Found patient in PACU following NG tube placement. Endorses adequate control of nausea and pain. Appeared tired but excited with completion of radiation therapy.       Physical Exam   Vital Signs: Temp: 99  F (37.2  C) Temp src: Oral BP: 139/70 Pulse: 89   Resp: (!) 32 SpO2: 97 % O2 Device: Nasal cannula Oxygen Delivery: 2 LPM  Weight: 142 lbs 0 oz  General Appearance: Frail appearing. Alert. Intermittent distress from pain/nausea    Eyes: No conjunctivitis or scleral icterus   Head: atraumatic  Respiratory: Adequate oxygenation on room air   Cardiovascular: RRR with no murmurs, rubs, or gallops  GI: Soft, non-distended. No tenderness in all four quadrants.   Genitourinary: No [place]mosillo   Skin: Warm and well perfused. No pallor. Right chest port in place w/o surrounding erythema.  Musculoskeletal: Moving all four extremities   Neurologic: No gross neurological deficits   Psychiatric: appropriate mood and affect        Data   Recent Labs   Lab 06/06/24  0729 06/06/24  0634 06/05/24  0724 06/04/24  0219   WBC  --  2.1*  2.1* 1.4* 1.9*   HGB  --  11.0* 9.3* 11.0*   MCV  --  99 98 96   PLT  --  140* 91* 137*   INR  --   --   --  0.98   NA  --  136 135 135   POTASSIUM  --  3.4 3.7 4.3   CHLORIDE  --  99 102 98   CO2  --  24 23 20*   BUN  --  9.8 11.9 21.3   CR  --  0.56 0.51 0.76   ANIONGAP  --  13 10 17*   VAIBHAV  --  10.5* 9.7 11.1*   GLC 92 94 89 99   ALBUMIN  --  4.0 3.4* 4.3   PROTTOTAL  --  7.3 5.8* 7.5   BILITOTAL  --  0.7 0.7 1.2   ALKPHOS  --  99 78 98   ALT  --  22 18 27   AST  --  17 14 22   LIPASE  --   --   --  11*       Imaging results reviewed over the past 24 hrs:   No results found for this or any  previous visit (from the past 24 hour(s)).

## 2024-06-06 NOTE — ANESTHESIA PROCEDURE NOTES
Airway       Patient location during procedure: OR       Procedure Start/Stop Times: 6/6/2024 8:21 AM  Staff -        Anesthesiologist:  Cynthia Granados MD       Resident/Fellow: Abdoulaye Wren       Performed By: residentIndications and Patient Condition       Indications for airway management: eliza-procedural       Induction type:RSI       Mask difficulty assessment: 0 - not attempted    Final Airway Details       Final airway type: endotracheal airway       Successful airway: ETT - single  Endotracheal Airway Details        ETT size (mm): 7.0       Successful intubation technique: direct laryngoscopy       DL Blade Type: MAC 3       Grade View of Cords: 1       Adjucts: stylet       Position: Right       Measured from: gums/teeth       Secured at (cm): 21       Bite block used: None    Post intubation assessment        Placement verified by: capnometry, equal breath sounds and chest rise        Number of attempts at approach: 1       Number of other approaches attempted: 0       Secured with: tape       Ease of procedure: easy       Dentition: Intact and Unchanged    Medication(s) Administered   Medication Administration Time: 6/6/2024 8:21 AM

## 2024-06-06 NOTE — OP NOTE
Preoperative diagnosis: Mid-esophageal squamous cell cancer    Postoperative diagnosis: The same as preoperative diagnosis    Procedure:   1) Esophagogastroscopy  2) Nasogastric feeding tube placement (corflow 10Fr)    Anesthesia: General    Surgeon: Bryant Martinez (present and participated in the entire procedure)    Resident surgeon: Yoly Victoria    EBL: 0    Complications: None immediate    Findings:  No obvious mass, the esophageal mucosa looks very inflamed, but better above 22 cm from the teeth. Stomach and 1st and 2nd portion of the duodenum were normal.    Procedure:  We positioned Ms. Mustafa supine and after induction of general anesthesia we performed an esophagogastroduodenoscopy with the above-mentioned findings. Next, we passed a wire transnasally into the stomach, we trimmed the feeding tube bu cutting off about 50 cm, and I made some additional side holes. We then secured the tube with a bridle.    At the end we verified that there was no nasal bleeding. Ms. Mustafa tolerated the procedure well.

## 2024-06-06 NOTE — PROGRESS NOTES
CLINICAL NUTRITION SERVICES - ASSESSMENT NOTE     Nutrition Prescription    RECOMMENDATIONS FOR MDs/PROVIDERS TO ORDER:   None at this time.    Malnutrition Status:   Unable to determine due to patient condition not being appropriate for exam at this time.    Recommendations already ordered by Registered Dietitian (RD):   Osmolite 1.5 via NG-tube @ 60 mL/hr x 24 hrs providing 1440 mL, 2160 kcal (34 kcal/kg), 91 g protein (1.4 g/kg), 294 g CHO, 0 g fiber, and 1097 mL free water per DW of 64 kg.  Water Flushes: 120 mL q 4 hrs (TF + Flushes = 1767 mL water; meeting 100% hydration needs).   - Initiate @ 10 ml/hr and advance by 10 ml q8hr as tolerated  - Do not start or advance until lytes (Mg++,K+) WNL and phos>2.0    - Order thiamin.  - Elevated HOB with gastric feeds         Future/Additional Recommendations:   Monitor TF tolerance, weight trends and pertinent lab values.     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 Pt also has a h/o dysphagia, odynophagia, hypothyroidism, and borderline macrocytic anemia.     NUTRITION HISTORY   Per 6/4 ED note, pt was unable to take in PO due to pain while sitting up PTA. Pt has been NPO since 6/5.    Nutrition/GI: Intermittent nausea noted in flowsheets    Skin: Klever score 21 with nutrition marked as adequate per flowsheets. No edema noted in flowsheets.    CURRENT NUTRITION ORDERS   Diet: NPO per Anesthesia Guidelines for Procedure/Surgery Except for: Meds  Nutrition Support: Initiating TF via NG tube.  Intake/Tolerance: Unable to assess due to unable to meet w/ patient and  "chart review inadequate.    LABS   Labs Reviewed   06/06/24 06:34   Sodium 136   Potassium 3.4   Urea Nitrogen 9.8   Creatinine 0.56   GFR Estimate >90   Calcium 10.5 (H)   Albumin 4.0   Protein Total 7.3   Alkaline Phosphatase 99   ALT 22   AST 17   Bilirubin Total 0.7   Glucose 94   Hemoglobin 11.0 (L)   Platelet Count 140 (L)   MCV 99       MEDICATIONS   Medications reviewed  - Pepcid  - Levothyroxine  - Magic mouthwash  - Protonix  PRN: Maalox, Tums, antiemetics,and bowel meds    ANTHROPOMETRICS  Height: 170.2 cm (5' 7\")  Most Recent Weight: 64.4 kg (142 lb)    IBW: 67.3 kg (96% of IBW)  BMI: 22.2 - Normal BMI  Weight History: 4% wt loss in 1 month, not significant.   Wt Readings from Last Encounters:   06/04/24 64.4 kg (142 lb)   05/31/24 64.4 kg (142 lb)   05/28/24 64.5 kg (142 lb 2.2 oz)   05/28/24 64.8 kg (142 lb 14.4 oz)   05/20/24 66 kg (145 lb 9.6 oz)   05/20/24 65.8 kg (145 lb)   05/15/24 66.7 kg (147 lb)   05/13/24 66.4 kg (146 lb 4.8 oz)   05/07/24 66.7 kg (147 lb)   05/06/24 65.8 kg (145 lb)   05/01/24 67.3 kg (148 lb 4.8 oz)   04/18/24 67.5 kg (148 lb 14.4 oz)   04/16/24 66.7 kg (147 lb)   04/02/24 68.6 kg (151 lb 3.8 oz)   03/21/24 68.1 kg (150 lb 2.1 oz)   03/14/24 68 kg (150 lb)   03/28/23 73 kg (161 lb)   01/25/23 74.8 kg (165 lb)   01/23/23 73.5 kg (162 lb)   01/03/23 76.1 kg (167 lb 12.8 oz)   12/23/22 81.6 kg (180 lb)     Dosing Weight: 64 kg - Actual    ASSESSED NUTRITION NEEDS   Estimated Energy Needs: 1153-1482 kcals/day (30 - 35 kcals/kg )  Justification: Increased needs and Post-op  Estimated Protein Needs: 77-96 grams protein/day (1.2 - 1.5 grams of pro/kg)  Justification: Increased needs and Post-op  Estimated Fluid Needs: 7308-4927 mL/day (25 - 30 mL/kg)   Justification: Maintenance    PHYSICAL FINDINGS   See malnutrition section below.    MALNUTRITION   % Intake: Unable to assess  % Weight Loss: Weight loss does not meet criteria  Subcutaneous Fat Loss: Unable to assess  Muscle Loss: " Unable to assess  Fluid Accumulation/Edema: None noted in chart  Malnutrition Diagnosis: Unable to determine due to patient condition not being appropriate for exam at this time.    NUTRITION DIAGNOSIS   Inadequate oral intake related to decreased intake and dysphagia as evidenced by need for feeding tube.     INTERVENTIONS   Implementation   Nutrition Education: Not appropriate at this time due to patient condition   Enteral Nutrition - Initiate   Osmolite 1.5 via NG-tube @ 60 mL/hr x 24 hrs providing 1440 mL, 2160 kcal (34 kcal/kg), 91 g protein (1.4 g/kg), 294 g CHO, 0 g fiber, and 1097 mL free water per DW of 64 kg.  Water Flushes: 120 mL q 4 hrs (TF + Flushes = 1767 mL water; meeting 100% hydration needs).   - Initiate @ 10 ml/hr and advance by 10 ml q8hr as tolerated  - Do not start or advance until lytes (Mg++,K+) WNL and phos>2.0  - Order thiamin.  - Elevated HOB with gastric feeds     Goals   Total avg nutritional intake to meet a minimum of 30 kcal/kg and 1.2 g PRO/kg daily (per dosing wt 64 kg) once goal rate has been achieved.     Monitoring/Evaluation   Progress toward goals will be monitored and evaluated per protocol.  Kathy Ovalles  Dietetic Intern  +  Huma Mckeon RD, LD   Available on ithinksport  No longer available by pager

## 2024-06-06 NOTE — PLAN OF CARE
"Goal Outcome Evaluation:       Shift:[id number]    V/S & Pain: VSS on RA. Pain managed with oxycodone and dilaudid 7/10  Neuro: AOx4, calm and cooperative  Respiratory: Stable on RA, lung sounds clear bilaterally  Cardiac:WDL  Skin:   GI/: Voids spontaneously, BM 06/01  Nutrition: NPO since midnight    Labs: monitoring, RN managed    Events this shift: no acute events this shift. Pt remains vitally stable on RA. Call light within reach, calls appropriately           /85 (BP Location: Right arm, Patient Position: Supine)   Pulse 58   Temp 98.4  F (36.9  C) (Oral)   Resp 16   Ht 1.702 m (5' 7\")   Wt 64.4 kg (142 lb)   SpO2 99%   BMI 22.24 kg/m            "

## 2024-06-06 NOTE — ANESTHESIA POSTPROCEDURE EVALUATION
Patient: Linh Mustafa    Procedure: Procedure(s):  Esophagoscopy, Gastroscopy, Duodenoscopy (EGD), Nasojejunal Feeding Tube Placement       Anesthesia Type:  General    Note:  Disposition: Outpatient   Postop Pain Control: Uneventful            Sign Out: Well controlled pain   PONV: No   Neuro/Psych: Uneventful            Sign Out: Acceptable/Baseline neuro status   Airway/Respiratory: Uneventful            Sign Out: Acceptable/Baseline resp. status   CV/Hemodynamics: Uneventful            Sign Out: Acceptable CV status; No obvious hypovolemia; No obvious fluid overload   Other NRE: NONE   DID A NON-ROUTINE EVENT OCCUR? No           Last vitals:  Vitals Value Taken Time   /63 06/06/24 1000   Temp 36.6  C (97.8  F) 06/06/24 0915   Pulse 74 06/06/24 1001   Resp 16 06/06/24 1001   SpO2 98 % 06/06/24 1001   Vitals shown include unfiled device data.    Electronically Signed By: Cynthia Granados MD  June 6, 2024  10:02 AM

## 2024-06-06 NOTE — ANESTHESIA CARE TRANSFER NOTE
Patient: Linh Mustafa    Procedure: Procedure(s):  Esophagoscopy, Gastroscopy, Duodenoscopy (EGD), Nasojejunal Feeding Tube Placement       Diagnosis: Esophageal cancer (H) [C15.9]  Diagnosis Additional Information: No value filed.    Anesthesia Type:   General     Note:      Level of Consciousness: awake  Oxygen Supplementation: face mask  Level of Supplemental Oxygen (L/min / FiO2): 6  Independent Airway: airway patency satisfactory and stable  Dentition: dentition unchanged  Vital Signs Stable: post-procedure vital signs reviewed and stable  Report to RN Given: handoff report given  Patient transferred to: PACU  Comments: Pt remains stable, monitors on alarms in place, report to PACU RN, no complications  Handoff Report: Identifed the Patient, Identified the Reponsible Provider, Reviewed the pertinent medical history, Discussed the surgical course, Reviewed Intra-OP anesthesia mangement and issues during anesthesia, Set expectations for post-procedure period and Allowed opportunity for questions and acknowledgement of understanding  Vitals:  Vitals Value Taken Time   /70 06/06/24 0915   Temp     Pulse 85 06/06/24 0917   Resp 17 06/06/24 0917   SpO2 100 % 06/06/24 0917   Vitals shown include unfiled device data.    Electronically Signed By: MASON Santos CRNA  June 6, 2024  9:17 AM

## 2024-06-06 NOTE — ANESTHESIA PREPROCEDURE EVALUATION
Anesthesia Pre-Procedure Evaluation    Patient: Linh Mustafa   MRN: [id number] : 1960        Procedure : Procedure(s):  Esophagoscopy, gastroscopy, duodenoscopy (EGD), combined- percutaneous j tube          Past Medical History:   Diagnosis Date    Hyperlipidemia     Hypertension       Past Surgical History:   Procedure Laterality Date    BIOPSY BREAST Right     age 30 benign fibrous    CV CORONARY ANGIOGRAM N/A 2023    Procedure: Coronary Angiogram;  Surgeon: Lukas Irizarry MD;  Location: [facility] CV    CV LEFT HEART CATH N/A 2023    Procedure: Left Heart Catheterization;  Surgeon: Lukas Irizarry MD;  Location: [facility] CV    ENDOSCOPIC ULTRASOUND UPPER GASTROINTESTINAL TRACT (GI) N/A 3/21/2024    Procedure: ENDOSCOPIC ULTRASOUND, ESOPHAGOSCOPY / UPPER GASTROINTESTINAL TRACT (GI), ENDOSCOPY WITH BIOPSY, FINE NEEDLE ASPIRATION;  Surgeon: Damon Kramer MD;  Location: [facility] OR    ENDOSCOPIC ULTRASOUND, ESOPHAGOSCOPY / UPPER GASTROINTESTINAL TRACT (GI), ENDOSCOPY WITH BIOPSY, FINE NEEDLE ASPIRATION  2024    ESOPHAGOSCOPY, GASTROSCOPY, DUODENOSCOPY (EGD), SUBMUCOSA RESECTION N/A 2024    Procedure: ESOPHAGOGASTRODUODENOSCOPY, WITH SUBMUCOSAL RESECTION;  Surgeon: Holden King MD;  Location: [facility] OR    IR CHEST PORT PLACEMENT > 5 YRS OF AGE  2024      Allergies   Allergen Reactions    Amoxicillin Shortness Of Breath, Itching and Rash    Penicillins       Social History     Tobacco Use    Smoking status: Every Day     Current packs/day: 1.00     Average packs/day: 1 pack/day for 47.4 years (47.4 ttl pk-yrs)     Types: Cigarettes     Start date:     Smokeless tobacco: Never   Substance Use Topics    Alcohol use: Not on file     Comment: occasional      Wt Readings from Last 1 Encounters:   24 64.4 kg (142 lb)        Anesthesia Evaluation   Pt has had prior anesthetic.     No history of anesthetic complications       ROS/MED HX  ENT/Pulmonary:      (+)                tobacco use, Current use,                       Neurologic:  - neg neurologic ROS     Cardiovascular:     (+)  hypertension- -   -  - -                                 Previous cardiac testing   Echo: Date: 1/17/23 Results:  Interpretation Summary  1. Abnormal stress echocardiogram without evidence of stress induced ischemia.  2. Normal resting LV systolic performance with an ejection fraction of 55-60%.  There is no improvement of LV systolic function with hint of anterior septal  hypokinesia with exercise  3. Nondiagnostic but suspicious for ischemia EKG changes  4. Anginal chest pain reported with exercise.  5. Poor functional capacity for age.  6. Hypertensive response to exercise     Electrocardiogram: Baseline ECG reveals normal sinus rhythm without evidence  of prior myocardial injury. There are diffuse nonspecific ST-T abnormalities.  With exercise, there there is further ST segment depression. There is a short  run of VT in recovery and ST segment depression becomes more pronounced.     Baseline echocardiogram: Technically adequate images were obtained in the  standard quad-screen format. LV systolic performance is normal with a visually  estimated ejection fraction of 55-60%. There is normal regional wall motion.  No significant valvular heart disease is identified on limited screening  Doppler.     Postexercise echocardiogram: Technically adequate images were obtained  immediately post exercise in the standard quad-screen format. LV systolic  performance does not improve with exercise. There there is a hint of  anteroseptal hypokinesia.    Stress Test:  Date: 1/17/23 Results:    ECG Reviewed:  Date: 1/17/24 Results:  NSR- wnl  Cath:  Date: 1/25/23 Results:    Coronary Findings    Diagnostic  Dominance: Right  Left Main  The vessel was visualized by selective angiography and is moderate in size. There was 0% vessel disease.    Left Anterior Descending  The vessel was visualized by  selective angiography and is moderate in size. There was 0% vessel disease.    Left Circumflex  Dominant, normal    Right Coronary Artery  Small, normal    Intervention    No interventions have been documented.    Hemodynamics    LVEDP = 13mm Hg        METS/Exercise Tolerance: >4 METS    Hematologic:     (+)      anemia,          Musculoskeletal:  - neg musculoskeletal ROS     GI/Hepatic: Comment: - Esophageal cancer s/p radiation. Now with radiation esophagitis.  - Last radiation treatment was 5/31/24  - Dysphagia        Renal/Genitourinary:  - neg Renal ROS     Endo:  - neg endo ROS     Psychiatric/Substance Use:  - neg psychiatric ROS     Infectious Disease: Comment: Had a long discussion with patient and pharmacy regarding antibiotics on 6/6/2024. Patient's allergic reaction to penicillins was deemed low risk (rash only, no hemodynamic changes, anxiety contributing to feeling of dyspnea, no airway edema, no hives). Recommended cefazolin for this procedure and upcoming procedures.       Malignancy: Comment: - Esophageal cancer  (+) Malignancy, History of Other.    Other:            Physical Exam    Airway        Mallampati: I   TM distance: > 3 FB   Neck ROM: full   Mouth opening: > 3 cm    Respiratory Devices and Support         Dental       (+) Edentulous      Cardiovascular   cardiovascular exam normal          Pulmonary   pulmonary exam normal                OUTSIDE LABS:  CBC:   Lab Results   Component Value Date    WBC 1.4 (L) 06/05/2024    WBC 1.9 (L) 06/04/2024    HGB 9.3 (L) 06/05/2024    HGB 11.0 (L) 06/04/2024    HCT 26.5 (L) 06/05/2024    HCT 31.7 (L) 06/04/2024    PLT 91 (L) 06/05/2024     (L) 06/04/2024     BMP:   Lab Results   Component Value Date     06/05/2024     06/04/2024    POTASSIUM 3.7 06/05/2024    POTASSIUM 4.3 06/04/2024    CHLORIDE 102 06/05/2024    CHLORIDE 98 06/04/2024    CO2 23 06/05/2024    CO2 20 (L) 06/04/2024    BUN 11.9 06/05/2024    BUN 21.3 06/04/2024    CR  "0.51 06/05/2024    CR 0.76 06/04/2024    GLC 89 06/05/2024    GLC 99 06/04/2024     COAGS:   Lab Results   Component Value Date    PTT 30 12/23/2022    INR 0.98 06/04/2024     POC: No results found for: \"BGM\", \"HCG\", \"HCGS\"  HEPATIC:   Lab Results   Component Value Date    ALBUMIN 3.4 (L) 06/05/2024    PROTTOTAL 5.8 (L) 06/05/2024    ALT 18 06/05/2024    AST 14 06/05/2024    ALKPHOS 78 06/05/2024    BILITOTAL 0.7 06/05/2024     OTHER:   Lab Results   Component Value Date    LACT 1.9 06/04/2024    VAIBHAV 9.7 06/05/2024    MAG 1.8 12/23/2022    LIPASE 11 (L) 06/04/2024    TSH 0.19 (L) 04/18/2024    T4 1.70 04/18/2024    SED 61 (H) 06/04/2024       Anesthesia Plan    ASA Status:  3    NPO Status:  NPO Appropriate    Anesthesia Type: General.     - Airway: ETT   Induction: Intravenous.   Maintenance: Balanced.        Consents    Anesthesia Plan(s) and associated risks, benefits, and realistic alternatives discussed. Questions answered and patient/representative(s) expressed understanding.     - Discussed:     - Discussed with:  Patient      - Extended Intubation/Ventilatory Support Discussed: No.      - Patient is DNR/DNI Status: No     Use of blood products discussed: No .     Postoperative Care    Pain management: IV analgesics, Multi-modal analgesia.   PONV prophylaxis: Ondansetron (or other 5HT-3), Dexamethasone or Solumedrol     Comments:               Abdoulaye Wren    I have reviewed the pertinent notes and labs in the chart from the past 30 days and (re)examined the patient.  Any updates or changes from those notes are reflected in this note.             "

## 2024-06-07 LAB
ANION GAP SERPL CALCULATED.3IONS-SCNC: 8 MMOL/L (ref 7–15)
BASOPHILS # BLD AUTO: 0 10E3/UL (ref 0–0.2)
BASOPHILS NFR BLD AUTO: 0 %
BUN SERPL-MCNC: 9.5 MG/DL (ref 8–23)
CALCIUM SERPL-MCNC: 10.1 MG/DL (ref 8.8–10.2)
CHLORIDE SERPL-SCNC: 99 MMOL/L (ref 98–107)
CREAT SERPL-MCNC: 0.56 MG/DL (ref 0.51–0.95)
DEPRECATED HCO3 PLAS-SCNC: 27 MMOL/L (ref 22–29)
EGFRCR SERPLBLD CKD-EPI 2021: >90 ML/MIN/1.73M2
EOSINOPHIL # BLD AUTO: 0 10E3/UL (ref 0–0.7)
EOSINOPHIL NFR BLD AUTO: 1 %
ERYTHROCYTE [DISTWIDTH] IN BLOOD BY AUTOMATED COUNT: 15 % (ref 10–15)
GLUCOSE SERPL-MCNC: 104 MG/DL (ref 70–99)
HCT VFR BLD AUTO: 27.5 % (ref 35–47)
HGB BLD-MCNC: 9.6 G/DL (ref 11.7–15.7)
IMM GRANULOCYTES # BLD: 0 10E3/UL
IMM GRANULOCYTES NFR BLD: 1 %
LYMPHOCYTES # BLD AUTO: 0.4 10E3/UL (ref 0.8–5.3)
LYMPHOCYTES NFR BLD AUTO: 29 %
MAGNESIUM SERPL-MCNC: 1.8 MG/DL (ref 1.7–2.3)
MAGNESIUM SERPL-MCNC: 1.8 MG/DL (ref 1.7–2.3)
MCH RBC QN AUTO: 33.7 PG (ref 26.5–33)
MCHC RBC AUTO-ENTMCNC: 34.9 G/DL (ref 31.5–36.5)
MCV RBC AUTO: 97 FL (ref 78–100)
MONOCYTES # BLD AUTO: 0.2 10E3/UL (ref 0–1.3)
MONOCYTES NFR BLD AUTO: 12 %
NEUTROPHILS # BLD AUTO: 0.8 10E3/UL (ref 1.6–8.3)
NEUTROPHILS NFR BLD AUTO: 57 %
NRBC # BLD AUTO: 0 10E3/UL
NRBC BLD AUTO-RTO: 0 /100
PHOSPHATE SERPL-MCNC: 2.5 MG/DL (ref 2.5–4.5)
PHOSPHATE SERPL-MCNC: 2.7 MG/DL (ref 2.5–4.5)
PLATELET # BLD AUTO: 125 10E3/UL (ref 150–450)
POTASSIUM SERPL-SCNC: 4.2 MMOL/L (ref 3.4–5.3)
POTASSIUM SERPL-SCNC: 4.2 MMOL/L (ref 3.4–5.3)
RBC # BLD AUTO: 2.85 10E6/UL (ref 3.8–5.2)
SODIUM SERPL-SCNC: 134 MMOL/L (ref 135–145)
WBC # BLD AUTO: 1.4 10E3/UL (ref 4–11)

## 2024-06-07 PROCEDURE — 36591 DRAW BLOOD OFF VENOUS DEVICE: CPT

## 2024-06-07 PROCEDURE — 250N000009 HC RX 250: Performed by: STUDENT IN AN ORGANIZED HEALTH CARE EDUCATION/TRAINING PROGRAM

## 2024-06-07 PROCEDURE — 250N000011 HC RX IP 250 OP 636: Mod: JZ

## 2024-06-07 PROCEDURE — 250N000011 HC RX IP 250 OP 636

## 2024-06-07 PROCEDURE — 250N000013 HC RX MED GY IP 250 OP 250 PS 637

## 2024-06-07 PROCEDURE — 120N000002 HC R&B MED SURG/OB UMMC

## 2024-06-07 PROCEDURE — 84100 ASSAY OF PHOSPHORUS: CPT

## 2024-06-07 PROCEDURE — 83735 ASSAY OF MAGNESIUM: CPT

## 2024-06-07 PROCEDURE — 80048 BASIC METABOLIC PNL TOTAL CA: CPT

## 2024-06-07 PROCEDURE — 258N000003 HC RX IP 258 OP 636: Mod: JZ | Performed by: STUDENT IN AN ORGANIZED HEALTH CARE EDUCATION/TRAINING PROGRAM

## 2024-06-07 PROCEDURE — 250N000013 HC RX MED GY IP 250 OP 250 PS 637: Performed by: STUDENT IN AN ORGANIZED HEALTH CARE EDUCATION/TRAINING PROGRAM

## 2024-06-07 PROCEDURE — 85025 COMPLETE CBC W/AUTO DIFF WBC: CPT

## 2024-06-07 PROCEDURE — 84132 ASSAY OF SERUM POTASSIUM: CPT

## 2024-06-07 PROCEDURE — 99232 SBSQ HOSP IP/OBS MODERATE 35: CPT | Mod: GC | Performed by: STUDENT IN AN ORGANIZED HEALTH CARE EDUCATION/TRAINING PROGRAM

## 2024-06-07 RX ORDER — HYDROMORPHONE HYDROCHLORIDE 1 MG/ML
0.3 INJECTION, SOLUTION INTRAMUSCULAR; INTRAVENOUS; SUBCUTANEOUS
Status: COMPLETED | OUTPATIENT
Start: 2024-06-07 | End: 2024-06-08

## 2024-06-07 RX ORDER — MAGNESIUM OXIDE 400 MG/1
400 TABLET ORAL EVERY 4 HOURS
Status: COMPLETED | OUTPATIENT
Start: 2024-06-07 | End: 2024-06-07

## 2024-06-07 RX ORDER — OXYCODONE HYDROCHLORIDE 5 MG/1
5-10 TABLET ORAL
Status: DISCONTINUED | OUTPATIENT
Start: 2024-06-07 | End: 2024-06-08

## 2024-06-07 RX ORDER — MAGNESIUM OXIDE 400 MG/1
400 TABLET ORAL EVERY 4 HOURS
Status: COMPLETED | OUTPATIENT
Start: 2024-06-07 | End: 2024-06-08

## 2024-06-07 RX ORDER — HYDROMORPHONE HYDROCHLORIDE 1 MG/ML
0.3 INJECTION, SOLUTION INTRAMUSCULAR; INTRAVENOUS; SUBCUTANEOUS
Status: CANCELLED | OUTPATIENT
Start: 2024-06-07

## 2024-06-07 RX ORDER — POLYETHYLENE GLYCOL 3350 17 G/17G
17 POWDER, FOR SOLUTION ORAL DAILY PRN
Status: DISCONTINUED | OUTPATIENT
Start: 2024-06-07 | End: 2024-06-08

## 2024-06-07 RX ADMIN — ACETAMINOPHEN 975 MG: 325 SOLUTION ORAL at 08:50

## 2024-06-07 RX ADMIN — ACETAMINOPHEN 975 MG: 325 SOLUTION ORAL at 14:35

## 2024-06-07 RX ADMIN — OXYCODONE HYDROCHLORIDE 10 MG: 5 TABLET ORAL at 10:37

## 2024-06-07 RX ADMIN — MAGNESIUM OXIDE TAB 400 MG (241.3 MG ELEMENTAL MG) 400 MG: 400 (241.3 MG) TAB at 08:50

## 2024-06-07 RX ADMIN — FAMOTIDINE 20 MG: 20 TABLET ORAL at 08:50

## 2024-06-07 RX ADMIN — HYDROMORPHONE HYDROCHLORIDE 0.3 MG: 1 INJECTION, SOLUTION INTRAMUSCULAR; INTRAVENOUS; SUBCUTANEOUS at 12:14

## 2024-06-07 RX ADMIN — FAMOTIDINE 20 MG: 20 TABLET ORAL at 20:05

## 2024-06-07 RX ADMIN — LEVOTHYROXINE SODIUM 100 MCG: 100 TABLET ORAL at 08:50

## 2024-06-07 RX ADMIN — PANTOPRAZOLE SODIUM 40 MG: 40 TABLET, DELAYED RELEASE ORAL at 08:50

## 2024-06-07 RX ADMIN — POTASSIUM PHOSPHATE, MONOBASIC AND POTASSIUM PHOSPHATE, DIBASIC 9 MMOL: 224; 236 INJECTION, SOLUTION, CONCENTRATE INTRAVENOUS at 08:50

## 2024-06-07 RX ADMIN — BUSPIRONE HYDROCHLORIDE 5 MG: 5 TABLET ORAL at 08:50

## 2024-06-07 RX ADMIN — PANTOPRAZOLE SODIUM 40 MG: 40 TABLET, DELAYED RELEASE ORAL at 16:48

## 2024-06-07 RX ADMIN — MAGNESIUM OXIDE TAB 400 MG (241.3 MG ELEMENTAL MG) 400 MG: 400 (241.3 MG) TAB at 20:05

## 2024-06-07 RX ADMIN — OXYCODONE HYDROCHLORIDE 10 MG: 5 TABLET ORAL at 02:32

## 2024-06-07 RX ADMIN — THIAMINE HCL TAB 100 MG 100 MG: 100 TAB at 08:50

## 2024-06-07 RX ADMIN — ACETAMINOPHEN 975 MG: 325 SOLUTION ORAL at 20:05

## 2024-06-07 RX ADMIN — OXYCODONE HYDROCHLORIDE 10 MG: 5 TABLET ORAL at 06:28

## 2024-06-07 RX ADMIN — POTASSIUM PHOSPHATE, MONOBASIC AND POTASSIUM PHOSPHATE, DIBASIC 9 MMOL: 224; 236 INJECTION, SOLUTION, CONCENTRATE INTRAVENOUS at 21:59

## 2024-06-07 RX ADMIN — FENTANYL 1 PATCH: 25 PATCH TRANSDERMAL at 14:36

## 2024-06-07 RX ADMIN — DIPHENHYDRAMINE HYDROCHLORIDE AND LIDOCAINE HYDROCHLORIDE AND ALUMINUM HYDROXIDE AND MAGNESIUM HYDRO 10 ML: KIT at 18:00

## 2024-06-07 RX ADMIN — POLYETHYLENE GLYCOL 3350 17 G: 17 POWDER, FOR SOLUTION ORAL at 20:05

## 2024-06-07 RX ADMIN — OXYCODONE HYDROCHLORIDE 10 MG: 5 TABLET ORAL at 15:49

## 2024-06-07 RX ADMIN — DIPHENHYDRAMINE HYDROCHLORIDE AND LIDOCAINE HYDROCHLORIDE AND ALUMINUM HYDROXIDE AND MAGNESIUM HYDRO 10 ML: KIT at 22:00

## 2024-06-07 RX ADMIN — PROCHLORPERAZINE EDISYLATE 10 MG: 5 INJECTION INTRAMUSCULAR; INTRAVENOUS at 15:49

## 2024-06-07 RX ADMIN — MAGNESIUM OXIDE TAB 400 MG (241.3 MG ELEMENTAL MG) 400 MG: 400 (241.3 MG) TAB at 12:08

## 2024-06-07 RX ADMIN — OXYCODONE HYDROCHLORIDE 10 MG: 5 TABLET ORAL at 20:08

## 2024-06-07 RX ADMIN — ENOXAPARIN SODIUM 40 MG: 40 INJECTION SUBCUTANEOUS at 20:05

## 2024-06-07 RX ADMIN — ONDANSETRON 4 MG: 2 INJECTION INTRAMUSCULAR; INTRAVENOUS at 08:54

## 2024-06-07 ASSESSMENT — ACTIVITIES OF DAILY LIVING (ADL)
ADLS_ACUITY_SCORE: 29
ADLS_ACUITY_SCORE: 29
ADLS_ACUITY_SCORE: 27
ADLS_ACUITY_SCORE: 29
ADLS_ACUITY_SCORE: 27
ADLS_ACUITY_SCORE: 29
ADLS_ACUITY_SCORE: 27
ADLS_ACUITY_SCORE: 29
ADLS_ACUITY_SCORE: 27
ADLS_ACUITY_SCORE: 29
ADLS_ACUITY_SCORE: 27
ADLS_ACUITY_SCORE: 29
ADLS_ACUITY_SCORE: 27
ADLS_ACUITY_SCORE: 29
ADLS_ACUITY_SCORE: 27
ADLS_ACUITY_SCORE: 27

## 2024-06-07 NOTE — PLAN OF CARE
Goal Outcome Evaluation:      Plan of Care Reviewed With: patient    Overall Patient Progress: no change    Pt arrived from PACU @1615. Pain managed w/ PRN IV dilaudid, oxycodone & tylenol. Nausea managed w/ PRN zofran & compazine. NJ running TF @ 10 ml/hr, next advance @ 0330 to 20 ml/hr. R chest port SL. Magnesium replaced, recheck in morning. Difficulty w/ swallowing some medications. Continue POC.

## 2024-06-07 NOTE — PLAN OF CARE
"Goal Outcome Evaluation:      Plan of Care Reviewed With: patient    Overall Patient Progress: no changeOverall Patient Progress: no change    Outcome Evaluation: Assumed care from [id number]; no acute changes. Remains alert and oriented, VSS, RA, c/o pain in throat w/ swallowing. TF @ 30mL - advance to 40mL at 1930; patient c/o nausea - PRN meds given with relief. Independent in room. Patient stated she did not know when her last bowel movement was - refused PO PRN bowel care meds - RN pageamara MATUTE for Miralax to be given in NJ.     /59 (BP Location: Right arm)   Pulse 66   Temp 97.5  F (36.4  C) (Oral)   Resp 18   Ht 1.702 m (5' 7\")   Wt 65.1 kg (143 lb 8.3 oz)   SpO2 96%   BMI 22.48 kg/m          "

## 2024-06-07 NOTE — PHARMACY-CONSULT NOTE
Pharmacy Tube Feeding Consult    Medication reviewed for administration by feeding tube and for potential food/drug interactions.    Recommendation: No changes are needed at this time. If patient unable to take pantoprazole EC by mouth please contact pharmacy to change to suspension.     Pharmacy will continue to follow as new medications are ordered.    Shana Ayon, Pharm.D., BCPS, BCTXP

## 2024-06-07 NOTE — PROGRESS NOTES
THORACIC & FOREGUT SURGERY    S:  No overnight events.  Pt seen at bedside resting comfortably.       O:  Vitals:    06/06/24 1615 06/06/24 1620 06/06/24 2328 06/07/24 0740   BP: (!) 149/84  114/61 100/47   BP Location: Left arm  Left arm Left arm   Patient Position:       Pulse:   74 72   Resp:   17 16   Temp:   98  F (36.7  C) 98  F (36.7  C)   TempSrc:   Oral Oral   SpO2: 100%  99% 98%   Weight:  65.1 kg (143 lb 8.3 oz)     Height:           A&Ox3, NAD  Breathing non-labored on RA  RRR  Soft, NDNT, regular diet, NG in place-can do bolus feeds  Distal extremities warm.   No calf tenderness.    A/P: Linh Mustafa is a 64 year old female POD# 1 s/p Esophagogastroscopy and nasogastric feeding tube placement.    - Ok for bolus feeds via NG tube  - Thoracic Surgery to sign off    MASON Irizarry CNS   Thoracic Surgery

## 2024-06-07 NOTE — PROGRESS NOTES
CLINICAL NUTRITION SERVICES - BRIEF NOTE     Nutrition Prescription     RECOMMENDATIONS FOR MDs/PROVIDERS TO ORDER:  Continuous TF not yet at goal - recommend waiting until patient is tolerating continuous feeds at goal for at least 8 hours prior to transitioning to bolus feeds. Bolus feeds recommendations below once appropriate.      Recommendations already ordered by Registered Dietitian (RD):  Continue to advance TF towards goal as ordered. Currently at 30 ml/h.  GOAL: Osmolite 1.5 via NG-tube @ 60 mL/hr x 24 hrs providing 1440 mL, 2160 kcal (34 kcal/kg), 91 g protein (1.4 g/kg), 294 g CHO, 0 g fiber, and 1097 mL free water      Future/Additional Recommendations:  Once pt has tolerated goal continuous rate on new formula for at least 8 hours, may transition to bolus feeds.  Bolus recommendations:   - Stop continuous TF for 1-2 hrs to let stomach empty prior to starting gravity feeding.    - Begin 1st and 2nd gravity bolus feeding @ 0.5 cans (~ 118 mL). If tolerates without GI complaints, can increase volume to goal of 1.5 can per feed (355 mL) x 4 feeds per day.   - Suggested bolus schedule: 1.5 carton at 0800, 1200, 1600, 2000 or timing per patient preference. Do not give feedings at night while pt asleep (during the day only).  - Note: If patient is unable to tolerate 1.5 cartons/feed, can do 1 carton/feed x 6 feeds/day instead   - Water Flushes: Flush with 60 mL of H20 before and after each feeding (480 mL total) plus additional 60 mL once/day daily to provide full hydration (TF + FWF = total of 1622 mL free water daily).   --> Goal Regimen:  Osmolite 1.5  (1420 mL/day) provides 2130 kcal, 89 g protein, 290 g CHO, 70 g fat, 0 g fiber, 1082 mL free water     For last full RD assessment, see note dated 6/6/24    NEW FINDINGS   TF continues to advance to goal, Thoracic OK with transitioning to bolus feeds via NGT. Recommendations above.     INTERVENTIONS  Implementation  Enteral Nutrition - continue to advance to  goal for continuous feeds; recs for bolus feeds when appropriate      Monitoring/Evaluation  Will continue to monitor and evaluate per protocol.    Kinjal Guido, MPH, RDN, LD  7A + 7B ([phone]) LIBBY Castillo [7A or 7B Clinical Dietitian]   Weekend/Holiday: Vocera - Weekend Clinical Dietitian

## 2024-06-07 NOTE — PROGRESS NOTES
"/61 (BP Location: Left arm)   Pulse 74   Temp 98  F (36.7  C) (Oral)   Resp 17   Ht 1.702 m (5' 7\")   Wt 65.1 kg (143 lb 8.3 oz)   SpO2 99%   BMI 22.48 kg/m       Neuro: AOx4, calm and cooperative with cares. VSS  Cardiac: WDL  Respiratory: on RA  GI/: voiding adequately, -flatus,   Diet/Appetite: regular diet, NJ infusing TF @ 20 mL/hr, increase at 1130   Skin: no new deficits  LDA: (L) nare NJ tube, (R) chest port  Activity: NOOB  Pain: Managed with PRN Oxycodone  Plan: pain management, continue POC    "

## 2024-06-07 NOTE — PLAN OF CARE
Goal Outcome Evaluation:      Plan of Care Reviewed With: patient    Overall Patient Progress: no changeOverall Patient Progress: no change  Neuro: AOx4, calm and cooperative with cares. VSS  Cardiac: WDL  Respiratory: on RA  GI/: voiding adequately, -flatus,   Diet/Appetite: regular diet, NJ infusing TF @ 20 mL/hr, increase at 1130   Skin: no new deficits  LDA: (L) nare NJ tube, (R) chest port  Activity: NOOB  Pain: Managed with PRN Oxycodone x2  Plan: pain management, monitoring electrolytes, continue POC

## 2024-06-07 NOTE — PROGRESS NOTES
[facility]    Internal Medicine Progress Note - [facility] Service    Main Plans for Today   - Plan for J-tube in two weeks.   - Monitoring electrolytes for refeeding syndrome (1800)  - Tube feeds initiated and titrating up to 60 mL/hr   - PRN one-time dose dilaudid     Assessment & Plan   Linh Mustafa is a 64 year old female with squamous cell carcinoma of the esophagus admitted on 6/4/202 for chest pain after radiation therapy on 5/31/2024. Initial workup negative for acute pulmonary or cardiac etiology. Chest pain likely from mucositis of the esophagus due to radiation toxicity. She completed final two radiation treatments and pain and nausea is well controlled with current drug regimen. Needs jejunostomy in preparation for esophagectomy.     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 Reports pain, nausea, and vomiting after radiation therapy. Chest pain feels similar regarding quality and location to prior pain, but significantly more intense.  ED workup notable for normal troponin and BNP. CT Abd  and Chest demonstrated no acute pathology, including PE, aortic dissection, or esophageal perforation. Workup negative for cardiac etiology, GI perforation, and PE.  Has significant pain with swallowing, which limits her PO intake. Diet consist predominantly of soft foods like pudding and shakes. Bedside swallow evaluation demonstrated normal oropharyngeal swallowing mechanisms with regular/thin liquids.   - Consult Radiation, thank you for recs   - Completed final two rounds of radiation therapy.   - Consult Thoracic Surgery   - s/p esophagogastroscopy on 6/6 (inflamed esophageal  mucosa)   - s/p NGT on 6/6   - Plan for J tube placement in 2 weeks pending improvement in leukocyte count  - Consult nutrition for tube feeds  - Osmolite 1.5 via NG-tube (Goal of 60 mL/hr)   - Monitor for refeeding syndrome (Daily K, Mg, and Phosphorus)  - Pain Control: PTA PRN oxycodone (15 mg q4h), PTA fentanyl (25 mcg/hr patch every 72 hours), PRN lidocaine 1%  - Pantoprazole (40 mg BID); PTA esomeprazole not on formulary   - PTA famotidine (20 mg BID)  - PTA Magic Mouthwash (4x daily)  - PTA PRN ondansetron (8 mg q8h)   - PTA PRN compazine (10 mg q6h)  - PTA sucralfate (1g 4x daily)  - Per SLP, okay for regular diet w/ thin liquids    Pancytopenia  Neutropenia  At presentation, WBCs at 1.4 and platelets at 137. Suspect secondary to bone marrow suppression from radiation therapy and/or chemotherapy. Most recent dose of chemotherapy on 5/28/2024 with paclitaxel and carboplatin. On 6/5, platelets decreased to 91 after starting enoxaparin. Acute decrease is likely dilutional after receiving 2L of fluid and a concomitant drop in WBCs and RBCs.   - Curbsided oncology on 6/7 regarding GCSF; no need unless concern for infxn  - Neutropenic precautions  - Hold PTA vitamin B12 (2500 mcg daily) given large size of pill (difficulty swallowing)    Chronic Hypercalcemia   Ca elevated to 11.1 at admission. Most likely secondary to hyperparathyroidism with parathyroid hormone elevated to 121 on 5/1/2024. Had an appointment with endocrinology, but missed the appointment.   - Outpatient followup with endocrinology      Hypothyroidism   PTA levothyroxine (100 mcg)      Anxiety:  - PTA buspirone (5 mg daily)      Tobacco abuse.   Uses 1/2 ppd.    - Nicotine patch   - PRN nicotine gum     The patient was discussed with Dr. Jelena Nova, MS4    Diet: Regular diet Thin Liquids + Tube feeds   Fluids: None  Lines: Right Chest Port  [place] Catheter: Not present      Interval History   No overnight events. Found patient  resting in bed. She is eager to try eating food today. Returned later and spoke with family regarding medicine team's plan. Endorses adequate control of nausea, though continues to have esophageal pain when oxycodone starts to wear off. Okay with plan of weaning off IV Dilaudid.    Physical Exam   Vital Signs: Temp: 98  F (36.7  C) Temp src: Oral BP: 100/47 Pulse: 72   Resp: 16 SpO2: 98 % O2 Device: None (Room air)    Weight: 143 lbs 8.31 oz  General Appearance: Frail appearing. Alert. Intermittent distress from pain/nausea    Eyes: No conjunctivitis or scleral icterus   Head: atraumatic  Respiratory: Adequate oxygenation on room air   Cardiovascular: RRR with no murmurs, rubs, or gallops  GI: Soft, non-distended. No tenderness in all four quadrants.   Genitourinary: No hermosillo   Skin: Warm and well perfused. No pallor. Right chest port in place w/o surrounding erythema.  Musculoskeletal: Moving all four extremities   Neurologic: No gross neurological deficits   Psychiatric: appropriate mood and affect        Data   Recent Labs   Lab 06/07/24  0624 06/06/24  1826 06/06/24  0729 06/06/24  0634 06/05/24  0724 06/04/24  0219   WBC 1.4*  --   --  2.1*  2.1* 1.4* 1.9*   HGB 9.6*  --   --  11.0* 9.3* 11.0*   MCV 97  --   --  99 98 96   *  --   --  140* 91* 137*   INR  --   --   --   --   --  0.98   *  --   --  136 135 135   POTASSIUM 4.2 4.2  --  3.4 3.7 4.3   CHLORIDE 99  --   --  99 102 98   CO2 27  --   --  24 23 20*   BUN 9.5  --   --  9.8 11.9 21.3   CR 0.56  --   --  0.56 0.51 0.76   ANIONGAP 8  --   --  13 10 17*   VAIBHAV 10.1  --   --  10.5* 9.7 11.1*   *  --  92 94 89 99   ALBUMIN  --   --   --  4.0 3.4* 4.3   PROTTOTAL  --   --   --  7.3 5.8* 7.5   BILITOTAL  --   --   --  0.7 0.7 1.2   ALKPHOS  --   --   --  99 78 98   ALT  --   --   --  22 18 27   AST  --   --   --  17 14 22   LIPASE  --   --   --   --   --  11*       Imaging results reviewed over the past 24 hrs:   No results found for this or  any previous visit (from the past 24 hour(s)).

## 2024-06-07 NOTE — PLAN OF CARE
"Goal Outcome Evaluation:      Plan of Care Reviewed With: patient    Overall Patient Progress: no changeOverall Patient Progress: no change    A&Ox4. VSS on RA. Pain managed with PRN oxycodone x1 and dilaudid x1. Fentanyl patch on L shoulder. Nausea managed with PRN zofran x1. NJ with Tfs going at 30ml/hr. Advance to 40ml/hr at 1930. Voiding spontaneously. No BM, not passing flatus. Regular diet, poor appetite. Up in room SBA. R chest port TKO. Phosphorus and magnesium replaced. Continue with POC.     Vitals: /47 (BP Location: Left arm)   Pulse 72   Temp 98  F (36.7  C) (Oral)   Resp 16   Ht 1.702 m (5' 7\")   Wt 65.1 kg (143 lb 8.3 oz)   SpO2 98%   BMI 22.48 kg/m      "

## 2024-06-08 LAB
ACANTHOCYTES BLD QL SMEAR: NORMAL
ANION GAP SERPL CALCULATED.3IONS-SCNC: 8 MMOL/L (ref 7–15)
AUER BODIES BLD QL SMEAR: NORMAL
BASO STIPL BLD QL SMEAR: NORMAL
BASOPHILS # BLD AUTO: 0 10E3/UL (ref 0–0.2)
BASOPHILS NFR BLD AUTO: 0 %
BITE CELLS BLD QL SMEAR: NORMAL
BLISTER CELLS BLD QL SMEAR: NORMAL
BUN SERPL-MCNC: 10.9 MG/DL (ref 8–23)
BURR CELLS BLD QL SMEAR: NORMAL
CALCIUM SERPL-MCNC: 9.5 MG/DL (ref 8.8–10.2)
CHLORIDE SERPL-SCNC: 101 MMOL/L (ref 98–107)
CREAT SERPL-MCNC: 0.53 MG/DL (ref 0.51–0.95)
DACRYOCYTES BLD QL SMEAR: NORMAL
DEPRECATED HCO3 PLAS-SCNC: 27 MMOL/L (ref 22–29)
EGFRCR SERPLBLD CKD-EPI 2021: >90 ML/MIN/1.73M2
ELLIPTOCYTES BLD QL SMEAR: NORMAL
EOSINOPHIL # BLD AUTO: 0 10E3/UL (ref 0–0.7)
EOSINOPHIL NFR BLD AUTO: 1 %
ERYTHROCYTE [DISTWIDTH] IN BLOOD BY AUTOMATED COUNT: 15.7 % (ref 10–15)
FRAGMENTS BLD QL SMEAR: NORMAL
GLUCOSE SERPL-MCNC: 133 MG/DL (ref 70–99)
HCT VFR BLD AUTO: 27.1 % (ref 35–47)
HGB BLD-MCNC: 9.3 G/DL (ref 11.7–15.7)
HGB C CRYSTALS: NORMAL
HOWELL-JOLLY BOD BLD QL SMEAR: NORMAL
IMM GRANULOCYTES # BLD: 0 10E3/UL
IMM GRANULOCYTES NFR BLD: 1 %
LYMPHOCYTES # BLD AUTO: 0.2 10E3/UL (ref 0.8–5.3)
LYMPHOCYTES NFR BLD AUTO: 25 %
MAGNESIUM SERPL-MCNC: 1.8 MG/DL (ref 1.7–2.3)
MAGNESIUM SERPL-MCNC: 2.2 MG/DL (ref 1.7–2.3)
MCH RBC QN AUTO: 33.9 PG (ref 26.5–33)
MCHC RBC AUTO-ENTMCNC: 34.3 G/DL (ref 31.5–36.5)
MCV RBC AUTO: 99 FL (ref 78–100)
MONOCYTES # BLD AUTO: 0.1 10E3/UL (ref 0–1.3)
MONOCYTES NFR BLD AUTO: 15 %
NEUTROPHILS # BLD AUTO: 0.5 10E3/UL (ref 1.6–8.3)
NEUTROPHILS NFR BLD AUTO: 58 %
NEUTS HYPERSEG BLD QL SMEAR: NORMAL
NRBC # BLD AUTO: 0 10E3/UL
NRBC BLD AUTO-RTO: 0 /100
PHOSPHATE SERPL-MCNC: 2.6 MG/DL (ref 2.5–4.5)
PHOSPHATE SERPL-MCNC: 2.8 MG/DL (ref 2.5–4.5)
PLAT MORPH BLD: NORMAL
PLATELET # BLD AUTO: 119 10E3/UL (ref 150–450)
POLYCHROMASIA BLD QL SMEAR: NORMAL
POTASSIUM SERPL-SCNC: 4 MMOL/L (ref 3.4–5.3)
POTASSIUM SERPL-SCNC: 4.4 MMOL/L (ref 3.4–5.3)
RBC # BLD AUTO: 2.74 10E6/UL (ref 3.8–5.2)
RBC AGGLUT BLD QL: NORMAL
RBC MORPH BLD: NORMAL
ROULEAUX BLD QL SMEAR: NORMAL
SICKLE CELLS BLD QL SMEAR: NORMAL
SMUDGE CELLS BLD QL SMEAR: NORMAL
SODIUM SERPL-SCNC: 136 MMOL/L (ref 135–145)
SPHEROCYTES BLD QL SMEAR: NORMAL
STOMATOCYTES BLD QL SMEAR: NORMAL
TARGETS BLD QL SMEAR: NORMAL
TOXIC GRANULES BLD QL SMEAR: NORMAL
VARIANT LYMPHS BLD QL SMEAR: NORMAL
WBC # BLD AUTO: 0.9 10E3/UL (ref 4–11)

## 2024-06-08 PROCEDURE — 250N000009 HC RX 250: Performed by: PEDIATRICS

## 2024-06-08 PROCEDURE — 250N000013 HC RX MED GY IP 250 OP 250 PS 637

## 2024-06-08 PROCEDURE — 250N000011 HC RX IP 250 OP 636: Mod: JZ

## 2024-06-08 PROCEDURE — 84100 ASSAY OF PHOSPHORUS: CPT

## 2024-06-08 PROCEDURE — 36591 DRAW BLOOD OFF VENOUS DEVICE: CPT

## 2024-06-08 PROCEDURE — 80048 BASIC METABOLIC PNL TOTAL CA: CPT

## 2024-06-08 PROCEDURE — 250N000013 HC RX MED GY IP 250 OP 250 PS 637: Performed by: STUDENT IN AN ORGANIZED HEALTH CARE EDUCATION/TRAINING PROGRAM

## 2024-06-08 PROCEDURE — 250N000011 HC RX IP 250 OP 636

## 2024-06-08 PROCEDURE — 99233 SBSQ HOSP IP/OBS HIGH 50: CPT | Performed by: PEDIATRICS

## 2024-06-08 PROCEDURE — 83735 ASSAY OF MAGNESIUM: CPT

## 2024-06-08 PROCEDURE — 250N000013 HC RX MED GY IP 250 OP 250 PS 637: Performed by: PEDIATRICS

## 2024-06-08 PROCEDURE — 258N000003 HC RX IP 258 OP 636: Mod: JZ | Performed by: PEDIATRICS

## 2024-06-08 PROCEDURE — 85025 COMPLETE CBC W/AUTO DIFF WBC: CPT

## 2024-06-08 PROCEDURE — 250N000011 HC RX IP 250 OP 636: Mod: JZ | Performed by: PEDIATRICS

## 2024-06-08 PROCEDURE — 120N000002 HC R&B MED SURG/OB UMMC

## 2024-06-08 PROCEDURE — 84132 ASSAY OF SERUM POTASSIUM: CPT

## 2024-06-08 RX ORDER — OXYCODONE HYDROCHLORIDE 10 MG/1
10 TABLET ORAL
Status: DISCONTINUED | OUTPATIENT
Start: 2024-06-08 | End: 2024-06-09

## 2024-06-08 RX ORDER — MAGNESIUM SULFATE HEPTAHYDRATE 40 MG/ML
2 INJECTION, SOLUTION INTRAVENOUS ONCE
Status: COMPLETED | OUTPATIENT
Start: 2024-06-08 | End: 2024-06-08

## 2024-06-08 RX ORDER — POLYETHYLENE GLYCOL 3350 17 G/17G
17 POWDER, FOR SOLUTION ORAL 2 TIMES DAILY
Status: DISCONTINUED | OUTPATIENT
Start: 2024-06-08 | End: 2024-06-18 | Stop reason: HOSPADM

## 2024-06-08 RX ORDER — OXYCODONE HCL 5 MG/5 ML
10 SOLUTION, ORAL ORAL
Status: DISCONTINUED | OUTPATIENT
Start: 2024-06-08 | End: 2024-06-09

## 2024-06-08 RX ADMIN — OXYCODONE HYDROCHLORIDE 10 MG: 5 TABLET ORAL at 03:38

## 2024-06-08 RX ADMIN — ONDANSETRON 4 MG: 2 INJECTION INTRAMUSCULAR; INTRAVENOUS at 18:13

## 2024-06-08 RX ADMIN — PROCHLORPERAZINE EDISYLATE 10 MG: 5 INJECTION INTRAMUSCULAR; INTRAVENOUS at 11:26

## 2024-06-08 RX ADMIN — OXYCODONE HYDROCHLORIDE 10 MG: 5 TABLET ORAL at 11:27

## 2024-06-08 RX ADMIN — OXYCODONE HYDROCHLORIDE 10 MG: 5 SOLUTION ORAL at 22:07

## 2024-06-08 RX ADMIN — PANTOPRAZOLE SODIUM 40 MG: 40 TABLET, DELAYED RELEASE ORAL at 08:39

## 2024-06-08 RX ADMIN — ENOXAPARIN SODIUM 40 MG: 40 INJECTION SUBCUTANEOUS at 20:17

## 2024-06-08 RX ADMIN — OXYCODONE HYDROCHLORIDE 10 MG: 5 SOLUTION ORAL at 14:31

## 2024-06-08 RX ADMIN — FAMOTIDINE 20 MG: 20 TABLET ORAL at 08:39

## 2024-06-08 RX ADMIN — MAGNESIUM OXIDE TAB 400 MG (241.3 MG ELEMENTAL MG) 400 MG: 400 (241.3 MG) TAB at 00:04

## 2024-06-08 RX ADMIN — HYDROMORPHONE HYDROCHLORIDE 0.3 MG: 1 INJECTION, SOLUTION INTRAMUSCULAR; INTRAVENOUS; SUBCUTANEOUS at 08:36

## 2024-06-08 RX ADMIN — PANTOPRAZOLE SODIUM 40 MG: 40 TABLET, DELAYED RELEASE ORAL at 17:08

## 2024-06-08 RX ADMIN — BUSPIRONE HYDROCHLORIDE 5 MG: 5 TABLET ORAL at 08:39

## 2024-06-08 RX ADMIN — ACETAMINOPHEN 975 MG: 325 SOLUTION ORAL at 08:38

## 2024-06-08 RX ADMIN — ONDANSETRON 4 MG: 2 INJECTION INTRAMUSCULAR; INTRAVENOUS at 08:39

## 2024-06-08 RX ADMIN — OXYCODONE HYDROCHLORIDE 10 MG: 5 TABLET ORAL at 06:56

## 2024-06-08 RX ADMIN — THIAMINE HCL TAB 100 MG 100 MG: 100 TAB at 08:38

## 2024-06-08 RX ADMIN — ACETAMINOPHEN 975 MG: 325 SOLUTION ORAL at 20:17

## 2024-06-08 RX ADMIN — OXYCODONE HYDROCHLORIDE 10 MG: 5 SOLUTION ORAL at 18:13

## 2024-06-08 RX ADMIN — POTASSIUM PHOSPHATE, MONOBASIC AND POTASSIUM PHOSPHATE, DIBASIC 9 MMOL: 224; 236 INJECTION, SOLUTION, CONCENTRATE INTRAVENOUS at 12:40

## 2024-06-08 RX ADMIN — LEVOTHYROXINE SODIUM 100 MCG: 100 TABLET ORAL at 08:39

## 2024-06-08 RX ADMIN — ACETAMINOPHEN 975 MG: 325 SOLUTION ORAL at 14:15

## 2024-06-08 RX ADMIN — OXYCODONE HYDROCHLORIDE 10 MG: 5 TABLET ORAL at 00:07

## 2024-06-08 RX ADMIN — FAMOTIDINE 20 MG: 20 TABLET ORAL at 20:17

## 2024-06-08 RX ADMIN — MAGNESIUM SULFATE HEPTAHYDRATE 2 G: 2 INJECTION, SOLUTION INTRAVENOUS at 11:12

## 2024-06-08 RX ADMIN — DIPHENHYDRAMINE HYDROCHLORIDE AND LIDOCAINE HYDROCHLORIDE AND ALUMINUM HYDROXIDE AND MAGNESIUM HYDRO 10 ML: KIT at 08:39

## 2024-06-08 RX ADMIN — PROCHLORPERAZINE EDISYLATE 10 MG: 5 INJECTION INTRAMUSCULAR; INTRAVENOUS at 20:25

## 2024-06-08 ASSESSMENT — ACTIVITIES OF DAILY LIVING (ADL)
ADLS_ACUITY_SCORE: 29

## 2024-06-08 NOTE — PLAN OF CARE
Goal Outcome Evaluation:      Plan of Care Reviewed With: patient    Overall Patient Progress: no change    Neuro:A/O x4  Respiratory:WDL on RA  Cardiac:hypotensive overnight-provider notified   Diet:reg  GI/: voiding spontaneously. Not passing gas. No BM this shfit  Incision/Drains: none  IV Access:R chest port tko 10 ml/hr   Pain:managed with PRN oxycodone   Labs:reviewed  VS: Temp: 98.3  F (36.8  C) Temp src: Oral BP: (!) 122/102 Pulse: 72   Resp: 18 SpO2: 96 % O2 Device: None (Room air)     Activity:ind      New Changes this shift: none  Plan: continue POC

## 2024-06-08 NOTE — PLAN OF CARE
"Goal Outcome Evaluation:      Plan of Care Reviewed With: patient    Overall Patient Progress: improving    Outcome Evaluation: No acute changes this shift; VSS, RA, pain partially managed with current regiment. TF advanced to goal rate - patient tolerating. C/o nausea - relief with PRN medications. IV Magnesium & Phos replaced this shift - recheck ordered for AM. Pt reports small BM this shift.    /58 (BP Location: Left arm)   Pulse 72   Temp 98.1  F (36.7  C) (Oral)   Resp 18   Ht 1.702 m (5' 7\")   Wt 63.3 kg (139 lb 8.8 oz)   SpO2 97%   BMI 21.86 kg/m          "

## 2024-06-08 NOTE — PROGRESS NOTES
[facility]    Internal Medicine Progress Note - [facility] Service    Main Plans for Today   - Plan for J-tube in two weeks.   - Monitoring electrolytes for refeeding syndrome (1800 and morning labs)   - Tube feeds titrated up to 60 mL/hr with goal of transition to bolus feeding.   - For constipation:  Miralax BID, PRN senna (syrup)   - For odynophagia, changed oxycodone tablets to liquid solution   - Palliative consult for pain/nausea management + plan for outpatient follow up     Assessment & Plan   Linh Mustafa is a 64 year old female with squamous cell carcinoma of the esophagus admitted on 6/4/202 for chest pain after radiation therapy on 5/31/2024. Initial workup negative for acute pulmonary or cardiac etiology. Chest pain likely from mucositis of the esophagus due to radiation toxicity. She completed final two radiation treatments and pain and nausea is well controlled with current drug regimen. NG placed for tube feeding and monitoring for refeeding syndrome. Needs jejunostomy in preparation for esophagectomy.     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 Reports pain, nausea, and vomiting after radiation therapy. Chest pain feels similar regarding quality and location to prior pain, but significantly more intense.  ED workup notable for normal troponin and BNP. CT Abd  and Chest demonstrated no acute pathology, including PE, aortic dissection, or esophageal perforation. Workup negative for cardiac etiology, GI perforation, and PE.  Has significant pain with swallowing, which limits her PO intake. Diet consist predominantly of soft foods like pudding and shakes. Bedside  swallow evaluation demonstrated normal oropharyngeal swallowing mechanisms with regular/thin liquids.   - Consult Palliative care for better pain and nausea control   - Consult Radiation, thank you for recs   - Completed final two rounds of radiation therapy.   - Consult Thoracic Surgery   - s/p esophagogastroscopy on 6/6 (inflamed esophageal mucosa)   - s/p NGT on 6/6   - Plan for J tube placement in 2 weeks pending improvement in leukocyte count  - Consult nutrition for tube feeds  - Osmolite 1.5 via NG-tube (Goal of 60 mL/hr and then transition to bolus feeding)   - Monitor for refeeding syndrome (Daily K, Mg, and Phosphorus)  - Pain Control: PRN oxycodone (10 mg q3h), PTA fentanyl (25 mcg/hr patch every 72 hours), PRN lidocaine 1%  - Bowel Regimen: Miralax BID, PRN senna   - Pantoprazole (40 mg BID); PTA esomeprazole not on formulary   - PTA famotidine (20 mg BID)  - PTA Magic Mouthwash (4x daily)  - PTA PRN ondansetron (8 mg q8h)   - PTA PRN compazine (10 mg q6h)  - PTA sucralfate (1g 4x daily)  - Per SLP, okay for regular diet w/ thin liquids    Pancytopenia  Neutropenia  At presentation, WBCs at 1.4 and platelets at 137. Suspect secondary to bone marrow suppression from radiation therapy and/or chemotherapy. Most recent dose of chemotherapy on 5/28/2024 with paclitaxel and carboplatin. On 6/5, platelets decreased to 91 after starting enoxaparin. Acute decrease is likely dilutional after receiving 2L of fluid and a concomitant drop in WBCs and RBCs.   - Curbsided oncology on 6/7 regarding GCSF; no need unless concern for infxn  - Neutropenic precautions  - Hold PTA vitamin B12 (2500 mcg daily) given large size of pill (difficulty swallowing)    Chronic Hypercalcemia   Ca elevated to 11.1 at admission. Most likely secondary to hyperparathyroidism with parathyroid hormone elevated to 121 on 5/1/2024. Had an appointment with endocrinology, but missed the appointment.   - Outpatient followup with endocrinology       Hypothyroidism   PTA levothyroxine (100 mcg)      Anxiety:  - PTA buspirone (5 mg daily)      Tobacco abuse.   Uses 1/2 ppd.    - Nicotine patch   - PRN nicotine gum     The patient was discussed with Dr. Red Nova, MS4    Diet: Regular diet Thin Liquids + Tube feeds   Fluids: None  Lines: Right Chest Port  [place] Catheter: Not present        Physician Attestation   I saw this patient with the medical student and have edited this note in its entirety.    Cindy Moon MD  Date of Service (when I saw the patient): 06/08/24       Interval History   Found patient sleeping in bed. Feels worse today, noting increased pain with swallowing and chest pain. Discussed consulting palliative care with patient, explaining how they can provide expert advice regarding pain and nausea control in patients with chronic conditions. Patient is amenable to meeting with them. Patient's brother plans to bring in pear juice for constipation.       Physical Exam   Vital Signs: Temp: 98.3  F (36.8  C) Temp src: Oral BP: 106/67 Pulse: 83   Resp: 18 SpO2: 96 % O2 Device: None (Room air)    Weight: 143 lbs 8.31 oz  General Appearance: Frail appearing. Alert. Intermittent distress from pain/nausea    Eyes: No conjunctivitis or scleral icterus   Head: atraumatic  Respiratory: Adequate oxygenation on room air   Cardiovascular: RRR with no murmurs, rubs, or gallops  GI: Soft, non-distended. No tenderness in all four quadrants.   Genitourinary: No hermosillo   Skin: Warm and well perfused. No pallor. Right chest port in place w/o surrounding erythema.  Musculoskeletal: Moving all four extremities   Neurologic: No gross neurological deficits   Psychiatric: appropriate mood and affect        Data   Recent Labs   Lab 06/07/24  1759 06/07/24  0624 06/06/24  1826 06/06/24  0729 06/06/24  0634 06/05/24  0724 06/04/24  0219   WBC  --  1.4*  --   --  2.1*  2.1* 1.4* 1.9*   HGB  --  9.6*  --   --  11.0* 9.3* 11.0*   MCV  --  97  --   --  99 98  96   PLT  --  125*  --   --  140* 91* 137*   INR  --   --   --   --   --   --  0.98   NA  --  134*  --   --  136 135 135   POTASSIUM 4.2 4.2 4.2  --  3.4 3.7 4.3   CHLORIDE  --  99  --   --  99 102 98   CO2  --  27  --   --  24 23 20*   BUN  --  9.5  --   --  9.8 11.9 21.3   CR  --  0.56  --   --  0.56 0.51 0.76   ANIONGAP  --  8  --   --  13 10 17*   VAIBHAV  --  10.1  --   --  10.5* 9.7 11.1*   GLC  --  104*  --  92 94 89 99   ALBUMIN  --   --   --   --  4.0 3.4* 4.3   PROTTOTAL  --   --   --   --  7.3 5.8* 7.5   BILITOTAL  --   --   --   --  0.7 0.7 1.2   ALKPHOS  --   --   --   --  99 78 98   ALT  --   --   --   --  22 18 27   AST  --   --   --   --  17 14 22   LIPASE  --   --   --   --   --   --  11*       Imaging results reviewed over the past 24 hrs:   No results found for this or any previous visit (from the past 24 hour(s)).

## 2024-06-09 ENCOUNTER — APPOINTMENT (OUTPATIENT)
Dept: GENERAL RADIOLOGY | Facility: CLINIC | Age: 64
DRG: 391 | End: 2024-06-09
Payer: COMMERCIAL

## 2024-06-09 LAB
ANION GAP SERPL CALCULATED.3IONS-SCNC: 7 MMOL/L (ref 7–15)
BASOPHILS # BLD AUTO: 0 10E3/UL (ref 0–0.2)
BASOPHILS NFR BLD AUTO: 0 %
BUN SERPL-MCNC: 12.1 MG/DL (ref 8–23)
CALCIUM SERPL-MCNC: 9.3 MG/DL (ref 8.8–10.2)
CHLORIDE SERPL-SCNC: 100 MMOL/L (ref 98–107)
CREAT SERPL-MCNC: 0.53 MG/DL (ref 0.51–0.95)
DEPRECATED HCO3 PLAS-SCNC: 26 MMOL/L (ref 22–29)
EGFRCR SERPLBLD CKD-EPI 2021: >90 ML/MIN/1.73M2
EOSINOPHIL # BLD AUTO: 0 10E3/UL (ref 0–0.7)
EOSINOPHIL NFR BLD AUTO: 2 %
ERYTHROCYTE [DISTWIDTH] IN BLOOD BY AUTOMATED COUNT: 16 % (ref 10–15)
GLUCOSE SERPL-MCNC: 132 MG/DL (ref 70–99)
HCT VFR BLD AUTO: 26.3 % (ref 35–47)
HGB BLD-MCNC: 8.7 G/DL (ref 11.7–15.7)
IMM GRANULOCYTES # BLD: 0 10E3/UL
IMM GRANULOCYTES NFR BLD: 1 %
LYMPHOCYTES # BLD AUTO: 0.2 10E3/UL (ref 0.8–5.3)
LYMPHOCYTES NFR BLD AUTO: 22 %
MAGNESIUM SERPL-MCNC: 2 MG/DL (ref 1.7–2.3)
MCH RBC QN AUTO: 33.5 PG (ref 26.5–33)
MCHC RBC AUTO-ENTMCNC: 33.1 G/DL (ref 31.5–36.5)
MCV RBC AUTO: 101 FL (ref 78–100)
MONOCYTES # BLD AUTO: 0.1 10E3/UL (ref 0–1.3)
MONOCYTES NFR BLD AUTO: 14 %
NEUTROPHILS # BLD AUTO: 0.6 10E3/UL (ref 1.6–8.3)
NEUTROPHILS NFR BLD AUTO: 61 %
NRBC # BLD AUTO: 0 10E3/UL
NRBC BLD AUTO-RTO: 0 /100
PHOSPHATE SERPL-MCNC: 2.5 MG/DL (ref 2.5–4.5)
PLATELET # BLD AUTO: 127 10E3/UL (ref 150–450)
POTASSIUM SERPL-SCNC: 4.7 MMOL/L (ref 3.4–5.3)
RBC # BLD AUTO: 2.6 10E6/UL (ref 3.8–5.2)
SODIUM SERPL-SCNC: 133 MMOL/L (ref 135–145)
WBC # BLD AUTO: 1 10E3/UL (ref 4–11)

## 2024-06-09 PROCEDURE — 250N000013 HC RX MED GY IP 250 OP 250 PS 637

## 2024-06-09 PROCEDURE — 83735 ASSAY OF MAGNESIUM: CPT

## 2024-06-09 PROCEDURE — 99223 1ST HOSP IP/OBS HIGH 75: CPT | Mod: AI | Performed by: INTERNAL MEDICINE

## 2024-06-09 PROCEDURE — 36591 DRAW BLOOD OFF VENOUS DEVICE: CPT

## 2024-06-09 PROCEDURE — 85025 COMPLETE CBC W/AUTO DIFF WBC: CPT

## 2024-06-09 PROCEDURE — 250N000013 HC RX MED GY IP 250 OP 250 PS 637: Performed by: PEDIATRICS

## 2024-06-09 PROCEDURE — 250N000013 HC RX MED GY IP 250 OP 250 PS 637: Performed by: INTERNAL MEDICINE

## 2024-06-09 PROCEDURE — 250N000011 HC RX IP 250 OP 636: Mod: JZ

## 2024-06-09 PROCEDURE — 80048 BASIC METABOLIC PNL TOTAL CA: CPT

## 2024-06-09 PROCEDURE — 120N000002 HC R&B MED SURG/OB UMMC

## 2024-06-09 PROCEDURE — 999N000065 XR ABDOMEN PORT 1 VIEW

## 2024-06-09 PROCEDURE — 74018 RADEX ABDOMEN 1 VIEW: CPT | Mod: 26 | Performed by: RADIOLOGY

## 2024-06-09 PROCEDURE — 84100 ASSAY OF PHOSPHORUS: CPT

## 2024-06-09 PROCEDURE — 99233 SBSQ HOSP IP/OBS HIGH 50: CPT | Mod: GC | Performed by: PEDIATRICS

## 2024-06-09 RX ORDER — FENTANYL 25 UG/1
25 PATCH TRANSDERMAL
Status: DISCONTINUED | OUTPATIENT
Start: 2024-06-09 | End: 2024-06-18 | Stop reason: HOSPADM

## 2024-06-09 RX ORDER — FENTANYL 12.5 UG/1
12 PATCH TRANSDERMAL
Status: DISCONTINUED | OUTPATIENT
Start: 2024-06-09 | End: 2024-06-18 | Stop reason: HOSPADM

## 2024-06-09 RX ORDER — OXYCODONE HCL 5 MG/5 ML
10 SOLUTION, ORAL ORAL
Status: DISCONTINUED | OUTPATIENT
Start: 2024-06-09 | End: 2024-06-13

## 2024-06-09 RX ORDER — HYDROMORPHONE HYDROCHLORIDE 1 MG/ML
0.3 INJECTION, SOLUTION INTRAMUSCULAR; INTRAVENOUS; SUBCUTANEOUS
Status: DISCONTINUED | OUTPATIENT
Start: 2024-06-09 | End: 2024-06-10

## 2024-06-09 RX ADMIN — OXYCODONE HYDROCHLORIDE 10 MG: 5 SOLUTION ORAL at 20:52

## 2024-06-09 RX ADMIN — ONDANSETRON 4 MG: 2 INJECTION INTRAMUSCULAR; INTRAVENOUS at 03:35

## 2024-06-09 RX ADMIN — FENTANYL 1 PATCH: 25 PATCH TRANSDERMAL at 14:36

## 2024-06-09 RX ADMIN — ONDANSETRON 4 MG: 2 INJECTION INTRAMUSCULAR; INTRAVENOUS at 18:20

## 2024-06-09 RX ADMIN — PROCHLORPERAZINE EDISYLATE 10 MG: 5 INJECTION INTRAMUSCULAR; INTRAVENOUS at 14:44

## 2024-06-09 RX ADMIN — ACETAMINOPHEN 975 MG: 325 SOLUTION ORAL at 20:34

## 2024-06-09 RX ADMIN — OXYCODONE HYDROCHLORIDE 10 MG: 5 SOLUTION ORAL at 18:16

## 2024-06-09 RX ADMIN — HYDROMORPHONE HYDROCHLORIDE 0.3 MG: 1 INJECTION, SOLUTION INTRAMUSCULAR; INTRAVENOUS; SUBCUTANEOUS at 10:41

## 2024-06-09 RX ADMIN — OXYCODONE HYDROCHLORIDE 10 MG: 5 SOLUTION ORAL at 09:14

## 2024-06-09 RX ADMIN — FAMOTIDINE 20 MG: 20 TABLET ORAL at 20:35

## 2024-06-09 RX ADMIN — ACETAMINOPHEN 325 MG: 325 SOLUTION ORAL at 00:19

## 2024-06-09 RX ADMIN — LEVOTHYROXINE SODIUM 100 MCG: 100 TABLET ORAL at 09:12

## 2024-06-09 RX ADMIN — FENTANYL 1 PATCH: 12.5 PATCH TRANSDERMAL at 14:35

## 2024-06-09 RX ADMIN — FAMOTIDINE 20 MG: 20 TABLET ORAL at 09:12

## 2024-06-09 RX ADMIN — OXYCODONE HYDROCHLORIDE 10 MG: 5 SOLUTION ORAL at 15:38

## 2024-06-09 RX ADMIN — ACETAMINOPHEN 975 MG: 325 SOLUTION ORAL at 09:14

## 2024-06-09 RX ADMIN — ENOXAPARIN SODIUM 40 MG: 40 INJECTION SUBCUTANEOUS at 20:35

## 2024-06-09 RX ADMIN — ACETAMINOPHEN 975 MG: 325 SOLUTION ORAL at 14:36

## 2024-06-09 RX ADMIN — ONDANSETRON 4 MG: 2 INJECTION INTRAMUSCULAR; INTRAVENOUS at 12:39

## 2024-06-09 RX ADMIN — PANTOPRAZOLE SODIUM 40 MG: 40 TABLET, DELAYED RELEASE ORAL at 18:17

## 2024-06-09 RX ADMIN — BUSPIRONE HYDROCHLORIDE 5 MG: 5 TABLET ORAL at 09:12

## 2024-06-09 RX ADMIN — OXYCODONE HYDROCHLORIDE 10 MG: 5 SOLUTION ORAL at 03:31

## 2024-06-09 RX ADMIN — OXYCODONE HYDROCHLORIDE 10 MG: 5 SOLUTION ORAL at 12:39

## 2024-06-09 RX ADMIN — PANTOPRAZOLE SODIUM 40 MG: 40 TABLET, DELAYED RELEASE ORAL at 09:12

## 2024-06-09 RX ADMIN — THIAMINE HCL TAB 100 MG 100 MG: 100 TAB at 09:12

## 2024-06-09 ASSESSMENT — ACTIVITIES OF DAILY LIVING (ADL)
ADLS_ACUITY_SCORE: 32
ADLS_ACUITY_SCORE: 34
ADLS_ACUITY_SCORE: 29
ADLS_ACUITY_SCORE: 32
ADLS_ACUITY_SCORE: 29
ADLS_ACUITY_SCORE: 28
ADLS_ACUITY_SCORE: 29
ADLS_ACUITY_SCORE: 28
ADLS_ACUITY_SCORE: 29
ADLS_ACUITY_SCORE: 32
ADLS_ACUITY_SCORE: 28
ADLS_ACUITY_SCORE: 32
ADLS_ACUITY_SCORE: 32
ADLS_ACUITY_SCORE: 29

## 2024-06-09 NOTE — PLAN OF CARE
"Goal Outcome Evaluation:      Plan of Care Reviewed With: patient    Overall Patient Progress: improving    Outcome Evaluation: No acute changes this shift; VSS, RA, pain managment adjusted for scheduled pain meds.Continuous TF stopped - bolus feeds initiated - next bolus feed at 8pm. Continuous nausea - managed with PRN meds - relief of symptoms. Intermittent incontinence - 3 small BMs this shift. Voiding adequately. UAL. Encouraging ambulation out of room.    /56 (BP Location: Right arm)   Pulse 70   Temp 98.2  F (36.8  C) (Oral)   Resp 16   Ht 1.702 m (5' 7\")   Wt 63.3 kg (139 lb 8.8 oz)   SpO2 100%   BMI 21.86 kg/m          "

## 2024-06-09 NOTE — PLAN OF CARE
Goal Outcome Evaluation:      Plan of Care Reviewed With: patient    Overall Patient Progress: no change    Neuro:A/O x4  Respiratory:WDL on RA  Cardiac:WDL  Diet:reg  GI/: voiding spontaneously. Passing gas. No BM this shfit. R NG in place running TF at 60ml/hr  Incision/Drains: none  IV Access:R chest port tko 10 ml/hr   Pain:managed with PRN oxycodone   Labs:reviewed  VS: Temp: 97.8  F (36.6  C) Temp src: Oral BP: 137/69 Pulse: 91   Resp: 16 SpO2: 96 % O2 Device: None (Room air)     Activity:ind      New Changes this shift: none  Plan: continue POC

## 2024-06-09 NOTE — PROGRESS NOTES
[facility]    Internal Medicine Progress Note - [facility] Service    Main Plans for Today   - AXR to confirm NGT position given right-sided abdominal discomfort  - Transition from continuous to bolus feeding  - Discontinue PM lyte check given stability  - Palliative care consulted regarding pain management; anticipate increasing fentanyl patch    Assessment & Plan   Linh Mustafa is a 64 year old female with squamous cell carcinoma of the esophagus admitted on 6/4/202 for chest pain after radiation therapy on 5/31/2024. Initial workup negative for acute pulmonary or cardiac etiology. Chest pain likely from mucositis of the esophagus due to radiation toxicity. She completed final two radiation treatments and pain and nausea is well controlled with current drug regimen. NG placed for tube feeding and monitoring for refeeding syndrome. Needs jejunostomy in preparation for esophagectomy.     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 Reports pain, nausea, and vomiting after radiation therapy. Chest pain feels similar regarding quality and location to prior pain, but significantly more intense.  ED workup notable for normal troponin and BNP. CT Abd  and Chest demonstrated no acute pathology, including PE, aortic dissection, or esophageal perforation. Workup negative for cardiac etiology, GI perforation, and PE.  Has significant pain with swallowing, which limits her PO intake. Diet consist predominantly of soft foods like pudding and shakes. Bedside swallow evaluation demonstrated normal oropharyngeal swallowing mechanisms with regular/thin liquids.   - Consult Palliative care for  better pain and nausea control   - Consult Radiation, thank you for recs   - Completed final two rounds of radiation therapy.   - Consult Thoracic Surgery   - s/p esophagogastroscopy on 6/6 (inflamed esophageal mucosa)   - s/p NGT on 6/6   - Plan for J tube placement in 2 weeks pending improvement in leukocyte count  - Consult nutrition for tube feeds  - Osmolite 1.5 via NG-tube (transitioned from continuous to bolus feeding on 6/10)  - Monitor for refeeding syndrome (Daily K, Mg, and Phosphorus)  - Pain Control: PRN oxycodone (10 mg q3h), PTA fentanyl (25 mcg/hr patch every 72 hours), PRN lidocaine 1%  - Bowel Regimen: Miralax BID, PRN senna   - Pantoprazole (40 mg BID); PTA esomeprazole not on formulary   - PTA famotidine (20 mg BID)  - PTA Magic Mouthwash (4x daily)  - PTA PRN ondansetron (8 mg q8h)   - PTA PRN compazine (10 mg q6h)  - PTA sucralfate (1g 4x daily)  - Per SLP, okay for regular diet w/ thin liquids    Pancytopenia  Neutropenia  At presentation, WBCs at 1.4 and platelets at 137. Suspect secondary to bone marrow suppression from radiation therapy and/or chemotherapy. Most recent dose of chemotherapy on 5/28/2024 with paclitaxel and carboplatin. On 6/5, platelets decreased to 91 after starting enoxaparin. Acute decrease is likely dilutional after receiving 2L of fluid and a concomitant drop in WBCs and RBCs.   - Curbsided oncology on 6/7 regarding GCSF; no need unless concern for infxn  - Neutropenic precautions  - Hold PTA vitamin B12 (2500 mcg daily) given large size of pill (difficulty swallowing)    Chronic Hypercalcemia   Ca elevated to 11.1 at admission. Most likely secondary to hyperparathyroidism with parathyroid hormone elevated to 121 on 5/1/2024. Had an appointment with endocrinology, but missed the appointment.   - Outpatient followup with endocrinology      Hypothyroidism   PTA levothyroxine (100 mcg)      Anxiety:  - PTA buspirone (5 mg daily)      Tobacco abuse.   Uses 1/2 ppd.    -  Nicotine patch   - PRN nicotine gum     The patient was discussed with Dr. Red Florentino MD  Internal Medicine Resident    Diet: Regular diet Thin Liquids + Tube feeds   Fluids: None  Lines: Right Chest Port  [place] Catheter: Not present            Interval History   No acute events overnight.  Patient states that she was in a considerable amount of pain this morning because she did not get her oxycodone overnight.  Continues to have midline chest pain that now radiates to beneath her right breast.  Notes that her nausea is well-controlled with her as needed medications.  No vomiting.  Brother is present at bedside and updated.        Physical Exam   Vital Signs: Temp: 98.5  F (36.9  C) Temp src: Oral BP: 95/51 Pulse: 84   Resp: 16 SpO2: 95 % O2 Device: None (Room air)    Weight: 139 lbs 8.82 oz  General Appearance: Frail appearing. Alert.   Eyes: No conjunctivitis or scleral icterus   Head: atraumatic  Respiratory: Adequate oxygenation on room air   Cardiovascular: RRR with no murmurs, rubs, or gallops  GI: Soft, non-distended. No tenderness in all four quadrants.   Skin: Warm and well perfused. No pallor. Right chest port in place w/o surrounding erythema.  Musculoskeletal: Moving all four extremities   Neurologic: No gross neurological deficits   Psychiatric: appropriate mood and affect        Data   Recent Labs   Lab 06/09/24  0658 06/08/24  2048 06/08/24  0714 06/07/24  1759 06/07/24  0624 06/06/24  0729 06/06/24  0634 06/05/24  0724 06/04/24  0219   WBC 1.0*  --  0.9*  --  1.4*  --  2.1*  2.1* 1.4* 1.9*   HGB 8.7*  --  9.3*  --  9.6*  --  11.0* 9.3* 11.0*   *  --  99  --  97  --  99 98 96   *  --  119*  --  125*  --  140* 91* 137*   INR  --   --   --   --   --   --   --   --  0.98   *  --  136  --  134*  --  136 135 135   POTASSIUM 4.7 4.4 4.0   < > 4.2   < > 3.4 3.7 4.3   CHLORIDE 100  --  101  --  99  --  99 102 98   CO2 26  --  27  --  27  --  24 23 20*   BUN 12.1  --  10.9   --  9.5  --  9.8 11.9 21.3   CR 0.53  --  0.53  --  0.56  --  0.56 0.51 0.76   ANIONGAP 7  --  8  --  8  --  13 10 17*   VAIBHAV 9.3  --  9.5  --  10.1  --  10.5* 9.7 11.1*   *  --  133*  --  104*   < > 94 89 99   ALBUMIN  --   --   --   --   --   --  4.0 3.4* 4.3   PROTTOTAL  --   --   --   --   --   --  7.3 5.8* 7.5   BILITOTAL  --   --   --   --   --   --  0.7 0.7 1.2   ALKPHOS  --   --   --   --   --   --  99 78 98   ALT  --   --   --   --   --   --  22 18 27   AST  --   --   --   --   --   --  17 14 22   LIPASE  --   --   --   --   --   --   --   --  11*    < > = values in this interval not displayed.       Imaging results reviewed over the past 24 hrs:   Recent Results (from the past 24 hour(s))   XR Abdomen Port 1 View    Narrative    Exam: XR ABDOMEN PORT 1 VIEW, 6/9/2024 12:11 PM    Indication: verify ngt placement, also right sided abdominal  discomfort    Comparison: 6/4/2024 CT    Findings:   Supine frontal view of the abdomen. Enteric tube tip projects over the  right upper quadrant curving inferiorly at its distal segment.  Air-filled, nondilated loops of small and large bowel. No pneumatosis  or portal venous gas. Vascular calcifications. No acute osseous  abnormality.      Impression    Impression:   1. Enteric tube tip projects near the gastric antrum/pylorus/duodenal  bulb.  2. Nonobstructive bowel gas pattern.    KELLEY REMY DO         SYSTEM ID:  [id number]

## 2024-06-09 NOTE — CONSULTS
"[facility]  Palliative Care Consultation Note    Patient: Linh Mustafa  Date of Admission:  6/4/2024    Requesting Clinician / Team: medicine  Reason for consult: Symptom management    Recommendations:  Pain: increase fentanyl to 37mcg/hr; her pain should improve soon but that may yet be 2-3 weeks before it turns around and right now she's barely keeping ahead with the pain between her patch and oxycodone pretty much q3h. I will schedule the oxycodone q3h too; she can at home self taper down when her pain improves and we discussed that.    Her nausea sounds more direct obstructive sx from her tumor; belching, getting gas stuck that she needs to adjust positions for to get it out. She's already on famotidine bid; not much more to be done about this apart from hoping her tumor shrinks in the coming weeks/ feels better as the inflammation calms down  We will follow-up in clinic in a couple weeks; we'll arrange; you don't need to do anything about this.   Mood is down; all this is demoralizing; but I think she's at the bottom point of her morbidity from her chemoRT and that she'll make improvements in the coming weeks and we discussed this; I don't think further intervention is needed for this currently.     These recommendations have been discussed with medicine.    Thank you for the opportunity to participate in the care of this patient and family. Our team: will continue to follow.   During regular M-F work hours -- if you are not sure who specifically to contact -- please text the \"Palliative Care [facility]\" voice group in [employer]. Our after-hours answering service is [phone]. Who's on-call for us in available in [facility], also.     Alexi Mejía MD / Palliative Medicine / Text me via [employer]  I personally spent over 75 minutes on the date of service in various clinical activities associated with this patient's care.       Assessments:  Linh Mustafa is a 64 year " old female with esophageal SCC stage II dx 3/2024 who was admitted 6/4 with severe pain 2/2 mucositis from her neoadjuvant chemoradiotherapy.   She completed her RT inpatient.   Requiring NG TF; plan for J tube placement.   Neutropenic.     Today, the patient was seen for:  Esophageal SCC  Neoplasm related pain  Radiation mucositis    Prognosis, Goals, & Planning:    Functional Status just prior to hospitalization: 2 (Ambulatory and capable of all selfcare but unable to carry out any work activities; may need help with IADLs up and about > 50% of waking hours)    Prognosis, Goals, and/or Advance Care Planning were addressed today: Yes        Summary/Comments:  Getting curative cancer treatment; curative surgery is tentatively planned for a few months from now    Patient's decision making preferences: independently        Patient has decision-making capacity today for complex decisions: Yes          I have concerns about the patient/family's health literacy today: No         Patient has a completed Health Care Directive: No.     Code status: Full Code    Coping, Meaning, & Spirituality:   Mood, coping, and/or meaning in the context of serious illness were addressed today: Yes  Summary/Comments:     Social:   Lives alone. Daughter nearby who helps out.  With her diagnosis her retired brother from [place] has been with her and helping with driving, IADLs, caregiving    History of Present Illness:  History gathered today from: patient, medical chart    Admitted with severe pain and PO intolerance, weakness as she finished up her chemoRT.    On fentanyl patch -- really just started around the time she's admitted  10 mg oxycodone q3h is very helpful but notices if she's a couple hours late on oxycodone her pain is very severe and tough to get under contorl and that's why she uses IV dialudid here and there.   Constipated; but managed with prune and pear juice.  Some tiredness from meds but no danii sedation    Pain is  lower chest and has worsened markedly during her RT       Medications:  I have reviewed this patient's medication profile and medications from this hospitalization.   Noted:  Tylenol 1g tid  Buspar 5mg daily  Fentanyl 25 mcg/hr patch; on this prior to admission  Dilaudid 0.3 mg IV q3h prn  MM not taking  Oxycodone 10 mg q3h prn; [id number]    PEG and senna ordered; not using     Physical Exam:  Vital Signs: Temp: 98.5  F (36.9  C) Temp src: Oral BP: 95/51 Pulse: 84   Resp: 16 SpO2: 95 % O2 Device: None (Room air)    Weight: 139 lbs 8.82 oz   Alert NAD    Data reviewed:  Recent imaging reviewed, my comments on pertinents:   CT upon admission did not show new/unexpected cancer findings    Recent lab data reviewed, my comments on pertinents:   Na 133  Cr 0.5  Ca 9.3    Plt 127  Hgb 8.7  ANC now 600

## 2024-06-10 ENCOUNTER — HOME INFUSION (PRE-WILLOW HOME INFUSION) (OUTPATIENT)
Dept: PHARMACY | Facility: CLINIC | Age: 64
End: 2024-06-10
Payer: COMMERCIAL

## 2024-06-10 LAB
ANION GAP SERPL CALCULATED.3IONS-SCNC: 7 MMOL/L (ref 7–15)
BASOPHILS # BLD AUTO: 0 10E3/UL (ref 0–0.2)
BASOPHILS NFR BLD AUTO: 0 %
BUN SERPL-MCNC: 10.1 MG/DL (ref 8–23)
CALCIUM SERPL-MCNC: 10 MG/DL (ref 8.8–10.2)
CHLORIDE SERPL-SCNC: 101 MMOL/L (ref 98–107)
CREAT SERPL-MCNC: 0.54 MG/DL (ref 0.51–0.95)
DEPRECATED HCO3 PLAS-SCNC: 26 MMOL/L (ref 22–29)
EGFRCR SERPLBLD CKD-EPI 2021: >90 ML/MIN/1.73M2
EOSINOPHIL # BLD AUTO: 0 10E3/UL (ref 0–0.7)
EOSINOPHIL NFR BLD AUTO: 1 %
ERYTHROCYTE [DISTWIDTH] IN BLOOD BY AUTOMATED COUNT: 16.3 % (ref 10–15)
GLUCOSE SERPL-MCNC: 104 MG/DL (ref 70–99)
HCT VFR BLD AUTO: 28.7 % (ref 35–47)
HGB BLD-MCNC: 9.3 G/DL (ref 11.7–15.7)
IMM GRANULOCYTES # BLD: 0 10E3/UL
IMM GRANULOCYTES NFR BLD: 1 %
LYMPHOCYTES # BLD AUTO: 0.4 10E3/UL (ref 0.8–5.3)
LYMPHOCYTES NFR BLD AUTO: 27 %
MAGNESIUM SERPL-MCNC: 2.1 MG/DL (ref 1.7–2.3)
MCH RBC QN AUTO: 33 PG (ref 26.5–33)
MCHC RBC AUTO-ENTMCNC: 32.4 G/DL (ref 31.5–36.5)
MCV RBC AUTO: 102 FL (ref 78–100)
MONOCYTES # BLD AUTO: 0.3 10E3/UL (ref 0–1.3)
MONOCYTES NFR BLD AUTO: 17 %
NEUTROPHILS # BLD AUTO: 0.8 10E3/UL (ref 1.6–8.3)
NEUTROPHILS NFR BLD AUTO: 54 %
NRBC # BLD AUTO: 0 10E3/UL
NRBC BLD AUTO-RTO: 0 /100
PHOSPHATE SERPL-MCNC: 3.1 MG/DL (ref 2.5–4.5)
PLATELET # BLD AUTO: 158 10E3/UL (ref 150–450)
POTASSIUM SERPL-SCNC: 5.1 MMOL/L (ref 3.4–5.3)
RBC # BLD AUTO: 2.82 10E6/UL (ref 3.8–5.2)
SODIUM SERPL-SCNC: 134 MMOL/L (ref 135–145)
WBC # BLD AUTO: 1.6 10E3/UL (ref 4–11)

## 2024-06-10 PROCEDURE — 99233 SBSQ HOSP IP/OBS HIGH 50: CPT | Mod: GC | Performed by: PEDIATRICS

## 2024-06-10 PROCEDURE — 250N000011 HC RX IP 250 OP 636

## 2024-06-10 PROCEDURE — 99233 SBSQ HOSP IP/OBS HIGH 50: CPT

## 2024-06-10 PROCEDURE — 250N000013 HC RX MED GY IP 250 OP 250 PS 637

## 2024-06-10 PROCEDURE — 120N000002 HC R&B MED SURG/OB UMMC

## 2024-06-10 PROCEDURE — 85025 COMPLETE CBC W/AUTO DIFF WBC: CPT

## 2024-06-10 PROCEDURE — 36591 DRAW BLOOD OFF VENOUS DEVICE: CPT

## 2024-06-10 PROCEDURE — 250N000011 HC RX IP 250 OP 636: Mod: JZ

## 2024-06-10 PROCEDURE — 83735 ASSAY OF MAGNESIUM: CPT

## 2024-06-10 PROCEDURE — 250N000013 HC RX MED GY IP 250 OP 250 PS 637: Performed by: INTERNAL MEDICINE

## 2024-06-10 PROCEDURE — 84100 ASSAY OF PHOSPHORUS: CPT

## 2024-06-10 PROCEDURE — 80048 BASIC METABOLIC PNL TOTAL CA: CPT

## 2024-06-10 RX ORDER — POLYETHYLENE GLYCOL 3350 17 G/17G
17 POWDER, FOR SOLUTION ORAL DAILY
Qty: 510 G | Refills: 0 | Status: CANCELLED | OUTPATIENT
Start: 2024-06-10

## 2024-06-10 RX ORDER — ACETAMINOPHEN 325 MG/10.15ML
975 LIQUID ORAL 3 TIMES DAILY
Qty: 473 ML | Refills: 0 | Status: CANCELLED | OUTPATIENT
Start: 2024-06-10

## 2024-06-10 RX ORDER — HEPARIN SODIUM,PORCINE 10 UNIT/ML
5-10 VIAL (ML) INTRAVENOUS
Status: DISCONTINUED | OUTPATIENT
Start: 2024-06-10 | End: 2024-06-18 | Stop reason: HOSPADM

## 2024-06-10 RX ORDER — ONDANSETRON HYDROCHLORIDE 4 MG/5ML
4 SOLUTION ORAL EVERY 6 HOURS PRN
Status: CANCELLED | OUTPATIENT
Start: 2024-06-10

## 2024-06-10 RX ORDER — ONDANSETRON 4 MG/1
4 TABLET, ORALLY DISINTEGRATING ORAL EVERY 6 HOURS PRN
Qty: 30 TABLET | Refills: 0 | Status: CANCELLED | OUTPATIENT
Start: 2024-06-10

## 2024-06-10 RX ORDER — ONDANSETRON 4 MG/1
4 TABLET, ORALLY DISINTEGRATING ORAL EVERY 6 HOURS PRN
Status: CANCELLED | OUTPATIENT
Start: 2024-06-10

## 2024-06-10 RX ORDER — OXYCODONE HCL 5 MG/5 ML
10 SOLUTION, ORAL ORAL
Qty: 500 ML | Refills: 0 | Status: CANCELLED | OUTPATIENT
Start: 2024-06-10

## 2024-06-10 RX ORDER — HEPARIN SODIUM (PORCINE) LOCK FLUSH IV SOLN 100 UNIT/ML 100 UNIT/ML
5-10 SOLUTION INTRAVENOUS
Status: DISCONTINUED | OUTPATIENT
Start: 2024-06-10 | End: 2024-06-18 | Stop reason: HOSPADM

## 2024-06-10 RX ORDER — FENTANYL 12.5 UG/1
1 PATCH TRANSDERMAL
Qty: 5 PATCH | Refills: 0 | Status: CANCELLED | OUTPATIENT
Start: 2024-06-12

## 2024-06-10 RX ORDER — OXYCODONE HCL 5 MG/5 ML
10 SOLUTION, ORAL ORAL
Status: DISCONTINUED | OUTPATIENT
Start: 2024-06-10 | End: 2024-06-15

## 2024-06-10 RX ORDER — FENTANYL 25 UG/1
1 PATCH TRANSDERMAL
Qty: 5 PATCH | Refills: 0 | Status: CANCELLED | OUTPATIENT
Start: 2024-06-12

## 2024-06-10 RX ORDER — HYDROMORPHONE HCL IN WATER/PF 6 MG/30 ML
0.2 PATIENT CONTROLLED ANALGESIA SYRINGE INTRAVENOUS EVERY 4 HOURS PRN
Status: DISCONTINUED | OUTPATIENT
Start: 2024-06-10 | End: 2024-06-18 | Stop reason: HOSPADM

## 2024-06-10 RX ORDER — HEPARIN SODIUM,PORCINE 10 UNIT/ML
5-10 VIAL (ML) INTRAVENOUS EVERY 24 HOURS
Status: DISCONTINUED | OUTPATIENT
Start: 2024-06-10 | End: 2024-06-18 | Stop reason: HOSPADM

## 2024-06-10 RX ORDER — ONDANSETRON 2 MG/ML
4 INJECTION INTRAMUSCULAR; INTRAVENOUS EVERY 6 HOURS PRN
Status: CANCELLED | OUTPATIENT
Start: 2024-06-10

## 2024-06-10 RX ORDER — DEXTROSE, SODIUM CHLORIDE, SODIUM LACTATE, POTASSIUM CHLORIDE, AND CALCIUM CHLORIDE 5; .6; .31; .03; .02 G/100ML; G/100ML; G/100ML; G/100ML; G/100ML
INJECTION, SOLUTION INTRAVENOUS CONTINUOUS
Status: DISCONTINUED | OUTPATIENT
Start: 2024-06-10 | End: 2024-06-11

## 2024-06-10 RX ADMIN — OXYCODONE HYDROCHLORIDE 10 MG: 5 SOLUTION ORAL at 18:04

## 2024-06-10 RX ADMIN — OXYCODONE HYDROCHLORIDE 10 MG: 5 SOLUTION ORAL at 03:20

## 2024-06-10 RX ADMIN — FAMOTIDINE 20 MG: 20 TABLET ORAL at 08:00

## 2024-06-10 RX ADMIN — SODIUM CHLORIDE, SODIUM LACTATE, POTASSIUM CHLORIDE, CALCIUM CHLORIDE AND DEXTROSE MONOHYDRATE: 5; 600; 310; 30; 20 INJECTION, SOLUTION INTRAVENOUS at 15:00

## 2024-06-10 RX ADMIN — Medication 5 ML: at 13:48

## 2024-06-10 RX ADMIN — OXYCODONE HYDROCHLORIDE 10 MG: 5 SOLUTION ORAL at 06:17

## 2024-06-10 RX ADMIN — LEVOTHYROXINE SODIUM 100 MCG: 100 TABLET ORAL at 08:00

## 2024-06-10 RX ADMIN — THIAMINE HCL TAB 100 MG 100 MG: 100 TAB at 08:00

## 2024-06-10 RX ADMIN — ONDANSETRON 4 MG: 2 INJECTION INTRAMUSCULAR; INTRAVENOUS at 03:24

## 2024-06-10 RX ADMIN — OXYCODONE HYDROCHLORIDE 10 MG: 5 SOLUTION ORAL at 21:06

## 2024-06-10 RX ADMIN — PANTOPRAZOLE SODIUM 40 MG: 40 TABLET, DELAYED RELEASE ORAL at 08:00

## 2024-06-10 RX ADMIN — BUSPIRONE HYDROCHLORIDE 5 MG: 5 TABLET ORAL at 08:00

## 2024-06-10 RX ADMIN — OXYCODONE HYDROCHLORIDE 10 MG: 5 SOLUTION ORAL at 09:04

## 2024-06-10 RX ADMIN — ENOXAPARIN SODIUM 40 MG: 40 INJECTION SUBCUTANEOUS at 21:06

## 2024-06-10 RX ADMIN — ACETAMINOPHEN 975 MG: 325 SOLUTION ORAL at 07:50

## 2024-06-10 RX ADMIN — PROCHLORPERAZINE EDISYLATE 10 MG: 5 INJECTION INTRAMUSCULAR; INTRAVENOUS at 07:56

## 2024-06-10 RX ADMIN — Medication 5 ML: at 11:18

## 2024-06-10 RX ADMIN — OXYCODONE HYDROCHLORIDE 10 MG: 5 SOLUTION ORAL at 15:00

## 2024-06-10 RX ADMIN — ONDANSETRON 4 MG: 2 INJECTION INTRAMUSCULAR; INTRAVENOUS at 13:48

## 2024-06-10 RX ADMIN — FAMOTIDINE 20 MG: 20 TABLET ORAL at 21:06

## 2024-06-10 RX ADMIN — PANTOPRAZOLE SODIUM 40 MG: 40 TABLET, DELAYED RELEASE ORAL at 18:35

## 2024-06-10 RX ADMIN — OXYCODONE HYDROCHLORIDE 10 MG: 5 SOLUTION ORAL at 00:59

## 2024-06-10 RX ADMIN — PROCHLORPERAZINE EDISYLATE 10 MG: 5 INJECTION INTRAMUSCULAR; INTRAVENOUS at 21:14

## 2024-06-10 RX ADMIN — OXYCODONE HYDROCHLORIDE 10 MG: 5 SOLUTION ORAL at 12:10

## 2024-06-10 ASSESSMENT — ACTIVITIES OF DAILY LIVING (ADL)
ADLS_ACUITY_SCORE: 32
ADLS_ACUITY_SCORE: 31
ADLS_ACUITY_SCORE: 31
ADLS_ACUITY_SCORE: 32
ADLS_ACUITY_SCORE: 31
ADLS_ACUITY_SCORE: 31
ADLS_ACUITY_SCORE: 32
ADLS_ACUITY_SCORE: 29
ADLS_ACUITY_SCORE: 31
ADLS_ACUITY_SCORE: 31
ADLS_ACUITY_SCORE: 32
ADLS_ACUITY_SCORE: 31
ADLS_ACUITY_SCORE: 31
ADLS_ACUITY_SCORE: 29
ADLS_ACUITY_SCORE: 31
ADLS_ACUITY_SCORE: 32

## 2024-06-10 NOTE — PROGRESS NOTES
Therapy: Enteral TF  Insurance: [employer] IFP   Ded: $3200.00  Met: $3200.00  Co-Insurance: 75/25  Max Out of Pocket: $6800.00  Met: $6800.00    Pt has 100% coverage for Enteral TF through her [employer] I[employer] Plan due to fully meeting her Deductible and Out of pocket. No Coinsurance will apply. Prior auth is required and Formula must be sole-source for coverage.    In reference to [facility] [employer] for admission date 06/04/2024 to check Enteral TFcoverage.   Please contact Intake with any questions, [phone] or In Basket pool, [facility] ([phone]).

## 2024-06-10 NOTE — PROGRESS NOTES
PALLIATIVE CARE PROGRESS NOTE  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 (Did not receive any dose in the last 24 hrs)   Continue increased fentanyl patch 37 mcg/hr (dose increased 06/09)  Continue scheduled oxycodone 10 mg q 3hrs (plan to titrate in the coming days)  Continue scheduled tylenol 975 mg tid     #Nausea/vomiting   Continue Ondansetron IV/ODT 4 mg q 6hrs prn   Continue compazine 10 mg q 6hrs prn     #Mood/coping  Reports coping okay today; declined non-pharm support with palliative SW  Will continue to assess    PSYCHOSOCIAL/SPIRITUAL:  Family: Yulisa (daughter) Luis M (son in law)  [place] community: [employer]   Spiritual: Declined     Palliative Care will continue to follow. Thank you for the consult and allowing us to aid in the care of Linh Mustafa.    These recommendations have been discussed with primary team, nursing and family.    MASON Pantoja CNP  [facility], Palliative Care  Securely message with the Vocera Web Console (learn more here) or  Text page via [facility] Paging/Directory       Interval History:     Multidisciplinary collaboration:  Chart reviewed, discussed patient's case with medicine and family today    Notable medications:  Tylenol 1g tid  Buspar 5mg daily  Fentanyl 25 mcg/hr patch; on this prior to admission (increased to 37  mcg/hr on 06/09  Dilaudid 0.3 mg IV q3h prn (did not received in last 24 hrs)  Oxycodone 10 mg q3h scheduled     Patient/family narrative  Met with Lita and her daughter Yulisa at bedside this morning. Lita was sitting up in the chair. Reports pain control with current regimen. She reports feeling groggy today but is unsure if its related to increased fentanyl patch dose yesterday vs feeling overly tried. Reviewed pain plan with Lita and her daughter at bedside. Discussed plan to wean oxycodone, perhaps decreased oxycodone frequency from q 3 to q 4hrs scheduled. Lita prefers to hold off on further adjustment of her current pain regimen and hopes to see how she feels tomorrow. Currently, the scheduled oxycodone seems to be helping for break through pain and she hasn't need PRN IV hydromorphone. Discussed plan to titrated oxycodone in the coming days.     Assessment          Linh Mustafa is a 64 year old female with esophageal SCC stage II dx 3/2024 who was admitted 6/4 with severe pain 2/2 mucositis from her neoadjuvant chemoradiotherapy.   She completed her RT inpatient.   Requiring NG TF; plan for J tube placement.   Neutropenic.      Today, the patient was seen for:  Esophageal SCC  Neoplasm related pain  Radiation mucositis  Palliative encounter        Review of Systems:     Besides above, ROS was reviewed and is unremarkable        Physical Exam:   Temp:  [98.1  F (36.7  C)-98.2  F (36.8  C)] 98.2  F (36.8  C)  Pulse:  [70-71] 70  Resp:  [16] 16  BP: ()/(47-56) 95/55  SpO2:  [98 %-100 %] 100 %  139 lbs 8.82 oz    Physical Exam  GENERAL: alert, awake, lethargic     SKIN: warm and dry   LUNGS: non-labored breathing, on room air   ABDOMINAL: BS (+), soft, non distended, non tender  MUSKL: no gross joint deformities   EXTREMITIES: no edema or cyanosis, pulses 2+ and symmetrical  NEUROLOGIC: alert and oriented x 4  PSYCH: calm       Data Reviewed:   CMP  Recent Labs   Lab 06/10/24  0603  06/09/24  0658 06/06/24  0729 06/06/24  0634 06/05/24  0724   * 133*   < > 136 135   POTASSIUM 5.1 4.7   < > 3.4 3.7   CHLORIDE 101 100   < > 99 102   CO2 26 26   < > 24 23   ANIONGAP 7 7   < > 13 10   * 132*   < > 94 89   BUN 10.1 12.1   < > 9.8 11.9   CR 0.54 0.53   < > 0.56 0.51   GFRESTIMATED >90 >90   < > >90 >90   VAIBHAV 10.0 9.3   < > 10.5* 9.7   MAG 2.1 2.0   < > 1.9  --    PHOS 3.1 2.5   < > 2.8  --    PROTTOTAL  --   --   --  7.3 5.8*   ALBUMIN  --   --   --  4.0 3.4*   BILITOTAL  --   --   --  0.7 0.7   ALKPHOS  --   --   --  99 78   AST  --   --   --  17 14   ALT  --   --   --  22 18    < > = values in this interval not displayed.     CBC  Recent Labs   Lab 06/10/24  0603 06/09/24  0658   WBC 1.6* 1.0*   RBC 2.82* 2.60*   HGB 9.3* 8.7*   HCT 28.7* 26.3*   * 101*   MCH 33.0 33.5*   MCHC 32.4 33.1   RDW 16.3* 16.0*    127*         Medical Decision Making       MANAGEMENT DISCUSSED with the following over the past 24 hours: medicine, nursing and family   NOTE(S)/MEDICAL RECORDS REVIEWED over the past 24 hours: Medicine and nursing  50 MINUTES SPENT BY ME on the date of service doing chart review, history, exam, documentation & further activities per the note.

## 2024-06-10 NOTE — PLAN OF CARE
Problem: Adult Inpatient Plan of Care  Goal: Plan of Care Review  Description: The Plan of Care Review/Shift note should be completed every shift.  The Outcome Evaluation is a brief statement about your assessment that the patient is improving, declining, or no change.  This information will be displayed automatically on your shift  note.  Flowsheets (Taken 6/10/2024 9576)  Outcome Evaluation: Discharge home with assist from brother or daughter  Plan of Care Reviewed With:   patient   other (see comments)

## 2024-06-10 NOTE — PROGRESS NOTES
[facility]    Internal Medicine Progress Note - [facility] Service    Main Plans for Today   - NG tube became dislodged during coughing fit. Thoracic surgery notified and recommend bedside placement. Plan for bedside NG placement tomorrow  - Tube feeds held until NG tube replaced. Meanwhile, giving D5LR 100 ml/hr for 14 hours.   - Palliative care consulted regarding pain management for breakthrough pain (patient prefers to not take PRN dilaudid)  - PT/OT consult for impaired mobility and deconditioning     Assessment & Plan   Linh Mustafa is a 64 year old female with squamous cell carcinoma of the esophagus admitted on 6/4/202 for chest pain after radiation therapy on 5/31/2024. Initial workup negative for acute pulmonary or cardiac etiology. Chest pain likely from mucositis of the esophagus due to radiation toxicity. She completed final two radiation treatments and pain and nausea is well controlled with current drug regimen. NG placed for tube feeding and monitoring for refeeding syndrome. Needs jejunostomy in preparation for esophagectomy.     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 Reports pain, nausea, and vomiting after radiation therapy. Chest pain feels similar regarding quality and location to prior pain, but significantly more intense.  ED workup notable for normal troponin and BNP. CT Abd  and Chest demonstrated no acute pathology, including PE, aortic dissection, or esophageal perforation. Workup negative for cardiac etiology, GI perforation, and PE.  Has significant pain with swallowing, which limits her PO intake. Diet consist predominantly of soft foods like  pudding and shakes. Bedside swallow evaluation demonstrated normal oropharyngeal swallowing mechanisms with regular/thin liquids. On 6/10, patient was coughing profusely and the NG tube was dislodged. Thoracic surgery was notified and asserted that it can be replaced at bedside. Plan for bedside replacement tomorrow. Nutrition recommended returning to continuous tube feeds instead of bolus feeding because GI irritation may have provoked the sustained coughing resulting in the NG tube displacement.   - Consult Palliative care for better pain and nausea control    - Pain Control: PRN oxycodone (10 mg q3h), PTA fentanyl (25 mcg/hr patch every 72 hours), PRN lidocaine 1%  - Bowel Regimen: Miralax BID, PRN senna   - Consult Radiation, thank you for recs   - Completed final two rounds of radiation therapy.   - Consult Thoracic Surgery   - s/p esophagogastroscopy on 6/6 (inflamed esophageal mucosa)   - s/p NGT on 6/6, now dislodged and plan to replace bedside tomorrow    - Plan for J tube placement in 2 weeks pending improvement in leukocyte count  - Consult nutrition for tube feeds   - Tube feeds held until NG tube replaced. Meanwhile, giving D5LR 100 ml/hr for 14 hours.   - Osmolite 1.5 via NG-tube; held until NG tube replaced   - Monitor for refeeding syndrome (Daily K, Mg, and Phosphorus)  - Consult PT/OT for impaired mobility and deconditioning   - Pantoprazole (40 mg BID); PTA esomeprazole not on formulary   - PTA famotidine (20 mg BID)  - PTA Magic Mouthwash (4x daily)  - PTA PRN ondansetron (8 mg q8h)   - PTA PRN compazine (10 mg q6h)  - PTA sucralfate (1g 4x daily)  - Per SLP, okay for regular diet w/ thin liquids    Pancytopenia  Neutropenia  At presentation, WBCs at 1.4 and platelets at 137. Suspect secondary to bone marrow suppression from radiation therapy and/or chemotherapy. Most recent dose of chemotherapy on 5/28/2024 with paclitaxel and carboplatin. On 6/5, platelets decreased to 91 after starting  enoxaparin. Acute decrease is likely dilutional after receiving 2L of fluid and a concomitant drop in WBCs and RBCs.   - Curbsided oncology on 6/7 regarding GCSF; no need unless concern for infxn  - Neutropenic precautions  - Hold PTA vitamin B12 (2500 mcg daily) given large size of pill (difficulty swallowing)    Chronic Hypercalcemia   Ca elevated to 11.1 at admission. Most likely secondary to hyperparathyroidism with parathyroid hormone elevated to 121 on 5/1/2024. Had an appointment with endocrinology, but missed the appointment.   - Outpatient followup with endocrinology      Hypothyroidism   PTA levothyroxine (100 mcg)      Anxiety:  - PTA buspirone (5 mg daily)      Tobacco abuse.   Uses 1/2 ppd.    - Nicotine patch   - PRN nicotine gum     The patient was discussed with Dr. Red Nova, MS4    Resident/Fellow Attestation   I, Vandana Florentino MD, was present with the medical/[employer] student who participated in the service and in the documentation of the note.  I have verified the history and personally performed the physical exam and medical decision making.  I agree with the assessment and plan of care as documented in the note.      Vandana Florentino MD  Internal Medicine Resident    Diet: Regular diet Thin Liquids + Tube feeds (held)   Fluids: D5LR 100 ml/hr for 14 hours   Lines: Right Chest Port  Mercado Catheter: Not present            Interval History   No acute events overnight. Patient endorses significant pain this morning. Continues to have midline chest pain that radiates to her right breast. She also endorses nausea associated with the bolus feeding.     Was notified by nurse that NG was dislodged during coughing fit. Plan for bedside replacement tomorrow per thoracic surgery.       Physical Exam   Vital Signs: Temp: 98.2  F (36.8  C) Temp src: Oral BP: 95/55 Pulse: 70   Resp: 16 SpO2: 100 % O2 Device: None (Room air)    Weight: 140 lbs 1.6 oz  General Appearance: Frail appearing. Alert.    Eyes: No conjunctivitis or scleral icterus   Head: atraumatic  Respiratory: Adequate oxygenation on room air   Cardiovascular: RRR with no murmurs, rubs, or gallops  GI: Soft, non-distended. No tenderness in all four quadrants.   Skin: Warm and well perfused. No pallor. Right chest port in place w/o surrounding erythema.  Musculoskeletal: Moving all four extremities   Neurologic: No gross neurological deficits   Psychiatric: appropriate mood and affect        Data   Recent Labs   Lab 06/10/24  0603 06/09/24  0658 06/08/24 2048 06/08/24  0714 06/06/24  0729 06/06/24  0634 06/05/24  0724 06/04/24  0219   WBC 1.6* 1.0*  --  0.9*   < > 2.1*  2.1* 1.4* 1.9*   HGB 9.3* 8.7*  --  9.3*   < > 11.0* 9.3* 11.0*   * 101*  --  99   < > 99 98 96    127*  --  119*   < > 140* 91* 137*   INR  --   --   --   --   --   --   --  0.98   * 133*  --  136   < > 136 135 135   POTASSIUM 5.1 4.7 4.4 4.0   < > 3.4 3.7 4.3   CHLORIDE 101 100  --  101   < > 99 102 98   CO2 26 26  --  27   < > 24 23 20*   BUN 10.1 12.1  --  10.9   < > 9.8 11.9 21.3   CR 0.54 0.53  --  0.53   < > 0.56 0.51 0.76   ANIONGAP 7 7  --  8   < > 13 10 17*   VAIBHAV 10.0 9.3  --  9.5   < > 10.5* 9.7 11.1*   * 132*  --  133*   < > 94 89 99   ALBUMIN  --   --   --   --   --  4.0 3.4* 4.3   PROTTOTAL  --   --   --   --   --  7.3 5.8* 7.5   BILITOTAL  --   --   --   --   --  0.7 0.7 1.2   ALKPHOS  --   --   --   --   --  99 78 98   ALT  --   --   --   --   --  22 18 27   AST  --   --   --   --   --  17 14 22   LIPASE  --   --   --   --   --   --   --  11*    < > = values in this interval not displayed.       Imaging results reviewed over the past 24 hrs:   No results found for this or any previous visit (from the past 24 hour(s)).

## 2024-06-10 NOTE — CONSULTS
Care Management Initial Consult    General Information  Assessment completed with: Patient, Family, Yulisa London  Type of CM/SW Visit: Initial Assessment    Primary Care Provider verified and updated as needed: Yes   Readmission within the last 30 days: current reason for admission unrelated to previous admission         Advance Care Planning:            Communication Assessment  Patient's communication style: spoken language (English or Bilingual)    Hearing Difficulty or Deaf: no   Wear Glasses or Blind: yes    Cognitive  Cognitive/Neuro/Behavioral: WDL                      Living Environment:   People in home: child(esteban), adult  daughter or brother will stay with Lita at her apartment  Current living Arrangements: apartment      Able to return to prior arrangements: yes       Family/Social Support:  Care provided by:    Provides care for: no one, unable/limited ability to care for self  Marital Status: Single  Children, Sibling(s)          Description of Support System: Supportive, Involved    Support Assessment: Adequate family and caregiver support, Adequate social supports    Current Resources:   Patient receiving home care services: No     Community Resources: None  Equipment currently used at home: none  Supplies currently used at home:      Employment/Financial:  Employment Status: retired        Financial Concerns: none   Referral to Financial Worker: No       Does the patient's insurance plan have a 3 day qualifying hospital stay waiver?  No    Lifestyle & Psychosocial Needs:  Social Determinants of Health     Food Insecurity: No Food Insecurity (2/24/2024)    Received from [facility]    Hunger Vital Sign     Worried About Running Out of Food in the Last Year: Never true     Ran Out of Food in the Last Year: Never true   Depression: Not on file   Housing Stability: Low Risk  (2/24/2024)    Received from [facility]    Housing Stability     What is your living situation  today?: I have a steady place to live   Tobacco Use: High Risk (5/28/2024)    Patient History     Smoking Tobacco Use: Every Day     Smokeless Tobacco Use: Never     Passive Exposure: Not on file   Financial Resource Strain: Not on file   Alcohol Use: Not on file   Transportation Needs: No Transportation Needs (2/24/2024)    [facility]    PRAPARE - Transportation     Lack of Transportation (Medical): No     Lack of Transportation (Non-Medical): No   Physical Activity: Inactive (2/24/2024)    [facility]    Exercise Vital Sign     Days of Exercise per Week: 0 days     Minutes of Exercise per Session: 0 min   Interpersonal Safety: Not on file   Stress: Not on file   Social Connections: Not on file   Health Literacy: Not on file       Functional Status:  Prior to admission patient needed assistance:   Dependent ADLs:: Independent  Dependent IADLs:: Laundry, Cooking, Transportation, Cleaning       Mental Health Status:  Mental Health Status: No Current Concerns       Chemical Dependency Status:  Chemical Dependency Status: No Current Concerns             Values/Beliefs:  Spiritual, Cultural Beliefs, Judaism Practices, Values that affect care:                 Additional Information:    Met with  Lita in her room. Discussed plan for her to go home with NG for TF's.  She will return in 2 weeks for a J tube placement.  Verified address and PCP. Updated PCP in Epic.       Discussed referral to [facility] for TF and home care.  She is agreeable.    Plan is for her brother or her daughter to stay with her when she discharges home.    NG was dislodged today after an emesis episode so she does not have NG at this time.    Referral sent to [facility] for tube feed and [facility] for home care.   Accepted by [facility]. Home care orders pended.    Ruba Oconnell, RN, BA   6/10/2024  Nurse Coordinator , [facility]    Social Work and Care Management  Department       SEARCHABLE in [facility] - search CARE COORDINATOR       [place] [address] [place] ([phone]) Saturday & Sunday; ([phone]) [facility] Recognized Holidays     Units: 5A Onc 5201 - 5219 RNCC,  5A Onc 5220 thru 5240 RNCC, 5C OFFSERVICE [phone] RNCC & 5C OFF SERVICE [id number] RNCC       Units: 6B Vocera, 6C Card 6401 thru 6420 RNCC, 6C Card 6502 thru 6514 RNCC & 6C Card 6515 thru 6519 RNCC        Units: 7A SOT RNCC Vocera, 7B Med Surg Vocera, 7C Med Surg 7401 thru 7418 RNCC & 7C Med Surg 7502 thru 7521 RNCC       Units: 6A Vocera & 4A CVICU Vocera, 4C MICU Vocera, and 4E SICU Vocera         Units: 5 Ortho Vocera & 5 Med Surg Vocera        Units: 6 Med Surg Vocera & 8 Med Surg Vocera

## 2024-06-10 NOTE — PLAN OF CARE
Goal Outcome Evaluation:      Plan of Care Reviewed With: patient    Overall Patient Progress: no change    Neuro:A/O x4  Respiratory:WDL on RA  Cardiac:WDL  Diet:reg  GI/: voiding spontaneously. Passing gas. BMx1 this shfit. R NG clamped  Incision/Drains: none  IV Access:R chest port tko 20 ml/hr   Pain:managed with scheduled oxycodone   Labs:reviewed  VS: Temp: 98.1  F (36.7  C) Temp src: Oral BP: 91/47 Pulse: 71   Resp: 16 SpO2: 98 % O2 Device: None (Room air)     Activity:ind      New Changes this shift: none  Plan: continue POC

## 2024-06-10 NOTE — PLAN OF CARE
Goal Outcome Evaluation:Patient reports good pain relief with Oxycodone per scheduled dosing.C/O nausea upon initial encounter.Relief with Compazine.Was called to room,noted NG advanced out mostly all tubing, reported had N/V episode.Removed, nausea subsided.MD notified. Tolerated sips fluids,declined food intake.Ambulated in room/up in [facility].gait steady.Continue monitor

## 2024-06-11 LAB
ANION GAP SERPL CALCULATED.3IONS-SCNC: 5 MMOL/L (ref 7–15)
BASOPHILS # BLD AUTO: 0 10E3/UL (ref 0–0.2)
BASOPHILS NFR BLD AUTO: 1 %
BUN SERPL-MCNC: 6.1 MG/DL (ref 8–23)
CALCIUM SERPL-MCNC: 9.8 MG/DL (ref 8.8–10.2)
CHLORIDE SERPL-SCNC: 100 MMOL/L (ref 98–107)
CREAT SERPL-MCNC: 0.54 MG/DL (ref 0.51–0.95)
DEPRECATED HCO3 PLAS-SCNC: 28 MMOL/L (ref 22–29)
EGFRCR SERPLBLD CKD-EPI 2021: >90 ML/MIN/1.73M2
EOSINOPHIL # BLD AUTO: 0 10E3/UL (ref 0–0.7)
EOSINOPHIL NFR BLD AUTO: 2 %
ERYTHROCYTE [DISTWIDTH] IN BLOOD BY AUTOMATED COUNT: 16.4 % (ref 10–15)
GLUCOSE SERPL-MCNC: 109 MG/DL (ref 70–99)
HCT VFR BLD AUTO: 26.8 % (ref 35–47)
HGB BLD-MCNC: 8.9 G/DL (ref 11.7–15.7)
IMM GRANULOCYTES # BLD: 0 10E3/UL
IMM GRANULOCYTES NFR BLD: 1 %
LYMPHOCYTES # BLD AUTO: 0.5 10E3/UL (ref 0.8–5.3)
LYMPHOCYTES NFR BLD AUTO: 30 %
MAGNESIUM SERPL-MCNC: 2.1 MG/DL (ref 1.7–2.3)
MCH RBC QN AUTO: 33.6 PG (ref 26.5–33)
MCHC RBC AUTO-ENTMCNC: 33.2 G/DL (ref 31.5–36.5)
MCV RBC AUTO: 101 FL (ref 78–100)
MONOCYTES # BLD AUTO: 0.3 10E3/UL (ref 0–1.3)
MONOCYTES NFR BLD AUTO: 16 %
NEUTROPHILS # BLD AUTO: 0.8 10E3/UL (ref 1.6–8.3)
NEUTROPHILS NFR BLD AUTO: 50 %
NRBC # BLD AUTO: 0 10E3/UL
NRBC BLD AUTO-RTO: 0 /100
PHOSPHATE SERPL-MCNC: 3.3 MG/DL (ref 2.5–4.5)
PLATELET # BLD AUTO: 169 10E3/UL (ref 150–450)
POTASSIUM SERPL-SCNC: 4.5 MMOL/L (ref 3.4–5.3)
RBC # BLD AUTO: 2.65 10E6/UL (ref 3.8–5.2)
SODIUM SERPL-SCNC: 133 MMOL/L (ref 135–145)
WBC # BLD AUTO: 1.6 10E3/UL (ref 4–11)

## 2024-06-11 PROCEDURE — 250N000011 HC RX IP 250 OP 636

## 2024-06-11 PROCEDURE — 120N000002 HC R&B MED SURG/OB UMMC

## 2024-06-11 PROCEDURE — 999N000147 HC STATISTIC PT IP EVAL DEFER

## 2024-06-11 PROCEDURE — 250N000013 HC RX MED GY IP 250 OP 250 PS 637: Performed by: INTERNAL MEDICINE

## 2024-06-11 PROCEDURE — 250N000013 HC RX MED GY IP 250 OP 250 PS 637

## 2024-06-11 PROCEDURE — 36591 DRAW BLOOD OFF VENOUS DEVICE: CPT

## 2024-06-11 PROCEDURE — 999N000111 HC STATISTIC OT IP EVAL DEFER

## 2024-06-11 PROCEDURE — 84100 ASSAY OF PHOSPHORUS: CPT

## 2024-06-11 PROCEDURE — 250N000011 HC RX IP 250 OP 636: Mod: JZ

## 2024-06-11 PROCEDURE — 80048 BASIC METABOLIC PNL TOTAL CA: CPT

## 2024-06-11 PROCEDURE — 83735 ASSAY OF MAGNESIUM: CPT

## 2024-06-11 PROCEDURE — 99233 SBSQ HOSP IP/OBS HIGH 50: CPT | Mod: GC | Performed by: STUDENT IN AN ORGANIZED HEALTH CARE EDUCATION/TRAINING PROGRAM

## 2024-06-11 PROCEDURE — 99232 SBSQ HOSP IP/OBS MODERATE 35: CPT

## 2024-06-11 PROCEDURE — 85025 COMPLETE CBC W/AUTO DIFF WBC: CPT

## 2024-06-11 RX ORDER — DEXTROSE, SODIUM CHLORIDE, SODIUM LACTATE, POTASSIUM CHLORIDE, AND CALCIUM CHLORIDE 5; .6; .31; .03; .02 G/100ML; G/100ML; G/100ML; G/100ML; G/100ML
INJECTION, SOLUTION INTRAVENOUS CONTINUOUS
Status: DISCONTINUED | OUTPATIENT
Start: 2024-06-11 | End: 2024-06-12

## 2024-06-11 RX ORDER — METOCLOPRAMIDE HYDROCHLORIDE 5 MG/ML
10 INJECTION INTRAMUSCULAR; INTRAVENOUS EVERY 6 HOURS
Status: DISCONTINUED | OUTPATIENT
Start: 2024-06-11 | End: 2024-06-18 | Stop reason: HOSPADM

## 2024-06-11 RX ORDER — ONDANSETRON 2 MG/ML
8 INJECTION INTRAMUSCULAR; INTRAVENOUS EVERY 6 HOURS
Status: DISCONTINUED | OUTPATIENT
Start: 2024-06-11 | End: 2024-06-18 | Stop reason: HOSPADM

## 2024-06-11 RX ORDER — ONDANSETRON 4 MG/1
8 TABLET, ORALLY DISINTEGRATING ORAL EVERY 6 HOURS
Status: DISCONTINUED | OUTPATIENT
Start: 2024-06-11 | End: 2024-06-18 | Stop reason: HOSPADM

## 2024-06-11 RX ADMIN — OXYCODONE HYDROCHLORIDE 10 MG: 5 SOLUTION ORAL at 11:50

## 2024-06-11 RX ADMIN — PANTOPRAZOLE SODIUM 40 MG: 40 TABLET, DELAYED RELEASE ORAL at 17:37

## 2024-06-11 RX ADMIN — ONDANSETRON 8 MG: 2 INJECTION INTRAMUSCULAR; INTRAVENOUS at 14:57

## 2024-06-11 RX ADMIN — OXYCODONE HYDROCHLORIDE 10 MG: 5 SOLUTION ORAL at 03:52

## 2024-06-11 RX ADMIN — OXYCODONE HYDROCHLORIDE 10 MG: 5 SOLUTION ORAL at 00:55

## 2024-06-11 RX ADMIN — THIAMINE HCL TAB 100 MG 100 MG: 100 TAB at 08:45

## 2024-06-11 RX ADMIN — ONDANSETRON 4 MG: 4 TABLET, ORALLY DISINTEGRATING ORAL at 00:58

## 2024-06-11 RX ADMIN — FAMOTIDINE 20 MG: 20 TABLET ORAL at 21:31

## 2024-06-11 RX ADMIN — SODIUM CHLORIDE, SODIUM LACTATE, POTASSIUM CHLORIDE, CALCIUM CHLORIDE AND DEXTROSE MONOHYDRATE: 5; 600; 310; 30; 20 INJECTION, SOLUTION INTRAVENOUS at 19:52

## 2024-06-11 RX ADMIN — METOCLOPRAMIDE 10 MG: 5 INJECTION, SOLUTION INTRAMUSCULAR; INTRAVENOUS at 17:37

## 2024-06-11 RX ADMIN — SODIUM CHLORIDE, SODIUM LACTATE, POTASSIUM CHLORIDE, CALCIUM CHLORIDE AND DEXTROSE MONOHYDRATE: 5; 600; 310; 30; 20 INJECTION, SOLUTION INTRAVENOUS at 11:54

## 2024-06-11 RX ADMIN — OXYCODONE HYDROCHLORIDE 10 MG: 5 SOLUTION ORAL at 17:37

## 2024-06-11 RX ADMIN — ONDANSETRON 8 MG: 4 TABLET, ORALLY DISINTEGRATING ORAL at 21:32

## 2024-06-11 RX ADMIN — ENOXAPARIN SODIUM 40 MG: 40 INJECTION SUBCUTANEOUS at 21:30

## 2024-06-11 RX ADMIN — FAMOTIDINE 20 MG: 20 TABLET ORAL at 08:45

## 2024-06-11 RX ADMIN — OXYCODONE HYDROCHLORIDE 10 MG: 5 SOLUTION ORAL at 21:31

## 2024-06-11 RX ADMIN — PROCHLORPERAZINE EDISYLATE 10 MG: 5 INJECTION INTRAMUSCULAR; INTRAVENOUS at 12:01

## 2024-06-11 RX ADMIN — ONDANSETRON 4 MG: 4 TABLET, ORALLY DISINTEGRATING ORAL at 08:50

## 2024-06-11 RX ADMIN — BUSPIRONE HYDROCHLORIDE 5 MG: 5 TABLET ORAL at 08:46

## 2024-06-11 RX ADMIN — OXYCODONE HYDROCHLORIDE 10 MG: 5 SOLUTION ORAL at 08:43

## 2024-06-11 RX ADMIN — SODIUM CHLORIDE, SODIUM LACTATE, POTASSIUM CHLORIDE, CALCIUM CHLORIDE AND DEXTROSE MONOHYDRATE: 5; 600; 310; 30; 20 INJECTION, SOLUTION INTRAVENOUS at 00:42

## 2024-06-11 RX ADMIN — OXYCODONE HYDROCHLORIDE 10 MG: 5 SOLUTION ORAL at 14:57

## 2024-06-11 RX ADMIN — SODIUM CHLORIDE, SODIUM LACTATE, POTASSIUM CHLORIDE, CALCIUM CHLORIDE AND DEXTROSE MONOHYDRATE: 5; 600; 310; 30; 20 INJECTION, SOLUTION INTRAVENOUS at 10:06

## 2024-06-11 RX ADMIN — PANTOPRAZOLE SODIUM 40 MG: 40 TABLET, DELAYED RELEASE ORAL at 08:46

## 2024-06-11 RX ADMIN — CAMPHOR, MENTHOL: .5; .5 LOTION TOPICAL at 11:54

## 2024-06-11 RX ADMIN — PROCHLORPERAZINE EDISYLATE 10 MG: 5 INJECTION INTRAMUSCULAR; INTRAVENOUS at 04:02

## 2024-06-11 RX ADMIN — LEVOTHYROXINE SODIUM 100 MCG: 100 TABLET ORAL at 08:45

## 2024-06-11 RX ADMIN — METOCLOPRAMIDE 10 MG: 5 INJECTION, SOLUTION INTRAMUSCULAR; INTRAVENOUS at 10:06

## 2024-06-11 RX ADMIN — METOCLOPRAMIDE 10 MG: 5 INJECTION, SOLUTION INTRAMUSCULAR; INTRAVENOUS at 21:31

## 2024-06-11 ASSESSMENT — ACTIVITIES OF DAILY LIVING (ADL)
ADLS_ACUITY_SCORE: 29

## 2024-06-11 NOTE — PLAN OF CARE
PT 7B:  Physical Therapy: Orders received. Chart reviewed and discussed with care team.? Physical Therapy not indicated due to functional mobility status. Pt stated that she has been independently showering, toileting, moving around her room, and making her bed. Pt expressed ongoing fatigue and would benefit from OP PT for fatigue management and discharge home. Defer discharge recommendations to medical team.? Will complete orders.

## 2024-06-11 NOTE — PLAN OF CARE
Occupational Therapy: Orders received and chart reviewed. Per PT and RN, pt has been up making her bed, showering, moving about her room, and toileting IND. Occupational therapy not indicated at this time as pt near baseline, primarily limited by fatigue.? Defer discharge recommendations to medical team.? Will complete orders.    Please feel free to re-consult if pt has change in status or new IP OT needs become apparent.

## 2024-06-11 NOTE — PROGRESS NOTES
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 Her NG tube dislodged while having coughing fit yesterday. She is forcing her self to eat today and thus making her pain worse.   Continue IV hydromorphone 0.2 mg q 4hrs prn for break through pain (did not require IV hydromorphone >24 hrs  Continue increased fentanyl patch 37 mcg/hr (dose increased 06/09)  Continue scheduled oxycodone 10 mg q 3hrs- Scheduled oxycodone seems to be helping with breakthrough pain. Could consider increasing dose to 15 mg  if pain worsens. Unfortunately, she is having difficult staying awake and feels groggy, will hold off on further adjustment at this time.   Continue scheduled tylenol 975 mg tid  Continue with acid suppression medication- pantoprazole, famotidine, sucralfate    #Nausea/vomiting   Nausea worse today compared to yesterday as she is attempting to eat by mouth. Unfortunately, NG tube dislodged yesterday during coughing fit.   Continue scheduled Ondansetron IV/ODT 8 mg q 6hrs  Continue compazine 10 mg q 6hrs prn   Scheduled Reglan added today by primary   EKG on 6/5 showed QTc 417  Could consider low dose zyprexa 2.5 mg at bedtime if nausea is uncontrolled with scheduled Zofran and Reglan       #Mood/coping  Reports coping okay. Patient daughter shared her mother usually keeps things to herself and does not like to talk about her emotion  Palliative SW following to support  patient and daughter as needed      # Opioid-induced constipation-last BM on 6/11  Continue MiraLAX twice daily, senna as needed      PSYCHOSOCIAL/SPIRITUAL:  Family: Yulisa (daughter) Luis M (son in law)  [place] community: Christian   Spiritual: Declined     Palliative Care will continue to follow. Thank you for the consult and allowing us to aid in the care of Linh Mustafa.    These recommendations have been discussed with primary team, nursing and family.    MASON Pantoja CNP  [facility], Palliative Care  Securely message with the Vocera Web Console (learn more here) or  Text page via [facility] Paging/Directory       Interval History:     Multidisciplinary collaboration:  Chart reviewed, spoke with primary RN. NG tube dislodged yesterday, nausea worse today    Notable medications:  Tylenol 1g tid  Buspar 5mg daily  Fentanyl 25 mcg/hr patch; on this prior to admission (increased to 37 mcg/hr on 06/09  Dilaudid 0.2 mg IV q 4hr prn (did not received in last 24 hrs)  Oxycodone 10 mg q3h scheduled     Patient/family narrative  Met with Lita and her daughter Yulisa today. Lita worries since her NG tube dislodged yesterday, a portion of it feels stuck in her throat. Pain seems to be worse today than yesterday as she attempts to eat/drink. She also feels more nausea today with oral intake. Overall, she reports sleeping well overnight. She hopes to have alternative plan other than placing back NG tube. She is aware she be needing J tube placement pending improvement of her white count.     Assessment          Linh Mustafa is a 64 year old female with esophageal SCC stage II dx 3/2024 who was admitted 6/4 with severe pain 2/2 mucositis from her neoadjuvant chemoradiotherapy.   She completed her RT inpatient.   Requiring NG TF; plan for J tube placement.   Neutropenic.      Today, the patient was seen for:  Esophageal SCC  Neoplasm related pain  Radiation mucositis  Palliative encounter        Review of Systems:      Besides above, ROS was reviewed and is unremarkable        Physical Exam:   Temp:  [98.1  F (36.7  C)-98.5  F (36.9  C)] 98.1  F (36.7  C)  Pulse:  [60-76] 76  Resp:  [16-18] 16  BP: ()/(48-87) 139/87  SpO2:  [97 %-100 %] 100 %  140 lbs 1.6 oz    Physical Exam  GENERAL: alert, awake, lying in bed     SKIN: warm and dry   LUNGS: non-labored breathing, on room air   ABDOMINAL: BS (+), soft, non distended, non tender  MUSKL: no gross joint deformities   EXTREMITIES: no edema or cyanosis, pulses 2+ and symmetrical  NEUROLOGIC: alert and oriented x 4  PSYCH: calm       Data Reviewed:   CMP  Recent Labs   Lab 06/11/24  0716 06/10/24  0603 06/06/24  0729 06/06/24  0634 06/05/24  0724   * 134*   < > 136 135   POTASSIUM 4.5 5.1   < > 3.4 3.7   CHLORIDE 100 101   < > 99 102   CO2 28 26   < > 24 23   ANIONGAP 5* 7   < > 13 10   * 104*   < > 94 89   BUN 6.1* 10.1   < > 9.8 11.9   CR 0.54 0.54   < > 0.56 0.51   GFRESTIMATED >90 >90   < > >90 >90   VAIBHAV 9.8 10.0   < > 10.5* 9.7   MAG 2.1 2.1   < > 1.9  --    PHOS 3.3 3.1   < > 2.8  --    PROTTOTAL  --   --   --  7.3 5.8*   ALBUMIN  --   --   --  4.0 3.4*   BILITOTAL  --   --   --  0.7 0.7   ALKPHOS  --   --   --  99 78   AST  --   --   --  17 14   ALT  --   --   --  22 18    < > = values in this interval not displayed.     CBC  Recent Labs   Lab 06/11/24  0716 06/10/24  0603   WBC 1.6* 1.6*   RBC 2.65* 2.82*   HGB 8.9* 9.3*   HCT 26.8* 28.7*   * 102*   MCH 33.6* 33.0   MCHC 33.2 32.4   RDW 16.4* 16.3*    158         Medical Decision Making       MANAGEMENT DISCUSSED with the following over the past 24 hours: medicine, nursing and family   NOTE(S)/MEDICAL RECORDS REVIEWED over the past 24 hours: Medicine and nursing  40 MINUTES SPENT BY ME on the date of service doing chart review, history, exam, documentation & further activities per the note.

## 2024-06-11 NOTE — CONSULTS
"Palliative Care Initial Social Work Note  Location: [facility]    Patient Info:  Per EMR, Linh Mustafa is a 64 year old female with esophageal SCC stage II dx 3/2024 who was admitted 6/4 with severe pain 2/2 mucositis from her neoadjuvant chemoradiotherapy.   She completed her RT inpatient. Requiring NG TF; plan for J tube placement. Neutropenic.     Brief summary of visit: Per conversation with palliative [employer], PCSW made introductory phone call to pt's dtrYulisa this morning.    Reviewed role of social work as part of the palliative team. Per update from [employer], Lita is somewhat more reserved and \"not much of a talker\" which Yulisa validated. PCSW noted that goal is to meet patients and family members where they're at in terms of openness to support and conversation. Yulisa appreciative of availability of PCSW and palliative team.    Noted that palliative care social work can assist with adjustment to illness and coping, processing of information, emotional and decisional support needs, non-pharm techniques for stress and anxiety and can provide effective communication and caring support.    Yulisa at Lita's bedside at time of phone call and noted she and her mom had a \"hard but necessary\" conversation with doctors this morning that provided some clarity on the path moving forward.    Interventions used today: rapport building, active and reflective listening, validation      Date of Admission: 6/4/2024    Reason for consult: Patient and family support    Sources of information: Family member evtete Márquez    Recommendations & Plan:  PCSW will continue to follow while palliative care team is involved; check in 1-2x/week over course of hospitalization, ongoing assessment of coping, emotional support needs       Symptoms & Concerns Addressed Today:  Emotional support needs will continue to be assessed  Family    Strengths Identified:    Family support    Relationships & Support:  Aspects of " relationships and support assessed today:  Identified family members: evette BaumannYulisa  Professional supports: NA  Family coping: adequate at this time  Bereavement Risk concerns: continue to assess    Coping, Mental Health & Adjustment to Illness:   Adjustment to Illness/Hospitalization    Goals, Decision Making & Advance Care Planning:   Prognosis, Goals, and/or Advance Care Planning were assessed today: No  If yes, brief summary of discussion: not discussed with this writer's phone call  Preferred language: English  Patient's decision making preferences: shared with support from loved ones  I have concerns about the patient/family's health literacy today: No  Patient has a completed Health Care Directive: No.   Code status per chart review: Full Code      Clinical Social Work Interventions:   Assessment of palliative specific issues    Introduction of Palliative clinical social work interventions   Adjustment to illness counseling  Facilitation of processing of thoughts/feelings      ELLE Naidu, [facility]  [facility], Palliative Care  Securely message with the [employer] Web Console (learn more here) or  Text page via [employer] Paging/Directory

## 2024-06-11 NOTE — PLAN OF CARE
Goal Outcome Evaluation:       Temp: 98.2  F (36.8  C) Temp src: Oral BP: 104/48 Pulse: 64   Resp: 18 SpO2: 99 % O2 Device: None (Room air)       Neuro: Alert and Oriented  x4, let needs known  Cardiac:WNL, denies cardiac chest pain  Respiratory:LS clear and dim on room air   GI/:WNL, Voiding, No BM since 6-11  Diet/Appetite: regular; nausea intermittent; gave PO PRN zofran x1 and PRN IV compazine x1  Skin:Skin intact, no new deficient  LDA: R Port infusing D5+LR per MAR  Activity: Up IND  Pain: managed with scheduled medications  Plan: possible replacement of NG in am

## 2024-06-11 NOTE — PROGRESS NOTES
CLINICAL NUTRITION SERVICES - BRIEF NOTE     Nutrition Prescription    RECOMMENDATIONS FOR MDs/PROVIDERS TO ORDER:  Recommend resuming continuous TF upon NGT replacement as this seemed to be better tolerated than bolus feedings.      Recommendations already ordered by Registered Dietitian (RD):  Discontinued bolus TF orders    Future/Additional Recommendations:  When appropriate to resume TF:  Recommend starting Osmolite 1.5 @ 25 ml/hr with advancement by 10 ml/hr q 4 hours to goal rate of 55 ml/hr.  Goal rate provides: 1980 kcals (31 kcal/kg), 83 g PRO (1.3g/kg), 1005 ml free H20, 268 g CHO, and 0 g fiber daily.    For full hydration needs via FT, recommend 150 ml q 4 hours if liquid oral intake is minimal.        EVALUATION OF THE PROGRESS TOWARD GOALS   Diet: Regular Diet    Nutrition Support: TF was running continuously via NGT since 6/6.  Transitioned from continuous to 1/2 can bolus feedings on 6/9.  She received one half can bolus yesterday and one today.  She had an episode of N/V which caused her NGT to become dislodged.  The NGT was removed. Oral intake minimal - declining all meals.      INTERVENTIONS  Per discussion with primary team, patient not wanting to replace NGT today.  Will try for replacement tomorrow.     Monitoring/Evaluation  Progress toward goals will be monitored and evaluated per protocol.     Yulisa Aburto, MS, RD, LD, CCTD, [facility]  7A + 7B (beds [id number])  Available on Vocera [7A or 7B Clinical Dietitian]   Weekend/Holiday: Vocera [Weekend Clinical Dietitian]

## 2024-06-11 NOTE — PROGRESS NOTES
[facility]    Internal Medicine Progress Note - [facility] Service    Main Plans for Today   - Will touch base w/ thoracic surgery team regarding goal WBC/ANC for J tube placement (will consider reaching out to hematology to consider GCSF depending on this)  - Patient considering replacement of NGT on 6/12 depending on thoracic surgery recs  - Optimizing pain and nausea control today; increased and scheduled Zofran w/ Reglan PRN  - D5LR @ 100cc/hr  - Ensure between meals    Assessment & Plan   Linh Mustafa is a 64 year old female with squamous cell carcinoma of the esophagus admitted on 6/4/202 for chest pain after radiation therapy on 5/31/2024. Initial workup negative for acute pulmonary or cardiac etiology. Chest pain likely from mucositis of the esophagus due to radiation toxicity. She completed final two radiation treatments and pain and nausea is well controlled with current drug regimen. NG placed for tube feeding and monitoring for refeeding syndrome. Needs jejunostomy in preparation for esophagectomy.     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 Reports pain, nausea, and vomiting after radiation therapy. Chest pain feels similar regarding quality and location to prior pain, but significantly more intense.  ED workup notable for normal troponin and BNP. CT Abd  and Chest demonstrated no acute pathology, including PE, aortic dissection, or esophageal perforation. Workup negative for cardiac etiology, GI perforation, and PE.  Has significant pain with swallowing, which limits her PO intake. Diet consist predominantly of soft foods like pudding and shakes. Bedside swallow  evaluation demonstrated normal oropharyngeal swallowing mechanisms with regular/thin liquids. On 6/10, patient was coughing profusely and the NG tube was dislodged. Thoracic surgery was notified and asserted that it can be replaced at bedside. Plan for bedside replacement tomorrow. Nutrition recommended returning to continuous tube feeds instead of bolus feeding because GI irritation may have provoked the sustained coughing resulting in the NG tube displacement.   - Consult Palliative care for better pain and nausea control    - Pain Control: oxycodone (10 mg q3h), PTA fentanyl (37 mcg/hr patch every 72 hours), PRN lidocaine 1%  - Bowel Regimen: Miralax BID, PRN senna   - Consult Radiation, thank you for recs   - Completed final two rounds of radiation therapy.   - Consult Thoracic Surgery   - s/p esophagogastroscopy on 6/6 (inflamed esophageal mucosa)   - s/p NGT on 6/6, now dislodged and plan to replace bedside tomorrow    - Plan for J tube placement in 2 weeks pending improvement in leukocyte count  - Consult nutrition for tube feeds   - Tube feeds held until NG tube replaced. Meanwhile, giving D5LR 100 ml/hr.  - Osmolite 1.5 via NG-tube; held until NG tube replaced   - Monitor for refeeding syndrome (Daily K, Mg, and Phosphorus)  - Consult PT/OT for impaired mobility and deconditioning   - Pantoprazole (40 mg BID); PTA esomeprazole not on formulary   - PTA famotidine (20 mg BID)  - PTA Magic Mouthwash (4x daily)  - PTA PRN ondansetron (8 mg q8h)   - PTA PRN compazine (10 mg q6h)  - PTA sucralfate (1g 4x daily)  - Per SLP, okay for regular diet w/ thin liquids    Pancytopenia  Neutropenia  At presentation, WBCs at 1.4 and platelets at 137. Suspect secondary to bone marrow suppression from radiation therapy and/or chemotherapy. Most recent dose of chemotherapy on 5/28/2024 with paclitaxel and carboplatin. On 6/5, platelets decreased to 91 after starting enoxaparin. Acute decrease is likely dilutional after  receiving 2L of fluid and a concomitant drop in WBCs and RBCs.   - Curbsided oncology on 6/7 regarding GCSF; no need unless concern for infxn  - Neutropenic precautions  - Hold PTA vitamin B12 (2500 mcg daily) given large size of pill (difficulty swallowing)    Chronic Hypercalcemia   Ca elevated to 11.1 at admission. Most likely secondary to hyperparathyroidism with parathyroid hormone elevated to 121 on 5/1/2024. Had an appointment with endocrinology, but missed the appointment.   - Outpatient followup with endocrinology      Hypothyroidism   PTA levothyroxine (100 mcg)      Anxiety:  - PTA buspirone (5 mg daily)      Tobacco abuse.   Uses 1/2 ppd.    - Nicotine patch   - PRN nicotine gum     The patient was discussed with Dr. Brett Nova, MS4    Resident/Fellow Attestation   I, Vandana Florentino MD, was present with the medical/[employer] student who participated in the service and in the documentation of the note.  I have verified the history and personally performed the physical exam and medical decision making.  I agree with the assessment and plan of care as documented in the note.      Vandana Florentino MD      Diet: Regular diet Thin Liquids + Tube feeds (held while NGT out)  Fluids: D5LR 100 ml/hr  Lines: Right Chest Port  Mercado Catheter: Not present            Interval History   No acute events overnight.  Reports ongoing severe nausea.  Pain is slightly better than yesterday but still persistent.  Worsened significantly after trying to eat and drink.  Would like some time to reconsider reinsertion of the NG tube.  Discussed plan with daughter, who is present at bedside.      Physical Exam   Vital Signs: Temp: 98.1  F (36.7  C) Temp src: Oral BP: 139/87 Pulse: 76   Resp: 16 SpO2: 100 % O2 Device: None (Room air)    Weight: 140 lbs 1.6 oz  General Appearance: Frail appearing. Alert.   Eyes: No conjunctivitis or scleral icterus   Head: atraumatic  Respiratory: Adequate oxygenation on room air    Cardiovascular: RRR with no murmurs, rubs, or gallops  GI: Soft, non-distended. No tenderness in all four quadrants.   Skin: Warm and well perfused. No pallor. Right chest port in place w/o surrounding erythema.  Musculoskeletal: Moving all four extremities   Neurologic: No gross neurological deficits   Psychiatric: appropriate mood and affect        Data   Recent Labs   Lab 06/11/24  0716 06/10/24  0603 06/09/24  0658 06/06/24  0729 06/06/24  0634 06/05/24  0724   WBC 1.6* 1.6* 1.0*   < > 2.1*  2.1* 1.4*   HGB 8.9* 9.3* 8.7*   < > 11.0* 9.3*   * 102* 101*   < > 99 98    158 127*   < > 140* 91*   * 134* 133*   < > 136 135   POTASSIUM 4.5 5.1 4.7   < > 3.4 3.7   CHLORIDE 100 101 100   < > 99 102   CO2 28 26 26   < > 24 23   BUN 6.1* 10.1 12.1   < > 9.8 11.9   CR 0.54 0.54 0.53   < > 0.56 0.51   ANIONGAP 5* 7 7   < > 13 10   VAIBHAV 9.8 10.0 9.3   < > 10.5* 9.7   * 104* 132*   < > 94 89   ALBUMIN  --   --   --   --  4.0 3.4*   PROTTOTAL  --   --   --   --  7.3 5.8*   BILITOTAL  --   --   --   --  0.7 0.7   ALKPHOS  --   --   --   --  99 78   ALT  --   --   --   --  22 18   AST  --   --   --   --  17 14    < > = values in this interval not displayed.       Imaging results reviewed over the past 24 hrs:   No results found for this or any previous visit (from the past 24 hour(s)).

## 2024-06-11 NOTE — PLAN OF CARE
"Goal Outcome Evaluation:      Plan of Care Reviewed With: patient    Overall Patient Progress: no changeOverall Patient Progress: no change    A&Ox4. VSS on RA. Pain managed with scheduled oxycodone. Fentanyl patch on R arm. Nausea managed with scheduled Zofran and Reglan. Voiding spontaneously, BM x1, passing flatus. R chest port infusing D5 in LR @ 100ml/hr. Regular diet, poor appetite. Up ad liz. No electrolyte replacements needed. Recheck in AM. Continue with POC.     Vitals: /87 (BP Location: Left arm, Patient Position: Sitting)   Pulse 76   Temp 98.1  F (36.7  C) (Oral)   Resp 16   Ht 1.702 m (5' 7\")   Wt 63.5 kg (140 lb 1.6 oz)   SpO2 100%   BMI 21.94 kg/m        "

## 2024-06-11 NOTE — PLAN OF CARE
Goal Outcome Evaluation:      Plan of Care Reviewed With: patient    Overall Patient Progress: no changeOverall Patient Progress: no change    Temp: 98.5  F (36.9  C) Temp src: Oral BP: 108/63 Pulse: 60   Resp: 16 SpO2: 97 % O2 Device: None (Room air)       Time: 1500 - 2300  Reason for admission:  6/6 EGD, NJ feeding tube placement secondary to esophageal cancer  Vitals:  Stable on RA  Diet:  Regular, little appetite  Activity/Mobility:  Independent.   Neuro:  A&Ox4  Pain/Nausea:  Pain controlled with scheduled meds. Intermittent nausea managed with PRN compazine.   Respiratory: WDL on RA  Cardiac: WDL  GI:  No BM this shift.  :  Voiding spontaneously  Lines & Drains:  R port w/ D5LR @ 100  Labs:  Monitored. Most recent WBC at 1.6  Incisions/Skin:  Monitored    NEW CHANGES:  No acute changes this shift.    Continue to implement Care Plan.    Rio Murillo RN

## 2024-06-11 NOTE — PROGRESS NOTES
CLINICAL NUTRITION SERVICES - BRIEF NOTE     Nutrition Prescription    RECOMMENDATIONS FOR MDs/PROVIDERS TO ORDER:  Consider parenteral nutrition (PPN vs TPN) and/or moving up J-tube procedure if this is a possibility.     Recommendations already ordered by Registered Dietitian (RD):  Trial Ensure Clear and Ensure Enlive with meals    Future/Additional Recommendations:  If/when appropriate to resume TF: Recommend starting Osmolite 1.5 @ 25 ml/hr with advancement by 10 ml/hr q 4 hours to goal rate of 55 ml/hr.  Goal rate provides: 1980 kcals (31 kcal/kg), 83 g PRO (1.3g/kg), 1005 ml free H20, 268 g CHO, and 0 g fiber daily.     For full hydration needs via FT, recommend 150 ml q 4 hours if liquid oral intake is minimal.      EVALUATION OF THE PROGRESS TOWARD GOALS   Diet: Regular  Nutrition Support: TF received 6/6, transitioned to bolus 6/9    Intake: Patient is declining NGT, she doesn't feel like it will be successful d/t ongoing gagging/coughing/vomiting. Didn't feel like the TF worsened her nausea, which has been present since admission. Wants to continue trying to eat with anti-emetics, however had been declining all meals.      NEW FINDINGS   Scheduled reglan and zofran ordered to help with nausea today.     INTERVENTIONS  Reviewed TF tolerance with patient. Discussed oral supplement options to trial.     Monitoring/Evaluation  Progress toward goals will be monitored and evaluated per protocol.     Yulisa Aburto, MS, RD, LD, CCTD, [facility]  7A + 7B (beds [id number])  Available on Vocera [7A or 7B Clinical Dietitian]   Weekend/Holiday: Vocera [Weekend Clinical Dietitian]

## 2024-06-12 ENCOUNTER — APPOINTMENT (OUTPATIENT)
Dept: GENERAL RADIOLOGY | Facility: CLINIC | Age: 64
DRG: 391 | End: 2024-06-12
Payer: COMMERCIAL

## 2024-06-12 LAB
ANION GAP SERPL CALCULATED.3IONS-SCNC: 7 MMOL/L (ref 7–15)
BASOPHILS # BLD AUTO: 0 10E3/UL (ref 0–0.2)
BASOPHILS NFR BLD AUTO: 0 %
BUN SERPL-MCNC: 4.2 MG/DL (ref 8–23)
CALCIUM SERPL-MCNC: 9.7 MG/DL (ref 8.8–10.2)
CHLORIDE SERPL-SCNC: 101 MMOL/L (ref 98–107)
CREAT SERPL-MCNC: 0.59 MG/DL (ref 0.51–0.95)
DEPRECATED HCO3 PLAS-SCNC: 28 MMOL/L (ref 22–29)
EGFRCR SERPLBLD CKD-EPI 2021: >90 ML/MIN/1.73M2
EOSINOPHIL # BLD AUTO: 0 10E3/UL (ref 0–0.7)
EOSINOPHIL NFR BLD AUTO: 1 %
ERYTHROCYTE [DISTWIDTH] IN BLOOD BY AUTOMATED COUNT: 16.1 % (ref 10–15)
GLUCOSE SERPL-MCNC: 105 MG/DL (ref 70–99)
HCT VFR BLD AUTO: 25.5 % (ref 35–47)
HGB BLD-MCNC: 8.5 G/DL (ref 11.7–15.7)
IMM GRANULOCYTES # BLD: 0 10E3/UL
IMM GRANULOCYTES NFR BLD: 1 %
LYMPHOCYTES # BLD AUTO: 0.5 10E3/UL (ref 0.8–5.3)
LYMPHOCYTES NFR BLD AUTO: 28 %
MAGNESIUM SERPL-MCNC: 2 MG/DL (ref 1.7–2.3)
MCH RBC QN AUTO: 33.5 PG (ref 26.5–33)
MCHC RBC AUTO-ENTMCNC: 33.3 G/DL (ref 31.5–36.5)
MCV RBC AUTO: 100 FL (ref 78–100)
MONOCYTES # BLD AUTO: 0.3 10E3/UL (ref 0–1.3)
MONOCYTES NFR BLD AUTO: 16 %
NEUTROPHILS # BLD AUTO: 0.9 10E3/UL (ref 1.6–8.3)
NEUTROPHILS NFR BLD AUTO: 54 %
NRBC # BLD AUTO: 0 10E3/UL
NRBC BLD AUTO-RTO: 0 /100
PHOSPHATE SERPL-MCNC: 3.5 MG/DL (ref 2.5–4.5)
PLATELET # BLD AUTO: 194 10E3/UL (ref 150–450)
POTASSIUM SERPL-SCNC: 4.6 MMOL/L (ref 3.4–5.3)
RBC # BLD AUTO: 2.54 10E6/UL (ref 3.8–5.2)
SODIUM SERPL-SCNC: 136 MMOL/L (ref 135–145)
WBC # BLD AUTO: 1.7 10E3/UL (ref 4–11)

## 2024-06-12 PROCEDURE — 120N000002 HC R&B MED SURG/OB UMMC

## 2024-06-12 PROCEDURE — 36591 DRAW BLOOD OFF VENOUS DEVICE: CPT

## 2024-06-12 PROCEDURE — 99232 SBSQ HOSP IP/OBS MODERATE 35: CPT

## 2024-06-12 PROCEDURE — 74018 RADEX ABDOMEN 1 VIEW: CPT | Mod: 26 | Performed by: STUDENT IN AN ORGANIZED HEALTH CARE EDUCATION/TRAINING PROGRAM

## 2024-06-12 PROCEDURE — 999N000111 HC STATISTIC OT IP EVAL DEFER: Performed by: OCCUPATIONAL THERAPIST

## 2024-06-12 PROCEDURE — 250N000013 HC RX MED GY IP 250 OP 250 PS 637: Performed by: INTERNAL MEDICINE

## 2024-06-12 PROCEDURE — 999N000065 XR ABDOMEN PORT 1 VIEW

## 2024-06-12 PROCEDURE — 85025 COMPLETE CBC W/AUTO DIFF WBC: CPT

## 2024-06-12 PROCEDURE — 82374 ASSAY BLOOD CARBON DIOXIDE: CPT

## 2024-06-12 PROCEDURE — 84100 ASSAY OF PHOSPHORUS: CPT

## 2024-06-12 PROCEDURE — 250N000011 HC RX IP 250 OP 636: Mod: JZ

## 2024-06-12 PROCEDURE — 99233 SBSQ HOSP IP/OBS HIGH 50: CPT | Mod: GC | Performed by: STUDENT IN AN ORGANIZED HEALTH CARE EDUCATION/TRAINING PROGRAM

## 2024-06-12 PROCEDURE — 250N000009 HC RX 250

## 2024-06-12 PROCEDURE — 83735 ASSAY OF MAGNESIUM: CPT

## 2024-06-12 PROCEDURE — 250N000013 HC RX MED GY IP 250 OP 250 PS 637

## 2024-06-12 PROCEDURE — 250N000011 HC RX IP 250 OP 636

## 2024-06-12 PROCEDURE — 250N000013 HC RX MED GY IP 250 OP 250 PS 637: Performed by: STUDENT IN AN ORGANIZED HEALTH CARE EDUCATION/TRAINING PROGRAM

## 2024-06-12 RX ORDER — METOCLOPRAMIDE HYDROCHLORIDE 5 MG/ML
10 INJECTION INTRAMUSCULAR; INTRAVENOUS ONCE
Status: COMPLETED | OUTPATIENT
Start: 2024-06-12 | End: 2024-06-12

## 2024-06-12 RX ORDER — DEXTROSE MONOHYDRATE 50 MG/ML
10-20 INJECTION, SOLUTION INTRAVENOUS
Status: DISCONTINUED | OUTPATIENT
Start: 2024-06-12 | End: 2024-06-12

## 2024-06-12 RX ORDER — FLUORIDE TOOTHPASTE
15 TOOTHPASTE DENTAL 4 TIMES DAILY
Status: DISCONTINUED | OUTPATIENT
Start: 2024-06-12 | End: 2024-06-12

## 2024-06-12 RX ORDER — FLUORIDE TOOTHPASTE
15 TOOTHPASTE DENTAL 4 TIMES DAILY PRN
Status: DISCONTINUED | OUTPATIENT
Start: 2024-06-12 | End: 2024-06-18 | Stop reason: HOSPADM

## 2024-06-12 RX ORDER — LIDOCAINE HYDROCHLORIDE 20 MG/ML
JELLY TOPICAL ONCE
Status: COMPLETED | OUTPATIENT
Start: 2024-06-12 | End: 2024-06-12

## 2024-06-12 RX ADMIN — LIDOCAINE HYDROCHLORIDE: 20 JELLY TOPICAL at 10:23

## 2024-06-12 RX ADMIN — PROCHLORPERAZINE EDISYLATE 10 MG: 5 INJECTION INTRAMUSCULAR; INTRAVENOUS at 12:17

## 2024-06-12 RX ADMIN — Medication 15 ML: at 18:07

## 2024-06-12 RX ADMIN — OXYCODONE HYDROCHLORIDE 10 MG: 5 SOLUTION ORAL at 22:14

## 2024-06-12 RX ADMIN — PANTOPRAZOLE SODIUM 40 MG: 40 TABLET, DELAYED RELEASE ORAL at 16:15

## 2024-06-12 RX ADMIN — ONDANSETRON 8 MG: 4 TABLET, ORALLY DISINTEGRATING ORAL at 02:24

## 2024-06-12 RX ADMIN — OXYCODONE HYDROCHLORIDE 10 MG: 5 SOLUTION ORAL at 19:09

## 2024-06-12 RX ADMIN — HYDROMORPHONE HYDROCHLORIDE 0.2 MG: 0.2 INJECTION, SOLUTION INTRAMUSCULAR; INTRAVENOUS; SUBCUTANEOUS at 09:30

## 2024-06-12 RX ADMIN — OXYCODONE HYDROCHLORIDE 10 MG: 5 SOLUTION ORAL at 00:35

## 2024-06-12 RX ADMIN — ENOXAPARIN SODIUM 40 MG: 40 INJECTION SUBCUTANEOUS at 20:15

## 2024-06-12 RX ADMIN — FILGRASTIM-AAFI 300 MCG: 300 INJECTION, SOLUTION INTRAVENOUS; SUBCUTANEOUS at 17:56

## 2024-06-12 RX ADMIN — Medication 5 ML: at 12:17

## 2024-06-12 RX ADMIN — OXYCODONE HYDROCHLORIDE 10 MG: 5 SOLUTION ORAL at 16:15

## 2024-06-12 RX ADMIN — CAMPHOR, MENTHOL: .5; .5 LOTION TOPICAL at 06:18

## 2024-06-12 RX ADMIN — OXYCODONE HYDROCHLORIDE 10 MG: 5 SOLUTION ORAL at 06:42

## 2024-06-12 RX ADMIN — METOCLOPRAMIDE 10 MG: 5 INJECTION, SOLUTION INTRAMUSCULAR; INTRAVENOUS at 22:14

## 2024-06-12 RX ADMIN — METOCLOPRAMIDE 10 MG: 5 INJECTION, SOLUTION INTRAMUSCULAR; INTRAVENOUS at 16:15

## 2024-06-12 RX ADMIN — ONDANSETRON 8 MG: 4 TABLET, ORALLY DISINTEGRATING ORAL at 14:43

## 2024-06-12 RX ADMIN — ONDANSETRON 8 MG: 4 TABLET, ORALLY DISINTEGRATING ORAL at 20:15

## 2024-06-12 RX ADMIN — ONDANSETRON 8 MG: 4 TABLET, ORALLY DISINTEGRATING ORAL at 08:13

## 2024-06-12 RX ADMIN — FAMOTIDINE 20 MG: 20 TABLET ORAL at 20:15

## 2024-06-12 RX ADMIN — OXYCODONE HYDROCHLORIDE 10 MG: 5 SOLUTION ORAL at 12:18

## 2024-06-12 RX ADMIN — CAMPHOR, MENTHOL: .5; .5 LOTION TOPICAL at 22:35

## 2024-06-12 RX ADMIN — BUSPIRONE HYDROCHLORIDE 5 MG: 5 TABLET ORAL at 08:15

## 2024-06-12 RX ADMIN — METOCLOPRAMIDE 10 MG: 5 INJECTION, SOLUTION INTRAMUSCULAR; INTRAVENOUS at 09:23

## 2024-06-12 RX ADMIN — LEVOTHYROXINE SODIUM 100 MCG: 100 TABLET ORAL at 08:15

## 2024-06-12 RX ADMIN — PANTOPRAZOLE SODIUM 40 MG: 40 TABLET, DELAYED RELEASE ORAL at 08:16

## 2024-06-12 RX ADMIN — CAMPHOR, MENTHOL: .5; .5 LOTION TOPICAL at 00:37

## 2024-06-12 RX ADMIN — FENTANYL 1 PATCH: 12.5 PATCH TRANSDERMAL at 14:39

## 2024-06-12 RX ADMIN — OXYCODONE HYDROCHLORIDE 10 MG: 5 SOLUTION ORAL at 09:23

## 2024-06-12 RX ADMIN — METOCLOPRAMIDE 10 MG: 5 INJECTION, SOLUTION INTRAMUSCULAR; INTRAVENOUS at 03:39

## 2024-06-12 RX ADMIN — CAMPHOR, MENTHOL: .5; .5 LOTION TOPICAL at 12:22

## 2024-06-12 RX ADMIN — OXYCODONE HYDROCHLORIDE 10 MG: 5 SOLUTION ORAL at 03:39

## 2024-06-12 RX ADMIN — FAMOTIDINE 20 MG: 20 TABLET ORAL at 08:16

## 2024-06-12 RX ADMIN — DIPHENHYDRAMINE HYDROCHLORIDE AND LIDOCAINE HYDROCHLORIDE AND ALUMINUM HYDROXIDE AND MAGNESIUM HYDRO 10 ML: KIT at 08:14

## 2024-06-12 ASSESSMENT — ACTIVITIES OF DAILY LIVING (ADL)
ADLS_ACUITY_SCORE: 29
ADLS_ACUITY_SCORE: 28
ADLS_ACUITY_SCORE: 29
ADLS_ACUITY_SCORE: 28
ADLS_ACUITY_SCORE: 28
ADLS_ACUITY_SCORE: 29

## 2024-06-12 NOTE — PLAN OF CARE
OT: open OT evaluation order however, after chart review PT was deferred yesterday as she is I with basic ADL at this time. After conversation/observation with this pt today pt I with basic ADL.  Defer acute OT.

## 2024-06-12 NOTE — PLAN OF CARE
"Goal Outcome Evaluation:      Plan of Care Reviewed With: patient    Overall Patient Progress: no changeOverall Patient Progress: no change    A&Ox4, VSS, RA. Denies Cardiac chest pain, SOB. C/o throat pain with swallowing. Pain managed with scheduled Oxy. Fentanyl patch on R shoulder. Nausea managed with scheduled Reglan and Zofran. R chest port infusing D5 in LR @ 100mL/hr. BM 6/11 and voiding without difficulties. No electrolytes replacement needed, AM rechecks. Regular diet with thin liquids. Up ad liz. Continue POC.    BP 94/49 (BP Location: Left arm)   Pulse 79   Temp 98.1  F (36.7  C) (Oral)   Resp 16   Ht 1.702 m (5' 7\")   Wt 63.5 kg (140 lb 1.6 oz)   SpO2 96%   BMI 21.94 kg/m     "

## 2024-06-12 NOTE — PROGRESS NOTES
CLINICAL NUTRITION SERVICES - BRIEF NOTE     Nutrition Prescription    RECOMMENDATIONS FOR MDs/PROVIDERS TO ORDER:  If unable to tolerate NGT placement and/or becomes dislodged again, recommend PPN or TPN until able to have J-tube placed.     Recommendations already ordered by Registered Dietitian (RD):  Pending verification/placement of NGT, ordered to start Osmolite 1.5 @ 25 ml/hr with advancement by 10 ml/hr q 4 hours to goal rate of 55 ml/hr. Goal rate provides: 1980 kcals (31 kcal/kg), 83 g PRO (1.3g/kg), 1005 ml free H20, 268 g CHO, and 0 g fiber daily.   FW: 60 ml q 4 hours (increase if IVF discontinued)    Future/Additional Recommendations:  PPN recommendations: Clinimix peripheral formula @ 83.33 ml/hr (2000 ml/day) + 250 ml SMOF lipid daily -> 1020 kcal (16 kcal/kg) and 85 grams protein (1.5g/kg), which meets ~50% of assessed needs and 100% of assessed protein needs    TPN recommendations: 1440 ml (60 ml/hr) - Dex 150 grams (GIR 1.7 mg/kg/min), AA 95 grams + 250 ml IV lipids daily.  Pending tolerance to initial formula, recommend advancement to goal dextrose of 225 grams.  Goal regimen would provide 1645 kcal (26 kcal/kg), 1.5g/kg protein, 30% kcal from fat.      EVALUATION OF THE PROGRESS TOWARD GOALS   Diet: Regular diet + oral supplements  Nutrition Support: On hold since 6/9-6/10 (NGT became dislodged)    Intake: Struggled with worsened nausea and pain with attempting PO intake yesterday.  Awaiting eventual J-tube placement when appropriate per surgery team.      INTERVENTIONS  Discussed nutrition plan with primary team.     Monitoring/Evaluation  Progress toward goals will be monitored and evaluated per protocol.     Yulisa Aburto, MS, RD, LD, CCTD, [facility]  7A + 7B (beds 6627-2472)  Available on Vocera [7A or 7B Clinical Dietitian]   Weekend/Holiday: Vocera [Weekend Clinical Dietitian]

## 2024-06-12 NOTE — PROGRESS NOTES
[facility]    Internal Medicine Progress Note - [facility] Service    Main Plans for Today   - Thoracic surgery requires > 2000 Neutrophil count and > 100k plt count to perform J-tube placement  - Initiated Filgrastim-aafi, expect >2000 Neutrophils in 2-3 days, notified thoracic surgery   - NGT replaced and continuous tube feeds resumed   - Discontinued mIVF  - Lozenges, biotene PRN    Assessment & Plan   Linh Mustafa is a 64 year old female with squamous cell carcinoma of the esophagus admitted on 6/4/202 for chest pain after radiation therapy on 5/31/2024. Initial workup negative for acute pulmonary or cardiac etiology. Chest pain likely from mucositis of the esophagus due to radiation toxicity. She completed final two radiation treatments and pain and nausea is well controlled with current drug regimen. NG placed for tube feeding and monitoring for refeeding syndrome. Needs jejunostomy for nutrition with odynophagia limiting oral consumption. Started GSCF 6/12/2024 to increase neutrophil count >2000 to facilitate J-tube placement.     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 Reports pain, nausea, and vomiting after radiation therapy. Chest pain feels similar regarding quality and location to prior pain, but significantly more intense.  ED workup notable for normal troponin and BNP. CT Abd  and Chest demonstrated no acute pathology, including PE, aortic dissection, or esophageal perforation. Workup negative for cardiac etiology, GI perforation, and PE.  Has significant pain with swallowing, which limits her PO intake. Diet consist predominantly of soft foods like pudding and  alex. Bedside swallow evaluation demonstrated normal oropharyngeal swallowing mechanisms with regular/thin liquids. On 6/10, patient was coughing profusely and the NG tube was dislodged. Thoracic surgery was notified and asserted that it can be replaced at bedside. NG tube was replaced 6/12.   - Consult Palliative care for better pain and nausea control    - Pain Control: oxycodone (10 mg q3h), PTA fentanyl (37 mcg/hr patch every 72 hours), PRN lidocaine 1%  - Bowel Regimen: Miralax BID, PRN senna   - Consult Radiation, thank you for recs   - Completed final two rounds of radiation therapy.   - Consult Thoracic Surgery   - s/p esophagogastroscopy on 6/6 (inflamed esophageal mucosa)   - s/p NGT on 6/6 (dislodged 6/10 and replaced 10/12)   - Plan for J tube placement pending improvement in leukocyte count  - Consult nutrition for tube feeds  - Osmolite 1.5 via NG-tube Goal (55 mL/Hr)   - Consult PT/OT for impaired mobility and deconditioning    - Pt near baseline and discharge recommendations deferred to medical team  - Pantoprazole (40 mg BID); PTA esomeprazole not on formulary   - PTA famotidine (20 mg BID)  - PTA Magic Mouthwash (4x daily)  - PTA PRN ondansetron (8 mg q8h)   - PTA PRN compazine (10 mg q6h)  - PTA sucralfate (1g 4x daily)  - Per SLP, okay for regular diet w/ thin liquids    Pancytopenia  Neutropenia  At presentation, WBCs at 1.4 and platelets at 137. Suspect secondary to bone marrow suppression from radiation therapy and/or chemotherapy. Most recent dose of chemotherapy on 5/28/2024 with paclitaxel and carboplatin. Thoracic surgery requires > 2000 Neutrophil count and > 100k plt count to perform J-tube placement  - Curbside Heme/onc, affirmed use of GCSF to increase neutrophils >2000   - Filgrastim-aafi injection (300 mcg)   - Neutropenic precautions  - Hold PTA vitamin B12 (2500 mcg daily) given large size of pill (difficulty swallowing)    Chronic Hypercalcemia   Ca elevated to 11.1 at  admission. Most likely secondary to hyperparathyroidism with parathyroid hormone elevated to 121 on 5/1/2024. Had an appointment with endocrinology, but missed the appointment.   - Outpatient followup with endocrinology      Hypothyroidism   PTA levothyroxine (100 mcg)      Anxiety:  - PTA buspirone (5 mg daily)      Tobacco abuse.   Uses 1/2 ppd.    - Nicotine patch   - PRN nicotine gum     The patient was discussed with Dr. Brett Nova, MS4    Resident/Fellow Attestation   I, Vandana Florentino MD, was present with the medical/[employer] student who participated in the service and in the documentation of the note.  I have verified the history and personally performed the physical exam and medical decision making.  I agree with the assessment and plan of care as documented in the note.      Vandana Florentino MD  Internal Medicine Resident      Diet: Regular diet Thin Liquids + Tube feeds   Fluids: None   Lines: Right Chest Port  Mercado Catheter: Not present            Interval History   No acute events overnight.  Reports ongoing severe pain and nausea. Unable to tolerate oral diet yesterday due to pain from swallowing. Patient amenable to NG tube placement today. Discussed plan to give GCSF to increase neutrophil count to enable J-tube placement.       Physical Exam   Vital Signs: Temp: 98  F (36.7  C) Temp src: Oral BP: 101/48 Pulse: 89   Resp: 16 SpO2: 96 % O2 Device: None (Room air)    Weight: 140 lbs 1.6 oz  General Appearance: Frail appearing. Alert.   Eyes: No conjunctivitis or scleral icterus   Head: atraumatic  Respiratory: Adequate oxygenation on room air   Cardiovascular: RRR with no murmurs, rubs, or gallops  GI: Soft, non-distended. No tenderness in all four quadrants.   Skin: Warm and well perfused. No pallor. Right chest port in place w/o surrounding erythema.  Musculoskeletal: Moving all four extremities   Neurologic: No gross neurological deficits   Psychiatric: appropriate mood and  affect        Data   Recent Labs   Lab 06/12/24  0625 06/11/24  0716 06/10/24  0603 06/06/24  0729 06/06/24  0634   WBC 1.7* 1.6* 1.6*   < > 2.1*  2.1*   HGB 8.5* 8.9* 9.3*   < > 11.0*    101* 102*   < > 99    169 158   < > 140*    133* 134*   < > 136   POTASSIUM 4.6 4.5 5.1   < > 3.4   CHLORIDE 101 100 101   < > 99   CO2 28 28 26   < > 24   BUN 4.2* 6.1* 10.1   < > 9.8   CR 0.59 0.54 0.54   < > 0.56   ANIONGAP 7 5* 7   < > 13   VAIBHAV 9.7 9.8 10.0   < > 10.5*   * 109* 104*   < > 94   ALBUMIN  --   --   --   --  4.0   PROTTOTAL  --   --   --   --  7.3   BILITOTAL  --   --   --   --  0.7   ALKPHOS  --   --   --   --  99   ALT  --   --   --   --  22   AST  --   --   --   --  17    < > = values in this interval not displayed.       Imaging results reviewed over the past 24 hrs:   No results found for this or any previous visit (from the past 24 hour(s)).

## 2024-06-12 NOTE — PLAN OF CARE
"Shift [id number]  Goal Outcome Evaluation:      Plan of Care Reviewed With: patient    Overall Patient Progress: no change    BP 95/51 (BP Location: Left arm)   Pulse 69   Temp 98.4  F (36.9  C) (Oral)   Resp 16   Ht 1.702 m (5' 7\")   Wt 63.5 kg (140 lb 1.6 oz)   SpO2 98%   BMI 21.94 kg/m     A/OX4 able to make needs known. On room air denies SOB reports chest discomfort and throath pain from swallowing. Managed with scheduled oxycodone. Chest port infusing MIVF. Voiding without difficulties, no BM this shift. Nausea managed with Reglan IV. Continue POC.   "

## 2024-06-12 NOTE — PROGRESS NOTES
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   Continue IV hydromorphone 0.2 mg q 4hrs prn for break through pain  Continue increased fentanyl patch 37 mcg/hr (dose increased 06/09)  Continue scheduled oxycodone 10 mg q 3hrs- Scheduled oxycodone seems to be helping with breakthrough pain. Could consider increasing dose to 15 mg  if pain worsens.  Continue scheduled tylenol 975 mg tid  Continue with acid suppression medication- pantoprazole, famotidine, sucralfate    #Nausea/vomiting- NG tube replaced 06/12, feeding tube resumed.   Continue scheduled Ondansetron IV/ODT 8 mg q 6hrs  Continue compazine 10 mg q 6hrs prn   Scheduled Reglan added 6/12 by primary   EKG on 6/5 showed QTc 417  Consider low dose zyprexa 2.5 mg at bedtime if nausea is uncontrolled with scheduled Zofran and Reglan     #Mood/coping  Reports coping okay. Patient daughter shared her mother usually keeps things to herself and does not like to talk about her emotion  Palliative SW following to support patient and daughter as needed      # Opioid-induced constipation-last BM on 6/12  Continue MiraLAX twice daily, senna as needed      PSYCHOSOCIAL/SPIRITUAL:  Family: Yulisa (daughter) Luis M (son in law)  [place] community: Hoahaoism   Spiritual: Declined     Palliative Care will continue to follow. Thank you for the consult and allowing us to aid in the  care of Linh Mustafa.    These recommendations have been discussed with primary team, nursing and family.    MASON Pantoja CNP  [facility], Palliative Care  Securely message with the [employer] Web Console (learn more here) or  Text page via [facility] Paging/Directory       Interval History:     Multidisciplinary collaboration:  Chart reviewed, spoke with primary RN. Patient agreed her NG tube replaced yesterday. Tube feedings resumed.    Notable medications:  Tylenol 1g tid  Buspar 5mg daily  Fentanyl 25 mcg/hr patch; on this prior to admission (increased to 37 mcg/hr on 06/09  Dilaudid 0.2 mg IV q 4hr prn (did not received in last 24 hrs)  Oxycodone 10 mg q3h scheduled     Patient/family narrative  Met with Lita and her brother this morning at bedside. Lita was in good spirit and expressed feeling more optimistic about her treatment plan today. She learn from the primary team yesterday pending improvement of her white count and plt count, plan for J-tube placement in the coming days. Lita shared her pain is slowly improving after taking so much pain medication yesterday.  She does not feel as though her the pain medication has worn off.    Assessment          Linh Mustafa is a 64 year old female with esophageal SCC stage II dx 3/2024 who was admitted 6/4 with severe pain 2/2 mucositis from her neoadjuvant chemoradiotherapy.   She completed her RT inpatient.   Requiring NG TF; plan for J tube placement.   Neutropenic.      Today, the patient was seen for:  Esophageal SCC  Neoplasm related pain  Radiation mucositis  Palliative encounter        Review of Systems:     Besides above, ROS was reviewed and is unremarkable        Physical Exam:   Temp:  [98  F (36.7  C)-98.4  F (36.9  C)] 98  F (36.7  C)  Pulse:  [69-89] 89  Resp:  [16] 16  BP: ()/(48-51) 101/48  SpO2:  [96 %-98 %] 96 %  140 lbs 1.6 oz    Physical Exam  GENERAL: alert, awake, sitting up in bed. NG tube in place     SKIN: warm and dry    LUNGS: non-labored breathing, on room air   ABDOMINAL: BS (+), soft, non distended, non tender  MUSKL: no gross joint deformities   EXTREMITIES: no edema or cyanosis, pulses 2+ and symmetrical  NEUROLOGIC: alert and oriented x 4  PSYCH: calm       Data Reviewed:   CMP  Recent Labs   Lab 06/12/24  0625 06/11/24  0716 06/06/24  0729 06/06/24  0634    133*   < > 136   POTASSIUM 4.6 4.5   < > 3.4   CHLORIDE 101 100   < > 99   CO2 28 28   < > 24   ANIONGAP 7 5*   < > 13   * 109*   < > 94   BUN 4.2* 6.1*   < > 9.8   CR 0.59 0.54   < > 0.56   GFRESTIMATED >90 >90   < > >90   VAIBHAV 9.7 9.8   < > 10.5*   MAG 2.0 2.1   < > 1.9   PHOS 3.5 3.3   < > 2.8   PROTTOTAL  --   --   --  7.3   ALBUMIN  --   --   --  4.0   BILITOTAL  --   --   --  0.7   ALKPHOS  --   --   --  99   AST  --   --   --  17   ALT  --   --   --  22    < > = values in this interval not displayed.     CBC  Recent Labs   Lab 06/12/24  0625 06/11/24  0716   WBC 1.7* 1.6*   RBC 2.54* 2.65*   HGB 8.5* 8.9*   HCT 25.5* 26.8*    101*   MCH 33.5* 33.6*   MCHC 33.3 33.2   RDW 16.1* 16.4*    169         Medical Decision Making       MANAGEMENT DISCUSSED with the following over the past 24 hours: medicine, nursing and family   NOTE(S)/MEDICAL RECORDS REVIEWED over the past 24 hours: Medicine and nursing  30 MINUTES SPENT BY ME on the date of service doing chart review, history, exam, documentation & further activities per the note.

## 2024-06-13 ENCOUNTER — TELEPHONE (OUTPATIENT)
Dept: SURGERY | Facility: CLINIC | Age: 64
End: 2024-06-13
Payer: COMMERCIAL

## 2024-06-13 LAB
ABO/RH(D): NORMAL
ANION GAP SERPL CALCULATED.3IONS-SCNC: 9 MMOL/L (ref 7–15)
ANTIBODY SCREEN: NEGATIVE
BASOPHILS # BLD AUTO: 0 10E3/UL (ref 0–0.2)
BASOPHILS NFR BLD AUTO: 0 %
BUN SERPL-MCNC: 9.4 MG/DL (ref 8–23)
CALCIUM SERPL-MCNC: 9.9 MG/DL (ref 8.8–10.2)
CHLORIDE SERPL-SCNC: 99 MMOL/L (ref 98–107)
CREAT SERPL-MCNC: 0.75 MG/DL (ref 0.51–0.95)
DEPRECATED HCO3 PLAS-SCNC: 27 MMOL/L (ref 22–29)
EGFRCR SERPLBLD CKD-EPI 2021: 88 ML/MIN/1.73M2
EOSINOPHIL # BLD AUTO: 0 10E3/UL (ref 0–0.7)
EOSINOPHIL NFR BLD AUTO: 0 %
ERYTHROCYTE [DISTWIDTH] IN BLOOD BY AUTOMATED COUNT: 16.3 % (ref 10–15)
GLUCOSE SERPL-MCNC: 146 MG/DL (ref 70–99)
HCT VFR BLD AUTO: 26.5 % (ref 35–47)
HGB BLD-MCNC: 8.8 G/DL (ref 11.7–15.7)
IMM GRANULOCYTES # BLD: 0 10E3/UL
IMM GRANULOCYTES NFR BLD: 1 %
LYMPHOCYTES # BLD AUTO: 0.6 10E3/UL (ref 0.8–5.3)
LYMPHOCYTES NFR BLD AUTO: 12 %
MAGNESIUM SERPL-MCNC: 2 MG/DL (ref 1.7–2.3)
MCH RBC QN AUTO: 33.2 PG (ref 26.5–33)
MCHC RBC AUTO-ENTMCNC: 33.2 G/DL (ref 31.5–36.5)
MCV RBC AUTO: 100 FL (ref 78–100)
MONOCYTES # BLD AUTO: 0.5 10E3/UL (ref 0–1.3)
MONOCYTES NFR BLD AUTO: 10 %
NEUTROPHILS # BLD AUTO: 4 10E3/UL (ref 1.6–8.3)
NEUTROPHILS NFR BLD AUTO: 77 %
NRBC # BLD AUTO: 0 10E3/UL
NRBC BLD AUTO-RTO: 0 /100
PHOSPHATE SERPL-MCNC: 3.5 MG/DL (ref 2.5–4.5)
PLATELET # BLD AUTO: 215 10E3/UL (ref 150–450)
POTASSIUM SERPL-SCNC: 4.1 MMOL/L (ref 3.4–5.3)
RBC # BLD AUTO: 2.65 10E6/UL (ref 3.8–5.2)
SODIUM SERPL-SCNC: 135 MMOL/L (ref 135–145)
SPECIMEN EXPIRATION DATE: NORMAL
WBC # BLD AUTO: 5.1 10E3/UL (ref 4–11)

## 2024-06-13 PROCEDURE — 93010 ELECTROCARDIOGRAM REPORT: CPT | Performed by: INTERNAL MEDICINE

## 2024-06-13 PROCEDURE — 250N000013 HC RX MED GY IP 250 OP 250 PS 637

## 2024-06-13 PROCEDURE — 99233 SBSQ HOSP IP/OBS HIGH 50: CPT | Mod: GC | Performed by: STUDENT IN AN ORGANIZED HEALTH CARE EDUCATION/TRAINING PROGRAM

## 2024-06-13 PROCEDURE — 93005 ELECTROCARDIOGRAM TRACING: CPT

## 2024-06-13 PROCEDURE — 80048 BASIC METABOLIC PNL TOTAL CA: CPT

## 2024-06-13 PROCEDURE — 36591 DRAW BLOOD OFF VENOUS DEVICE: CPT

## 2024-06-13 PROCEDURE — 85004 AUTOMATED DIFF WBC COUNT: CPT

## 2024-06-13 PROCEDURE — 120N000002 HC R&B MED SURG/OB UMMC

## 2024-06-13 PROCEDURE — 84100 ASSAY OF PHOSPHORUS: CPT

## 2024-06-13 PROCEDURE — 250N000013 HC RX MED GY IP 250 OP 250 PS 637: Performed by: STUDENT IN AN ORGANIZED HEALTH CARE EDUCATION/TRAINING PROGRAM

## 2024-06-13 PROCEDURE — 86900 BLOOD TYPING SEROLOGIC ABO: CPT

## 2024-06-13 PROCEDURE — 250N000011 HC RX IP 250 OP 636: Mod: JZ

## 2024-06-13 PROCEDURE — 83735 ASSAY OF MAGNESIUM: CPT

## 2024-06-13 PROCEDURE — 250N000013 HC RX MED GY IP 250 OP 250 PS 637: Performed by: INTERNAL MEDICINE

## 2024-06-13 RX ORDER — MAGNESIUM OXIDE 400 MG/1
400 TABLET ORAL EVERY 4 HOURS
Status: COMPLETED | OUTPATIENT
Start: 2024-06-13 | End: 2024-06-13

## 2024-06-13 RX ORDER — OXYCODONE HCL 5 MG/5 ML
15 SOLUTION, ORAL ORAL
Status: DISCONTINUED | OUTPATIENT
Start: 2024-06-13 | End: 2024-06-18 | Stop reason: HOSPADM

## 2024-06-13 RX ADMIN — METOCLOPRAMIDE 10 MG: 5 INJECTION, SOLUTION INTRAMUSCULAR; INTRAVENOUS at 21:24

## 2024-06-13 RX ADMIN — LEVOTHYROXINE SODIUM 100 MCG: 100 TABLET ORAL at 09:23

## 2024-06-13 RX ADMIN — MAGNESIUM OXIDE TAB 400 MG (241.3 MG ELEMENTAL MG) 400 MG: 400 (241.3 MG) TAB at 15:40

## 2024-06-13 RX ADMIN — OXYCODONE HYDROCHLORIDE 15 MG: 5 SOLUTION ORAL at 21:24

## 2024-06-13 RX ADMIN — CAMPHOR, MENTHOL: .5; .5 LOTION TOPICAL at 06:30

## 2024-06-13 RX ADMIN — OXYCODONE HYDROCHLORIDE 15 MG: 5 SOLUTION ORAL at 18:01

## 2024-06-13 RX ADMIN — ONDANSETRON 8 MG: 4 TABLET, ORALLY DISINTEGRATING ORAL at 14:06

## 2024-06-13 RX ADMIN — ONDANSETRON 8 MG: 4 TABLET, ORALLY DISINTEGRATING ORAL at 01:43

## 2024-06-13 RX ADMIN — Medication 5 ML: at 07:14

## 2024-06-13 RX ADMIN — OXYCODONE HYDROCHLORIDE 10 MG: 5 SOLUTION ORAL at 01:43

## 2024-06-13 RX ADMIN — METOCLOPRAMIDE 10 MG: 5 INJECTION, SOLUTION INTRAMUSCULAR; INTRAVENOUS at 15:40

## 2024-06-13 RX ADMIN — CAMPHOR, MENTHOL: .5; .5 LOTION TOPICAL at 23:45

## 2024-06-13 RX ADMIN — CAMPHOR, MENTHOL: .5; .5 LOTION TOPICAL at 11:02

## 2024-06-13 RX ADMIN — ONDANSETRON 8 MG: 4 TABLET, ORALLY DISINTEGRATING ORAL at 09:21

## 2024-06-13 RX ADMIN — DIPHENHYDRAMINE HYDROCHLORIDE AND LIDOCAINE HYDROCHLORIDE AND ALUMINUM HYDROXIDE AND MAGNESIUM HYDRO 10 ML: KIT at 21:40

## 2024-06-13 RX ADMIN — OXYCODONE HYDROCHLORIDE 15 MG: 5 SOLUTION ORAL at 15:40

## 2024-06-13 RX ADMIN — FAMOTIDINE 20 MG: 20 TABLET ORAL at 09:23

## 2024-06-13 RX ADMIN — OXYCODONE HYDROCHLORIDE 10 MG: 5 SOLUTION ORAL at 09:13

## 2024-06-13 RX ADMIN — OXYCODONE HYDROCHLORIDE 15 MG: 5 SOLUTION ORAL at 12:23

## 2024-06-13 RX ADMIN — METOCLOPRAMIDE 10 MG: 5 INJECTION, SOLUTION INTRAMUSCULAR; INTRAVENOUS at 03:33

## 2024-06-13 RX ADMIN — ENOXAPARIN SODIUM 40 MG: 40 INJECTION SUBCUTANEOUS at 20:06

## 2024-06-13 RX ADMIN — ONDANSETRON 8 MG: 4 TABLET, ORALLY DISINTEGRATING ORAL at 20:07

## 2024-06-13 RX ADMIN — MAGNESIUM OXIDE TAB 400 MG (241.3 MG ELEMENTAL MG) 400 MG: 400 (241.3 MG) TAB at 12:24

## 2024-06-13 RX ADMIN — METOCLOPRAMIDE 10 MG: 5 INJECTION, SOLUTION INTRAMUSCULAR; INTRAVENOUS at 09:30

## 2024-06-13 RX ADMIN — OXYCODONE HYDROCHLORIDE 10 MG: 5 SOLUTION ORAL at 04:37

## 2024-06-13 RX ADMIN — BUSPIRONE HYDROCHLORIDE 5 MG: 5 TABLET ORAL at 09:23

## 2024-06-13 RX ADMIN — Medication 5 ML: at 11:03

## 2024-06-13 RX ADMIN — PANTOPRAZOLE SODIUM 40 MG: 40 TABLET, DELAYED RELEASE ORAL at 09:22

## 2024-06-13 RX ADMIN — FAMOTIDINE 20 MG: 20 TABLET ORAL at 20:07

## 2024-06-13 RX ADMIN — PANTOPRAZOLE SODIUM 40 MG: 40 TABLET, DELAYED RELEASE ORAL at 15:40

## 2024-06-13 ASSESSMENT — ACTIVITIES OF DAILY LIVING (ADL)
ADLS_ACUITY_SCORE: 29

## 2024-06-13 NOTE — PLAN OF CARE
"Goal Outcome Evaluation:      Plan of Care Reviewed With: patient    Overall Patient Progress: improvingOverall Patient Progress: improving    A&Ox4, VSS, RA. Denies cardiac chest pain & SOB. Pain managed with scheduled Oxy and nausea managed with schedule Zofran and Reglan. Fentanyl patch on L upper arm. NG TF 45mL/hr, flush 60mL/hr, next advancement due @ 0745 at goal rate of 55mL/hr. R chest port SL. BM 6/12 and spontaneously voiding without difficulties. No electrolytes replacement needed, AM rechecks. Regular diet with thin liquids. Up ad liz. Continue POC.     /69 (BP Location: Right arm, Patient Position: Semi-Martines's)   Pulse 92   Temp 98.3  F (36.8  C) (Oral)   Resp 16   Ht 1.702 m (5' 7\")   Wt 63.5 kg (140 lb 1.6 oz)   SpO2 100%   BMI 21.94 kg/m     "

## 2024-06-13 NOTE — TELEPHONE ENCOUNTER
Received voicemail message from patient's daughter, Yulisa. Patient currently hospitalized and due to her work schedule she is unable to be present when the Thoracic Surgery team rounds. In message, Yulisa asking if Dr Martinez or someone from the inpatient Thoracic Surgery team can call her to discuss patient's plan of care, particularly in regards to a change in surgery plan.     Vocera message sent to Dr Martinez and inpatient Thoracic Surgery PA's.    Anitra Santana RN, BSN  Thoracic Surgery RN Care Coordinator

## 2024-06-13 NOTE — PROGRESS NOTES
CLINICAL NUTRITION SERVICES - REASSESSMENT NOTE     Nutrition Prescription    Malnutrition Status:    Moderate malnutrition in the context of acute illness    Recommendations already ordered by Registered Dietitian (RD):  Continue TF as ordered for now    Future/Additional Recommendations:  Monitor weight trends, ongoing TF tolerance    Once TF well tolerated at continuous goal rate, okay to consider transition to cycled regimen (bolus no longer appropriate with FT tip in small intestine).     EVALUATION OF THE PROGRESS TOWARD GOALS   Diet: Regular  Nutrition Support: Osmolite 1.5 @ 45 ml/hr (goal = 55 ml/hr) via NDT    Intake: TF started 6/6, transitioned to bolus on 6/9.  TF stopped 6/10 d/t NGT dislodgement.  Resumed continuous TF on 6/12 after NDT replaced at bedside.  Nausea/pain are barriers to PO intake, often declining meals.  Pt reports to be a little queasy currently, but its tolerable.     I/O likely slightly inaccurate, pt received 347 ml TF daily on average over the last 7 days, providing 520 kcal and 22 grams protein (~20-30% of assessed needs).      NEW FINDINGS   Weight: Current weight is 63.5 kg, down slightly from admission weight ~1 week ago. Weight down 5.6% in the last 5-6 weeks.     Meds: Protonix, Compazine PRN, Pepcid BID, Miralax BID, Senokot, Reglan QID, scheduled Zofran    Labs: Reviewed (lytes stable/normal since resumption of TF)    GI: LBM 6/12    MALNUTRITION  % Intake: </= 50% for >/= 5 days (severe)  % Weight Loss: 1-2% in 1 week (moderate)  Subcutaneous Fat Loss: Unable to assess (patient family visiting, pt reportedly uncomfortable)  Muscle Loss: Unable to assess (patient family visiting, pt reportedly uncomfortable)  Fluid Accumulation/Edema: None noted  Malnutrition Diagnosis: Moderate malnutrition in the context of acute illness    Previous Goals   Total avg nutritional intake to meet a minimum of 30 kcal/kg and 1.2 g PRO/kg daily (per dosing wt 64 kg) once goal rate has been  achieved.   Evaluation: Not met    Previous Nutrition Diagnosis  Inadequate oral intake  Evaluation: No change    CURRENT NUTRITION DIAGNOSIS  Inadequate enteral nutrition infusion related to nausea/NGT dislodgement as evidenced by patient received an average of 20-30% of assessed needs over the last 7 days from TF per I/O.      INTERVENTIONS  Implementation  Nutrition education for recommended modifications - reviewed FT position with patient/family.  Pt family notes that the team hopes for J-tube placement tomorrow or Monday.     Goals  Total avg nutritional intake to meet a minimum of 30 kcal/kg and 1.2 g PRO/kg daily (per dosing wt 64 kg).    Monitoring/Evaluation  Progress toward goals will be monitored and evaluated per protocol.    Yulisa Aburto, MS, RD, LD, CCTD, CNSC  7A + 7B (beds [id number])  Available on Vocera [7A or 7B Clinical Dietitian]   Weekend/Holiday: Vocera [Weekend Clinical Dietitian]

## 2024-06-13 NOTE — PROGRESS NOTES
Home Infusion  Received referral from  [facility] for Enteral feedings.  Benefits verified.  Patient has [employer] and is covered 100%.  Met with pt at bedside to review home infusion services, review benefits and offer choice of providers.  Patient would like to use [facility] for home infusion.  Confirmed discharge address, phone, and emergency contact information.  Pt is expected to discharge as soon as 06/17/24 and will be going home on enteral feeds via pump.  She has never done home enteral therapy before however she and her family will need to receive teaching from Enteral Nurse Liaison. Telephone call placed to pt's daughter to set up teaching with no answer.  LM asking for a return call.    Provided her with information about [facility] services.  Explained about administration method via the Infinity pump with back pack for mobility. Discussed options of hook up of tube feedings at the hospital prior to discharge vs disconnect for transport home (if feasible per medical team) and restart of feeding at home.   Informed them about supplies and delivery of supplies, storage of formula, plan for SNV and 24/7 availability of [facility] staff while on enteral therapy.    She verbalized understanding of all information given.  Pt and her family are willing and able to learn and manage home enteral therapy.  Patient expressed a desire to have the tube feedings hooked up at the hospital or off for transport and a nurse to the home to assist with setting it back up if ok with medical team.   Questions answered.  Will continue to follow and revisit pt once discharge plans are confirmed.    Thank you for the referral      Shantel Robison LPN  Enteral Nurse Liaison  [facility]  [address] [place] [address]  [phone]  Main number [phone]

## 2024-06-13 NOTE — PLAN OF CARE
"Goal Outcome Evaluation:      Plan of Care Reviewed With: patient      /72 (BP Location: Right arm)   Pulse 85   Temp 97.8  F (36.6  C) (Oral)   Resp 18   Ht 1.702 m (5' 7\")   Wt 63.5 kg (140 lb 1.6 oz)   SpO2 96%   BMI 21.94 kg/m          A/OX4 able to make needs known. On room air, denies SOB,  throat pain from swallowing. Managed with scheduled oxycodone. Chest port SL. Voiding without difficulties, 2 BMs this shift. Nausea managed with Reglan IV, Zofran and compazine,  Tube feeds infusing at goal rate of 55ml/hr. NPO at midnight for procedure tomorrow, Continue with current POC            "

## 2024-06-13 NOTE — CONSULTS
SPIRITUAL HEALTH SERVICES Consult Note  [facility] 7B    Family was present at time of consult     Lita communicated she has no spiritual and emotional needs at this time. She identifies as Hoahaoism.      services remain available upon request. No current plans to follow up unless requested.     Sawyer Purcell  Intern   Pager [phone]      * [facility] remains available 24/7 for emergent requests/referrals, either by having the switchboard page the on-call  or by entering an ASAP/STAT consult in Epic (this will also page the on-call ). Routine Epic consults receive an initial response within 24 hours.*

## 2024-06-13 NOTE — PROGRESS NOTES
[facility]    Internal Medicine Progress Note - [facility] Service    Main Plans for Today   - Neutrophils above surgical goal of 2000  - Plan for G-tube placement tomorrow morning   - NGT replaced and at goal of 55 mL/Hr  - NPO & hold tube feeds at midnight   - Increase Oxycodone to 15 mg Q3H     Assessment & Plan   Linh Mustafa is a 64 year old female with squamous cell carcinoma of the esophagus admitted on 6/4/202 for chest pain after radiation therapy on 5/31/2024. Initial workup negative for acute pulmonary or cardiac etiology. Chest pain likely from mucositis of the esophagus due to radiation toxicity. She completed final two radiation treatments and pain and nausea is well controlled with current drug regimen. NG placed for tube feeding and monitoring for refeeding syndrome. Needs jejunostomy for nutrition with odynophagia limiting oral consumption. Started GSCF 6/12/2024 to increase neutrophil count >2000 to facilitate G-tube placement. After one dose, neutrophils exceeded target. Plan for G-tube placement tomorrow.     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 Reports pain, nausea, and vomiting after radiation therapy. Chest pain feels similar regarding quality and location to prior pain, but significantly more intense.  ED workup notable for normal troponin and BNP. CT Abd  and Chest demonstrated no acute pathology, including PE, aortic dissection, or esophageal perforation. Workup negative for cardiac etiology, GI perforation, and PE.  Has significant pain with swallowing, which limits her PO intake. Diet consist predominantly of soft foods like pudding and shakes.  Bedside swallow evaluation demonstrated normal oropharyngeal swallowing mechanisms with regular/thin liquids. On 6/10, patient was coughing profusely and the NG tube was dislodged. Thoracic surgery was notified and asserted that it can be replaced at bedside. NG tube was replaced 6/12. Plan for G-tube placement tomorrow   - Consult Palliative care for better pain and nausea control    - Pain Control: oxycodone (15 mg q3h), PTA fentanyl (37 mcg/hr patch every 72 hours), PRN lidocaine 1%  - Bowel Regimen: Miralax BID, PRN senna   - Consult Radiation, thank you for recs   - Completed final two rounds of radiation therapy.   - Consult Thoracic Surgery   - s/p esophagogastroscopy on 6/6 (inflamed esophageal mucosa)   - s/p NGT on 6/6 (dislodged 6/10 and replaced 10/12)   - Plan for G tube placement tomorrow   - Consult nutrition for tube feeds  - Osmolite 1.5 via NG-tube (Goal 55 mL/Hr)   - Consult PT/OT for impaired mobility and deconditioning    - Pt near baseline and discharge recommendations deferred to medical team  - Pantoprazole (40 mg BID); PTA esomeprazole not on formulary   - PTA famotidine (20 mg BID)  - PTA Magic Mouthwash (4x daily)  - PTA PRN ondansetron (8 mg q8h)   - PTA PRN compazine (10 mg q6h)  - PTA sucralfate (1g 4x daily)  - Per SLP, okay for regular diet w/ thin liquids    Pancytopenia  Neutropenia  At presentation, WBCs at 1.4 and platelets at 137. Suspect secondary to bone marrow suppression from radiation therapy and/or chemotherapy. Most recent dose of chemotherapy on 5/28/2024 with paclitaxel and carboplatin. Thoracic surgery requires > 2000 Neutrophil count and > 100k plt count to perform G-tube placement. Patient received single dose of Filgrastim-aafi injection (300 mcg) and neutrophils increased to 4000.   - Curbside Heme/onc, affirmed use of GCSF to increase neutrophils >2000  - Hold PTA vitamin B12 (2500 mcg daily) given large size of pill (difficulty swallowing)    Chronic Hypercalcemia    Ca elevated to 11.1 at admission. Most likely secondary to hyperparathyroidism with parathyroid hormone elevated to 121 on 5/1/2024. Had an appointment with endocrinology, but missed the appointment.   - Outpatient followup with endocrinology      Hypothyroidism   PTA levothyroxine (100 mcg)      Anxiety:  - PTA buspirone (5 mg daily)      Tobacco abuse.   Uses 1/2 ppd.    - Nicotine patch   - PRN nicotine gum     The patient was discussed with Dr. Brett Nova, MS4    Resident/Fellow Attestation   I, Vandana Florentino MD, was present with the medical/[employer] student who participated in the service and in the documentation of the note.  I have verified the history and personally performed the physical exam and medical decision making.  I agree with the assessment and plan of care as documented in the note.      Vandana Florentino MD  Internal Medicine Resident          Diet: NPO at midnight  Fluids: None   Lines: Right Chest Port  Mercado Catheter: Not present            Interval History   No acute events overnight. Found patient with brother and daughter at bedside. Reports ongoing severe pain and nausea. Offered increase dose in oxycodone with potential side effects of increased sedation. Patient preferred increased pain control at this time. Discussed plan for G-tube placement now that patient is at platelet and neutrophil goal.     Physical Exam   Vital Signs: Temp: 98.1  F (36.7  C) Temp src: Oral BP: 108/64 Pulse: 86   Resp: 18 SpO2: 100 % O2 Device: None (Room air)    Weight: 140 lbs 1.6 oz  General Appearance: Frail appearing. Alert.   Eyes: No conjunctivitis or scleral icterus   Head: atraumatic  Respiratory: Adequate oxygenation on room air   Cardiovascular: RRR with no murmurs, rubs, or gallops  GI: Soft, non-distended. No tenderness in all four quadrants.   Skin: Warm and well perfused. No pallor. Right chest port in place w/o surrounding erythema.  Musculoskeletal: Moving all four extremities    Neurologic: No gross neurological deficits   Psychiatric: appropriate mood and affect        Data   Recent Labs   Lab 06/13/24  0718 06/12/24  0625 06/11/24  0716   WBC 5.1 1.7* 1.6*   HGB 8.8* 8.5* 8.9*    100 101*    194 169    136 133*   POTASSIUM 4.1 4.6 4.5   CHLORIDE 99 101 100   CO2 27 28 28   BUN 9.4 4.2* 6.1*   CR 0.75 0.59 0.54   ANIONGAP 9 7 5*   VAIBHAV 9.9 9.7 9.8   * 105* 109*       Imaging results reviewed over the past 24 hrs:   No results found for this or any previous visit (from the past 24 hour(s)).

## 2024-06-13 NOTE — PLAN OF CARE
"Goal Outcome Evaluation:      Plan of Care Reviewed With: patient      /64 (BP Location: Left arm)   Pulse 83   Temp 98.2  F (36.8  C) (Oral)   Resp 16   Ht 1.702 m (5' 7\")   Wt 63.5 kg (140 lb 1.6 oz)   SpO2 100%   BMI 21.94 kg/m          A/OX4 able to make needs known. On room air, denies SOB,  throat pain from swallowing. Managed with scheduled oxycodone. Chest port SL. Voiding without difficulties, 2 BMs this shift. Nausea managed with Reglan IV, Zofran and compazine, new NG tube placed, Tube feeds started at 25 ml/hr, increase every 4 hours by 10 ml until goal rate of 55ml/hr. Continue with current POC              "

## 2024-06-14 ENCOUNTER — ANESTHESIA EVENT (OUTPATIENT)
Dept: SURGERY | Facility: CLINIC | Age: 64
DRG: 391 | End: 2024-06-14
Payer: COMMERCIAL

## 2024-06-14 ENCOUNTER — ANESTHESIA (OUTPATIENT)
Dept: SURGERY | Facility: CLINIC | Age: 64
DRG: 391 | End: 2024-06-14
Payer: COMMERCIAL

## 2024-06-14 LAB
ANION GAP SERPL CALCULATED.3IONS-SCNC: 8 MMOL/L (ref 7–15)
ATRIAL RATE - MUSE: 79 BPM
BASOPHILS # BLD AUTO: 0 10E3/UL (ref 0–0.2)
BASOPHILS NFR BLD AUTO: 0 %
BUN SERPL-MCNC: 10.4 MG/DL (ref 8–23)
CALCIUM SERPL-MCNC: 9.9 MG/DL (ref 8.8–10.2)
CHLORIDE SERPL-SCNC: 98 MMOL/L (ref 98–107)
CREAT SERPL-MCNC: 0.67 MG/DL (ref 0.51–0.95)
DEPRECATED HCO3 PLAS-SCNC: 27 MMOL/L (ref 22–29)
DIASTOLIC BLOOD PRESSURE - MUSE: NORMAL MMHG
EGFRCR SERPLBLD CKD-EPI 2021: >90 ML/MIN/1.73M2
EOSINOPHIL # BLD AUTO: 0 10E3/UL (ref 0–0.7)
EOSINOPHIL NFR BLD AUTO: 0 %
ERYTHROCYTE [DISTWIDTH] IN BLOOD BY AUTOMATED COUNT: 16.5 % (ref 10–15)
GLUCOSE BLDC GLUCOMTR-MCNC: 113 MG/DL (ref 70–99)
GLUCOSE SERPL-MCNC: 109 MG/DL (ref 70–99)
HCT VFR BLD AUTO: 25.4 % (ref 35–47)
HGB BLD-MCNC: 8.6 G/DL (ref 11.7–15.7)
IMM GRANULOCYTES # BLD: 0.1 10E3/UL
IMM GRANULOCYTES NFR BLD: 1 %
INTERPRETATION ECG - MUSE: NORMAL
LYMPHOCYTES # BLD AUTO: 0.7 10E3/UL (ref 0.8–5.3)
LYMPHOCYTES NFR BLD AUTO: 13 %
MAGNESIUM SERPL-MCNC: 2.2 MG/DL (ref 1.7–2.3)
MCH RBC QN AUTO: 34.3 PG (ref 26.5–33)
MCHC RBC AUTO-ENTMCNC: 33.9 G/DL (ref 31.5–36.5)
MCV RBC AUTO: 101 FL (ref 78–100)
MONOCYTES # BLD AUTO: 0.5 10E3/UL (ref 0–1.3)
MONOCYTES NFR BLD AUTO: 10 %
NEUTROPHILS # BLD AUTO: 4.1 10E3/UL (ref 1.6–8.3)
NEUTROPHILS NFR BLD AUTO: 76 %
NRBC # BLD AUTO: 0 10E3/UL
NRBC BLD AUTO-RTO: 0 /100
P AXIS - MUSE: 10 DEGREES
PHOSPHATE SERPL-MCNC: 3.3 MG/DL (ref 2.5–4.5)
PLATELET # BLD AUTO: 255 10E3/UL (ref 150–450)
POTASSIUM SERPL-SCNC: 4.8 MMOL/L (ref 3.4–5.3)
PR INTERVAL - MUSE: 136 MS
QRS DURATION - MUSE: 80 MS
QT - MUSE: 378 MS
QTC - MUSE: 433 MS
R AXIS - MUSE: -18 DEGREES
RBC # BLD AUTO: 2.51 10E6/UL (ref 3.8–5.2)
SODIUM SERPL-SCNC: 133 MMOL/L (ref 135–145)
SYSTOLIC BLOOD PRESSURE - MUSE: NORMAL MMHG
T AXIS - MUSE: 33 DEGREES
VENTRICULAR RATE- MUSE: 79 BPM
WBC # BLD AUTO: 5.5 10E3/UL (ref 4–11)

## 2024-06-14 PROCEDURE — 0DH63UZ INSERTION OF FEEDING DEVICE INTO STOMACH, PERCUTANEOUS APPROACH: ICD-10-PCS | Performed by: THORACIC SURGERY (CARDIOTHORACIC VASCULAR SURGERY)

## 2024-06-14 PROCEDURE — 250N000011 HC RX IP 250 OP 636: Mod: JZ

## 2024-06-14 PROCEDURE — 120N000002 HC R&B MED SURG/OB UMMC

## 2024-06-14 PROCEDURE — 250N000011 HC RX IP 250 OP 636: Mod: JZ | Performed by: NURSE ANESTHETIST, CERTIFIED REGISTERED

## 2024-06-14 PROCEDURE — 83735 ASSAY OF MAGNESIUM: CPT

## 2024-06-14 PROCEDURE — 370N000017 HC ANESTHESIA TECHNICAL FEE, PER MIN: Performed by: THORACIC SURGERY (CARDIOTHORACIC VASCULAR SURGERY)

## 2024-06-14 PROCEDURE — 99233 SBSQ HOSP IP/OBS HIGH 50: CPT | Mod: GC | Performed by: STUDENT IN AN ORGANIZED HEALTH CARE EDUCATION/TRAINING PROGRAM

## 2024-06-14 PROCEDURE — 36591 DRAW BLOOD OFF VENOUS DEVICE: CPT

## 2024-06-14 PROCEDURE — C1769 GUIDE WIRE: HCPCS | Performed by: THORACIC SURGERY (CARDIOTHORACIC VASCULAR SURGERY)

## 2024-06-14 PROCEDURE — 43653 LAPAROSCOPY GASTROSTOMY: CPT | Performed by: NURSE ANESTHETIST, CERTIFIED REGISTERED

## 2024-06-14 PROCEDURE — 250N000011 HC RX IP 250 OP 636: Performed by: ANESTHESIOLOGY

## 2024-06-14 PROCEDURE — 250N000009 HC RX 250: Performed by: THORACIC SURGERY (CARDIOTHORACIC VASCULAR SURGERY)

## 2024-06-14 PROCEDURE — 250N000011 HC RX IP 250 OP 636: Performed by: NURSE ANESTHETIST, CERTIFIED REGISTERED

## 2024-06-14 PROCEDURE — 250N000011 HC RX IP 250 OP 636

## 2024-06-14 PROCEDURE — 258N000003 HC RX IP 258 OP 636: Mod: JZ | Performed by: NURSE ANESTHETIST, CERTIFIED REGISTERED

## 2024-06-14 PROCEDURE — 43653 LAPAROSCOPY GASTROSTOMY: CPT | Performed by: ANESTHESIOLOGY

## 2024-06-14 PROCEDURE — C1894 INTRO/SHEATH, NON-LASER: HCPCS | Performed by: THORACIC SURGERY (CARDIOTHORACIC VASCULAR SURGERY)

## 2024-06-14 PROCEDURE — 272N000001 HC OR GENERAL SUPPLY STERILE: Performed by: THORACIC SURGERY (CARDIOTHORACIC VASCULAR SURGERY)

## 2024-06-14 PROCEDURE — 250N000009 HC RX 250: Performed by: NURSE ANESTHETIST, CERTIFIED REGISTERED

## 2024-06-14 PROCEDURE — 710N000010 HC RECOVERY PHASE 1, LEVEL 2, PER MIN: Performed by: THORACIC SURGERY (CARDIOTHORACIC VASCULAR SURGERY)

## 2024-06-14 PROCEDURE — 250N000013 HC RX MED GY IP 250 OP 250 PS 637

## 2024-06-14 PROCEDURE — 43653 LAPAROSCOPY GASTROSTOMY: CPT | Mod: GC | Performed by: THORACIC SURGERY (CARDIOTHORACIC VASCULAR SURGERY)

## 2024-06-14 PROCEDURE — 80048 BASIC METABOLIC PNL TOTAL CA: CPT

## 2024-06-14 PROCEDURE — 250N000025 HC SEVOFLURANE, PER MIN: Performed by: THORACIC SURGERY (CARDIOTHORACIC VASCULAR SURGERY)

## 2024-06-14 PROCEDURE — 84100 ASSAY OF PHOSPHORUS: CPT

## 2024-06-14 PROCEDURE — 85025 COMPLETE CBC W/AUTO DIFF WBC: CPT

## 2024-06-14 PROCEDURE — 360N000076 HC SURGERY LEVEL 3, PER MIN: Performed by: THORACIC SURGERY (CARDIOTHORACIC VASCULAR SURGERY)

## 2024-06-14 PROCEDURE — 999N000141 HC STATISTIC PRE-PROCEDURE NURSING ASSESSMENT: Performed by: THORACIC SURGERY (CARDIOTHORACIC VASCULAR SURGERY)

## 2024-06-14 RX ORDER — OXYCODONE HYDROCHLORIDE 10 MG/1
10 TABLET ORAL
Status: CANCELLED | OUTPATIENT
Start: 2024-06-14

## 2024-06-14 RX ORDER — ENOXAPARIN SODIUM 100 MG/ML
40 INJECTION SUBCUTANEOUS ONCE
Status: CANCELLED | OUTPATIENT
Start: 2024-06-14

## 2024-06-14 RX ORDER — LIDOCAINE HYDROCHLORIDE 20 MG/ML
INJECTION, SOLUTION INFILTRATION; PERINEURAL PRN
Status: DISCONTINUED | OUTPATIENT
Start: 2024-06-14 | End: 2024-06-14

## 2024-06-14 RX ORDER — CLINDAMYCIN PHOSPHATE 900 MG/50ML
900 INJECTION, SOLUTION INTRAVENOUS
Status: DISCONTINUED | OUTPATIENT
Start: 2024-06-14 | End: 2024-06-14 | Stop reason: HOSPADM

## 2024-06-14 RX ORDER — ONDANSETRON 2 MG/ML
4 INJECTION INTRAMUSCULAR; INTRAVENOUS EVERY 30 MIN PRN
Status: DISCONTINUED | OUTPATIENT
Start: 2024-06-14 | End: 2024-06-14 | Stop reason: HOSPADM

## 2024-06-14 RX ORDER — OXYCODONE HYDROCHLORIDE 5 MG/1
5 TABLET ORAL
Status: CANCELLED | OUTPATIENT
Start: 2024-06-14

## 2024-06-14 RX ORDER — FENTANYL CITRATE 50 UG/ML
INJECTION, SOLUTION INTRAMUSCULAR; INTRAVENOUS PRN
Status: DISCONTINUED | OUTPATIENT
Start: 2024-06-14 | End: 2024-06-14

## 2024-06-14 RX ORDER — EPHEDRINE SULFATE 50 MG/ML
INJECTION, SOLUTION INTRAMUSCULAR; INTRAVENOUS; SUBCUTANEOUS PRN
Status: DISCONTINUED | OUTPATIENT
Start: 2024-06-14 | End: 2024-06-14

## 2024-06-14 RX ORDER — DEXAMETHASONE SODIUM PHOSPHATE 4 MG/ML
4 INJECTION, SOLUTION INTRA-ARTICULAR; INTRALESIONAL; INTRAMUSCULAR; INTRAVENOUS; SOFT TISSUE
Status: CANCELLED | OUTPATIENT
Start: 2024-06-14

## 2024-06-14 RX ORDER — SODIUM CHLORIDE, SODIUM LACTATE, POTASSIUM CHLORIDE, CALCIUM CHLORIDE 600; 310; 30; 20 MG/100ML; MG/100ML; MG/100ML; MG/100ML
INJECTION, SOLUTION INTRAVENOUS CONTINUOUS
Status: DISCONTINUED | OUTPATIENT
Start: 2024-06-14 | End: 2024-06-14 | Stop reason: HOSPADM

## 2024-06-14 RX ORDER — CLINDAMYCIN PHOSPHATE 900 MG/50ML
900 INJECTION, SOLUTION INTRAVENOUS SEE ADMIN INSTRUCTIONS
Status: DISCONTINUED | OUTPATIENT
Start: 2024-06-14 | End: 2024-06-14 | Stop reason: HOSPADM

## 2024-06-14 RX ORDER — PROPOFOL 10 MG/ML
INJECTION, EMULSION INTRAVENOUS PRN
Status: DISCONTINUED | OUTPATIENT
Start: 2024-06-14 | End: 2024-06-14

## 2024-06-14 RX ORDER — BUPIVACAINE HYDROCHLORIDE AND EPINEPHRINE 2.5; 5 MG/ML; UG/ML
INJECTION, SOLUTION INFILTRATION; PERINEURAL PRN
Status: DISCONTINUED | OUTPATIENT
Start: 2024-06-14 | End: 2024-06-14 | Stop reason: HOSPADM

## 2024-06-14 RX ORDER — ACETAMINOPHEN 325 MG/1
975 TABLET ORAL ONCE
Status: DISCONTINUED | OUTPATIENT
Start: 2024-06-14 | End: 2024-06-14 | Stop reason: HOSPADM

## 2024-06-14 RX ORDER — ONDANSETRON 2 MG/ML
INJECTION INTRAMUSCULAR; INTRAVENOUS PRN
Status: DISCONTINUED | OUTPATIENT
Start: 2024-06-14 | End: 2024-06-14

## 2024-06-14 RX ORDER — HYDROMORPHONE HCL IN WATER/PF 6 MG/30 ML
0.4 PATIENT CONTROLLED ANALGESIA SYRINGE INTRAVENOUS EVERY 5 MIN PRN
Status: DISCONTINUED | OUTPATIENT
Start: 2024-06-14 | End: 2024-06-14 | Stop reason: HOSPADM

## 2024-06-14 RX ORDER — NALOXONE HYDROCHLORIDE 0.4 MG/ML
0.1 INJECTION, SOLUTION INTRAMUSCULAR; INTRAVENOUS; SUBCUTANEOUS
Status: CANCELLED | OUTPATIENT
Start: 2024-06-14

## 2024-06-14 RX ORDER — ONDANSETRON 4 MG/1
4 TABLET, ORALLY DISINTEGRATING ORAL EVERY 30 MIN PRN
Status: CANCELLED | OUTPATIENT
Start: 2024-06-14

## 2024-06-14 RX ORDER — FENTANYL CITRATE 50 UG/ML
25 INJECTION, SOLUTION INTRAMUSCULAR; INTRAVENOUS EVERY 5 MIN PRN
Status: DISCONTINUED | OUTPATIENT
Start: 2024-06-14 | End: 2024-06-14 | Stop reason: HOSPADM

## 2024-06-14 RX ORDER — SODIUM CHLORIDE, SODIUM GLUCONATE, SODIUM ACETATE, POTASSIUM CHLORIDE AND MAGNESIUM CHLORIDE 526; 502; 368; 37; 30 MG/100ML; MG/100ML; MG/100ML; MG/100ML; MG/100ML
INJECTION, SOLUTION INTRAVENOUS CONTINUOUS PRN
Status: DISCONTINUED | OUTPATIENT
Start: 2024-06-14 | End: 2024-06-14

## 2024-06-14 RX ORDER — FENTANYL CITRATE 50 UG/ML
50 INJECTION, SOLUTION INTRAMUSCULAR; INTRAVENOUS EVERY 5 MIN PRN
Status: DISCONTINUED | OUTPATIENT
Start: 2024-06-14 | End: 2024-06-14 | Stop reason: HOSPADM

## 2024-06-14 RX ORDER — PROPOFOL 10 MG/ML
INJECTION, EMULSION INTRAVENOUS CONTINUOUS PRN
Status: DISCONTINUED | OUTPATIENT
Start: 2024-06-14 | End: 2024-06-14

## 2024-06-14 RX ORDER — NALOXONE HYDROCHLORIDE 0.4 MG/ML
0.1 INJECTION, SOLUTION INTRAMUSCULAR; INTRAVENOUS; SUBCUTANEOUS
Status: DISCONTINUED | OUTPATIENT
Start: 2024-06-14 | End: 2024-06-14 | Stop reason: HOSPADM

## 2024-06-14 RX ORDER — CEFAZOLIN SODIUM 1 G/3ML
INJECTION, POWDER, FOR SOLUTION INTRAMUSCULAR; INTRAVENOUS PRN
Status: DISCONTINUED | OUTPATIENT
Start: 2024-06-14 | End: 2024-06-14

## 2024-06-14 RX ORDER — ONDANSETRON 2 MG/ML
4 INJECTION INTRAMUSCULAR; INTRAVENOUS EVERY 30 MIN PRN
Status: CANCELLED | OUTPATIENT
Start: 2024-06-14

## 2024-06-14 RX ORDER — ONDANSETRON 4 MG/1
4 TABLET, ORALLY DISINTEGRATING ORAL EVERY 30 MIN PRN
Status: DISCONTINUED | OUTPATIENT
Start: 2024-06-14 | End: 2024-06-14 | Stop reason: HOSPADM

## 2024-06-14 RX ORDER — DEXAMETHASONE SODIUM PHOSPHATE 4 MG/ML
4 INJECTION, SOLUTION INTRA-ARTICULAR; INTRALESIONAL; INTRAMUSCULAR; INTRAVENOUS; SOFT TISSUE
Status: DISCONTINUED | OUTPATIENT
Start: 2024-06-14 | End: 2024-06-14 | Stop reason: HOSPADM

## 2024-06-14 RX ORDER — HYDROMORPHONE HCL IN WATER/PF 6 MG/30 ML
0.2 PATIENT CONTROLLED ANALGESIA SYRINGE INTRAVENOUS EVERY 5 MIN PRN
Status: DISCONTINUED | OUTPATIENT
Start: 2024-06-14 | End: 2024-06-14 | Stop reason: HOSPADM

## 2024-06-14 RX ADMIN — OXYCODONE HYDROCHLORIDE 15 MG: 5 SOLUTION ORAL at 08:36

## 2024-06-14 RX ADMIN — PHENYLEPHRINE HYDROCHLORIDE 100 MCG: 10 INJECTION INTRAVENOUS at 11:23

## 2024-06-14 RX ADMIN — ONDANSETRON 4 MG: 2 INJECTION INTRAMUSCULAR; INTRAVENOUS at 11:05

## 2024-06-14 RX ADMIN — PHENYLEPHRINE HYDROCHLORIDE 100 MCG: 10 INJECTION INTRAVENOUS at 11:14

## 2024-06-14 RX ADMIN — CEFAZOLIN 2 G: 1 INJECTION, POWDER, FOR SOLUTION INTRAMUSCULAR; INTRAVENOUS at 11:05

## 2024-06-14 RX ADMIN — PROCHLORPERAZINE EDISYLATE 5 MG: 5 INJECTION INTRAMUSCULAR; INTRAVENOUS at 13:36

## 2024-06-14 RX ADMIN — FENTANYL CITRATE 50 MCG: 50 INJECTION, SOLUTION INTRAMUSCULAR; INTRAVENOUS at 13:08

## 2024-06-14 RX ADMIN — OXYCODONE HYDROCHLORIDE 15 MG: 5 SOLUTION ORAL at 18:17

## 2024-06-14 RX ADMIN — HYDROMORPHONE HYDROCHLORIDE 0.2 MG: 0.2 INJECTION, SOLUTION INTRAMUSCULAR; INTRAVENOUS; SUBCUTANEOUS at 23:06

## 2024-06-14 RX ADMIN — FENTANYL CITRATE 50 MCG: 50 INJECTION INTRAMUSCULAR; INTRAVENOUS at 12:08

## 2024-06-14 RX ADMIN — PHENYLEPHRINE HYDROCHLORIDE 200 MCG: 10 INJECTION INTRAVENOUS at 11:28

## 2024-06-14 RX ADMIN — ONDANSETRON 4 MG: 2 INJECTION INTRAMUSCULAR; INTRAVENOUS at 14:14

## 2024-06-14 RX ADMIN — PHENYLEPHRINE HYDROCHLORIDE 200 MCG: 10 INJECTION INTRAVENOUS at 11:35

## 2024-06-14 RX ADMIN — ONDANSETRON 8 MG: 2 INJECTION INTRAMUSCULAR; INTRAVENOUS at 08:36

## 2024-06-14 RX ADMIN — PROPOFOL 125 MCG/KG/MIN: 10 INJECTION, EMULSION INTRAVENOUS at 11:32

## 2024-06-14 RX ADMIN — LIDOCAINE HYDROCHLORIDE 50 MG: 20 INJECTION, SOLUTION INFILTRATION; PERINEURAL at 11:05

## 2024-06-14 RX ADMIN — HYDROMORPHONE HYDROCHLORIDE 0.2 MG: 0.2 INJECTION, SOLUTION INTRAMUSCULAR; INTRAVENOUS; SUBCUTANEOUS at 16:26

## 2024-06-14 RX ADMIN — Medication 200 MG: at 12:30

## 2024-06-14 RX ADMIN — EPHEDRINE SULFATE 10 MG: 5 INJECTION INTRAVENOUS at 11:36

## 2024-06-14 RX ADMIN — ONDANSETRON 8 MG: 2 INJECTION INTRAMUSCULAR; INTRAVENOUS at 20:50

## 2024-06-14 RX ADMIN — METOCLOPRAMIDE 10 MG: 5 INJECTION, SOLUTION INTRAMUSCULAR; INTRAVENOUS at 03:14

## 2024-06-14 RX ADMIN — ONDANSETRON 8 MG: 4 TABLET, ORALLY DISINTEGRATING ORAL at 02:19

## 2024-06-14 RX ADMIN — CAMPHOR, MENTHOL: .5; .5 LOTION TOPICAL at 16:33

## 2024-06-14 RX ADMIN — PROPOFOL 110 MG: 10 INJECTION, EMULSION INTRAVENOUS at 11:05

## 2024-06-14 RX ADMIN — HYDROMORPHONE HYDROCHLORIDE 0.4 MG: 0.2 INJECTION, SOLUTION INTRAMUSCULAR; INTRAVENOUS; SUBCUTANEOUS at 14:20

## 2024-06-14 RX ADMIN — PROCHLORPERAZINE EDISYLATE 10 MG: 5 INJECTION INTRAMUSCULAR; INTRAVENOUS at 18:31

## 2024-06-14 RX ADMIN — FENTANYL CITRATE 50 MCG: 50 INJECTION INTRAMUSCULAR; INTRAVENOUS at 11:52

## 2024-06-14 RX ADMIN — Medication 20 MG: at 11:26

## 2024-06-14 RX ADMIN — FENTANYL CITRATE 50 MCG: 50 INJECTION INTRAMUSCULAR; INTRAVENOUS at 12:52

## 2024-06-14 RX ADMIN — FENTANYL CITRATE 25 MCG: 50 INJECTION, SOLUTION INTRAMUSCULAR; INTRAVENOUS at 13:23

## 2024-06-14 RX ADMIN — METOCLOPRAMIDE 10 MG: 5 INJECTION, SOLUTION INTRAMUSCULAR; INTRAVENOUS at 16:13

## 2024-06-14 RX ADMIN — Medication 30 MG: at 11:05

## 2024-06-14 RX ADMIN — CAMPHOR, MENTHOL: .5; .5 LOTION TOPICAL at 22:37

## 2024-06-14 RX ADMIN — SODIUM CHLORIDE, SODIUM GLUCONATE, SODIUM ACETATE, POTASSIUM CHLORIDE AND MAGNESIUM CHLORIDE: 526; 502; 368; 37; 30 INJECTION, SOLUTION INTRAVENOUS at 10:59

## 2024-06-14 RX ADMIN — CAMPHOR, MENTHOL: .5; .5 LOTION TOPICAL at 06:12

## 2024-06-14 RX ADMIN — PHENYLEPHRINE HYDROCHLORIDE 100 MCG: 10 INJECTION INTRAVENOUS at 11:18

## 2024-06-14 RX ADMIN — Medication 10 MG: at 11:52

## 2024-06-14 RX ADMIN — HYDROMORPHONE HYDROCHLORIDE 0.5 MG: 1 INJECTION, SOLUTION INTRAMUSCULAR; INTRAVENOUS; SUBCUTANEOUS at 12:33

## 2024-06-14 RX ADMIN — FAMOTIDINE 20 MG: 20 TABLET ORAL at 20:48

## 2024-06-14 RX ADMIN — OXYCODONE HYDROCHLORIDE 15 MG: 5 SOLUTION ORAL at 20:59

## 2024-06-14 RX ADMIN — Medication 5 ML: at 06:26

## 2024-06-14 RX ADMIN — METOCLOPRAMIDE 10 MG: 5 INJECTION, SOLUTION INTRAMUSCULAR; INTRAVENOUS at 20:48

## 2024-06-14 RX ADMIN — OXYCODONE HYDROCHLORIDE 15 MG: 5 SOLUTION ORAL at 06:06

## 2024-06-14 RX ADMIN — OXYCODONE HYDROCHLORIDE 15 MG: 5 SOLUTION ORAL at 00:10

## 2024-06-14 RX ADMIN — FENTANYL CITRATE 150 MCG: 50 INJECTION INTRAMUSCULAR; INTRAVENOUS at 11:05

## 2024-06-14 RX ADMIN — DIPHENHYDRAMINE HYDROCHLORIDE AND LIDOCAINE HYDROCHLORIDE AND ALUMINUM HYDROXIDE AND MAGNESIUM HYDRO 10 ML: KIT at 22:37

## 2024-06-14 RX ADMIN — PHENYLEPHRINE HYDROCHLORIDE 200 MCG: 10 INJECTION INTRAVENOUS at 11:10

## 2024-06-14 RX ADMIN — FENTANYL CITRATE 50 MCG: 50 INJECTION INTRAMUSCULAR; INTRAVENOUS at 12:38

## 2024-06-14 RX ADMIN — ENOXAPARIN SODIUM 40 MG: 40 INJECTION SUBCUTANEOUS at 10:58

## 2024-06-14 RX ADMIN — FENTANYL CITRATE 25 MCG: 50 INJECTION, SOLUTION INTRAMUSCULAR; INTRAVENOUS at 13:38

## 2024-06-14 RX ADMIN — OXYCODONE HYDROCHLORIDE 15 MG: 5 SOLUTION ORAL at 03:14

## 2024-06-14 RX ADMIN — HYDROMORPHONE HYDROCHLORIDE 0.2 MG: 0.2 INJECTION, SOLUTION INTRAMUSCULAR; INTRAVENOUS; SUBCUTANEOUS at 14:36

## 2024-06-14 ASSESSMENT — ACTIVITIES OF DAILY LIVING (ADL)
ADLS_ACUITY_SCORE: 29
ADLS_ACUITY_SCORE: 28
ADLS_ACUITY_SCORE: 29
ADLS_ACUITY_SCORE: 29
ADLS_ACUITY_SCORE: 28
ADLS_ACUITY_SCORE: 29
ADLS_ACUITY_SCORE: 28
ADLS_ACUITY_SCORE: 28
ADLS_ACUITY_SCORE: 29
ADLS_ACUITY_SCORE: 29
ADLS_ACUITY_SCORE: 28
ADLS_ACUITY_SCORE: 28
ADLS_ACUITY_SCORE: 29
ADLS_ACUITY_SCORE: 28
ADLS_ACUITY_SCORE: 29

## 2024-06-14 ASSESSMENT — LIFESTYLE VARIABLES: TOBACCO_USE: 1

## 2024-06-14 NOTE — ANESTHESIA PREPROCEDURE EVALUATION
Anesthesia Pre-Procedure Evaluation    Patient: Linh Mustafa   MRN: [id number] : 1960        Procedure : Procedure(s):  Esophagoscopy, gastroscopy, duodenoscopy (EGD), combined- percutaneous j tube          Past Medical History:   Diagnosis Date    Hyperlipidemia     Hypertension       Past Surgical History:   Procedure Laterality Date    BIOPSY BREAST Right     age 30 benign fibrous    CV CORONARY ANGIOGRAM N/A 2023    Procedure: Coronary Angiogram;  Surgeon: Lukas Irizarry MD;  Location: [facility] CV    CV LEFT HEART CATH N/A 2023    Procedure: Left Heart Catheterization;  Surgeon: Lukas Irizarry MD;  Location: [facility] CV    ENDOSCOPIC ULTRASOUND UPPER GASTROINTESTINAL TRACT (GI) N/A 3/21/2024    Procedure: ENDOSCOPIC ULTRASOUND, ESOPHAGOSCOPY / UPPER GASTROINTESTINAL TRACT (GI), ENDOSCOPY WITH BIOPSY, FINE NEEDLE ASPIRATION;  Surgeon: Damon Kramer MD;  Location: [facility] OR    ENDOSCOPIC ULTRASOUND, ESOPHAGOSCOPY / UPPER GASTROINTESTINAL TRACT (GI), ENDOSCOPY WITH BIOPSY, FINE NEEDLE ASPIRATION  2024    ESOPHAGOSCOPY, GASTROSCOPY, DUODENOSCOPY (EGD), COMBINED N/A 2024    Procedure: Esophagoscopy, Gastroscopy, Duodenoscopy (EGD), Nasojejunal Feeding Tube Placement;  Surgeon: Bryant Martinez MD;  Location: [facility] OR    ESOPHAGOSCOPY, GASTROSCOPY, DUODENOSCOPY (EGD), SUBMUCOSA RESECTION N/A 2024    Procedure: ESOPHAGOGASTRODUODENOSCOPY, WITH SUBMUCOSAL RESECTION;  Surgeon: Holden King MD;  Location: [facility] OR    IR CHEST PORT PLACEMENT > 5 YRS OF AGE  2024      Allergies   Allergen Reactions    Amoxicillin Shortness Of Breath, Itching and Rash    Penicillins       Social History     Tobacco Use    Smoking status: Every Day     Current packs/day: 1.00     Average packs/day: 1 pack/day for 47.5 years (47.5 ttl pk-yrs)     Types: Cigarettes     Start date:     Smokeless tobacco: Never   Substance Use Topics    Alcohol use: Not on file      Comment: occasional      Wt Readings from Last 1 Encounters:   06/10/24 63.5 kg (140 lb 1.6 oz)        Anesthesia Evaluation   Pt has had prior anesthetic. Type: General and MAC.    No history of anesthetic complications       ROS/MED HX  ENT/Pulmonary:     (+)                tobacco use, Current use,                       Neurologic:  - neg neurologic ROS     Cardiovascular:     (+)  hypertension- -   -  - -                                 Previous cardiac testing   Echo: Date: 1/17/23 Results:  Interpretation Summary  1. Abnormal stress echocardiogram without evidence of stress induced ischemia.  2. Normal resting LV systolic performance with an ejection fraction of 55-60%.  There is no improvement of LV systolic function with hint of anterior septal  hypokinesia with exercise  3. Nondiagnostic but suspicious for ischemia EKG changes  4. Anginal chest pain reported with exercise.  5. Poor functional capacity for age.  6. Hypertensive response to exercise     Electrocardiogram: Baseline ECG reveals normal sinus rhythm without evidence  of prior myocardial injury. There are diffuse nonspecific ST-T abnormalities.  With exercise, there there is further ST segment depression. There is a short  run of VT in recovery and ST segment depression becomes more pronounced.     Baseline echocardiogram: Technically adequate images were obtained in the  standard quad-screen format. LV systolic performance is normal with a visually  estimated ejection fraction of 55-60%. There is normal regional wall motion.  No significant valvular heart disease is identified on limited screening  Doppler.     Postexercise echocardiogram: Technically adequate images were obtained  immediately post exercise in the standard quad-screen format. LV systolic  performance does not improve with exercise. There there is a hint of  anteroseptal hypokinesia.    Stress Test:  Date: 1/17/23 Results:    ECG Reviewed:  Date: 1/17/24 Results:  NSR- wnl  Cath:   Date: 1/25/23 Results:    Coronary Findings    Diagnostic  Dominance: Right  Left Main  The vessel was visualized by selective angiography and is moderate in size. There was 0% vessel disease.    Left Anterior Descending  The vessel was visualized by selective angiography and is moderate in size. There was 0% vessel disease.    Left Circumflex  Dominant, normal    Right Coronary Artery  Small, normal    Intervention    No interventions have been documented.    Hemodynamics    LVEDP = 13mm Hg        METS/Exercise Tolerance: >4 METS    Hematologic:     (+)      anemia,          Musculoskeletal:  - neg musculoskeletal ROS     GI/Hepatic: Comment: - Esophageal cancer s/p radiation. Now with radiation esophagitis.  - Last radiation treatment was 5/31/24  - Dysphagia        Renal/Genitourinary:  - neg Renal ROS     Endo:  - neg endo ROS     Psychiatric/Substance Use:  - neg psychiatric ROS     Infectious Disease: Comment: Had a long discussion with patient and pharmacy regarding antibiotics on 6/6/2024. Patient's allergic reaction to penicillins was deemed low risk (rash only, no hemodynamic changes, anxiety contributing to feeling of dyspnea, no airway edema, no hives). Recommended cefazolin for this procedure and upcoming procedures.       Malignancy: Comment: - Esophageal cancer  (+) Malignancy, History of Other.    Other:            Physical Exam    Airway  airway exam normal      Mallampati: I   TM distance: > 3 FB   Neck ROM: full   Mouth opening: > 3 cm    Respiratory Devices and Support         Dental       (+) Edentulous      Cardiovascular   cardiovascular exam normal       Rhythm and rate: regular and normal     Pulmonary   pulmonary exam normal        breath sounds clear to auscultation           OUTSIDE LABS:  CBC:   Lab Results   Component Value Date    WBC 5.5 06/14/2024    WBC 5.1 06/13/2024    HGB 8.6 (L) 06/14/2024    HGB 8.8 (L) 06/13/2024    HCT 25.4 (L) 06/14/2024    HCT 26.5 (L) 06/13/2024      "06/14/2024     06/13/2024     BMP:   Lab Results   Component Value Date     (L) 06/14/2024     06/13/2024    POTASSIUM 4.8 06/14/2024    POTASSIUM 4.1 06/13/2024    CHLORIDE 98 06/14/2024    CHLORIDE 99 06/13/2024    CO2 27 06/14/2024    CO2 27 06/13/2024    BUN 10.4 06/14/2024    BUN 9.4 06/13/2024    CR 0.67 06/14/2024    CR 0.75 06/13/2024     (H) 06/14/2024     (H) 06/14/2024     COAGS:   Lab Results   Component Value Date    PTT 30 12/23/2022    INR 0.98 06/04/2024     POC: No results found for: \"BGM\", \"HCG\", \"HCGS\"  HEPATIC:   Lab Results   Component Value Date    ALBUMIN 4.0 06/06/2024    PROTTOTAL 7.3 06/06/2024    ALT 22 06/06/2024    AST 17 06/06/2024    ALKPHOS 99 06/06/2024    BILITOTAL 0.7 06/06/2024     OTHER:   Lab Results   Component Value Date    LACT 1.9 06/04/2024    VAIBHAV 9.9 06/14/2024    PHOS 3.3 06/14/2024    MAG 2.2 06/14/2024    LIPASE 11 (L) 06/04/2024    TSH 0.19 (L) 04/18/2024    T4 1.70 04/18/2024    SED 61 (H) 06/04/2024       Anesthesia Plan    ASA Status:  3    NPO Status:  NPO Appropriate    Anesthesia Type: General.     - Airway: ETT   Induction: Intravenous.   Maintenance: Balanced.   Techniques and Equipment:     - Lines/Monitors: 2nd IV     Consents    Anesthesia Plan(s) and associated risks, benefits, and realistic alternatives discussed. Questions answered and patient/representative(s) expressed understanding.     - Discussed: Risks, Benefits and Alternatives for BOTH SEDATION and the PROCEDURE were discussed     - Discussed with:  Patient      - Extended Intubation/Ventilatory Support Discussed: No.      - Patient is DNR/DNI Status: No     Use of blood products discussed: No .     Postoperative Care    Pain management: IV analgesics, Multi-modal analgesia.   PONV prophylaxis: Ondansetron (or other 5HT-3), Dexamethasone or Solumedrol     Comments:    Other Comments: The material risks, benefits, and alternatives were discussed in detail.  The " patient agrees to proceed.  The patient has no other complaints at this time.           Kade Ghosh MD    I have reviewed the pertinent notes and labs in the chart from the past 30 days and (re)examined the patient.  Any updates or changes from those notes are reflected in this note.

## 2024-06-14 NOTE — PROGRESS NOTES
[facility]    Internal Medicine Progress Note - [facility] Service    Main Plans for Today   - G-tube placed this morning  - Consult Nutrition for G-tube feeds    - Remove NG  - Hopeful discharge over weekend vs early next week pending TF tolerance and home setup    Assessment & Plan   Linh Mustafa is a 64 year old female with squamous cell carcinoma of the esophagus admitted on 6/4/202 for chest pain after radiation therapy on 5/31/2024. Initial workup negative for acute pulmonary or cardiac etiology. Chest pain likely from mucositis of the esophagus due to radiation toxicity. She completed final two radiation treatments and pain and nausea is well controlled with current drug regimen. NG placed for tube feeding and monitoring for refeeding syndrome. Needs jejunostomy for nutrition with odynophagia limiting oral consumption. Started GSCF 6/12/2024 to increase neutrophil count >2000 to facilitate G-tube placement. After one dose, neutrophils exceeded target. G-tube placed on 6/14.     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 Reports pain, nausea, and vomiting after radiation therapy. Chest pain feels similar regarding quality and location to prior pain, but significantly more intense.  ED workup notable for normal troponin and BNP. CT Abd  and Chest demonstrated no acute pathology, including PE, aortic dissection, or esophageal perforation. Workup negative for cardiac etiology, GI perforation, and PE.  Has significant pain with swallowing, which limits her PO intake. Diet consist predominantly of soft foods like pudding and shakes. Bedside swallow evaluation demonstrated normal  oropharyngeal swallowing mechanisms with regular/thin liquids. On 6/10, patient was coughing profusely and the NG tube was dislodged. Thoracic surgery was notified and asserted that it can be replaced at bedside. NG tube was replaced 6/12. G-tube placed on 6/14.   - Consult Palliative care for better pain and nausea control    - Pain Control: oxycodone (15 mg q3h), PTA fentanyl (37 mcg/hr patch every 72 hours), PRN lidocaine 1%  - Bowel Regimen: Miralax BID, PRN senna   - Consult Radiation, thank you for recs   - Completed final two rounds of radiation therapy.   - Consult Thoracic Surgery   - s/p esophagogastroscopy on 6/6 (inflamed esophageal mucosa)   - s/p NGT on 6/6 (dislodged 6/10, replaced 10/12, Removed 6/14)   - G-tube placed on 6/14.   - Consult nutrition for tube feeds  - Osmolite 1.5 via NG-tube (Goal 55 mL/Hr)   - Consult PT/OT for impaired mobility and deconditioning    - Pt near baseline and discharge recommendations deferred to medical team  - Pantoprazole (40 mg BID); PTA esomeprazole not on formulary   - PTA famotidine (20 mg BID)  - PTA Magic Mouthwash (4x daily)  - PTA PRN ondansetron (8 mg q8h)   - PTA PRN compazine (10 mg q6h)  - PTA sucralfate (1g 4x daily)  - Per SLP, okay for regular diet w/ thin liquids    Pancytopenia  Neutropenia  At presentation, WBCs at 1.4 and platelets at 137. Suspect secondary to bone marrow suppression from radiation therapy and/or chemotherapy. Most recent dose of chemotherapy on 5/28/2024 with paclitaxel and carboplatin. Thoracic surgery requires > 2000 Neutrophil count and > 100k plt count to perform G-tube placement. Patient received single dose of Filgrastim-aafi injection (300 mcg) and neutrophils increased to 4000.   - Curbside Heme/onc, affirmed use of GCSF to increase neutrophils >2000  - Hold PTA vitamin B12 (2500 mcg daily) given large size of pill (difficulty swallowing)    Chronic Hypercalcemia   Ca elevated to 11.1 at admission. Most likely secondary  to hyperparathyroidism with parathyroid hormone elevated to 121 on 5/1/2024. Had an appointment with endocrinology, but missed the appointment.   - Outpatient followup with endocrinology      Hypothyroidism   PTA levothyroxine (100 mcg)      Anxiety:  - PTA buspirone (5 mg daily)      Tobacco abuse.   Uses 1/2 ppd.    - Nicotine patch   - PRN nicotine gum     The patient was discussed with Dr. Brett Nova, MS4    Resident/Fellow Attestation   I, Vandana Florentino MD, was present with the medical/[employer] student who participated in the service and in the documentation of the note.  I have verified the history and personally performed the physical exam and medical decision making.  I agree with the assessment and plan of care as documented in the note.      Vandana Florentino MD  Internal Medicine Resident      Diet: NPO at midnight  Fluids: None   Lines: Right Chest Port  Mercado Catheter: Not present            Interval History   No acute events overnight. Found patient this morning and discussed plan for G-tube placement and starting feeds. Reports ongoing severe pain and nausea. Uncertain if increasing oxycodone yesterday helped. Proffered no other concerns or complaints.     Physical Exam   Vital Signs: Temp: 97.6  F (36.4  C) Temp src: Oral BP: 112/59 Pulse: 86   Resp: 16 SpO2: 98 % O2 Device: None (Room air)    Weight: 140 lbs 1.6 oz  General Appearance: Frail appearing. Alert.   Eyes: No conjunctivitis or scleral icterus   Head: atraumatic  Respiratory: Adequate oxygenation on room air   Cardiovascular: RRR with no murmurs, rubs, or gallops  GI: Soft, non-distended. No tenderness in all four quadrants.   Skin: Warm and well perfused. No pallor. Right chest port in place w/o surrounding erythema.  Musculoskeletal: Moving all four extremities   Neurologic: No gross neurological deficits   Psychiatric: appropriate mood and affect        Data   Recent Labs   Lab 06/14/24  0900 06/14/24  0630 06/13/24  0718  06/12/24  0625   WBC  --  5.5 5.1 1.7*   HGB  --  8.6* 8.8* 8.5*   MCV  --  101* 100 100   PLT  --  255 215 194   NA  --  133* 135 136   POTASSIUM  --  4.8 4.1 4.6   CHLORIDE  --  98 99 101   CO2  --  27 27 28   BUN  --  10.4 9.4 4.2*   CR  --  0.67 0.75 0.59   ANIONGAP  --  8 9 7   VAIBHAV  --  9.9 9.9 9.7   * 109* 146* 105*       Imaging results reviewed over the past 24 hrs:   No results found for this or any previous visit (from the past 24 hour(s)).

## 2024-06-14 NOTE — PROGRESS NOTES
CLINICAL NUTRITION SERVICES - Brief NOTE     Nutrition Prescription    Recommendations already ordered by Registered Dietitian (RD):  Osmolite 1.5 @ 25 ml/hr with advancement by 10 ml/hr q 4 hours to goal rate of 55 ml/hr. Goal rate provides: 1980 kcals (31 kcal/kg), 83 g PRO (1.3g/kg), 1005 ml free H20, 268 g CHO, and 0 g fiber daily.     Continue current H2O flushes    Future/Additional Recommendations:  -- monitor tolerance/lytes to TF advancement     Provider consult - Registered Dietitian to order TF per Medical Nutrition Therapy Guidelines     EVALUATION OF THE PROGRESS TOWARD GOALS   Diet: NPO  Intake: Patient on Osmolite 1.5 via NGT prior to FT placement       NEW FINDINGS   GI - s/p G-tube placement (6/14)    Labs (6/14): phos 3.3 mg/dL, Mg++ 2.2 mg/dL, K+ 4.8 mmol/L, Na 133 mmol/L     Meds: reviewed    INTERVENTIONS  Implementation  Collaboration with other providers - discussed TF advancement with team. Team okayed this writer to order TFs  Enteral Nutrition - Initiate    Monitoring/Evaluation  Progress toward goals will be monitored and evaluated per protocol.  Jacqueline Givens MS/RD/LD/CNSC  Available on [employer]   M-F (7am-3:30pm) - 7A/7B Clinical Dietitian  Weekend/Holiday Dietitian (7am-3:30pm)    ** Clinical Dietitians no longer available on pager

## 2024-06-14 NOTE — PLAN OF CARE
"Goal Outcome Evaluation:      Plan of Care Reviewed With: patient    Overall Patient Progress: improvingOverall Patient Progress: improving    A&Ox4, VSS, RA. Denies cardiac chest pain & SOB. Pain managed with scheduled Oxy and nausea managed with schedule Zofran and Reglan. Fentanyl patch on L upper arm. NG clamped per order. R chest port SL. BM 6/13 and spontaneously voiding without difficulties. No electrolytes replacement needed, AM rechecks. Up ad liz. Plan for G tube placement 6/14. Continue POC.     /72 (BP Location: Right arm)   Pulse 75   Temp 97.9  F (36.6  C) (Oral)   Resp 16   Ht 1.702 m (5' 7\")   Wt 63.5 kg (140 lb 1.6 oz)   SpO2 96%   BMI 21.94 kg/m       "

## 2024-06-14 NOTE — ANESTHESIA CARE TRANSFER NOTE
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    Anesthesia Type:   General     Note:      Level of Consciousness: awake  Oxygen Supplementation: nasal cannula (placed on an arival to PACU)  Level of Supplemental Oxygen (L/min / FiO2): 2  Independent Airway: airway patency satisfactory and stable        Patient transferred to: PACU  Comments: VSS, awake. Nausea improved since initial in OR and discomfort better too.   Handoff Report: Identifed the Patient, Identified the Reponsible Provider, Reviewed the pertinent medical history, Discussed the surgical course, Reviewed Intra-OP anesthesia mangement and issues during anesthesia, Set expectations for post-procedure period and Allowed opportunity for questions and acknowledgement of understanding      Vitals:  Vitals Value Taken Time   /63 06/14/24 1300   Temp     Pulse 86 06/14/24 1302   Resp 17 06/14/24 1302   SpO2 99 % 06/14/24 1302   Vitals shown include unfiled device data.    Electronically Signed By: MASON Greco CRNA  June 14, 2024  1:03 PM

## 2024-06-14 NOTE — ANESTHESIA POSTPROCEDURE EVALUATION
Patient: Linh Mustafa    Procedure: Procedure(s):  Upper endoscopy, laparoscopic gastrostomy tube placement       Anesthesia Type:  General    Note:  Disposition: Outpatient   Postop Pain Control: Uneventful            Sign Out: Well controlled pain   PONV: No   Neuro/Psych: Uneventful            Sign Out: Acceptable/Baseline neuro status   Airway/Respiratory: Uneventful            Sign Out: Acceptable/Baseline resp. status   CV/Hemodynamics: Uneventful            Sign Out: Acceptable CV status; No obvious hypovolemia; No obvious fluid overload   Other NRE: NONE   DID A NON-ROUTINE EVENT OCCUR? No    Event details/Postop Comments:  No complications.           Last vitals:  Vitals Value Taken Time   /51 06/14/24 1315   Temp     Pulse 74 06/14/24 1320   Resp 24 06/14/24 1311   SpO2 96 % 06/14/24 1320   Vitals shown include unfiled device data.    Electronically Signed By: Kade Ghosh MD  June 14, 2024  1:20 PM

## 2024-06-14 NOTE — OP NOTE
Preoperative diagnosis: Esophageal cancer, malnutrition    Postoperative diagnosis: The same as preop    Procedure performed:    1. Diagnostic laparoscopy     2. Laparoscopic gastrostomy feeding tube placement (18 Fr Dioni-G)    3. Esophagogastroduodenoscopy    Surgeon: Bryant Martinez (present and participated in the entire procedure)    Resident Surgeon: Yoly Victoria    Anesthesia: General    EBL: 5 ml    Complications: None    Findings:   Laparoscopy: Negative  Endoscopy: Significant mucosal inflammation, I could not clearly identify a tumor. The g-tube was in good position. Z-line at 40 cm.    Brief history  Ms. Mustafa just finished chemoradiotherapy for squamous cell cancer. She lost weight and is struggling to eat. On admission, her WBC was reaching a sana, and we first placed a feeding NG tube and she received GSF. Her WBC has almost normalized and we can now place a feeding tube. It is no clear right now if an esophagectomy will be a good option for her. I decided to place a laparoscopic g-tube so that I can place it specifically at the lesser curvature to avoid injury or manipulation of the potential conduit portion of the stomach.      Procedure    We positioned Linh Mustafa in supine and the abdomen and chest were prepared and draped in the conventional fashion.    We then used a 4 port approach and the first port was placed with open technique and fascial stitches for eventual closure.  Next, we examined the peritoneal cavity and there is no evidence of metastatic disease.    The stomach appeared normal and I placed a 0 silk pursestring just next to the lesser curvature sparing the body of the stomach towards the greater curvature. Then we placed 2 tacking sutures using a Tyler Keshav device. We made a small incision in the abdominal wall and cauterized an opening into the pursestring. We then passed the g-tube though the abdominal wall and directed it into the stomach through the pursestring  and tied the pursestring. We inflated the balloon and then tied the tacking stitches. The g-tube bumper was left at 4 cm.    Then, we ensured hemostasis and we closed with absorbable sutures in the conventional fashion.    At the end of the procedure we performed an esophagogastroduodenoscopy with the above-mentioned findings.    Linh Mustafa tolerated the procedure well.

## 2024-06-14 NOTE — BRIEF OP NOTE
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 Tube 4cm at the skin.   Complications: None.  Implants: * No implants in log *    - Okay to start using G tube immediately for tube feeds and meds

## 2024-06-14 NOTE — ANESTHESIA PROCEDURE NOTES
Airway       Patient location during procedure: OR       Procedure Start/Stop Times: 6/14/2024 11:07 AM  Staff -        CRNA: Merritt Rod APRN CRNA       Performed By: CRNA  Consent for Airway        Urgency: elective  Indications and Patient Condition       Indications for airway management: eliza-procedural       Induction type:RSI       Mask difficulty assessment: 0 - not attempted    Final Airway Details       Final airway type: endotracheal airway       Successful airway: ETT - single  Endotracheal Airway Details        ETT size (mm): 7.0       Cuffed: yes       Cuff volume (mL): 8       Successful intubation technique: direct laryngoscopy       DL Blade Type: Rivers 2       Grade View of Cords: 1       Adjucts: stylet       Position: Right       Measured from: lips       Secured at (cm): 22       Bite block used: None    Post intubation assessment        Placement verified by: capnometry and equal breath sounds        Number of attempts at approach: 1       Number of other approaches attempted: 0       Secured with: plastic tape       Ease of procedure: easy       Dentition: Intact and Unchanged    Medication(s) Administered   Medication Administration Time: 6/14/2024 11:07 AM

## 2024-06-15 LAB
ANION GAP SERPL CALCULATED.3IONS-SCNC: 9 MMOL/L (ref 7–15)
BASOPHILS # BLD AUTO: 0 10E3/UL (ref 0–0.2)
BASOPHILS NFR BLD AUTO: 0 %
BUN SERPL-MCNC: 9.5 MG/DL (ref 8–23)
CALCIUM SERPL-MCNC: 9.8 MG/DL (ref 8.8–10.2)
CHLORIDE SERPL-SCNC: 97 MMOL/L (ref 98–107)
CREAT SERPL-MCNC: 0.62 MG/DL (ref 0.51–0.95)
DEPRECATED HCO3 PLAS-SCNC: 27 MMOL/L (ref 22–29)
EGFRCR SERPLBLD CKD-EPI 2021: >90 ML/MIN/1.73M2
EOSINOPHIL # BLD AUTO: 0 10E3/UL (ref 0–0.7)
EOSINOPHIL NFR BLD AUTO: 0 %
ERYTHROCYTE [DISTWIDTH] IN BLOOD BY AUTOMATED COUNT: 16.7 % (ref 10–15)
GLUCOSE SERPL-MCNC: 170 MG/DL (ref 70–99)
HCT VFR BLD AUTO: 25.2 % (ref 35–47)
HGB BLD-MCNC: 8.3 G/DL (ref 11.7–15.7)
IMM GRANULOCYTES # BLD: 0.1 10E3/UL
IMM GRANULOCYTES NFR BLD: 1 %
LYMPHOCYTES # BLD AUTO: 0.4 10E3/UL (ref 0.8–5.3)
LYMPHOCYTES NFR BLD AUTO: 11 %
MAGNESIUM SERPL-MCNC: 2.2 MG/DL (ref 1.7–2.3)
MCH RBC QN AUTO: 33.5 PG (ref 26.5–33)
MCHC RBC AUTO-ENTMCNC: 32.9 G/DL (ref 31.5–36.5)
MCV RBC AUTO: 102 FL (ref 78–100)
MONOCYTES # BLD AUTO: 0.4 10E3/UL (ref 0–1.3)
MONOCYTES NFR BLD AUTO: 11 %
NEUTROPHILS # BLD AUTO: 2.7 10E3/UL (ref 1.6–8.3)
NEUTROPHILS NFR BLD AUTO: 77 %
NRBC # BLD AUTO: 0 10E3/UL
NRBC BLD AUTO-RTO: 0 /100
PHOSPHATE SERPL-MCNC: 3.3 MG/DL (ref 2.5–4.5)
PLATELET # BLD AUTO: 266 10E3/UL (ref 150–450)
POTASSIUM SERPL-SCNC: 4.3 MMOL/L (ref 3.4–5.3)
RBC # BLD AUTO: 2.48 10E6/UL (ref 3.8–5.2)
SODIUM SERPL-SCNC: 133 MMOL/L (ref 135–145)
WBC # BLD AUTO: 3.6 10E3/UL (ref 4–11)

## 2024-06-15 PROCEDURE — 85025 COMPLETE CBC W/AUTO DIFF WBC: CPT

## 2024-06-15 PROCEDURE — 36591 DRAW BLOOD OFF VENOUS DEVICE: CPT

## 2024-06-15 PROCEDURE — 250N000011 HC RX IP 250 OP 636: Mod: JZ

## 2024-06-15 PROCEDURE — 83735 ASSAY OF MAGNESIUM: CPT

## 2024-06-15 PROCEDURE — 250N000013 HC RX MED GY IP 250 OP 250 PS 637

## 2024-06-15 PROCEDURE — 99233 SBSQ HOSP IP/OBS HIGH 50: CPT | Performed by: STUDENT IN AN ORGANIZED HEALTH CARE EDUCATION/TRAINING PROGRAM

## 2024-06-15 PROCEDURE — 84100 ASSAY OF PHOSPHORUS: CPT

## 2024-06-15 PROCEDURE — 120N000002 HC R&B MED SURG/OB UMMC

## 2024-06-15 PROCEDURE — 80048 BASIC METABOLIC PNL TOTAL CA: CPT

## 2024-06-15 PROCEDURE — 250N000013 HC RX MED GY IP 250 OP 250 PS 637: Performed by: STUDENT IN AN ORGANIZED HEALTH CARE EDUCATION/TRAINING PROGRAM

## 2024-06-15 RX ORDER — METHOCARBAMOL 500 MG/1
500 TABLET, FILM COATED ORAL 4 TIMES DAILY
Status: DISCONTINUED | OUTPATIENT
Start: 2024-06-15 | End: 2024-06-17

## 2024-06-15 RX ORDER — OXYCODONE HCL 5 MG/5 ML
5-10 SOLUTION, ORAL ORAL 2 TIMES DAILY PRN
Status: DISCONTINUED | OUTPATIENT
Start: 2024-06-15 | End: 2024-06-18 | Stop reason: HOSPADM

## 2024-06-15 RX ADMIN — OXYCODONE HYDROCHLORIDE 15 MG: 5 SOLUTION ORAL at 00:23

## 2024-06-15 RX ADMIN — CAMPHOR, MENTHOL: .5; .5 LOTION TOPICAL at 09:41

## 2024-06-15 RX ADMIN — OXYCODONE HYDROCHLORIDE 15 MG: 5 SOLUTION ORAL at 09:39

## 2024-06-15 RX ADMIN — Medication 5 ML: at 13:32

## 2024-06-15 RX ADMIN — BUSPIRONE HYDROCHLORIDE 5 MG: 5 TABLET ORAL at 09:40

## 2024-06-15 RX ADMIN — OXYCODONE HYDROCHLORIDE 15 MG: 5 SOLUTION ORAL at 23:01

## 2024-06-15 RX ADMIN — ACETAMINOPHEN 975 MG: 325 SOLUTION ORAL at 06:11

## 2024-06-15 RX ADMIN — ONDANSETRON 8 MG: 2 INJECTION INTRAMUSCULAR; INTRAVENOUS at 13:32

## 2024-06-15 RX ADMIN — Medication 40 MG: at 15:55

## 2024-06-15 RX ADMIN — CAMPHOR, MENTHOL: .5; .5 LOTION TOPICAL at 13:33

## 2024-06-15 RX ADMIN — OXYCODONE HYDROCHLORIDE 15 MG: 5 SOLUTION ORAL at 17:09

## 2024-06-15 RX ADMIN — FAMOTIDINE 20 MG: 20 TABLET ORAL at 20:12

## 2024-06-15 RX ADMIN — HYDROMORPHONE HYDROCHLORIDE 0.2 MG: 0.2 INJECTION, SOLUTION INTRAMUSCULAR; INTRAVENOUS; SUBCUTANEOUS at 03:01

## 2024-06-15 RX ADMIN — FENTANYL 1 PATCH: 25 PATCH TRANSDERMAL at 13:34

## 2024-06-15 RX ADMIN — ONDANSETRON 8 MG: 2 INJECTION INTRAMUSCULAR; INTRAVENOUS at 02:36

## 2024-06-15 RX ADMIN — LEVOTHYROXINE SODIUM 100 MCG: 100 TABLET ORAL at 09:40

## 2024-06-15 RX ADMIN — HYDROMORPHONE HYDROCHLORIDE 0.2 MG: 0.2 INJECTION, SOLUTION INTRAMUSCULAR; INTRAVENOUS; SUBCUTANEOUS at 07:09

## 2024-06-15 RX ADMIN — ONDANSETRON 8 MG: 2 INJECTION INTRAMUSCULAR; INTRAVENOUS at 09:40

## 2024-06-15 RX ADMIN — PROCHLORPERAZINE EDISYLATE 10 MG: 5 INJECTION INTRAMUSCULAR; INTRAVENOUS at 07:09

## 2024-06-15 RX ADMIN — OXYCODONE HYDROCHLORIDE 15 MG: 5 SOLUTION ORAL at 20:12

## 2024-06-15 RX ADMIN — ENOXAPARIN SODIUM 40 MG: 40 INJECTION SUBCUTANEOUS at 20:12

## 2024-06-15 RX ADMIN — METHOCARBAMOL 500 MG: 500 TABLET ORAL at 20:12

## 2024-06-15 RX ADMIN — OXYCODONE HYDROCHLORIDE 15 MG: 5 SOLUTION ORAL at 04:56

## 2024-06-15 RX ADMIN — FENTANYL 1 PATCH: 12.5 PATCH TRANSDERMAL at 13:32

## 2024-06-15 RX ADMIN — FAMOTIDINE 20 MG: 20 TABLET ORAL at 09:40

## 2024-06-15 RX ADMIN — DIPHENHYDRAMINE HYDROCHLORIDE AND LIDOCAINE HYDROCHLORIDE AND ALUMINUM HYDROXIDE AND MAGNESIUM HYDRO 10 ML: KIT at 15:55

## 2024-06-15 RX ADMIN — DIPHENHYDRAMINE HYDROCHLORIDE AND LIDOCAINE HYDROCHLORIDE AND ALUMINUM HYDROXIDE AND MAGNESIUM HYDRO 10 ML: KIT at 23:00

## 2024-06-15 RX ADMIN — Medication 40 MG: at 09:40

## 2024-06-15 RX ADMIN — ACETAMINOPHEN 975 MG: 325 SOLUTION ORAL at 13:31

## 2024-06-15 RX ADMIN — OXYCODONE HYDROCHLORIDE 15 MG: 5 SOLUTION ORAL at 13:31

## 2024-06-15 RX ADMIN — METOCLOPRAMIDE 10 MG: 5 INJECTION, SOLUTION INTRAMUSCULAR; INTRAVENOUS at 23:01

## 2024-06-15 RX ADMIN — METOCLOPRAMIDE 10 MG: 5 INJECTION, SOLUTION INTRAMUSCULAR; INTRAVENOUS at 17:09

## 2024-06-15 RX ADMIN — ONDANSETRON 8 MG: 2 INJECTION INTRAMUSCULAR; INTRAVENOUS at 20:12

## 2024-06-15 RX ADMIN — METOCLOPRAMIDE 10 MG: 5 INJECTION, SOLUTION INTRAMUSCULAR; INTRAVENOUS at 04:56

## 2024-06-15 RX ADMIN — ACETAMINOPHEN 975 MG: 325 SOLUTION ORAL at 17:13

## 2024-06-15 RX ADMIN — PROCHLORPERAZINE EDISYLATE 10 MG: 5 INJECTION INTRAMUSCULAR; INTRAVENOUS at 00:44

## 2024-06-15 RX ADMIN — METHOCARBAMOL 500 MG: 500 TABLET ORAL at 15:55

## 2024-06-15 RX ADMIN — CAMPHOR, MENTHOL: .5; .5 LOTION TOPICAL at 20:13

## 2024-06-15 ASSESSMENT — ACTIVITIES OF DAILY LIVING (ADL)
ADLS_ACUITY_SCORE: 28
ADLS_ACUITY_SCORE: 29
ADLS_ACUITY_SCORE: 29
ADLS_ACUITY_SCORE: 28
ADLS_ACUITY_SCORE: 29
ADLS_ACUITY_SCORE: 28
ADLS_ACUITY_SCORE: 28
ADLS_ACUITY_SCORE: 29
ADLS_ACUITY_SCORE: 28
ADLS_ACUITY_SCORE: 29
ADLS_ACUITY_SCORE: 28
ADLS_ACUITY_SCORE: 29

## 2024-06-15 NOTE — PROGRESS NOTES
[facility]    Medicine Progress Note - Medicine Service, VIVIENNE TEAM 4    Date of Admission:  6/4/2024    Assessment & Plan   Assessment & Plan  Linh Mustafa is a 64 year old female with squamous cell carcinoma of the esophagus admitted on 6/4/202 for chest pain after radiation therapy on 5/31/2024. Initial workup negative for acute pulmonary or cardiac etiology. Chest pain likely from mucositis of the esophagus due to radiation toxicity. She completed final two radiation treatments and pain and nausea is well controlled with current drug regimen. NG placed for tube feeding and monitoring for refeeding syndrome. Needs jejunostomy for nutrition with odynophagia limiting oral consumption. Started GSCF 6/12/2024 to increase neutrophil count >2000 to facilitate G-tube placement. After one dose, neutrophils exceeded target. G-tube placed on 6/14.       Today:  -Monitor for TF tolerance, has nausea/some abdominal pain but not enough to limit TF. 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 Reports pain, nausea, and vomiting after radiation therapy. Chest pain feels similar regarding quality and location to prior pain, but significantly more intense.  ED workup notable for normal troponin and BNP. CT Abd  and Chest demonstrated no acute pathology, including PE, aortic dissection, or esophageal perforation. Workup negative for  cardiac etiology, GI perforation, and PE.  Has significant pain with swallowing, which limits her PO intake. Diet consist predominantly of soft foods like pudding and shakes. Bedside swallow evaluation demonstrated normal oropharyngeal swallowing mechanisms with regular/thin liquids. On 6/10, patient was coughing profusely and the NG tube was dislodged. Thoracic surgery was notified and asserted that it can be replaced at bedside. NG tube was replaced 6/12. G-tube placed on 6/14.   - Consult Palliative care for better pain and nausea control               - Pain Control: oxycodone (15 mg q3h), PTA fentanyl (37 mcg/hr patch every 72 hours), PRN lidocaine 1%  - Bowel Regimen: Miralax BID, PRN senna   - Consult Radiation, thank you for recs              - Completed final two rounds of radiation therapy.   - Consult Thoracic Surgery              - s/p esophagogastroscopy on 6/6 (inflamed esophageal mucosa)              - s/p NGT on 6/6 (dislodged 6/10, replaced 10/12, Removed 6/14)              - G-tube placed on 6/14.   - Consult nutrition for tube feeds  - Osmolite 1.5 via NG-tube (Goal 55 mL/Hr)   - Consult PT/OT for impaired mobility and deconditioning               - Pt near baseline and discharge recommendations deferred to medical team  - Pantoprazole (40 mg BID); PTA esomeprazole not on formulary   - PTA famotidine (20 mg BID)  - PTA Magic Mouthwash (4x daily)  - PTA PRN ondansetron (8 mg q8h)   - PTA PRN compazine (10 mg q6h)  - PTA sucralfate (1g 4x daily)  - Per SLP, okay for regular diet w/ thin liquids     [ ] G tube care instructions in discharge navigator. Will need follow up with CNS in thoracic surgery clinic about 1 week after discharge, they have messaged clinic coordinator to arrange this.      Pancytopenia  Neutropenia  At presentation, WBCs at 1.4 and platelets at 137. Suspect secondary to bone marrow suppression from radiation therapy and/or chemotherapy. Most recent dose of chemotherapy on  5/28/2024 with paclitaxel and carboplatin. Thoracic surgery requires > 2000 Neutrophil count and > 100k plt count to perform G-tube placement. Patient received single dose of Filgrastim-aafi injection (300 mcg) and neutrophils increased to 4000.   - Curbside Heme/onc, affirmed use of GCSF to increase neutrophils >2000  - Hold PTA vitamin B12 (2500 mcg daily) given large size of pill (difficulty swallowing)     Chronic Hypercalcemia   Ca elevated to 11.1 at admission. Most likely secondary to hyperparathyroidism with parathyroid hormone elevated to 121 on 5/1/2024. Had an appointment with endocrinology, but missed the appointment.   - Outpatient followup with endocrinology      Hypothyroidism   PTA levothyroxine (100 mcg)      Anxiety:  - PTA buspirone (5 mg daily)      Tobacco abuse.   Uses 1/2 ppd.    - Nicotine patch   - PRN nicotine gum          Diet: Diet  Adult Formula Drip Feeding: Continuous Osmolite 1.5; Gastrostomy; Goal Rate: 55; mL/hr; start TFs at 24 ml/hr for 4 hours and increase 10 ml q 4 hrs until goal rate; Do not advance tube feeding rate unless K+ is = or > 3.0, Mg++ is = or > 1.5, and...  Regular Diet Adult Thin Liquids (level 0)    DVT Prophylaxis: Enoxaparin (Lovenox) SQ  Mercado Catheter: Not present  Lines: PRESENT      Port A Cath Single 04/24/24 Right Chest wall-Site Assessment: WDL      Cardiac Monitoring: None  Code Status: Full Code      Clinically Significant Risk Factors              # Hypoalbuminemia: Lowest albumin = 3.4 g/dL at 6/5/2024  7:24 AM, will monitor as appropriate     # Hypertension: Noted on problem list           #Precipitous drop in Hgb/Hct: Lowest Hgb this hospitalization: 8.3 g/dL. Will continue to monitor and treat/transfuse as appropriate.      # Moderate Malnutrition: based on nutrition assessment    # Financial/Environmental Concerns: none         Disposition Plan     Medically Ready for Discharge: Anticipated in 2-4 Days         Geoff West DO  Medicine Service,  V[facility] TEAM 4  [facility]  Securely message with [employer] (more info)  Text page via [facility] Paging/Directory   See signed in provider for up to date coverage information  ______________________________________________________________________    Interval History   Pt today has had a change in abdominal pain. It is now more mid-abdomen around the G tube site and epigastric region rather than the esophagus region which was worse previous days. Has nausea, relatively unchanged. Has not vomited. Pt hesitant to try bolus feeds today as she has not reached continuous goal and previous attempt to do bolus feeds did not go well, but understands the G tube may be a better bolus modality than the NJ she had before. Willing to try this tomorrow and attempt better pain control today.     Physical Exam   Vital Signs: Temp: 97.9  F (36.6  C) Temp src: Oral BP: 95/61 Pulse: 92   Resp: 16 SpO2: 98 % O2 Device: Nasal cannula Oxygen Delivery: 1 LPM  Weight: 140 lbs 1.6 oz    Gen: No acute distress, non-toxic, alert  CV: Regular rate, regular rhythm  Pulm: Clear to auscultate bilaterally, breathing non-labored  Abd: Generalized tenderness worse in the upper quadrants, no guarding or rebound  Ext: Moves all extremities, non-cyanotic  Psych: Normal mood and affect      Medical Decision Making           Data     I have personally reviewed the following data over the past 24 hrs:    3.6 (L)  \   8.3 (L)   / 266     133 (L) 97 (L) 9.5 /  170 (H)   4.3 27 0.62 \       Imaging results reviewed over the past 24 hrs:   No results found for this or any previous visit (from the past 24 hour(s)).

## 2024-06-15 NOTE — PHARMACY-CONSULT NOTE
Pharmacy Tube Feeding Consult    Medication reviewed for administration by feeding tube and for potential food/drug interactions.    Recommendation: Per RN, patient prefers to take all medications possible through her feeding tube rather than by mouth. Recommend changing the following medications to a liquid dosage form: pantoprazole. If levothyroxine needs to be taken through the tube for more than 7 days, consider holding tube feeds 1 hr pre- and post-dose and monitor thyroid function weekly.      Pharmacy will continue to follow as new medications are ordered.    Gisselle Stewart, AmeenaD

## 2024-06-15 NOTE — PROGRESS NOTES
Thoracic Surgery Progress Note  06/15/2024       Subjective:  No acute events overnight. This morning, she endorses some abdominal muscle spasms and pain. Discussed that this is normal in the immediate post-operative period and should improve with time. Denies nausea or vomiting.     Objective:  Temp:  [97.4  F (36.3  C)-98.4  F (36.9  C)] 97.9  F (36.6  C)  Pulse:  [] 92  Resp:  [12-23] 16  BP: ()/(51-75) 95/61  SpO2:  [92 %-99 %] 98 %    I/O last 3 completed shifts:  In: 1024 [I.V.:950; NG/GT:50]  Out: 301 [Urine:300; Blood:1]      Gen: Awake, alert, NAD  Resp: NLB on RA  Abd: soft, nondistended, appropriately tender, G-tube site clean dry and intact  Ext: WWP, no edema     Labs:  Recent Labs   Lab 06/15/24  0646 06/14/24  0630 06/13/24  0718   WBC 3.6* 5.5 5.1   HGB 8.3* 8.6* 8.8*    255 215       Recent Labs   Lab 06/15/24  0646 06/14/24  0900 06/14/24  0630 06/13/24  0718   *  --  133* 135   POTASSIUM 4.3  --  4.8 4.1   CHLORIDE 97*  --  98 99   CO2 27  --  27 27   BUN 9.5  --  10.4 9.4   CR 0.62  --  0.67 0.75   * 113* 109* 146*   RACQUEL 9.8  --  9.9 9.9   MAG 2.2  --  2.2 2.0   PHOS 3.3  --  3.3 3.5          Assessment/Plan:   64 year old female with Squamous cell carcinoma of the esophagus on neoadjuvant chemotherapy admitted with chest pain and dysphagia in the setting of radiation (last dose today, 6/5). She is now s/p gastrostomy tube placement on 6/14/2024 with Dr. Martinez.  - Start tube feeds  - Multimodal pain control, recommend adding robaxin to help with port site discomfort  - Thoracic surgery will sign off at this time, please page on-racquel resident with any additional questions or concerns.  - G tube care instructions in discharge navigator. Will follow up with CNS in thoracic surgery clinic about 1 week after discharge, messaged clinic coordinator to arrange.        Seen and discussed with staff  - - - - - - - - - - - - - - - - - -  Abdoulaye Powell MD  General Surgery  "PGY-1  Pager: [phone]      See [facility] for on-call pager information: [facility] Paging/Directory   \"SURGERY THORACIC /MC\"    "

## 2024-06-15 NOTE — PROGRESS NOTES
"Blood pressure 101/63, pulse 92, temperature 97.4  F (36.3  C), temperature source Oral, resp. rate 18, height 1.702 m (5' 7\"), weight 63.5 kg (140 lb 1.6 oz), SpO2 96%.    Activity: SBA to bathroom  Neuros:  AOX4, makes needs known  Cardiac: Tachy, denies chest pain  Respiratory: WDL  GI/: AUOP, +BS  Diet: Regular, G-tube w/TF and meds.    Skin/Incisions/Drains: 4 lap sites, G-tube, Rt chest port,   Lines: Rt chest port  Pain: Oxy, Fentanyl patch, Reglan, Zofran   Plan: Continue with POC    "

## 2024-06-15 NOTE — PLAN OF CARE
"Goal Outcome Evaluation:      Plan of Care Reviewed With: patient    Overall Patient Progress: improvingOverall Patient Progress: improving     Blood pressure 116/75, pulse 92, temperature 97.8  F (36.6  C), temperature source Oral, resp. rate 18, height 1.702 m (5' 7\"), weight 63.5 kg (140 lb 1.6 oz), SpO2 92%.    Status: POD 0 following G tube placement, esophageal cancer  Activity: SBA  Neuros: A&O x4  Cardiac: WDL  Respiratory: 1 L NC sating >92  GI/: hypoactive BS, abdominal discomfort, nausea  Diet: NPO  Skin/Incisions: port, G tube, 4 lap sites with primapore  Lines/Drains: port  Pain/Nausea: pain poorly controlled with IV dilaudid, and oral oxycodone, encouraged to take scheduled tylenol as well, took tylenol late this am  New Changes: pain poorly controlled  Plan:  provide education on G tube      "

## 2024-06-15 NOTE — PLAN OF CARE
"Goal Outcome Evaluation:    A&Ox4. VSS on RA. Pain managed with scheduled oxycodone. Fentanyl patch on R upper arm. Nausea managed with scheduled Zofran. G tube with Tfs at 55ml/hr. Abdominal lap sites x4 covered with primapore CDI. Voiding spontaneously, LBM 6/13, not passing flatus. R chest port infusing HL. Regular diet, poor appetite. Up ad liz in room. No electrolyte replacements needed. Recheck tomorrow AM. Continue with POC.     Vitals: BP 95/61 (BP Location: Left arm)   Pulse 92   Temp 97.9  F (36.6  C) (Oral)   Resp 16   Ht 1.702 m (5' 7\")   Wt 63.5 kg (140 lb 1.6 oz)   SpO2 98%   BMI 21.94 kg/m      "

## 2024-06-16 LAB
ANION GAP SERPL CALCULATED.3IONS-SCNC: 8 MMOL/L (ref 7–15)
BASOPHILS # BLD AUTO: 0 10E3/UL (ref 0–0.2)
BASOPHILS NFR BLD AUTO: 0 %
BUN SERPL-MCNC: 13.5 MG/DL (ref 8–23)
CALCIUM SERPL-MCNC: 9.9 MG/DL (ref 8.8–10.2)
CHLORIDE SERPL-SCNC: 99 MMOL/L (ref 98–107)
CREAT SERPL-MCNC: 0.6 MG/DL (ref 0.51–0.95)
DEPRECATED HCO3 PLAS-SCNC: 27 MMOL/L (ref 22–29)
EGFRCR SERPLBLD CKD-EPI 2021: >90 ML/MIN/1.73M2
EOSINOPHIL # BLD AUTO: 0 10E3/UL (ref 0–0.7)
EOSINOPHIL NFR BLD AUTO: 0 %
ERYTHROCYTE [DISTWIDTH] IN BLOOD BY AUTOMATED COUNT: 16.8 % (ref 10–15)
GLUCOSE SERPL-MCNC: 120 MG/DL (ref 70–99)
HCT VFR BLD AUTO: 26.2 % (ref 35–47)
HGB BLD-MCNC: 8.5 G/DL (ref 11.7–15.7)
IMM GRANULOCYTES # BLD: 0 10E3/UL
IMM GRANULOCYTES NFR BLD: 1 %
LYMPHOCYTES # BLD AUTO: 0.5 10E3/UL (ref 0.8–5.3)
LYMPHOCYTES NFR BLD AUTO: 10 %
MAGNESIUM SERPL-MCNC: 2.1 MG/DL (ref 1.7–2.3)
MCH RBC QN AUTO: 33.6 PG (ref 26.5–33)
MCHC RBC AUTO-ENTMCNC: 32.4 G/DL (ref 31.5–36.5)
MCV RBC AUTO: 104 FL (ref 78–100)
MONOCYTES # BLD AUTO: 0.6 10E3/UL (ref 0–1.3)
MONOCYTES NFR BLD AUTO: 14 %
NEUTROPHILS # BLD AUTO: 3.4 10E3/UL (ref 1.6–8.3)
NEUTROPHILS NFR BLD AUTO: 75 %
NRBC # BLD AUTO: 0 10E3/UL
NRBC BLD AUTO-RTO: 0 /100
PHOSPHATE SERPL-MCNC: 3.2 MG/DL (ref 2.5–4.5)
PLATELET # BLD AUTO: 301 10E3/UL (ref 150–450)
POTASSIUM SERPL-SCNC: 5 MMOL/L (ref 3.4–5.3)
RBC # BLD AUTO: 2.53 10E6/UL (ref 3.8–5.2)
SODIUM SERPL-SCNC: 134 MMOL/L (ref 135–145)
WBC # BLD AUTO: 4.5 10E3/UL (ref 4–11)

## 2024-06-16 PROCEDURE — 250N000013 HC RX MED GY IP 250 OP 250 PS 637

## 2024-06-16 PROCEDURE — 250N000013 HC RX MED GY IP 250 OP 250 PS 637: Performed by: STUDENT IN AN ORGANIZED HEALTH CARE EDUCATION/TRAINING PROGRAM

## 2024-06-16 PROCEDURE — 250N000011 HC RX IP 250 OP 636

## 2024-06-16 PROCEDURE — 80048 BASIC METABOLIC PNL TOTAL CA: CPT

## 2024-06-16 PROCEDURE — 36591 DRAW BLOOD OFF VENOUS DEVICE: CPT

## 2024-06-16 PROCEDURE — 83735 ASSAY OF MAGNESIUM: CPT

## 2024-06-16 PROCEDURE — 99233 SBSQ HOSP IP/OBS HIGH 50: CPT | Performed by: STUDENT IN AN ORGANIZED HEALTH CARE EDUCATION/TRAINING PROGRAM

## 2024-06-16 PROCEDURE — 120N000002 HC R&B MED SURG/OB UMMC

## 2024-06-16 PROCEDURE — 84100 ASSAY OF PHOSPHORUS: CPT

## 2024-06-16 PROCEDURE — 85025 COMPLETE CBC W/AUTO DIFF WBC: CPT

## 2024-06-16 RX ADMIN — ONDANSETRON 8 MG: 2 INJECTION INTRAMUSCULAR; INTRAVENOUS at 08:04

## 2024-06-16 RX ADMIN — ONDANSETRON 8 MG: 2 INJECTION INTRAMUSCULAR; INTRAVENOUS at 20:29

## 2024-06-16 RX ADMIN — ACETAMINOPHEN 975 MG: 325 SOLUTION ORAL at 11:20

## 2024-06-16 RX ADMIN — OXYCODONE HYDROCHLORIDE 10 MG: 5 SOLUTION ORAL at 00:55

## 2024-06-16 RX ADMIN — DIPHENHYDRAMINE HYDROCHLORIDE AND LIDOCAINE HYDROCHLORIDE AND ALUMINUM HYDROXIDE AND MAGNESIUM HYDRO 10 ML: KIT at 23:04

## 2024-06-16 RX ADMIN — PROCHLORPERAZINE EDISYLATE 10 MG: 5 INJECTION INTRAMUSCULAR; INTRAVENOUS at 16:40

## 2024-06-16 RX ADMIN — OXYCODONE HYDROCHLORIDE 15 MG: 5 SOLUTION ORAL at 02:14

## 2024-06-16 RX ADMIN — DIPHENHYDRAMINE HYDROCHLORIDE AND LIDOCAINE HYDROCHLORIDE AND ALUMINUM HYDROXIDE AND MAGNESIUM HYDRO 10 ML: KIT at 16:06

## 2024-06-16 RX ADMIN — Medication 40 MG: at 16:06

## 2024-06-16 RX ADMIN — ONDANSETRON 8 MG: 2 INJECTION INTRAMUSCULAR; INTRAVENOUS at 14:14

## 2024-06-16 RX ADMIN — BUSPIRONE HYDROCHLORIDE 5 MG: 5 TABLET ORAL at 08:05

## 2024-06-16 RX ADMIN — CAMPHOR, MENTHOL: .5; .5 LOTION TOPICAL at 08:06

## 2024-06-16 RX ADMIN — METHOCARBAMOL 500 MG: 500 TABLET ORAL at 16:06

## 2024-06-16 RX ADMIN — METOCLOPRAMIDE 10 MG: 5 INJECTION, SOLUTION INTRAMUSCULAR; INTRAVENOUS at 17:07

## 2024-06-16 RX ADMIN — CAMPHOR, MENTHOL: .5; .5 LOTION TOPICAL at 20:29

## 2024-06-16 RX ADMIN — METHOCARBAMOL 500 MG: 500 TABLET ORAL at 20:29

## 2024-06-16 RX ADMIN — OXYCODONE HYDROCHLORIDE 15 MG: 5 SOLUTION ORAL at 08:03

## 2024-06-16 RX ADMIN — LEVOTHYROXINE SODIUM 100 MCG: 100 TABLET ORAL at 08:04

## 2024-06-16 RX ADMIN — ACETAMINOPHEN 975 MG: 325 SOLUTION ORAL at 23:13

## 2024-06-16 RX ADMIN — OXYCODONE HYDROCHLORIDE 15 MG: 5 SOLUTION ORAL at 17:07

## 2024-06-16 RX ADMIN — OXYCODONE HYDROCHLORIDE 15 MG: 5 SOLUTION ORAL at 20:29

## 2024-06-16 RX ADMIN — METHOCARBAMOL 500 MG: 500 TABLET ORAL at 11:20

## 2024-06-16 RX ADMIN — POLYETHYLENE GLYCOL 3350 17 G: 17 POWDER, FOR SOLUTION ORAL at 20:29

## 2024-06-16 RX ADMIN — PROCHLORPERAZINE EDISYLATE 10 MG: 5 INJECTION INTRAMUSCULAR; INTRAVENOUS at 23:13

## 2024-06-16 RX ADMIN — Medication 40 MG: at 08:04

## 2024-06-16 RX ADMIN — Medication 15 ML: at 08:06

## 2024-06-16 RX ADMIN — METHOCARBAMOL 500 MG: 500 TABLET ORAL at 08:04

## 2024-06-16 RX ADMIN — METOCLOPRAMIDE 10 MG: 5 INJECTION, SOLUTION INTRAMUSCULAR; INTRAVENOUS at 11:20

## 2024-06-16 RX ADMIN — ACETAMINOPHEN 975 MG: 325 SOLUTION ORAL at 17:14

## 2024-06-16 RX ADMIN — OXYCODONE HYDROCHLORIDE 15 MG: 5 SOLUTION ORAL at 04:55

## 2024-06-16 RX ADMIN — CAMPHOR, MENTHOL: .5; .5 LOTION TOPICAL at 14:14

## 2024-06-16 RX ADMIN — METOCLOPRAMIDE 10 MG: 5 INJECTION, SOLUTION INTRAMUSCULAR; INTRAVENOUS at 23:04

## 2024-06-16 RX ADMIN — OXYCODONE HYDROCHLORIDE 15 MG: 5 SOLUTION ORAL at 14:14

## 2024-06-16 RX ADMIN — FAMOTIDINE 20 MG: 20 TABLET ORAL at 08:05

## 2024-06-16 RX ADMIN — METOCLOPRAMIDE 10 MG: 5 INJECTION, SOLUTION INTRAMUSCULAR; INTRAVENOUS at 04:55

## 2024-06-16 RX ADMIN — ENOXAPARIN SODIUM 40 MG: 40 INJECTION SUBCUTANEOUS at 20:29

## 2024-06-16 RX ADMIN — FAMOTIDINE 20 MG: 20 TABLET ORAL at 20:29

## 2024-06-16 RX ADMIN — OXYCODONE HYDROCHLORIDE 15 MG: 5 SOLUTION ORAL at 23:05

## 2024-06-16 RX ADMIN — OXYCODONE HYDROCHLORIDE 15 MG: 5 SOLUTION ORAL at 11:19

## 2024-06-16 RX ADMIN — ONDANSETRON 8 MG: 2 INJECTION INTRAMUSCULAR; INTRAVENOUS at 02:14

## 2024-06-16 ASSESSMENT — ACTIVITIES OF DAILY LIVING (ADL)
ADLS_ACUITY_SCORE: 29

## 2024-06-16 NOTE — PROGRESS NOTES
"Nutrition Brief - contacted by team to start bolus regimen:    Chart reviewed: [facility] cancer of esophagus, s/p radiation therapy and surgery  \"Needs jejunostomy for nutrition with odynophagia limiting oral consumption. Started GSCF 2024 to increase neutrophil count >2000 to facilitate G-tube placement. After one dose, neutrophils exceeded target. G-tube placed on \".     GI/stool:  On bowel regimen  Last BM: 1x on     Nutrition:  EN: Continuous Osmolite 1.5; Gastrostomy; Goal Rate: 55; mL/hr;   Diet: Regular with thin liquids for comfort only at this time .     Labs noted:  Na+: 134 (L)  B (H) -> no history of diabetes, not on meds   Lytes normal range,   Renal function normal range       Interventions:  Enteral Nutrition   Dosing wt: 63.5 kg standing wt on 6/10/24  Access: G-tube placed on 24    Formula: Osmolite 1.5  Volume: 5.5 cartons/day (1320 mL, 1005 ml free water)  Provisions: 1980 kcals ( 31 kcal/kg), 83 g PRO (1.3 gram/kg), 268 g CHO, and 0 g fiber daily.        Suggested Tube feeding schedule via gravity feedings  - Stop continuous TF for 1-2 hrs to let stomach empty prior to starting gravity feeding    Day 1 (): Begin 1st gravity feeding @ 120 ml ( 1/2 can ) x 3 feedings today (separate 3-4 hrs apart).    Day 2 (): If tolerated, increase volume to 240 mL ( 1 can ) x 4 feedings (separate each feeds 3-4 hrs apart).    Day 3 ( ):  If tolerated increase to goal volume of  1 1/2 cartons formula ( 360 ml) TID plus additional 1 carton ( 240 ml) as 4th feeding     Flush with 120 mL of H20 before and after each feeding if TF to provide full hydration.     Do not give feedings at night while pt asleep (during the day only).    Qi Serrano RD/LD  Unit 7C ([id number]) and ([id number]),  Monday-Friday: Vocera -> (7C clinical Dietitian),   Weekend/Holiday: Vocera -> (Weekend Clinical Dietitian)              "

## 2024-06-16 NOTE — PROGRESS NOTES
[facility]    Medicine Progress Note - Medicine Service, VIVIENNE TEAM 4    Date of Admission:  6/4/2024    Assessment & Plan   Assessment & Plan  Linh Mustafa is a 64 year old female with squamous cell carcinoma of the esophagus admitted on 6/4/202 for chest pain after radiation therapy on 5/31/2024. Initial workup negative for acute pulmonary or cardiac etiology. Chest pain likely from mucositis of the esophagus due to radiation toxicity. She completed final two radiation treatments and pain and nausea is well controlled with current drug regimen. NG placed for tube feeding and monitoring for refeeding syndrome. Needs jejunostomy for nutrition with odynophagia limiting oral consumption. Started GSCF 6/12/2024 to increase neutrophil count >2000 to facilitate G-tube placement. After one dose, neutrophils exceeded target. G-tube placed on 6/14.       Today:  -Monitor for TF tolerance, has nausea/some abdominal pain but not enough to limit TF.   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 Reports pain, nausea, and vomiting after radiation therapy. Chest pain feels similar regarding quality and location to prior pain, but significantly more intense.  ED workup notable for normal troponin and BNP. CT Abd  and Chest demonstrated no acute pathology, including PE, aortic dissection, or esophageal perforation. Workup negative for cardiac  etiology, GI perforation, and PE.  Has significant pain with swallowing, which limits her PO intake. Diet consist predominantly of soft foods like pudding and shakes. Bedside swallow evaluation demonstrated normal oropharyngeal swallowing mechanisms with regular/thin liquids. On 6/10, patient was coughing profusely and the NG tube was dislodged. Thoracic surgery was notified and asserted that it can be replaced at bedside. NG tube was replaced 6/12. G-tube placed on 6/14.   - Consult Palliative care for better pain and nausea control               - Pain Control: oxycodone (15 mg q3h), PTA fentanyl (37 mcg/hr patch every 72 hours), PRN lidocaine 1%  - Bowel Regimen: Miralax BID, PRN senna   - Consult Radiation, thank you for recs              - Completed final two rounds of radiation therapy.   - Consult Thoracic Surgery              - s/p esophagogastroscopy on 6/6 (inflamed esophageal mucosa)              - s/p NGT on 6/6 (dislodged 6/10, replaced 10/12, Removed 6/14)              - G-tube placed on 6/14.    - Signed off 6/15   - Consult nutrition for tube feeds  - Osmolite 1.5 via NG-tube (Goal 55 mL/Hr)   - Consult PT/OT for impaired mobility and deconditioning               - Pt near baseline and discharge recommendations deferred to medical team  - Pantoprazole (40 mg BID); PTA esomeprazole not on formulary   - PTA famotidine (20 mg BID)  - PTA Magic Mouthwash (4x daily)  - PTA PRN ondansetron (8 mg q8h)   - PTA PRN compazine (10 mg q6h)  - PTA sucralfate (1g 4x daily)  - Per SLP, okay for regular diet w/ thin liquids     [ ] G tube care instructions in discharge navigator. Will need follow up with CNS in thoracic surgery clinic about 1 week after discharge, they have messaged clinic coordinator to arrange this.      Pancytopenia  Neutropenia  At presentation, WBCs at 1.4 and platelets at 137. Suspect secondary to bone marrow suppression from radiation therapy and/or chemotherapy. Most recent dose of  chemotherapy on 5/28/2024 with paclitaxel and carboplatin. Thoracic surgery requires > 2000 Neutrophil count and > 100k plt count to perform G-tube placement. Patient received single dose of Filgrastim-aafi injection (300 mcg) and neutrophils increased to 4000.   - Curbside Heme/onc, affirmed use of GCSF to increase neutrophils >2000  - Hold PTA vitamin B12 (2500 mcg daily) given large size of pill (difficulty swallowing)     Chronic Hypercalcemia   Ca elevated to 11.1 at admission. Most likely secondary to hyperparathyroidism with parathyroid hormone elevated to 121 on 5/1/2024. Had an appointment with endocrinology, but missed the appointment.   - Outpatient followup with endocrinology      Hypothyroidism   PTA levothyroxine (100 mcg)      Anxiety:  - PTA buspirone (5 mg daily)      Tobacco abuse.   Uses 1/2 ppd.    - Nicotine patch   - PRN nicotine gum          Diet: Diet  Adult Formula Drip Feeding: Continuous Osmolite 1.5; Gastrostomy; Goal Rate: 55; mL/hr; start TFs at 24 ml/hr for 4 hours and increase 10 ml q 4 hrs until goal rate; Do not advance tube feeding rate unless K+ is = or > 3.0, Mg++ is = or > 1.5, and...  Regular Diet Adult Thin Liquids (level 0)    DVT Prophylaxis: Enoxaparin (Lovenox) SQ  Mercado Catheter: Not present  Lines: PRESENT      Port A Cath Single 04/24/24 Right Chest wall-Site Assessment: WDL      Cardiac Monitoring: None  Code Status: Full Code      Clinically Significant Risk Factors              # Hypoalbuminemia: Lowest albumin = 3.4 g/dL at 6/5/2024  7:24 AM, will monitor as appropriate     # Hypertension: Noted on problem list           #Precipitous drop in Hgb/Hct: Lowest Hgb this hospitalization: 8.3 g/dL. Will continue to monitor and treat/transfuse as appropriate.      # Moderate Malnutrition: based on nutrition assessment    # Financial/Environmental Concerns: none         Disposition Plan     Medically Ready for Discharge: Anticipated in 2-4 Days         Geoff West  DO  Medicine Service, VIVIENNE TEAM 4  [facility]  Securely message with [employer] (more info)  Text page via [employer] Paging/Directory   See signed in provider for up to date coverage information  ______________________________________________________________________    Interval History   Pt today was seen walking in the halls prior to my interview. Later when seen in room pt had family at bedside. She thinks that her pain is about the same as yesterday. The additional PO oxy PRN helped and she still needed some IV dilaudid. Mostly the pain is around her incision site currently. She is willing to trial bolus feeds today and prioritize PO oxy PRN's over the dilaudid IV in hopes to move towards a safe discharge plan.     Physical Exam   Vital Signs: Temp: 98  F (36.7  C) Temp src: Oral BP: 111/69 Pulse: 71   Resp: 18 SpO2: 92 % O2 Device: None (Room air)    Weight: 140 lbs 1.6 oz    Gen: No acute distress, non-toxic, alert  CV: Regular rate, regular rhythm  Pulm: Clear to auscultate bilaterally, breathing non-labored  Abd: Generalized tenderness worse in the upper quadrants, no guarding or rebound  Ext: Moves all extremities, non-cyanotic  Psych: Normal mood and affect      Medical Decision Making           Data     I have personally reviewed the following data over the past 24 hrs:    4.5  \   8.5 (L)   / 301     134 (L) 99 13.5 /  120 (H)   5.0 27 0.60 \       Lab Results   Component Value Date    WBC 4.5 06/16/2024    WBC 3.6 (L) 06/15/2024    WBC 5.5 06/14/2024    HGB 8.5 (L) 06/16/2024    HGB 8.3 (L) 06/15/2024    HGB 8.6 (L) 06/14/2024    HCT 26.2 (L) 06/16/2024    HCT 25.2 (L) 06/15/2024    HCT 25.4 (L) 06/14/2024     06/16/2024     06/15/2024     06/14/2024     (L) 06/16/2024     (L) 06/15/2024     (L) 06/14/2024    POTASSIUM 5.0 06/16/2024    POTASSIUM 4.3 06/15/2024    POTASSIUM 4.8 06/14/2024    CHLORIDE 99 06/16/2024    CHLORIDE  97 (L) 06/15/2024    CHLORIDE 98 06/14/2024    CO2 27 06/16/2024    CO2 27 06/15/2024    CO2 27 06/14/2024    BUN 13.5 06/16/2024    BUN 9.5 06/15/2024    BUN 10.4 06/14/2024    CR 0.60 06/16/2024    CR 0.62 06/15/2024    CR 0.67 06/14/2024     (H) 06/16/2024     (H) 06/15/2024     (H) 06/14/2024    SED 61 (H) 06/04/2024    DD 0.79 (H) 12/23/2022    NTBNPI 136 06/04/2024    AST 17 06/06/2024    AST 14 06/05/2024    AST 22 06/04/2024    ALT 22 06/06/2024    ALT 18 06/05/2024    ALT 27 06/04/2024    ALKPHOS 99 06/06/2024    ALKPHOS 78 06/05/2024    ALKPHOS 98 06/04/2024    BILITOTAL 0.7 06/06/2024    BILITOTAL 0.7 06/05/2024    BILITOTAL 1.2 06/04/2024    INR 0.98 06/04/2024    INR 1.05 12/23/2022       Imaging results reviewed over the past 24 hrs:   No results found for this or any previous visit (from the past 24 hour(s)).

## 2024-06-16 NOTE — PLAN OF CARE
"Goal Outcome Evaluation:      Plan of Care Reviewed With: patient    Overall Patient Progress: improvingOverall Patient Progress: improving    A&Ox4. VSS on RA. Pain managed with scheduled oxycodone. Fentanyl patch on R upper arm. Nausea managed with scheduled Zofran and reglan. G tube clamped, continuous Tfs stopped at 1350, plan for gravity Tfs x3 feeding staring today at 1550. Abdominal lap sites x4 covered with primapore CDI. Voiding spontaneously, LBM 6/13, not passing flatus. R chest port infusing SL. Regular diet, poor appetite. Up ad liz, ambulated in halls x1. No electrolyte replacements needed. Recheck tomorrow AM. Continue with POC.     Vitals: /69 (BP Location: Left arm)   Pulse 71   Temp 98  F (36.7  C) (Oral)   Resp 18   Ht 1.702 m (5' 7\")   Wt 63.5 kg (140 lb 1.6 oz)   SpO2 92%   BMI 21.94 kg/m      "

## 2024-06-16 NOTE — PLAN OF CARE
"BP (!) 84/60 (BP Location: Left arm)   Pulse 71   Temp 98.1  F (36.7  C) (Oral)   Resp 18   Ht 1.702 m (5' 7\")   Wt 63.5 kg (140 lb 1.6 oz)   SpO2 95%   BMI 21.94 kg/m      Status: esophageal cancer  Activity: SBA  Neuros: A&Ox4, no deficits noted.  Cardiac: WDL, denies chest pain. Soft pressures baseline/WNL.  Respiratory: WDL on RA, denies SOB.  GI/: +BS, LBM 6/13, voids spont with AUOP.  Diet: Tolerating continuous TF; regular diet ordered, pt does not tolerate PO intake well.  Skin/Incisions: WDL ex abd lap sitesx4 dressed in primapore CDI.  Lines/Drains: R chest port HL. Gtube running TF continuously at 55.  Pain/Nausea: Pain managed with sched tyl/oxy + PRN oxy x1, nausea managed with sched zofran.  New Changes: No acute changes this shift.    "

## 2024-06-16 NOTE — PROGRESS NOTES
"Blood pressure 101/63, pulse 73, temperature 98  F (36.7  C), temperature source Oral, resp. rate 16, height 1.702 m (5' 7\"), weight 63.5 kg (140 lb 1.6 oz), SpO2 95%.    Activity: SBA to bathroom  Neuros:  AOX4, makes needs known  Cardiac: WDL  Respiratory: WDL  GI/: AUOP, +BS, no BM  Diet: Regular, not tolerating  Skin/Incisions/Drains: 4 abd  lap sites, G-tube, Rt port  Lines: Rt chest port  Pain: 4-5/10, schedule pain and nausea meds  Plan: Continue with POC  "

## 2024-06-17 LAB
ANION GAP SERPL CALCULATED.3IONS-SCNC: 9 MMOL/L (ref 7–15)
BASOPHILS # BLD AUTO: 0 10E3/UL (ref 0–0.2)
BASOPHILS NFR BLD AUTO: 0 %
BUN SERPL-MCNC: 12.5 MG/DL (ref 8–23)
CALCIUM SERPL-MCNC: 10.3 MG/DL (ref 8.8–10.2)
CHLORIDE SERPL-SCNC: 98 MMOL/L (ref 98–107)
CREAT SERPL-MCNC: 0.65 MG/DL (ref 0.51–0.95)
DEPRECATED HCO3 PLAS-SCNC: 28 MMOL/L (ref 22–29)
EGFRCR SERPLBLD CKD-EPI 2021: >90 ML/MIN/1.73M2
EOSINOPHIL # BLD AUTO: 0 10E3/UL (ref 0–0.7)
EOSINOPHIL NFR BLD AUTO: 1 %
ERYTHROCYTE [DISTWIDTH] IN BLOOD BY AUTOMATED COUNT: 17.1 % (ref 10–15)
GLUCOSE SERPL-MCNC: 94 MG/DL (ref 70–99)
HCT VFR BLD AUTO: 26 % (ref 35–47)
HGB BLD-MCNC: 8.7 G/DL (ref 11.7–15.7)
IMM GRANULOCYTES # BLD: 0.1 10E3/UL
IMM GRANULOCYTES NFR BLD: 2 %
LYMPHOCYTES # BLD AUTO: 0.6 10E3/UL (ref 0.8–5.3)
LYMPHOCYTES NFR BLD AUTO: 15 %
MAGNESIUM SERPL-MCNC: 2.1 MG/DL (ref 1.7–2.3)
MCH RBC QN AUTO: 34.5 PG (ref 26.5–33)
MCHC RBC AUTO-ENTMCNC: 33.5 G/DL (ref 31.5–36.5)
MCV RBC AUTO: 103 FL (ref 78–100)
MONOCYTES # BLD AUTO: 0.7 10E3/UL (ref 0–1.3)
MONOCYTES NFR BLD AUTO: 17 %
NEUTROPHILS # BLD AUTO: 2.6 10E3/UL (ref 1.6–8.3)
NEUTROPHILS NFR BLD AUTO: 65 %
NRBC # BLD AUTO: 0 10E3/UL
NRBC BLD AUTO-RTO: 0 /100
PHOSPHATE SERPL-MCNC: 3.7 MG/DL (ref 2.5–4.5)
PLATELET # BLD AUTO: 284 10E3/UL (ref 150–450)
POTASSIUM SERPL-SCNC: 4.8 MMOL/L (ref 3.4–5.3)
RBC # BLD AUTO: 2.52 10E6/UL (ref 3.8–5.2)
SODIUM SERPL-SCNC: 135 MMOL/L (ref 135–145)
WBC # BLD AUTO: 4 10E3/UL (ref 4–11)

## 2024-06-17 PROCEDURE — 99232 SBSQ HOSP IP/OBS MODERATE 35: CPT | Mod: GC | Performed by: STUDENT IN AN ORGANIZED HEALTH CARE EDUCATION/TRAINING PROGRAM

## 2024-06-17 PROCEDURE — 250N000013 HC RX MED GY IP 250 OP 250 PS 637: Performed by: STUDENT IN AN ORGANIZED HEALTH CARE EDUCATION/TRAINING PROGRAM

## 2024-06-17 PROCEDURE — 250N000013 HC RX MED GY IP 250 OP 250 PS 637

## 2024-06-17 PROCEDURE — 120N000002 HC R&B MED SURG/OB UMMC

## 2024-06-17 PROCEDURE — 85025 COMPLETE CBC W/AUTO DIFF WBC: CPT

## 2024-06-17 PROCEDURE — 83735 ASSAY OF MAGNESIUM: CPT

## 2024-06-17 PROCEDURE — 250N000011 HC RX IP 250 OP 636: Mod: JZ

## 2024-06-17 PROCEDURE — 80048 BASIC METABOLIC PNL TOTAL CA: CPT

## 2024-06-17 PROCEDURE — 36591 DRAW BLOOD OFF VENOUS DEVICE: CPT

## 2024-06-17 PROCEDURE — 84100 ASSAY OF PHOSPHORUS: CPT

## 2024-06-17 RX ORDER — AMOXICILLIN 250 MG
1 CAPSULE ORAL 2 TIMES DAILY
Qty: 60 TABLET | Refills: 0 | Status: SHIPPED | OUTPATIENT
Start: 2024-06-17 | End: 2024-06-26

## 2024-06-17 RX ORDER — ACETAMINOPHEN 325 MG/10.15ML
325 LIQUID ORAL EVERY 4 HOURS PRN
COMMUNITY
Start: 2024-06-17 | End: 2024-06-26

## 2024-06-17 RX ORDER — AMOXICILLIN 250 MG
1 CAPSULE ORAL 2 TIMES DAILY
Status: DISCONTINUED | OUTPATIENT
Start: 2024-06-17 | End: 2024-06-18 | Stop reason: HOSPADM

## 2024-06-17 RX ORDER — METHOCARBAMOL 750 MG/1
750 TABLET, FILM COATED ORAL 4 TIMES DAILY
Qty: 120 TABLET | Refills: 0 | Status: SHIPPED | OUTPATIENT
Start: 2024-06-17 | End: 2024-07-02

## 2024-06-17 RX ORDER — ACETAMINOPHEN 325 MG/10.15ML
975 LIQUID ORAL 3 TIMES DAILY
Qty: 473 ML | Refills: 0 | Status: SHIPPED | OUTPATIENT
Start: 2024-06-17 | End: 2024-06-26

## 2024-06-17 RX ORDER — ONDANSETRON 8 MG/1
8 TABLET, ORALLY DISINTEGRATING ORAL EVERY 6 HOURS
Qty: 120 TABLET | Refills: 0 | Status: SHIPPED | OUTPATIENT
Start: 2024-06-17 | End: 2024-07-02

## 2024-06-17 RX ORDER — PROCHLORPERAZINE MALEATE 10 MG
10 TABLET ORAL EVERY 6 HOURS PRN
Qty: 90 TABLET | Refills: 0 | Status: SHIPPED | OUTPATIENT
Start: 2024-06-17 | End: 2024-07-02

## 2024-06-17 RX ORDER — FLUORIDE TOOTHPASTE
15 TOOTHPASTE DENTAL 4 TIMES DAILY PRN
Qty: 473 ML | Refills: 0 | Status: SHIPPED | OUTPATIENT
Start: 2024-06-17 | End: 2024-07-02

## 2024-06-17 RX ORDER — METHOCARBAMOL 750 MG/1
750 TABLET, FILM COATED ORAL 4 TIMES DAILY
Status: DISCONTINUED | OUTPATIENT
Start: 2024-06-17 | End: 2024-06-18 | Stop reason: HOSPADM

## 2024-06-17 RX ADMIN — METOCLOPRAMIDE 10 MG: 5 INJECTION, SOLUTION INTRAMUSCULAR; INTRAVENOUS at 22:53

## 2024-06-17 RX ADMIN — METHOCARBAMOL 750 MG: 750 TABLET ORAL at 21:28

## 2024-06-17 RX ADMIN — LEVOTHYROXINE SODIUM 100 MCG: 100 TABLET ORAL at 08:24

## 2024-06-17 RX ADMIN — METOCLOPRAMIDE 10 MG: 5 INJECTION, SOLUTION INTRAMUSCULAR; INTRAVENOUS at 11:06

## 2024-06-17 RX ADMIN — FAMOTIDINE 20 MG: 20 TABLET ORAL at 08:24

## 2024-06-17 RX ADMIN — OXYCODONE HYDROCHLORIDE 15 MG: 5 SOLUTION ORAL at 01:56

## 2024-06-17 RX ADMIN — OXYCODONE HYDROCHLORIDE 15 MG: 5 SOLUTION ORAL at 11:06

## 2024-06-17 RX ADMIN — ONDANSETRON 8 MG: 2 INJECTION INTRAMUSCULAR; INTRAVENOUS at 08:24

## 2024-06-17 RX ADMIN — ONDANSETRON 8 MG: 2 INJECTION INTRAMUSCULAR; INTRAVENOUS at 21:28

## 2024-06-17 RX ADMIN — CAMPHOR, MENTHOL: .5; .5 LOTION TOPICAL at 21:29

## 2024-06-17 RX ADMIN — ACETAMINOPHEN 975 MG: 325 SOLUTION ORAL at 11:06

## 2024-06-17 RX ADMIN — ONDANSETRON 8 MG: 2 INJECTION INTRAMUSCULAR; INTRAVENOUS at 14:35

## 2024-06-17 RX ADMIN — ACETAMINOPHEN 975 MG: 325 SOLUTION ORAL at 23:50

## 2024-06-17 RX ADMIN — OXYCODONE HYDROCHLORIDE 15 MG: 5 SOLUTION ORAL at 14:27

## 2024-06-17 RX ADMIN — OXYCODONE HYDROCHLORIDE 15 MG: 5 SOLUTION ORAL at 08:23

## 2024-06-17 RX ADMIN — ENOXAPARIN SODIUM 40 MG: 40 INJECTION SUBCUTANEOUS at 21:29

## 2024-06-17 RX ADMIN — FAMOTIDINE 20 MG: 20 TABLET ORAL at 21:28

## 2024-06-17 RX ADMIN — PROCHLORPERAZINE EDISYLATE 10 MG: 5 INJECTION INTRAMUSCULAR; INTRAVENOUS at 17:28

## 2024-06-17 RX ADMIN — Medication 15 ML: at 14:35

## 2024-06-17 RX ADMIN — METHOCARBAMOL 500 MG: 500 TABLET ORAL at 08:24

## 2024-06-17 RX ADMIN — BUSPIRONE HYDROCHLORIDE 5 MG: 5 TABLET ORAL at 08:24

## 2024-06-17 RX ADMIN — ACETAMINOPHEN 975 MG: 325 SOLUTION ORAL at 17:32

## 2024-06-17 RX ADMIN — METHOCARBAMOL 750 MG: 750 TABLET ORAL at 11:06

## 2024-06-17 RX ADMIN — Medication 40 MG: at 17:32

## 2024-06-17 RX ADMIN — CAMPHOR, MENTHOL: .5; .5 LOTION TOPICAL at 08:25

## 2024-06-17 RX ADMIN — ONDANSETRON 8 MG: 2 INJECTION INTRAMUSCULAR; INTRAVENOUS at 01:56

## 2024-06-17 RX ADMIN — OXYCODONE HYDROCHLORIDE 15 MG: 5 SOLUTION ORAL at 05:13

## 2024-06-17 RX ADMIN — OXYCODONE HYDROCHLORIDE 15 MG: 5 SOLUTION ORAL at 17:32

## 2024-06-17 RX ADMIN — OXYCODONE HYDROCHLORIDE 15 MG: 5 SOLUTION ORAL at 22:53

## 2024-06-17 RX ADMIN — METHOCARBAMOL 750 MG: 750 TABLET ORAL at 17:32

## 2024-06-17 RX ADMIN — Medication 15 ML: at 21:29

## 2024-06-17 RX ADMIN — PROCHLORPERAZINE EDISYLATE 10 MG: 5 INJECTION INTRAMUSCULAR; INTRAVENOUS at 23:54

## 2024-06-17 RX ADMIN — METOCLOPRAMIDE 10 MG: 5 INJECTION, SOLUTION INTRAMUSCULAR; INTRAVENOUS at 05:13

## 2024-06-17 RX ADMIN — Medication 40 MG: at 08:27

## 2024-06-17 RX ADMIN — METOCLOPRAMIDE 10 MG: 5 INJECTION, SOLUTION INTRAMUSCULAR; INTRAVENOUS at 17:33

## 2024-06-17 RX ADMIN — CAMPHOR, MENTHOL: .5; .5 LOTION TOPICAL at 14:35

## 2024-06-17 RX ADMIN — OXYCODONE HYDROCHLORIDE 15 MG: 5 SOLUTION ORAL at 21:28

## 2024-06-17 ASSESSMENT — ACTIVITIES OF DAILY LIVING (ADL)
ADLS_ACUITY_SCORE: 31
ADLS_ACUITY_SCORE: 29
ADLS_ACUITY_SCORE: 31
ADLS_ACUITY_SCORE: 29
ADLS_ACUITY_SCORE: 31
ADLS_ACUITY_SCORE: 29
ADLS_ACUITY_SCORE: 31
ADLS_ACUITY_SCORE: 29
ADLS_ACUITY_SCORE: 31
ADLS_ACUITY_SCORE: 29
ADLS_ACUITY_SCORE: 31
ADLS_ACUITY_SCORE: 29
ADLS_ACUITY_SCORE: 29
ADLS_ACUITY_SCORE: 31
ADLS_ACUITY_SCORE: 29

## 2024-06-17 NOTE — PROGRESS NOTES
"Blood pressure 123/80, pulse 71, temperature 98.2  F (36.8  C), temperature source Oral, resp. rate 18, height 1.702 m (5' 7\"), weight 63.5 kg (140 lb 1.6 oz), SpO2 92%.    Activity: Up ad liz in room  Neuros:  AOX4, forgetful, makes needs known  Cardiac: WDL  Respiratory: WDL  GI/: AUOP, +BS, no BM  Diet: Regular, not tolerating  Skin/Incisions/Drains: 4 lap sites, G-tube, Rt port  Lines: Rt chest port  Pain: 4-6/10, schedule pain and nausea meds.  Plan: Discharge tomorrow. Started on bolus tube feedings x3 and showed her how to do TF.   "

## 2024-06-17 NOTE — PLAN OF CARE
"Goal Outcome Evaluation:      Plan of Care Reviewed With: patient    Overall Patient Progress: improvingOverall Patient Progress: improving    A&Ox4. VSS on RA. Pain managed with scheduled oxycodone. Fentanyl patch on R upper arm. Nausea managed with scheduled Zofran and reglan. G tube clamped btwn bolus feeds, 240cc feeds x2 this shift. Educated Pt and family on bolus feeds, Pt and family demonstrated understanding. Abdominal lap sites x4 covered with primapore CDI. Voiding spontaneously, LBM 6/16 per Pt report, passing flatus. R chest port SL. Regular diet, poor PO intake. Up ad liz, ambulated in halls x1. No electrolyte replacements needed. Recheck tomorrow AM. Continue with POC.     Vitals: /72   Pulse 75   Temp 97.9  F (36.6  C) (Oral)   Resp 16   Ht 1.702 m (5' 7\")   Wt 63.5 kg (140 lb 1.6 oz)   SpO2 95%   BMI 21.94 kg/m      "

## 2024-06-17 NOTE — DISCHARGE SUMMARY
[facility]    Internal Medicine Dis[facility]- [facility]    Date of Admission:  6/4/2024  Date of Discharge:  6/18/2024  Discharging Attending Provider: Dr. 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, completely excised   Radiation Toxcicity   Dysphagia/Odynophagia s/p NGT placement (6/6)  Pancytopenia; improving  Neutropenia; resolved  Chronic Hypercalcemia   Hypothyroidism   Anxiety  Tobacco abuse    Follow-ups Needed After Discharge   - Palliative care: ongoing pain and nausea management; per radiation oncology, odynophagia should improve around 6/25 (3 weeks after last dose of radiation) - anticipate being able to wean from Q3H oxycodone around this time  - Oncology and thoracic surgery: ongoing management of esophageal carcinoma and consideration of esophagectomy    Hospital Course   Linh Mustafa is a 64 year old female with squamous cell carcinoma of the esophagus admitted on 6/4/202 for chest pain after radiation therapy on 5/31/2024. Initial workup negative for acute pulmonary or cardiac etiology. Chest pain likely from mucositis of the esophagus due to radiation toxicity. She completed final two radiation treatments while inpatient. NGT placed for tube feeding and monitoring for refeeding syndrome. Initial plan for discharge w/ NGT followed by outpatient placement of J tube given neutropenia. NGT inadvertently removed on 6/10. Given 1x GCSF w/ resolution of neutropenia. Thoracic surgery placed G tube on 6/14. Hospital course complicated by pain and nausea management. Discharged in stable condition w/ ability to tolerate bolus tube feeds and PO pain/nausea management. Plan for close follow up with oncology and thoracic surgery team as outpatient (sent message on day of  discharge).    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 Reports pain, nausea, and vomiting after radiation therapy. Chest pain feels similar regarding quality and location to prior pain, but significantly more intense.  ED workup notable for normal troponin and BNP. CT Abd  and Chest demonstrated no acute pathology, including PE, aortic dissection, or esophageal perforation. Workup negative for cardiac etiology, GI perforation, and PE.  Has significant pain with swallowing, which limits her PO intake. Bedside swallow evaluation demonstrated normal oropharyngeal swallowing mechanisms with regular/thin liquids. On 6/10, patient was coughing profusely and the NG tube was dislodged.  NG tube was replaced 6/12. G-tube placed on 6/14 by thoracic surgery after neutropenia resolved w/ GCSF.   - Thoracic surgery signed off on 6/15; recs appreciated              - s/p esophagogastroscopy on 6/6 (inflamed esophageal mucosa)              - s/p NGT on 6/6 (dislodged 6/10, replaced 10/12, removed 6/14)              - G-tube placed on 6/14              - Outpatient follow up w/ Dr. Martinez on 6/19  - Palliative care consulted; recs appreciated              - Pain control: oxycodone (15 mg q3h), fentanyl patch (37 mcg/hr every 72 hours)  - Radiation oncology consulted; recs appreciated              - Completed final two rounds of radiation therapy while inpatient  - RD consulted; recs appreciated  Enteral Nutrition   Dosing wt: 63.5 kg standing wt on 6/10/24  Access: G-tube placed on 6/14/24  Formula: Osmolite 1.5  Volume: 5.5 cartons/day (1320 mL, 1005 ml free water)  Provisions: 1980 kcals ( 31 kcal/kg), 83 g PRO (1.3 gram/kg), 268 g CHO,  and 0 g fiber daily.   Suggested Tube feeding schedule via gravity feedings  Goal volume of  1 1/2 cartons formula (360 ml) TID plus additional 1 carton (240 ml) as 4th feeding   Flush with 120 mL of H20 before and after each feeding if TF to provide full hydration.   Do not give feedings at night while pt asleep (during the day only).  - Continue PTA PPI, famotidine, magic mouthwash, zofran, compazine, sucralfate  - Per SLP, okay for regular diet w/ thin liquids  - Dispo: G tube care instructions in discharge navigator     Pancytopenia; improving  Neutropenia; resolved  At presentation, WBCs at 1.4 and platelets at 137. Suspect secondary to bone marrow suppression from radiation therapy and/or chemotherapy. Most recent dose of chemotherapy on 5/28/2024 with paclitaxel and carboplatin. Thoracic surgery requires > 2000 Neutrophil count and > 100k plt count to perform G-tube placement. Patient received single dose of Filgrastim-aafi injection (300 mcg) and neutrophils increased to 4000.   - Discussed w/ heme/onc; given 1x GCSF (neutropenia resolved for placement of G tube)     Chronic Hypercalcemia   Ca elevated to 11.1 at admission. Most likely secondary to hyperparathyroidism with parathyroid hormone elevated to 121 on 5/1/2024. Had an appointment with endocrinology, but missed the appointment.   - Outpatient followup with endocrinology      Hypothyroidism   - PTA levothyroxine (100 mcg)      Anxiety  - PTA buspirone (5 mg daily)      Tobacco abuse  Uses 1/2 ppd.    - Nicotine patch   - PRN nicotine gum    Consultations This Hospital Stay   SPEECH LANGUAGE PATH ADULT IP CONSULT  RADIATION ONCOLOGY IP CONSULT  THORACIC SURGERY ADULT IP CONSULT  NUTRITION SERVICES ADULT IP CONSULT  PHARMACY IP CONSULT  PALLIATIVE CARE ADULT IP CONSULT  PHYSICAL THERAPY ADULT IP CONSULT  OCCUPATIONAL THERAPY ADULT IP CONSULT  CARE MANAGEMENT / SOCIAL WORK IP CONSULT  IP PALLIATIVE CARE SOCIAL WORK ADULT CONSULT  ONCOLOGY ADULT IP  CONSULT  SPIRITUAL HEALTH SERVICES IP CONSULT  NUTRITION SERVICES ADULT IP CONSULT  PHARMACY IP CONSULT  HEMATOLOGY ADULT IP CONSULT    Code Status   Full Code       The patient was discussed with Dr. Dodge.    Vandana Florentino MD  Internal Medicine Resident  ______________________________________________________________________    Physical Exam   Vital Signs: Temp: 97.9  F (36.6  C) Temp src: Oral BP: 107/72 Pulse: 75   Resp: 16 SpO2: 95 % O2 Device: None (Room air)    Weight: 140 lbs 1.6 oz    Gen: No acute distress, non-toxic, alert  CV: Regular rate, regular rhythm  Pulm: Clear to auscultate bilaterally, breathing non-labored  Abd: Generalized tenderness worse in the upper quadrants, no guarding or rebound, G tube in place w/ dressing C/D/I  Ext: Moves all extremities, non-cyanotic  Psych: Normal mood and affect    Significant Results and Procedures   Results for orders placed or performed during the hospital encounter of 06/04/24   CT Chest Pulmonary Embolism w Contrast    Narrative    EXAM: CT CHEST PULMONARY EMBOLISM W CONTRAST  LOCATION: [facility]  DATE: 6/4/2024    INDICATION: Pleuritic chest pain (hx esophageal cancer, radiation treatment), ? PE, esophageal abnormality, mediastinitis, pericarditis or other causes of severe chest pain  COMPARISON: 2/23/2024.  TECHNIQUE: CT chest pulmonary angiogram during arterial phase injection of IV contrast. Multiplanar reformats and MIP reconstructions were performed. Dose reduction techniques were used.   CONTRAST: 86 ml isovue 370    FINDINGS:  ANGIOGRAM CHEST: The central pulmonary arteries are large in caliber which can be seen with pulmonary artery hypertension. There is no pulmonary embolus. Thoracic aorta is negative for dissection. No CT evidence of right heart strain.    LUNGS AND PLEURA: Emphysema. Mild dependent atelectasis. No pneumothorax or pleural effusion.    MEDIASTINUM/AXILLAE: Right chest wall port. No  lymph node enlargement. Wall thickening of the mid esophagus similar to the previous exam.    CORONARY ARTERY CALCIFICATION: Moderate.    UPPER ABDOMEN: Stones in the gallbladder. Bilateral adrenal gland nodules without significant change.    MUSCULOSKELETAL: Degenerative disease in the spine.      Impression    IMPRESSION:  1.  There is no pulmonary embolus, aortic aneurysm or dissection.  2.  Wall thickening of the mid esophagus similar to the previous exam. There is edema in the fat surrounding the mid esophagus. No extraluminal gas or fluid collection.  3.  Emphysema.   CT Abdomen Pelvis w Contrast    Narrative    EXAM: CT ABDOMEN PELVIS W CONTRAST  LOCATION: [facility]  DATE: 6/4/2024    INDICATION: severe chest, to upper abd pain, hx esophageal cancer, radiation treatment  COMPARISON: None.  TECHNIQUE: CT scan of the abdomen and pelvis was performed following injection of IV contrast. Multiplanar reformats were obtained. Dose reduction techniques were used.  CONTRAST: 86ml isovue 370    FINDINGS:   LOWER CHEST: Lung bases clear.    HEPATOBILIARY: Subcentimeter hepatic hypodensities small for characterization. Cholelithiasis. Mild gallbladder wall thickening not excluded.    PANCREAS: Normal.    SPLEEN: Normal.    ADRENAL GLANDS: Thickening of the adrenal glands (left greater than right).    KIDNEYS/BLADDER: Normal.    BOWEL: Normal caliber. Diverticulosis.    LYMPH NODES: Prominent aortocaval node similar to prior.    VASCULATURE: Atherosclerotic vascular calcification.    PELVIC ORGANS: Normal.    MUSCULOSKELETAL: Degenerative change osseous structures.      Impression    IMPRESSION:   1.  Cholelithiasis. Mild gallbladder wall thickening not excluded.  2.  No bowel obstruction or abscess.  3.  Diverticulosis.  4.  Several hepatic hypodensities small for characterization   US Abdomen Limited (RUQ)    Narrative    EXAMINATION: Limited Abdominal Ultrasound, 6/4/2024  6:20 AM     COMPARISON: CT abdomen and pelvis from earlier same day    HISTORY: ? Cholecystitis     FINDINGS:   Fluid: No evidence of ascites or pleural effusions.    Liver: The liver demonstrates mildly increased echogenicity relative  to the right renal cortex, measuring 15.8 cm in craniocaudal  dimension. There is no focal mass. The main portal vein is patent with  antegrade flow.    Gallbladder: Cholelithiasis as seen on earlier abdominal CT. There is  no wall thickening, pericholecystic fluid, or positive sonographic  Balbuena's sign.    Bile Ducts: Both the intra- and extrahepatic biliary system are of  normal caliber.  The common bile duct measures 6 mm in diameter.    Pancreas: Visualized portions of the head and body of the pancreas are  unremarkable.     Kidney: The right kidney measures 9.4 cm long. There is no  hydronephrosis or hydroureter, no shadowing renal calculi, cystic  lesion or mass.       Impression    IMPRESSION:    1. Cholelithiasis without sonographic findings to suggest acute  cholecystitis.  2. Mildly increased echogenicity throughout the hepatic parenchyma,  which can be seen in patients with hepatic steatosis.    I have personally reviewed the examination and initial interpretation  and I agree with the findings.    GIANA COOL MD         SYSTEM ID:  [id number]   XR Abdomen Port 1 View    Narrative    Exam: XR ABDOMEN PORT 1 VIEW, 6/9/2024 12:11 PM    Indication: verify ngt placement, also right sided abdominal  discomfort    Comparison: 6/4/2024 CT    Findings:   Supine frontal view of the abdomen. Enteric tube tip projects over the  right upper quadrant curving inferiorly at its distal segment.  Air-filled, nondilated loops of small and large bowel. No pneumatosis  or portal venous gas. Vascular calcifications. No acute osseous  abnormality.      Impression    Impression:   1. Enteric tube tip projects near the gastric antrum/pylorus/duodenal  bulb.  2. Nonobstructive bowel gas  pattern.    KELLEY MELODY DO JONEL         SYSTEM ID:  [id number]   XR Abdomen Port 1 View    Narrative    XR ABDOMEN PORT 1 VIEW  6/12/2024 11:47 AM     HISTORY:  confirm ngt placement     COMPARISON:  6/10/2024 abdominal radiograph    TECHNIQUE: Single supine view of the abdomen.     FINDINGS:     Enteric tube tip projects over the first portion of the duodenum.  Presumed right central venous catheter tip projects over right atrium.  No obstructive bowel gas pattern. Mild scoliotic curvature to left.  The visualized lung bases are clear. Metallic opacity projecting over  the right lower zone possibly external to the patient, consider  correlation        Impression    IMPRESSION: Enteric tube tip projects over the expected location of  proximal duodenum.        I have personally reviewed the examination and initial interpretation  and I agree with the findings.    SIDDHARTHA DOBSON MD         SYSTEM ID:  [id number]       Pending Results   N/A         Primary Care Physician   Madison Ruiz    Discharge Disposition   Discharged to home  Condition at discharge: Stable    Discharge Orders      Home Care Referral      Reason for your hospital stay    Dear Linh Mustafa,    Your were hospitalized at [facility] with esophageal pain.  Over your hospitalization you were treated with nasogastric feeding tube, pain and nausea medications and are now ready to be discharged.      If you continue your current therapy you should continue to improve. However, if you develop nausea, vomiting, fever, chills, abdominal pain please seek medical attention.    It was a pleasure meeting with you today. Thank you for allowing me and my team the privilege of caring for you today. You are the reason we are here, and I truly hope we provided you with the excellent service you deserve. Please let us know if there is anything else we can do for you so that we can be sure you are leaving completely satisfied with your  care experience.    Thank you!     Activity    Your activity upon discharge: activity as tolerated     Discharge Instructions    Thoracic Surgery Discharge Instructions:    Skin care:  -Change the gauze dressing around your tube daily (should only use 1 piece of gauze, should not push the bumper from the skin).  -Check for redness and swelling in the area where the tube goes into your skin.    -Keep the skin around your tube clean and dry. If the hole where the tube enters your body is draining fluid, you may need to put barrier cream or antibiotic cream on the skin around your tube after you are done cleaning it.     Flushes:  -Flush both the G port with 30cc of water twice daily to ensure it does not become clogged.   -After medications or tube feedings, make sure to flush with water immediately after use to prevent the tube from clogging.     DISCHARGING RN- PLEASE PROVIDE PEG SUPPLIES AT THE TIME OF DISCHARGE (In addition to feeding supplies)            -2 Syringes 60ml (Cath tip or En-Fit as appropriate)            -5 2x2 split gauze packets    Follow up with Shruthi Garcia, Clinical Nurse Specialist, in Thoracic Surgery clinic in 1 week after discharge. No imaging is necessary prior to this appointment.     Follow Up ([facility])    Follow up with primary care provider, [facility], within 7 days for hospital follow- up.  No follow up labs or test are needed.    Also follow up with Dr. Isai Nelson (oncology, 6/19) and Dr. Martinez (thoracic surgeon, 6/19). Palliative care will arrange follow up for pain management.      Appointments on [place] and/or [facility] (with [facility] or [facility] provider or service). Call [phone] if you haven't heard regarding these appointments within 7 days of discharge.     Diet    Follow this diet upon discharge: Orders Placed This Encounter      Adult Formula Bolus Feeding: Osmolite 1.5; Route: Nasogastric tube; 4 times daily; Volume per Bolus: 118;  mL(s); Additional free water (mL): Flush with 60 mL of H20 before and after each feeding (480 mL total) plus additional 60 mL once/day daily ...      Regular Diet Adult Thin Liquids (level 0)     Discharge Medications   Current Discharge Medication List        START taking these medications    Details   !! acetaminophen (TYLENOL) 325 MG/10.15ML liquid Take 30.45 mLs (975 mg) by mouth 3 times daily  Qty: 473 mL, Refills: 0    Associated Diagnoses: Squamous cell carcinoma of esophagus (H)      !! acetaminophen (TYLENOL) 325 MG/10.15ML liquid Take 10.15 mLs (325 mg) by mouth every 4 hours as needed for mild pain or fever      artificial saliva (BIOTENE DRY MOUTHWASH) LIQD liquid Swish and spit 15 mLs in mouth 4 times daily as needed for dry mouth  Qty: 473 mL, Refills: 0    Associated Diagnoses: Squamous cell carcinoma of esophagus (H)      methocarbamol (ROBAXIN) 750 MG tablet Take 1 tablet (750 mg) by mouth 4 times daily  Qty: 120 tablet, Refills: 0    Associated Diagnoses: Squamous cell carcinoma of esophagus (H)      ondansetron (ZOFRAN ODT) 8 MG ODT tab Take 1 tablet (8 mg) by mouth every 6 hours  Qty: 120 tablet, Refills: 0    Associated Diagnoses: Squamous cell carcinoma of esophagus (H)      !! prochlorperazine (COMPAZINE) 10 MG tablet Take 1 tablet (10 mg) by mouth every 6 hours as needed for vomiting  Qty: 90 tablet, Refills: 0    Associated Diagnoses: Squamous cell carcinoma of esophagus (H)      senna-docusate (SENOKOT-S/PERICOLACE) 8.6-50 MG tablet Take 1 tablet by mouth 2 times daily  Qty: 60 tablet, Refills: 0    Associated Diagnoses: Squamous cell carcinoma of esophagus (H)       !! - Potential duplicate medications found. Please discuss with provider.        CONTINUE these medications which have NOT CHANGED    Details   busPIRone (BUSPAR) 5 MG tablet Take by mouth daily      esomeprazole (NEXIUM) 20 MG DR capsule Take 20 mg by mouth 2 times daily Take 30-60 minutes before eating.      famotidine  (PEPCID) 20 MG tablet Take 20 mg by mouth 2 times daily      levothyroxine (SYNTHROID/LEVOTHROID) 200 MCG tablet take 1 tablet by mouth every day for 30 days      magic mouthwash suspension (diphenhydrAMINE, lidocaine, aluminum-magnesium & simethicone) Swish and swallow 10 mLs in mouth 4 times daily (before meals and nightly)  Qty: 420 mL, Refills: 0    Associated Diagnoses: Esophageal pain      naloxone (NARCAN) 4 MG/0.1ML nasal spray Spray 1 spray (4 mg) into one nostril alternating nostrils as needed for opioid reversal every 2-3 minutes until assistance arrives  Qty: 0.2 mL, Refills: 1    Associated Diagnoses: Squamous cell carcinoma of esophagus (H); Cancer related pain      ondansetron (ZOFRAN) 8 MG tablet Take 1 tablet (8 mg) by mouth every 8 hours as needed for nausea  Qty: 30 tablet, Refills: 3    Associated Diagnoses: Squamous cell carcinoma of esophagus (H)      polyethylene glycol (MIRALAX) 17 GM/Dose powder Take 17 g (1 Capful) by mouth daily  Qty: 510 g, Refills: 3    Associated Diagnoses: Constipation, unspecified constipation type      !! prochlorperazine (COMPAZINE) 10 MG tablet Take 1 tablet (10 mg) by mouth every 6 hours as needed for nausea or vomiting  Qty: 30 tablet, Refills: 2    Associated Diagnoses: Squamous cell carcinoma of esophagus (H)      sucralfate (CARAFATE) 1 GM/10ML suspension Take 10 mLs (1 g) by mouth 4 times daily  Qty: 500 mL, Refills: 1    Associated Diagnoses: Squamous cell carcinoma of esophagus (H)      vitamin B-12 (CYANOCOBALAMIN) 2500 MCG sublingual tablet Take 1 tablet (2,500 mcg) by mouth daily  Qty: 90 tablet, Refills: 3    Associated Diagnoses: Vitamin B12 deficiency (non anemic)       !! - Potential duplicate medications found. Please discuss with provider.        STOP taking these medications       acetaminophen (TYLENOL) 160 MG/5ML liquid Comments:   Reason for Stopping:         fentaNYL (DURAGESIC) 25 mcg/hr 72 hr patch Comments:   Reason for Stopping:          oxyCODONE (ROXICODONE) 5 MG tablet Comments:   Reason for Stopping:             Allergies   Allergies   Allergen Reactions    Amoxicillin Shortness Of Breath, Itching and Rash    Penicillins

## 2024-06-17 NOTE — PROGRESS NOTES
[facility]    Medicine Progress Note - Medicine Service, VIVIENNE TEAM 4    Date of Admission:  6/4/2024    Assessment & Plan   Linh Mustafa is a 64 year old female with squamous cell carcinoma of the esophagus admitted on 6/4/202 for chest pain after radiation therapy on 5/31/2024. Initial workup negative for acute pulmonary or cardiac etiology. Chest pain likely from mucositis of the esophagus due to radiation toxicity. She completed final two radiation treatments and pain and nausea is well controlled with current drug regimen. NG placed for tube feeding and monitoring for refeeding syndrome. Needs jejunostomy for nutrition with odynophagia limiting oral consumption. Started GSCF 6/12/2024 to increase neutrophil count >2000 to facilitate G-tube placement. After one dose, neutrophils exceeded target. G-tube placed on 6/14.     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 Reports pain, nausea, and vomiting after radiation therapy. Chest pain feels similar regarding quality and location to prior pain, but significantly more intense.  ED workup notable for normal troponin and BNP. CT Abd  and Chest demonstrated no acute pathology, including PE, aortic dissection, or esophageal perforation. Workup negative for cardiac etiology, GI perforation, and PE.  Has significant pain with swallowing, which limits her PO intake. Diet consist predominantly of soft foods like pudding and shakes. Bedside  swallow evaluation demonstrated normal oropharyngeal swallowing mechanisms with regular/thin liquids. On 6/10, patient was coughing profusely and the NG tube was dislodged. Thoracic surgery was notified and asserted that it can be replaced at bedside. NG tube was replaced 6/12. G-tube placed on 6/14.   - Consult Palliative care for better pain and nausea control               - Pain Control: oxycodone (15 mg q3h), PTA fentanyl (37 mcg/hr patch every 72 hours), PRN lidocaine 1%  - Bowel Regimen: Miralax BID, PRN senna   - Consult Radiation, thank you for recs              - Completed final two rounds of radiation therapy.   - Consult Thoracic Surgery              - s/p esophagogastroscopy on 6/6 (inflamed esophageal mucosa)              - s/p NGT on 6/6 (dislodged 6/10, replaced 10/12, Removed 6/14)              - G-tube placed on 6/14.    - Signed off 6/15   - Consult nutrition for tube feeds  - Osmolite 1.5 via NG-tube (Goal 55 mL/Hr)   - Consult PT/OT for impaired mobility and deconditioning               - Pt near baseline and discharge recommendations deferred to medical team  - Continue PTA PPI, famotidine, magic mouthwash, zofran, compazine, sucralfate  - Per SLP, okay for regular diet w/ thin liquids  - Dispo: G tube care instructions in discharge navigator. Will need follow up with CNS in thoracic surgery clinic about 1 week after discharge, they have messaged clinic coordinator to arrange this.      Pancytopenia; improving  Neutropenia; resolved  At presentation, WBCs at 1.4 and platelets at 137. Suspect secondary to bone marrow suppression from radiation therapy and/or chemotherapy. Most recent dose of chemotherapy on 5/28/2024 with paclitaxel and carboplatin. Thoracic surgery requires > 2000 Neutrophil count and > 100k plt count to perform G-tube placement. Patient received single dose of Filgrastim-aafi injection (300 mcg) and neutrophils increased to 4000.   - Curbside Heme/onc, affirmed use of GCSF to  increase neutrophils >2000     Chronic Hypercalcemia   Ca elevated to 11.1 at admission. Most likely secondary to hyperparathyroidism with parathyroid hormone elevated to 121 on 5/1/2024. Had an appointment with endocrinology, but missed the appointment.   - Outpatient followup with endocrinology      Hypothyroidism   - PTA levothyroxine (100 mcg)      Anxiety:  - PTA buspirone (5 mg daily)      Tobacco abuse.   Uses 1/2 ppd.    - Nicotine patch   - PRN nicotine gum          Diet: Diet  Regular Diet Adult Thin Liquids (level 0)  Adult Formula Bolus Feeding: Osmolite 1.5; Route: Gastrostomy; 4 times daily (Stop continuous TF for 1-2 hrs to let stomach empty, start gravity feeding. Day 1 (6/16): Begin 1st gravity feeding @ 120 ml ( 1/2 can ) x 3 feedings today (separate 3-4...    DVT Prophylaxis: Enoxaparin (Lovenox) SQ  Mercado Catheter: Not present  Lines: PRESENT      Port A Cath Single 04/24/24 Right Chest wall-Site Assessment: WDL      Cardiac Monitoring: None  Code Status: Full Code      Clinically Significant Risk Factors           # Hypercalcemia: Highest Ca = 10.3 mg/dL in last 2 days, will monitor as appropriate    # Hypoalbuminemia: Lowest albumin = 3.4 g/dL at 6/5/2024  7:24 AM, will monitor as appropriate     # Hypertension: Noted on problem list           #Precipitous drop in Hgb/Hct: Lowest Hgb this hospitalization: 8.3 g/dL. Will continue to monitor and treat/transfuse as appropriate.      # Moderate Malnutrition: based on nutrition assessment    # Financial/Environmental Concerns: none         Disposition Plan     Medically Ready for Discharge: Likely 6/18 pending bolus tube feed tolerance    Staffed with Dr. West.         Vandana Florentino MD  Medicine Service, [facility] TEAM [address] [place] [address]  Securely message with [employer] (more info)  Text page via [facility] Paging/Directory   See signed in provider for up to date coverage  information  ______________________________________________________________________    Interval History   No acute events overnight.  Started bolus feeding yesterday.  Felt better when she was premedicated with pain and nausea medications before receiving each bolus.  Has not used any additional as needed oxycodone or IV Dilaudid yesterday.  Notes that she has persistent pain along her incision sites and around her G-tube, though not worse than yesterday.      Physical Exam   Vital Signs: Temp: 97.9  F (36.6  C) Temp src: Oral BP: 107/72 Pulse: 75   Resp: 16 SpO2: 95 % O2 Device: None (Room air)    Weight: 140 lbs 1.6 oz    Gen: No acute distress, non-toxic, alert  CV: Regular rate, regular rhythm  Pulm: Clear to auscultate bilaterally, breathing non-labored  Abd: Generalized tenderness worse in the upper quadrants, no guarding or rebound, G tube in place w/ dressing C/D/I  Ext: Moves all extremities, non-cyanotic  Psych: Normal mood and affect      Medical Decision Making           Data     I have personally reviewed the following data over the past 24 hrs:    4.0  \   8.7 (L)   / 284     135 98 12.5 /  94   4.8 28 0.65 \       Lab Results   Component Value Date    WBC 4.0 06/17/2024    WBC 4.5 06/16/2024    WBC 3.6 (L) 06/15/2024    HGB 8.7 (L) 06/17/2024    HGB 8.5 (L) 06/16/2024    HGB 8.3 (L) 06/15/2024    HCT 26.0 (L) 06/17/2024    HCT 26.2 (L) 06/16/2024    HCT 25.2 (L) 06/15/2024     06/17/2024     06/16/2024     06/15/2024     06/17/2024     (L) 06/16/2024     (L) 06/15/2024    POTASSIUM 4.8 06/17/2024    POTASSIUM 5.0 06/16/2024    POTASSIUM 4.3 06/15/2024    CHLORIDE 98 06/17/2024    CHLORIDE 99 06/16/2024    CHLORIDE 97 (L) 06/15/2024    CO2 28 06/17/2024    CO2 27 06/16/2024    CO2 27 06/15/2024    BUN 12.5 06/17/2024    BUN 13.5 06/16/2024    BUN 9.5 06/15/2024    CR 0.65 06/17/2024    CR 0.60 06/16/2024    CR 0.62 06/15/2024    GLC 94 06/17/2024     (H)  06/16/2024     (H) 06/15/2024    SED 61 (H) 06/04/2024    DD 0.79 (H) 12/23/2022    NTBNPI 136 06/04/2024    AST 17 06/06/2024    AST 14 06/05/2024    AST 22 06/04/2024    ALT 22 06/06/2024    ALT 18 06/05/2024    ALT 27 06/04/2024    ALKPHOS 99 06/06/2024    ALKPHOS 78 06/05/2024    ALKPHOS 98 06/04/2024    BILITOTAL 0.7 06/06/2024    BILITOTAL 0.7 06/05/2024    BILITOTAL 1.2 06/04/2024    INR 0.98 06/04/2024    INR 1.05 12/23/2022       Imaging results reviewed over the past 24 hrs:   No results found for this or any previous visit (from the past 24 hour(s)).

## 2024-06-17 NOTE — PLAN OF CARE
"/64 (BP Location: Right arm)   Pulse 77   Temp 97.8  F (36.6  C) (Oral)   Resp 18   Ht 1.702 m (5' 7\")   Wt 63.5 kg (140 lb 1.6 oz)   SpO2 92%   BMI 21.94 kg/m      Status: esophageal cancer  Activity: SBA  Neuros: A&Ox4, no deficits noted.  Cardiac: WDL, denies chest pain. Soft pressures baseline/WNL.  Respiratory: WDL on RA, denies SOB.  GI/: +BS, LBM 6/13, voids spont with AUOP.  Diet: Tolerating bolus TF; regular diet ordered, pt does not tolerate PO intake well.  Skin/Incisions: WDL ex abd lap sitesx4 dressed in primapore CDI.  Lines/Drains: R chest port SL. Gtube clamped between bolus feeds.  Pain/Nausea: Pain managed with sched tyl/oxy, nausea managed with sched zofran.  New Changes: No acute changes this shift.  "

## 2024-06-18 VITALS
TEMPERATURE: 97.9 F | OXYGEN SATURATION: 97 % | RESPIRATION RATE: 16 BRPM | HEART RATE: 79 BPM | HEIGHT: 67 IN | DIASTOLIC BLOOD PRESSURE: 61 MMHG | WEIGHT: 140.1 LBS | BODY MASS INDEX: 21.99 KG/M2 | SYSTOLIC BLOOD PRESSURE: 111 MMHG

## 2024-06-18 PROCEDURE — 250N000011 HC RX IP 250 OP 636

## 2024-06-18 PROCEDURE — 250N000013 HC RX MED GY IP 250 OP 250 PS 637

## 2024-06-18 PROCEDURE — 250N000011 HC RX IP 250 OP 636: Mod: JZ

## 2024-06-18 PROCEDURE — 250N000013 HC RX MED GY IP 250 OP 250 PS 637: Performed by: STUDENT IN AN ORGANIZED HEALTH CARE EDUCATION/TRAINING PROGRAM

## 2024-06-18 PROCEDURE — 99233 SBSQ HOSP IP/OBS HIGH 50: CPT | Mod: 24

## 2024-06-18 PROCEDURE — 99238 HOSP IP/OBS DSCHRG MGMT 30/<: CPT | Mod: GC | Performed by: STUDENT IN AN ORGANIZED HEALTH CARE EDUCATION/TRAINING PROGRAM

## 2024-06-18 RX ORDER — FENTANYL 25 UG/1
1 PATCH TRANSDERMAL
COMMUNITY
Start: 2024-06-18 | End: 2024-06-26

## 2024-06-18 RX ORDER — OXYCODONE HCL 5 MG/5 ML
15 SOLUTION, ORAL ORAL
Qty: 360 ML | Refills: 0 | Status: SHIPPED | OUTPATIENT
Start: 2024-06-18 | End: 2024-06-18

## 2024-06-18 RX ORDER — FENTANYL 12.5 UG/1
1 PATCH TRANSDERMAL
Qty: 1 PATCH | Refills: 0 | Status: SHIPPED | OUTPATIENT
Start: 2024-06-18 | End: 2024-06-18

## 2024-06-18 RX ORDER — FENTANYL 25 UG/1
1 PATCH TRANSDERMAL
Qty: 1 PATCH | Refills: 0 | Status: SHIPPED | OUTPATIENT
Start: 2024-06-18 | End: 2024-06-18

## 2024-06-18 RX ORDER — FENTANYL 12.5 UG/1
1 PATCH TRANSDERMAL
Qty: 3 PATCH | Refills: 0 | Status: SHIPPED | OUTPATIENT
Start: 2024-06-18 | End: 2024-06-26

## 2024-06-18 RX ORDER — HEPARIN SODIUM (PORCINE) LOCK FLUSH IV SOLN 100 UNIT/ML 100 UNIT/ML
5-10 SOLUTION INTRAVENOUS
Status: DISCONTINUED | OUTPATIENT
Start: 2024-06-18 | End: 2024-06-18 | Stop reason: HOSPADM

## 2024-06-18 RX ORDER — OXYCODONE HCL 5 MG/5 ML
15 SOLUTION, ORAL ORAL
Qty: 500 ML | Refills: 0 | Status: SHIPPED | OUTPATIENT
Start: 2024-06-18 | End: 2024-06-21

## 2024-06-18 RX ADMIN — BUSPIRONE HYDROCHLORIDE 5 MG: 5 TABLET ORAL at 08:54

## 2024-06-18 RX ADMIN — OXYCODONE HYDROCHLORIDE 15 MG: 5 SOLUTION ORAL at 08:46

## 2024-06-18 RX ADMIN — ONDANSETRON 8 MG: 4 TABLET, ORALLY DISINTEGRATING ORAL at 08:53

## 2024-06-18 RX ADMIN — FENTANYL 1 PATCH: 25 PATCH TRANSDERMAL at 13:51

## 2024-06-18 RX ADMIN — ONDANSETRON 8 MG: 4 TABLET, ORALLY DISINTEGRATING ORAL at 13:50

## 2024-06-18 RX ADMIN — Medication 5 ML: at 11:10

## 2024-06-18 RX ADMIN — ONDANSETRON 8 MG: 2 INJECTION INTRAMUSCULAR; INTRAVENOUS at 02:02

## 2024-06-18 RX ADMIN — METHOCARBAMOL 750 MG: 750 TABLET ORAL at 08:54

## 2024-06-18 RX ADMIN — DIPHENHYDRAMINE HYDROCHLORIDE AND LIDOCAINE HYDROCHLORIDE AND ALUMINUM HYDROXIDE AND MAGNESIUM HYDRO 10 ML: KIT at 08:55

## 2024-06-18 RX ADMIN — METOCLOPRAMIDE 10 MG: 5 INJECTION, SOLUTION INTRAMUSCULAR; INTRAVENOUS at 05:36

## 2024-06-18 RX ADMIN — OXYCODONE HYDROCHLORIDE 15 MG: 5 SOLUTION ORAL at 11:08

## 2024-06-18 RX ADMIN — METOCLOPRAMIDE 10 MG: 5 INJECTION, SOLUTION INTRAMUSCULAR; INTRAVENOUS at 11:08

## 2024-06-18 RX ADMIN — OXYCODONE HYDROCHLORIDE 15 MG: 5 SOLUTION ORAL at 13:52

## 2024-06-18 RX ADMIN — OXYCODONE HYDROCHLORIDE 15 MG: 5 SOLUTION ORAL at 02:17

## 2024-06-18 RX ADMIN — ACETAMINOPHEN 975 MG: 325 SOLUTION ORAL at 12:28

## 2024-06-18 RX ADMIN — OXYCODONE HYDROCHLORIDE 15 MG: 5 SOLUTION ORAL at 17:04

## 2024-06-18 RX ADMIN — Medication 40 MG: at 15:46

## 2024-06-18 RX ADMIN — CAMPHOR, MENTHOL: .5; .5 LOTION TOPICAL at 08:55

## 2024-06-18 RX ADMIN — Medication 40 MG: at 08:54

## 2024-06-18 RX ADMIN — LEVOTHYROXINE SODIUM 100 MCG: 100 TABLET ORAL at 08:54

## 2024-06-18 RX ADMIN — METHOCARBAMOL 750 MG: 750 TABLET ORAL at 12:28

## 2024-06-18 RX ADMIN — METHOCARBAMOL 750 MG: 750 TABLET ORAL at 15:47

## 2024-06-18 RX ADMIN — CAMPHOR, MENTHOL: .5; .5 LOTION TOPICAL at 02:14

## 2024-06-18 RX ADMIN — FENTANYL 1 PATCH: 12.5 PATCH TRANSDERMAL at 13:52

## 2024-06-18 RX ADMIN — METOCLOPRAMIDE 10 MG: 5 INJECTION, SOLUTION INTRAMUSCULAR; INTRAVENOUS at 17:04

## 2024-06-18 RX ADMIN — Medication 5 ML: at 17:17

## 2024-06-18 RX ADMIN — OXYCODONE HYDROCHLORIDE 15 MG: 5 SOLUTION ORAL at 05:36

## 2024-06-18 RX ADMIN — FAMOTIDINE 20 MG: 20 TABLET ORAL at 08:54

## 2024-06-18 ASSESSMENT — ACTIVITIES OF DAILY LIVING (ADL)
ADLS_ACUITY_SCORE: 30
ADLS_ACUITY_SCORE: 31
ADLS_ACUITY_SCORE: 31
ADLS_ACUITY_SCORE: 30
ADLS_ACUITY_SCORE: 30
ADLS_ACUITY_SCORE: 31
ADLS_ACUITY_SCORE: 30
ADLS_ACUITY_SCORE: 31
ADLS_ACUITY_SCORE: 31
ADLS_ACUITY_SCORE: 30
ADLS_ACUITY_SCORE: 30

## 2024-06-18 NOTE — PROGRESS NOTES
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 No additional PRN pain medication. Total OME's 242.75mg in the last 24 hrs.  Per radiation oncology, anticipated odynophagia to improve in 3 weeks of the last dose of radiation (6/25)   Continue fentanyl patch 37 mcg/hr every 72 hrs (dose increased 06/09)   Continue oxycodone 15 mg q 3hrs for now (discussed with Lita and family at bedside plan to wean oxycodone in the coming days).   Continue scheduled tylenol 975 mg tid and PRN lidocaine 1%  Methocarbamol 750 mg qid daily added by primary team 06/15  Continue with acid suppression medication- pantoprazole, famotidine, sucralfate   Outpatient palliative referrals placed today     #Nausea/vomiting- improved   Continue scheduled Ondansetron IV/ODT 8 mg q 6hrs  Continue compazine 10 mg q 6hrs prn   Scheduled Reglan added 6/12 by primary   Continue Zyprexa 2.5 mg at bedtime   EKG on 6/13 showed QTc 433    #Mood/coping  Continue buspar 5 mg daily   Continue Zyprexa 2.5 mg at bedtime       # Opioid-induced constipation  Continue MiraLAX twice daily, senna as needed      PSYCHOSOCIAL/SPIRITUAL:  Family: Yulisa (daughter) Luis M (son in law)  [place] community: [employer]   Spiritual: Declined     Palliative Care will continue to follow. Thank you for the consult and allowing us to aid in the care of Linh Mustafa.    These  "recommendations have been discussed with primary team, nursing and family.    MASON Pantoja CNP  [facility], Palliative Care  Securely message with the [employer] Web Console (learn more here) or  Text page via [employer] Paging/Directory       Interval History:     Multidisciplinary collaboration:  Patient's case discussed with medicine and primary RN at bedside. Outpatient palliative clinic referrals placed today.     Notable medications:  Tylenol 1g tid  Buspar 5mg daily  Fentanyl 25 mcg/hr patch; on this prior to admission (increased to 37 mcg/hr on 06/09  Dilaudid 0.2 mg IV q 4hr prn (did not received in last 24 hrs)  Oxycodone 15 mg q3h scheduled   Additional oxycodone 5 mg -10 mg PRN x 2 daily added for breakthrough pain     Patient/family narrative  Met with Lita and her family at bedside this morning. Lita required increased pain control secondary to G-tube placement on 06/14 and due to radiation toxicity. She has now completed her final two rounds of radiation therapy while inpatient. She was in a good spirit and eager to go home today. Pain mostly about 5-7/10 and overall reports improved pain and nausea in past couple of days. She is also attempting to eat this morning and is tolerating small bites. She denies any changes in her mood or sleep and overall reports \"sleeping good.\". She is tolerating recently added Methocarbamol and zyprexa. Discussed plan to wean oxycodone in the coming days and agreed to see Palliative clinic as outpatient for follow up, referrals placed today.     Assessment          Linh Mustafa is a 64 year old female with squamous cell carcinoma of the esophagus admitted on 6/4/202 for chest pain after radiation therapy on 5/31/2024. Initial workup negative for acute pulmonary or cardiac etiology. Chest pain likely from mucositis of the esophagus due to radiation toxicity. She completed final two radiation treatments and pain and nausea is well controlled with current drug regimen. " NG placed for tube feeding and monitoring for refeeding syndrome. Needs jejunostomy for nutrition with odynophagia limiting oral consumption. Started GSCF 6/12/2024 to increase neutrophil count >2000 to facilitate G-tube placement. After one dose, neutrophils exceeded target. G-tube placed on 6/14.     Today, the patient was seen for:  Esophageal SCC  Neoplasm related pain  Radiation mucositis  Palliative encounter        Review of Systems:     Besides above, ROS was reviewed and is unremarkable        Physical Exam:   Temp:  [97.4  F (36.3  C)-98.2  F (36.8  C)] 98.2  F (36.8  C)  Pulse:  [75-78] 78  Resp:  [16] 16  BP: ()/(48-78) 113/78  SpO2:  [95 %-98 %] 98 %  140 lbs 1.6 oz    Physical Exam  GENERAL: alert and awake, lying in bed, no acute distress   SKIN: warm/dry   LUNGS: non-labored breathing, on room air   ABDOMINAL: mild tenderness in the upper quadrants, G-tube in place   MUSKL: no gross joint deformities   EXTREMITIES: no edema or cyanosis, pulses 2+ and symmetrical  NEUROLOGIC: alert and oriented x 4  PSYCH: calm       Data Reviewed:   CMP  Recent Labs   Lab 06/17/24  0718 06/16/24  0735    134*   POTASSIUM 4.8 5.0   CHLORIDE 98 99   CO2 28 27   ANIONGAP 9 8   GLC 94 120*   BUN 12.5 13.5   CR 0.65 0.60   GFRESTIMATED >90 >90   VAIBHAV 10.3* 9.9   MAG 2.1 2.1   PHOS 3.7 3.2     CBC  Recent Labs   Lab 06/17/24  0718 06/16/24  0735   WBC 4.0 4.5   RBC 2.52* 2.53*   HGB 8.7* 8.5*   HCT 26.0* 26.2*   * 104*   MCH 34.5* 33.6*   MCHC 33.5 32.4   RDW 17.1* 16.8*    301         Medical Decision Making       MANAGEMENT DISCUSSED with the following over the past 24 hours: medicine, nursing and family   NOTE(S)/MEDICAL RECORDS REVIEWED over the past 24 hours: Medicine and nursing  60 MINUTES SPENT BY ME on the date of service doing chart review, history, exam, documentation & further activities per the note.

## 2024-06-18 NOTE — PROGRESS NOTES
Care Management Discharge Note    Discharge Date: 06/18/2024       Discharge Disposition: Home with services    Discharge Services: Home Care, Home Infusion    Discharge DME: NA    Discharge Transportation: family or friend will provide    Private pay costs discussed: Not applicable    Does the patient's insurance plan have a 3 day qualifying hospital stay waiver?  No    PAS Confirmation Code: NA   Patient/family educated on Medicare website which has current facility and service quality ratings: no    Education Provided on the Discharge Plan: Yes  Persons Notified of Discharge Plans: patient  Patient/Family in Agreement with the Plan: yes    Handoff Referral Completed: Yes    Additional Information:  Received update from MD team that patient is medically stable for discharge to home today.  Patient will discharge on TF provided by [facility].  [facility] liaisonShantel, updated and will meet with the patient prior to discharge.  [facility] for SN visits.  RNCC will remain available if further needs arise.     [facility](TF)  Phone: [phone]  Fax: [phone]    [facility](SN)   Phone: [phone]  Fax: [phone]     PRISCILLA Ly  Phone: [phone]  7B Med Surg Vocera  Nurse Coordinator      Social Work and Care Management Department       SEARCHABLE in [facility] - search CARE COORDINATOR       [place] [employer] [place] ([id number]) Saturday & Sunday; ([id number]) [facility] Recognized Holidays     Units: 5A Onc Vocera & 5C Vocera    Units: 6B Vocera & 6C Vocera     Units: 7A SOT RNCC Vocera, 7B Med Surg Vocera, & 7C Med Surg Vocera    Units: 6A Vocera & 4A CVICU Vocera, 4C MICU Vocera, and 4E SICU Vocera       Units: 5 Ortho Vocera & 5 Med Surg Vocera      Units: 6 Med Surg Vocera & 8 Med Surg Vocera

## 2024-06-18 NOTE — PLAN OF CARE
Goal Outcome Evaluation:       Temp: 97.7  F (36.5  C) Temp src: Oral BP: 94/48 Pulse: 75   Resp: 16 SpO2: 96 % O2 Device: None (Room air)      Neuro: Alert and Oriented  x4, let needs known  Cardiac:WNL, denies cardiac chest pain  Respiratory:LS clear and dim on room air   GI/:WNL, Voiding, last BM 6-17  Diet/Appetite: regular - poor appetite, nausea relieved with scheduled meds and PRN IV compazine x1  Skin:4 lap sites  LDA: PEG; R port  Activity: Up IND  Pain: scheduled meds  Plan: continue plan of care

## 2024-06-18 NOTE — PROGRESS NOTES
Discharge date: 06/18/24 (SOC)  Discharge therapies to be provided by [facility]: Enteral  Formula: Osmolite 1.5  Rate/Dose: 1.5 cartons qid via gravity bag.  Provider to manage enteral at home: [facility]  Estimated length of need: 90 days or more  Education completed: Bedside nurse did teaching of gravity feeding with pt and her family.  Delivery/supplies: Delivery to pt's home today. Suppllies: adult scale, Osmolite 1.5 formula, IV pole, gravity bags, 60ml syringes, medication syringer, med cups, 2x2 gauze.  Agency: [facility]  SNV: NA  Notes: Met with pt and her family. Went over discharge orders for tube feeding with them. Went over the cares of tube feeding daily and changing dressing each day, went over symptoms when to contact I/and primary physician, informed her supplies will be delivered to her home, and went over the supplies that will be delivered,  Went over TF order and 120ml of water to be given before and after feeding. Questioned them if they feel comfortable with being independent with TF's and they said they where Directed them to not hesitate I with any questions etc.     Thank you for the referral.    Shantel Robison LPN  Enteral Nurse Liaison  [facility]  [address] [place] [address]  [phone]

## 2024-06-18 NOTE — PLAN OF CARE
Goal Outcome Evaluation:      Plan of Care Reviewed With: patient    Overall Patient Progress: improvingOverall Patient Progress: improving    Temp: 97.4  F (36.3  C) Temp src: Oral BP: 103/63 Pulse: 75   Resp: 16 SpO2: 95 % O2 Device: None (Room air)       Time: 1500 - 2300  Vitals:  Stable on RA  Diet:  Regular and bolus tube feeds at goal of 360 mL each  Activity/Mobility:  Independent  Neuro:  A&Ox4  Pain/Nausea:  Pain & Nausea managed with meds. Compazine PRN x1 this shift; all others scheduled.   Respiratory: WDL on RA  Cardiac: WDL  GI:  Passing gas. No BM this shift.  :  Voiding spontaneously, not saving  Lines & Drains:  R port SL, PEG clamped & intermittent bolus feeds & meds  Labs:  Monitored.     NEW CHANGES:  No acute changes this shift. Last bolus feed of 360 mL administered and tolerated.     Continue to implement Care Plan.    Rio Murillo RN

## 2024-06-18 NOTE — DISCHARGE SUMMARY
Pt. discharged at home to 5:30 PM, was accompanied by  and daughter, and left with personal belongings. Pt. received complete discharge paperwork and  medications as filled by discharge pharmacy. Pt. was given times of last dose for all discharge medications in writing on discharge medication sheets. Discharge teaching included  medication, pain management, activity restrictions and signs and symptoms of infection. Pt. to follow up with primary provider in 7 days. Pt. had no further questions at the time of discharge and no unmet needs were identified.

## 2024-06-19 ENCOUNTER — VIRTUAL VISIT (OUTPATIENT)
Dept: ONCOLOGY | Facility: CLINIC | Age: 64
End: 2024-06-19
Attending: RADIOLOGY
Payer: COMMERCIAL

## 2024-06-19 DIAGNOSIS — C15.9 SQUAMOUS CELL CARCINOMA OF ESOPHAGUS (H): Primary | ICD-10-CM

## 2024-06-19 NOTE — LETTER
6/19/2024      Linh Mustafa  [address]  [place] [address]      Dear Colleague,    Thank you for referring your patient, Linh Mustafa, to the [facility]. Please see a copy of my visit note below.    Oncology Nutrition Services:    Called patient today for her MNT consult with dietitian as she did not connect to scheduled video call with text and email link.   Lita tells me that she meant to cancel this appointment as she was just discharged from the hospital last night.   Writer offered to answer any questions or concerns she may have regarding her nutrition and tube feeding.   She denies any questions and concerns at this time  She would like to reach out to reschedule in the future prn.    She apologized for not canceling this appointment sooner.  Writer encouraged her to reach out in the future prn.     Symone Nelson RD, , LD  [facility]  [phone]        Again, thank you for allowing me to participate in the care of your patient.        Sincerely,        Symone Nelson RD

## 2024-06-19 NOTE — PROGRESS NOTES
Oncology Nutrition Services:    Called patient today for her MNT consult with dietitian as she did not connect to scheduled video call with text and email link.   Lita tells me that she meant to cancel this appointment as she was just discharged from the hospital last night.   Writer offered to answer any questions or concerns she may have regarding her nutrition and tube feeding.   She denies any questions and concerns at this time  She would like to reach out to reschedule in the future prn.    She apologized for not canceling this appointment sooner.  Writer encouraged her to reach out in the future prn.     Symone Nelson RD, , LD  [facility]  [phone]

## 2024-06-21 ENCOUNTER — MYC MEDICAL ADVICE (OUTPATIENT)
Dept: ONCOLOGY | Facility: CLINIC | Age: 64
End: 2024-06-21
Payer: COMMERCIAL

## 2024-06-21 DIAGNOSIS — C15.9 MALIGNANT NEOPLASM OF ESOPHAGUS, UNSPECIFIED LOCATION (H): ICD-10-CM

## 2024-06-21 RX ORDER — OXYCODONE HCL 5 MG/5 ML
15 SOLUTION, ORAL ORAL
Qty: 500 ML | Refills: 0 | Status: SHIPPED | OUTPATIENT
Start: 2024-06-21 | End: 2024-06-24

## 2024-06-24 DIAGNOSIS — C15.9 MALIGNANT NEOPLASM OF ESOPHAGUS, UNSPECIFIED LOCATION (H): ICD-10-CM

## 2024-06-24 NOTE — TELEPHONE ENCOUNTER
Narcotic Refill Request    Medication(s) requested:  Oxycodone 5mg/5ml solution  Person Requesting Refill:Lita (pt)  What pain is the medication treating: across where incision is middle of chest/below breast bone  Last time pharmacy could only fill 300ml of the original prescription as they ran out of stock.   How is the medication being taken?:Taking about 15ml every 3 to 4 hours  Does pt have enough for today? Yes, will need by tomorrow.   Is pain being adequately controlled on the current regimen?: okay  Experiencing any side effects from medication?: denies    Date of most recent appointment:  5/28/2024 Lilo aMndujano PA-C  Any No Show Visits:none recently  Next appointment:   6/26/2024 Margie Ritchie PA-C  Last fill date and by whom:  Lilo Mandujano PA-C   Reviewed: not an agent    Send to provider: routed to Lilo Mandujano PA-C    2612 This writer spoke to pharmacy at [address] and verified they do have the 500ml in stock to fulfill full prescription.

## 2024-06-25 DIAGNOSIS — C15.9 MALIGNANT NEOPLASM OF ESOPHAGUS, UNSPECIFIED LOCATION (H): Primary | ICD-10-CM

## 2024-06-25 RX ORDER — OXYCODONE HCL 5 MG/5 ML
15 SOLUTION, ORAL ORAL
Qty: 500 ML | Refills: 0 | Status: SHIPPED | OUTPATIENT
Start: 2024-06-25 | End: 2024-06-26

## 2024-06-26 ENCOUNTER — APPOINTMENT (OUTPATIENT)
Dept: LAB | Facility: CLINIC | Age: 64
End: 2024-06-26
Attending: PHYSICIAN ASSISTANT
Payer: COMMERCIAL

## 2024-06-26 ENCOUNTER — ONCOLOGY VISIT (OUTPATIENT)
Dept: ONCOLOGY | Facility: CLINIC | Age: 64
End: 2024-06-26
Attending: INTERNAL MEDICINE
Payer: COMMERCIAL

## 2024-06-26 VITALS
OXYGEN SATURATION: 99 % | DIASTOLIC BLOOD PRESSURE: 56 MMHG | SYSTOLIC BLOOD PRESSURE: 101 MMHG | TEMPERATURE: 97.2 F | BODY MASS INDEX: 21.63 KG/M2 | WEIGHT: 138.1 LBS | HEART RATE: 71 BPM | RESPIRATION RATE: 16 BRPM

## 2024-06-26 DIAGNOSIS — C15.9 SQUAMOUS CELL CARCINOMA OF ESOPHAGUS (H): Primary | ICD-10-CM

## 2024-06-26 DIAGNOSIS — C15.9 MALIGNANT NEOPLASM OF ESOPHAGUS, UNSPECIFIED LOCATION (H): ICD-10-CM

## 2024-06-26 DIAGNOSIS — D51.9 ANEMIA DUE TO VITAMIN B12 DEFICIENCY, UNSPECIFIED B12 DEFICIENCY TYPE: ICD-10-CM

## 2024-06-26 LAB
ALBUMIN SERPL BCG-MCNC: 3.8 G/DL (ref 3.5–5.2)
ALP SERPL-CCNC: 105 U/L (ref 40–150)
ALT SERPL W P-5'-P-CCNC: 43 U/L (ref 0–50)
ANION GAP SERPL CALCULATED.3IONS-SCNC: 11 MMOL/L (ref 7–15)
AST SERPL W P-5'-P-CCNC: 27 U/L (ref 0–45)
BASOPHILS # BLD AUTO: 0 10E3/UL (ref 0–0.2)
BASOPHILS NFR BLD AUTO: 0 %
BILIRUB SERPL-MCNC: 0.5 MG/DL
BUN SERPL-MCNC: 17.6 MG/DL (ref 8–23)
CALCIUM SERPL-MCNC: 10.3 MG/DL (ref 8.8–10.2)
CHLORIDE SERPL-SCNC: 98 MMOL/L (ref 98–107)
CREAT SERPL-MCNC: 0.72 MG/DL (ref 0.51–0.95)
DEPRECATED HCO3 PLAS-SCNC: 27 MMOL/L (ref 22–29)
EGFRCR SERPLBLD CKD-EPI 2021: >90 ML/MIN/1.73M2
EOSINOPHIL # BLD AUTO: 0 10E3/UL (ref 0–0.7)
EOSINOPHIL NFR BLD AUTO: 1 %
ERYTHROCYTE [DISTWIDTH] IN BLOOD BY AUTOMATED COUNT: 17.8 % (ref 10–15)
GLUCOSE SERPL-MCNC: 101 MG/DL (ref 70–99)
HCT VFR BLD AUTO: 26.6 % (ref 35–47)
HGB BLD-MCNC: 8.8 G/DL (ref 11.7–15.7)
IMM GRANULOCYTES # BLD: 0 10E3/UL
IMM GRANULOCYTES NFR BLD: 0 %
LYMPHOCYTES # BLD AUTO: 0.7 10E3/UL (ref 0.8–5.3)
LYMPHOCYTES NFR BLD AUTO: 11 %
MAGNESIUM SERPL-MCNC: 2.4 MG/DL (ref 1.7–2.3)
MCH RBC QN AUTO: 34.2 PG (ref 26.5–33)
MCHC RBC AUTO-ENTMCNC: 33.1 G/DL (ref 31.5–36.5)
MCV RBC AUTO: 104 FL (ref 78–100)
MONOCYTES # BLD AUTO: 0.7 10E3/UL (ref 0–1.3)
MONOCYTES NFR BLD AUTO: 10 %
NEUTROPHILS # BLD AUTO: 5.1 10E3/UL (ref 1.6–8.3)
NEUTROPHILS NFR BLD AUTO: 78 %
NRBC # BLD AUTO: 0 10E3/UL
NRBC BLD AUTO-RTO: 0 /100
PHOSPHATE SERPL-MCNC: 3.4 MG/DL (ref 2.5–4.5)
PLATELET # BLD AUTO: 362 10E3/UL (ref 150–450)
POTASSIUM SERPL-SCNC: 4.7 MMOL/L (ref 3.4–5.3)
PROT SERPL-MCNC: 7.3 G/DL (ref 6.4–8.3)
RBC # BLD AUTO: 2.57 10E6/UL (ref 3.8–5.2)
SODIUM SERPL-SCNC: 136 MMOL/L (ref 135–145)
VIT B12 SERPL-MCNC: 1620 PG/ML (ref 232–1245)
WBC # BLD AUTO: 6.5 10E3/UL (ref 4–11)

## 2024-06-26 PROCEDURE — 82607 VITAMIN B-12: CPT | Performed by: PHYSICIAN ASSISTANT

## 2024-06-26 PROCEDURE — 83735 ASSAY OF MAGNESIUM: CPT | Performed by: PHYSICIAN ASSISTANT

## 2024-06-26 PROCEDURE — 84100 ASSAY OF PHOSPHORUS: CPT | Performed by: PHYSICIAN ASSISTANT

## 2024-06-26 PROCEDURE — 85025 COMPLETE CBC W/AUTO DIFF WBC: CPT | Performed by: PHYSICIAN ASSISTANT

## 2024-06-26 PROCEDURE — G0463 HOSPITAL OUTPT CLINIC VISIT: HCPCS | Performed by: PHYSICIAN ASSISTANT

## 2024-06-26 PROCEDURE — 36591 DRAW BLOOD OFF VENOUS DEVICE: CPT | Performed by: PHYSICIAN ASSISTANT

## 2024-06-26 PROCEDURE — G2211 COMPLEX E/M VISIT ADD ON: HCPCS | Performed by: PHYSICIAN ASSISTANT

## 2024-06-26 PROCEDURE — 250N000011 HC RX IP 250 OP 636: Mod: JZ | Performed by: PHYSICIAN ASSISTANT

## 2024-06-26 PROCEDURE — 99215 OFFICE O/P EST HI 40 MIN: CPT | Performed by: PHYSICIAN ASSISTANT

## 2024-06-26 PROCEDURE — 80053 COMPREHEN METABOLIC PANEL: CPT | Performed by: PHYSICIAN ASSISTANT

## 2024-06-26 RX ORDER — FENTANYL 12.5 UG/1
1 PATCH TRANSDERMAL
Qty: 10 PATCH | Refills: 0 | Status: SHIPPED | OUTPATIENT
Start: 2024-06-26 | End: 2024-08-16

## 2024-06-26 RX ORDER — OXYCODONE HCL 5 MG/5 ML
15 SOLUTION, ORAL ORAL
Qty: 1000 ML | Refills: 0 | Status: SHIPPED | OUTPATIENT
Start: 2024-06-26 | End: 2024-07-03

## 2024-06-26 RX ORDER — HEPARIN SODIUM (PORCINE) LOCK FLUSH IV SOLN 100 UNIT/ML 100 UNIT/ML
5 SOLUTION INTRAVENOUS
Status: CANCELLED | OUTPATIENT
Start: 2024-06-26

## 2024-06-26 RX ORDER — HEPARIN SODIUM (PORCINE) LOCK FLUSH IV SOLN 100 UNIT/ML 100 UNIT/ML
5 SOLUTION INTRAVENOUS
Status: DISCONTINUED | OUTPATIENT
Start: 2024-06-26 | End: 2024-06-26 | Stop reason: HOSPADM

## 2024-06-26 RX ORDER — ACETAMINOPHEN 325 MG/10.15ML
1000 LIQUID ORAL 3 TIMES DAILY
Qty: 2812.5 ML | Refills: 1 | Status: SHIPPED | OUTPATIENT
Start: 2024-06-26

## 2024-06-26 RX ORDER — HEPARIN SODIUM,PORCINE 10 UNIT/ML
5-20 VIAL (ML) INTRAVENOUS DAILY PRN
Status: CANCELLED | OUTPATIENT
Start: 2024-06-26

## 2024-06-26 RX ORDER — FENTANYL 25 UG/1
1 PATCH TRANSDERMAL
Qty: 10 PATCH | Refills: 0 | Status: SHIPPED | OUTPATIENT
Start: 2024-06-26 | End: 2024-08-16

## 2024-06-26 RX ADMIN — Medication 5 ML: at 09:31

## 2024-06-26 ASSESSMENT — PAIN SCALES - GENERAL: PAINLEVEL: NO PAIN (0)

## 2024-06-26 NOTE — PROGRESS NOTES
Oncology/Hematology Visit Note  Jun 26, 2024    Reason for Visit: follow up of esophageal cancer    History of Present Illness: Linh Mustafa is a 64 year old female with esophageal cancer. Since November 2023 she started to notice pain upon swallowing and it was basically pain which limited her food intake.  This was progressively getting worse.  She had further workup as mentioned below.     2/15/2024.  EGD showed an ulcerated mid esophageal mass extending from 26-31 cm from the incisors.  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  Pathology from the mid esophageal mass showed moderately differentiated invasive squamous cell carcinoma, MMR IHC intact.     3/7/2024.  PET/CT showed markedly FDG avid wall thickening of the mid thoracic esophagus with SUV max 24.1.  No evidence of metastatic disease.     3/21/2024.  Upper EUS.  Endoscopy showed a flat nodule in the proximal esophagus between 15-19 cm and one third circumferential.  Upper esophageal sphincter was at 14 cm.  Biopsies from this showed high-grade dysplasia/carcinoma in situ.       Normal esophageal squamous cell cancer causing maybe esophagus on the right anterolateral esophageal wall between 24-30 cm.  It was 50% circumferential.  The GE junction was at 35 cm with 2 cm tongues of Auguste's anteriorly without high risk features.     Ultrasound exam showed the mid esophageal tumor to be probably T3. Two 4 x 6 mm nodes were seen adjacent to the distal esophageal wall at 36 and 37 cm.  Both an identical appearance and one was sampled.  This lymph node biopsy was benign.     By EUS it was staged as T3 N0 M0 tumor.     4/2/2024.  Because of finding of carcinoma in situ in the proximal esophagus, she had endoscopic resection of the proximal squamous cell carcinoma in situ lesion performed with en bloc removal.    The final pathology showed squamous mucosa with high-grade dysplasia/carcinoma in situ with all margins  negative for dysplasia.  No definite invasion was identified. ESD was considered curative for this lesion.     Case was also discussed in the tumor conference with the plan to proceed with neoadjuvant chemotherapy/radiation followed by definitive surgery.       She started on concurrent chemoradiation with carboplatin and Taxol on 5/1/24 and received 5 doses, last on 5/28/24 and last dose of radiation on 6/5/24.    She was hospitalized from 6/4-6/18/24 with radiation esophagitis causing pain and inability to eat. She was discharged home with a G-tube that was placed on 6/14/24.     Please see previous notes for further details on the patient's history. She comes in today for routine hospital follow-up.     Interval History:   Patient reports that she remains very tired.  She is sleeping between 18 to 20 hours out of 24 hours.  She feels her pain is pretty well-controlled with 37 mcg/h of fentanyl patches and 15 mg of oxycodone every 3-4 hours including overnight.  She reports that her pain is located across her upper abdomen.  She also sometimes is burping with pain.  She is having 1 bowel movement per day without the use of any medication.  She is drinking pear juice as needed if she feels mildly constipated.  She is tolerating 1 carton of her tube feeds 4 times per day along with 64 ounces of water through her G-tube.  She is drinking about 8 ounces of water per day and is not eating anything currently.  She is not using Carafate or pantoprazole as she felt that both upset her stomach.  She wonders about resuming BuSpar and famotidine.  She is taking Robaxin every 6-8 hours as well as Zofran and Compazine every 6-8 hours.  She quit smoking when she was hospitalized and has not resumed it.  She does not feel the need for nicotine replacement.  She does still sometimes have cravings.  She denies other concerns.    Current Outpatient Medications   Medication Sig Dispense Refill    acetaminophen (TYLENOL) 325  MG/10.15ML liquid Take 31.25 mLs (1,000 mg) by mouth 3 times daily 2812.5 mL 1    fentaNYL (DURAGESIC) 12 mcg/hr 72 hr patch Place 1 patch onto the skin every 72 hours remove old patch. 10 patch 0    fentaNYL (DURAGESIC) 25 mcg/hr 72 hr patch Place 1 patch onto the skin every 72 hours remove old patch. 10 patch 0    oxyCODONE (ROXICODONE) 5 MG/5ML solution 15 mLs (15 mg) by Oral or Feeding Tube route every 3 hours as needed for severe pain 1000 mL 0    artificial saliva (BIOTENE DRY MOUTHWASH) LIQD liquid Swish and spit 15 mLs in mouth 4 times daily as needed for dry mouth 473 mL 0    busPIRone (BUSPAR) 5 MG tablet Take by mouth daily      famotidine (PEPCID) 20 MG tablet Take 20 mg by mouth 2 times daily      levothyroxine (SYNTHROID/LEVOTHROID) 200 MCG tablet take 1 tablet by mouth every day for 30 days      methocarbamol (ROBAXIN) 750 MG tablet Take 1 tablet (750 mg) by mouth 4 times daily 120 tablet 0    naloxone (NARCAN) 4 MG/0.1ML nasal spray Spray 1 spray (4 mg) into one nostril alternating nostrils as needed for opioid reversal every 2-3 minutes until assistance arrives 0.2 mL 1    ondansetron (ZOFRAN ODT) 8 MG ODT tab Take 1 tablet (8 mg) by mouth every 6 hours 120 tablet 0    prochlorperazine (COMPAZINE) 10 MG tablet Take 1 tablet (10 mg) by mouth every 6 hours as needed for vomiting 90 tablet 0    vitamin B-12 (CYANOCOBALAMIN) 2500 MCG sublingual tablet Take 1 tablet (2,500 mcg) by mouth daily 90 tablet 3     Physical Examination:  General: The patient is a pleasant female in no acute distress.   /56 (BP Location: Right arm, Patient Position: Sitting, Cuff Size: Adult Regular)   Pulse 71   Temp 97.2  F (36.2  C) (Oral)   Resp 16   Wt 62.6 kg (138 lb 1.6 oz)   SpO2 99%   BMI 21.63 kg/m    Wt Readings from Last 10 Encounters:   06/26/24 62.6 kg (138 lb 1.6 oz)   06/10/24 63.5 kg (140 lb 1.6 oz)   05/31/24 64.4 kg (142 lb)   05/28/24 64.5 kg (142 lb 2.2 oz)   05/28/24 64.8 kg (142 lb 14.4 oz)    05/20/24 66 kg (145 lb 9.6 oz)   05/20/24 65.8 kg (145 lb)   05/15/24 66.7 kg (147 lb)   05/13/24 66.4 kg (146 lb 4.8 oz)   05/07/24 66.7 kg (147 lb)   HEENT: Sclerae are anicteric.  Lymph: Neck is supple with no lymphadenopathy in the cervical or supraclavicular areas.   Heart: Regular rate and rhythm.   Lungs: Clear to auscultation bilaterally.   Abdomen: Bowel sounds present, soft, nontender with no palpable hepatosplenomegaly or masses. G-tube is in place in left abdomen with moderate amount of surrounding discharge noted. No surrounding erythema. Other surgical incisions on abdomen are bandaged.   Extremities: No lower extremity edema noted bilaterally.   Neuro: Cranial nerves II through XII are grossly intact.  Skin: No rashes, petechiae, or bruising noted on exposed skin.    Laboratory Data:  Most Recent 3 CBC's:  Recent Labs   Lab Test 06/26/24  0935 06/17/24  0718 06/16/24  0735   WBC 6.5 4.0 4.5   HGB 8.8* 8.7* 8.5*   * 103* 104*    284 301   ANEUTAUTO 5.1 2.6 3.4    Most Recent 3 BMP's:  Recent Labs   Lab Test 06/26/24  0935 06/17/24  0718 06/16/24  0735 06/06/24  0729 06/06/24  0634 06/05/24  0724    135 134*   < > 136 135   POTASSIUM 4.7 4.8 5.0   < > 3.4 3.7   CHLORIDE 98 98 99   < > 99 102   CO2 27 28 27   < > 24 23   BUN 17.6 12.5 13.5   < > 9.8 11.9   CR 0.72 0.65 0.60   < > 0.56 0.51   ANIONGAP 11 9 8   < > 13 10   VAIBHAV 10.3* 10.3* 9.9   < > 10.5* 9.7   * 94 120*   < > 94 89   PROTTOTAL 7.3  --   --   --  7.3 5.8*   ALBUMIN 3.8  --   --   --  4.0 3.4*    < > = values in this interval not displayed.    Most Recent 2 LFT's:  Recent Labs   Lab Test 06/26/24  0935 06/06/24  0634   AST 27 17   ALT 43 22   ALKPHOS 105 99   BILITOTAL 0.5 0.7   I reviewed the above labs today.    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  MMR IHC intact. She completed neoadjuvant treatment with concurrent chemoradiation  with weekly carboplatin and Taxol with last dose of radiation on 6/5/24, complicated by a hospitalization for radiation esophagitis. She is slowly recovering from this. She will see Dr. Martinez in follow-up tomorrow. She will follow-up with me in 2 weeks. Will repeat imaging in mid-August with a PET/CT. She will call sooner for concerns.     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  Now s/p endoscopic resection with clear margins free of dysplasia.  This has been adequately treated.  Continue to observe.     Odynophagia. Secondary to cancer and radiation esophagitis. She will continue on Fentanyl at 37 mcg/hr, Tylenol 975 mg tid, and oxycodone 15 mg every 3-4 hours prn. She also remain on Robaxin every 6-8 hours. She will follow-up with palliative care next week. I appreciate their assistance in planning to taper her pain medication over the next month as her pain improves. Recommend swallowing some water every day.     Nausea. Secondary to radiation esophagitis and worsened with tube feeds. She will continue to take Zofran and Compazine every 6-8 hours prn.    Weight loss: Now stabilized. She will continue with her tube feeds as directed by home infusion.    Vitamin B12 deficiency with macrocytosis. Was on vitamin B12 tablets, but now supplemented in her tube feeds. Will recheck vitamin B12 level today.     Tobacco abuse. Quit on 6/4/24 when hospitalized. Congratulated her on this. No need for nicotine replacement at this time.    Hypercalcemia. Secondary to hyperparathyroidism. Suspect this is as a sequela of her prior thyroid radiation. She was scheduled to see endocrinology on 5/10 but had to cancel the appointment. Will request this be rescheduled. Calcium is mildly elevated--waxing and waning. Continue to monitor.     Hypothyroidism. hx of PATEL at the age of 16 for hyperthyroidism. On levothyroxine as managed by her PCP. Last TSH was low with a normal free T4. Will defer to endocrine on  management.     Anemia. Secondary to vitamin B12 deficiency and worsened with chemoradiation. Discussed this will slowly improve over the next few months. Will continue to monitor.     Anxiety. Recommend resuming buspirone 5 mg daily, which she may crush and put in her G-tube.     Acid reflux. Recommend resuming famotidine, which she may crush and put in her G-tube.     Margie Ritchie PA-C  [facility] [address] [address]  [phone]    50 minutes spent on the date of the encounter doing chart review, review of test results, interpretation of tests, patient visit, and documentation     The longitudinal plan of care for the diagnosis(es)/condition(s) as documented were addressed during this visit. Due to the added complexity in care, I will continue to support Lita in the subsequent management and with ongoing continuity of care.    Transition Care Management Services  Admit: 6/4/24  Discharge: 6/18/24  Face-to-face visit date: 6/26/2024  Medical complexity decision making: Moderate complexity ([id number])

## 2024-06-26 NOTE — LETTER
6/26/2024      Linh Mustafa  [address]  [place] [address]      Dear Colleague,    Thank you for referring your patient, Linh Mustafa, to the [facility]. Please see a copy of my visit note below.    Oncology/Hematology Visit Note  Jun 26, 2024    Reason for Visit: follow up of esophageal cancer    History of Present Illness: Linh Mustafa is a 64 year old female with esophageal cancer. Since November 2023 she started to notice pain upon swallowing and it was basically pain which limited her food intake.  This was progressively getting worse.  She had further workup as mentioned below.     2/15/2024.  EGD showed an ulcerated mid esophageal mass extending from 26-31 cm from the incisors.  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  Pathology from the mid esophageal mass showed moderately differentiated invasive squamous cell carcinoma, MMR IHC intact.     3/7/2024.  PET/CT showed markedly FDG avid wall thickening of the mid thoracic esophagus with SUV max 24.1.  No evidence of metastatic disease.     3/21/2024.  Upper EUS.  Endoscopy showed a flat nodule in the proximal esophagus between 15-19 cm and one third circumferential.  Upper esophageal sphincter was at 14 cm.  Biopsies from this showed high-grade dysplasia/carcinoma in situ.       Normal esophageal squamous cell cancer causing maybe esophagus on the right anterolateral esophageal wall between 24-30 cm.  It was 50% circumferential.  The GE junction was at 35 cm with 2 cm tongues of Auguste's anteriorly without high risk features.     Ultrasound exam showed the mid esophageal tumor to be probably T3. Two 4 x 6 mm nodes were seen adjacent to the distal esophageal wall at 36 and 37 cm.  Both an identical appearance and one was sampled.  This lymph node biopsy was benign.     By EUS it was staged as T3 N0 M0 tumor.     4/2/2024.  Because of finding of carcinoma in situ in the  proximal esophagus, she had endoscopic resection of the proximal squamous cell carcinoma in situ lesion performed with en bloc removal.    The final pathology showed squamous mucosa with high-grade dysplasia/carcinoma in situ with all margins negative for dysplasia.  No definite invasion was identified. ESD was considered curative for this lesion.     Case was also discussed in the tumor conference with the plan to proceed with neoadjuvant chemotherapy/radiation followed by definitive surgery.       She started on concurrent chemoradiation with carboplatin and Taxol on 5/1/24 and received 5 doses, last on 5/28/24 and last dose of radiation on 6/5/24.    She was hospitalized from 6/4-6/18/24 with radiation esophagitis causing pain and inability to eat. She was discharged home with a G-tube that was placed on 6/14/24.     Please see previous notes for further details on the patient's history. She comes in today for routine hospital follow-up.     Interval History:   Patient reports that she remains very tired.  She is sleeping between 18 to 20 hours out of 24 hours.  She feels her pain is pretty well-controlled with 37 mcg/h of fentanyl patches and 15 mg of oxycodone every 3-4 hours including overnight.  She reports that her pain is located across her upper abdomen.  She also sometimes is burping with pain.  She is having 1 bowel movement per day without the use of any medication.  She is drinking pear juice as needed if she feels mildly constipated.  She is tolerating 1 carton of her tube feeds 4 times per day along with 64 ounces of water through her G-tube.  She is drinking about 8 ounces of water per day and is not eating anything currently.  She is not using Carafate or pantoprazole as she felt that both upset her stomach.  She wonders about resuming BuSpar and famotidine.  She is taking Robaxin every 6-8 hours as well as Zofran and Compazine every 6-8 hours.  She quit smoking when she was hospitalized and has  not resumed it.  She does not feel the need for nicotine replacement.  She does still sometimes have cravings.  She denies other concerns.    Current Outpatient Medications   Medication Sig Dispense Refill     acetaminophen (TYLENOL) 325 MG/10.15ML liquid Take 31.25 mLs (1,000 mg) by mouth 3 times daily 2812.5 mL 1     fentaNYL (DURAGESIC) 12 mcg/hr 72 hr patch Place 1 patch onto the skin every 72 hours remove old patch. 10 patch 0     fentaNYL (DURAGESIC) 25 mcg/hr 72 hr patch Place 1 patch onto the skin every 72 hours remove old patch. 10 patch 0     oxyCODONE (ROXICODONE) 5 MG/5ML solution 15 mLs (15 mg) by Oral or Feeding Tube route every 3 hours as needed for severe pain 1000 mL 0     artificial saliva (BIOTENE DRY MOUTHWASH) LIQD liquid Swish and spit 15 mLs in mouth 4 times daily as needed for dry mouth 473 mL 0     busPIRone (BUSPAR) 5 MG tablet Take by mouth daily       famotidine (PEPCID) 20 MG tablet Take 20 mg by mouth 2 times daily       levothyroxine (SYNTHROID/LEVOTHROID) 200 MCG tablet take 1 tablet by mouth every day for 30 days       methocarbamol (ROBAXIN) 750 MG tablet Take 1 tablet (750 mg) by mouth 4 times daily 120 tablet 0     naloxone (NARCAN) 4 MG/0.1ML nasal spray Spray 1 spray (4 mg) into one nostril alternating nostrils as needed for opioid reversal every 2-3 minutes until assistance arrives 0.2 mL 1     ondansetron (ZOFRAN ODT) 8 MG ODT tab Take 1 tablet (8 mg) by mouth every 6 hours 120 tablet 0     prochlorperazine (COMPAZINE) 10 MG tablet Take 1 tablet (10 mg) by mouth every 6 hours as needed for vomiting 90 tablet 0     vitamin B-12 (CYANOCOBALAMIN) 2500 MCG sublingual tablet Take 1 tablet (2,500 mcg) by mouth daily 90 tablet 3     Physical Examination:  General: The patient is a pleasant female in no acute distress.   /56 (BP Location: Right arm, Patient Position: Sitting, Cuff Size: Adult Regular)   Pulse 71   Temp 97.2  F (36.2  C) (Oral)   Resp 16   Wt 62.6 kg (138 lb  1.6 oz)   SpO2 99%   BMI 21.63 kg/m    Wt Readings from Last 10 Encounters:   06/26/24 62.6 kg (138 lb 1.6 oz)   06/10/24 63.5 kg (140 lb 1.6 oz)   05/31/24 64.4 kg (142 lb)   05/28/24 64.5 kg (142 lb 2.2 oz)   05/28/24 64.8 kg (142 lb 14.4 oz)   05/20/24 66 kg (145 lb 9.6 oz)   05/20/24 65.8 kg (145 lb)   05/15/24 66.7 kg (147 lb)   05/13/24 66.4 kg (146 lb 4.8 oz)   05/07/24 66.7 kg (147 lb)   HEENT: Sclerae are anicteric.  Lymph: Neck is supple with no lymphadenopathy in the cervical or supraclavicular areas.   Heart: Regular rate and rhythm.   Lungs: Clear to auscultation bilaterally.   Abdomen: Bowel sounds present, soft, nontender with no palpable hepatosplenomegaly or masses. G-tube is in place in left abdomen with moderate amount of surrounding discharge noted. No surrounding erythema. Other surgical incisions on abdomen are bandaged.   Extremities: No lower extremity edema noted bilaterally.   Neuro: Cranial nerves II through XII are grossly intact.  Skin: No rashes, petechiae, or bruising noted on exposed skin.    Laboratory Data:  Most Recent 3 CBC's:  Recent Labs   Lab Test 06/26/24  0935 06/17/24  0718 06/16/24  0735   WBC 6.5 4.0 4.5   HGB 8.8* 8.7* 8.5*   * 103* 104*    284 301   ANEUTAUTO 5.1 2.6 3.4    Most Recent 3 BMP's:  Recent Labs   Lab Test 06/26/24  0935 06/17/24  0718 06/16/24  0735 06/06/24  0729 06/06/24  0634 06/05/24  0724    135 134*   < > 136 135   POTASSIUM 4.7 4.8 5.0   < > 3.4 3.7   CHLORIDE 98 98 99   < > 99 102   CO2 27 28 27   < > 24 23   BUN 17.6 12.5 13.5   < > 9.8 11.9   CR 0.72 0.65 0.60   < > 0.56 0.51   ANIONGAP 11 9 8   < > 13 10   VAIBHAV 10.3* 10.3* 9.9   < > 10.5* 9.7   * 94 120*   < > 94 89   PROTTOTAL 7.3  --   --   --  7.3 5.8*   ALBUMIN 3.8  --   --   --  4.0 3.4*    < > = values in this interval not displayed.    Most Recent 2 LFT's:  Recent Labs   Lab Test 06/26/24  0935 06/06/24  0634   AST 27 17   ALT 43 22   ALKPHOS 105 99   BILITOTAL  0.5 0.7   I reviewed the above labs today.    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  MMR IHC intact. She completed neoadjuvant treatment with concurrent chemoradiation with weekly carboplatin and Taxol with last dose of radiation on 6/5/24, complicated by a hospitalization for radiation esophagitis. She is slowly recovering from this. She will see Dr. Martinez in follow-up tomorrow. She will follow-up with me in 2 weeks. Will repeat imaging in mid-August with a PET/CT. She will call sooner for concerns.     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  Now s/p endoscopic resection with clear margins free of dysplasia.  This has been adequately treated.  Continue to observe.     Odynophagia. Secondary to cancer and radiation esophagitis. She will continue on Fentanyl at 37 mcg/hr, Tylenol 975 mg tid, and oxycodone 15 mg every 3-4 hours prn. She also remain on Robaxin every 6-8 hours. She will follow-up with palliative care next week. I appreciate their assistance in planning to taper her pain medication over the next month as her pain improves. Recommend swallowing some water every day.     Nausea. Secondary to radiation esophagitis and worsened with tube feeds. She will continue to take Zofran and Compazine every 6-8 hours prn.    Weight loss: Now stabilized. She will continue with her tube feeds as directed by home infusion.    Vitamin B12 deficiency with macrocytosis. Was on vitamin B12 tablets, but now supplemented in her tube feeds. Will recheck vitamin B12 level today.     Tobacco abuse. Quit on 6/4/24 when hospitalized. Congratulated her on this. No need for nicotine replacement at this time.    Hypercalcemia. Secondary to hyperparathyroidism. Suspect this is as a sequela of her prior thyroid radiation. She was scheduled to see endocrinology on 5/10 but had to cancel the appointment. Will request this be rescheduled.  Calcium is mildly elevated--waxing and waning. Continue to monitor.     Hypothyroidism. hx of PATEL at the age of 16 for hyperthyroidism. On levothyroxine as managed by her PCP. Last TSH was low with a normal free T4. Will defer to endocrine on management.     Anemia. Secondary to vitamin B12 deficiency and worsened with chemoradiation. Discussed this will slowly improve over the next few months. Will continue to monitor.     Anxiety. Recommend resuming buspirone 5 mg daily, which she may crush and put in her G-tube.     Acid reflux. Recommend resuming famotidine, which she may crush and put in her G-tube.     Margie Ritchie PA-C  [facility] [place] [address]  [phone]    50 minutes spent on the date of the encounter doing chart review, review of test results, interpretation of tests, patient visit, and documentation     The longitudinal plan of care for the diagnosis(es)/condition(s) as documented were addressed during this visit. Due to the added complexity in care, I will continue to support Lita in the subsequent management and with ongoing continuity of care.    Transition Care Management Services  Admit: 6/4/24  Discharge: 6/18/24  Face-to-face visit date: 6/26/2024  Medical complexity decision making: Moderate complexity ([id number])        Again, thank you for allowing me to participate in the care of your patient.        Sincerely,        Margie Ritchie PA-C

## 2024-06-26 NOTE — NURSING NOTE
"Oncology Rooming Note    June 26, 2024 9:53 AM   Linh Mustafa is a 64 year old female who presents for:    Chief Complaint   Patient presents with    Port Draw     Port accessed and labs drawn by rn in lab. Vital signs taken.    Oncology Clinic Visit     Squamous Cell Carcinoma of Esophagus     Initial Vitals: /56 (BP Location: Right arm, Patient Position: Sitting, Cuff Size: Adult Regular)   Pulse 71   Temp 97.2  F (36.2  C) (Oral)   Resp 16   Wt 62.6 kg (138 lb 1.6 oz)   SpO2 99%   BMI 21.63 kg/m   Estimated body mass index is 21.63 kg/m  as calculated from the following:    Height as of 6/4/24: 1.702 m (5' 7\").    Weight as of this encounter: 62.6 kg (138 lb 1.6 oz). Body surface area is 1.72 meters squared.  No Pain (0) Comment: Data Unavailable   No LMP recorded. Patient is postmenopausal.  Allergies reviewed: Yes  Medications reviewed: Yes    Medications: MEDICATION REFILLS NEEDED TODAY. Provider was notified.  Pharmacy name entered into [facility] [employer]:    [id number] - [place], [place] - [address] [place] [address] E  [facility] - [place], [place] - [address] [place] [address]    Frailty Screening:   Is the patient here for a new oncology consult visit in cancer care? 2. No      Clinical concerns: Medication refills       Debbi Gutierrez LPN  6/26/2024              "

## 2024-06-27 ENCOUNTER — ONCOLOGY VISIT (OUTPATIENT)
Dept: PULMONOLOGY | Facility: CLINIC | Age: 64
End: 2024-06-27
Attending: THORACIC SURGERY (CARDIOTHORACIC VASCULAR SURGERY)
Payer: COMMERCIAL

## 2024-06-27 VITALS
WEIGHT: 137 LBS | BODY MASS INDEX: 21.46 KG/M2 | SYSTOLIC BLOOD PRESSURE: 110 MMHG | OXYGEN SATURATION: 97 % | HEART RATE: 90 BPM | DIASTOLIC BLOOD PRESSURE: 64 MMHG

## 2024-06-27 DIAGNOSIS — C15.4 MALIGNANT NEOPLASM OF MIDDLE THIRD OF ESOPHAGUS (H): Primary | ICD-10-CM

## 2024-06-27 PROCEDURE — 99024 POSTOP FOLLOW-UP VISIT: CPT | Performed by: THORACIC SURGERY (CARDIOTHORACIC VASCULAR SURGERY)

## 2024-06-28 ENCOUNTER — ONCOLOGY VISIT (OUTPATIENT)
Dept: RADIATION ONCOLOGY | Facility: CLINIC | Age: 64
End: 2024-06-28
Payer: COMMERCIAL

## 2024-06-28 ENCOUNTER — PATIENT OUTREACH (OUTPATIENT)
Dept: ONCOLOGY | Facility: CLINIC | Age: 64
End: 2024-06-28
Payer: COMMERCIAL

## 2024-06-28 NOTE — LETTER
6/28/2024      Linh Mustafa  [address]  [place] [address]      Dear Colleague,    Thank you for referring your patient, Linh Mustafa, to the [facility]. Please see a copy of my visit note below.    No notes on file    Again, thank you for allowing me to participate in the care of your patient.        Sincerely,        Mireille Berg MD

## 2024-06-28 NOTE — PROGRESS NOTES
[facility]: Cancer Care Short Note                                                                                          Outgoing Call:   Phone call to Lita.  Margie Ritchie PA-C reviewed her labs and her vitamin B12 level is elevated. No need to take the replacement. Lita confirms that she will not take the replacement.    Lita is having weekly visits from a [facility] nurse Yulisa ([phone]).  Yulisa RN confirms that can coordinate home lab for Lita around 7/9 so that Margie Ritchie has lab results for her virtual visit on 7/10.   Lab orders faxed to [facility] at [phone].  Green ok given in right fax.  Lita was updated on the plan.    Verónica Sharma RN, BSN  RN Care Coordinator   [facility]

## 2024-07-01 ENCOUNTER — NURSE TRIAGE (OUTPATIENT)
Dept: NURSING | Facility: CLINIC | Age: 64
End: 2024-07-01
Payer: COMMERCIAL

## 2024-07-01 NOTE — TELEPHONE ENCOUNTER
Call from patient who says she has been sleeping constantly on the 15 mg of oxycodone. She says she cut oxycodone back 10 mg but still is sleepy. She asked if she could take off the 12 mg patch of fentanyl instead.    Advised she could but not sure if it would decrease her pain control. Advised she could try to take less of the oxycodone or take it further apart.    Caller verbalized understanding.    Won Mai RN, BSN  Triage Nurse Advisor        Reason for Disposition   Caller has medicine question only, adult not sick, AND triager answers question    Protocols used: Medication Question Call-A-AH

## 2024-07-02 DIAGNOSIS — C15.9 SQUAMOUS CELL CARCINOMA OF ESOPHAGUS (H): ICD-10-CM

## 2024-07-02 RX ORDER — METHOCARBAMOL 750 MG/1
750 TABLET, FILM COATED ORAL 4 TIMES DAILY
Qty: 120 TABLET | Refills: 0 | Status: SHIPPED | OUTPATIENT
Start: 2024-07-02

## 2024-07-02 RX ORDER — PROCHLORPERAZINE MALEATE 10 MG
10 TABLET ORAL EVERY 6 HOURS PRN
Qty: 90 TABLET | Refills: 0 | Status: SHIPPED | OUTPATIENT
Start: 2024-07-02 | End: 2024-10-04

## 2024-07-02 RX ORDER — FLUORIDE TOOTHPASTE
15 TOOTHPASTE DENTAL 4 TIMES DAILY PRN
Qty: 473 ML | Refills: 0 | Status: SHIPPED | OUTPATIENT
Start: 2024-07-02

## 2024-07-02 RX ORDER — ONDANSETRON 8 MG/1
8 TABLET, ORALLY DISINTEGRATING ORAL EVERY 8 HOURS PRN
Qty: 90 TABLET | Refills: 0 | Status: SHIPPED | OUTPATIENT
Start: 2024-07-02 | End: 2024-10-04

## 2024-07-02 NOTE — PROGRESS NOTES
"Palliative Care Progress Note    Patient Name: Linh Mustafa  Primary Provider: Madison Ruiz    Chief Complaint/Patient ID: Linh Mustafa is a 64 year old female with PMHx of esophageal SCC.  S/p endoscopic resection of SCC in situ lesion, and recommendation was to proceed with neoadjuvant chemoRT followed by definitive surgery.  Started concurrent chemo RT with carbo/Taxol 05/2024 (5 cycles).  -Hospitalized 06/4-06/18/24 with chest pain after radiation therapy, felt to be 2/2 mucositis.  Able to complete final 2 RT treatments inpatient.    Last Palliative care appointment: Seen during inpatient admission at [facility].  Last note by MASON Davis on 6/18/2024.     Reviewed: Yes.  Current prescriptions for oxycodone solution and 12 mcg fentanyl patch.    Social History: Lives alone.  Has a daughter and son-in-law who live nearby.  Brother from [place] has been helping her with day-to-day tasks.  Identifies as Restorationism.    Advanced Care Planning: Has not completed an HCD.    Interim History:  Linh Mustafa is a 64 year old female who is seen today for follow up with Palliative Care via billable video visit.  Her daughter Yulisa and her brother Medina are also present and provide additional history.     Reviewed oncology note from 6/26.  Completed treatment while inpatient and slowly recovering.  Planning to repeat imaging in mid August.  Significant fatigue, sleeping between 18 to 20 hours/day.  Hoping to be able to taper pain medication over the coming weeks.    She notes that since 6/29, she has had the feeling of being \"extremely antsy\".  It seems like this did start in conjunction with further tapering down on her opiates, and since the anxiety did increase, this scared her, so she actually reduce the fentanyl patch even further on 7/1.  Daughter reports that her dosing is actually lower right now than patient realized.  She has been getting 7.5 mg of oxycodone during the day per dose and 10 mg at " night if pain is really bad.  She currently has the 25 mcg fentanyl patch on and is due to changes today.    With anxiety increasing, there had been discussion about resuming BuSpar.  However, daughter believes that BuSpar has caused increased anger in the patient.  Primary sent a prescription for 25 mg of hydroxyzine twice daily as needed earlier today.  Patient took 1 dose so far and did not feel that it did anything for her.    Historically, being outside working in the yard, playing games on her phone/tablet, and reading have been activities that are combing for her.    She denies any changes to bowel habits, night sweats, or myalgias.      Physical Exam:   Constitutional: Alert, pleasant, no apparent distress. Sitting up in chair.  Eyes: Sclera non-icteric, no eye discharge.  ENT: No nasal discharge. Ears grossly normal.  Respiratory: Unlabored respirations. Speaking in full sentences.  Musculoskeletal: Extremities appear normal- no gross deformities noted. No edema noted on upper body.   Skin: No suspicious lesions or rashes on visible skin.  Neurologic: Clear speech, no aphasia. No facial droop.  Psychiatric: Mentation appears normal, appropriate attention. Affect normal/bright. Does not appear anxious or depressed.    Key Data Reviewed:  LABS:   Lab Results   Component Value Date    WBC 6.5 06/26/2024    HGB 8.8 (L) 06/26/2024    HCT 26.6 (L) 06/26/2024     06/26/2024     06/26/2024    POTASSIUM 4.7 06/26/2024    CHLORIDE 98 06/26/2024    CO2 27 06/26/2024    BUN 17.6 06/26/2024    CR 0.72 06/26/2024     (H) 06/26/2024    SED 61 (H) 06/04/2024    DD 0.79 (H) 12/23/2022    NTBNPI 136 06/04/2024    AST 27 06/26/2024    ALT 43 06/26/2024    ALKPHOS 105 06/26/2024    BILITOTAL 0.5 06/26/2024    INR 0.98 06/04/2024 6/26/2024- GFR >90, Albumin 3.8.      Impression & Recommendations & Counseling:  Linh Mustafa is a 64 year old female with history of esophageal SCC s/p proximal resection  and chemo RT.    Increased anxiety does seem to be correlating with reduction in opiate doses.  Daughter had reported concerns that patient was taking the medication in the hospital even when she did not need it (noted instances where they were all together laughing and having a good conversation, patient did not seem to be in any pain, however when she realized what time it was, she immediately wanted her medication).  We discussed that it is important to taper off medications when they are no longer needed, and it does seem that she has been doing okay tapering down up until this weekend.  She is alert and awake more often (was sleeping up to 18 to 20 hours/day in the hospital).  Suggested tapering a bit slower at this point and considering maximizing the dosing on the hydroxyzine.    Recommendations:  -Hold further tapering of opioids until 7/8.  This means continuing 25 mcg fentanyl patch every 72 hours and 7.5 mg of oxycodone every 3 hours as needed during the day and 10 mg nightly as needed.  -Increased range on hydroxyzine to 25 to 50 mg up to 3 times daily as needed.  Discussed we could further increase this depending on effectiveness.  -Discussed it would be okay to taper opioids further in weekly increments, or even a little bit longer if needed.  -If anxiety continues to be pervasive once opioids have stopped, would consider additional medication management.    Follow up: About a month      Video-Visit Details  Video Start Time: 3:13 PM  Video End Time: 3:43 PM    Originating Location (pt. Location): Home     Distant Location (provider location):  Offsite- Personal Home     Platform used for Video Visit: Vishal     Total time spent on day of encounter is 43 mins, including reviewing record, review of above studies, above visit with patient, symptomatic discussion of primarily pain, anxiety, and withdrawal concerns, including medication adjustments/prescription management, and documentation.     Lourdes CORONADO  DO Opal  Palliative Medicine   [id number]    Some chart documentation performed using Dragon Voice recognition Software. Although reviewed after completion, some words and grammatical errors may remain.

## 2024-07-02 NOTE — TELEPHONE ENCOUNTER
Medication requested: prochlorperazine 10 mg tablet  Last prescribing provider: Vandana Florentino MD on 6/17/24    Medication requested: ondansetron 8 mg ODT tab  Last prescribing provider: Vandana Florentino MD    Medication requested: methocarbamol 750 mg tablet  Last prescribing provider: Vandana Florentino MD    Medication requested: Biotene  Last prescribing provider: Vandana Florentino MD    Last clinic visit date: 6/26/24 with ROSAURA Flores    Recommendations for requested medication (if none, N/A): NA    Any other pertinent information (if none, N/A): NA    Refilled: Y/N, if NO, why?    Pended and Routed to ROSAURA Flores

## 2024-07-02 NOTE — TELEPHONE ENCOUNTER
"Per ROSAURA Flores, please advise her to take off the 12 mcg fentanyl patch.    Spoke with Lita: Advised her per ROSAURA Flores  Yesterday removed the patch at 5-6 p.m.  Had a painful night.  Went up to 15 mg of oxycodone at 0800 and then slept.  Pain is tolerable right now.  States she feels better now and does not have the \"out of body\" feeling she was having before removing the fentanyl patch.    Routed to ROSAURA Flores to update.        "

## 2024-07-03 ENCOUNTER — VIRTUAL VISIT (OUTPATIENT)
Dept: ONCOLOGY | Facility: CLINIC | Age: 64
End: 2024-07-03
Attending: STUDENT IN AN ORGANIZED HEALTH CARE EDUCATION/TRAINING PROGRAM
Payer: COMMERCIAL

## 2024-07-03 VITALS — WEIGHT: 137 LBS | BODY MASS INDEX: 21.5 KG/M2 | HEIGHT: 67 IN

## 2024-07-03 DIAGNOSIS — F41.9 ANXIETY: ICD-10-CM

## 2024-07-03 DIAGNOSIS — R53.83 OTHER FATIGUE: ICD-10-CM

## 2024-07-03 DIAGNOSIS — K22.89 ESOPHAGEAL PAIN: ICD-10-CM

## 2024-07-03 DIAGNOSIS — Z51.5 ENCOUNTER FOR PALLIATIVE CARE: ICD-10-CM

## 2024-07-03 DIAGNOSIS — C15.9 SQUAMOUS CELL CARCINOMA OF ESOPHAGUS (H): Primary | ICD-10-CM

## 2024-07-03 PROCEDURE — 99203 OFFICE O/P NEW LOW 30 MIN: CPT | Mod: 95 | Performed by: STUDENT IN AN ORGANIZED HEALTH CARE EDUCATION/TRAINING PROGRAM

## 2024-07-03 RX ORDER — OXYCODONE HCL 5 MG/5 ML
7.5-1 SOLUTION, ORAL ORAL
COMMUNITY
Start: 2024-07-03 | End: 2024-07-16

## 2024-07-03 RX ORDER — HYDROXYZINE HYDROCHLORIDE 25 MG/1
25 TABLET, FILM COATED ORAL 3 TIMES DAILY PRN
COMMUNITY
Start: 2024-07-03 | End: 2024-08-16

## 2024-07-03 NOTE — PATIENT INSTRUCTIONS
Recommendations:  -I would hold at your current doses of the opioids, both the oxycodone and the fentanyl patch, for now.  Can reassess further dose decreases 7/8 (unless you feel really great beforehand).  My main concern is for the anxiety to calm down a little bit before decreasing further.  -You can take 1 to 2 capsules of the hydroxyzine up to 3 times daily.  This is still not the maximum dose, so if you do not find it to be fully effective, please let me know and we can adjust further as long as you are not having any negative effects from it.  -We talked about using distraction as a technique to help with both anxiety as well as pain.  Try to incorporate more relaxing and calming activities into your daily routine as well.  -If anxiety continues to be pervasive once you have tapered down on the opioid medications, there are other options that we can discuss for long-term anxiety management.    Follow up: Will plan on about a month, but please let me know how things are going next week either via [employer] or calling my clinic nurse at the number below.      Reasons to Call    If you are having worsening/uncontrolled symptoms we want you to call!    You or your other physicians make any changes to medications we have prescribed.  -Please call for refills 4-5 days before you will run out of medication.    Important Phone Numbers, including: Refills, scheduling, and general questions     Palliative Care RN: Elaine Chavez - [phone]  *After hours or on weekends/holidays. Will connect you with on call MD. [phone]

## 2024-07-03 NOTE — LETTER
"7/3/2024      Linh Mustafa  [address]  [place] [address]      Dear Colleague,    Thank you for referring your patient, Linh Mustafa, to the [facility]. Please see a copy of my visit note below.    Palliative Care Progress Note    Patient Name: Lihn Mustafa  Primary Provider: Madison Ruiz    Chief Complaint/Patient ID: Linh Mustafa is a 64 year old female with PMHx of esophageal SCC.  S/p endoscopic resection of SCC in situ lesion, and recommendation was to proceed with neoadjuvant chemoRT followed by definitive surgery.  Started concurrent chemo RT with carbo/Taxol 05/2024 (5 cycles).  -Hospitalized 06/4-06/18/24 with chest pain after radiation therapy, felt to be 2/2 mucositis.  Able to complete final 2 RT treatments inpatient.    Last Palliative care appointment: Seen during inpatient admission at [facility].  Last note by MASON Davis on 6/18/2024.     Reviewed: Yes.  Current prescriptions for oxycodone solution and 12 mcg fentanyl patch.    Social History: Lives alone.  Has a daughter and son-in-law who live nearby.  Brother from [place] has been helping her with day-to-day tasks.  Identifies as Latter day.    Advanced Care Planning: Has not completed an HCD.    Interim History:  Linh Mustafa is a 64 year old female who is seen today for follow up with Palliative Care via billable video visit.  Her daughter Yulisa and her brother Medina are also present and provide additional history.     Reviewed oncology note from 6/26.  Completed treatment while inpatient and slowly recovering.  Planning to repeat imaging in mid August.  Significant fatigue, sleeping between 18 to 20 hours/day.  Hoping to be able to taper pain medication over the coming weeks.    She notes that since 6/29, she has had the feeling of being \"extremely antsy\".  It seems like this did start in conjunction with further tapering down on her opiates, and since the anxiety did " increase, this scared her, so she actually reduce the fentanyl patch even further on 7/1.  Daughter reports that her dosing is actually lower right now than patient realized.  She has been getting 7.5 mg of oxycodone during the day per dose and 10 mg at night if pain is really bad.  She currently has the 25 mcg fentanyl patch on and is due to changes today.    With anxiety increasing, there had been discussion about resuming BuSpar.  However, daughter believes that BuSpar has caused increased anger in the patient.  Primary sent a prescription for 25 mg of hydroxyzine twice daily as needed earlier today.  Patient took 1 dose so far and did not feel that it did anything for her.    Historically, being outside working in the yard, playing games on her phone/tablet, and reading have been activities that are combing for her.    She denies any changes to bowel habits, night sweats, or myalgias.      Physical Exam:   Constitutional: Alert, pleasant, no apparent distress. Sitting up in chair.  Eyes: Sclera non-icteric, no eye discharge.  ENT: No nasal discharge. Ears grossly normal.  Respiratory: Unlabored respirations. Speaking in full sentences.  Musculoskeletal: Extremities appear normal- no gross deformities noted. No edema noted on upper body.   Skin: No suspicious lesions or rashes on visible skin.  Neurologic: Clear speech, no aphasia. No facial droop.  Psychiatric: Mentation appears normal, appropriate attention. Affect normal/bright. Does not appear anxious or depressed.    Key Data Reviewed:  LABS:   Lab Results   Component Value Date    WBC 6.5 06/26/2024    HGB 8.8 (L) 06/26/2024    HCT 26.6 (L) 06/26/2024     06/26/2024     06/26/2024    POTASSIUM 4.7 06/26/2024    CHLORIDE 98 06/26/2024    CO2 27 06/26/2024    BUN 17.6 06/26/2024    CR 0.72 06/26/2024     (H) 06/26/2024    SED 61 (H) 06/04/2024    DD 0.79 (H) 12/23/2022    NTBNPI 136 06/04/2024    AST 27 06/26/2024    ALT 43 06/26/2024     ALKPHOS 105 06/26/2024    BILITOTAL 0.5 06/26/2024    INR 0.98 06/04/2024 6/26/2024- GFR >90, Albumin 3.8.      Impression & Recommendations & Counseling:  Linh Mustafa is a 64 year old female with history of esophageal SCC s/p proximal resection and chemo RT.    Increased anxiety does seem to be correlating with reduction in opiate doses.  Daughter had reported concerns that patient was taking the medication in the hospital even when she did not need it (noted instances where they were all together laughing and having a good conversation, patient did not seem to be in any pain, however when she realized what time it was, she immediately wanted her medication).  We discussed that it is important to taper off medications when they are no longer needed, and it does seem that she has been doing okay tapering down up until this weekend.  She is alert and awake more often (was sleeping up to 18 to 20 hours/day in the hospital).  Suggested tapering a bit slower at this point and considering maximizing the dosing on the hydroxyzine.    Recommendations:  -Hold further tapering of opioids until 7/8.  This means continuing 25 mcg fentanyl patch every 72 hours and 7.5 mg of oxycodone every 3 hours as needed during the day and 10 mg nightly as needed.  -Increased range on hydroxyzine to 25 to 50 mg up to 3 times daily as needed.  Discussed we could further increase this depending on effectiveness.  -Discussed it would be okay to taper opioids further in weekly increments, or even a little bit longer if needed.  -If anxiety continues to be pervasive once opioids have stopped, would consider additional medication management.    Follow up: About a month      Video-Visit Details  Video Start Time: 3:13 PM  Video End Time: 3:43 PM    Originating Location (pt. Location): Home     Distant Location (provider location):  Offsite- Personal Home     Platform used for Video Visit: Vishal     Total time spent on day of encounter is  43 mins, including reviewing record, review of above studies, above visit with patient, symptomatic discussion of primarily pain, anxiety, and withdrawal concerns, including medication adjustments/prescription management, and documentation.     Lourdes Joseph DO  Palliative Medicine   [id number]    Some chart documentation performed using Dragon Voice recognition Software. Although reviewed after completion, some words and grammatical errors may remain.      Again, thank you for allowing me to participate in the care of your patient.        Sincerely,        Lourdes Joseph DO

## 2024-07-03 NOTE — LETTER
"7/3/2024      Linh Mustafa  [address]  [place] [address]      Dear Colleague,    Thank you for referring your patient, Linh Mustafa, to the [facility]. Please see a copy of my visit note below.    Palliative Care Progress Note    Patient Name: Linh Mustafa  Primary Provider: Madison Ruiz    Chief Complaint/Patient ID: Linh Mustafa is a 64 year old female with PMHx of esophageal SCC.  S/p endoscopic resection of SCC in situ lesion, and recommendation was to proceed with neoadjuvant chemoRT followed by definitive surgery.  Started concurrent chemo RT with carbo/Taxol 05/2024 (5 cycles).  -Hospitalized 06/4-06/18/24 with chest pain after radiation therapy, felt to be 2/2 mucositis.  Able to complete final 2 RT treatments inpatient.    Last Palliative care appointment: Seen during inpatient admission at [facility].  Last note by MASON Davis on 6/18/2024.     Reviewed: Yes.  Current prescriptions for oxycodone solution and 12 mcg fentanyl patch.    Social History: Lives alone.  Has a daughter and son-in-law who live nearby.  Brother from [place] has been helping her with day-to-day tasks.  Identifies as Anabaptist.    Advanced Care Planning: Has not completed an HCD.    Interim History:  Linh Mustafa is a 64 year old female who is seen today for follow up with Palliative Care via billable video visit.  Her daughter Yulisa and her brother Medina are also present and provide additional history.     Reviewed oncology note from 6/26.  Completed treatment while inpatient and slowly recovering.  Planning to repeat imaging in mid August.  Significant fatigue, sleeping between 18 to 20 hours/day.  Hoping to be able to taper pain medication over the coming weeks.    She notes that since 6/29, she has had the feeling of being \"extremely antsy\".  It seems like this did start in conjunction with further tapering down on her opiates, and since the anxiety did " increase, this scared her, so she actually reduce the fentanyl patch even further on 7/1.  Daughter reports that her dosing is actually lower right now than patient realized.  She has been getting 7.5 mg of oxycodone during the day per dose and 10 mg at night if pain is really bad.  She currently has the 25 mcg fentanyl patch on and is due to changes today.    With anxiety increasing, there had been discussion about resuming BuSpar.  However, daughter believes that BuSpar has caused increased anger in the patient.  Primary sent a prescription for 25 mg of hydroxyzine twice daily as needed earlier today.  Patient took 1 dose so far and did not feel that it did anything for her.    Historically, being outside working in the yard, playing games on her phone/tablet, and reading have been activities that are combing for her.    She denies any changes to bowel habits, night sweats, or myalgias.      Physical Exam:   Constitutional: Alert, pleasant, no apparent distress. Sitting up in chair.  Eyes: Sclera non-icteric, no eye discharge.  ENT: No nasal discharge. Ears grossly normal.  Respiratory: Unlabored respirations. Speaking in full sentences.  Musculoskeletal: Extremities appear normal- no gross deformities noted. No edema noted on upper body.   Skin: No suspicious lesions or rashes on visible skin.  Neurologic: Clear speech, no aphasia. No facial droop.  Psychiatric: Mentation appears normal, appropriate attention. Affect normal/bright. Does not appear anxious or depressed.    Key Data Reviewed:  LABS:   Lab Results   Component Value Date    WBC 6.5 06/26/2024    HGB 8.8 (L) 06/26/2024    HCT 26.6 (L) 06/26/2024     06/26/2024     06/26/2024    POTASSIUM 4.7 06/26/2024    CHLORIDE 98 06/26/2024    CO2 27 06/26/2024    BUN 17.6 06/26/2024    CR 0.72 06/26/2024     (H) 06/26/2024    SED 61 (H) 06/04/2024    DD 0.79 (H) 12/23/2022    NTBNPI 136 06/04/2024    AST 27 06/26/2024    ALT 43 06/26/2024     ALKPHOS 105 06/26/2024    BILITOTAL 0.5 06/26/2024    INR 0.98 06/04/2024 6/26/2024- GFR >90, Albumin 3.8.      Impression & Recommendations & Counseling:  Linh Mustafa is a 64 year old female with history of esophageal SCC s/p proximal resection and chemo RT.    Increased anxiety does seem to be correlating with reduction in opiate doses.  Daughter had reported concerns that patient was taking the medication in the hospital even when she did not need it (noted instances where they were all together laughing and having a good conversation, patient did not seem to be in any pain, however when she realized what time it was, she immediately wanted her medication).  We discussed that it is important to taper off medications when they are no longer needed, and it does seem that she has been doing okay tapering down up until this weekend.  She is alert and awake more often (was sleeping up to 18 to 20 hours/day in the hospital).  Suggested tapering a bit slower at this point and considering maximizing the dosing on the hydroxyzine.    Recommendations:  -Hold further tapering of opioids until 7/8.  This means continuing 25 mcg fentanyl patch every 72 hours and 7.5 mg of oxycodone every 3 hours as needed during the day and 10 mg nightly as needed.  -Increased range on hydroxyzine to 25 to 50 mg up to 3 times daily as needed.  Discussed we could further increase this depending on effectiveness.  -Discussed it would be okay to taper opioids further in weekly increments, or even a little bit longer if needed.  -If anxiety continues to be pervasive once opioids have stopped, would consider additional medication management.    Follow up: About a month      Video-Visit Details  Video Start Time: 3:13 PM  Video End Time: 3:43 PM    Originating Location (pt. Location): Home     Distant Location (provider location):  Offsite- Personal Home     Platform used for Video Visit: Vishal     Total time spent on day of encounter is  43 mins, including reviewing record, review of above studies, above visit with patient, symptomatic discussion of primarily pain, anxiety, and withdrawal concerns, including medication adjustments/prescription management, and documentation.     Lourdes Joseph DO  Palliative Medicine   [id number]    Some chart documentation performed using Dragon Voice recognition Software. Although reviewed after completion, some words and grammatical errors may remain.      Again, thank you for allowing me to participate in the care of your patient.        Sincerely,        Lourdes Joseph DO

## 2024-07-03 NOTE — NURSING NOTE
Is the patient currently in the state of [place]? YES    Visit mode:VIDEO    If the visit is dropped, the patient can be reconnected by: VIDEO VISIT: Send to e-mail at: [email or web address]    Will anyone else be joining the visit? NO  (If patient encounters technical issues they should call [id number]    How would you like to obtain your AVS? MyChart    Reason for visit: Video Visit (Follow up)    Clementina BARNEY

## 2024-07-09 ENCOUNTER — LAB REQUISITION (OUTPATIENT)
Dept: LAB | Facility: HOSPITAL | Age: 64
End: 2024-07-09
Payer: COMMERCIAL

## 2024-07-09 DIAGNOSIS — C15.3 MALIGNANT NEOPLASM OF UPPER THIRD OF ESOPHAGUS (H): ICD-10-CM

## 2024-07-09 LAB
ALBUMIN SERPL BCG-MCNC: 4.2 G/DL (ref 3.5–5.2)
ALP SERPL-CCNC: 90 U/L (ref 40–150)
ALT SERPL W P-5'-P-CCNC: 16 U/L (ref 0–50)
ANION GAP SERPL CALCULATED.3IONS-SCNC: 9 MMOL/L (ref 7–15)
AST SERPL W P-5'-P-CCNC: 16 U/L (ref 0–45)
BASOPHILS # BLD AUTO: 0 10E3/UL (ref 0–0.2)
BASOPHILS NFR BLD AUTO: 0 %
BILIRUB SERPL-MCNC: 0.4 MG/DL
BUN SERPL-MCNC: 26.2 MG/DL (ref 8–23)
CALCIUM SERPL-MCNC: 10.4 MG/DL (ref 8.8–10.2)
CHLORIDE SERPL-SCNC: 100 MMOL/L (ref 98–107)
CREAT SERPL-MCNC: 0.94 MG/DL (ref 0.51–0.95)
DEPRECATED HCO3 PLAS-SCNC: 26 MMOL/L (ref 22–29)
EGFRCR SERPLBLD CKD-EPI 2021: 67 ML/MIN/1.73M2
EOSINOPHIL # BLD AUTO: 0.1 10E3/UL (ref 0–0.7)
EOSINOPHIL NFR BLD AUTO: 1 %
ERYTHROCYTE [DISTWIDTH] IN BLOOD BY AUTOMATED COUNT: 16.3 % (ref 10–15)
GLUCOSE SERPL-MCNC: 122 MG/DL (ref 70–99)
HCT VFR BLD AUTO: 29.9 % (ref 35–47)
HGB BLD-MCNC: 9.7 G/DL (ref 11.7–15.7)
IMM GRANULOCYTES # BLD: 0 10E3/UL
IMM GRANULOCYTES NFR BLD: 0 %
LYMPHOCYTES # BLD AUTO: 1.1 10E3/UL (ref 0.8–5.3)
LYMPHOCYTES NFR BLD AUTO: 17 %
MCH RBC QN AUTO: 34.6 PG (ref 26.5–33)
MCHC RBC AUTO-ENTMCNC: 32.4 G/DL (ref 31.5–36.5)
MCV RBC AUTO: 107 FL (ref 78–100)
MONOCYTES # BLD AUTO: 0.6 10E3/UL (ref 0–1.3)
MONOCYTES NFR BLD AUTO: 9 %
NEUTROPHILS # BLD AUTO: 4.6 10E3/UL (ref 1.6–8.3)
NEUTROPHILS NFR BLD AUTO: 72 %
NRBC # BLD AUTO: 0 10E3/UL
NRBC BLD AUTO-RTO: 0 /100
PLATELET # BLD AUTO: 268 10E3/UL (ref 150–450)
POTASSIUM SERPL-SCNC: 4.9 MMOL/L (ref 3.4–5.3)
PROT SERPL-MCNC: 7.6 G/DL (ref 6.4–8.3)
RBC # BLD AUTO: 2.8 10E6/UL (ref 3.8–5.2)
SODIUM SERPL-SCNC: 135 MMOL/L (ref 135–145)
WBC # BLD AUTO: 6.4 10E3/UL (ref 4–11)

## 2024-07-09 PROCEDURE — 80053 COMPREHEN METABOLIC PANEL: CPT | Mod: ORL | Performed by: PHYSICIAN ASSISTANT

## 2024-07-09 PROCEDURE — 85025 COMPLETE CBC W/AUTO DIFF WBC: CPT | Mod: ORL | Performed by: PHYSICIAN ASSISTANT

## 2024-07-10 ENCOUNTER — VIRTUAL VISIT (OUTPATIENT)
Dept: ONCOLOGY | Facility: CLINIC | Age: 64
End: 2024-07-10
Attending: PHYSICIAN ASSISTANT
Payer: COMMERCIAL

## 2024-07-10 VITALS — HEIGHT: 67 IN | WEIGHT: 137 LBS | BODY MASS INDEX: 21.5 KG/M2

## 2024-07-10 DIAGNOSIS — K22.89 ESOPHAGEAL PAIN: ICD-10-CM

## 2024-07-10 DIAGNOSIS — C15.9 SQUAMOUS CELL CARCINOMA OF ESOPHAGUS (H): Primary | ICD-10-CM

## 2024-07-10 DIAGNOSIS — F41.9 ANXIETY: ICD-10-CM

## 2024-07-10 PROCEDURE — G2211 COMPLEX E/M VISIT ADD ON: HCPCS | Mod: 95 | Performed by: PHYSICIAN ASSISTANT

## 2024-07-10 PROCEDURE — 99214 OFFICE O/P EST MOD 30 MIN: CPT | Mod: 95 | Performed by: PHYSICIAN ASSISTANT

## 2024-07-10 ASSESSMENT — PAIN SCALES - GENERAL: PAINLEVEL: MODERATE PAIN (4)

## 2024-07-10 NOTE — LETTER
7/10/2024      Linh Mustafa  [address]  [facility] [address]      Dear Colleague,    Thank you for referring your patient, Linh Mustafa, to the [facility]. Please see a copy of my visit note below.    Virtual Visit Details  Type of service:  Video Visit   Originating Location (pt. Location): Home  Distant Location (provider location):  On-site  Platform used for Video Visit: [facility]    Oncology/Hematology Visit Note  Jul 10, 2024    Reason for Visit: follow up of esophageal cancer    History of Present Illness: Linh Mustafa is a 64 year old female with esophageal cancer. Since November 2023 she started to notice pain upon swallowing and it was basically pain which limited her food intake.  This was progressively getting worse.  She had further workup as mentioned below.     2/15/2024.  EGD showed an ulcerated mid esophageal mass extending from 26-31 cm from the incisors.  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  Pathology from the mid esophageal mass showed moderately differentiated invasive squamous cell carcinoma, MMR IHC intact.     3/7/2024.  PET/CT showed markedly FDG avid wall thickening of the mid thoracic esophagus with SUV max 24.1.  No evidence of metastatic disease.     3/21/2024.  Upper EUS.  Endoscopy showed a flat nodule in the proximal esophagus between 15-19 cm and one third circumferential.  Upper esophageal sphincter was at 14 cm.  Biopsies from this showed high-grade dysplasia/carcinoma in situ.       Normal esophageal squamous cell cancer causing maybe esophagus on the right anterolateral esophageal wall between 24-30 cm.  It was 50% circumferential.  The GE junction was at 35 cm with 2 cm tongues of Auguste's anteriorly without high risk features.     Ultrasound exam showed the mid esophageal tumor to be probably T3. Two 4 x 6 mm nodes were seen adjacent to the distal esophageal wall at 36 and 37 cm.  Both  an identical appearance and one was sampled.  This lymph node biopsy was benign.     By EUS it was staged as T3 N0 M0 tumor.     4/2/2024.  Because of finding of carcinoma in situ in the proximal esophagus, she had endoscopic resection of the proximal squamous cell carcinoma in situ lesion performed with en bloc removal.    The final pathology showed squamous mucosa with high-grade dysplasia/carcinoma in situ with all margins negative for dysplasia.  No definite invasion was identified. ESD was considered curative for this lesion.     Case was also discussed in the tumor conference with the plan to proceed with neoadjuvant chemotherapy/radiation followed by definitive surgery.       She started on concurrent chemoradiation with carboplatin and Taxol on 5/1/24 and received 5 doses, last on 5/28/24 and last dose of radiation on 6/5/24.    She was hospitalized from 6/4-6/18/24 with radiation esophagitis causing pain and inability to eat. She was discharged home with a G-tube that was placed on 6/14/24.     Please see previous notes for further details on the patient's history. She comes in today for routine follow-up.     Interval History:   -Doing okay overall.  -Taking hydroxyzine 25 mg tid with improvement in anxiety.   -Has a dull ache on left side on abdomen with no tenderness.   -Now on 12.5 mcg Fentanyl and oxycodone 10 mg every 3-4 hours.   -Bowels are working well without medication.   -Eating some soft foods daily. Esophageal pain is improving.   -Using 4 cartons/day of her tube feeds.   -Now weighs 136 pounds.   -Now sleeping about 14 out of 24 hours. Energy is a little better.   -Drinking 16 oz water/day and 60 oz/day with flushes.   -Denies any nausea with regular use of Zofran and Compazine.   -Denies any smoking.   -Did not resume famotidine, but not having heartburn.   -Doing home PT once/week and goes for a daily walk.     Current Outpatient Medications   Medication Sig Dispense Refill      acetaminophen (TYLENOL) 325 MG/10.15ML liquid Take 31.25 mLs (1,000 mg) by mouth 3 times daily 2812.5 mL 1     artificial saliva (BIOTENE DRY MOUTHWASH) LIQD liquid Swish and spit 15 mLs in mouth 4 times daily as needed for dry mouth 473 mL 0     famotidine (PEPCID) 20 MG tablet Take 20 mg by mouth 2 times daily       fentaNYL (DURAGESIC) 12 mcg/hr 72 hr patch Place 1 patch onto the skin every 72 hours remove old patch. 10 patch 0     fentaNYL (DURAGESIC) 25 mcg/hr 72 hr patch Place 1 patch onto the skin every 72 hours remove old patch. (Patient not taking: Reported on 7/10/2024) 10 patch 0     hydrOXYzine HCl (ATARAX) 25 MG tablet Take 1 tablet (25 mg) by mouth 3 times daily as needed for itching       levothyroxine (SYNTHROID/LEVOTHROID) 200 MCG tablet Take 200 mcg by mouth daily       methocarbamol (ROBAXIN) 750 MG tablet Take 1 tablet (750 mg) by mouth 4 times daily 120 tablet 0     naloxone (NARCAN) 4 MG/0.1ML nasal spray Spray 1 spray (4 mg) into one nostril alternating nostrils as needed for opioid reversal every 2-3 minutes until assistance arrives 0.2 mL 1     ondansetron (ZOFRAN ODT) 8 MG ODT tab Take 1 tablet (8 mg) by mouth every 8 hours as needed for nausea 90 tablet 0     oxyCODONE (ROXICODONE) 5 MG/5ML solution 7.5-10 mLs (7.5-10 mg) by Oral or Feeding Tube route every 3 hours as needed for severe pain       prochlorperazine (COMPAZINE) 10 MG tablet Take 1 tablet (10 mg) by mouth every 6 hours as needed for vomiting 90 tablet 0     vitamin B-12 (CYANOCOBALAMIN) 2500 MCG sublingual tablet Take 1 tablet (2,500 mcg) by mouth daily 90 tablet 3     Objective:  General: patient appears well in no acute distress, alert and oriented, speech clear and fluid, thin hair on scalp  Skin: no visualized rash or lesions on visualized skin  Resp: Appears to be breathing comfortably without accessory muscle usage, speaking in full sentences, no audible wheezes or cough.  Psych: Coherent speech, normal rate and volume,  able to articulate logical thoughts, able to abstract reason, no tangential thoughts, no hallucinations or delusions  Patient's affect is appropriate.    Laboratory Data:  Most Recent 3 CBC's:  Recent Labs   Lab Test 07/09/24  1545 06/26/24  0935 06/17/24  0718   WBC 6.4 6.5 4.0   HGB 9.7* 8.8* 8.7*   * 104* 103*    362 284   ANEUTAUTO 4.6 5.1 2.6    Most Recent 3 BMP's:  Recent Labs   Lab Test 07/09/24  1545 06/26/24  0935 06/17/24  0718 06/06/24  0729 06/06/24  0634    136 135   < > 136   POTASSIUM 4.9 4.7 4.8   < > 3.4   CHLORIDE 100 98 98   < > 99   CO2 26 27 28   < > 24   BUN 26.2* 17.6 12.5   < > 9.8   CR 0.94 0.72 0.65   < > 0.56   ANIONGAP 9 11 9   < > 13   VAIBHAV 10.4* 10.3* 10.3*   < > 10.5*   * 101* 94   < > 94   PROTTOTAL 7.6 7.3  --   --  7.3   ALBUMIN 4.2 3.8  --   --  4.0    < > = values in this interval not displayed.    Most Recent 2 LFT's:  Recent Labs   Lab Test 07/09/24  1545 06/26/24  0935   AST 16 27   ALT 16 43   ALKPHOS 90 105   BILITOTAL 0.4 0.5   I reviewed the above labs today.    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  MMR IHC intact. She completed neoadjuvant treatment with concurrent chemoradiation with weekly carboplatin and Taxol with last dose of radiation on 6/5/24, complicated by a hospitalization for radiation esophagitis. She is slowly recovering from this. Encouraged daily activity. Will repeat imaging in mid-August with a PET/CT and have follow-up with Dr. Burch. She will call sooner for concerns.     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  Now s/p endoscopic resection with clear margins free of dysplasia.  This has been adequately treated.  Continue to observe.     Odynophagia. Secondary to cancer and radiation esophagitis. She will continue on Fentanyl at 12 mcg/hr and oxycodone 10 mg every 3 hours prn. She is working with palliative care to taper her pain  medication.    Nausea. Secondary to radiation esophagitis and worsened with tube feeds. She will continue to take Zofran and Compazine every 6-8 hours prn.    Weight loss: Now stabilized. She will continue with her tube feeds as directed by home infusion.    Vitamin B12 deficiency with macrocytosis. Was on vitamin B12 tablets, but now supplemented in her tube feeds. Last vitamin B12 level was elevated, so no need for tablet supplementation currently.     Tobacco abuse. Quit on 6/4/24 when hospitalized. Congratulated her on this again. No need for nicotine replacement at this time.    Hypercalcemia. Secondary to hyperparathyroidism. Suspect this is as a sequela of her prior thyroid radiation. She was scheduled to see endocrinology on 5/10 but had to cancel the appointment. Will again request this be rescheduled. Calcium is mildly elevated--waxing and waning. Continue to monitor.     Hypothyroidism. hx of PATEL at the age of 16 for hyperthyroidism. On levothyroxine as managed by her PCP. Last TSH was low with a normal free T4. Will defer to endocrine on management.     Anemia. Secondary to vitamin B12 deficiency and worsened with chemoradiation. Discussed this will slowly improve over the next few months, last check was trending up. Will recheck in a month. Will continue to monitor.     Anxiety. Managing with hydroxyzine as directed by palliative care, which she will continue for now while tapering her opioids.     Margie Ritchie PA-C  [facility]  [address] [place] [address]  [phone]    20 minutes spent on the date of the encounter doing chart review, review of test results, interpretation of tests, patient visit, and documentation     The longitudinal plan of care for the diagnosis(es)/condition(s) as documented were addressed during this visit. Due to the added complexity in care, I will continue to support Lita in the subsequent management and with ongoing continuity of  care.            Again, thank you for allowing me to participate in the care of your patient.        Sincerely,        Margie Ritchie PA-C

## 2024-07-10 NOTE — NURSING NOTE
Current patient location: [address] [place] [id number]    Is the patient currently in the state of [place]? YES    Visit mode:VIDEO    If the visit is dropped, the patient can be reconnected by: VIDEO VISIT: Send to e-mail at: [email or web address]    Will anyone else be joining the visit? NO  (If patient encounters technical issues they should call [phone]    How would you like to obtain your AVS? MyChart    Are changes needed to the allergy or medication list? Pt stated no changes to allergies and now taking Fentanyl 12mcg patch instead of the 25mcg    Are refills needed on medications prescribed by this physician? NO    Reason for visit: QUIRINO Serna LPN

## 2024-07-11 NOTE — PROCEDURES
Radiotherapy Treatment Summary          Date of Report: 2024     PATIENT: DANIELLE MUSTAFA  MEDICAL RECORD NO: [id number]  : 1960     DIAGNOSIS: C15.4 Malignant neoplasm of middle third of esophagus  INTENT OF RADIOTHERAPY: Cure  PATHOLOGY: squamous cell carcinoma mid esophagus  STAGE: T3N0M0 (II)  CONCURRENT SYSTEMIC THERAPY: Carbo/Taxol     Details of the treatments summarized below are found in records kept in the Department of Radiation Oncology at [facility].  Treatment Summary:  Radiation Oncology - Course: 1:   Treatment Site            Current Dose Modality        From                          To Elapsed Days Fx.  Esophagus                  4,500 cGy 06 X   5/01/2024  2024  35             25  Tumor                       5,000 cGy          Dose per Fraction:   Esophagus  180 cGy  Tumor   250 cGy     COMMENTS:   Ms. Mustafa is a 65yo woman with esophageal squamous cell carcinoma who presented initially with   unintentional wegiht loss and epigastric pain. EGD showed an ulcerated mass and Auguste's esophagus.   Pathology of the mass showed invasive squamous cell carcinoma, moderately differentiated, MMR intact.   Staging evaluation showed no PET avid metastatic nodes or tumors.  EUS showed an additional superficial   satellite lesion at 15-19cm, which was SCC in situ on biopsy, as well as the mass at 24-30 cm which was early   T3 tumor with two distal esophageal nodes. A node was biopsied and was negative. The lesion at 15-19cm was   resected completely with negative margins. Neoadjuvant chemoradiation was recommended for the mass at 24-  30 cm for improved local control.     The esophagus was treated to 45Gy in 25 once daily fractions using 6MV photons and VMAT technique. The   tumor was boosted to a dose of 50Gy with simultaneous integrated boost technique. Two coplanar arcs were   used. Concurrent weekly Carboplatin AUC2 and Paclitaxel 50mg/m2 were given.      During treatment, she  developed weight loss related to dysphagia. Her pain was severe, requiring oxycodone   5mg every 3 hours. She developed odynophagia for which she used Magic Mouthwash before meals. She   developed dermatitis for which she used Aquaphor. Before her last two treatments, she was hospitalized for   dehydration and inability to take PO. She completed her final two radiation treatments as an inpatient. She then   underwent PEG placement with Dr. Martinez. She was able to be discharged home on 6-18.      ED visits/hospitalizations: 6-4-24 through 6-18 for odynophagia, anorexia, dehydration  Missed treatments: [facility]  Acute Toxicity Profile by CTC v5.0: anorexia, grade 2; fatigue, grade 1; pain, grade 2; esophagitis, grade 2;   dermatitis, grade 1     PAIN MANAGEMENT: oxycodone 5mg PO q4h prn     FOLLOW UP PLAN:   See Margie Ritchie 7-10-24  See Dr. Burch 8-22-24     Resident Physician: Carlie Angeles M.D.  Staff Physician: Mireille Berg M.D.        CC: MD Kathy Flores MD Rafael Andrade, MD                               Radiation Oncology:  [facility] [address] [place] [address]

## 2024-07-15 NOTE — PROGRESS NOTES
THORACIC SURGERY - ESTABLISHED PATIENT OFFICE VISIT       Dear Dr. Chilel,     I saw Linh Mustafa in follow-up for the evaluation and treatment of a squamous cell carcinoma of the mid-esophagus.      HPI  Linh Mustafa is a 63 year old female with a mid-esophageal squamous cell carcinoma. She was hospitalized from 6/4/2024 to 6/18/2024 due to weight loss and failure to thrive after chemo-radiotherapy. We laced a laparoscopic g-tube at the time. She is quite a bit better now, she quit smoking, and she has been gaining weight.    Procedure performed  Laparoscopic placement of gastrostomy tube (6/14/2024; adjacent to the lesser curvature to avoid any damage to an eventual gastric conduit).       Previsit Tests   EGD with biopsy (Dr. Burleson, 2/15/2024): Mid-esophageal mass from 26-31 cm with a 7 mm cratered lesion, suspected short segment BE (37-40 cm); biopsy = moderately-differentiated squamous cell carcinoma; biopsy at 38 cm = Auguste's mucosa, no dysplasia  PET (3/7/2024): Mid-esophageal lesion extends about 6 cm in length with SUV of 24, no metastatic disease     PMH  Reviewed, as below     Past Medical History        Past Medical History:   Diagnosis Date    Hyperlipidemia      Hypertension              PSH  Reviewed, as below     Past Surgical History         Past Surgical History:   Procedure Laterality Date    BIOPSY BREAST Right       age 30 benign fibrous    CV CORONARY ANGIOGRAM N/A 1/25/2023     Procedure: Coronary Angiogram;  Surgeon: Lukas Irizarry MD;  Location: [facility]    CV LEFT HEART CATH N/A 1/25/2023     Procedure: Left Heart Catheterization;  Surgeon: Lkuas Irizarry MD;  Location: [facility] CV         Current Facility-Administered Medications       Current Outpatient Medications   Medication    amLODIPine (NORVASC) 10 MG tablet    aspirin (ASA) 81 MG chewable tablet    atorvastatin (LIPITOR) 40 MG tablet    fluticasone (FLONASE) 50 MCG/ACT nasal spray     levothyroxine (SYNTHROID/LEVOTHROID) 200 MCG tablet    metoprolol succinate ER (TOPROL XL) 25 MG 24 hr tablet    nitroGLYcerin (NITROSTAT) 0.4 MG sublingual tablet    predniSONE (DELTASONE) 20 MG tablet      No current facility-administered medications for this visit.               ETOH 1 drink/week  TOB current smoker      Physical examination  /64 (BP Location: Left arm, Patient Position: Chair, Cuff Size: Adult Regular)   Pulse 90   Wt 62.1 kg (137 lb)   SpO2 97%   BMI 21.46 kg/m    She looks much better than during her recent hospitalization.  G-tube site is clean.     From a personal perspective, she is here with her daughter, Yulisa.        Code Status: Full Code     Health Care Proxy: We discussed this and gave her a form.     IMPRESSION (C15.4) Malignant neoplasm of middle third of esophagus (H)  (primary encounter diagnosis)     This person is a 63 year old female with a clinical T3N0M0 (stage II) squamous cell carcinoma of the mid-esophagus     PLAN  I spent 15 min on the date of the encounter in chart review, patient visit, review of tests, documentation and/or discussion with other providers about the issues documented above. I reviewed the plan as follows:     In my opinion, Ms. Mustafa has a high chance of cure with chemo-radiotherapy alone. An esophagectomy would be a very challenging procedure for her, and I am not sure it would be in her best interest. If the tumor should recur, we could reassess her.        I appreciate the opportunity to participate in the care of your patient and will keep you updated.        Sincerely,     Bryant Martinez MD

## 2024-07-16 DIAGNOSIS — C15.9 SQUAMOUS CELL CARCINOMA OF ESOPHAGUS (H): Primary | ICD-10-CM

## 2024-07-16 RX ORDER — OXYCODONE HCL 5 MG/5 ML
7.5-1 SOLUTION, ORAL ORAL EVERY 4 HOURS PRN
Qty: 1500 ML | Refills: 0 | Status: SHIPPED | OUTPATIENT
Start: 2024-07-16 | End: 2024-10-04

## 2024-07-16 NOTE — TELEPHONE ENCOUNTER
Received message from oncology that pt is requesting a refill of oxycodone.    Last refill: 6/26/24  Last office visit: 7/3/24  Scheduled for follow up per check out request.     Will route request to MD for review.     Reviewed [place]  Report.

## 2024-08-07 ENCOUNTER — LAB (OUTPATIENT)
Dept: INFUSION THERAPY | Facility: HOSPITAL | Age: 64
End: 2024-08-07
Attending: INTERNAL MEDICINE
Payer: COMMERCIAL

## 2024-08-07 DIAGNOSIS — C15.9 SQUAMOUS CELL CARCINOMA OF ESOPHAGUS (H): Primary | ICD-10-CM

## 2024-08-07 LAB
ALBUMIN SERPL BCG-MCNC: 4 G/DL (ref 3.5–5.2)
ALP SERPL-CCNC: 91 U/L (ref 40–150)
ALT SERPL W P-5'-P-CCNC: 18 U/L (ref 0–50)
ANION GAP SERPL CALCULATED.3IONS-SCNC: 5 MMOL/L (ref 7–15)
AST SERPL W P-5'-P-CCNC: 20 U/L (ref 0–45)
BASOPHILS # BLD AUTO: 0 10E3/UL (ref 0–0.2)
BASOPHILS NFR BLD AUTO: 0 %
BILIRUB SERPL-MCNC: 0.3 MG/DL
BUN SERPL-MCNC: 19.6 MG/DL (ref 8–23)
CALCIUM SERPL-MCNC: 10.4 MG/DL (ref 8.8–10.4)
CHLORIDE SERPL-SCNC: 103 MMOL/L (ref 98–107)
CREAT SERPL-MCNC: 0.7 MG/DL (ref 0.51–0.95)
EGFRCR SERPLBLD CKD-EPI 2021: >90 ML/MIN/1.73M2
EOSINOPHIL # BLD AUTO: 0.1 10E3/UL (ref 0–0.7)
EOSINOPHIL NFR BLD AUTO: 1 %
ERYTHROCYTE [DISTWIDTH] IN BLOOD BY AUTOMATED COUNT: 13.6 % (ref 10–15)
GLUCOSE SERPL-MCNC: 103 MG/DL (ref 70–99)
HCO3 SERPL-SCNC: 28 MMOL/L (ref 22–29)
HCT VFR BLD AUTO: 30.8 % (ref 35–47)
HGB BLD-MCNC: 10.2 G/DL (ref 11.7–15.7)
IMM GRANULOCYTES # BLD: 0 10E3/UL
IMM GRANULOCYTES NFR BLD: 0 %
LYMPHOCYTES # BLD AUTO: 0.7 10E3/UL (ref 0.8–5.3)
LYMPHOCYTES NFR BLD AUTO: 8 %
MCH RBC QN AUTO: 34.5 PG (ref 26.5–33)
MCHC RBC AUTO-ENTMCNC: 33.1 G/DL (ref 31.5–36.5)
MCV RBC AUTO: 104 FL (ref 78–100)
MONOCYTES # BLD AUTO: 0.6 10E3/UL (ref 0–1.3)
MONOCYTES NFR BLD AUTO: 7 %
NEUTROPHILS # BLD AUTO: 7.4 10E3/UL (ref 1.6–8.3)
NEUTROPHILS NFR BLD AUTO: 83 %
NRBC # BLD AUTO: 0 10E3/UL
NRBC BLD AUTO-RTO: 0 /100
PLATELET # BLD AUTO: 254 10E3/UL (ref 150–450)
POTASSIUM SERPL-SCNC: 4.6 MMOL/L (ref 3.4–5.3)
PROT SERPL-MCNC: 7.3 G/DL (ref 6.4–8.3)
RBC # BLD AUTO: 2.96 10E6/UL (ref 3.8–5.2)
SODIUM SERPL-SCNC: 136 MMOL/L (ref 135–145)
WBC # BLD AUTO: 8.8 10E3/UL (ref 4–11)

## 2024-08-07 PROCEDURE — 36591 DRAW BLOOD OFF VENOUS DEVICE: CPT

## 2024-08-07 PROCEDURE — 85025 COMPLETE CBC W/AUTO DIFF WBC: CPT

## 2024-08-07 PROCEDURE — 250N000011 HC RX IP 250 OP 636: Performed by: PHYSICIAN ASSISTANT

## 2024-08-07 PROCEDURE — 80053 COMPREHEN METABOLIC PANEL: CPT

## 2024-08-07 RX ORDER — HEPARIN SODIUM (PORCINE) LOCK FLUSH IV SOLN 100 UNIT/ML 100 UNIT/ML
5 SOLUTION INTRAVENOUS
Status: DISCONTINUED | OUTPATIENT
Start: 2024-08-07 | End: 2024-08-07 | Stop reason: HOSPADM

## 2024-08-07 RX ORDER — HEPARIN SODIUM,PORCINE 10 UNIT/ML
5-20 VIAL (ML) INTRAVENOUS DAILY PRN
OUTPATIENT
Start: 2024-08-07

## 2024-08-07 RX ORDER — HEPARIN SODIUM (PORCINE) LOCK FLUSH IV SOLN 100 UNIT/ML 100 UNIT/ML
5 SOLUTION INTRAVENOUS
OUTPATIENT
Start: 2024-08-07

## 2024-08-07 RX ADMIN — HEPARIN 5 ML: 100 SYRINGE at 11:15

## 2024-08-15 ENCOUNTER — HOSPITAL ENCOUNTER (OUTPATIENT)
Dept: PET IMAGING | Facility: CLINIC | Age: 64
Discharge: HOME | End: 2024-08-15
Attending: THORACIC SURGERY (CARDIOTHORACIC VASCULAR SURGERY) | Admitting: THORACIC SURGERY (CARDIOTHORACIC VASCULAR SURGERY)
Payer: COMMERCIAL

## 2024-08-15 DIAGNOSIS — C15.4 MALIGNANT NEOPLASM OF MIDDLE THIRD OF ESOPHAGUS (H): ICD-10-CM

## 2024-08-15 PROCEDURE — 250N000011 HC RX IP 250 OP 636: Performed by: THORACIC SURGERY (CARDIOTHORACIC VASCULAR SURGERY)

## 2024-08-15 PROCEDURE — 71260 CT THORAX DX C+: CPT | Mod: 26 | Performed by: RADIOLOGY

## 2024-08-15 PROCEDURE — 343N000001 HC RX 343: Performed by: THORACIC SURGERY (CARDIOTHORACIC VASCULAR SURGERY)

## 2024-08-15 PROCEDURE — 78815 PET IMAGE W/CT SKULL-THIGH: CPT | Mod: PS

## 2024-08-15 PROCEDURE — 78815 PET IMAGE W/CT SKULL-THIGH: CPT | Mod: 26 | Performed by: RADIOLOGY

## 2024-08-15 PROCEDURE — 74177 CT ABD & PELVIS W/CONTRAST: CPT | Mod: 26 | Performed by: RADIOLOGY

## 2024-08-15 PROCEDURE — A9552 F18 FDG: HCPCS | Performed by: THORACIC SURGERY (CARDIOTHORACIC VASCULAR SURGERY)

## 2024-08-15 PROCEDURE — 71260 CT THORAX DX C+: CPT

## 2024-08-15 RX ORDER — FLUDEOXYGLUCOSE F 18 200 MCI/ML
10-18 INJECTION, SOLUTION INTRAVENOUS ONCE
Status: COMPLETED | OUTPATIENT
Start: 2024-08-15 | End: 2024-08-15

## 2024-08-15 RX ORDER — IOPAMIDOL 755 MG/ML
10-135 INJECTION, SOLUTION INTRAVASCULAR ONCE
Status: COMPLETED | OUTPATIENT
Start: 2024-08-15 | End: 2024-08-15

## 2024-08-15 RX ORDER — HEPARIN SODIUM (PORCINE) LOCK FLUSH IV SOLN 100 UNIT/ML 100 UNIT/ML
500 SOLUTION INTRAVENOUS ONCE
Status: COMPLETED | OUTPATIENT
Start: 2024-08-15 | End: 2024-08-15

## 2024-08-15 RX ADMIN — FLUDEOXYGLUCOSE F 18 14 MILLICURIE: 200 INJECTION, SOLUTION INTRAVENOUS at 12:42

## 2024-08-15 RX ADMIN — Medication 500 UNITS: at 12:44

## 2024-08-15 RX ADMIN — IOPAMIDOL 84 ML: 755 INJECTION, SOLUTION INTRAVENOUS at 12:43

## 2024-08-16 ENCOUNTER — VIRTUAL VISIT (OUTPATIENT)
Dept: ONCOLOGY | Facility: CLINIC | Age: 64
End: 2024-08-16
Attending: STUDENT IN AN ORGANIZED HEALTH CARE EDUCATION/TRAINING PROGRAM
Payer: COMMERCIAL

## 2024-08-16 VITALS — BODY MASS INDEX: 21.66 KG/M2 | WEIGHT: 138 LBS | HEIGHT: 67 IN

## 2024-08-16 DIAGNOSIS — Z71.6 ENCOUNTER FOR SMOKING CESSATION COUNSELING: ICD-10-CM

## 2024-08-16 DIAGNOSIS — F41.9 ANXIETY: ICD-10-CM

## 2024-08-16 DIAGNOSIS — K22.89 ESOPHAGEAL PAIN: ICD-10-CM

## 2024-08-16 DIAGNOSIS — R53.83 OTHER FATIGUE: ICD-10-CM

## 2024-08-16 DIAGNOSIS — Z51.5 ENCOUNTER FOR PALLIATIVE CARE: ICD-10-CM

## 2024-08-16 DIAGNOSIS — C15.9 SQUAMOUS CELL CARCINOMA OF ESOPHAGUS (H): Primary | ICD-10-CM

## 2024-08-16 DIAGNOSIS — R11.0 NAUSEA: ICD-10-CM

## 2024-08-16 PROCEDURE — 99406 BEHAV CHNG SMOKING 3-10 MIN: CPT | Mod: 95 | Performed by: STUDENT IN AN ORGANIZED HEALTH CARE EDUCATION/TRAINING PROGRAM

## 2024-08-16 PROCEDURE — 99215 OFFICE O/P EST HI 40 MIN: CPT | Mod: 24 | Performed by: STUDENT IN AN ORGANIZED HEALTH CARE EDUCATION/TRAINING PROGRAM

## 2024-08-16 RX ORDER — SERTRALINE HYDROCHLORIDE 25 MG/1
TABLET, FILM COATED ORAL
Qty: 45 TABLET | Refills: 0 | Status: SHIPPED | OUTPATIENT
Start: 2024-08-16 | End: 2024-09-05

## 2024-08-16 RX ORDER — HYDROXYZINE HYDROCHLORIDE 25 MG/1
25-50 TABLET, FILM COATED ORAL 3 TIMES DAILY PRN
Qty: 180 TABLET | Refills: 1 | Status: SHIPPED | OUTPATIENT
Start: 2024-08-16 | End: 2024-09-09

## 2024-08-16 ASSESSMENT — PAIN SCALES - GENERAL: PAINLEVEL: MODERATE PAIN (4)

## 2024-08-16 NOTE — NURSING NOTE
Current patient location: [address] [place] [address]    Is the patient currently in the state of [place]? YES    Visit mode:VIDEO    If the visit is dropped, the patient can be reconnected by: VIDEO VISIT: Text to cell phone:   Telephone Information:   Mobile [phone]       Will anyone else be joining the visit? Pt unsure if daughter will be joining.   (If patient encounters technical issues they should call [phone]    How would you like to obtain your AVS? MyChart    Are changes needed to the allergy or medication list? No    Are refills needed on medications prescribed by this physician? NO    Rooming Documentation:  Questionnaire(s) completed      Reason for visit: QUIRINO BARNEY

## 2024-08-16 NOTE — PROGRESS NOTES
"Palliative Care Progress Note    Patient Name: Linh Mustafa  Primary Provider: Madison Ruiz    Chief Complaint/Patient ID: Linh Mustafa is a 64 year old female with PMHx of esophageal SCC.  S/p endoscopic resection of SCC in situ lesion, and recommendation was to proceed with neoadjuvant chemoRT followed by definitive surgery.  Started concurrent chemo RT with carbo/Taxol 05/2024 (5 cycles).  -Hospitalized 06/4-06/18/24 with chest pain after radiation therapy, felt to be 2/2 mucositis.  Able to complete final 2 RT treatments inpatient.    -Of note, buspar causes increased anger/irritability.    Last Palliative care appointment: 07/03/24 with me.  Discussed continuing opioid taper.     Reviewed: Yes    Social History: Lives alone.  Has a daughter and son-in-law who live nearby.  Brother from [place] has been helping her with day-to-day tasks.  Identifies as Protestant.    Advanced Care Planning: Has not completed an HCD.    Interim History:  Linh Mustafa is a 64 year old female who is seen today for follow up with Palliative Care via billable video visit.      Reviewed oncology note from 7/10.  Anxiety better controlled with hydroxyzine.  Pain was improving.  The plan was to repeat PET scan in mid August and then have follow-up with primary oncologist.  Advised to continue working with palliative on opioid taper.    Pain level is okay.  She states it is much better than before, and she is not having much pain with swallowing now.  Still unable to swallow really large pills.  No longer on the fentanyl patch.  Using 7.5 mg of oxycodone at bedtime and then once overnight.  Does not tend to use oxycodone during the day unless she is very active with errands and such, and then she will use a 5 mg dose.    Anxiety has been \"terrible\".  Describes herself is \"I am just scared, I do not want to be alone.  I started smoking again.  My daughter leaves and I go into a panic\".  Currently taking hydroxyzine 50 " mg twice daily.  Does feel that this is helpful.    Fatigue has improved.  She is not napping anymore during the day.  Sleeps generally about 12 hours per night.      Physical Exam:   Constitutional: Alert, pleasant, no apparent distress. Sitting up in chair.  Eyes: Sclera non-icteric, no eye discharge.  ENT: No nasal discharge. Ears grossly normal.  Respiratory: Unlabored respirations. Speaking in full sentences.  Musculoskeletal: Extremities appear normal- no gross deformities noted. No edema noted on upper body.   Skin: No suspicious lesions or rashes on visible skin.  Neurologic: Clear speech, no aphasia. No facial droop.  Psychiatric: Mentation appears normal, appropriate attention. Affect normal/bright. Does not appear anxious or depressed.    Key Data Reviewed:  LABS:   Lab Results   Component Value Date    WBC 8.8 08/07/2024    HGB 10.2 (L) 08/07/2024    HCT 30.8 (L) 08/07/2024     08/07/2024     08/07/2024    POTASSIUM 4.6 08/07/2024    CHLORIDE 103 08/07/2024    CO2 28 08/07/2024    BUN 19.6 08/07/2024    CR 0.70 08/07/2024     (H) 08/07/2024    SED 61 (H) 06/04/2024    DD 0.79 (H) 12/23/2022    NTBNPI 136 06/04/2024    AST 20 08/07/2024    ALT 18 08/07/2024    ALKPHOS 91 08/07/2024    BILITOTAL 0.3 08/07/2024    INR 0.98 06/04/2024     IMAGING: PET Scan from yesterday has not been read yet.    Impression & Recommendations & Counseling:  Linh Mustafa is a 64 year old female with history of esophageal SCC s/p proximal resection and chemo RT.    She has made significant progress in tapering down on her opioids.  No longer on a long-acting medication, as she has discontinued the fentanyl patch.  Currently taking 2 doses of 7.5 mg oxycodone overnight and will use 5 mg during the day as needed depending on activity.  Encouraged her to continue slowly tapering down.    Anxiety has been dramatically increased.  While some of this could be situational with her PET scan yesterday, it does  seem as though anxiety overall is heightened, and we discussed starting a daily controller medication today.  Her mother had a poor response to the Prozac, but she was agreeable to trying Zoloft today.    We discussed smoking cessation.  She feels really frustrated that she resumed smoking when she had gone so long without it.  She does have the nicotine patches.  Discussed the high addictive properties of nicotine, and the fact that it is incredibly difficult to quit smoking in the face of significant anxiety.  Discussed weaning down on cigarettes slowly to be able to resume using nicotine patches.    Recommendations:  -Start Zoloft 25 mg daily for 2 weeks and then increase to 50 mg daily.  Will reassess further dose adjustments after that.  -Increased range on hydroxyzine to 25 to 50 mg up to 3 times daily as needed.  My recommendation right now is to take 50 mg 3 times daily, particularly while we are waiting for the Zoloft to take effect.  Discussed we could further increase this depending on effectiveness.  -Discussed it would be okay to taper opioids further by 1 mg in weekly increments, or even a little bit longer if needed.  For example, try doing 6 mg of oxycodone at overnight doses.  -Spoke with patient regarding options for smoking cessation.  Discussed the use of : Distraction and Nicotine patch.   Patient chooses to trial distraction and spreading out cigarette timing to wean and then transition to patch. For example, when she feels she wants a cigarette, encouraged her to find something to go do such as a chore around the house or go for a walk.  -Okay to continue Zofran and Compazine as needed for nausea.  If nausea is still present despite improvement in anxiety, could consider resuming treatment for GERD.    Follow up: 6 weeks      Video-Visit Details  Video Start Time: 11:17 AM  Video End Time: 11:42 AM    Originating Location (pt. Location): Home     Distant Location (provider location):  Offsite-  Personal Home      Platform used for Video Visit: Vishal     Total time spent on day of encounter is 46 mins, including reviewing record, review of above studies, above visit with patient, symptomatic discussion of pain, anxiety, nausea, and fatigue, including medication adjustments/prescription management, 5 mins spent on smoking cessation counseling and plan, and documentation.             Lourdes Joseph, DO  Palliative Medicine   [id number]    Some chart documentation performed using Dragon Voice recognition Software. Although reviewed after completion, some words and grammatical errors may remain.

## 2024-08-16 NOTE — LETTER
8/16/2024      Linh Mustafa  [address]  [place] [address]      Dear Colleague,    Thank you for referring your patient, Linh Mustafa, to the [facility]. Please see a copy of my visit note below.    Palliative Care Progress Note    Patient Name: Linh Mustafa  Primary Provider: Madison Ruiz    Chief Complaint/Patient ID: Linh Mustafa is a 64 year old female with PMHx of esophageal SCC.  S/p endoscopic resection of SCC in situ lesion, and recommendation was to proceed with neoadjuvant chemoRT followed by definitive surgery.  Started concurrent chemo RT with carbo/Taxol 05/2024 (5 cycles).  -Hospitalized 06/4-06/18/24 with chest pain after radiation therapy, felt to be 2/2 mucositis.  Able to complete final 2 RT treatments inpatient.    -Of note, buspar causes increased anger/irritability.    Last Palliative care appointment: 07/03/24 with me.  Discussed continuing opioid taper.     Reviewed: Yes    Social History: Lives alone.  Has a daughter and son-in-law who live nearby.  Brother from [place] has been helping her with day-to-day tasks.  Identifies as Spiritism.    Advanced Care Planning: Has not completed an HCD.    Interim History:  Linh Mustafa is a 64 year old female who is seen today for follow up with Palliative Care via billable video visit.      Reviewed oncology note from 7/10.  Anxiety better controlled with hydroxyzine.  Pain was improving.  The plan was to repeat PET scan in mid August and then have follow-up with primary oncologist.  Advised to continue working with palliative on opioid taper.    Pain level is okay.  She states it is much better than before, and she is not having much pain with swallowing now.  Still unable to swallow really large pills.  No longer on the fentanyl patch.  Using 7.5 mg of oxycodone at bedtime and then once overnight.  Does not tend to use oxycodone during the day unless she is very active with  "errands and such, and then she will use a 5 mg dose.    Anxiety has been \"terrible\".  Describes herself is \"I am just scared, I do not want to be alone.  I started smoking again.  My daughter leaves and I go into a panic\".  Currently taking hydroxyzine 50 mg twice daily.  Does feel that this is helpful.    Fatigue has improved.  She is not napping anymore during the day.  Sleeps generally about 12 hours per night.      Physical Exam:   Constitutional: Alert, pleasant, no apparent distress. Sitting up in chair.  Eyes: Sclera non-icteric, no eye discharge.  ENT: No nasal discharge. Ears grossly normal.  Respiratory: Unlabored respirations. Speaking in full sentences.  Musculoskeletal: Extremities appear normal- no gross deformities noted. No edema noted on upper body.   Skin: No suspicious lesions or rashes on visible skin.  Neurologic: Clear speech, no aphasia. No facial droop.  Psychiatric: Mentation appears normal, appropriate attention. Affect normal/bright. Does not appear anxious or depressed.    Key Data Reviewed:  LABS:   Lab Results   Component Value Date    WBC 8.8 08/07/2024    HGB 10.2 (L) 08/07/2024    HCT 30.8 (L) 08/07/2024     08/07/2024     08/07/2024    POTASSIUM 4.6 08/07/2024    CHLORIDE 103 08/07/2024    CO2 28 08/07/2024    BUN 19.6 08/07/2024    CR 0.70 08/07/2024     (H) 08/07/2024    SED 61 (H) 06/04/2024    DD 0.79 (H) 12/23/2022    NTBNPI 136 06/04/2024    AST 20 08/07/2024    ALT 18 08/07/2024    ALKPHOS 91 08/07/2024    BILITOTAL 0.3 08/07/2024    INR 0.98 06/04/2024     IMAGING: PET Scan from yesterday has not been read yet.    Impression & Recommendations & Counseling:  Linh Mustafa is a 64 year old female with history of esophageal SCC s/p proximal resection and chemo RT.    She has made significant progress in tapering down on her opioids.  No longer on a long-acting medication, as she has discontinued the fentanyl patch.  Currently taking 2 doses of 7.5 mg " oxycodone overnight and will use 5 mg during the day as needed depending on activity.  Encouraged her to continue slowly tapering down.    Anxiety has been dramatically increased.  While some of this could be situational with her PET scan yesterday, it does seem as though anxiety overall is heightened, and we discussed starting a daily controller medication today.  Her mother had a poor response to the Prozac, but she was agreeable to trying Zoloft today.    We discussed smoking cessation.  She feels really frustrated that she resumed smoking when she had gone so long without it.  She does have the nicotine patches.  Discussed the high addictive properties of nicotine, and the fact that it is incredibly difficult to quit smoking in the face of significant anxiety.  Discussed weaning down on cigarettes slowly to be able to resume using nicotine patches.    Recommendations:  -Start Zoloft 25 mg daily for 2 weeks and then increase to 50 mg daily.  Will reassess further dose adjustments after that.  -Increased range on hydroxyzine to 25 to 50 mg up to 3 times daily as needed.  My recommendation right now is to take 50 mg 3 times daily, particularly while we are waiting for the Zoloft to take effect.  Discussed we could further increase this depending on effectiveness.  -Discussed it would be okay to taper opioids further by 1 mg in weekly increments, or even a little bit longer if needed.  For example, try doing 6 mg of oxycodone at overnight doses.  -Spoke with patient regarding options for smoking cessation.  Discussed the use of : Distraction and Nicotine patch.   Patient chooses to trial distraction and spreading out cigarette timing to wean and then transition to patch. For example, when she feels she wants a cigarette, encouraged her to find something to go do such as a chore around the house or go for a walk.  -Okay to continue Zofran and Compazine as needed for nausea.  If nausea is still present despite  improvement in anxiety, could consider resuming treatment for GERD.    Follow up: 6 weeks      Video-Visit Details  Video Start Time: 11:17 AM  Video End Time: 11:42 AM    Originating Location (pt. Location): Home     Distant Location (provider location):  Offsite- Personal Home      Platform used for Video Visit: Vishal     Total time spent on day of encounter is 46 mins, including reviewing record, review of above studies, above visit with patient, symptomatic discussion of pain, anxiety, nausea, and fatigue, including medication adjustments/prescription management, 5 mins spent on smoking cessation counseling and plan, and documentation.             Lourdes Joseph DO  Palliative Medicine   [id number]    Some chart documentation performed using Dragon Voice recognition Software. Although reviewed after completion, some words and grammatical errors may remain.      Again, thank you for allowing me to participate in the care of your patient.        Sincerely,        Lourdes Joseph DO

## 2024-08-16 NOTE — LETTER
8/16/2024      Linh Mustafa  [address]  [place] [address]      Dear Colleague,    Thank you for referring your patient, Linh Mustafa, to the [facility]. Please see a copy of my visit note below.    Palliative Care Progress Note    Patient Name: Linh Mustafa  Primary Provider: Madison Ruiz    Chief Complaint/Patient ID: Linh Mustafa is a 64 year old female with PMHx of esophageal SCC.  S/p endoscopic resection of SCC in situ lesion, and recommendation was to proceed with neoadjuvant chemoRT followed by definitive surgery.  Started concurrent chemo RT with carbo/Taxol 05/2024 (5 cycles).  -Hospitalized 06/4-06/18/24 with chest pain after radiation therapy, felt to be 2/2 mucositis.  Able to complete final 2 RT treatments inpatient.    -Of note, buspar causes increased anger/irritability.    Last Palliative care appointment: 07/03/24 with me.  Discussed continuing opioid taper.     Reviewed: Yes    Social History: Lives alone.  Has a daughter and son-in-law who live nearby.  Brother from [place] has been helping her with day-to-day tasks.  Identifies as Yazidi.    Advanced Care Planning: Has not completed an HCD.    Interim History:  Linh Mustafa is a 64 year old female who is seen today for follow up with Palliative Care via billable video visit.      Reviewed oncology note from 7/10.  Anxiety better controlled with hydroxyzine.  Pain was improving.  The plan was to repeat PET scan in mid August and then have follow-up with primary oncologist.  Advised to continue working with palliative on opioid taper.    Pain level is okay.  She states it is much better than before, and she is not having much pain with swallowing now.  Still unable to swallow really large pills.  No longer on the fentanyl patch.  Using 7.5 mg of oxycodone at bedtime and then once overnight.  Does not tend to use oxycodone during the day unless she is very active with  "errands and such, and then she will use a 5 mg dose.    Anxiety has been \"terrible\".  Describes herself is \"I am just scared, I do not want to be alone.  I started smoking again.  My daughter leaves and I go into a panic\".  Currently taking hydroxyzine 50 mg twice daily.  Does feel that this is helpful.    Fatigue has improved.  She is not napping anymore during the day.  Sleeps generally about 12 hours per night.      Physical Exam:   Constitutional: Alert, pleasant, no apparent distress. Sitting up in chair.  Eyes: Sclera non-icteric, no eye discharge.  ENT: No nasal discharge. Ears grossly normal.  Respiratory: Unlabored respirations. Speaking in full sentences.  Musculoskeletal: Extremities appear normal- no gross deformities noted. No edema noted on upper body.   Skin: No suspicious lesions or rashes on visible skin.  Neurologic: Clear speech, no aphasia. No facial droop.  Psychiatric: Mentation appears normal, appropriate attention. Affect normal/bright. Does not appear anxious or depressed.    Key Data Reviewed:  LABS:   Lab Results   Component Value Date    WBC 8.8 08/07/2024    HGB 10.2 (L) 08/07/2024    HCT 30.8 (L) 08/07/2024     08/07/2024     08/07/2024    POTASSIUM 4.6 08/07/2024    CHLORIDE 103 08/07/2024    CO2 28 08/07/2024    BUN 19.6 08/07/2024    CR 0.70 08/07/2024     (H) 08/07/2024    SED 61 (H) 06/04/2024    DD 0.79 (H) 12/23/2022    NTBNPI 136 06/04/2024    AST 20 08/07/2024    ALT 18 08/07/2024    ALKPHOS 91 08/07/2024    BILITOTAL 0.3 08/07/2024    INR 0.98 06/04/2024     IMAGING: PET Scan from yesterday has not been read yet.    Impression & Recommendations & Counseling:  Linh Mustafa is a 64 year old female with history of esophageal SCC s/p proximal resection and chemo RT.    She has made significant progress in tapering down on her opioids.  No longer on a long-acting medication, as she has discontinued the fentanyl patch.  Currently taking 2 doses of 7.5 mg " oxycodone overnight and will use 5 mg during the day as needed depending on activity.  Encouraged her to continue slowly tapering down.    Anxiety has been dramatically increased.  While some of this could be situational with her PET scan yesterday, it does seem as though anxiety overall is heightened, and we discussed starting a daily controller medication today.  Her mother had a poor response to the Prozac, but she was agreeable to trying Zoloft today.    We discussed smoking cessation.  She feels really frustrated that she resumed smoking when she had gone so long without it.  She does have the nicotine patches.  Discussed the high addictive properties of nicotine, and the fact that it is incredibly difficult to quit smoking in the face of significant anxiety.  Discussed weaning down on cigarettes slowly to be able to resume using nicotine patches.    Recommendations:  -Start Zoloft 25 mg daily for 2 weeks and then increase to 50 mg daily.  Will reassess further dose adjustments after that.  -Increased range on hydroxyzine to 25 to 50 mg up to 3 times daily as needed.  My recommendation right now is to take 50 mg 3 times daily, particularly while we are waiting for the Zoloft to take effect.  Discussed we could further increase this depending on effectiveness.  -Discussed it would be okay to taper opioids further by 1 mg in weekly increments, or even a little bit longer if needed.  For example, try doing 6 mg of oxycodone at overnight doses.  -Spoke with patient regarding options for smoking cessation.  Discussed the use of : Distraction and Nicotine patch.   Patient chooses to trial distraction and spreading out cigarette timing to wean and then transition to patch. For example, when she feels she wants a cigarette, encouraged her to find something to go do such as a chore around the house or go for a walk.  -Okay to continue Zofran and Compazine as needed for nausea.  If nausea is still present despite  improvement in anxiety, could consider resuming treatment for GERD.    Follow up: 6 weeks      Video-Visit Details  Video Start Time: 11:17 AM  Video End Time: 11:42 AM    Originating Location (pt. Location): Home     Distant Location (provider location):  Offsite- Personal Home      Platform used for Video Visit: Vishal     Total time spent on day of encounter is 46 mins, including reviewing record, review of above studies, above visit with patient, symptomatic discussion of pain, anxiety, nausea, and fatigue, including medication adjustments/prescription management, 5 mins spent on smoking cessation counseling and plan, and documentation.             Lourdes Joseph DO  Palliative Medicine   [id number]    Some chart documentation performed using Dragon Voice recognition Software. Although reviewed after completion, some words and grammatical errors may remain.      Again, thank you for allowing me to participate in the care of your patient.        Sincerely,        Lourdes Joseph DO

## 2024-08-16 NOTE — PATIENT INSTRUCTIONS
Recommendations:  -Start Zoloft 25 mg (1 tablet) daily for 2 weeks and then increase to 50 mg (2 tablets) daily.  Will reassess further dose adjustments after that.  -Increased range on hydroxyzine to 25 to 50 mg up to 3 times daily as needed.  My recommendation right now is to take 50 mg 3 times daily, particularly while we are waiting for the Zoloft to take effect.  Discussed we could further increase this depending on effectiveness.  -Discussed it would be okay to taper opioids further by 1 mg in weekly increments, or even a little bit longer if needed.  For example, try doing 6 mg of oxycodone at overnight doses.  -Suggested using distraction to try to spread out the time between cigarettes.  For example, when you feel you want a cigarette, encourage you to find something to go do such as a chore around the house or go for a walk.  -Okay to continue ondansetron and prochlorperazine as needed for nausea.  If nausea is still present despite improvement in anxiety, could consider resuming treatment for heartburn/reflux.    Follow up: About 6 weeks      Reasons to Call    If you are having worsening/uncontrolled symptoms we want you to call!    You or your other physicians make any changes to medications we have prescribed.  -Please call for refills 4-5 days before you will run out of medication.    Important Phone Numbers, including: Refills, scheduling, and general questions     Palliative Care RN: Elaine Chavez : [phone]  *For scheduling needs/follow up visits : [phone]  *After hours or on weekends- Will connect you with on call MD : [phone].

## 2024-08-19 ENCOUNTER — PATIENT OUTREACH (OUTPATIENT)
Dept: CARE COORDINATION | Facility: CLINIC | Age: 64
End: 2024-08-19
Payer: COMMERCIAL

## 2024-08-19 NOTE — PROGRESS NOTES
Social Work - Distress Screen Intervention  [facility]    Identified Concern and Score from Distress Screenin. How concerned are you about your ability to eat? 5     2. How concerned are you about unintended weight loss or your current weight? 0     3. How concerned are you about feeling depressed or very sad?  (!) 10     4. How concerned are you about feeling anxious or very scared?  (!) 10     5. Do you struggle with the loss of meaning and riri in your life?  Somewhat     6. How concerned are you about work and home life issues that may be affected by your cancer?  6     7. How concerned are you about knowing what resources are available to help you?  5     8. Do you currently have what you would describe as Restoration or spiritual struggles? Not at all     9. If you want to be contacted by one of our professionals, I can send a message to them right now.  -- (Pt declined at this time.)       Date of Distress Screen: 24  Data: At time of last visit, patient scored positive on distress screening.   Intervention/Education provided:  reviewed screen, at time of screening Lita denied desire for supportive care outreach at present time.     Per chart review it appears that Lita's anxiety was addressed by Dr. Joseph at time of appointment.     Follow-up Required:  will remain available to patient for support as needed and rely on medical team referral for engagement.     Stefany Odom, ELLE, LICSW, OSW-C  Clinical - Adult Oncology  Phone: [phone]  She/Her/Hers  [facility]: Stacie FAUST  8am-4:30pm  [facility]: XAVI Petit F 8am-4:30pm   Support Groups at [facility]: Social Work Services for Cancer Patients ([email or web address])

## 2024-08-22 ENCOUNTER — ONCOLOGY VISIT (OUTPATIENT)
Dept: ONCOLOGY | Facility: CLINIC | Age: 64
End: 2024-08-22
Attending: INTERNAL MEDICINE
Payer: COMMERCIAL

## 2024-08-22 VITALS
DIASTOLIC BLOOD PRESSURE: 53 MMHG | RESPIRATION RATE: 15 BRPM | TEMPERATURE: 98.2 F | BODY MASS INDEX: 21.79 KG/M2 | WEIGHT: 139.1 LBS | HEART RATE: 84 BPM | SYSTOLIC BLOOD PRESSURE: 89 MMHG | OXYGEN SATURATION: 97 %

## 2024-08-22 DIAGNOSIS — C15.9 MALIGNANT NEOPLASM OF ESOPHAGUS, UNSPECIFIED LOCATION (H): Primary | ICD-10-CM

## 2024-08-22 PROCEDURE — G2211 COMPLEX E/M VISIT ADD ON: HCPCS | Performed by: INTERNAL MEDICINE

## 2024-08-22 PROCEDURE — 99215 OFFICE O/P EST HI 40 MIN: CPT | Performed by: INTERNAL MEDICINE

## 2024-08-22 PROCEDURE — G0463 HOSPITAL OUTPT CLINIC VISIT: HCPCS | Performed by: INTERNAL MEDICINE

## 2024-08-22 ASSESSMENT — PAIN SCALES - GENERAL: PAINLEVEL: MILD PAIN (3)

## 2024-08-22 NOTE — NURSING NOTE
"Oncology Rooming Note    August 22, 2024 9:50 AM   Linh Mustafa is a 64 year old female who presents for:    Chief Complaint   Patient presents with    Oncology Clinic Visit     Squamous cell carcinoma of esophagus      Initial Vitals: BP (!) 89/53 (BP Location: Right arm, Patient Position: Sitting, Cuff Size: Adult Regular)   Pulse 84   Temp 98.2  F (36.8  C) (Oral)   Resp 15   Wt 63.1 kg (139 lb 1.6 oz)   SpO2 97%   BMI 21.79 kg/m   Estimated body mass index is 21.79 kg/m  as calculated from the following:    Height as of 8/16/24: 1.702 m (5' 7\").    Weight as of this encounter: 63.1 kg (139 lb 1.6 oz). Body surface area is 1.73 meters squared.  Mild Pain (3) Comment: Data Unavailable   No LMP recorded. Patient is postmenopausal.  Allergies reviewed: Yes  Medications reviewed: Yes    Medications: Medication refills not needed today.  Pharmacy name entered into [employer]: [id number] - [address]    Frailty Screening:   Is the patient here for a new oncology consult visit in cancer care? 2. No      Clinical concerns: Pt reports no new concerns today.        Quiana Page, EMT   "

## 2024-08-22 NOTE — PROGRESS NOTES
Oncology follow-up visit:  Date on this visit: 8/22/24     Linh Mustafa  is referred by Dr.Heather Denice Garcia for an oncology consultation. She requires evaluation for new diagnosis of esophageal cancer.    Primary Physician: Clinic, [facility]     History Of Present Illness:  Ms. Mustafa is a 64 year old female who presents with new diagnosis of esophageal cancer.  I initially saw her on 4/18/2024.  Please see my previous note for details.  I have copied and updated from prior notes.      She mentioned that since November 2023 she started to notice pain upon swallowing and it was basically pain which limited her food intake.  This was progressively getting worse.  She had further workup as mentioned below.    2/15/2024.  EGD showed an ulcerated mid esophageal mass extending from 26-31 cm from the incisors.  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  Pathology from the mid esophageal mass showed moderately differentiated invasive squamous cell carcinoma, MMR IHC intact.    3/7/2024.  PET/CT showed markedly FDG avid wall thickening of the mid thoracic esophagus with SUV max 24.1.  No evidence of metastatic disease.    3/21/2024.  Upper EUS.  Endoscopy showed a flat nodule in the proximal esophagus between 15-19 cm and one third circumferential.  Upper esophageal sphincter was at 14 cm.  Biopsies from this showed high-grade dysplasia/carcinoma in situ.      Normal esophageal squamous cell cancer causing maybe esophagus on the right anterolateral esophageal wall between 24-30 cm.  It was 50% circumferential.  The GE junction was at 35 cm with 2 cm tongues of Auguste's anteriorly without high risk features.    Ultrasound exam showed the mid esophageal tumor to be probably T3. Two 4 x 6 mm nodes were seen adjacent to the distal esophageal wall at 36 and 37 cm.  Both an identical appearance and one was sampled.  This lymph node biopsy was benign.    By EUS it was  staged as T3 N0 M0 tumor.    4/2/2024.  Because of finding of carcinoma in situ in the proximal esophagus, he had endoscopic resection of the proximal squamous cell carcinoma in situ lesion performed with en bloc removal.    The final pathology showed squamous mucosa with high-grade dysplasia/carcinoma in situ with all margins negative for dysplasia.  No definite invasion was identified. ESD was considered curative for this lesion.    Her case was also discussed in the tumor conference with the plan to proceed with neoadjuvant chemotherapy/radiation followed by definitive surgery.      She met with Dr. Martinez.    She started on concurrent chemoradiation with carboplatin and Taxol on 5/1/24 and received 5 doses, last on 5/28/24 and last dose of radiation on 6/5/24.     She was hospitalized from 6/4-6/18/24 with radiation esophagitis causing pain and inability to eat. She was discharged home with a G-tube that was placed on 6/14/24.       Interval history.    She comes in today accompanied by her daughter.  I also reviewed notes from thoracic surgery and palliative care.  Doing better.  Pain is better.  She is now using oxycodone 7.5 mg at bedtime and also takes 5 mg in the middle of the night.  Most of the time, during the day she does not use oxycodone.  She is able to swallow better.  It is still hard for her to swallow large pills.  Off-and-on she feels nauseous for which she takes antiemetics.  She is taking stool softener and MiraLAX to help with constipation.  She has some discomfort at the tube feed side but denies that it is actual pain.  Denies neuropathy.  No bleeding.  No shortness of breath.  She still feels tired and fatigued but slowly improving.        ECOG 1    ROS:  A comprehensive ROS was otherwise neg    I reviewed other history in epic as below.    Past Medical/Surgical History:  Past Medical History:   Diagnosis Date    Hyperlipidemia     Hypertension    Hypothyroidism- hx of PATEL at the age of  16 for hyperthyroidism    Past Surgical History:   Procedure Laterality Date    BIOPSY BREAST Right     age 30 benign fibrous    CV CORONARY ANGIOGRAM N/A 01/25/2023    Procedure: Coronary Angiogram;  Surgeon: Lukas Irizarry MD;  Location: [facility] CV    CV LEFT HEART CATH N/A 01/25/2023    Procedure: Left Heart Catheterization;  Surgeon: Lukas Irizarry MD;  Location: [facility] CV    ENDOSCOPIC ULTRASOUND UPPER GASTROINTESTINAL TRACT (GI) N/A 3/21/2024    Procedure: ENDOSCOPIC ULTRASOUND, ESOPHAGOSCOPY / UPPER GASTROINTESTINAL TRACT (GI), ENDOSCOPY WITH BIOPSY, FINE NEEDLE ASPIRATION;  Surgeon: Damon Kramer MD;  Location: [facility] OR    ENDOSCOPIC ULTRASOUND, ESOPHAGOSCOPY / UPPER GASTROINTESTINAL TRACT (GI), ENDOSCOPY WITH BIOPSY, FINE NEEDLE ASPIRATION  03/21/2024    ESOPHAGOSCOPY, GASTROSCOPY, DUODENOSCOPY (EGD), COMBINED N/A 6/6/2024    Procedure: Esophagoscopy, Gastroscopy, Duodenoscopy (EGD), Nasojejunal Feeding Tube Placement;  Surgeon: Bryant Martinez MD;  Location: [facility] OR    ESOPHAGOSCOPY, GASTROSCOPY, DUODENOSCOPY (EGD), SUBMUCOSA RESECTION N/A 4/2/2024    Procedure: ESOPHAGOGASTRODUODENOSCOPY, WITH SUBMUCOSAL RESECTION;  Surgeon: Holden King MD;  Location: [facility] OR    IR CHEST PORT PLACEMENT > 5 YRS OF AGE  4/24/2024    LAPAROSCOPIC ASSISTED INSERTION TUBE GASTROTOMY N/A 6/14/2024    Procedure: Upper endoscopy, laparoscopic gastrostomy tube placement;  Surgeon: Bryant Martinez MD;  Location: [facility] OR     Cancer History:   As above    Allergies:  Allergies as of 08/22/2024 - Reviewed 08/22/2024   Allergen Reaction Noted    Amoxicillin Shortness Of Breath, Itching, and Rash 12/06/2023    Penicillins  03/14/2024     Current Medications:  Current Outpatient Medications   Medication Sig Dispense Refill    acetaminophen (TYLENOL) 325 MG/10.15ML liquid Take 31.25 mLs (1,000 mg) by mouth 3 times daily 2812.5 mL 1    artificial saliva (BIOTENE DRY MOUTHWASH) LIQD liquid  Swish and spit 15 mLs in mouth 4 times daily as needed for dry mouth 473 mL 0    hydrOXYzine HCl (ATARAX) 25 MG tablet Take 1-2 tablets (25-50 mg) by mouth 3 times daily as needed for itching 180 tablet 1    levothyroxine (SYNTHROID/LEVOTHROID) 200 MCG tablet Take 200 mcg by mouth daily      methocarbamol (ROBAXIN) 750 MG tablet Take 1 tablet (750 mg) by mouth 4 times daily 120 tablet 0    naloxone (NARCAN) 4 MG/0.1ML nasal spray Spray 1 spray (4 mg) into one nostril alternating nostrils as needed for opioid reversal every 2-3 minutes until assistance arrives 0.2 mL 1    nicotine (NICODERM CQ) 14 MG/24HR 24 hr patch Place 1 patch onto the skin every 24 hours 30 patch 1    [START ON 8/30/2024] nicotine (NICODERM CQ) 7 MG/24HR 24 hr patch Place 1 patch onto the skin every 24 hours Start after 6 weeks of the 14 mg/24 hr dose 30 patch 0    ondansetron (ZOFRAN ODT) 8 MG ODT tab Take 1 tablet (8 mg) by mouth every 8 hours as needed for nausea 90 tablet 0    oxyCODONE (ROXICODONE) 5 MG/5ML solution Take 7.5-10 mLs (7.5-10 mg) by mouth or Feeding Tube every 4 hours as needed for severe pain 1500 mL 0    prochlorperazine (COMPAZINE) 10 MG tablet Take 1 tablet (10 mg) by mouth every 6 hours as needed for vomiting 90 tablet 0    sertraline (ZOLOFT) 25 MG tablet Take 1 tablet daily for 2 weeks, then increase to 2 tablets daily. 45 tablet 0    famotidine (PEPCID) 20 MG tablet Take 20 mg by mouth 2 times daily      vitamin B-12 (CYANOCOBALAMIN) 2500 MCG sublingual tablet Take 1 tablet (2,500 mcg) by mouth daily 90 tablet 3      Family History:  Family History   Problem Relation Age of Onset    Chronic Obstructive Pulmonary Disease Father     Colon Cancer Paternal Grandmother 70     1 daughter- healthy    Social History:  Social History     Socioeconomic History    Marital status: Single     Spouse name: Not on file    Number of children: Not on file    Years of education: Not on file    Highest education level: Not on file    Occupational History    Not on file   Tobacco Use    Smoking status: Former     Current packs/day: 1.00     Average packs/day: 1 pack/day for 47.6 years (47.6 ttl pk-yrs)     Types: Cigarettes     Start date: 1977     Passive exposure: Current    Smokeless tobacco: Never   Vaping Use    Vaping status: Never Used   Substance and Sexual Activity    Alcohol use: Not on file     Comment: occasional    Drug use: Never    Sexual activity: Not on file   Other Topics Concern    Not on file   Social History Narrative    ** Merged History Encounter **          Social Determinants of Health     Financial Resource Strain: Not on file   Food Insecurity: No Food Insecurity (2/24/2024)    Received from [facility]    Hunger Vital Sign     Worried About Running Out of Food in the Last Year: Never true     Ran Out of Food in the Last Year: Never true   Transportation Needs: No Transportation Needs (2/24/2024)    Received from [facility]    PRAPARE - Transportation     Lack of Transportation (Medical): No     Lack of Transportation (Non-Medical): No   Physical Activity: Inactive (2/24/2024)    Received from [facility]    Exercise Vital Sign     Days of Exercise per Week: 0 days     Minutes of Exercise per Session: 0 min   Stress: Not on file   Social Connections: Not on file   Interpersonal Safety: Not on file   Housing Stability: Low Risk  (2/24/2024)    Received from [facility]    Housing Stability     What is your living situation today?: I have a steady place to live   Chronic smoker- now smokes occasionally. No etoh. Lives alone. Currently brother staying with her        Physical Exam:  BP (!) 89/53 (BP Location: Right arm, Patient Position: Sitting, Cuff Size: Adult Regular)   Pulse 84   Temp 98.2  F (36.8  C) (Oral)   Resp 15   Wt 63.1 kg (139 lb 1.6 oz)   SpO2 97%   BMI 21.79 kg/m      Wt Readings from Last 4 Encounters:   08/22/24 63.1 kg (139 lb 1.6 oz)    08/16/24 62.6 kg (138 lb)   07/10/24 62.1 kg (137 lb)   07/03/24 62.1 kg (137 lb)     CONSTITUTIONAL: No apparent distress.  She has a hoarse voice.  EYES: PERRLA, there is mild pallor but no jaundice.    ENT/MOUTH: Ears unremarkable. No oral lesions  CVS: s1s2 normal  RESPIRATORY: Chest is clear  GI: Abdomen is soft.  Mild discomfort around the tube feed site.  Site looks fine.  NEURO: Alert and oriented ×3  INTEGUMENT: no concerning skin rashes   LYMPHATIC: no palpable lymphadenopathy  MUSCULOSKELETAL: Unremarkable. No bony tenderness.   EXTREMITIES: no pedal edema  PSYCH: Mentation, mood and affect are appropriate      Laboratory/Imaging Studies      Reviewed    8/7/2024  CBC shows WBC 8.8.  Hemoglobin 10.2.  Platelets 254.   Chemistry unremarkable.      PET/CT 8/15/2024    COMPARISON: 3/7/2024 PET CT     FINDINGS:      BACKGROUND: Liver SUV max = 3.65, Aorta Blood SUV max = 2.81.      Right chest wall port with the tip of the catheter in right atrium.     HEAD/NECK:     Pharyngeal mucosal spaces: Nasopharynx and palatine tonsils are within  normal limits. Tongue base is normal. Oral tongue and buccal mucosa of  the oral cavity is normal. Oropharynx, hypopharynx and larynx are  within normal limits.     Sinonasal region: Paranasal sinuses are clear. No mass within the  nasal cavity.     Lymph nodes: No FDG avid or abnormally enlarged lymph nodes.     Salivary glands: The major salivary glands are within normal limits.      Thyroid gland: The thyroid gland is within normal limits.      Vascular structures: The major vasculature of the neck are patent.     Brain: No abnormal FDG avid lesion or abnormal enhancement.      Orbital cavities: No abnormal FDG avid lesion or abnormal enhancement.        CHEST:     Lymph nodes: No FDG avid or abnormally enlarged lymph nodes.     Lungs: The central tracheobronchial tree is clear. No acute  consolidation.  No pleural effusion or pneumothorax.      Heart and great vessels:  Heart size is within normal limits. No  pericardial effusion. The thoracic aorta and main pulmonary artery are  within normal limits. Wall calcifications along the aortic arch and  descending aorta.      Interval complete resolution of previously seen FDG avid mid  esophageal mass.      Breasts: No abnormal uptake in the breasts.     ABDOMEN AND PELVIS:     New percutaneous gastrostomy tube with mild inflammatory uptake in the  entry site.     Liver: No FDG avid lesion. Isometabolic lesion in segment 8 is most  compatible with a hemangioma. Another smaller one in segment 6. No  intrahepatic or extrahepatic biliary ductal dilatation. Cholelithiasis  without findings of inflammation.     Pancreas: The pancreas is within normal limits. No pancreatic ductal  dilatation.      Spleen: No FDG avid lesion.     Adrenal glands: Non FDG avid 2.0 x 1.1 cm left adrenal medial siobhan  lesion, stable, imaging findings are suggestive of an adenoma.      Kidneys: No FDG avid lesion. No hydronephrosis. The urinary bladder is  unremarkable.      Reproductive organs are within normal limits. 1.8 cm left adnexal non  avid cyst, no need to do further imaging, stable.      Gastrointestinal system: Normal caliber of the small and large bowel.  Colonic diverticulosis     Lymph nodes: No FDG avid or abnormally enlarged lymph nodes.     Vascular structures: Normal caliber of the abdominal aorta. Aortoiliac  atherosclerosis.     No free air, free fluid, or fluid collection.      EXTREMITIES:      No abnormal FDG uptake in the visualized extremities.     BONES AND SOFT TISSUES:   Photopenia of mid thoracic spine represents radiation treatment  effect.   No abnormal FDG uptake in the skeleton. No abnormal lytic or blastic  osseous lesions.      SPINAL CANAL:      No evident canal compromise. No abnormal FDG avid lesion.                                                                      IMPRESSION: Whole body FDG PET CT in comparison to  pretreatment PET CT  shows complete resolution of previously seen FDG avid mid esophageal  mass. No new concerning findings.      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  MMR IHC intact.      She started on concurrent chemoradiation with carboplatin and Taxol on 5/1/24 and received 5 doses, last on 5/28/24 and last dose of radiation on 6/5/24.    Repeat PET/CT on 8/15/2024 which I reviewed myself showed very good response to treatment.  There is complete resolution of FDG avidity in the esophagus.  No evidence of malignancy.    We discussed the situation in detail.  The chances of cure are highest if surgery is done after chemotherapy/radiation.  There is a chance of cure with only chemotherapy and radiation as well.  I would like her to meet with Dr. Martinez again to discuss the pros and cons of surgery.  I have sent a message to Dr. Martinez.      Radiation esophagitis/odynophagia.  Doing better.  Following with palliative care.  I reviewed notes from Dr. Mansoor Eric.  Now taking oxycodone as needed.      Nutrition.  She is using tube feeds.  Slowly she is trying to eat more.  We discussed that if she is able to maintain nutrition through oral intake only, then we can take out the feeding tube.     Anxiety.  Hydroxyzine is helping.  Recently started on Prozac.      Anemia.  This is improving after completing the treatment.  Continue to observe.      Smoking.  She quit for some time but restarted smoking.  I advised her to continue working hard to completely quit smoking.      Port-A-Cath.  She will need port flushes every 8 weeks or so.      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  Now s/p endoscopic resection with clear margins free of dysplasia.  This has been adequately treated.  Continue to observe.      Macrocytosis.  Previously she had macrocytosis.  B12 and folate were  normal.  Free T4 was normal.  TSH was low.  Continue to observe.     We will determine the follow-up with me accordingly.    I answered all of her and her daughter's questions to their satisfaction.  They are agreeable and comfortable with the plan.    Kathy Burch MD     The longitudinal plan of care for the esophageal cancer, as documented were addressed during this visit. Due to the added complexity in care, I will continue to support Lita in the subsequent management and with ongoing continuity of care.

## 2024-08-22 NOTE — LETTER
8/22/2024      Linh Mustafa  [address]  [facility] [address]      Dear Colleague,    Thank you for referring your patient, Linh Mustafa, to the [facility]. Please see a copy of my visit note below.    Oncology follow-up visit:  Date on this visit: 8/22/24     Linh Mustafa  is referred by Dr.Heather Denice Garcia for an oncology consultation. She requires evaluation for new diagnosis of esophageal cancer.    Primary Physician: Clinic, [facility]     History Of Present Illness:  Ms. Mustafa is a 64 year old female who presents with new diagnosis of esophageal cancer.  I initially saw her on 4/18/2024.  Please see my previous note for details.  I have copied and updated from prior notes.      She mentioned that since November 2023 she started to notice pain upon swallowing and it was basically pain which limited her food intake.  This was progressively getting worse.  She had further workup as mentioned below.    2/15/2024.  EGD showed an ulcerated mid esophageal mass extending from 26-31 cm from the incisors.  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  Pathology from the mid esophageal mass showed moderately differentiated invasive squamous cell carcinoma, MMR IHC intact.    3/7/2024.  PET/CT showed markedly FDG avid wall thickening of the mid thoracic esophagus with SUV max 24.1.  No evidence of metastatic disease.    3/21/2024.  Upper EUS.  Endoscopy showed a flat nodule in the proximal esophagus between 15-19 cm and one third circumferential.  Upper esophageal sphincter was at 14 cm.  Biopsies from this showed high-grade dysplasia/carcinoma in situ.      Normal esophageal squamous cell cancer causing maybe esophagus on the right anterolateral esophageal wall between 24-30 cm.  It was 50% circumferential.  The GE junction was at 35 cm with 2 cm tongues of Auguste's anteriorly without high risk  features.    Ultrasound exam showed the mid esophageal tumor to be probably T3. Two 4 x 6 mm nodes were seen adjacent to the distal esophageal wall at 36 and 37 cm.  Both an identical appearance and one was sampled.  This lymph node biopsy was benign.    By EUS it was staged as T3 N0 M0 tumor.    4/2/2024.  Because of finding of carcinoma in situ in the proximal esophagus, he had endoscopic resection of the proximal squamous cell carcinoma in situ lesion performed with en bloc removal.    The final pathology showed squamous mucosa with high-grade dysplasia/carcinoma in situ with all margins negative for dysplasia.  No definite invasion was identified. ESD was considered curative for this lesion.    Her case was also discussed in the tumor conference with the plan to proceed with neoadjuvant chemotherapy/radiation followed by definitive surgery.      She met with Dr. Martinez.    She started on concurrent chemoradiation with carboplatin and Taxol on 5/1/24 and received 5 doses, last on 5/28/24 and last dose of radiation on 6/5/24.     She was hospitalized from 6/4-6/18/24 with radiation esophagitis causing pain and inability to eat. She was discharged home with a G-tube that was placed on 6/14/24.       Interval history.    She comes in today accompanied by her daughter.  I also reviewed notes from thoracic surgery and palliative care.  Doing better.  Pain is better.  She is now using oxycodone 7.5 mg at bedtime and also takes 5 mg in the middle of the night.  Most of the time, during the day she does not use oxycodone.  She is able to swallow better.  It is still hard for her to swallow large pills.  Off-and-on she feels nauseous for which she takes antiemetics.  She is taking stool softener and MiraLAX to help with constipation.  She has some discomfort at the tube feed side but denies that it is actual pain.  Denies neuropathy.  No bleeding.  No shortness of breath.  She still feels tired and fatigued but slowly  improving.        ECOG 1    ROS:  A comprehensive ROS was otherwise neg    I reviewed other history in epic as below.    Past Medical/Surgical History:  Past Medical History:   Diagnosis Date     Hyperlipidemia      Hypertension    Hypothyroidism- hx of PATEL at the age of 16 for hyperthyroidism    Past Surgical History:   Procedure Laterality Date     BIOPSY BREAST Right     age 30 benign fibrous     CV CORONARY ANGIOGRAM N/A 01/25/2023    Procedure: Coronary Angiogram;  Surgeon: Lukas Irizarry MD;  Location: [facility] CV     CV LEFT HEART CATH N/A 01/25/2023    Procedure: Left Heart Catheterization;  Surgeon: Lukas Irizarry MD;  Location: [facility] CV     ENDOSCOPIC ULTRASOUND UPPER GASTROINTESTINAL TRACT (GI) N/A 3/21/2024    Procedure: ENDOSCOPIC ULTRASOUND, ESOPHAGOSCOPY / UPPER GASTROINTESTINAL TRACT (GI), ENDOSCOPY WITH BIOPSY, FINE NEEDLE ASPIRATION;  Surgeon: Damon Kramer MD;  Location: [facility] OR     ENDOSCOPIC ULTRASOUND, ESOPHAGOSCOPY / UPPER GASTROINTESTINAL TRACT (GI), ENDOSCOPY WITH BIOPSY, FINE NEEDLE ASPIRATION  03/21/2024     ESOPHAGOSCOPY, GASTROSCOPY, DUODENOSCOPY (EGD), COMBINED N/A 6/6/2024    Procedure: Esophagoscopy, Gastroscopy, Duodenoscopy (EGD), Nasojejunal Feeding Tube Placement;  Surgeon: Bryant Martinez MD;  Location: [facility] OR     ESOPHAGOSCOPY, GASTROSCOPY, DUODENOSCOPY (EGD), SUBMUCOSA RESECTION N/A 4/2/2024    Procedure: ESOPHAGOGASTRODUODENOSCOPY, WITH SUBMUCOSAL RESECTION;  Surgeon: Holden King MD;  Location: [facility] OR     IR CHEST PORT PLACEMENT > 5 YRS OF AGE  4/24/2024     LAPAROSCOPIC ASSISTED INSERTION TUBE GASTROTOMY N/A 6/14/2024    Procedure: Upper endoscopy, laparoscopic gastrostomy tube placement;  Surgeon: Bryant Martinez MD;  Location: [facility] OR     Cancer History:   As above    Allergies:  Allergies as of 08/22/2024 - Reviewed 08/22/2024   Allergen Reaction Noted     Amoxicillin Shortness Of Breath, Itching, and Rash 12/06/2023      Penicillins  03/14/2024     Current Medications:  Current Outpatient Medications   Medication Sig Dispense Refill     acetaminophen (TYLENOL) 325 MG/10.15ML liquid Take 31.25 mLs (1,000 mg) by mouth 3 times daily 2812.5 mL 1     artificial saliva (BIOTENE DRY MOUTHWASH) LIQD liquid Swish and spit 15 mLs in mouth 4 times daily as needed for dry mouth 473 mL 0     hydrOXYzine HCl (ATARAX) 25 MG tablet Take 1-2 tablets (25-50 mg) by mouth 3 times daily as needed for itching 180 tablet 1     levothyroxine (SYNTHROID/LEVOTHROID) 200 MCG tablet Take 200 mcg by mouth daily       methocarbamol (ROBAXIN) 750 MG tablet Take 1 tablet (750 mg) by mouth 4 times daily 120 tablet 0     naloxone (NARCAN) 4 MG/0.1ML nasal spray Spray 1 spray (4 mg) into one nostril alternating nostrils as needed for opioid reversal every 2-3 minutes until assistance arrives 0.2 mL 1     nicotine (NICODERM CQ) 14 MG/24HR 24 hr patch Place 1 patch onto the skin every 24 hours 30 patch 1     [START ON 8/30/2024] nicotine (NICODERM CQ) 7 MG/24HR 24 hr patch Place 1 patch onto the skin every 24 hours Start after 6 weeks of the 14 mg/24 hr dose 30 patch 0     ondansetron (ZOFRAN ODT) 8 MG ODT tab Take 1 tablet (8 mg) by mouth every 8 hours as needed for nausea 90 tablet 0     oxyCODONE (ROXICODONE) 5 MG/5ML solution Take 7.5-10 mLs (7.5-10 mg) by mouth or Feeding Tube every 4 hours as needed for severe pain 1500 mL 0     prochlorperazine (COMPAZINE) 10 MG tablet Take 1 tablet (10 mg) by mouth every 6 hours as needed for vomiting 90 tablet 0     sertraline (ZOLOFT) 25 MG tablet Take 1 tablet daily for 2 weeks, then increase to 2 tablets daily. 45 tablet 0     famotidine (PEPCID) 20 MG tablet Take 20 mg by mouth 2 times daily       vitamin B-12 (CYANOCOBALAMIN) 2500 MCG sublingual tablet Take 1 tablet (2,500 mcg) by mouth daily 90 tablet 3      Family History:  Family History   Problem Relation Age of Onset     Chronic Obstructive Pulmonary Disease Father       Colon Cancer Paternal Grandmother 70     1 daughter- healthy    Social History:  Social History     Socioeconomic History     Marital status: Single     Spouse name: Not on file     Number of children: Not on file     Years of education: Not on file     Highest education level: Not on file   Occupational History     Not on file   Tobacco Use     Smoking status: Former     Current packs/day: 1.00     Average packs/day: 1 pack/day for 47.6 years (47.6 ttl pk-yrs)     Types: Cigarettes     Start date: 1977     Passive exposure: Current     Smokeless tobacco: Never   Vaping Use     Vaping status: Never Used   Substance and Sexual Activity     Alcohol use: Not on file     Comment: occasional     Drug use: Never     Sexual activity: Not on file   Other Topics Concern     Not on file   Social History Narrative    ** Merged History Encounter **          Social Determinants of Health     Financial Resource Strain: Not on file   Food Insecurity: No Food Insecurity (2/24/2024)    Received from [facility]    Hunger Vital Sign      Worried About Running Out of Food in the Last Year: Never true      Ran Out of Food in the Last Year: Never true   Transportation Needs: No Transportation Needs (2/24/2024)    Received from [facility]    PRAPARE - Transportation      Lack of Transportation (Medical): No      Lack of Transportation (Non-Medical): No   Physical Activity: Inactive (2/24/2024)    Received from [facility]    Exercise Vital Sign      Days of Exercise per Week: 0 days      Minutes of Exercise per Session: 0 min   Stress: Not on file   Social Connections: Not on file   Interpersonal Safety: Not on file   Housing Stability: Low Risk  (2/24/2024)    Received from [facility]    Housing Stability      What is your living situation today?: I have a steady place to live   Chronic smoker- now smokes occasionally. No etoh. Lives alone. Currently brother staying with  her        Physical Exam:  BP (!) 89/53 (BP Location: Right arm, Patient Position: Sitting, Cuff Size: Adult Regular)   Pulse 84   Temp 98.2  F (36.8  C) (Oral)   Resp 15   Wt 63.1 kg (139 lb 1.6 oz)   SpO2 97%   BMI 21.79 kg/m      Wt Readings from Last 4 Encounters:   08/22/24 63.1 kg (139 lb 1.6 oz)   08/16/24 62.6 kg (138 lb)   07/10/24 62.1 kg (137 lb)   07/03/24 62.1 kg (137 lb)     CONSTITUTIONAL: No apparent distress.  She has a hoarse voice.  EYES: PERRLA, there is mild pallor but no jaundice.    ENT/MOUTH: Ears unremarkable. No oral lesions  CVS: s1s2 normal  RESPIRATORY: Chest is clear  GI: Abdomen is soft.  Mild discomfort around the tube feed site.  Site looks fine.  NEURO: Alert and oriented ×3  INTEGUMENT: no concerning skin rashes   LYMPHATIC: no palpable lymphadenopathy  MUSCULOSKELETAL: Unremarkable. No bony tenderness.   EXTREMITIES: no pedal edema  PSYCH: Mentation, mood and affect are appropriate      Laboratory/Imaging Studies      Reviewed    8/7/2024  CBC shows WBC 8.8.  Hemoglobin 10.2.  Platelets 254.   Chemistry unremarkable.      PET/CT 8/15/2024    COMPARISON: 3/7/2024 PET CT     FINDINGS:      BACKGROUND: Liver SUV max = 3.65, Aorta Blood SUV max = 2.81.      Right chest wall port with the tip of the catheter in right atrium.     HEAD/NECK:     Pharyngeal mucosal spaces: Nasopharynx and palatine tonsils are within  normal limits. Tongue base is normal. Oral tongue and buccal mucosa of  the oral cavity is normal. Oropharynx, hypopharynx and larynx are  within normal limits.     Sinonasal region: Paranasal sinuses are clear. No mass within the  nasal cavity.     Lymph nodes: No FDG avid or abnormally enlarged lymph nodes.     Salivary glands: The major salivary glands are within normal limits.      Thyroid gland: The thyroid gland is within normal limits.      Vascular structures: The major vasculature of the neck are patent.     Brain: No abnormal FDG avid lesion or abnormal  enhancement.      Orbital cavities: No abnormal FDG avid lesion or abnormal enhancement.        CHEST:     Lymph nodes: No FDG avid or abnormally enlarged lymph nodes.     Lungs: The central tracheobronchial tree is clear. No acute  consolidation.  No pleural effusion or pneumothorax.      Heart and great vessels: Heart size is within normal limits. No  pericardial effusion. The thoracic aorta and main pulmonary artery are  within normal limits. Wall calcifications along the aortic arch and  descending aorta.      Interval complete resolution of previously seen FDG avid mid  esophageal mass.      Breasts: No abnormal uptake in the breasts.     ABDOMEN AND PELVIS:     New percutaneous gastrostomy tube with mild inflammatory uptake in the  entry site.     Liver: No FDG avid lesion. Isometabolic lesion in segment 8 is most  compatible with a hemangioma. Another smaller one in segment 6. No  intrahepatic or extrahepatic biliary ductal dilatation. Cholelithiasis  without findings of inflammation.     Pancreas: The pancreas is within normal limits. No pancreatic ductal  dilatation.      Spleen: No FDG avid lesion.     Adrenal glands: Non FDG avid 2.0 x 1.1 cm left adrenal medial siobhan  lesion, stable, imaging findings are suggestive of an adenoma.      Kidneys: No FDG avid lesion. No hydronephrosis. The urinary bladder is  unremarkable.      Reproductive organs are within normal limits. 1.8 cm left adnexal non  avid cyst, no need to do further imaging, stable.      Gastrointestinal system: Normal caliber of the small and large bowel.  Colonic diverticulosis     Lymph nodes: No FDG avid or abnormally enlarged lymph nodes.     Vascular structures: Normal caliber of the abdominal aorta. Aortoiliac  atherosclerosis.     No free air, free fluid, or fluid collection.      EXTREMITIES:      No abnormal FDG uptake in the visualized extremities.     BONES AND SOFT TISSUES:   Photopenia of mid thoracic spine represents radiation  treatment  effect.   No abnormal FDG uptake in the skeleton. No abnormal lytic or blastic  osseous lesions.      SPINAL CANAL:      No evident canal compromise. No abnormal FDG avid lesion.                                                                      IMPRESSION: Whole body FDG PET CT in comparison to pretreatment PET CT  shows complete resolution of previously seen FDG avid mid esophageal  mass. No new concerning findings.      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  MMR IHC intact.      She started on concurrent chemoradiation with carboplatin and Taxol on 5/1/24 and received 5 doses, last on 5/28/24 and last dose of radiation on 6/5/24.    Repeat PET/CT on 8/15/2024 which I reviewed myself showed very good response to treatment.  There is complete resolution of FDG avidity in the esophagus.  No evidence of malignancy.    We discussed the situation in detail.  The chances of cure are highest if surgery is done after chemotherapy/radiation.  There is a chance of cure with only chemotherapy and radiation as well.  I would like her to meet with Dr. Martinez again to discuss the pros and cons of surgery.  I have sent a message to Dr. Martinez.      Radiation esophagitis/odynophagia.  Doing better.  Following with palliative care.  I reviewed notes from Dr. Mansoor Eric.  Now taking oxycodone as needed.      Nutrition.  She is using tube feeds.  Slowly she is trying to eat more.  We discussed that if she is able to maintain nutrition through oral intake only, then we can take out the feeding tube.     Anxiety.  Hydroxyzine is helping.  Recently started on Prozac.      Anemia.  This is improving after completing the treatment.  Continue to observe.      Smoking.  She quit for some time but restarted smoking.  I advised her to continue working hard to completely quit smoking.      Port-A-Cath.  She will need port flushes every 8 weeks or  so.      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  Now s/p endoscopic resection with clear margins free of dysplasia.  This has been adequately treated.  Continue to observe.      Macrocytosis.  Previously she had macrocytosis.  B12 and folate were normal.  Free T4 was normal.  TSH was low.  Continue to observe.     We will determine the follow-up with me accordingly.    I answered all of her and her daughter's questions to their satisfaction.  They are agreeable and comfortable with the plan.    Kathy Burch MD     The longitudinal plan of care for the esophageal cancer, as documented were addressed during this visit. Due to the added complexity in care, I will continue to support Lita in the subsequent management and with ongoing continuity of care.        Again, thank you for allowing me to participate in the care of your patient.        Sincerely,        Kathy Burch MD

## 2024-09-05 ENCOUNTER — ENROLLMENT (OUTPATIENT)
Dept: HOME HEALTH SERVICES | Facility: HOME HEALTH | Age: 64
End: 2024-09-05
Payer: COMMERCIAL

## 2024-09-05 DIAGNOSIS — F41.9 ANXIETY: ICD-10-CM

## 2024-09-05 NOTE — TELEPHONE ENCOUNTER
Received electronic request from pharmacy requesting refill of  sertraline .     Last office visit: 8/16/24  Scheduled for follow up 10/4/24     Will route request to MD/ for review.

## 2024-09-08 DIAGNOSIS — F41.9 ANXIETY: ICD-10-CM

## 2024-09-09 RX ORDER — HYDROXYZINE HYDROCHLORIDE 25 MG/1
25-50 TABLET, FILM COATED ORAL 3 TIMES DAILY PRN
Qty: 540 TABLET | Refills: 1 | Status: SHIPPED | OUTPATIENT
Start: 2024-09-09

## 2024-09-09 NOTE — TELEPHONE ENCOUNTER
Received electronic request from pharmacy requesting refill of  hydroxyzine. Pt requesting a 90 day supply .     Last office visit: 8/16/24  Scheduled for follow up 10/4/24     Will route request to MD/DO for review.

## 2024-09-10 ENCOUNTER — LAB REQUISITION (OUTPATIENT)
Dept: LAB | Facility: CLINIC | Age: 64
End: 2024-09-10

## 2024-09-10 DIAGNOSIS — E03.9 HYPOTHYROIDISM, UNSPECIFIED: ICD-10-CM

## 2024-09-10 PROCEDURE — 84443 ASSAY THYROID STIM HORMONE: CPT | Performed by: NURSE PRACTITIONER

## 2024-09-11 LAB — TSH SERPL DL<=0.005 MIU/L-ACNC: 2.19 UIU/ML (ref 0.3–4.2)

## 2024-09-12 ENCOUNTER — ONCOLOGY VISIT (OUTPATIENT)
Dept: PULMONOLOGY | Facility: CLINIC | Age: 64
End: 2024-09-12
Payer: COMMERCIAL

## 2024-09-12 VITALS
OXYGEN SATURATION: 94 % | SYSTOLIC BLOOD PRESSURE: 128 MMHG | DIASTOLIC BLOOD PRESSURE: 74 MMHG | BODY MASS INDEX: 21.33 KG/M2 | HEART RATE: 98 BPM | WEIGHT: 136.2 LBS

## 2024-09-12 DIAGNOSIS — Z90.89 S/P THYMECTOMY: ICD-10-CM

## 2024-09-12 DIAGNOSIS — C15.4 MALIGNANT NEOPLASM OF MIDDLE THIRD OF ESOPHAGUS (H): Primary | ICD-10-CM

## 2024-09-12 PROCEDURE — 99024 POSTOP FOLLOW-UP VISIT: CPT | Performed by: THORACIC SURGERY (CARDIOTHORACIC VASCULAR SURGERY)

## 2024-09-26 NOTE — PROGRESS NOTES
06/10/2024- PT HAS COVERAGE FOR ENTERAL TF, LINECARE, HYDRATION AND TPA.     ENTERAL MUST BE SOLE SOURCE FOR COVERAGE. ML

## 2024-09-30 ENCOUNTER — MYC MEDICAL ADVICE (OUTPATIENT)
Dept: ONCOLOGY | Facility: CLINIC | Age: 64
End: 2024-09-30
Payer: COMMERCIAL

## 2024-10-04 ENCOUNTER — VIRTUAL VISIT (OUTPATIENT)
Dept: ONCOLOGY | Facility: CLINIC | Age: 64
End: 2024-10-04
Attending: STUDENT IN AN ORGANIZED HEALTH CARE EDUCATION/TRAINING PROGRAM
Payer: COMMERCIAL

## 2024-10-04 VITALS — WEIGHT: 133 LBS | HEIGHT: 67 IN | BODY MASS INDEX: 20.88 KG/M2

## 2024-10-04 DIAGNOSIS — Z79.891 ENCOUNTER FOR LONG-TERM USE OF OPIATE ANALGESIC: ICD-10-CM

## 2024-10-04 DIAGNOSIS — R11.2 NAUSEA AND VOMITING, UNSPECIFIED VOMITING TYPE: ICD-10-CM

## 2024-10-04 DIAGNOSIS — F41.9 ANXIETY: ICD-10-CM

## 2024-10-04 DIAGNOSIS — Z51.5 ENCOUNTER FOR PALLIATIVE CARE: ICD-10-CM

## 2024-10-04 DIAGNOSIS — C15.9 SQUAMOUS CELL CARCINOMA OF ESOPHAGUS (H): Primary | ICD-10-CM

## 2024-10-04 DIAGNOSIS — K22.89 ESOPHAGEAL PAIN: ICD-10-CM

## 2024-10-04 PROCEDURE — 99214 OFFICE O/P EST MOD 30 MIN: CPT | Mod: 95 | Performed by: STUDENT IN AN ORGANIZED HEALTH CARE EDUCATION/TRAINING PROGRAM

## 2024-10-04 RX ORDER — PROCHLORPERAZINE MALEATE 10 MG
10 TABLET ORAL EVERY 6 HOURS PRN
Qty: 90 TABLET | Refills: 1 | Status: SHIPPED | OUTPATIENT
Start: 2024-10-04

## 2024-10-04 RX ORDER — OXYCODONE HCL 5 MG/5 ML
4-5 SOLUTION, ORAL ORAL
COMMUNITY
Start: 2024-10-04 | End: 2024-10-15

## 2024-10-04 RX ORDER — ONDANSETRON 8 MG/1
8 TABLET, ORALLY DISINTEGRATING ORAL EVERY 8 HOURS PRN
Qty: 90 TABLET | Refills: 1 | Status: SHIPPED | OUTPATIENT
Start: 2024-10-04

## 2024-10-04 ASSESSMENT — PAIN SCALES - GENERAL: PAINLEVEL: NO PAIN (0)

## 2024-10-04 NOTE — PATIENT INSTRUCTIONS
Recommendations:  -Okay to continue using Zofran and Compazine as needed for nausea and vomiting.  I refilled both of these today.  -We discussed continuing to taper the oxycodone now and 1 mg increments.  Since you have been taking 5 mg nightly, when you feel ready, I would suggest going down to 4 mg nightly.  We can continue to do 1 mg reductions every 1 to 2 weeks depending on your comfort level.  If you end up needing a little bit more oxycodone to complete the taper when you run out of your current bottle, please let me know and I will send this.  You have done a tremendous job with your opioid taper, and I want to congratulate you on that.  -Try cutting out 1 tablet of the hydroxyzine and seeing how things go/how your anxiety feels.  It is absolutely okay to still use it on an as-needed basis, however if baseline anxiety is still higher on a daily basis, increasing the Zoloft a little bit more would probably be preferred way of managing the anxiety instead of needing the hydroxyzine on a regular basis.    Follow up: 2 to 3 months      Reasons to Call    If you are having worsening/uncontrolled symptoms we want you to call!    You or your other physicians make any changes to medications we have prescribed.  -Please call for refills 4-5 days before you will run out of medication.    Important Phone Numbers, including: Refills, scheduling, and general questions     Palliative Care RN: Elaine Chavez : [phone]  *For scheduling needs/follow up visits : [phone]  *After hours or on weekends- Will connect you with on call MD : [phone].

## 2024-10-04 NOTE — LETTER
"10/4/2024      Linh Mustafa  [address]  [place] [address]      Dear Colleague,    Thank you for referring your patient, Linh Mustafa, to the [facility]. Please see a copy of my visit note below.    Palliative Care Progress Note    Patient Name: Linh Mustafa  Primary Provider: Madison Ruiz    Chief Complaint/Patient ID: Linh Mustafa is a 64 year old female with PMHx of esophageal SCC.  S/p endoscopic resection of SCC in situ lesion, and recommendation was to proceed with neoadjuvant chemoRT followed by definitive surgery.  Started concurrent chemo RT with carbo/Taxol 05/2024 (5 cycles).  -Hospitalized 06/4-06/18/24 with chest pain after radiation therapy, felt to be 2/2 mucositis.  Able to complete final 2 RT treatments inpatient.    -Of note, buspar causes increased anger/irritability.    Last Palliative care appointment: 8/16/24 with me.  Started Zoloft.     Reviewed: Yes    Social History: Lives alone.  Has a daughter and son-in-law who live nearby.  Brother from [place] has been helping her with day-to-day tasks.  Identifies as [employer].    Advanced Care Planning: Has not completed an HCD.    Interim History:  Linh Mustafa is a 64 year old female who is seen today for follow up with Palliative Care via billable video visit.      Reviewed oncology note from 8/22.  Has continued to taper down on opioids.  Swallowing was improved.  Noted there was complete resolution of FDG avidity in the esophagus with no evidence of disease/malignancy.  Advised to meet again with thoracic surgery to discuss possibly doing surgery,  As rates of care highest if surgery is done after chemo RT.    G-tube was removed 9/12.    Initially said she's doing OK then immediately stated \"I'm going to be sick\" and then left to go vomit. Once or twice per week this still happens.  Was out of Compazine this morning, so she could not take it.  Only has 1 sleeve of ODT " "Zofran left.    Other than this, things have been really good.  Down to only 5 mg of oxycodone at night.  Says there are still some nights where she has pain.  Wondering if or at a point where she is supposed to stop the Oxy.    Anxiety has been better.  Thinks the Zoloft has been helpful.  Currently taking 50 mg daily.  Still taking hydroxyzine 50 mg twice daily scheduled because \"I did not want to take a chance\" that anxiety would worsen.    Says she was told by the surgeon that she is \"cured\" so no surgery is recommended at this point.  She is waiting on next steps from oncology.    Physical Exam:   Constitutional: Alert, pleasant, no apparent distress. Sitting up in chair.  Eyes: Sclera non-icteric, no eye discharge.  ENT: No nasal discharge. Ears grossly normal.  Respiratory: Unlabored respirations. Speaking in full sentences.  Musculoskeletal: Extremities appear normal- no gross deformities noted. No edema noted on upper body.   Skin: No suspicious lesions or rashes on visible skin.  Neurologic: Clear speech, no aphasia. No facial droop.  Psychiatric: Mentation appears normal, appropriate attention. Affect normal/bright. Does not appear anxious or depressed.    Key Data Reviewed:  LABS:   Lab Results   Component Value Date    WBC 8.8 08/07/2024    HGB 10.2 (L) 08/07/2024    HCT 30.8 (L) 08/07/2024     08/07/2024     08/07/2024    POTASSIUM 4.6 08/07/2024    CHLORIDE 103 08/07/2024    CO2 28 08/07/2024    BUN 19.6 08/07/2024    CR 0.70 08/07/2024     (H) 08/07/2024    SED 61 (H) 06/04/2024    DD 0.79 (H) 12/23/2022    NTBNPI 136 06/04/2024    AST 20 08/07/2024    ALT 18 08/07/2024    ALKPHOS 91 08/07/2024    BILITOTAL 0.3 08/07/2024    INR 0.98 06/04/2024     IMAGING: PET scan 8/15/2024- IMPRESSION: Whole body FDG PET CT in comparison to pretreatment PET CT shows complete resolution of previously seen FDG avid mid esophageal mass. No new concerning findings.    Impression & Recommendations & " "Counseling:  Linh Mustafa is a 64 year old female with history of esophageal SCC s/p proximal resection and chemo RT.    She has made significant progress in tapering down on her opioids.  Since our last visit, she is further decreased which she is taking by 75% and is now only taking 5 mg of oxycodone per night.  She still has about 250 mL left, so we discussed at this point, we can do an even slower taper if we need to.  Suggested going down by 1 mg increments, and she appreciated this.    Anxiety has been better.  The Zoloft has helped.  She is still taking hydroxyzine scheduled to daily, so we discussed trying to walk this back to see if she still needs it.    Feeding tube is out. Surgery has stated they do not feel she is needing any sort of surgery at this point due to being \"cured\".  She had understood there would likely be some ongoing surveillance with oncology. Awaiting plan for follow-up with oncology.    Recommendations:  -Continue Zoloft 50 mg daily for now.  -Suggested starting a wean by cutting out 1 tablet of hydroxyzine daily and seeing how this goes.  If she still needs it, she can absolutely still take it, but discussed that it might make more sense to try increasing the Zoloft in the future.  -Discussed it would be okay to taper opioids further by 1 mg in weekly increments, or even a little bit longer if needed.  She will complete her current prescription, and then if she still needs a little bit longer taper, we can send the appropriate next amount of oxycodone.  -Okay to continue Zofran and Compazine as needed for nausea.  She is still having bouts of vomiting a couple times per week, so I refilled both of these today.    Follow up: 2 to 3 months        Video-Visit Details  Video Start Time: 10:46 AM  Video End Time: 11:02 AM    Originating Location (pt. Location): Home     Distant Location (provider location):  Offsite- Personal Home      Platform used for Video Visit: AmWell     Total " time spent on day of encounter is 30 mins, including reviewing record, review of above studies, above visit with patient, symptomatic discussion of pain and anxiety primarily, including medication adjustments/prescription management, and documentation.             Lourdes Joseph DO  Palliative Medicine   [facility] [place] [id number]    Some chart documentation performed using Dragon Voice recognition Software. Although reviewed after completion, some words and grammatical errors may remain.      Again, thank you for allowing me to participate in the care of your patient.        Sincerely,        Lourdes Joseph DO

## 2024-10-04 NOTE — NURSING NOTE
Current patient location: [address] [place] [id number]    Is the patient currently in the state of [place]? YES    Visit mode:VIDEO    If the visit is dropped, the patient can be reconnected by: VIDEO VISIT: Send to e-mail at: [email or web address]    Will anyone else be joining the visit? NO  (If patient encounters technical issues they should call [phone]    Are changes needed to the allergy or medication list? No    Are refills needed on medications prescribed by this physician? YES ondansetron (ZOFRAN ODT) 8 MG ODT tab  prochlorperazine (COMPAZINE) 10 MG tablet    Rooming Documentation:  Unable to complete questionnaire(s) due to time    Reason for visit: QUIRINO BARNEY

## 2024-10-04 NOTE — PROGRESS NOTES
"Palliative Care Progress Note    Patient Name: Linh Mustafa  Primary Provider: Madison Ruiz    Chief Complaint/Patient ID: Linh Mustafa is a 64 year old female with PMHx of esophageal SCC.  S/p endoscopic resection of SCC in situ lesion, and recommendation was to proceed with neoadjuvant chemoRT followed by definitive surgery.  Started concurrent chemo RT with carbo/Taxol 05/2024 (5 cycles).  -Hospitalized 06/4-06/18/24 with chest pain after radiation therapy, felt to be 2/2 mucositis.  Able to complete final 2 RT treatments inpatient.    -Of note, buspar causes increased anger/irritability.    Last Palliative care appointment: 8/16/24 with me.  Started Zoloft.     Reviewed: Yes    Social History: Lives alone.  Has a daughter and son-in-law who live nearby.  Brother from [place] has been helping her with day-to-day tasks.  Identifies as Yazidism.    Advanced Care Planning: Has not completed an HCD.    Interim History:  Linh Mustafa is a 64 year old female who is seen today for follow up with Palliative Care via billable video visit.      Reviewed oncology note from 8/22.  Has continued to taper down on opioids.  Swallowing was improved.  Noted there was complete resolution of FDG avidity in the esophagus with no evidence of disease/malignancy.  Advised to meet again with thoracic surgery to discuss possibly doing surgery,  As rates of care highest if surgery is done after chemo RT.    G-tube was removed 9/12.    Initially said she's doing OK then immediately stated \"I'm going to be sick\" and then left to go vomit. Once or twice per week this still happens.  Was out of Compazine this morning, so she could not take it.  Only has 1 sleeve of ODT Zofran left.    Other than this, things have been really good.  Down to only 5 mg of oxycodone at night.  Says there are still some nights where she has pain.  Wondering if or at a point where she is supposed to stop the Oxy.    Anxiety has been better.  " "Thinks the Zoloft has been helpful.  Currently taking 50 mg daily.  Still taking hydroxyzine 50 mg twice daily scheduled because \"I did not want to take a chance\" that anxiety would worsen.    Says she was told by the surgeon that she is \"cured\" so no surgery is recommended at this point.  She is waiting on next steps from oncology.    Physical Exam:   Constitutional: Alert, pleasant, no apparent distress. Sitting up in chair.  Eyes: Sclera non-icteric, no eye discharge.  ENT: No nasal discharge. Ears grossly normal.  Respiratory: Unlabored respirations. Speaking in full sentences.  Musculoskeletal: Extremities appear normal- no gross deformities noted. No edema noted on upper body.   Skin: No suspicious lesions or rashes on visible skin.  Neurologic: Clear speech, no aphasia. No facial droop.  Psychiatric: Mentation appears normal, appropriate attention. Affect normal/bright. Does not appear anxious or depressed.    Key Data Reviewed:  LABS:   Lab Results   Component Value Date    WBC 8.8 08/07/2024    HGB 10.2 (L) 08/07/2024    HCT 30.8 (L) 08/07/2024     08/07/2024     08/07/2024    POTASSIUM 4.6 08/07/2024    CHLORIDE 103 08/07/2024    CO2 28 08/07/2024    BUN 19.6 08/07/2024    CR 0.70 08/07/2024     (H) 08/07/2024    SED 61 (H) 06/04/2024    DD 0.79 (H) 12/23/2022    NTBNPI 136 06/04/2024    AST 20 08/07/2024    ALT 18 08/07/2024    ALKPHOS 91 08/07/2024    BILITOTAL 0.3 08/07/2024    INR 0.98 06/04/2024     IMAGING: PET scan 8/15/2024- IMPRESSION: Whole body FDG PET CT in comparison to pretreatment PET CT shows complete resolution of previously seen FDG avid mid esophageal mass. No new concerning findings.    Impression & Recommendations & Counseling:  Linh Mustafa is a 64 year old female with history of esophageal SCC s/p proximal resection and chemo RT.    She has made significant progress in tapering down on her opioids.  Since our last visit, she is further decreased which she is " "taking by 75% and is now only taking 5 mg of oxycodone per night.  She still has about 250 mL left, so we discussed at this point, we can do an even slower taper if we need to.  Suggested going down by 1 mg increments, and she appreciated this.    Anxiety has been better.  The Zoloft has helped.  She is still taking hydroxyzine scheduled to daily, so we discussed trying to walk this back to see if she still needs it.    Feeding tube is out. Surgery has stated they do not feel she is needing any sort of surgery at this point due to being \"cured\".  She had understood there would likely be some ongoing surveillance with oncology. Awaiting plan for follow-up with oncology.    Recommendations:  -Continue Zoloft 50 mg daily for now.  -Suggested starting a wean by cutting out 1 tablet of hydroxyzine daily and seeing how this goes.  If she still needs it, she can absolutely still take it, but discussed that it might make more sense to try increasing the Zoloft in the future.  -Discussed it would be okay to taper opioids further by 1 mg in weekly increments, or even a little bit longer if needed.  She will complete her current prescription, and then if she still needs a little bit longer taper, we can send the appropriate next amount of oxycodone.  -Okay to continue Zofran and Compazine as needed for nausea.  She is still having bouts of vomiting a couple times per week, so I refilled both of these today.    Follow up: 2 to 3 months        Video-Visit Details  Video Start Time: 10:46 AM  Video End Time: 11:02 AM    Originating Location (pt. Location): Home     Distant Location (provider location):  Offsite- Personal Home      Platform used for Video Visit: [employer]Well     Total time spent on day of encounter is 30 mins, including reviewing record, review of above studies, above visit with patient, symptomatic discussion of pain and anxiety primarily, including medication adjustments/prescription management, and documentation. "             Lourdes Joseph,   Palliative Medicine   [facility] [place] [id number]    Some chart documentation performed using Dragon Voice recognition Software. Although reviewed after completion, some words and grammatical errors may remain.

## 2024-10-04 NOTE — LETTER
"10/4/2024      Linh Mustafa  [address]  [place] [address]      Dear Colleague,    Thank you for referring your patient, Linh Mustafa, to the [facility]. Please see a copy of my visit note below.    Palliative Care Progress Note    Patient Name: Linh Mustafa  Primary Provider: Madison Ruiz    Chief Complaint/Patient ID: Linh Mustafa is a 64 year old female with PMHx of esophageal SCC.  S/p endoscopic resection of SCC in situ lesion, and recommendation was to proceed with neoadjuvant chemoRT followed by definitive surgery.  Started concurrent chemo RT with carbo/Taxol 05/2024 (5 cycles).  -Hospitalized 06/4-06/18/24 with chest pain after radiation therapy, felt to be 2/2 mucositis.  Able to complete final 2 RT treatments inpatient.    -Of note, buspar causes increased anger/irritability.    Last Palliative care appointment: 8/16/24 with me.  Started Zoloft.     Reviewed: Yes    Social History: Lives alone.  Has a daughter and son-in-law who live nearby.  Brother from [place] has been helping her with day-to-day tasks.  Identifies as Yazdanism.    Advanced Care Planning: Has not completed an HCD.    Interim History:  Linh Mustafa is a 64 year old female who is seen today for follow up with Palliative Care via billable video visit.      Reviewed oncology note from 8/22.  Has continued to taper down on opioids.  Swallowing was improved.  Noted there was complete resolution of FDG avidity in the esophagus with no evidence of disease/malignancy.  Advised to meet again with thoracic surgery to discuss possibly doing surgery,  As rates of care highest if surgery is done after chemo RT.    G-tube was removed 9/12.    Initially said she's doing OK then immediately stated \"I'm going to be sick\" and then left to go vomit. Once or twice per week this still happens.  Was out of Compazine this morning, so she could not take it.  Only has 1 sleeve of ODT " "Zofran left.    Other than this, things have been really good.  Down to only 5 mg of oxycodone at night.  Says there are still some nights where she has pain.  Wondering if or at a point where she is supposed to stop the Oxy.    Anxiety has been better.  Thinks the Zoloft has been helpful.  Currently taking 50 mg daily.  Still taking hydroxyzine 50 mg twice daily scheduled because \"I did not want to take a chance\" that anxiety would worsen.    Says she was told by the surgeon that she is \"cured\" so no surgery is recommended at this point.  She is waiting on next steps from oncology.    Physical Exam:   Constitutional: Alert, pleasant, no apparent distress. Sitting up in chair.  Eyes: Sclera non-icteric, no eye discharge.  ENT: No nasal discharge. Ears grossly normal.  Respiratory: Unlabored respirations. Speaking in full sentences.  Musculoskeletal: Extremities appear normal- no gross deformities noted. No edema noted on upper body.   Skin: No suspicious lesions or rashes on visible skin.  Neurologic: Clear speech, no aphasia. No facial droop.  Psychiatric: Mentation appears normal, appropriate attention. Affect normal/bright. Does not appear anxious or depressed.    Key Data Reviewed:  LABS:   Lab Results   Component Value Date    WBC 8.8 08/07/2024    HGB 10.2 (L) 08/07/2024    HCT 30.8 (L) 08/07/2024     08/07/2024     08/07/2024    POTASSIUM 4.6 08/07/2024    CHLORIDE 103 08/07/2024    CO2 28 08/07/2024    BUN 19.6 08/07/2024    CR 0.70 08/07/2024     (H) 08/07/2024    SED 61 (H) 06/04/2024    DD 0.79 (H) 12/23/2022    NTBNPI 136 06/04/2024    AST 20 08/07/2024    ALT 18 08/07/2024    ALKPHOS 91 08/07/2024    BILITOTAL 0.3 08/07/2024    INR 0.98 06/04/2024     IMAGING: PET scan 8/15/2024- IMPRESSION: Whole body FDG PET CT in comparison to pretreatment PET CT shows complete resolution of previously seen FDG avid mid esophageal mass. No new concerning findings.    Impression & Recommendations & " "Counseling:  Linh Mustafa is a 64 year old female with history of esophageal SCC s/p proximal resection and chemo RT.    She has made significant progress in tapering down on her opioids.  Since our last visit, she is further decreased which she is taking by 75% and is now only taking 5 mg of oxycodone per night.  She still has about 250 mL left, so we discussed at this point, we can do an even slower taper if we need to.  Suggested going down by 1 mg increments, and she appreciated this.    Anxiety has been better.  The Zoloft has helped.  She is still taking hydroxyzine scheduled to daily, so we discussed trying to walk this back to see if she still needs it.    Feeding tube is out. Surgery has stated they do not feel she is needing any sort of surgery at this point due to being \"cured\".  She had understood there would likely be some ongoing surveillance with oncology. Awaiting plan for follow-up with oncology.    Recommendations:  -Continue Zoloft 50 mg daily for now.  -Suggested starting a wean by cutting out 1 tablet of hydroxyzine daily and seeing how this goes.  If she still needs it, she can absolutely still take it, but discussed that it might make more sense to try increasing the Zoloft in the future.  -Discussed it would be okay to taper opioids further by 1 mg in weekly increments, or even a little bit longer if needed.  She will complete her current prescription, and then if she still needs a little bit longer taper, we can send the appropriate next amount of oxycodone.  -Okay to continue Zofran and Compazine as needed for nausea.  She is still having bouts of vomiting a couple times per week, so I refilled both of these today.    Follow up: 2 to 3 months        Video-Visit Details  Video Start Time: 10:46 AM  Video End Time: 11:02 AM    Originating Location (pt. Location): Home     Distant Location (provider location):  Offsite- Personal Home      Platform used for Video Visit: AmWell     Total " time spent on day of encounter is 30 mins, including reviewing record, review of above studies, above visit with patient, symptomatic discussion of pain and anxiety primarily, including medication adjustments/prescription management, and documentation.             Lourdes Joseph DO  Palliative Medicine   [facility] [place] [id number]    Some chart documentation performed using Dragon Voice recognition Software. Although reviewed after completion, some words and grammatical errors may remain.      Again, thank you for allowing me to participate in the care of your patient.        Sincerely,        Lourdes Joseph DO

## 2024-10-15 DIAGNOSIS — C15.9 SQUAMOUS CELL CARCINOMA OF ESOPHAGUS (H): ICD-10-CM

## 2024-10-15 RX ORDER — OXYCODONE HCL 5 MG/5 ML
3-4 SOLUTION, ORAL ORAL
Qty: 120 ML | Refills: 0 | Status: SHIPPED | OUTPATIENT
Start: 2024-10-15

## 2024-10-15 NOTE — TELEPHONE ENCOUNTER
Received voicemail from patient requesting refill of oxycodone. She continues to slowly taper it and that is going well. She is currently taking 4 mg at bedtime.    Last refill: 7/18/24  Last office visit: 10/4/24  Scheduled for follow up 12/13/24     Will route request to MD/ for review.     Reviewed [place]  Report.

## 2024-10-16 ENCOUNTER — PATIENT OUTREACH (OUTPATIENT)
Dept: SURGERY | Facility: CLINIC | Age: 64
End: 2024-10-16
Payer: COMMERCIAL

## 2024-10-16 NOTE — TELEPHONE ENCOUNTER
[facility]: Thoracic Surgery                                                                                          Called patient to follow up on [employer] message received and verify patients plan moving forward. Patient reports that at her last visit with Dr Martinez he told her that no further Thoracic Surgery interventions/follow up is needed at this time. Patient reports that she has been scheduled to see a colleague of Dr Burch's while he is out. Patient scheduled to see Dr Christensen on 10/29. Patient appreciated writer's call.    Anitra Santana RN, BSN  Thoracic Surgery RN Care Coordinator

## 2024-10-24 NOTE — CONFIDENTIAL NOTE
RECORDS RECEIVED FROM: internal    DATE RECEIVED: 11.8.24    NOTES (FOR ALL VISITS) STATUS DETAILS   OFFICE NOTES from referring provider internal    Margie Ritchie PA-C      MEDICATION LIST internal     IMAGING      XR (Chest) internal  12.23.22    CT (HEAD/NECK/CHEST/ABDOMEN) internal  8.15.24, 6.4.24, 12.23.22   LABS     DIABETES: HBGA1C, CREATININE, FASTING LIPIDS, MICROALBUMIN URINE, POTASSIUM, TSH, T4    THYROID: TSH, T4, CBC, THYRODLONULIN, TOTAL T3, FREE T4, CALCITONIN, CEA internal

## 2024-11-05 ENCOUNTER — ONCOLOGY VISIT (OUTPATIENT)
Dept: ONCOLOGY | Facility: CLINIC | Age: 64
End: 2024-11-05
Attending: STUDENT IN AN ORGANIZED HEALTH CARE EDUCATION/TRAINING PROGRAM
Payer: COMMERCIAL

## 2024-11-05 VITALS
SYSTOLIC BLOOD PRESSURE: 120 MMHG | BODY MASS INDEX: 21.79 KG/M2 | WEIGHT: 139.1 LBS | HEART RATE: 72 BPM | TEMPERATURE: 98.1 F | DIASTOLIC BLOOD PRESSURE: 65 MMHG | RESPIRATION RATE: 16 BRPM

## 2024-11-05 DIAGNOSIS — C15.9 MALIGNANT NEOPLASM OF ESOPHAGUS, UNSPECIFIED LOCATION (H): Primary | ICD-10-CM

## 2024-11-05 PROCEDURE — 99215 OFFICE O/P EST HI 40 MIN: CPT | Performed by: STUDENT IN AN ORGANIZED HEALTH CARE EDUCATION/TRAINING PROGRAM

## 2024-11-05 PROCEDURE — G0463 HOSPITAL OUTPT CLINIC VISIT: HCPCS | Performed by: STUDENT IN AN ORGANIZED HEALTH CARE EDUCATION/TRAINING PROGRAM

## 2024-11-05 PROCEDURE — 99417 PROLNG OP E/M EACH 15 MIN: CPT | Performed by: STUDENT IN AN ORGANIZED HEALTH CARE EDUCATION/TRAINING PROGRAM

## 2024-11-05 RX ORDER — HYDROXYZINE PAMOATE 50 MG/1
50 CAPSULE ORAL 2 TIMES DAILY PRN
COMMUNITY
Start: 2024-10-24

## 2024-11-05 ASSESSMENT — PAIN SCALES - GENERAL: PAINLEVEL_OUTOF10: NO PAIN (0)

## 2024-11-05 NOTE — PROGRESS NOTES
Med Onc Clinic Note      Ms. Mustafa comes in with daughter today.  It is my first time meeting her.      65 yo woman, mid- esophageal iF3T3S9 squamous cell carcinoma, pMMR.  CROSS regimen given 5/204-6/2024, c/b mucositis.    PET in 8/2024, with cCR.  Met Dr. Martinez, no plan for surgery.  Feeding tube removed.    Today, reports:  Physically doing quite ok  Gained 4 lbs in last month. All PO.  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 Mansoor Eric on pain/ insomnia      On exam:  /65 (BP Location: Right arm, Patient Position: Sitting, Cuff Size: Adult Regular)   Pulse 72   Temp 98.1  F (36.7  C) (Oral)   Resp 16   Wt 63.1 kg (139 lb 1.6 oz)   BMI 21.79 kg/m    Port +  ECOG 1  Warm      Labs/ imaging:  I personally reviewed the PET CT in 8/2024 that demonstrated Miki.    Assessment/ plan:  65 yo woman with cT3N0 mid esophageal SCC with cCR with CROSS regimen, completed 6/2024.    We discussed surveillance paradigm  Focus on nutrition and recovery  Discussed warning signs-- weight loss  Will keep port in for now- flush at [facility] soon, then in 2 months  Labs and CT CAP in Feb 2025 (6 months after PET-CT) for surveillance, see Dr. Burch after  Continue pall care follow up      I spent a total of 55 minutes on the date of service including preparation time (e.g., review of records and interpretation of tests), visit time with the patient and care partners, requesting interventions, communicating with other health care professionals, and documentation.      William Christensen M.D.   of Medicine  Division of Hematology, Oncology and Transplantation  [facility]

## 2024-11-05 NOTE — NURSING NOTE
"Oncology Rooming Note    November 5, 2024 2:03 PM   Linh Mustafa is a 64 year old female who presents for:    Chief Complaint   Patient presents with    Oncology Clinic Visit     Squamous Cell Carcinoma of Esophagus     Initial Vitals: /65 (BP Location: Right arm, Patient Position: Sitting, Cuff Size: Adult Regular)   Pulse 72   Temp 98.1  F (36.7  C) (Oral)   Resp 16   Wt 63.1 kg (139 lb 1.6 oz)   BMI 21.79 kg/m   Estimated body mass index is 21.79 kg/m  as calculated from the following:    Height as of 10/4/24: 1.702 m (5' 7\").    Weight as of this encounter: 63.1 kg (139 lb 1.6 oz). Body surface area is 1.73 meters squared.  No Pain (0) Comment: Data Unavailable   No LMP recorded. Patient is postmenopausal.  Allergies reviewed: Yes  Medications reviewed: Yes    Medications: Medication refills not needed today.  Pharmacy name entered into [employer]: [id number] - [address]    Frailty Screening:   Is the patient here for a new oncology consult visit in cancer care? 2. No      Clinical concerns: None       Debbi Gutierrez LPN  11/5/2024              "

## 2024-11-05 NOTE — LETTER
11/5/2024      Linh Mustafa  [address]  [facility] [address]      Dear Colleague,    Thank you for referring your patient, Linh Mustafa, to the [facility]. Please see a copy of my visit note below.    Med Onc Clinic Note      Ms. Mustafa comes in with daughter today.  It is my first time meeting her.      65 yo woman, mid- esophageal [id number] squamous cell carcinoma, pMMR.  CROSS regimen given 5/204-6/2024, c/b mucositis.    PET in 8/2024, with cCR.  Met Dr. Martinez, no plan for surgery.  Feeding tube removed.    Today, reports:  Physically doing quite ok  Gained 4 lbs in last month. All PO.  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 Mansoor Eric on pain/ insomnia      On exam:  /65 (BP Location: Right arm, Patient Position: Sitting, Cuff Size: Adult Regular)   Pulse 72   Temp 98.1  F (36.7  C) (Oral)   Resp 16   Wt 63.1 kg (139 lb 1.6 oz)   BMI 21.79 kg/m    Port +  ECOG 1  Warm      Labs/ imaging:  I personally reviewed the PET CT in 8/2024 that demonstrated Miki.    Assessment/ plan:  65 yo woman with cT3N0 mid esophageal SCC with cCR with CROSS regimen, completed 6/2024.    We discussed surveillance paradigm  Focus on nutrition and recovery  Discussed warning signs-- weight loss  Will keep port in for now- flush at [facility] soon, then in 2 months  Labs and CT CAP in Feb 2025 (6 months after PET-CT) for surveillance, see Dr. Burch after  Continue [facility] care follow up      I spent a total of 55 minutes on the date of service including preparation time (e.g., review of records and interpretation of tests), visit time with the patient and care partners, requesting interventions, communicating with other health care professionals,  and documentation.      William Christensen M.D.   of Medicine  Division of Hematology, Oncology and Transplantation  [facility]      Again, thank you for allowing me to participate in the care of your patient.        Sincerely,        William Christensen MD

## 2024-11-08 ENCOUNTER — PRE VISIT (OUTPATIENT)
Dept: ENDOCRINOLOGY | Facility: CLINIC | Age: 64
End: 2024-11-08

## 2024-11-08 ENCOUNTER — OFFICE VISIT (OUTPATIENT)
Dept: ENDOCRINOLOGY | Facility: CLINIC | Age: 64
End: 2024-11-08
Payer: COMMERCIAL

## 2024-11-08 VITALS
WEIGHT: 139 LBS | DIASTOLIC BLOOD PRESSURE: 73 MMHG | BODY MASS INDEX: 21.77 KG/M2 | OXYGEN SATURATION: 97 % | SYSTOLIC BLOOD PRESSURE: 165 MMHG | HEART RATE: 68 BPM

## 2024-11-08 DIAGNOSIS — E89.0 POSTABLATIVE HYPOTHYROIDISM: ICD-10-CM

## 2024-11-08 DIAGNOSIS — E21.3 HYPERPARATHYROIDISM (H): ICD-10-CM

## 2024-11-08 DIAGNOSIS — E55.9 VITAMIN D DEFICIENCY: ICD-10-CM

## 2024-11-08 DIAGNOSIS — E83.52 HYPERCALCEMIA: Primary | ICD-10-CM

## 2024-11-08 RX ORDER — CHOLECALCIFEROL (VITAMIN D3) 50 MCG
1 TABLET ORAL DAILY
Qty: 90 TABLET | Refills: 3 | Status: SHIPPED | OUTPATIENT
Start: 2024-11-08

## 2024-11-08 RX ORDER — CALCIUM CARBONATE 1250 MG/5ML
1250 SUSPENSION ORAL 2 TIMES DAILY
Qty: 270 ML | Refills: 3 | Status: SHIPPED | OUTPATIENT
Start: 2024-11-08 | End: 2024-11-08

## 2024-11-08 ASSESSMENT — PAIN SCALES - GENERAL: PAINLEVEL_OUTOF10: NO PAIN (0)

## 2024-11-08 NOTE — NURSING NOTE
"Chief Complaint   Patient presents with    Thyroid Problem     Hyperparathyroidism     Vital signs:      BP: (!) 165/73 Pulse: 68     SpO2: 97 %       Weight: 63 kg (139 lb)  Estimated body mass index is 21.77 kg/m  as calculated from the following:    Height as of 10/4/24: 1.702 m (5' 7\").    Weight as of this encounter: 63 kg (139 lb).        "

## 2024-11-08 NOTE — LETTER
11/8/2024       RE: Linh Mustafa  [address]  [facility] [facility] [address]     Dear Colleague,    Thank you for referring your patient, Linh Mustafa, to the [facility] ENDOCRINOLOGY [facility]IN[facility] [place] at [facility]. Please see a copy of my visit note below.    11/07/24 1:11 PM : Appointment reminder phone call made to patient.Pt verbalized understanding      Endocrinology Clinic Visit 11/8/2024    NAME:  Linh Mustafa  PCP:  Madison Ruiz  MRN:  [id number]  Reason for Consult: Hyperparathyroidism  Requesting Provider:  Referred Self    Chief Complaint     Chief Complaint   Patient presents with     Thyroid Problem     Hyperparathyroidism       History of Present Illness     Linh Mustafa is a 64 year old female who is seen in clinic for hyperparathyroidism.  Today is her first visit at the endocrinology clinic.  She has background history of squamous cell carcinoma of the esophagus, postablative hypothyroidism and hypertension.    Following diagnosis with squamous cell carcinoma of the esophagus recommendation was to proceed with neoadjuvant chemoRT followed by definitive surgery. Started concurrent chemo RT with carbo/Taxol 05/2024 (5 cycles) last dose of RT was on 6/5/2024.She was hospitalized from 6/4-6/18/24 with radiation esophagitis causing pain and inability to eat. She was discharged home with a G-tube that was placed on 6/14/24.  G-tube was removed on 9/12/2024.    Hypercalcemia history:  She had previously normal calcium level up to January 2023 started to have mild hypercalcemia.  1/23/2023: Calcium level 10.6, no albumin.  4/6/2023: Calcium level 10.3, no albumin check.    11/29/2023: Measured calcium level 10.3 albumin level 4.2 corrected calcium level 10.1 which was normal.  4/18/2024: Measured calcium level 10.6, albumin level 4.4 corrected calcium 10.3 normal  5/1/2024: Measured calcium 10.5, albumin 4.2, corrected calcium 10.3  normal, vitamin D was low at 16 ng/mL parathyroid hormone level was elevated 121.  5/7/2024: Measured calcium level normal at 10.1  5/13/2024: Measured calcium level normal 10.0,  5/20/2024: Measured calcium normal 9.9.  5/28/2024: Measured calcium 10.4, albumin 4.1, corrected calcium 10 .3 mildly elevated (10.2)  6/4/2024: Measured calcium level 11.1, albumin 4.3, corrected calcium elevated 10.9.  6/5/2024: Measured calcium normal 9.7.  6/6/2024: Measured calcium 10.5 albumin 4.0.  6/7/2024 measured calcium 10.1  7/9/2024: Measured calcium 10.4, albumin 4.2 corrected calcium 10.2 normal.  8/7/2024: Measured calcium normal 10.4, albumin 4.0    She has background history of hyperthyroidism s/p PATEL at age of 16 years old following that placed on levothyroxine therapy, during the times which she had hypercalcemia her TSH was low.    4/6/2023: TSH   <0.01 was on levothyroxine 200 mcg daily then the dose was reduced to 175 mcg daily.  10/10/2023: TSH  0.08 was on levothyroxine 175 mcg daily dose was reduced to 150 mcg daily  1/17/2024: TSH 0.02 was on 150 mcg daily dose was reduced 125 mcg daily  3/8/2024: TSH 0.08 was on 125 mcg daily then the dose was reduced to 100 mcg daily.  4/18/2024: TSH 0.19, free T4 normal 1.7   continued on 100 mcg daily.  Then in the most recent check her TSH normalized on 9/10/2024 TSH was 2.19    Change over the time:  12/23/2022: 180 pounds.  1/23/2023: 162 pounds.  3/14/2024: 150 pounds  4/18/2024: 148 pounds  6//2024: 142 pounds  8/22/2024 139 pounds  Today: 139 Ib    No previous DEXA bone scan done.  Symptoms of hypercalcemia: Has ongoing  constipation takes opioids caused constipation for her but takes stool softeners, , no significant change mental status changes/lethargy, no  polyuria.    Calcium intake: Dietary: No milk frequently, yogurt 2-3 servings/week , likes cheese eats cheese with cracker cottage cheese but not often , used to eat ice-cream twice /week   , Supplements:  no    Vitamin D intake: Supplements: no    Previous fractures:  left writs fracture fell from height of 3 stairs  tripped by a hose fell on outstretched hand againist concrete  5 years ago.  Family history of fragility fracture in parent: Yes: Both parents had compression fractures   History of kidney stones: No  History of kidney disease: No  Family history of any calcium problems or kidney stones: No  History of vitamin D deficiency: Yes:   History of HCTZ use: No  History of Lithium use: No  History of malabsorption (IBD, Celiac, gastric bypass ): No  History of thyroid disease: Yes: History of hyperthyroidism s/p PATEL at age of 16 following that developed hypothyroidism started on levothyroxine treatment has been on thyrotoxicosis during that time she had hypercalcemia following correction of levothyroxine dose hypercalcemia resolved.  Weight history: Addressed above.    She stated she was taking Levothyroxine 200 mcg until 02/2024 then the dose was reduced to 150 mcg in 02/2024 then to 100 mcg in 06/2024  , currently she takes 100 mcg daily.   No Palpitation, has ongoing tremor, no  heat intolerance, no diarrhea, no excessive sweating, no irritability , insomnia : has difficult in initiating the sleep started  to get insomnia when she started the chemotherapy , now with reducing the dose of Oxy started to get difficulty in falling asleep.   Normal fatigue, has ongoing cold intolerance without recent change, developed constipation after starting opioids medication however responding to laxatives.          Problem List     Patient Active Problem List   Diagnosis     Primary hypertension     Squamous cell carcinoma of esophagus (H)     Malignant neoplasm of esophagus, unspecified location (H)        Medications     Current Outpatient Medications   Medication Sig Dispense Refill     acetaminophen (TYLENOL) 325 MG/10.15ML liquid Take 31.25 mLs (1,000 mg) by mouth 3 times daily 2812.5 mL 1     artificial saliva  (BIOTENE DRY MOUTHWASH) LIQD liquid Swish and spit 15 mLs in mouth 4 times daily as needed for dry mouth 473 mL 0     Calcium Citrate-Vitamin D (CALCIUM CITRATE CHEWY BITE) 500-12.5 MG-MCG CHEW Take 1 tablet by mouth 2 times daily. 180 tablet 3     dextran 70-hypromellose (TEARS NATURALE FREE PF) 0.1-0.3 % ophthalmic solution Place 1 drop into both eyes daily as needed (Irritation). 35 each 0     esomeprazole (NEXIUM) 20 MG DR capsule Take 20 mg by mouth daily as needed.       famotidine (PEPCID) 20 MG tablet Take 20 mg by mouth 2 times daily       hydrOXYzine (VISTARIL) 50 MG capsule Take 50 mg by mouth 2 times daily as needed.       hydrOXYzine HCl (ATARAX) 25 MG tablet TAKE 1-2 TABLETS (25-50 MG) BY MOUTH 3 TIMES DAILY AS NEEDED FOR ITCHING 540 tablet 1     levothyroxine (SYNTHROID/LEVOTHROID) 200 MCG tablet Take 200 mcg by mouth daily. Pt states taking 125mcg       methocarbamol (ROBAXIN) 750 MG tablet Take 1 tablet (750 mg) by mouth 4 times daily 120 tablet 0     naloxone (NARCAN) 4 MG/0.1ML nasal spray Spray 1 spray (4 mg) into one nostril alternating nostrils as needed for opioid reversal every 2-3 minutes until assistance arrives 0.2 mL 1     nicotine (NICODERM CQ) 14 MG/24HR 24 hr patch Place 1 patch onto the skin every 24 hours 30 patch 1     nicotine (NICODERM CQ) 7 MG/24HR 24 hr patch Place 1 patch onto the skin every 24 hours Start after 6 weeks of the 14 mg/24 hr dose 30 patch 0     ondansetron (ZOFRAN ODT) 8 MG ODT tab Take 1 tablet (8 mg) by mouth every 8 hours as needed for nausea. 90 tablet 1     oxyCODONE (ROXICODONE) 5 MG/5ML solution Take 3-4 mLs (3-4 mg) by mouth or Feeding Tube nightly as needed for severe pain. 120 mL 0     prochlorperazine (COMPAZINE) 10 MG tablet Take 1 tablet (10 mg) by mouth every 6 hours as needed for vomiting. 90 tablet 1     sertraline (ZOLOFT) 50 MG tablet Take 1 tablet (50 mg) by mouth daily. 90 tablet 0     vitamin D3 (CHOLECALCIFEROL) 50 mcg (2000 units) tablet  Take 1 tablet (50 mcg) by mouth daily. 90 tablet 3     vitamin B-12 (CYANOCOBALAMIN) 2500 MCG sublingual tablet Take 1 tablet (2,500 mcg) by mouth daily (Patient not taking: Reported on 11/8/2024) 90 tablet 3     No current facility-administered medications for this visit.        Allergies     Allergies   Allergen Reactions     Amoxicillin Shortness Of Breath, Itching and Rash     Penicillins        Medical / Surgical History     Past Medical History:   Diagnosis Date     Hyperlipidemia      Hypertension      Past Surgical History:   Procedure Laterality Date     BIOPSY BREAST Right     age 30 benign fibrous     CV CORONARY ANGIOGRAM N/A 01/25/2023    Procedure: Coronary Angiogram;  Surgeon: Lukas Irizarry MD;  Location: [facility]     CV LEFT HEART CATH N/A 01/25/2023    Procedure: Left Heart Catheterization;  Surgeon: Lukas Irizarry MD;  Location: [facility] CV     ENDOSCOPIC ULTRASOUND UPPER GASTROINTESTINAL TRACT (GI) N/A 3/21/2024    Procedure: ENDOSCOPIC ULTRASOUND, ESOPHAGOSCOPY / UPPER GASTROINTESTINAL TRACT (GI), ENDOSCOPY WITH BIOPSY, FINE NEEDLE ASPIRATION;  Surgeon: Damon Kramer MD;  Location: [facility] OR     ENDOSCOPIC ULTRASOUND, ESOPHAGOSCOPY / UPPER GASTROINTESTINAL TRACT (GI), ENDOSCOPY WITH BIOPSY, FINE NEEDLE ASPIRATION  03/21/2024     ESOPHAGOSCOPY, GASTROSCOPY, DUODENOSCOPY (EGD), COMBINED N/A 6/6/2024    Procedure: Esophagoscopy, Gastroscopy, Duodenoscopy (EGD), Nasojejunal Feeding Tube Placement;  Surgeon: Bryant Martinez MD;  Location: [facility] OR     ESOPHAGOSCOPY, GASTROSCOPY, DUODENOSCOPY (EGD), SUBMUCOSA RESECTION N/A 4/2/2024    Procedure: ESOPHAGOGASTRODUODENOSCOPY, WITH SUBMUCOSAL RESECTION;  Surgeon: Holden King MD;  Location: [facility] OR     IR CHEST PORT PLACEMENT > 5 YRS OF AGE  4/24/2024     LAPAROSCOPIC ASSISTED INSERTION TUBE GASTROTOMY N/A 6/14/2024    Procedure: Upper endoscopy, laparoscopic gastrostomy tube placement;  Surgeon: Bryant Martinez  MD [place];  Location:  OR       Social History     Social History     Socioeconomic History     Marital status: Single     Spouse name: Not on file     Number of children: Not on file     Years of education: Not on file     Highest education level: Not on file   Occupational History     Not on file   Tobacco Use     Smoking status: Former     Current packs/day: 1.00     Average packs/day: 1 pack/day for 47.9 years (47.9 ttl pk-yrs)     Types: Cigarettes     Start date: 1977     Passive exposure: Current     Smokeless tobacco: Never   Vaping Use     Vaping status: Never Used   Substance and Sexual Activity     Alcohol use: Not on file     Comment: occasional     Drug use: Never     Sexual activity: Not on file   Other Topics Concern     Not on file   Social History Narrative    ** Merged History Encounter **          Social Drivers of Health     Financial Resource Strain: Not on file   Food Insecurity: No Food Insecurity (2/24/2024)    Received from [facility]    Hunger Vital Sign      Worried About Running Out of Food in the Last Year: Never true      Ran Out of Food in the Last Year: Never true   Transportation Needs: No Transportation Needs (2/24/2024)    Received from [facility]    PRAPARE - Transportation      Lack of Transportation (Medical): No      Lack of Transportation (Non-Medical): No   Physical Activity: Inactive (2/24/2024)    Received from [facility]    Exercise Vital Sign      Days of Exercise per Week: 0 days      Minutes of Exercise per Session: 0 min   Stress: Not on file   Social Connections: Not on file   Interpersonal Safety: Not on file   Housing Stability: Low Risk  (2/24/2024)    Received from [facility]    Housing Stability      What is your living situation today?: I have a steady place to live       Family History     Family History   Problem Relation Age of Onset     Chronic Obstructive Pulmonary Disease Father      Colon Cancer  "Paternal Grandmother 70       ROS     12 ROS completed, pertinent positive and negative in HPI    Physical Exam   BP (!) 165/73   Pulse 68   Wt 63 kg (139 lb)   SpO2 97%   BMI 21.77 kg/m       General: Comfortable, no obvious distress, normal body habitus  Eyes: Bilateral proptosis.  She stated she has symptoms for years, no conjunctival injection.  No periorbital puffiness.  No ophthalmoplegia.  No spontaneous or induced diplopia.  HENT: Atraumatic, no palpable thyroid tissue.  No cervical lymphadenopathy.  CV: normal rate.   Resp:  good effort, no evidence of loud wheezing   Skin: No rashes, lesions, or subcutaneous nodules on exposed skin.   Psych: Alert and oriented x 3. Appropriate affect, good insight  Musculoskeletal: Appropriate muscle bulk   Neuro: Moves all four extremities. No focal deficits on limited exam.     Labs/Imaging     Pertinent Labs were reviewed and discussed briefly.  Radiology Results were  reviewed and discussed briefly.    Summary of recent findings:   No results found for: \"A1C\"    TSH   Date Value Ref Range Status   09/10/2024 2.19 0.30 - 4.20 uIU/mL Final   04/18/2024 0.19 (L) 0.30 - 4.20 uIU/mL Final   03/08/2024 0.08 (L) 0.30 - 4.20 uIU/mL Final   01/17/2024 0.02 (L) 0.30 - 4.20 uIU/mL Final   10/10/2023 0.08 (L) 0.30 - 4.20 uIU/mL Final   02/23/2022 0.82 0.30 - 5.00 uIU/mL Final   04/29/2019 0.96 0.30 - 5.00 uIU/mL Final     Free T4   Date Value Ref Range Status   04/18/2024 1.70 0.90 - 1.70 ng/dL Final       Creatinine   Date Value Ref Range Status   08/07/2024 0.70 0.51 - 0.95 mg/dL Final      Latest Ref Rng 6/13/2024  7:18 AM 6/14/2024  6:30 AM 6/15/2024  6:46 AM 6/16/2024  7:35 AM 6/17/2024  7:18 AM   ENDO CALCIUM LABS-UMP         Albumin 3.5 - 5.2 g/dL        Alkaline Phosphatase 40 - 150 U/L        Calcium 8.8 - 10.4 mg/dL 9.9  9.9  9.8  9.9  10.3 (H)    Urea Nitrogen 8 - 22 mg/dL        Urea Nitrogen 8.0 - 23.0 mg/dL 9.4  10.4  9.5  13.5  12.5    Creatinine 0.51 - 0.95 " mg/dL 0.75  0.67  0.62  0.60  0.65    Lipase 13 - 60 U/L        Magnesium 1.7 - 2.3 mg/dL 2.0  2.2  2.2  2.1  2.1    Parathyroid Hormone Intact 15 - 65 pg/mL           Latest Ref Rn 6/26/2024  9:35 AM 7/9/2024  3:45 PM 8/7/2024  11:14 AM   ENDO CALCIUM LABS-[facility]       Albumin 3.5 - 5.2 g/dL 3.8  4.2  4.0    Alkaline Phosphatase 40 - 150 U/L 105  90  91    Calcium 8.8 - 10.4 mg/dL 10.3 (H)  10.4 (H)  10.4    Urea Nitrogen 8 - 22 mg/dL      Urea Nitrogen 8.0 - 23.0 mg/dL 17.6  26.2 (H)  19.6    Creatinine 0.51 - 0.95 mg/dL 0.72  0.94  0.70    Lipase 13 - 60 U/L      Magnesium 1.7 - 2.3 mg/dL 2.4 (H)      Parathyroid Hormone Intact 15 - 65 pg/mL         Legend:  (H) High   Latest Ref [facility] 4/29/2019  10:40 AM 2/23/2022  2:09 PM 12/23/2022  2:38 AM 1/23/2023  2:51 PM   ENDO CALCIUM LABS-UMP        Vitamin D, Total (25-Hydroxy) 20 - 50 ng/mL       Albumin 3.5 - 5.0 g/dL 4.2       Albumin 3.5 - 5.2 g/dL   4.0     Alkaline Phosphatase 40 - 150 U/L 92   89     Calcium 8.8 - 10.4 mg/dL 10.2  10.5  9.8  10.6 (H)    Urea Nitrogen 8 - 22 mg/dL 12  11   12    Urea Nitrogen 8.0 - 23.0 mg/dL   6.8 (L)     Creatinine 0.51 - 0.95 mg/dL 0.69  0.65  0.50 (L)  0.64    Lipase 13 - 60 U/L   12 (L)     Magnesium 1.7 - 2.3 mg/dL   1.8     Parathyroid Hormone Intact 15 - 65 pg/mL          Latest Ref [facility] 4/6/2023  1:54 PM 11/29/2023  11:25 AM 4/18/2024  12:51 PM 5/1/2024  8:54 AM   ENDO CALCIUM LABS-UMP        Vitamin D, Total (25-Hydroxy) 20 - 50 ng/mL    16 (L)    Albumin 3.5 - 5.0 g/dL       Albumin 3.5 - 5.2 g/dL  4.2  4.4  4.2    Alkaline Phosphatase 40 - 150 U/L  117  106  96    Calcium 8.8 - 10.4 mg/dL 10.3 (H)  10.3 (H)  10.6 (H)  10.5 (H)    Urea Nitrogen 8 - 22 mg/dL       Urea Nitrogen 8.0 - 23.0 mg/dL 11.0  7.9 (L)  17.5  15.8    Creatinine 0.51 - 0.95 mg/dL 0.54  0.59  0.67  0.57    Lipase 13 - 60 U/L       Magnesium 1.7 - 2.3 mg/dL       Parathyroid Hormone Intact 15 - 65 pg/mL          Latest Ref Rng 5/1/2024  10:54 AM   ENDO  CALCIUM LABS-UMP     Vitamin D, Total (25-Hydroxy) 20 - 50 ng/mL    Albumin 3.5 - 5.0 g/dL    Albumin 3.5 - 5.2 g/dL    Alkaline Phosphatase 40 - 150 U/L    Calcium 8.8 - 10.4 mg/dL    Urea Nitrogen 8 - 22 mg/dL    Urea Nitrogen 8.0 - 23.0 mg/dL    Creatinine 0.51 - 0.95 mg/dL    Lipase 13 - 60 U/L    Magnesium 1.7 - 2.3 mg/dL    Parathyroid Hormone Intact 15 - 65 pg/mL 121 (H)       Legend:  (L) Low  (H) High    I personally reviewed the patient's outside records from [employer], Care Everywhere, and faxed records. Summary of pertinent findings in HPI.    Impression / Plan     1.  Hypercalcemia:  2.  Post ablation hypothyroidism:  3.  Vitamin D deficiency:  Had previously normal calcium level however she was found to have intermittently mildly elevated calcium since January 2023 resolved in July 2024.  She had parathyroid hormone level checked was found to be elevated 121 however her vitamin D level at that time was low 16 and her corrected calcium was normal at 10.3.    She has history of hypothyroidism s/p PATEL started on levothyroxine therapy following that her levothyroxine dose was stable for years however she lost significant weight over the last 2 years almost 40 pounds since the time of the diagnosis with esophageal cancer.  Her TFTs showed subclinical hyperthyroidism for almost a year from October 2023 and finally normalized in September 2024.    Her dose of levothyroxine was reduced several times over the last year currently she is on levothyroxine 100 mcg daily (1.6 mcg/kg) most recent TFTs was on 9/10/2024 TSH was normal.  Clinically euthyroid.    Impression:  Hypercalcemia likely due to iatrogenic thyrotoxicosis which happened due to significant weight loss while continuing the same dose of levothyroxine for some time.  Following correction of thyrotoxicosis calcium level normalized and remained normal following that.  Hyperparathyroidism likely due to vitamin D deficiency-vitamin D level was low (16) and  the corrected calcium level was normal when she was found to have elevated PTH.  To assess this further we will replete vitamin D deficiency and improve dietary calcium intake and then we will repeat the labs in 3 months.    Plan:  -Continue levothyroxine 100 mcg daily.  -To get TFTs in 3 months.  -To start taking vitamin D 2000 units daily.  -To start taking calcium citrate with vitamin D 500/12.5 mcg twice daily.  -To get the following labs in 3 months vitamin D, PTH, ionized calcium, albumin, BMP.  -She did not have DEXA bone scan , she has multiple risk factors for osteoporosis including positive family history, history of vitamin D deficiency, low dietary calcium intake, significant weight loss and tobacco use.    4.  Suspected thyroid eyes disease:  She stated she was diagnosed with hypothyroidism at the age of 16 years old not sure if she had Graves' disease at that time.  Has bilateral eyes proptosis.  Complaining of bilateral eyes dryness and itchiness.  No blurring of vision.  No sign of active disease on examination.    Plan:  -To get TRAb antibodies to assess if her eye symptoms related to Graves' disease or not.  -To start using artificial tears as needed.  If no improvement we will place referral to ophthalmology.              Test and/or medications prescribed today:  Orders Placed This Encounter   Procedures     DX Bone Density     Basic metabolic panel     Phosphorus     Albumin level     25 Hydroxyvitamin D2 and D3     Parathyroid Hormone Intact     Ionized Calcium     T4 free     TSH with free T4 reflex     Thyrotropin Receptor Antibody         Follow up: 3 months      ELLY Herr  Endocrinology, Diabetes and Metabolism  [facility]      98 minutes spent on the date of the encounter doing chart review, history and exam, documentation and further activities per the note  The longitudinal plan of care for the diagnosis(es)/condition(s) as documented were addressed during this  visit. Due to the added complexity in care, I will continue to support Lita in the subsequent management and with ongoing continuity of care.    Note: Chart documentation done in part with Dragon Voice Recognition software. Although reviewed after completion, some word and grammatical errors may remain.  Please consider this when interpreting information in this chart        Again, thank you for allowing me to participate in the care of your patient.      Sincerely,    ELLY Herr

## 2024-11-08 NOTE — PROGRESS NOTES
Endocrinology Clinic Visit 11/8/2024    NAME:  Linh Mustafa  PCP:  Madison Ruiz  MRN:  [id number]  Reason for Consult: Hyperparathyroidism  Requesting Provider:  Referred Self    Chief Complaint     Chief Complaint   Patient presents with    Thyroid Problem     Hyperparathyroidism       History of Present Illness     Linh Mustafa is a 64 year old female who is seen in clinic for hyperparathyroidism.  Today is her first visit at the endocrinology clinic.  She has background history of squamous cell carcinoma of the esophagus, postablative hypothyroidism and hypertension.    Following diagnosis with squamous cell carcinoma of the esophagus recommendation was to proceed with neoadjuvant chemoRT followed by definitive surgery. Started concurrent chemo RT with carbo/Taxol 05/2024 (5 cycles) last dose of RT was on 6/5/2024.She was hospitalized from 6/4-6/18/24 with radiation esophagitis causing pain and inability to eat. She was discharged home with a G-tube that was placed on 6/14/24.  G-tube was removed on 9/12/2024.    Hypercalcemia history:  She had previously normal calcium level up to January 2023 started to have mild hypercalcemia.  1/23/2023: Calcium level 10.6, no albumin.  4/6/2023: Calcium level 10.3, no albumin check.    11/29/2023: Measured calcium level 10.3 albumin level 4.2 corrected calcium level 10.1 which was normal.  4/18/2024: Measured calcium level 10.6, albumin level 4.4 corrected calcium 10.3 normal  5/1/2024: Measured calcium 10.5, albumin 4.2, corrected calcium 10.3 normal, vitamin D was low at 16 ng/mL parathyroid hormone level was elevated 121.  5/7/2024: Measured calcium level normal at 10.1  5/13/2024: Measured calcium level normal 10.0,  5/20/2024: Measured calcium normal 9.9.  5/28/2024: Measured calcium 10.4, albumin 4.1, corrected calcium 10 .3 mildly elevated (10.2)  6/4/2024: Measured calcium level 11.1, albumin 4.3, corrected calcium elevated 10.9.  6/5/2024: Measured  calcium normal 9.7.  6/6/2024: Measured calcium 10.5 albumin 4.0.  6/7/2024 measured calcium 10.1  7/9/2024: Measured calcium 10.4, albumin 4.2 corrected calcium 10.2 normal.  8/7/2024: Measured calcium normal 10.4, albumin 4.0    She has background history of hyperthyroidism s/p PATEL at age of 16 years old following that placed on levothyroxine therapy, during the times which she had hypercalcemia her TSH was low.    4/6/2023: TSH   <0.01 was on levothyroxine 200 mcg daily then the dose was reduced to 175 mcg daily.  10/10/2023: TSH  0.08 was on levothyroxine 175 mcg daily dose was reduced to 150 mcg daily  1/17/2024: TSH 0.02 was on 150 mcg daily dose was reduced 125 mcg daily  3/8/2024: TSH 0.08 was on 125 mcg daily then the dose was reduced to 100 mcg daily.  4/18/2024: TSH 0.19, free T4 normal 1.7   continued on 100 mcg daily.  Then in the most recent check her TSH normalized on 9/10/2024 TSH was 2.19    Change over the time:  12/23/2022: 180 pounds.  1/23/2023: 162 pounds.  3/14/2024: 150 pounds  4/18/2024: 148 pounds  6//2024: 142 pounds  8/22/2024 139 pounds  Today: 139 Ib    No previous DEXA bone scan done.  Symptoms of hypercalcemia: Has ongoing  constipation takes opioids caused constipation for her but takes stool softeners, , no significant change mental status changes/lethargy, no  polyuria.    Calcium intake: Dietary: No milk frequently, yogurt 2-3 servings/week , likes cheese eats cheese with cracker cottage cheese but not often , used to eat ice-cream twice /week   , Supplements: no    Vitamin D intake: Supplements: no    Previous fractures:  left writs fracture fell from height of 3 stairs  tripped by a hose fell on outstretched hand againist concrete  5 years ago.  Family history of fragility fracture in parent: Yes: Both parents had compression fractures   History of kidney stones: No  History of kidney disease: No  Family history of any calcium problems or kidney stones: No  History of vitamin D  deficiency: Yes:   History of HCTZ use: No  History of Lithium use: No  History of malabsorption (IBD, Celiac, gastric bypass ): No  History of thyroid disease: Yes: History of hyperthyroidism s/p PATEL at age of 16 following that developed hypothyroidism started on levothyroxine treatment has been on thyrotoxicosis during that time she had hypercalcemia following correction of levothyroxine dose hypercalcemia resolved.  Weight history: Addressed above.    She stated she was taking Levothyroxine 200 mcg until 02/2024 then the dose was reduced to 150 mcg in 02/2024 then to 100 mcg in 06/2024  , currently she takes 100 mcg daily.   No Palpitation, has ongoing tremor, no  heat intolerance, no diarrhea, no excessive sweating, no irritability , insomnia : has difficult in initiating the sleep started  to get insomnia when she started the chemotherapy , now with reducing the dose of Oxy started to get difficulty in falling asleep.   Normal fatigue, has ongoing cold intolerance without recent change, developed constipation after starting opioids medication however responding to laxatives.          Problem List     Patient Active Problem List   Diagnosis    Primary hypertension    Squamous cell carcinoma of esophagus (H)    Malignant neoplasm of esophagus, unspecified location (H)        Medications     Current Outpatient Medications   Medication Sig Dispense Refill    acetaminophen (TYLENOL) 325 MG/10.15ML liquid Take 31.25 mLs (1,000 mg) by mouth 3 times daily 2812.5 mL 1    artificial saliva (BIOTENE DRY MOUTHWASH) LIQD liquid Swish and spit 15 mLs in mouth 4 times daily as needed for dry mouth 473 mL 0    Calcium Citrate-Vitamin D (CALCIUM CITRATE CHEWY BITE) 500-12.5 MG-MCG CHEW Take 1 tablet by mouth 2 times daily. 180 tablet 3    dextran 70-hypromellose (TEARS NATURALE FREE PF) 0.1-0.3 % ophthalmic solution Place 1 drop into both eyes daily as needed (Irritation). 35 each 0    esomeprazole (NEXIUM) 20 MG DR capsule  Take 20 mg by mouth daily as needed.      famotidine (PEPCID) 20 MG tablet Take 20 mg by mouth 2 times daily      hydrOXYzine (VISTARIL) 50 MG capsule Take 50 mg by mouth 2 times daily as needed.      hydrOXYzine HCl (ATARAX) 25 MG tablet TAKE 1-2 TABLETS (25-50 MG) BY MOUTH 3 TIMES DAILY AS NEEDED FOR ITCHING 540 tablet 1    levothyroxine (SYNTHROID/LEVOTHROID) 200 MCG tablet Take 200 mcg by mouth daily. Pt states taking 125mcg      methocarbamol (ROBAXIN) 750 MG tablet Take 1 tablet (750 mg) by mouth 4 times daily 120 tablet 0    naloxone (NARCAN) 4 MG/0.1ML nasal spray Spray 1 spray (4 mg) into one nostril alternating nostrils as needed for opioid reversal every 2-3 minutes until assistance arrives 0.2 mL 1    nicotine (NICODERM CQ) 14 MG/24HR 24 hr patch Place 1 patch onto the skin every 24 hours 30 patch 1    nicotine (NICODERM CQ) 7 MG/24HR 24 hr patch Place 1 patch onto the skin every 24 hours Start after 6 weeks of the 14 mg/24 hr dose 30 patch 0    ondansetron (ZOFRAN ODT) 8 MG ODT tab Take 1 tablet (8 mg) by mouth every 8 hours as needed for nausea. 90 tablet 1    oxyCODONE (ROXICODONE) 5 MG/5ML solution Take 3-4 mLs (3-4 mg) by mouth or Feeding Tube nightly as needed for severe pain. 120 mL 0    prochlorperazine (COMPAZINE) 10 MG tablet Take 1 tablet (10 mg) by mouth every 6 hours as needed for vomiting. 90 tablet 1    sertraline (ZOLOFT) 50 MG tablet Take 1 tablet (50 mg) by mouth daily. 90 tablet 0    vitamin D3 (CHOLECALCIFEROL) 50 mcg (2000 units) tablet Take 1 tablet (50 mcg) by mouth daily. 90 tablet 3    vitamin B-12 (CYANOCOBALAMIN) 2500 MCG sublingual tablet Take 1 tablet (2,500 mcg) by mouth daily (Patient not taking: Reported on 11/8/2024) 90 tablet 3     No current facility-administered medications for this visit.        Allergies     Allergies   Allergen Reactions    Amoxicillin Shortness Of Breath, Itching and Rash    Penicillins        Medical / Surgical History     Past Medical History:    Diagnosis Date    Hyperlipidemia     Hypertension      Past Surgical History:   Procedure Laterality Date    BIOPSY BREAST Right     age 30 benign fibrous    CV CORONARY ANGIOGRAM N/A 01/25/2023    Procedure: Coronary Angiogram;  Surgeon: Lukas Irizarry MD;  Location: [facility] CV    CV LEFT HEART CATH N/A 01/25/2023    Procedure: Left Heart Catheterization;  Surgeon: Lukas Irizarry MD;  Location: [facility] CV    ENDOSCOPIC ULTRASOUND UPPER GASTROINTESTINAL TRACT (GI) N/A 3/21/2024    Procedure: ENDOSCOPIC ULTRASOUND, ESOPHAGOSCOPY / UPPER GASTROINTESTINAL TRACT (GI), ENDOSCOPY WITH BIOPSY, FINE NEEDLE ASPIRATION;  Surgeon: Damon Kramer MD;  Location: [facility] OR    ENDOSCOPIC ULTRASOUND, ESOPHAGOSCOPY / UPPER GASTROINTESTINAL TRACT (GI), ENDOSCOPY WITH BIOPSY, FINE NEEDLE ASPIRATION  03/21/2024    ESOPHAGOSCOPY, GASTROSCOPY, DUODENOSCOPY (EGD), COMBINED N/A 6/6/2024    Procedure: Esophagoscopy, Gastroscopy, Duodenoscopy (EGD), Nasojejunal Feeding Tube Placement;  Surgeon: Bryant Martinez MD;  Location: [facility] OR    ESOPHAGOSCOPY, GASTROSCOPY, DUODENOSCOPY (EGD), SUBMUCOSA RESECTION N/A 4/2/2024    Procedure: ESOPHAGOGASTRODUODENOSCOPY, WITH SUBMUCOSAL RESECTION;  Surgeon: Holden King MD;  Location: [facility] OR    IR CHEST PORT PLACEMENT > 5 YRS OF AGE  4/24/2024    LAPAROSCOPIC ASSISTED INSERTION TUBE GASTROTOMY N/A 6/14/2024    Procedure: Upper endoscopy, laparoscopic gastrostomy tube placement;  Surgeon: Bryant Martinez MD;  Location: [facility] OR       Social History     Social History     Socioeconomic History    Marital status: Single     Spouse name: Not on file    Number of children: Not on file    Years of education: Not on file    Highest education level: Not on file   Occupational History    Not on file   Tobacco Use    Smoking status: Former     Current packs/day: 1.00     Average packs/day: 1 pack/day for 47.9 years (47.9 ttl pk-yrs)     Types: Cigarettes     Start date:  1977     Passive exposure: Current    Smokeless tobacco: Never   Vaping Use    Vaping status: Never Used   Substance and Sexual Activity    Alcohol use: Not on file     Comment: occasional    Drug use: Never    Sexual activity: Not on file   Other Topics Concern    Not on file   Social History Narrative    ** Merged History Encounter **          Social Drivers of Health     Financial Resource Strain: Not on file   Food Insecurity: No Food Insecurity (2/24/2024)    Received from [facility]    Hunger Vital Sign     Worried About Running Out of Food in the Last Year: Never true     Ran Out of Food in the Last Year: Never true   Transportation Needs: No Transportation Needs (2/24/2024)    Received from [facility]    PRAPARE - Transportation     Lack of Transportation (Medical): No     Lack of Transportation (Non-Medical): No   Physical Activity: Inactive (2/24/2024)    Received from [facility]    Exercise Vital Sign     Days of Exercise per Week: 0 days     Minutes of Exercise per Session: 0 min   Stress: Not on file   Social Connections: Not on file   Interpersonal Safety: Not on file   Housing Stability: Low Risk  (2/24/2024)    Received from [facility]    Housing Stability     What is your living situation today?: I have a steady place to live       Family History     Family History   Problem Relation Age of Onset    Chronic Obstructive Pulmonary Disease Father     Colon Cancer Paternal Grandmother 70       ROS     12 ROS completed, pertinent positive and negative in HPI    Physical Exam   BP (!) 165/73   Pulse 68   Wt 63 kg (139 lb)   SpO2 97%   BMI 21.77 kg/m       General: Comfortable, no obvious distress, normal body habitus  Eyes: Bilateral proptosis.  She stated she has symptoms for years, no conjunctival injection.  No periorbital puffiness.  No ophthalmoplegia.  No spontaneous or induced diplopia.  HENT: Atraumatic, no palpable thyroid tissue.  No  "cervical lymphadenopathy.  CV: normal rate.   Resp:  good effort, no evidence of loud wheezing   Skin: No rashes, lesions, or subcutaneous nodules on exposed skin.   Psych: Alert and oriented x 3. Appropriate affect, good insight  Musculoskeletal: Appropriate muscle bulk   Neuro: Moves all four extremities. No focal deficits on limited exam.     Labs/Imaging     Pertinent Labs were reviewed and discussed briefly.  Radiology Results were  reviewed and discussed briefly.    Summary of recent findings:   No results found for: \"A1C\"    TSH   Date Value Ref Range Status   09/10/2024 2.19 0.30 - 4.20 uIU/mL Final   04/18/2024 0.19 (L) 0.30 - 4.20 uIU/mL Final   03/08/2024 0.08 (L) 0.30 - 4.20 uIU/mL Final   01/17/2024 0.02 (L) 0.30 - 4.20 uIU/mL Final   10/10/2023 0.08 (L) 0.30 - 4.20 uIU/mL Final   02/23/2022 0.82 0.30 - 5.00 uIU/mL Final   04/29/2019 0.96 0.30 - 5.00 uIU/mL Final     Free T4   Date Value Ref Range Status   04/18/2024 1.70 0.90 - 1.70 ng/dL Final       Creatinine   Date Value Ref Range Status   08/07/2024 0.70 0.51 - 0.95 mg/dL Final      Latest Ref Rng 6/13/2024  7:18 AM 6/14/2024  6:30 AM 6/15/2024  6:46 AM 6/16/2024  7:35 AM 6/17/2024  7:18 AM   ENDO CALCIUM LABS-[facility]         Albumin 3.5 - 5.2 g/dL        Alkaline Phosphatase 40 - 150 U/L        Calcium 8.8 - 10.4 mg/dL 9.9  9.9  9.8  9.9  10.3 (H)    Urea Nitrogen 8 - 22 mg/dL        Urea Nitrogen 8.0 - 23.0 mg/dL 9.4  10.4  9.5  13.5  12.5    Creatinine 0.51 - 0.95 mg/dL 0.75  0.67  0.62  0.60  0.65    Lipase 13 - 60 U/L        Magnesium 1.7 - 2.3 mg/dL 2.0  2.2  2.2  2.1  2.1    Parathyroid Hormone Intact 15 - 65 pg/mL           Latest Ref Rng 6/26/2024  9:35 AM 7/9/2024  3:45 PM 8/7/2024  11:14 AM   ENDO CALCIUM LABS-[facility]       Albumin 3.5 - 5.2 g/dL 3.8  4.2  4.0    Alkaline Phosphatase 40 - 150 U/L 105  90  91    Calcium 8.8 - 10.4 mg/dL 10.3 (H)  10.4 (H)  10.4    Urea Nitrogen 8 - 22 mg/dL      Urea Nitrogen 8.0 - 23.0 mg/dL 17.6  26.2 (H)  19.6 "    Creatinine 0.51 - 0.95 mg/dL 0.72  0.94  0.70    Lipase 13 - 60 U/L      Magnesium 1.7 - 2.3 mg/dL 2.4 (H)      Parathyroid Hormone Intact 15 - 65 pg/mL         Legend:  (H) High   Latest Ref Rn 4/29/2019  10:40 AM 2/23/2022  2:09 PM 12/23/2022  2:38 AM 1/23/2023  2:51 PM   ENDO CALCIUM LABS-[facility]        Vitamin D, Total (25-Hydroxy) 20 - 50 ng/mL       Albumin 3.5 - 5.0 g/dL 4.2       Albumin 3.5 - 5.2 g/dL   4.0     Alkaline Phosphatase 40 - 150 U/L 92   89     Calcium 8.8 - 10.4 mg/dL 10.2  10.5  9.8  10.6 (H)    Urea Nitrogen 8 - 22 mg/dL 12  11   12    Urea Nitrogen 8.0 - 23.0 mg/dL   6.8 (L)     Creatinine 0.51 - 0.95 mg/dL 0.69  0.65  0.50 (L)  0.64    Lipase 13 - 60 U/L   12 (L)     Magnesium 1.7 - 2.3 mg/dL   1.8     Parathyroid Hormone Intact 15 - 65 pg/mL          Latest Ref Rn 4/6/2023  1:54 PM 11/29/2023  11:25 AM 4/18/2024  12:51 PM 5/1/2024  8:54 AM   ENDO CALCIUM LABS-[facility]        Vitamin D, Total (25-Hydroxy) 20 - 50 ng/mL    16 (L)    Albumin 3.5 - 5.0 g/dL       Albumin 3.5 - 5.2 g/dL  4.2  4.4  4.2    Alkaline Phosphatase 40 - 150 U/L  117  106  96    Calcium 8.8 - 10.4 mg/dL 10.3 (H)  10.3 (H)  10.6 (H)  10.5 (H)    Urea Nitrogen 8 - 22 mg/dL       Urea Nitrogen 8.0 - 23.0 mg/dL 11.0  7.9 (L)  17.5  15.8    Creatinine 0.51 - 0.95 mg/dL 0.54  0.59  0.67  0.57    Lipase 13 - 60 U/L       Magnesium 1.7 - 2.3 mg/dL       Parathyroid Hormone Intact 15 - 65 pg/mL          Latest Ref Rng 5/1/2024  10:54 AM   ENDO CALCIUM LABS-[facility]     Vitamin D, Total (25-Hydroxy) 20 - 50 ng/mL    Albumin 3.5 - 5.0 g/dL    Albumin 3.5 - 5.2 g/dL    Alkaline Phosphatase 40 - 150 U/L    Calcium 8.8 - 10.4 mg/dL    Urea Nitrogen 8 - 22 mg/dL    Urea Nitrogen 8.0 - 23.0 mg/dL    Creatinine 0.51 - 0.95 mg/dL    Lipase 13 - 60 U/L    Magnesium 1.7 - 2.3 mg/dL    Parathyroid Hormone Intact 15 - 65 pg/mL 121 (H)       Legend:  (L) Low  (H) High    I personally reviewed the patient's outside records from [employer] EMR, Care  Everywhere, and faxed records. Summary of pertinent findings in HPI.    Impression / Plan     1.  Hypercalcemia:  2.  Post ablation hypothyroidism:  3.  Vitamin D deficiency:  Had previously normal calcium level however she was found to have intermittently mildly elevated calcium since January 2023 resolved in July 2024.  She had parathyroid hormone level checked was found to be elevated 121 however her vitamin D level at that time was low 16 and her corrected calcium was normal at 10.3.    She has history of hypothyroidism s/p PATEL started on levothyroxine therapy following that her levothyroxine dose was stable for years however she lost significant weight over the last 2 years almost 40 pounds since the time of the diagnosis with esophageal cancer.  Her TFTs showed subclinical hyperthyroidism for almost a year from October 2023 and finally normalized in September 2024.    Her dose of levothyroxine was reduced several times over the last year currently she is on levothyroxine 100 mcg daily (1.6 mcg/kg) most recent TFTs was on 9/10/2024 TSH was normal.  Clinically euthyroid.    Impression:  Hypercalcemia likely due to iatrogenic thyrotoxicosis which happened due to significant weight loss while continuing the same dose of levothyroxine for some time.  Following correction of thyrotoxicosis calcium level normalized and remained normal following that.  Hyperparathyroidism likely due to vitamin D deficiency-vitamin D level was low (16) and the corrected calcium level was normal when she was found to have elevated PTH.  To assess this further we will replete vitamin D deficiency and improve dietary calcium intake and then we will repeat the labs in 3 months.    Plan:  -Continue levothyroxine 100 mcg daily.  -To get TFTs in 3 months.  -To start taking vitamin D 2000 units daily.  -To start taking calcium citrate with vitamin D 500/12.5 mcg twice daily.  -To get the following labs in 3 months vitamin D, PTH, ionized  calcium, albumin, BMP.  -She did not have DEXA bone scan , she has multiple risk factors for osteoporosis including positive family history, history of vitamin D deficiency, low dietary calcium intake, significant weight loss and tobacco use.    4.  Suspected thyroid eyes disease:  She stated she was diagnosed with hypothyroidism at the age of 16 years old not sure if she had Graves' disease at that time.  Has bilateral eyes proptosis.  Complaining of bilateral eyes dryness and itchiness.  No blurring of vision.  No sign of active disease on examination.    Plan:  -To get TRAb antibodies to assess if her eye symptoms related to Graves' disease or not.  -To start using artificial tears as needed.  If no improvement we will place referral to ophthalmology.              Test and/or medications prescribed today:  Orders Placed This Encounter   Procedures    DX Bone Density    Basic metabolic panel    Phosphorus    Albumin level    25 Hydroxyvitamin D2 and D3    Parathyroid Hormone Intact    Ionized Calcium    T4 free    TSH with free T4 reflex    Thyrotropin Receptor Antibody         Follow up: 3 months      ELLY Herr  Endocrinology, Diabetes and Metabolism  [facility]      98 minutes spent on the date of the encounter doing chart review, history and exam, documentation and further activities per the note  The longitudinal plan of care for the diagnosis(es)/condition(s) as documented were addressed during this visit. Due to the added complexity in care, I will continue to support Lita in the subsequent management and with ongoing continuity of care.    Note: Chart documentation done in part with Dragon Voice Recognition software. Although reviewed after completion, some word and grammatical errors may remain.  Please consider this when interpreting information in this chart

## 2024-11-08 NOTE — PATIENT INSTRUCTIONS
- To start taking Vitamin D 2000 units daily   - To start taking the calcium chew twice daily   - To get the dexa bone scan done   - To get the labs done in 3 months   - Continue levothyroxine 100 mcg daily   - To use the artificial tears   - We will see you back in 3 months with labs before the visit       Imaging (DEXA, CT, MRI, XRAY)    [facility] ([facility], [place]) [phone]   [facility] ([place], [place], [place]) [phone]   [facility] ([place], [place]) [phone]   [facility] ([facility]) [phone]       [facility] [phone]   [facility] [phone]   [place] [phone]   [facility]  [phone]   [facility] [phone]   [place] [phone]   [facility] [phone]   [facility] Walk-In Only   [place] [phone]   [place] [phone]   [place] [phone]   [place] [phone]       Infusion    [facility] [phone]   [place] [phone]   [place] [phone]   [place] [phone]   [place] [phone]   [place] [phone]   [place] [phone]     For any questions, please reach out to the Endocrinology Clinic Number for assistance: [phone].

## 2024-11-12 ENCOUNTER — MYC MEDICAL ADVICE (OUTPATIENT)
Dept: ONCOLOGY | Facility: CLINIC | Age: 64
End: 2024-11-12
Payer: COMMERCIAL

## 2024-11-21 ENCOUNTER — INFUSION THERAPY VISIT (OUTPATIENT)
Dept: INFUSION THERAPY | Facility: HOSPITAL | Age: 64
End: 2024-11-21
Attending: INTERNAL MEDICINE
Payer: COMMERCIAL

## 2024-11-21 DIAGNOSIS — C15.9 SQUAMOUS CELL CARCINOMA OF ESOPHAGUS (H): Primary | ICD-10-CM

## 2024-11-21 PROCEDURE — 250N000011 HC RX IP 250 OP 636: Performed by: INTERNAL MEDICINE

## 2024-11-21 RX ORDER — HEPARIN SODIUM (PORCINE) LOCK FLUSH IV SOLN 100 UNIT/ML 100 UNIT/ML
5 SOLUTION INTRAVENOUS
OUTPATIENT
Start: 2024-11-21

## 2024-11-21 RX ORDER — HEPARIN SODIUM (PORCINE) LOCK FLUSH IV SOLN 100 UNIT/ML 100 UNIT/ML
SOLUTION INTRAVENOUS
Status: COMPLETED
Start: 2024-11-21 | End: 2024-11-21

## 2024-11-21 RX ORDER — HEPARIN SODIUM,PORCINE 10 UNIT/ML
5-20 VIAL (ML) INTRAVENOUS DAILY PRN
OUTPATIENT
Start: 2024-11-21

## 2024-11-21 RX ORDER — HEPARIN SODIUM (PORCINE) LOCK FLUSH IV SOLN 100 UNIT/ML 100 UNIT/ML
5 SOLUTION INTRAVENOUS
Status: DISCONTINUED | OUTPATIENT
Start: 2024-11-21 | End: 2024-11-21 | Stop reason: HOSPADM

## 2024-11-21 RX ADMIN — HEPARIN SODIUM (PORCINE) LOCK FLUSH IV SOLN 100 UNIT/ML 5 ML: 100 SOLUTION at 11:12

## 2024-11-21 RX ADMIN — HEPARIN 5 ML: 100 SYRINGE at 11:12

## 2024-12-19 DIAGNOSIS — G25.81 RESTLESS LEGS SYNDROME (RLS): ICD-10-CM

## 2024-12-19 DIAGNOSIS — C15.9 SQUAMOUS CELL CARCINOMA OF ESOPHAGUS (H): ICD-10-CM

## 2024-12-19 RX ORDER — ROPINIROLE 0.25 MG/1
TABLET, FILM COATED ORAL
COMMUNITY
Start: 2024-12-19

## 2024-12-19 RX ORDER — OXYCODONE HCL 5 MG/5 ML
3-4 SOLUTION, ORAL ORAL
Qty: 80 ML | Refills: 0 | Status: SHIPPED | OUTPATIENT
Start: 2024-12-19

## 2024-12-19 NOTE — TELEPHONE ENCOUNTER
Received [employer] message from patient requesting refill of oxycodone.     Last refill: 10/17/24  Last office visit: 12/13/24  Scheduled for follow up 2/28/25     Will route request to MD/ for review.     Reviewed [place]  Report.

## 2025-01-21 ENCOUNTER — INFUSION THERAPY VISIT (OUTPATIENT)
Dept: INFUSION THERAPY | Facility: HOSPITAL | Age: 65
End: 2025-01-21
Attending: INTERNAL MEDICINE
Payer: COMMERCIAL

## 2025-01-21 DIAGNOSIS — C15.9 SQUAMOUS CELL CARCINOMA OF ESOPHAGUS (H): Primary | ICD-10-CM

## 2025-01-21 PROCEDURE — 250N000011 HC RX IP 250 OP 636: Performed by: PHYSICIAN ASSISTANT

## 2025-01-21 PROCEDURE — 96523 IRRIG DRUG DELIVERY DEVICE: CPT

## 2025-01-21 RX ORDER — HEPARIN SODIUM (PORCINE) LOCK FLUSH IV SOLN 100 UNIT/ML 100 UNIT/ML
5 SOLUTION INTRAVENOUS
Status: DISCONTINUED | OUTPATIENT
Start: 2025-01-21 | End: 2025-01-21 | Stop reason: HOSPADM

## 2025-01-21 RX ORDER — HEPARIN SODIUM,PORCINE 10 UNIT/ML
5-20 VIAL (ML) INTRAVENOUS DAILY PRN
OUTPATIENT
Start: 2025-01-21

## 2025-01-21 RX ORDER — HEPARIN SODIUM (PORCINE) LOCK FLUSH IV SOLN 100 UNIT/ML 100 UNIT/ML
5 SOLUTION INTRAVENOUS
OUTPATIENT
Start: 2025-01-21

## 2025-01-21 RX ADMIN — HEPARIN 5 ML: 100 SYRINGE at 11:59

## 2025-01-27 DIAGNOSIS — G25.81 RESTLESS LEGS SYNDROME (RLS): ICD-10-CM

## 2025-01-27 NOTE — TELEPHONE ENCOUNTER
Received [employer] message from patient requesting refill of  requip .     Last office visit: 12/13/24  Scheduled for follow up 2/28/25     Will route request to MD/DO for review.

## 2025-01-28 RX ORDER — ROPINIROLE 0.25 MG/1
TABLET, FILM COATED ORAL
Qty: 240 TABLET | Refills: 3 | Status: SHIPPED | OUTPATIENT
Start: 2025-01-28 | End: 2025-01-30

## 2025-01-30 DIAGNOSIS — G25.81 RESTLESS LEGS SYNDROME (RLS): Primary | ICD-10-CM

## 2025-01-30 RX ORDER — ROPINIROLE 1 MG/1
1.5-2 TABLET, FILM COATED ORAL AT BEDTIME
Qty: 60 TABLET | Refills: 3 | Status: SHIPPED | OUTPATIENT
Start: 2025-01-30

## 2025-01-30 NOTE — TELEPHONE ENCOUNTER
Received fax from pharmacy requesting refill of Requip. Pharmacy requesting to fill prescription using increased tablet strength.    ALLAN ArringtonN, RN  Palliative Care Nurse Clinician    [phone] (Direct)  [phone] (Main)  [phone] (Appointment Scheduling)

## 2025-02-06 ENCOUNTER — LAB (OUTPATIENT)
Dept: LAB | Facility: CLINIC | Age: 65
End: 2025-02-06
Payer: COMMERCIAL

## 2025-02-06 DIAGNOSIS — E89.0 POSTABLATIVE HYPOTHYROIDISM: ICD-10-CM

## 2025-02-06 DIAGNOSIS — E21.3 HYPERPARATHYROIDISM: ICD-10-CM

## 2025-02-06 LAB — CA-I BLD-MCNC: 4.9 MG/DL (ref 4.4–5.2)

## 2025-02-09 LAB
DEPRECATED CALCIDIOL+CALCIFEROL SERPL-MC: <48 UG/L (ref 20–75)
VITAMIN D2 SERPL-MCNC: <5 UG/L
VITAMIN D3 SERPL-MCNC: 43 UG/L

## 2025-02-20 ENCOUNTER — ONCOLOGY VISIT (OUTPATIENT)
Dept: ONCOLOGY | Facility: CLINIC | Age: 65
End: 2025-02-20
Attending: INTERNAL MEDICINE
Payer: COMMERCIAL

## 2025-02-20 VITALS
SYSTOLIC BLOOD PRESSURE: 157 MMHG | RESPIRATION RATE: 20 BRPM | HEART RATE: 71 BPM | TEMPERATURE: 97.7 F | OXYGEN SATURATION: 98 % | DIASTOLIC BLOOD PRESSURE: 83 MMHG | WEIGHT: 154 LBS | BODY MASS INDEX: 24.12 KG/M2

## 2025-02-20 DIAGNOSIS — C15.9 SQUAMOUS CELL CARCINOMA OF ESOPHAGUS (H): ICD-10-CM

## 2025-02-20 DIAGNOSIS — Z72.0 TOBACCO ABUSE: Primary | ICD-10-CM

## 2025-02-20 PROCEDURE — G0463 HOSPITAL OUTPT CLINIC VISIT: HCPCS | Performed by: INTERNAL MEDICINE

## 2025-02-20 ASSESSMENT — PAIN SCALES - GENERAL: PAINLEVEL_OUTOF10: NO PAIN (0)

## 2025-02-20 NOTE — NURSING NOTE
"Oncology Rooming Note    February 20, 2025 12:24 PM   Linh Mustafa is a 64 year old female who presents for:    Chief Complaint   Patient presents with    Oncology Clinic Visit     Squamous cell carcinoma of esophagus     Initial Vitals: BP (!) 157/83 (BP Location: Right arm, Patient Position: Sitting, Cuff Size: Adult Regular)   Pulse 71   Temp 97.7  F (36.5  C) (Oral)   Resp 20   Wt 69.9 kg (154 lb)   SpO2 98%   BMI 24.12 kg/m   Estimated body mass index is 24.12 kg/m  as calculated from the following:    Height as of 2/14/25: 1.702 m (5' 7\").    Weight as of this encounter: 69.9 kg (154 lb). Body surface area is 1.82 meters squared.  No Pain (0) Comment: Data Unavailable   No LMP recorded. Patient is postmenopausal.  Allergies reviewed: Yes  Medications reviewed: Yes    Medications: Medication refills not needed today.  Pharmacy name entered into [employer]IQ: [id number] - [address]    Frailty Screening:   Is the patient here for a new oncology consult visit in cancer care? 2. No    PHQ9:  Did this patient require a PHQ9?: No      Clinical concerns: none.       Carlos Green"

## 2025-02-20 NOTE — LETTER
2/20/2025      Linh Mustafa  [address]  [place] [address]      Dear Colleague,    Thank you for referring your patient, Linh Mustafa, to the [facility]. Please see a copy of my visit note below.    Oncology follow-up visit:  Date on this visit: 2/20/25     Diagnosis.  Esophageal cancer.    Primary Physician: Clinic, [facility]     History Of Present Illness:  Ms. Mustafa is a 64 year old female who presents with new diagnosis of esophageal cancer.  I initially saw her on 4/18/2024.  Please see my previous note for details.  I have copied and updated from prior notes.      She mentioned that since November 2023 she started to notice pain upon swallowing and it was basically pain which limited her food intake.  This was progressively getting worse.  She had further workup as mentioned below.    2/15/2024.  EGD showed an ulcerated mid esophageal mass extending from 26-31 cm from the incisors.  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  Pathology from the mid esophageal mass showed moderately differentiated invasive squamous cell carcinoma, MMR IHC intact.    3/7/2024.  PET/CT showed markedly FDG avid wall thickening of the mid thoracic esophagus with SUV max 24.1.  No evidence of metastatic disease.    3/21/2024.  Upper EUS.  Endoscopy showed a flat nodule in the proximal esophagus between 15-19 cm and one third circumferential.  Upper esophageal sphincter was at 14 cm.  Biopsies from this showed high-grade dysplasia/carcinoma in situ.      Normal esophageal squamous cell cancer causing maybe esophagus on the right anterolateral esophageal wall between 24-30 cm.  It was 50% circumferential.  The GE junction was at 35 cm with 2 cm tongues of Auguste's anteriorly without high risk features.    Ultrasound exam showed the mid esophageal tumor to be probably T3. Two 4 x 6 mm nodes were seen adjacent to the distal esophageal  wall at 36 and 37 cm.  Both an identical appearance and one was sampled.  This lymph node biopsy was benign.    By EUS it was staged as T3 N0 M0 tumor.    4/2/2024.  Because of finding of carcinoma in situ in the proximal esophagus, he had endoscopic resection of the proximal squamous cell carcinoma in situ lesion performed with en bloc removal.    The final pathology showed squamous mucosa with high-grade dysplasia/carcinoma in situ with all margins negative for dysplasia.  No definite invasion was identified. ESD was considered curative for this lesion.    Her case was also discussed in the tumor conference with the plan to proceed with neoadjuvant chemotherapy/radiation followed by definitive surgery.      She met with Dr. Martinez.    She started on concurrent chemoradiation with carboplatin and Taxol on 5/1/24 and received 5 doses, last on 5/28/24 and last dose of radiation on 6/5/24.     She was hospitalized from 6/4-6/18/24 with radiation esophagitis causing pain and inability to eat. She was discharged home with a G-tube that was placed on 6/14/24.     Repeat PET/CT on 8/15/2024 showed very good response to treatment.  There is complete resolution of FDG avidity in the esophagus.  No evidence of malignancy.    She met with Dr. Martinez and decision was made not to pursue surgery for now.    Feeding tube was removed in September 2024.      Interval history.    She comes in today accompanied by her daughter.  I also reviewed notes from Dr. Mansoor Eric from palliative care and thoracic surgery from Dr. Martinez.      She tells me that now she is able to eat better.  She has gained some weight.  She feels nauseous in the morning.  She takes Compazine twice a day and Zofran as needed.  Bowel movements are fine.  Denies any pain.  She is smoking half a pack a day.  Denies any neuropathy.         ECOG 1    ROS:  A comprehensive ROS was otherwise neg    I reviewed other history in epic as below.    Past  Medical/Surgical History:  Past Medical History:   Diagnosis Date     Hyperlipidemia      Hypertension    Hypothyroidism- hx of PATEL at the age of 16 for hyperthyroidism    Past Surgical History:   Procedure Laterality Date     BIOPSY BREAST Right     age 30 benign fibrous     CV CORONARY ANGIOGRAM N/A 01/25/2023    Procedure: Coronary Angiogram;  Surgeon: Lukas Irizarry MD;  Location: [facility] CV     CV LEFT HEART CATH N/A 01/25/2023    Procedure: Left Heart Catheterization;  Surgeon: Lukas Irizarry MD;  Location: [facility] CV     ENDOSCOPIC ULTRASOUND UPPER GASTROINTESTINAL TRACT (GI) N/A 3/21/2024    Procedure: ENDOSCOPIC ULTRASOUND, ESOPHAGOSCOPY / UPPER GASTROINTESTINAL TRACT (GI), ENDOSCOPY WITH BIOPSY, FINE NEEDLE ASPIRATION;  Surgeon: Damon Kramer MD;  Location: [facility] OR     ENDOSCOPIC ULTRASOUND, ESOPHAGOSCOPY / UPPER GASTROINTESTINAL TRACT (GI), ENDOSCOPY WITH BIOPSY, FINE NEEDLE ASPIRATION  03/21/2024     ESOPHAGOSCOPY, GASTROSCOPY, DUODENOSCOPY (EGD), COMBINED N/A 6/6/2024    Procedure: Esophagoscopy, Gastroscopy, Duodenoscopy (EGD), Nasojejunal Feeding Tube Placement;  Surgeon: Bryant Martinez MD;  Location: [facility] OR     ESOPHAGOSCOPY, GASTROSCOPY, DUODENOSCOPY (EGD), SUBMUCOSA RESECTION N/A 4/2/2024    Procedure: ESOPHAGOGASTRODUODENOSCOPY, WITH SUBMUCOSAL RESECTION;  Surgeon: Holden King MD;  Location: [facility] OR     IR CHEST PORT PLACEMENT > 5 YRS OF AGE  4/24/2024     LAPAROSCOPIC ASSISTED INSERTION TUBE GASTROTOMY N/A 6/14/2024    Procedure: Upper endoscopy, laparoscopic gastrostomy tube placement;  Surgeon: Bryant Martinez MD;  Location: [facility] OR     Cancer History:   As above    Allergies:  Allergies as of 02/20/2025 - Reviewed 02/20/2025   Allergen Reaction Noted     Amoxicillin Shortness Of Breath, Itching, and Rash 12/06/2023     Penicillins  03/14/2024     Current Medications:  Current Outpatient Medications   Medication Sig Dispense Refill      artificial saliva (BIOTENE DRY MOUTHWASH) LIQD liquid Swish and spit 15 mLs in mouth 4 times daily as needed for dry mouth 473 mL 0     Calcium Citrate-Vitamin D (CALCIUM CITRATE CHEWY BITE) 500-12.5 MG-MCG CHEW Take 1 tablet by mouth 2 times daily. 180 tablet 3     dextran 70-hypromellose (TEARS NATURALE FREE PF) 0.1-0.3 % ophthalmic solution Place 1 drop into both eyes daily as needed (Irritation). 35 each 0     esomeprazole (NEXIUM) 20 MG DR capsule Take 1 capsule (20 mg) by mouth daily. 90 capsule 1     hydrOXYzine HCl (ATARAX) 25 MG tablet TAKE 1-2 TABLETS (25-50 MG) BY MOUTH 3 TIMES DAILY AS NEEDED FOR ITCHING 540 tablet 1     levothyroxine (SYNTHROID/LEVOTHROID) 100 MCG tablet Take 1 tablet (100 mcg) by mouth daily. 90 tablet 3     methocarbamol (ROBAXIN) 750 MG tablet Take 1 tablet (750 mg) by mouth 4 times daily 120 tablet 0     naloxone (NARCAN) 4 MG/0.1ML nasal spray Spray 1 spray (4 mg) into one nostril alternating nostrils as needed for opioid reversal every 2-3 minutes until assistance arrives 0.2 mL 1     nicotine (NICODERM CQ) 7 MG/24HR 24 hr patch Place 1 patch onto the skin every 24 hours Start after 6 weeks of the 14 mg/24 hr dose 30 patch 0     ondansetron (ZOFRAN ODT) 8 MG ODT tab Take 1 tablet (8 mg) by mouth every 8 hours as needed for nausea. 90 tablet 1     oxyCODONE (ROXICODONE) 5 MG/5ML solution Take 3-4 mLs (3-4 mg) by mouth or Feeding Tube nightly as needed for severe pain. 80 mL 0     prochlorperazine (COMPAZINE) 10 MG tablet TAKE 1 TABLET (10 MG) BY MOUTH EVERY 6 HOURS AS NEEDED FOR VOMITING. 90 tablet 1     rOPINIRole (REQUIP) 1 MG tablet Take 1.5-2 tablets (1.5-2 mg) by mouth at bedtime. 60 tablet 3     sertraline (ZOLOFT) 50 MG tablet Take 1 tablet (50 mg) by mouth daily. 90 tablet 1     vitamin D3 (CHOLECALCIFEROL) 50 mcg (2000 units) tablet Take 1 tablet (50 mcg) by mouth daily. 90 tablet 3     hydrOXYzine (VISTARIL) 50 MG capsule Take 50 mg by mouth 2 times daily as needed.        nicotine (NICODERM CQ) 14 MG/24HR 24 hr patch Place 1 patch onto the skin every 24 hours (Patient not taking: Reported on 2/20/2025) 30 patch 1      Family History:  Family History   Problem Relation Age of Onset     Chronic Obstructive Pulmonary Disease Father      Colon Cancer Paternal Grandmother 70     1 daughter- healthy    Social History:  Social History     Socioeconomic History     Marital status: Single     Spouse name: Not on file     Number of children: Not on file     Years of education: Not on file     Highest education level: Not on file   Occupational History     Not on file   Tobacco Use     Smoking status: Former     Current packs/day: 1.00     Average packs/day: 1 pack/day for 48.1 years (48.1 ttl pk-yrs)     Types: Cigarettes     Start date: 1977     Passive exposure: Current     Smokeless tobacco: Never   Vaping Use     Vaping status: Never Used   Substance and Sexual Activity     Alcohol use: Not on file     Comment: occasional     Drug use: Never     Sexual activity: Not on file   Other Topics Concern     Not on file   Social History Narrative    ** Merged History Encounter **          Social Drivers of Health     Financial Resource Strain: Not on file   Food Insecurity: No Food Insecurity (2/24/2024)    Received from [facility]    Hunger Vital Sign      Worried About Running Out of Food in the Last Year: Never true      Ran Out of Food in the Last Year: Never true   Transportation Needs: No Transportation Needs (2/24/2024)    Received from [facility]    PRAPARE - Transportation      Lack of Transportation (Medical): No      Lack of Transportation (Non-Medical): No   Physical Activity: Inactive (2/24/2024)    Received from [facility]    Exercise Vital Sign      Days of Exercise per Week: 0 days      Minutes of Exercise per Session: 0 min   Stress: Not on file   Social Connections: Not on file   Interpersonal Safety: Not on file   Housing Stability: Low  Risk  (2/24/2024)    [facility], [facility]    Housing Stability      What is your living situation today?: I have a steady place to live   Chronic smoker- now smokes occasionally. No etoh. Lives alone. Currently brother staying with her        Physical Exam:  BP (!) 157/83 (BP Location: Right arm, Patient Position: Sitting, Cuff Size: Adult Regular)   Pulse 71   Temp 97.7  F (36.5  C) (Oral)   Resp 20   Wt 69.9 kg (154 lb)   SpO2 98%   BMI 24.12 kg/m      Wt Readings from Last 4 Encounters:   02/20/25 69.9 kg (154 lb)   02/14/25 68.9 kg (152 lb)   12/13/24 63.5 kg (140 lb)   11/08/24 63 kg (139 lb)     CONSTITUTIONAL: no acute distress  EYES: PERRLA, no palor or icterus.   ENT/MOUTH: no mouth lesions. Ears normal   RESPIRATORY: Breathing is comfortable  GI: soft non tender no hepatosplenomegaly  NEURO: AAOX3  Grossly non focal neuro exam  INTEGUMENT: no obvious rashes  LYMPHATIC: no palpable cervical, supraclavicular, axillary LAD  MUSCULOSKELETAL: Unremarkable. No bony tenderness.   EXTREMITIES: no edema  PSYCH: Mentation, mood and affect are normal. Decision making capacity is intact        Laboratory/Imaging Studies      Reviewed    2/6/2025  CBC shows WBC 8.8.  Hemoglobin 10.2.  Platelets 254.   Chemistry unremarkable.  TSH 3.58    8/7/2024  CBC showed WBC 8.8.  Hemoglobin 10.2.  Platelets 254.    CT chest abdomen and pelvis 2/13/2025  FINDINGS:   LUNGS AND PLEURA: Emphysematous change in both lungs. No nodules or masses.     MEDIASTINUM/AXILLAE: No mediastinal mass or adenopathy. Mild thickening along the mid esophagus is noted stable.     CORONARY ARTERY CALCIFICATION: Moderate.     HEPATOBILIARY: Multiple small low-attenuation lesions in the liver the largest in segment 8 measuring 1 cm stable from previous examination. Cholelithiasis.     PANCREAS: Pancreas is normal.     SPLEEN: Normal.     ADRENAL GLANDS: Bilateral adrenal adenomas stable.     KIDNEYS/BLADDER: No stones in either  kidney. No hydronephrosis.     BOWEL: Few colonic diverticula.     LYMPH NODES: No abnormal adenopathy in the abdomen or pelvis.     VASCULATURE: Atherosclerotic plaque throughout the aorta and iliac vessels.     PELVIC ORGANS: Normal.     MUSCULOSKELETAL: No skeletal lesions.                                                                      IMPRESSION:  1.  No definite change from previous.     2.  Small low-attenuation lesions in the liver stable possibly cysts.     3.  Mild thickening of the wall of the mid esophagus stable.     4.  No new findings.    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  MMR IHC intact.      She started on concurrent chemoradiation with carboplatin and Taxol on 5/1/24 and received 5 doses, last on 5/28/24 and last dose of radiation on 6/5/24.    Repeat PET/CT on 8/15/2024 showed complete resolution of FDG avidity in the esophagus.    At that time she met with Dr. Martinez and decision was made not to pursue the surgery.  Her feeding tube was removed.      She had a repeat CT chest abdomen and pelvis on 2/13/2025 which I reviewed myself.  It seems stable with mild stable thickening of the mid esophagus.  No new lesions concerning for recurrence/metastasis are seen.     Nutrition.  She was on tube feeds but now she is able to eat better.  She was losing weight but now has started to gain weight which is good news.  She does not have any dysphagia or odynophagia anymore.    Anemia.  This was improving after treatment.  We will give her more time to recover and repeat labs in 6 months.    Nausea.  She usually feels this in the morning.  She is takes Compazine twice a day and as needed Zofran.  She is following with Dr. Mansoor Eric.      Smoking.  She quit for some time but now smoking again and she smokes on average half a pack a day.  We again discussed about this and I gave her a referral for smoking cessation  program.      Port-A-Cath.  She will need port flushes every 8 weeks or so.      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  Now s/p endoscopic resection with clear margins free of dysplasia.  This has been adequately treated.  Continue to observe.      Macrocytosis.  Previously she had macrocytosis.  B12 and folate were normal.  Free T4 was normal.  TSH was low.  Continue to observe.     I will see her back in 6 months with labs/CT scan prior    I answered all of her and her daughter's questions to their satisfaction.  They are agreeable and comfortable with the plan.    Kathy Burch MD     The longitudinal plan of care for the esophageal cancer, as documented were addressed during this visit. Due to the added complexity in care, I will continue to support Lita in the subsequent management and with ongoing continuity of care.        Again, thank you for allowing me to participate in the care of your patient.        Sincerely,        Kathy Burch MD    Electronically signed

## 2025-02-25 DIAGNOSIS — C15.9 SQUAMOUS CELL CARCINOMA OF ESOPHAGUS (H): ICD-10-CM

## 2025-02-25 RX ORDER — PROCHLORPERAZINE MALEATE 10 MG
10 TABLET ORAL EVERY 6 HOURS PRN
Qty: 90 TABLET | Refills: 1 | Status: SHIPPED | OUTPATIENT
Start: 2025-02-25

## 2025-02-25 NOTE — TELEPHONE ENCOUNTER
Received fax from pharmacy requesting refill of  compazine .     Last office visit: 12/13/24  Scheduled for follow up 2/28/25     Will route request to MD/ for review.

## 2025-03-02 DIAGNOSIS — F41.9 ANXIETY: ICD-10-CM

## 2025-03-03 RX ORDER — HYDROXYZINE HYDROCHLORIDE 25 MG/1
25-50 TABLET, FILM COATED ORAL 3 TIMES DAILY PRN
Qty: 180 TABLET | Refills: 1 | Status: SHIPPED | OUTPATIENT
Start: 2025-03-03

## 2025-03-03 NOTE — TELEPHONE ENCOUNTER
Received electronic request from pharmacy requesting refill of  hydroxyzine .     Last office visit: 2/28/25  Scheduled for follow up per check out request.     Will route request to MD/DO for review.

## 2025-03-22 DIAGNOSIS — F51.01 PRIMARY INSOMNIA: ICD-10-CM

## 2025-03-24 RX ORDER — DOXEPIN HYDROCHLORIDE 10 MG/ML
3-10 SOLUTION ORAL AT BEDTIME
Qty: 90 ML | Refills: 1 | Status: SHIPPED | OUTPATIENT
Start: 2025-03-24

## 2025-03-24 NOTE — TELEPHONE ENCOUNTER
Received electronic request from pharmacy requesting refill of  doxepin . Pt/pharmacy requesting a 90 day supply.    Last office visit: 2/28/25  Scheduled for follow up 5/27/25     Will route request to MD/DO for review.

## 2025-03-29 DIAGNOSIS — F41.9 ANXIETY: ICD-10-CM

## 2025-03-30 NOTE — TELEPHONE ENCOUNTER
Received [employer] message from pharmacy requesting refill of hydroxyzine.  Patient and pharmacy requesting a 90-day supply.      Last office visit: 2/28/2025  Scheduled for follow up 5/27/2025    Will route request to MD/ for review.

## 2025-03-31 RX ORDER — HYDROXYZINE HYDROCHLORIDE 25 MG/1
25-50 TABLET, FILM COATED ORAL 3 TIMES DAILY PRN
Qty: 540 TABLET | Refills: 1 | Status: SHIPPED | OUTPATIENT
Start: 2025-03-31

## 2025-04-03 ENCOUNTER — INFUSION THERAPY VISIT (OUTPATIENT)
Dept: INFUSION THERAPY | Facility: HOSPITAL | Age: 65
End: 2025-04-03
Attending: INTERNAL MEDICINE
Payer: COMMERCIAL

## 2025-04-03 DIAGNOSIS — C15.9 SQUAMOUS CELL CARCINOMA OF ESOPHAGUS (H): Primary | ICD-10-CM

## 2025-04-03 PROCEDURE — 250N000011 HC RX IP 250 OP 636: Performed by: PHYSICIAN ASSISTANT

## 2025-04-03 RX ORDER — HEPARIN SODIUM,PORCINE 10 UNIT/ML
5-20 VIAL (ML) INTRAVENOUS DAILY PRN
OUTPATIENT
Start: 2025-04-03

## 2025-04-03 RX ORDER — HEPARIN SODIUM (PORCINE) LOCK FLUSH IV SOLN 100 UNIT/ML 100 UNIT/ML
5 SOLUTION INTRAVENOUS
Status: DISCONTINUED | OUTPATIENT
Start: 2025-04-03 | End: 2025-04-03 | Stop reason: HOSPADM

## 2025-04-03 RX ORDER — HEPARIN SODIUM (PORCINE) LOCK FLUSH IV SOLN 100 UNIT/ML 100 UNIT/ML
5 SOLUTION INTRAVENOUS
OUTPATIENT
Start: 2025-04-03

## 2025-04-03 RX ADMIN — HEPARIN 5 ML: 100 SYRINGE at 11:30

## 2025-04-14 DIAGNOSIS — C15.9 SQUAMOUS CELL CARCINOMA OF ESOPHAGUS (H): ICD-10-CM

## 2025-04-14 RX ORDER — OXYCODONE HCL 5 MG/5 ML
5-7 SOLUTION, ORAL ORAL EVERY 4 HOURS PRN
Qty: 400 ML | Refills: 0 | Status: SHIPPED | OUTPATIENT
Start: 2025-04-14

## 2025-04-14 NOTE — TELEPHONE ENCOUNTER
Received [employer] [facility] from patient requesting refill of oxycodone.     Last refill: 12/20/24  Last office visit: 2/28/25  Scheduled for follow up 5/27/25     Will route request to MD/ for review.     Reviewed [place]  Report.

## 2025-04-15 ENCOUNTER — APPOINTMENT (OUTPATIENT)
Dept: RADIOLOGY | Facility: HOSPITAL | Age: 65
End: 2025-04-15
Payer: COMMERCIAL

## 2025-04-15 ENCOUNTER — HOSPITAL ENCOUNTER (EMERGENCY)
Facility: HOSPITAL | Age: 65
Discharge: HOME | End: 2025-04-15
Payer: COMMERCIAL

## 2025-04-15 VITALS
WEIGHT: 165.9 LBS | HEART RATE: 77 BPM | OXYGEN SATURATION: 93 % | HEIGHT: 67 IN | SYSTOLIC BLOOD PRESSURE: 125 MMHG | RESPIRATION RATE: 28 BRPM | BODY MASS INDEX: 26.04 KG/M2 | TEMPERATURE: 97.7 F | DIASTOLIC BLOOD PRESSURE: 78 MMHG

## 2025-04-15 DIAGNOSIS — S82.62XA CLOSED FRACTURE OF LEFT LATERAL MALLEOLUS: ICD-10-CM
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
## 2025-04-15 LAB
ANION GAP SERPL CALCULATED.3IONS-SCNC: 11 MMOL/L (ref 7–15)
BUN SERPL-MCNC: 19.4 MG/DL (ref 8–23)
CALCIUM SERPL-MCNC: 10.1 MG/DL (ref 8.8–10.4)
CHLORIDE SERPL-SCNC: 103 MMOL/L (ref 98–107)
CREAT SERPL-MCNC: 0.75 MG/DL (ref 0.51–0.95)
EGFRCR SERPLBLD CKD-EPI 2021: 88 ML/MIN/1.73M2
ERYTHROCYTE [DISTWIDTH] IN BLOOD BY AUTOMATED COUNT: 17.2 % (ref 10–15)
GLUCOSE SERPL-MCNC: 98 MG/DL (ref 70–99)
HCO3 SERPL-SCNC: 25 MMOL/L (ref 22–29)
HCT VFR BLD AUTO: 33.7 % (ref 35–47)
HGB BLD-MCNC: 11.2 G/DL (ref 11.7–15.7)
MAGNESIUM SERPL-MCNC: 2.1 MG/DL (ref 1.7–2.3)
MCH RBC QN AUTO: 33.1 PG (ref 26.5–33)
MCHC RBC AUTO-ENTMCNC: 33.2 G/DL (ref 31.5–36.5)
MCV RBC AUTO: 100 FL (ref 78–100)
PLATELET # BLD AUTO: 227 10E3/UL (ref 150–450)
POTASSIUM SERPL-SCNC: 4.3 MMOL/L (ref 3.4–5.3)
RBC # BLD AUTO: 3.38 10E6/UL (ref 3.8–5.2)
SODIUM SERPL-SCNC: 139 MMOL/L (ref 135–145)
TROPONIN T SERPL HS-MCNC: 7 NG/L
TROPONIN T SERPL HS-MCNC: 7 NG/L
WBC # BLD AUTO: 6.5 10E3/UL (ref 4–11)

## 2025-04-15 PROCEDURE — 93005 ELECTROCARDIOGRAM TRACING: CPT

## 2025-04-15 PROCEDURE — 73610 X-RAY EXAM OF ANKLE: CPT | Mod: LT

## 2025-04-15 PROCEDURE — 85041 AUTOMATED RBC COUNT: CPT

## 2025-04-15 PROCEDURE — 73130 X-RAY EXAM OF HAND: CPT | Mod: LT

## 2025-04-15 PROCEDURE — 250N000011 HC RX IP 250 OP 636: Mod: JZ

## 2025-04-15 PROCEDURE — 27786 TREATMENT OF ANKLE FRACTURE: CPT | Mod: LT

## 2025-04-15 PROCEDURE — 36415 COLL VENOUS BLD VENIPUNCTURE: CPT

## 2025-04-15 PROCEDURE — 84484 ASSAY OF TROPONIN QUANT: CPT

## 2025-04-15 PROCEDURE — 80048 BASIC METABOLIC PNL TOTAL CA: CPT

## 2025-04-15 PROCEDURE — 85014 HEMATOCRIT: CPT

## 2025-04-15 PROCEDURE — 83735 ASSAY OF MAGNESIUM: CPT

## 2025-04-15 PROCEDURE — 96374 THER/PROPH/DIAG INJ IV PUSH: CPT

## 2025-04-15 PROCEDURE — 250N000013 HC RX MED GY IP 250 OP 250 PS 637

## 2025-04-15 PROCEDURE — 99285 EMERGENCY DEPT VISIT HI MDM: CPT | Mod: 25

## 2025-04-15 RX ORDER — KETOROLAC TROMETHAMINE 15 MG/ML
15 INJECTION, SOLUTION INTRAMUSCULAR; INTRAVENOUS ONCE
Status: COMPLETED | OUTPATIENT
Start: 2025-04-15 | End: 2025-04-15

## 2025-04-15 RX ORDER — ACETAMINOPHEN 325 MG/1
650 TABLET ORAL ONCE
Status: COMPLETED | OUTPATIENT
Start: 2025-04-15 | End: 2025-04-15

## 2025-04-15 RX ADMIN — KETOROLAC TROMETHAMINE 15 MG: 15 INJECTION, SOLUTION INTRAMUSCULAR; INTRAVENOUS at 20:45

## 2025-04-15 RX ADMIN — ACETAMINOPHEN 650 MG: 325 TABLET ORAL at 22:43

## 2025-04-15 RX ADMIN — OXYCODONE HYDROCHLORIDE 2.5 MG: 5 TABLET ORAL at 22:43

## 2025-04-15 ASSESSMENT — ACTIVITIES OF DAILY LIVING (ADL)
ADLS_ACUITY_SCORE: 59

## 2025-04-15 ASSESSMENT — COLUMBIA-SUICIDE SEVERITY RATING SCALE - C-SSRS
6. HAVE YOU EVER DONE ANYTHING, STARTED TO DO ANYTHING, OR PREPARED TO DO ANYTHING TO END YOUR LIFE?: NO
2. HAVE YOU ACTUALLY HAD ANY THOUGHTS OF KILLING YOURSELF IN THE PAST MONTH?: NO
1. IN THE PAST MONTH, HAVE YOU WISHED YOU WERE DEAD OR WISHED YOU COULD GO TO SLEEP AND NOT WAKE UP?: NO

## 2025-04-15 NOTE — ED TRIAGE NOTES
Pt was at her grandson's swimming two hours ago when she stood up and fell down immediately. Pt states she doesn't know what caused her to fall. She doesn't think she was light-headed or dizzy, but states she was really warm. Daughter states it was hot in the pool area. Pt denies loss of consciousness, hitting her head, or taking blood thinners. Pt states that her only injuries are her left ankle and left hand. She has been ambulating independently. Pt is here for left ankle pain.      Triage Assessment (Adult)       Row Name 04/15/25 0819          Triage Assessment    Airway WDL WDL        Respiratory WDL    Respiratory WDL WDL        Skin Circulation/Temperature WDL    Skin Circulation/Temperature WDL WDL        Cardiac WDL    Cardiac WDL WDL        Peripheral/Neurovascular WDL    Peripheral Neurovascular WDL WDL        Cognitive/Neuro/Behavioral WDL    Cognitive/Neuro/Behavioral WDL WDL

## 2025-04-16 LAB
ATRIAL RATE - MUSE: 75 BPM
DIASTOLIC BLOOD PRESSURE - MUSE: 55 MMHG
INTERPRETATION ECG - MUSE: NORMAL
P AXIS - MUSE: 60 DEGREES
PR INTERVAL - MUSE: 146 MS
QRS DURATION - MUSE: 72 MS
QT - MUSE: 382 MS
QTC - MUSE: 426 MS
R AXIS - MUSE: -51 DEGREES
SYSTOLIC BLOOD PRESSURE - MUSE: 125 MMHG
T AXIS - MUSE: 64 DEGREES
VENTRICULAR RATE- MUSE: 75 BPM

## 2025-04-16 NOTE — DISCHARGE INSTRUCTIONS
Please continue wearing the walking boot.  It is okay to bear weight on this foot as you can tolerate it.  Ice and elevate the ankle to help with pain and swelling.  Use Tylenol and ibuprofen as needed for discomfort.  If needed you can use some of your oxycodone that you have at home.    You may take 600 mg of ibuprofen (Advil) every 6 hours and 650 mg acetaminophen (Tylenol) every 6 hours for pain. Do not take more than 3200 mg of ibuprofen (Advil) in 24 hours. Do not take more than 3000 mg of acetaminophen (Tylenol) in 24 hours. You may take this much for 1-3 days, then only use as needed.     Please follow-up with [facility] to make sure this is healing well.    Schedule an appoint with your primary care provider for an emergency department follow-up.    Return to the emergency department for chest pain, shortness of breath, loss of consciousness, uncontrollable pain, or any other concerning symptoms.

## 2025-04-16 NOTE — ED PROVIDER NOTES
Emergency Department Encounter   NAME: Linh Mustafa  AGE: 64 year old female  YOB: 1960  MRN: [id number]    PCP: Madison Ruiz  ED PROVIDER: Aranza Jo PA-C    Evaluation Date & Time:   No admission date for patient encounter.    CHIEF COMPLAINT:  Ankle Pain and Fall      Impression and Plan   MDM: 64-year-old female with history of hypertension and squamous cell carcinoma of the esophagus presents for evaluation after a fall.  Unsure how she fell.  No prodromal symptoms.  No has left hand pain and left ankle pain.  Otherwise been feeling well.  Able to ambulate but it is painful.  On arrival here patient is vitally stable.  Afebrile.  On my exam patient is in no acute distress.  Unremarkable cardiopulmonary exams.  Swelling and tenderness over the left lateral malleolus and left middle metacarpal and MCP joint.    Unclear exactly why patient fell.  She was sitting in a hot palpable area and had taken a dose of oxycodone for her cancer pain.  She cannot reliably tell me if she lost consciousness.  She reports standing up and then being on the floor.  Suspect this is likely vasovagal in nature, but given unclear etiology of her fall we discussed getting some basic labs to assess for severe anemia, electrolyte abnormality, arrhythmia, ACS.  Patient is agreeable to this.  Initially declined any pain medication as she does not want to feel more loopy but then was agreeable to some Toradol which I ordered for her.  We will get plain films of the left hand and ankle.    She is not anticoagulated and did not hit her head.  She has no evidence of basilar skull fracture or head trauma whatsoever.  I do not think that any head imaging is indicated today which patient is agreeable to.    Blood work here without any leukocytosis.  Stable chronic anemia with hemoglobin of 11.2.  No concerning electrolyte abnormality or BEATRICE.  Normal magnesium.  EKG shows sinus rhythm with nonspecific T abnormalities  diffusely.  Initial troponin was 7, repeat was stable at 7.  ACS is unlikely.    X-ray of the left hand per my independent interpretation without any acute fracture.  Supported by radiology read.  X-ray of the left ankle per my independent interpretation with distal fibular fracture.  Small curvilinear mildly distracted fracture tip of the lateral malleolus as per radiology read.    I reassessed the patient.  We discussed all lab and imaging results.  Discussed reassuring lab work.  She will follow-up with her primary care provider to discuss her fall today, but with reassuring cardiac evaluation I do not think that she requires admission at this time which patient was agreeable to.  She is quite excited to be heading home.  She was provided with a walking boot for the left lower extremity.  Discussed that she can bear weight but should be limited and only as tolerated.  We discussed crutches which patient felt quite uncomfortable with.  She preferred a walker which we provided for her here today.  She will follow-up with her primary care provider as well as [facility].  Tylenol, ibuprofen, ice, elevation for swelling and pain.  Patient has oxycodone at home to use as needed for breakthrough pain.  Patient is eager to head home and feels comfortable with outpatient follow-up.  We reviewed strict return precautions and patient was discharged home in stable condition with her daughter.    ED Course as of 04/16/25 0020   Tue Apr 15, 2025   1935 Introduced myself to the patient, obtained history of present illness, and performed initial physical exam at this time.          Medical Decision Making  I obtained history from Family Member/Significant Other  Discharge. No recommendations on prescription strength medication(s). I considered admission, but ultimately discharged patient as she was able to ambulate, pain was well-controlled, and her cardiac evaluation came back reassuring.    MIPS (CTPE, Dental pain,  [place], Sinusitis, Asthma/COPD, Head Trauma): Not Applicable    SEPSIS: None         FINAL IMPRESSION:    ICD-10-CM    1. Fall, initial encounter  W19.XXXA       2. Closed fracture of left lateral malleolus  S82.62XA             MEDICATIONS GIVEN IN THE EMERGENCY DEPARTMENT:  Medications   ketorolac (TORADOL) injection 15 mg (15 mg Intravenous $Given 4/15/25 2045)   acetaminophen (TYLENOL) tablet 650 mg (650 mg Oral $Given 4/15/25 2243)   oxyCODONE IR (ROXICODONE) half-tab 2.5 mg (2.5 mg Oral $Given 4/15/25 2243)         NEW PRESCRIPTIONS STARTED AT TODAY'S ED VISIT:  Discharge Medication List as of 4/15/2025 11:11 PM            HPI   Patient information was obtained from: Patient and patient's daughter.   Use of Intrepreter: N/A     Linh Mustafa is a 64 year old female with a pertinent history of hypertension and esophageal cancer who presents to the ED by walk-in  for evaluation of ankle pain and fall.     Patient reports she was at her grandson's swimming lessons where she had a fall after trying to get up out of her chair striking her left side.  Patient states that it was very hot in the pool as she was very diaphoretic.  She is unsure if she actually stood up completely or if she stood up care home and then fell.  She does not remember what caused her to fall.  She denies feeling lightheaded or dizziness prior to the fall.  Patient states she felt fine and at her baseline prior to the fall.  Denies any prodrome of chest pain, shortness of breath, palpitations.  No blood thinners. She does not believe she hit her head.  Reports no LOC, but can't quite remember the fall itself.  Patient required help getting up after the fall but was able to ambulate on her own after that though she has pain in the lateral left ankle.  She endorses pain to her left hand and left ankle.  Pain to the ankle worsens with walking or flexion.  She has had no prior surgeries on her left hand or left ankle.    At this time, she feels  "back to normal.  She denies any headache or loss of vision.  No neck pain. She denies any chest pain, shortness of breath, or heart palpitations before or after the fall.  No recent fevers.    Patient has a history of esophageal cancer.  Patient took 2.5 mg of liquid oxycodone at 3:00 PM for residual pain following cancer.  Patient is following with oncology for concern over breast cancer.  Patient denies any recent chemotherapy, radiation, or other therapeutic treatments. No other symptoms, complaints, or concerns at this time.     REVIEW OF SYSTEMS:  Pertinent positive and negative symptoms per HPI.       Physical Exam     First Vitals:  Patient Vitals for the past 24 hrs:   BP Temp Temp src Pulse Resp SpO2 Height Weight   04/15/25 2313 125/78 -- -- 77 -- -- -- --   04/15/25 2312 -- -- -- 77 -- 93 % -- --   04/15/25 2211 (!) 147/73 -- -- -- -- 93 % -- --   04/15/25 2115 -- -- -- 74 28 -- -- --   04/15/25 2100 -- -- -- 80 18 -- -- --   04/15/25 1945 125/55 -- -- 82 -- 92 % -- --   04/15/25 1856 130/60 97.7  F (36.5  C) Oral 80 18 95 % 1.702 m (5' 7\") 75.3 kg (165 lb 14.4 oz)       PHYSICAL EXAM:   General Appearance:  Alert, cooperative, no distress, appears stated age  HENT: Normocephalic without obvious deformity, atraumatic. Mucous membranes moist.  No Zavala sign or raccoon eyes.  No midline cervical tenderness.  Full cervical range of motion.  Eyes: Conjunctiva clear, Lids normal. No discharge.  EOM intact.  Respiratory: No distress. Lungs clear to ausculation bilaterally. No wheezes, rhonchi or stridor  Cardiovascular: Regular rate and rhythm, no murmur. Normal cap refill. No peripheral edema  GI: Abdomen soft, nontender, normal bowel sounds  Musculoskeletal: Moving all extremities. No gross deformities.  No midline cervical, thoracic, lumbar midline spinal tenderness.  Chest and pelvis are stable and nontender.  Full range of motion of bilateral upper and lower extremities.  Right lower extremity: Swelling " and bruising over the lateral malleolus with associated tenderness to palpation without any underlying crepitus or step-offs.  No tenderness to palpation over the dorsum of the foot, toes, medial malleolus, calcaneus, Achilles tendon, calf, knee.  Full range of motion of the ankle, knee, hip.  Integument: Warm, dry, no rashes or lesions  Neurologic: Alert and orientated x3. GCS 15. No focal deficits.  Psych: Normal mood and affect      Results     LAB:  All pertinent labs reviewed and interpreted  Labs Ordered and Resulted from Time of ED Arrival to Time of ED Departure   CBC WITH PLATELETS - Abnormal       Result Value    WBC Count 6.5      RBC Count 3.38 (*)     Hemoglobin 11.2 (*)     Hematocrit 33.7 (*)           MCH 33.1 (*)     MCHC 33.2      RDW 17.2 (*)     Platelet Count 227     BASIC METABOLIC PANEL - Normal    Sodium 139      Potassium 4.3      Chloride 103      Carbon Dioxide (CO2) 25      Anion Gap 11      Urea Nitrogen 19.4      Creatinine 0.75      GFR Estimate 88      Calcium 10.1      Glucose 98     MAGNESIUM - Normal    Magnesium 2.1     TROPONIN T, HIGH SENSITIVITY - Normal    Troponin T, High Sensitivity 7     TROPONIN T, HIGH SENSITIVITY - Normal    Troponin T, High Sensitivity 7         RADIOLOGY:  XR Ankle Left G/E 3 Views   Final Result   IMPRESSION: Small curvilinear, mildly distracted fracture tip of the lateral malleolus with adjacent soft tissue swelling. Ankle mortise is intact. Bones are demineralized.      NOTE: ABNORMAL REPORT      THE DICTATION ABOVE DESCRIBES AN ABNORMALITY FOR WHICH FOLLOW-UP IS NEEDED.       XR Hand Left G/E 3 Views   Final Result   IMPRESSION: Bones are demineralized. Scattered arthritic changes which are severe at the thumb CMC joint. No evidence of an acute displaced fracture. Prior volar plate and screw fixation distal radius.            ECG:    Performed at: 2056    Impression: sinus rhythm with diffuse nonspecific ST abnormality     Rate: 75  Rhythm:  sinus  OK Interval: 146  QRS Interval: 72  QTc Interval: 426  ST Changes: diffuse nonspecific ST abnormality   Comparison: new diffuse ST abnormality     EKG results reviewed and interpreted by Dr. Mercedes, ED MD.       I, Viki Vasquez, am serving as a scribe to document services personally performed by Aranza Jo PA-C, based on my observation and the provider's statements to me. I, Aranza Jo PA-C attest that Viki Vasquez is acting in a scribe capacity, has observed my performance of the services and has documented them in accordance with my direction.       Aranza Jo PA-C   Emergency Medicine   [facility] EMERGENCY DEPARTMENT     Aranza Jo PA-C  04/16/25 0021

## 2025-04-29 ENCOUNTER — PATIENT OUTREACH (OUTPATIENT)
Dept: ONCOLOGY | Facility: CLINIC | Age: 65
End: 2025-04-29

## 2025-04-29 ENCOUNTER — VIRTUAL VISIT (OUTPATIENT)
Dept: ONCOLOGY | Facility: CLINIC | Age: 65
End: 2025-04-29
Attending: INTERNAL MEDICINE
Payer: COMMERCIAL

## 2025-04-29 VITALS — HEIGHT: 67 IN | BODY MASS INDEX: 25.58 KG/M2 | WEIGHT: 163 LBS

## 2025-04-29 DIAGNOSIS — C50.512 MALIGNANT NEOPLASM OF LOWER-OUTER QUADRANT OF LEFT BREAST OF FEMALE, ESTROGEN RECEPTOR POSITIVE (H): Primary | ICD-10-CM

## 2025-04-29 DIAGNOSIS — Z79.899 ENCOUNTER FOR MONITORING CARDIOTOXIC DRUG THERAPY: ICD-10-CM

## 2025-04-29 DIAGNOSIS — Z51.81 ENCOUNTER FOR MONITORING CARDIOTOXIC DRUG THERAPY: ICD-10-CM

## 2025-04-29 DIAGNOSIS — C15.9 SQUAMOUS CELL CARCINOMA OF ESOPHAGUS (H): ICD-10-CM

## 2025-04-29 DIAGNOSIS — Z17.0 MALIGNANT NEOPLASM OF LOWER-OUTER QUADRANT OF LEFT BREAST OF FEMALE, ESTROGEN RECEPTOR POSITIVE (H): Primary | ICD-10-CM

## 2025-04-29 PROCEDURE — G2211 COMPLEX E/M VISIT ADD ON: HCPCS | Performed by: INTERNAL MEDICINE

## 2025-04-29 PROCEDURE — 1125F AMNT PAIN NOTED PAIN PRSNT: CPT | Performed by: INTERNAL MEDICINE

## 2025-04-29 PROCEDURE — 98007 SYNCH AUDIO-VIDEO EST HI 40: CPT | Performed by: INTERNAL MEDICINE

## 2025-04-29 RX ORDER — HEPARIN SODIUM (PORCINE) LOCK FLUSH IV SOLN 100 UNIT/ML 100 UNIT/ML
5 SOLUTION INTRAVENOUS
OUTPATIENT
Start: 2025-05-19

## 2025-04-29 RX ORDER — DIPHENHYDRAMINE HYDROCHLORIDE 50 MG/ML
50 INJECTION, SOLUTION INTRAMUSCULAR; INTRAVENOUS
Start: 2025-05-19

## 2025-04-29 RX ORDER — HEPARIN SODIUM,PORCINE 10 UNIT/ML
5-20 VIAL (ML) INTRAVENOUS DAILY PRN
OUTPATIENT
Start: 2025-05-19

## 2025-04-29 RX ORDER — ALBUTEROL SULFATE 0.83 MG/ML
2.5 SOLUTION RESPIRATORY (INHALATION)
OUTPATIENT
Start: 2025-05-19

## 2025-04-29 RX ORDER — DIPHENHYDRAMINE HYDROCHLORIDE 50 MG/ML
25 INJECTION, SOLUTION INTRAMUSCULAR; INTRAVENOUS
Start: 2025-05-19

## 2025-04-29 RX ORDER — METHYLPREDNISOLONE SODIUM SUCCINATE 40 MG/ML
40 INJECTION INTRAMUSCULAR; INTRAVENOUS
Start: 2025-05-19

## 2025-04-29 RX ORDER — ACETAMINOPHEN 325 MG/1
650 TABLET ORAL ONCE
OUTPATIENT
Start: 2025-05-19

## 2025-04-29 RX ORDER — MEPERIDINE HYDROCHLORIDE 25 MG/ML
25 INJECTION INTRAMUSCULAR; INTRAVENOUS; SUBCUTANEOUS
OUTPATIENT
Start: 2025-05-19

## 2025-04-29 RX ORDER — ALBUTEROL SULFATE 90 UG/1
1-2 INHALANT RESPIRATORY (INHALATION)
Start: 2025-05-19

## 2025-04-29 RX ORDER — DIPHENHYDRAMINE HYDROCHLORIDE 50 MG/ML
50 INJECTION, SOLUTION INTRAMUSCULAR; INTRAVENOUS ONCE
OUTPATIENT
Start: 2025-05-19

## 2025-04-29 RX ORDER — EPINEPHRINE 1 MG/ML
0.3 INJECTION, SOLUTION INTRAMUSCULAR; SUBCUTANEOUS EVERY 5 MIN PRN
OUTPATIENT
Start: 2025-05-19

## 2025-04-29 RX ORDER — PALONOSETRON 0.05 MG/ML
0.25 INJECTION, SOLUTION INTRAVENOUS ONCE
OUTPATIENT
Start: 2025-05-19

## 2025-04-29 RX ORDER — DIPHENHYDRAMINE HCL 25 MG
50 CAPSULE ORAL ONCE
OUTPATIENT
Start: 2025-05-19

## 2025-04-29 ASSESSMENT — PAIN SCALES - GENERAL: PAINLEVEL_OUTOF10: SEVERE PAIN (8)

## 2025-04-29 NOTE — NURSING NOTE
Is the patient currently in the state of [place]? YES    Visit mode: VIDEO    If the visit is dropped, the patient can be reconnected by:VIDEO VISIT: Send to e-mail at: [email or web address]    Will anyone else be joining the visit? NO  (If patient encounters technical issues they should call [phone]    Are changes needed to the allergy or medication list? No    Are refills needed on medications prescribed by this physician? NO    Rooming Documentation:  Questionnaire(s) completed    Reason for visit: Video Visit (Follow Up )    Clementina BARNEY

## 2025-04-29 NOTE — PATIENT INSTRUCTIONS
Please schedule echocardiogram as soon as possible    Start chemo for breast cancer asap    See me or NP before C#2 and 3 and 4    Refer to breast surgeon    Refer to genetic counselor

## 2025-04-29 NOTE — PROGRESS NOTES
New Patient Oncology Nurse Navigator Note     Referring provider: Kathy Burch MD      Referring Clinic/Organization: [facility]      Referred to (specialty:) Genetic Counseling and Cancer Risk Management     Requested provider (if applicable): NA     Date Referral Received: April 29, 2025     Evaluation for:    C50.512, Z17.0 (ICD-10-CM) - Malignant neoplasm of lower-outer quadrant of left breast of female, estrogen receptor positive (H)   C15.9 (ICD-10-CM) - Squamous cell carcinoma of esophagus (H)       Payor: [facility] / Plan: [facility] INDIVIDUAL FAMILY PLANS WITH [employer] / Product Type: HMO /     April 29, 2025  Referral received and reviewed.   Sent to NPS to schedule.     Cherie LEMUSN, RN, OCN  Oncology Nurse Navigator   [facility]  Cancer Care Service Line   New Patient Hem/Onc Scheduling / Referrals: [phone] (fax: [phone] )

## 2025-04-29 NOTE — LETTER
4/29/2025      Linh Mustafa  [address]  [place] [address]      Dear Colleague,    Thank you for referring your patient, Linh Mustafa, to the [facility]. Please see a copy of my visit note below.    Virtual Visit Details    Type of service:  Video Visit     Originating Location (pt. Location): Home    Distant Location (provider location):  Off-site  Platform used for Video Visit: Daily Pic  Video start time. 1:48 PM  Video stop time. 2:10 PM     Oncology follow-up visit:  Date on this visit: 4/29/25     Diagnosis.  Esophageal cancer.    Primary Physician: Clinic, [facility]     History Of Present Illness:  Ms. Mustafa is a 64 year old female who presents with diagnosis of esophageal cancer.  I initially saw her on 4/18/2024.  Please see my previous note for details.  I have copied and updated from prior notes.      She mentioned that since November 2023 she started to notice pain upon swallowing and it was basically pain which limited her food intake.  This was progressively getting worse.  She had further workup as mentioned below.    2/15/2024.  EGD showed an ulcerated mid esophageal mass extending from 26-31 cm from the incisors.  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  Pathology from the mid esophageal mass showed moderately differentiated invasive squamous cell carcinoma, MMR IHC intact.    3/7/2024.  PET/CT showed markedly FDG avid wall thickening of the mid thoracic esophagus with SUV max 24.1.  No evidence of metastatic disease.    3/21/2024.  Upper EUS.  Endoscopy showed a flat nodule in the proximal esophagus between 15-19 cm and one third circumferential.  Upper esophageal sphincter was at 14 cm.  Biopsies from this showed high-grade dysplasia/carcinoma in situ.      Normal esophageal squamous cell cancer causing maybe esophagus on the right anterolateral esophageal wall between 24-30 cm.  It was 50%  circumferential.  The GE junction was at 35 cm with 2 cm tongues of Auguste's anteriorly without high risk features.    Ultrasound exam showed the mid esophageal tumor to be probably T3. Two 4 x 6 mm nodes were seen adjacent to the distal esophageal wall at 36 and 37 cm.  Both an identical appearance and one was sampled.  This lymph node biopsy was benign.    By EUS it was staged as T3 N0 M0 tumor.    4/2/2024.  Because of finding of carcinoma in situ in the proximal esophagus, she had endoscopic resection of the proximal squamous cell carcinoma in situ lesion performed with en bloc removal.    The final pathology showed squamous mucosa with high-grade dysplasia/carcinoma in situ with all margins negative for dysplasia.  No definite invasion was identified. ESD was considered curative for this lesion.    Her case was also discussed in the tumor conference with the plan to proceed with neoadjuvant chemotherapy/radiation followed by definitive surgery.      She met with Dr. Martinez.    She started on concurrent chemoradiation with carboplatin and Taxol on 5/1/24 and received 5 doses, last on 5/28/24 and last dose of radiation on 6/5/24.     She was hospitalized from 6/4-6/18/24 with radiation esophagitis causing pain and inability to eat. She was discharged home with a G-tube that was placed on 6/14/24.     Repeat PET/CT on 8/15/2024 showed very good response to treatment.  There is complete resolution of FDG avidity in the esophagus.  No evidence of malignancy.    She met with Dr. Martinez and decision was made not to pursue surgery for now.    Feeding tube was removed in September 2024.    2/13/2025.  CT chest abdomen and pelvis did not show evidence of disease.      Interval history.    This is a video visit.  Her daughter is also available.  She mentioned that she was having pain in the left breast which started around end of March 2025 and had a mammogram/ultrasound done which showed a 2 cm suspicious lesion in  the left breast at around 5 o'clock position.  I do not have imaging to review myself at this times and she was reading the report from her computer.  The ultrasound showed normal left axillary lymph nodes.  This was followed by left breast biopsy on 4/22/2025 which showed invasive ductal carcinoma, [place] grade 3 with focally suspicious angiolymphatic invasion.  ER 28% positive.  [place] negative.  HER2 by IHC was +3+.  Ki-67 index 21%.   She continues to have some pain in the left breast.  Denies any other swellings.  Denies any other pain.  She has some nausea for which she takes Compazine twice a day and as needed Zofran.  No trouble swallowing.  Bowel movements are fine.  Denies any infections.  No neuropathy.    ECOG 1    ROS:  A comprehensive ROS was otherwise neg    I reviewed other history in epic as below.    Past Medical/Surgical History:  Past Medical History:   Diagnosis Date     Hyperlipidemia      Hypertension    Hypothyroidism- hx of PATEL at the age of 16 for hyperthyroidism    Past Surgical History:   Procedure Laterality Date     BIOPSY BREAST Right     age 30 benign fibrous     CV CORONARY ANGIOGRAM N/A 01/25/2023    Procedure: Coronary Angiogram;  Surgeon: Lukas Irizarry MD;  [facility]     CV LEFT HEART CATH N/A 01/25/2023    Procedure: Left Heart Catheterization;  Surgeon: Lukas Irizarry MD;  Location: [facility] CV     ENDOSCOPIC ULTRASOUND UPPER GASTROINTESTINAL TRACT (GI) N/A 3/21/2024    Procedure: ENDOSCOPIC ULTRASOUND, ESOPHAGOSCOPY / UPPER GASTROINTESTINAL TRACT (GI), ENDOSCOPY WITH BIOPSY, FINE NEEDLE ASPIRATION;  Surgeon: Damon Kramer MD;  Location: [facility] OR     ENDOSCOPIC ULTRASOUND, ESOPHAGOSCOPY / UPPER GASTROINTESTINAL TRACT (GI), ENDOSCOPY WITH BIOPSY, FINE NEEDLE ASPIRATION  03/21/2024     ESOPHAGOSCOPY, GASTROSCOPY, DUODENOSCOPY (EGD), COMBINED N/A 6/6/2024    Procedure: Esophagoscopy, Gastroscopy, Duodenoscopy (EGD), Nasojejunal Feeding Tube  Placement;  Surgeon: Bryant Martinez MD;  Location: [facility] OR     ESOPHAGOSCOPY, GASTROSCOPY, DUODENOSCOPY (EGD), SUBMUCOSA RESECTION N/A 4/2/2024    Procedure: ESOPHAGOGASTRODUODENOSCOPY, WITH SUBMUCOSAL RESECTION;  Surgeon: Holden King MD;  Location: [facility] OR     IR CHEST PORT PLACEMENT > 5 YRS OF AGE  4/24/2024     LAPAROSCOPIC ASSISTED INSERTION TUBE GASTROTOMY N/A 6/14/2024    Procedure: Upper endoscopy, laparoscopic gastrostomy tube placement;  Surgeon: Bryant Martinez MD;  Location: [facility] OR     Cancer History:   As above    Allergies:  Allergies as of 04/29/2025 - Reviewed 04/15/2025   Allergen Reaction Noted     Amoxicillin Shortness Of Breath, Itching, and Rash 12/06/2023     Penicillins  03/14/2024     Current Medications:  Current Outpatient Medications   Medication Sig Dispense Refill     artificial saliva (BIOTENE DRY MOUTHWASH) LIQD liquid Swish and spit 15 mLs in mouth 4 times daily as needed for dry mouth 473 mL 0     Calcium Citrate-Vitamin D (CALCIUM CITRATE CHEWY BITE) 500-12.5 MG-MCG CHEW Take 1 tablet by mouth 2 times daily. 180 tablet 3     dextran 70-hypromellose (TEARS NATURALE FREE PF) 0.1-0.3 % ophthalmic solution Place 1 drop into both eyes daily as needed (Irritation). 35 each 0     doxepin (SINEQUAN) 10 MG/ML (HIGH CONC) solution TAKE 0.3-1 MLS (3-10 MG) BY MOUTH AT BEDTIME. 90 mL 1     esomeprazole (NEXIUM) 20 MG DR capsule Take 1 capsule (20 mg) by mouth daily. 90 capsule 1     hydrOXYzine (VISTARIL) 50 MG capsule Take 50 mg by mouth 2 times daily as needed.       hydrOXYzine HCl (ATARAX) 25 MG tablet TAKE 1-2 TABLETS (25-50 MG) BY MOUTH 3 TIMES DAILY AS NEEDED FOR ITCHING 540 tablet 1     levothyroxine (SYNTHROID/LEVOTHROID) 100 MCG tablet Take 1 tablet (100 mcg) by mouth daily. 90 tablet 3     methocarbamol (ROBAXIN) 750 MG tablet Take 1 tablet (750 mg) by mouth 4 times daily 120 tablet 0     naloxone (NARCAN) 4 MG/0.1ML nasal spray Spray 1 spray (4 mg) into one nostril  alternating nostrils as needed for opioid reversal every 2-3 minutes until assistance arrives 0.2 mL 1     nicotine (NICODERM CQ) 14 MG/24HR 24 hr patch Place 1 patch onto the skin every 24 hours (Patient not taking: Reported on 2/20/2025) 30 patch 1     nicotine (NICODERM CQ) 7 MG/24HR 24 hr patch Place 1 patch onto the skin every 24 hours Start after 6 weeks of the 14 mg/24 hr dose 30 patch 0     ondansetron (ZOFRAN ODT) 8 MG ODT tab Take 1 tablet (8 mg) by mouth every 8 hours as needed for nausea. 90 tablet 1     oxyCODONE (ROXICODONE) 5 MG/5ML solution Take 5-7 mLs (5-7 mg) by mouth or Feeding Tube every 4 hours as needed for severe pain. 400 mL 0     prochlorperazine (COMPAZINE) 10 MG tablet Take 1 tablet (10 mg) by mouth every 6 hours as needed for vomiting. 90 tablet 1     rOPINIRole (REQUIP) 1 MG tablet Take 1.5-2 tablets (1.5-2 mg) by mouth at bedtime. 60 tablet 3     sertraline (ZOLOFT) 50 MG tablet Take 1 tablet (50 mg) by mouth daily. 90 tablet 1     vitamin D3 (CHOLECALCIFEROL) 50 mcg (2000 units) tablet Take 1 tablet (50 mcg) by mouth daily. 90 tablet 3      Family History:  Family History   Problem Relation Age of Onset     Chronic Obstructive Pulmonary Disease Father      Colon Cancer Paternal Grandmother 70     1 daughter- healthy    Social History:  Social History     Socioeconomic History     Marital status: Single     Spouse name: Not on file     Number of children: Not on file     Years of education: Not on file     Highest education level: Not on file   Occupational History     Not on file   Tobacco Use     Smoking status: Former     Current packs/day: 1.00     Average packs/day: 1 pack/day for 48.3 years (48.3 ttl pk-yrs)     Types: Cigarettes     Start date: 1977     Passive exposure: Current     Smokeless tobacco: Never   Vaping Use     Vaping status: Never Used   Substance and Sexual Activity     Alcohol use: Not on file     Comment: occasional     Drug use: Never     Sexual activity: Not  on file   Other Topics Concern     Not on file   Social History Narrative    ** Merged History Encounter **          Social Drivers of Health     Financial Resource Strain: Not on file   Food Insecurity: No Food Insecurity (2/24/2024)    Received from [facility]    Hunger Vital Sign      Worried About Running Out of Food in the Last Year: Never true      Ran Out of Food in the Last Year: Never true   Transportation Needs: No Transportation Needs (2/24/2024)    Received from [facility]    PRAPARE - Transportation      Lack of Transportation (Medical): No      Lack of Transportation (Non-Medical): No   Physical Activity: Inactive (2/24/2024)    Received from [facility]    Exercise Vital Sign      Days of Exercise per Week: 0 days      Minutes of Exercise per Session: 0 min   Stress: Not on file   Social Connections: Not on file   Interpersonal Safety: Not on file   Housing Stability: Low Risk  (2/24/2024)    Received from [facility]    Housing Stability      What is your living situation today?: I have a steady place to live   Chronic smoker- now smokes occasionally. No etoh. Lives alone. Currently brother staying with her        Physical Exam:  There were no vitals taken for this visit.    Wt Readings from Last 4 Encounters:   04/15/25 75.3 kg (165 lb 14.4 oz)   02/28/25 70.8 kg (156 lb)   02/20/25 69.9 kg (154 lb)   02/14/25 68.9 kg (152 lb)       Constitutional.  Looks well and in no apparent distress.   Eyes.  Without eye redness or apparent jaundice.   Respiratory.  Non labored breathing. Speaking in full sentences.    Skin.  No concerning skin rashes on the skin visualized.   Neurological.  Is alert and oriented.  Psychiatric.  Mood and affect seem appropriate.      Laboratory/Imaging Studies      Reviewed  4/15/2025  BMP unremarkable.  CBC shows WBC 6.5.  Hemoglobin 11.2.  Platelets 227.      2/6/2025    TSH 3.58    Left ankle x-ray 4/15/2025.      IMPRESSION: Small curvilinear, mildly distracted fracture tip of the lateral malleolus with adjacent soft tissue swelling. Ankle mortise is intact. Bones are demineralized.     CT chest abdomen and pelvis 2/13/2025  FINDINGS:   LUNGS AND PLEURA: Emphysematous change in both lungs. No nodules or masses.     MEDIASTINUM/AXILLAE: No mediastinal mass or adenopathy. Mild thickening along the mid esophagus is noted stable.     CORONARY ARTERY CALCIFICATION: Moderate.     HEPATOBILIARY: Multiple small low-attenuation lesions in the liver the largest in segment 8 measuring 1 cm stable from previous examination. Cholelithiasis.     PANCREAS: Pancreas is normal.     SPLEEN: Normal.     ADRENAL GLANDS: Bilateral adrenal adenomas stable.     KIDNEYS/BLADDER: No stones in either kidney. No hydronephrosis.     BOWEL: Few colonic diverticula.     LYMPH NODES: No abnormal adenopathy in the abdomen or pelvis.     VASCULATURE: Atherosclerotic plaque throughout the aorta and iliac vessels.     PELVIC ORGANS: Normal.     MUSCULOSKELETAL: No skeletal lesions.                                                                      IMPRESSION:  1.  No definite change from previous.     2.  Small low-attenuation lesions in the liver stable possibly cysts.     3.  Mild thickening of the wall of the mid esophagus stable.     4.  No new findings.    ASSESSMENT/PLAN:    Stage II left-sided breast cancer, ER +28%, IN negative, HER2 IHC +3+.  It is a 2 cm left-sided breast cancer.  No suspicious axillary lymph nodes are seen.  We will try to get outside imaging.  We discussed that HER2 positive stage II breast cancer is treated with neoadjuvant chemotherapy/targeted therapy followed by surgery and HER2 directed therapy is continued for about 1 year in total.  We discussed rationale schedule and potential side effects of docetaxel/carboplatin/trastuzumab/pertuzumab TCHP.   Our plan will be to give this to her every 3 weeks for 6 cycles followed  by surgery for breast cancer.  I would also like her to meet with breast surgeon.  I gave her the referral.    Monitoring for cardiac toxicity.  We will get baseline echocardiogram because she will be starting potentially cardiotoxic medication trastuzumab/pertuzumab.  At present she does not give me any concerning cardiac symptoms.  We will continue to monitor echocardiogram every few months while she remains on this therapy.      Discussion regarding genetic testing.  Because she previously had esophageal cancer and now has developed breast cancer, I recommend evaluation by genetic counselor to consider genetic testing.  I gave her the referral.    Nausea.  She usually feels this in the morning.  She will continue Compazine twice a day and as needed Zofran.  She follows with palliative care Dr. Mansoor Eric.      We did not address the following today.  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  MMR IHC intact.      She started on concurrent chemoradiation with carboplatin and Taxol on 5/1/24 and received 5 doses, last on 5/28/24 and last dose of radiation on 6/5/24.    Repeat PET/CT on 8/15/2024 showed complete resolution of FDG avidity in the esophagus.    At that time she met with Dr. Martinez and decision was made not to pursue the surgery.  Her feeding tube was removed.      She had a repeat CT chest abdomen and pelvis on 2/13/2025 which showed mild stable thickening of the mid esophagus.  No new lesions concerning for recurrence/metastasis are seen.  We decided to repeat CT scan in 6 months.    Nutrition.  She was on tube feeds but now she is able to eat better.  She was losing weight but now has started to gain weight which is good news.  She does not have any dysphagia or odynophagia anymore.        Smoking.  She quit for some time but now smoking again and she smokes on average half a pack a day.  We again discussed about this and I gave her a referral  for smoking cessation program.          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  Now s/p endoscopic resection with clear margins free of dysplasia.  This has been adequately treated.  Continue to observe.      Macrocytosis.  Previously she had macrocytosis.  B12 and folate were normal.  Free T4 was normal.  TSH was low.  Continue to observe.     Return to clinic before cycle #2 chemotherapy.    I answered all of her and her daughter's questions to their satisfaction.  They are agreeable and comfortable with the plan.    Kathy Burch MD     The longitudinal plan of care for the breast cancer/esophageal cancer, as documented were addressed during this visit. Due to the added complexity in care, I will continue to support Lita in the subsequent management and with ongoing continuity of care.    I spent >40 minutes on this visit on the date of service, including the video time, reviewing records and labs and imaging and placing new orders as well as coordination of care and documentation.      Again, thank you for allowing me to participate in the care of your patient.        Sincerely,        Kathy Burch MD    Electronically signed

## 2025-04-29 NOTE — PROGRESS NOTES
"New Patient Oncology Nurse Navigator Note     Referring provider: Kathy Burch MD      Referring Clinic/Organization: [facility]     Referred to (specialty:) Cancer Surgery      Date Referral Received: April 29, 2025     Evaluation for:  C50.512, Z17.0 (ICD-10-CM) - Malignant neoplasm of lower-outer quadrant of left breast of female, estrogen receptor positive (H)      Clinical History (per Nurse review of records provided):      Linh Mustafa  is a 64 year old female who has seen Dr. Burch for esophageal cancer. Please see provider's excellent note regarding her diagnosis and treatment for esophageal cancer.    DIAGNOSTIC BREAST IMAGING FROM APRIL 2025 NEEDED  NEED SIZE BEFORE PROCEEDING  From Dr. Burch's note patient read the imaging reports to him during virtual appointment and \"mammogram/ultrasound done which showed a 2 cm suspicious lesion in the left breast at around 5 o'clock position...The ultrasound showed normal left axillary lymph nodes.\"     4/22/25 Case: [id number]                                   A) LEFT BREAST, 5:00, 2 CM FROM NIPPLE, ULTRASOUND-GUIDED CORE BIOPSY:   1. Invasive ductal carcinoma      a. [place] grade: III of III; [place] score: 8 of 9      b. Angio-lymphatic invasion: Focally suspicious      c. Associated DCIS: Not definitively identified   2. Breast Ancillary Testing:       a. Hormone Receptors:             Estrogen receptor: Positive (28%, weak staining)             Progesterone receptor: Negative       b. HER2 by IHC: Positive (3+ by manual morphometry)       c. Ki-67: 21%     Patient resides in [place], [place]. She clarifies she sees Dr. Burch through [facility] and [place] and other [place] [facility]     Per Dr. Burch's 4/29/25 note, treatment with neoadjuvant chemotherapy/targeted therapy with docetaxel/carboplatin/trastuzumab/pertuzumab TCHP is offered followed by surgery. HER2 directed therapy is continued for about 1 " year in total.    Our plan will be to give this to her every 3 weeks for 6 cycles followed by surgery for breast cancer.     Dr. Burch has ordered genetic counseling.    4/30 1422 - Telephoned and spoke with Lita. Writer received referral, reviewed for appropriate plan, and referral transferred to New Patient Scheduling for completion.    Records Location: Care Everywhere, Media, and See Bookmarked material     Records Needed:  Path reports-biopsy  (per protocol)  Pathology review (per protocol)  Breast imaging for past 5 years Especially diagnostic imaging from spring 2025  Systemic imaging (per protocol)

## 2025-04-29 NOTE — PROGRESS NOTES
Virtual Visit Details    Type of service:  Video Visit     Originating Location (pt. Location): Home    Distant Location (provider location):  Off-site  Platform used for Video Visit: Jajah  Video start time. 1:48 PM  Video stop time. 2:10 PM     Oncology follow-up visit:  Date on this visit: 4/29/25     Diagnosis.  Esophageal cancer.    Primary Physician: [place], [facility]     History Of Present Illness:  Ms. Mustafa is a 64 year old female who presents with diagnosis of esophageal cancer.  I initially saw her on 4/18/2024.  Please see my previous note for details.  I have copied and updated from prior notes.      She mentioned that since November 2023 she started to notice pain upon swallowing and it was basically pain which limited her food intake.  This was progressively getting worse.  She had further workup as mentioned below.    2/15/2024.  EGD showed an ulcerated mid esophageal mass extending from 26-31 cm from the incisors.  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  Pathology from the mid esophageal mass showed moderately differentiated invasive squamous cell carcinoma, MMR IHC intact.    3/7/2024.  PET/CT showed markedly FDG avid wall thickening of the mid thoracic esophagus with SUV max 24.1.  No evidence of metastatic disease.    3/21/2024.  Upper EUS.  Endoscopy showed a flat nodule in the proximal esophagus between 15-19 cm and one third circumferential.  Upper esophageal sphincter was at 14 cm.  Biopsies from this showed high-grade dysplasia/carcinoma in situ.      Normal esophageal squamous cell cancer causing maybe esophagus on the right anterolateral esophageal wall between 24-30 cm.  It was 50% circumferential.  The GE junction was at 35 cm with 2 cm tongues of Auguste's anteriorly without high risk features.    Ultrasound exam showed the mid esophageal tumor to be probably T3. Two 4 x 6 mm nodes were seen adjacent to the distal esophageal wall at  36 and 37 cm.  Both an identical appearance and one was sampled.  This lymph node biopsy was benign.    By EUS it was staged as T3 N0 M0 tumor.    4/2/2024.  Because of finding of carcinoma in situ in the proximal esophagus, she had endoscopic resection of the proximal squamous cell carcinoma in situ lesion performed with en bloc removal.    The final pathology showed squamous mucosa with high-grade dysplasia/carcinoma in situ with all margins negative for dysplasia.  No definite invasion was identified. ESD was considered curative for this lesion.    Her case was also discussed in the tumor conference with the plan to proceed with neoadjuvant chemotherapy/radiation followed by definitive surgery.      She met with Dr. Martinez.    She started on concurrent chemoradiation with carboplatin and Taxol on 5/1/24 and received 5 doses, last on 5/28/24 and last dose of radiation on 6/5/24.     She was hospitalized from 6/4-6/18/24 with radiation esophagitis causing pain and inability to eat. She was discharged home with a G-tube that was placed on 6/14/24.     Repeat PET/CT on 8/15/2024 showed very good response to treatment.  There is complete resolution of FDG avidity in the esophagus.  No evidence of malignancy.    She met with Dr. Martinez and decision was made not to pursue surgery for now.    Feeding tube was removed in September 2024.    2/13/2025.  CT chest abdomen and pelvis did not show evidence of disease.      Interval history.    This is a video visit.  Her daughter is also available.  She mentioned that she was having pain in the left breast which started around end of March 2025 and had a mammogram/ultrasound done which showed a 2 cm suspicious lesion in the left breast at around 5 o'clock position.  I do not have imaging to review myself at this times and she was reading the report from her computer.  The ultrasound showed normal left axillary lymph nodes.  This was followed by left breast biopsy on 4/22/2025  which showed invasive ductal carcinoma, [place] grade 3 with focally suspicious angiolymphatic invasion.  ER 28% positive.  WA negative.  HER2 by IHC was +3+.  Ki-67 index 21%.   She continues to have some pain in the left breast.  Denies any other swellings.  Denies any other pain.  She has some nausea for which she takes Compazine twice a day and as needed Zofran.  No trouble swallowing.  Bowel movements are fine.  Denies any infections.  No neuropathy.    ECOG 1    ROS:  A comprehensive ROS was otherwise neg    I reviewed other history in epic as below.    Past Medical/Surgical History:  Past Medical History:   Diagnosis Date    Hyperlipidemia     Hypertension    Hypothyroidism- hx of PATEL at the age of 16 for hyperthyroidism    Past Surgical History:   Procedure Laterality Date    BIOPSY BREAST Right     age 30 benign fibrous    CV CORONARY ANGIOGRAM N/A 01/25/2023    Procedure: Coronary Angiogram;  Surgeon: Lukas Irizarry MD;  Location: [facility] CV    CV LEFT HEART CATH N/A 01/25/2023    Procedure: Left Heart Catheterization;  Surgeon: Lukas Irizarry MD;  Location: [facility] CV    ENDOSCOPIC ULTRASOUND UPPER GASTROINTESTINAL TRACT (GI) N/A 3/21/2024    Procedure: ENDOSCOPIC ULTRASOUND, ESOPHAGOSCOPY / UPPER GASTROINTESTINAL TRACT (GI), ENDOSCOPY WITH BIOPSY, FINE NEEDLE ASPIRATION;  Surgeon: Damon Kramer MD;  Location: [facility] OR    ENDOSCOPIC ULTRASOUND, ESOPHAGOSCOPY / UPPER GASTROINTESTINAL TRACT (GI), ENDOSCOPY WITH BIOPSY, FINE NEEDLE ASPIRATION  03/21/2024    ESOPHAGOSCOPY, GASTROSCOPY, DUODENOSCOPY (EGD), COMBINED N/A 6/6/2024    Procedure: Esophagoscopy, Gastroscopy, Duodenoscopy (EGD), Nasojejunal Feeding Tube Placement;  Surgeon: Bryant Martinez MD;  Location: [facility] OR    ESOPHAGOSCOPY, GASTROSCOPY, DUODENOSCOPY (EGD), SUBMUCOSA RESECTION N/A 4/2/2024    Procedure: ESOPHAGOGASTRODUODENOSCOPY, WITH SUBMUCOSAL RESECTION;  Surgeon: Holden King MD;  Location:  OR     IR CHEST PORT PLACEMENT > 5 YRS OF AGE  4/24/2024    LAPAROSCOPIC ASSISTED INSERTION TUBE GASTROTOMY N/A 6/14/2024    Procedure: Upper endoscopy, laparoscopic gastrostomy tube placement;  Surgeon: Bryant Martinez MD;  Location: [facility] OR     Cancer History:   As above    Allergies:  Allergies as of 04/29/2025 - Reviewed 04/15/2025   Allergen Reaction Noted    Amoxicillin Shortness Of Breath, Itching, and Rash 12/06/2023    Penicillins  03/14/2024     Current Medications:  Current Outpatient Medications   Medication Sig Dispense Refill    artificial saliva (BIOTENE DRY MOUTHWASH) LIQD liquid Swish and spit 15 mLs in mouth 4 times daily as needed for dry mouth 473 mL 0    Calcium Citrate-Vitamin D (CALCIUM CITRATE CHEWY BITE) 500-12.5 MG-MCG CHEW Take 1 tablet by mouth 2 times daily. 180 tablet 3    dextran 70-hypromellose (TEARS NATURALE FREE PF) 0.1-0.3 % ophthalmic solution Place 1 drop into both eyes daily as needed (Irritation). 35 each 0    doxepin (SINEQUAN) 10 MG/ML (HIGH CONC) solution TAKE 0.3-1 MLS (3-10 MG) BY MOUTH AT BEDTIME. 90 mL 1    esomeprazole (NEXIUM) 20 MG DR capsule Take 1 capsule (20 mg) by mouth daily. 90 capsule 1    hydrOXYzine (VISTARIL) 50 MG capsule Take 50 mg by mouth 2 times daily as needed.      hydrOXYzine HCl (ATARAX) 25 MG tablet TAKE 1-2 TABLETS (25-50 MG) BY MOUTH 3 TIMES DAILY AS NEEDED FOR ITCHING 540 tablet 1    levothyroxine (SYNTHROID/LEVOTHROID) 100 MCG tablet Take 1 tablet (100 mcg) by mouth daily. 90 tablet 3    methocarbamol (ROBAXIN) 750 MG tablet Take 1 tablet (750 mg) by mouth 4 times daily 120 tablet 0    naloxone (NARCAN) 4 MG/0.1ML nasal spray Spray 1 spray (4 mg) into one nostril alternating nostrils as needed for opioid reversal every 2-3 minutes until assistance arrives 0.2 mL 1    nicotine (NICODERM CQ) 14 MG/24HR 24 hr patch Place 1 patch onto the skin every 24 hours (Patient not taking: Reported on 2/20/2025) 30 patch 1    nicotine (NICODERM CQ) 7  MG/24HR 24 hr patch Place 1 patch onto the skin every 24 hours Start after 6 weeks of the 14 mg/24 hr dose 30 patch 0    ondansetron (ZOFRAN ODT) 8 MG ODT tab Take 1 tablet (8 mg) by mouth every 8 hours as needed for nausea. 90 tablet 1    oxyCODONE (ROXICODONE) 5 MG/5ML solution Take 5-7 mLs (5-7 mg) by mouth or Feeding Tube every 4 hours as needed for severe pain. 400 mL 0    prochlorperazine (COMPAZINE) 10 MG tablet Take 1 tablet (10 mg) by mouth every 6 hours as needed for vomiting. 90 tablet 1    rOPINIRole (REQUIP) 1 MG tablet Take 1.5-2 tablets (1.5-2 mg) by mouth at bedtime. 60 tablet 3    sertraline (ZOLOFT) 50 MG tablet Take 1 tablet (50 mg) by mouth daily. 90 tablet 1    vitamin D3 (CHOLECALCIFEROL) 50 mcg (2000 units) tablet Take 1 tablet (50 mcg) by mouth daily. 90 tablet 3      Family History:  Family History   Problem Relation Age of Onset    Chronic Obstructive Pulmonary Disease Father     Colon Cancer Paternal Grandmother 70     1 daughter- healthy    Social History:  Social History     Socioeconomic History    Marital status: Single     Spouse name: Not on file    Number of children: Not on file    Years of education: Not on file    Highest education level: Not on file   Occupational History    Not on file   Tobacco Use    Smoking status: Former     Current packs/day: 1.00     Average packs/day: 1 pack/day for 48.3 years (48.3 ttl pk-yrs)     Types: Cigarettes     Start date: 1977     Passive exposure: Current    Smokeless tobacco: Never   Vaping Use    Vaping status: Never Used   Substance and Sexual Activity    Alcohol use: Not on file     Comment: occasional    Drug use: Never    Sexual activity: Not on file   Other Topics Concern    Not on file   Social History Narrative    ** Merged History Encounter **          Social Drivers of Health     Financial Resource Strain: Not on file   Food Insecurity: No Food Insecurity (2/24/2024)    [facility]    Hunger Vital Sign      Worried About Running Out of Food in the Last Year: Never true     Ran Out of Food in the Last Year: Never true   Transportation Needs: No Transportation Needs (2/24/2024)    Received from [facility]    PRAPARE - Transportation     Lack of Transportation (Medical): No     Lack of Transportation (Non-Medical): No   Physical Activity: Inactive (2/24/2024)    Received from [facility]    Exercise Vital Sign     Days of Exercise per Week: 0 days     Minutes of Exercise per Session: 0 min   Stress: Not on file   Social Connections: Not on file   Interpersonal Safety: Not on file   Housing Stability: Low Risk  (2/24/2024)    Received from [facility]    Housing Stability     What is your living situation today?: I have a steady place to live   Chronic smoker- now smokes occasionally. No etoh. Lives alone. Currently brother staying with her        Physical Exam:  There were no vitals taken for this visit.    Wt Readings from Last 4 Encounters:   04/15/25 75.3 kg (165 lb 14.4 oz)   02/28/25 70.8 kg (156 lb)   02/20/25 69.9 kg (154 lb)   02/14/25 68.9 kg (152 lb)       Constitutional.  Looks well and in no apparent distress.   Eyes.  Without eye redness or apparent jaundice.   Respiratory.  Non labored breathing. Speaking in full sentences.    Skin.  No concerning skin rashes on the skin visualized.   Neurological.  Is alert and oriented.  Psychiatric.  Mood and affect seem appropriate.      Laboratory/Imaging Studies      Reviewed  4/15/2025  BMP unremarkable.  CBC shows WBC 6.5.  Hemoglobin 11.2.  Platelets 227.      2/6/2025    TSH 3.58    Left ankle x-ray 4/15/2025.     IMPRESSION: Small curvilinear, mildly distracted fracture tip of the lateral malleolus with adjacent soft tissue swelling. Ankle mortise is intact. Bones are demineralized.     CT chest abdomen and pelvis 2/13/2025  FINDINGS:   LUNGS AND PLEURA: Emphysematous change in both lungs. No nodules or masses.      MEDIASTINUM/AXILLAE: No mediastinal mass or adenopathy. Mild thickening along the mid esophagus is noted stable.     CORONARY ARTERY CALCIFICATION: Moderate.     HEPATOBILIARY: Multiple small low-attenuation lesions in the liver the largest in segment 8 measuring 1 cm stable from previous examination. Cholelithiasis.     PANCREAS: Pancreas is normal.     SPLEEN: Normal.     ADRENAL GLANDS: Bilateral adrenal adenomas stable.     KIDNEYS/BLADDER: No stones in either kidney. No hydronephrosis.     BOWEL: Few colonic diverticula.     LYMPH NODES: No abnormal adenopathy in the abdomen or pelvis.     VASCULATURE: Atherosclerotic plaque throughout the aorta and iliac vessels.     PELVIC ORGANS: Normal.     MUSCULOSKELETAL: No skeletal lesions.                                                                      IMPRESSION:  1.  No definite change from previous.     2.  Small low-attenuation lesions in the liver stable possibly cysts.     3.  Mild thickening of the wall of the mid esophagus stable.     4.  No new findings.    ASSESSMENT/PLAN:    Stage II left-sided breast cancer, ER +28%, KS negative, HER2 IHC +3+.  It is a 2 cm left-sided breast cancer.  No suspicious axillary lymph nodes are seen.  We will try to get outside imaging.  We discussed that HER2 positive stage II breast cancer is treated with neoadjuvant chemotherapy/targeted therapy followed by surgery and HER2 directed therapy is continued for about 1 year in total.  We discussed rationale schedule and potential side effects of docetaxel/carboplatin/trastuzumab/pertuzumab TCHP.   Our plan will be to give this to her every 3 weeks for 6 cycles followed by surgery for breast cancer.  I would also like her to meet with breast surgeon.  I gave her the referral.    Monitoring for cardiac toxicity.  We will get baseline echocardiogram because she will be starting potentially cardiotoxic medication trastuzumab/pertuzumab.  At present she does not give me any  concerning cardiac symptoms.  We will continue to monitor echocardiogram every few months while she remains on this therapy.      Discussion regarding genetic testing.  Because she previously had esophageal cancer and now has developed breast cancer, I recommend evaluation by genetic counselor to consider genetic testing.  I gave her the referral.    Nausea.  She usually feels this in the morning.  She will continue Compazine twice a day and as needed Zofran.  She follows with palliative care Dr. Mansoor Eric.      We did not address the following today.  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  MMR IHC intact.      She started on concurrent chemoradiation with carboplatin and Taxol on 5/1/24 and received 5 doses, last on 5/28/24 and last dose of radiation on 6/5/24.    Repeat PET/CT on 8/15/2024 showed complete resolution of FDG avidity in the esophagus.    At that time she met with Dr. Martinez and decision was made not to pursue the surgery.  Her feeding tube was removed.      She had a repeat CT chest abdomen and pelvis on 2/13/2025 which showed mild stable thickening of the mid esophagus.  No new lesions concerning for recurrence/metastasis are seen.  We decided to repeat CT scan in 6 months.    Nutrition.  She was on tube feeds but now she is able to eat better.  She was losing weight but now has started to gain weight which is good news.  She does not have any dysphagia or odynophagia anymore.        Smoking.  She quit for some time but now smoking again and she smokes on average half a pack a day.  We again discussed about this and I gave her a referral for smoking cessation program.          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  Now s/p endoscopic resection with clear margins free of dysplasia.  This has been adequately treated.  Continue to observe.      Macrocytosis.  Previously she had macrocytosis.  B12 and folate were normal.   Free T4 was normal.  TSH was low.  Continue to observe.     Return to clinic before cycle #2 chemotherapy.    I answered all of her and her daughter's questions to their satisfaction.  They are agreeable and comfortable with the plan.    Kathy Burch MD     The longitudinal plan of care for the breast cancer/esophageal cancer, as documented were addressed during this visit. Due to the added complexity in care, I will continue to support Lita in the subsequent management and with ongoing continuity of care.    I spent >40 minutes on this visit on the date of service, including the video time, reviewing records and labs and imaging and placing new orders as well as coordination of care and documentation.

## 2025-04-30 NOTE — TELEPHONE ENCOUNTER
RECORDS STATUS - BREAST    RECORDS REQUESTED FROM: [facility]   NOTES DETAILS STATUS   OFFICE NOTE from referring provider Epic 4/29/2025 - Dr. Kathy Burch   OFFICE NOTE from medical oncologist [facility]  Ext: [place] Onc Epic:  11/5/2024 - Dr. William Christensen    [place] Onc:   02/27/24: Dr. Дмитрий Chilel    OFFICE NOTE from surgeon     OFFICE NOTE from radiation oncologist [facility]  [place] Onc  Epic:  6/28/2024 - Dr. Mireille Berg    [place] Onc:  3/11/2024 - Dr. Дмитрий Orozco   MEDICATION LIST [facility]    LABS     PATHOLOGY REPORTS  (Tissue diagnosis, Stage, ER/IN percentage positive and intensity of staining, HER2 IHC, FISH, and all biopsies from breast and any distant metastasis)                 -Paramjit 4/22/2025 - [id number]    PATHOLOGY [employer] TRACKING:   TRACKING #:  [id number]   GENONOMIC TESTING     TYPE:   (Next Generation Sequencing, including Foundation One testing, and Oncotype score)     IMAGING (NEED IMAGES & REPORT)     MAMMO PACS 4/9/2025-2/14/2022   ULTRASOUND PACS US Breast: 4/22/2025, 4/9/2025   PET PACS PET: 8/15/2024-2/23/2024

## 2025-05-01 ENCOUNTER — PATIENT OUTREACH (OUTPATIENT)
Dept: ONCOLOGY | Facility: CLINIC | Age: 65
End: 2025-05-01
Payer: COMMERCIAL

## 2025-05-01 DIAGNOSIS — Z51.11 ENCOUNTER FOR ANTINEOPLASTIC CHEMOTHERAPY: Primary | ICD-10-CM

## 2025-05-01 NOTE — PROGRESS NOTES
[facility]: Cancer Care Short Note                                                                                          [facility]: Cancer Care Short Note                                                                                          Lita will be seeing Dr. Burch at [place] for newly diagnosed breast cancer.  She will be coordinated by PRISCILLA De La Cruz.  Handoff given to Jeanette.  Removed [facility] from care team.      Verónica Sharma RN, BSN, OCN  RN Care Coordinator   [facility]

## 2025-05-06 ENCOUNTER — LAB REQUISITION (OUTPATIENT)
Dept: LAB | Facility: CLINIC | Age: 65
End: 2025-05-06
Payer: COMMERCIAL

## 2025-05-06 DIAGNOSIS — C15.9 SQUAMOUS CELL CARCINOMA OF ESOPHAGUS (H): ICD-10-CM

## 2025-05-06 PROCEDURE — 88321 CONSLTJ&REPRT SLD PREP ELSWR: CPT | Mod: GC | Performed by: PATHOLOGY

## 2025-05-06 RX ORDER — OXYCODONE HCL 5 MG/5 ML
5-7 SOLUTION, ORAL ORAL EVERY 4 HOURS PRN
Qty: 400 ML | Refills: 0 | Status: SHIPPED | OUTPATIENT
Start: 2025-05-06

## 2025-05-06 NOTE — TELEPHONE ENCOUNTER
Received [employer] message from patient requesting refill of oxycodone.     Last refill: 4/14/25  Last office visit: 2/28/25  Scheduled for follow up 5/27/25     Will route request to MD/ for review.     Reviewed [place]  Report.

## 2025-05-08 ENCOUNTER — PRE VISIT (OUTPATIENT)
Dept: ONCOLOGY | Facility: CLINIC | Age: 65
End: 2025-05-08
Payer: COMMERCIAL

## 2025-05-08 ENCOUNTER — PATIENT OUTREACH (OUTPATIENT)
Dept: ONCOLOGY | Facility: CLINIC | Age: 65
End: 2025-05-08

## 2025-05-08 ENCOUNTER — VIRTUAL VISIT (OUTPATIENT)
Dept: ONCOLOGY | Facility: CLINIC | Age: 65
End: 2025-05-08
Attending: INTERNAL MEDICINE
Payer: COMMERCIAL

## 2025-05-08 VITALS — WEIGHT: 165 LBS | HEIGHT: 67 IN | BODY MASS INDEX: 25.9 KG/M2

## 2025-05-08 DIAGNOSIS — Z17.0 MALIGNANT NEOPLASM OF LOWER-OUTER QUADRANT OF LEFT BREAST OF FEMALE, ESTROGEN RECEPTOR POSITIVE (H): ICD-10-CM

## 2025-05-08 DIAGNOSIS — C50.512 MALIGNANT NEOPLASM OF LOWER-OUTER QUADRANT OF LEFT BREAST OF FEMALE, ESTROGEN RECEPTOR POSITIVE (H): ICD-10-CM

## 2025-05-08 ASSESSMENT — PAIN SCALES - GENERAL: PAINLEVEL_OUTOF10: SEVERE PAIN (7)

## 2025-05-08 NOTE — NURSING NOTE
Patient declined medication review with VVF during rooming stating they are all same.         Current patient location: [address] [place] [address]  [place]  [place] [address]    Is the patient currently in the state of [place]? YES    Visit mode: VIDEO    If the visit is dropped, the patient can be reconnected by:VIDEO VISIT: Text to cell phone:   Telephone Information:   Mobile [phone]       Will anyone else be joining the visit? BryanPt's Daughter   (If patient encounters technical issues they should call [phone]    Are changes needed to the allergy or medication list? Pt declined med review and Pt stated no med changes    Are refills needed on medications prescribed by this physician? NO    Rooming Documentation:  Not applicable    Reason for visit: Consult      Yulisa Malone VVF

## 2025-05-08 NOTE — LETTER
5/8/2025      Linh Mustafa  [address]  [place] [address]      Dear Colleague,    Thank you for referring your patient, Linh Mustafa, to the [facility] BREAST [facility]. Please see a copy of my visit note below.    Virtual Visit Details    Lita is a 64-year-old woman with a new diagnosis of left breast cancer.  This March she noticed pain in her left breast.  She had a mammogram and ultrasound which I have reviewed.  This demonstrated a 2 cm mass in the left breast correlating to the site of her pain.  Her lymph nodes were normal by ultrasound.  On April 22 she underwent a biopsy which demonstrated a grade 3 invasive ductal cancer that was ER +28% and HER2 positive.  I still do not have her pathology report but will find it.  She has seen medical oncology and will be starting Herceptin based neoadjuvant therapy on May 19.  Her past medical history is significant for esophageal cancer.  She was diagnosed in 2024.  She had a T3 N0 M0 esophageal cancer.  She was treated with chemoradiation therapy.  She had a complete radiographic response and no surgery was required.  Her last imaging was in February which showed no evidence of recurrence.    Impression T2 N0 M0 ER positive HER2 positive breast cancer    Plan: I have talked her about the various treatment options including mastectomy with or without reconstruction versus a lumpectomy plus radiation therapy I have informed her that radiation therapy for breast cancer is not nearly as toxic as radiation therapy for esophageal cancer.  I have told her that the survival rates between lumpectomy plus radiation therapy versus mastectomy are the same.  I did recommend a sentinel lymph node biopsy.  She is going to be receiving neoadjuvant chemotherapy.  I will see her towards the end of her treatment to plan surgery.  I am going to have her get genetic testing on May 19 when she comes in for her first infusion.  She is scheduled for a  formal genetic counseling appointment in October but I went to move up her testing results.    The video started at 10:46 AM and ended at 11:04 AM and the total time of the visit was 30 minutes which included reviewing her imaging, the video visit, and coordinating her care.    Again, thank you for allowing me to participate in the care of your patient.        Sincerely,        Bro Carl MD    Electronically signed

## 2025-05-08 NOTE — PROGRESS NOTES
Lita is a 64-year-old woman with a new diagnosis of left breast cancer.  This March she noticed pain in her left breast.  She had a mammogram and ultrasound which I have reviewed.  This demonstrated a 2 cm mass in the left breast correlating to the site of her pain.  Her lymph nodes were normal by ultrasound.  On April 22 she underwent a biopsy which demonstrated a grade 3 invasive ductal cancer that was ER +28% and HER2 positive.  I still do not have her pathology report but will find it.  She has seen medical oncology and will be starting Herceptin based neoadjuvant therapy on May 19.  Her past medical history is significant for esophageal cancer.  She was diagnosed in 2024.  She had a T3 N0 M0 esophageal cancer.  She was treated with chemoradiation therapy.  She had a complete radiographic response and no surgery was required.  Her last imaging was in February which showed no evidence of recurrence.    Impression T2 N0 M0 ER positive HER2 positive breast cancer    Plan: I have talked her about the various treatment options including mastectomy with or without reconstruction versus a lumpectomy plus radiation therapy I have informed her that radiation therapy for breast cancer is not nearly as toxic as radiation therapy for esophageal cancer.  I have told her that the survival rates between lumpectomy plus radiation therapy versus mastectomy are the same.  I did recommend a sentinel lymph node biopsy.  She is going to be receiving neoadjuvant chemotherapy.  I will see her towards the end of her treatment to plan surgery.  I am going to have her get genetic testing on May 19 when she comes in for her first infusion.  She is scheduled for a formal genetic counseling appointment in October but I went to move up her testing results.    The video started at 10:46 AM and ended at 11:04 AM and the total time of the visit was 30 minutes which included reviewing her imaging, the video visit, and coordinating her care.

## 2025-05-08 NOTE — PROGRESS NOTES
Outgoing call                               Discussion with Patient/Care Team:                                                      Called and spoke with patient, introduced myself as surgical RNCC and role. Discussed planned follow up with Dr. Carl and timing of follow up towards the end of chemotherapy. Informed her our office will monitor her chemo schedule to ensure timing of follow up.     At this time patient is not interested in reconstruction and would like proceed with lumpectomy is this remains option. Will not schedule visit with plastics based on this.     Reviewed plan for genetic testing to be completed at the lab draw at the timing of her next visit. She was agreeable to this plan. Genetics consent form reviewed with patient. She verbalized understanding and provided verbal consent to complete genetic testing. Genetics consent completed and sent to scanning noted that verbal consent was completed.       Follow up/Plan                                                       At the end of chemotherapy.     Dot Garcia RN, BSN  Surgical Oncology and Hepatobiliary Surgery

## 2025-05-14 ENCOUNTER — HOSPITAL ENCOUNTER (OUTPATIENT)
Dept: CARDIOLOGY | Facility: CLINIC | Age: 65
Discharge: HOME | End: 2025-05-14
Attending: INTERNAL MEDICINE
Payer: COMMERCIAL

## 2025-05-14 DIAGNOSIS — Z17.0 MALIGNANT NEOPLASM OF LOWER-OUTER QUADRANT OF LEFT BREAST OF FEMALE, ESTROGEN RECEPTOR POSITIVE (H): ICD-10-CM

## 2025-05-14 DIAGNOSIS — C50.512 MALIGNANT NEOPLASM OF LOWER-OUTER QUADRANT OF LEFT BREAST OF FEMALE, ESTROGEN RECEPTOR POSITIVE (H): ICD-10-CM

## 2025-05-14 DIAGNOSIS — Z51.81 ENCOUNTER FOR MONITORING CARDIOTOXIC DRUG THERAPY: ICD-10-CM

## 2025-05-14 DIAGNOSIS — Z79.899 ENCOUNTER FOR MONITORING CARDIOTOXIC DRUG THERAPY: ICD-10-CM

## 2025-05-14 LAB — LVEF ECHO: NORMAL

## 2025-05-14 PROCEDURE — 255N000002 HC RX 255 OP 636: Performed by: INTERNAL MEDICINE

## 2025-05-14 PROCEDURE — C8929 TTE W OR WO FOL WCON,DOPPLER: HCPCS

## 2025-05-14 RX ADMIN — HUMAN ALBUMIN MICROSPHERES AND PERFLUTREN 6 ML: 10; .22 INJECTION, SOLUTION INTRAVENOUS at 15:35

## 2025-05-16 RX ORDER — PROCHLORPERAZINE MALEATE 10 MG
10 TABLET ORAL EVERY 6 HOURS PRN
Qty: 30 TABLET | Refills: 2 | Status: SHIPPED | OUTPATIENT
Start: 2025-05-18

## 2025-05-19 ENCOUNTER — INFUSION THERAPY VISIT (OUTPATIENT)
Dept: ONCOLOGY | Facility: CLINIC | Age: 65
End: 2025-05-19
Attending: INTERNAL MEDICINE
Payer: COMMERCIAL

## 2025-05-19 VITALS
DIASTOLIC BLOOD PRESSURE: 74 MMHG | RESPIRATION RATE: 16 BRPM | BODY MASS INDEX: 26.33 KG/M2 | WEIGHT: 163.8 LBS | SYSTOLIC BLOOD PRESSURE: 135 MMHG | HEIGHT: 66 IN | HEART RATE: 84 BPM | OXYGEN SATURATION: 95 % | TEMPERATURE: 97.4 F

## 2025-05-19 DIAGNOSIS — C50.512 MALIGNANT NEOPLASM OF LOWER-OUTER QUADRANT OF LEFT BREAST OF FEMALE, ESTROGEN RECEPTOR POSITIVE (H): Primary | ICD-10-CM

## 2025-05-19 DIAGNOSIS — Z17.0 MALIGNANT NEOPLASM OF LOWER-OUTER QUADRANT OF LEFT BREAST OF FEMALE, ESTROGEN RECEPTOR POSITIVE (H): Primary | ICD-10-CM

## 2025-05-19 DIAGNOSIS — Z51.11 ENCOUNTER FOR ANTINEOPLASTIC CHEMOTHERAPY: ICD-10-CM

## 2025-05-19 DIAGNOSIS — C15.9 SQUAMOUS CELL CARCINOMA OF ESOPHAGUS (H): ICD-10-CM

## 2025-05-19 LAB
ALBUMIN SERPL BCG-MCNC: 4.5 G/DL (ref 3.5–5.2)
ALP SERPL-CCNC: 96 U/L (ref 40–150)
ALT SERPL W P-5'-P-CCNC: 9 U/L (ref 0–50)
ANION GAP SERPL CALCULATED.3IONS-SCNC: 14 MMOL/L (ref 7–15)
AST SERPL W P-5'-P-CCNC: 16 U/L (ref 0–45)
BASOPHILS # BLD AUTO: 0 10E3/UL (ref 0–0.2)
BASOPHILS NFR BLD AUTO: 1 %
BILIRUB SERPL-MCNC: 0.4 MG/DL
BUN SERPL-MCNC: 16.4 MG/DL (ref 8–23)
CALCIUM SERPL-MCNC: 10.4 MG/DL (ref 8.8–10.4)
CHLORIDE SERPL-SCNC: 101 MMOL/L (ref 98–107)
CREAT SERPL-MCNC: 0.78 MG/DL (ref 0.51–0.95)
EGFRCR SERPLBLD CKD-EPI 2021: 84 ML/MIN/1.73M2
EOSINOPHIL # BLD AUTO: 0.1 10E3/UL (ref 0–0.7)
EOSINOPHIL NFR BLD AUTO: 1 %
ERYTHROCYTE [DISTWIDTH] IN BLOOD BY AUTOMATED COUNT: 15.6 % (ref 10–15)
GLUCOSE SERPL-MCNC: 104 MG/DL (ref 70–99)
HCO3 SERPL-SCNC: 21 MMOL/L (ref 22–29)
HCT VFR BLD AUTO: 37.6 % (ref 35–47)
HGB BLD-MCNC: 12.6 G/DL (ref 11.7–15.7)
IMM GRANULOCYTES # BLD: 0 10E3/UL
IMM GRANULOCYTES NFR BLD: 0 %
LYMPHOCYTES # BLD AUTO: 1 10E3/UL (ref 0.8–5.3)
LYMPHOCYTES NFR BLD AUTO: 18 %
MCH RBC QN AUTO: 34.1 PG (ref 26.5–33)
MCHC RBC AUTO-ENTMCNC: 33.5 G/DL (ref 31.5–36.5)
MCV RBC AUTO: 102 FL (ref 78–100)
MONOCYTES # BLD AUTO: 0.5 10E3/UL (ref 0–1.3)
MONOCYTES NFR BLD AUTO: 8 %
NEUTROPHILS # BLD AUTO: 4 10E3/UL (ref 1.6–8.3)
NEUTROPHILS NFR BLD AUTO: 71 %
NRBC # BLD AUTO: 0 10E3/UL
NRBC BLD AUTO-RTO: 0 /100
PLATELET # BLD AUTO: 210 10E3/UL (ref 150–450)
POTASSIUM SERPL-SCNC: 4.2 MMOL/L (ref 3.4–5.3)
PROT SERPL-MCNC: 7.6 G/DL (ref 6.4–8.3)
RBC # BLD AUTO: 3.7 10E6/UL (ref 3.8–5.2)
SODIUM SERPL-SCNC: 136 MMOL/L (ref 135–145)
WBC # BLD AUTO: 5.5 10E3/UL (ref 4–11)

## 2025-05-19 PROCEDURE — 96367 TX/PROPH/DG ADDL SEQ IV INF: CPT

## 2025-05-19 PROCEDURE — 250N000013 HC RX MED GY IP 250 OP 250 PS 637: Performed by: INTERNAL MEDICINE

## 2025-05-19 PROCEDURE — 96377 APPLICATON ON-BODY INJECTOR: CPT | Mod: 59

## 2025-05-19 PROCEDURE — 250N000011 HC RX IP 250 OP 636: Performed by: INTERNAL MEDICINE

## 2025-05-19 PROCEDURE — 96375 TX/PRO/DX INJ NEW DRUG ADDON: CPT

## 2025-05-19 PROCEDURE — 258N000003 HC RX IP 258 OP 636: Performed by: INTERNAL MEDICINE

## 2025-05-19 PROCEDURE — 96413 CHEMO IV INFUSION 1 HR: CPT

## 2025-05-19 PROCEDURE — 82247 BILIRUBIN TOTAL: CPT | Performed by: INTERNAL MEDICINE

## 2025-05-19 PROCEDURE — 96417 CHEMO IV INFUS EACH ADDL SEQ: CPT

## 2025-05-19 PROCEDURE — 85004 AUTOMATED DIFF WBC COUNT: CPT | Performed by: INTERNAL MEDICINE

## 2025-05-19 PROCEDURE — 36591 DRAW BLOOD OFF VENOUS DEVICE: CPT | Performed by: INTERNAL MEDICINE

## 2025-05-19 PROCEDURE — 96372 THER/PROPH/DIAG INJ SC/IM: CPT | Performed by: INTERNAL MEDICINE

## 2025-05-19 RX ORDER — DIPHENHYDRAMINE HCL 25 MG
50 CAPSULE ORAL ONCE
Status: COMPLETED | OUTPATIENT
Start: 2025-05-19 | End: 2025-05-19

## 2025-05-19 RX ORDER — HEPARIN SODIUM (PORCINE) LOCK FLUSH IV SOLN 100 UNIT/ML 100 UNIT/ML
5 SOLUTION INTRAVENOUS
Status: DISCONTINUED | OUTPATIENT
Start: 2025-05-19 | End: 2025-05-19 | Stop reason: HOSPADM

## 2025-05-19 RX ORDER — ACETAMINOPHEN 325 MG/1
650 TABLET ORAL ONCE
Status: COMPLETED | OUTPATIENT
Start: 2025-05-19 | End: 2025-05-19

## 2025-05-19 RX ORDER — PALONOSETRON 0.05 MG/ML
0.25 INJECTION, SOLUTION INTRAVENOUS ONCE
Status: COMPLETED | OUTPATIENT
Start: 2025-05-19 | End: 2025-05-19

## 2025-05-19 RX ORDER — HEPARIN SODIUM (PORCINE) LOCK FLUSH IV SOLN 100 UNIT/ML 100 UNIT/ML
500 SOLUTION INTRAVENOUS ONCE
Status: COMPLETED | OUTPATIENT
Start: 2025-05-19 | End: 2025-05-19

## 2025-05-19 RX ORDER — DEXAMETHASONE 4 MG/1
8 TABLET ORAL 2 TIMES DAILY WITH MEALS
Qty: 6 TABLET | Refills: 5 | Status: SHIPPED | OUTPATIENT
Start: 2025-05-19

## 2025-05-19 RX ADMIN — Medication 5 ML: at 12:46

## 2025-05-19 RX ADMIN — DIPHENHYDRAMINE HYDROCHLORIDE 50 MG: 25 CAPSULE ORAL at 07:25

## 2025-05-19 RX ADMIN — CARBOPLATIN 600 MG: 10 INJECTION, SOLUTION INTRAVENOUS at 12:11

## 2025-05-19 RX ADMIN — Medication 500 UNITS: at 06:40

## 2025-05-19 RX ADMIN — ACETAMINOPHEN 650 MG: 325 TABLET ORAL at 07:25

## 2025-05-19 RX ADMIN — PEGFILGRASTIM 6 MG: KIT SUBCUTANEOUS at 12:19

## 2025-05-19 RX ADMIN — PERTUZUMAB 840 MG: 30 INJECTION, SOLUTION, CONCENTRATE INTRAVENOUS at 08:27

## 2025-05-19 RX ADMIN — DOCETAXEL 140 MG: 20 INJECTION, SOLUTION, CONCENTRATE INTRAVENOUS at 11:06

## 2025-05-19 RX ADMIN — SODIUM CHLORIDE 590 MG: 0.9 INJECTION, SOLUTION INTRAVENOUS at 09:30

## 2025-05-19 RX ADMIN — SODIUM CHLORIDE 250 ML: 0.9 INJECTION, SOLUTION INTRAVENOUS at 07:28

## 2025-05-19 RX ADMIN — FOSAPREPITANT: 150 INJECTION, POWDER, LYOPHILIZED, FOR SOLUTION INTRAVENOUS at 07:59

## 2025-05-19 RX ADMIN — PALONOSETRON HYDROCHLORIDE 0.25 MG: 0.25 INJECTION INTRAVENOUS at 07:26

## 2025-05-19 ASSESSMENT — PAIN SCALES - GENERAL: PAINLEVEL_OUTOF10: MODERATE PAIN (5)

## 2025-05-19 NOTE — PROGRESS NOTES
Infusion Nursing Note:  Linh Mustafa presents today for cycle 1, day 1 perjeta, herceptin, docetaxel, carboplatin (dose #6).    Patient seen by provider today: No   present during visit today: Not Applicable.    Note: Patient had taxol/carbo in the spring of 2024, starting new regimen chemotherapy today for her breast cancer.  Writer printed handouts on today's drugs and reviewed plan of care and potential side effects of drugs. Patient confirms she has a thermometer at home and reminded to call with any fevers >100.4, shaking chills, or other signs or symptoms of infection.     Patient states she is feeling well today. Denies any fevers or chills. Has some ongoing fatigue which is unchanged from recent baseline. Reports intermittent nausea at home, especially in the mornings, usually takes compazine with relief. States she does vomit about once a week.  Reports pain in her breast 4-5/10 today. Taking oxycodone at night and occasional ibuprofen during the day. Tylenol premed given, otherwise no interventions needed for pain today.        Intravenous Access:  Implanted Port.    Treatment Conditions:  Lab Results   Component Value Date    HGB 12.6 05/19/2025    WBC 5.5 05/19/2025    ANEU 4.0 05/19/2025     05/19/2025        Lab Results   Component Value Date     05/19/2025    POTASSIUM 4.2 05/19/2025    MAG 2.1 04/15/2025    CR 0.78 05/19/2025    VAIBHAV 10.4 05/19/2025    BILITOTAL 0.4 05/19/2025    ALBUMIN 4.5 05/19/2025    ALT 9 05/19/2025    AST 16 05/19/2025       Results reviewed, labs MET treatment parameters, ok to proceed with treatment.  ECHO/MUGA completed 5/14/25  EF 60-65%.      Post Infusion Assessment:  Patient tolerated infusion without incident.  Blood return noted pre and post infusion.  Site patent and intact, free from redness, edema or discomfort.  No evidence of extravasations.  Access discontinued per protocol.     Neulasta Onpro On-Body injector applied to Left Abdomen  at 1220.  Writer discussed Neulasta injection would start tomorrow at 1520, approximately 27 hours after application applied today.    Written and Verbal instruction reviewed with patient.  Pt instructed when the dose delivery starts, it will take about 45 minutes to complete.  Pt aware Neulasta Onpro On-Body should have green flashing light and to call triage or on-call MD if injector flashes red or appears to be leaking.   Pt aware to keep Onpro On-Body Neulasta 4 inches away from electrical equipment and to avoid showering 4 hours prior to injection.   Patient advised to take claritin daily for the next week or so to help prevent bone pain. Instructed to call if she gets bone pain that can't be controlled with claritin and tylenol or ibuprofen.     Discharge Plan:   Prescription refills given for dexamethasone and OTC claritin.  Discharge instructions reviewed with: Patient and Family.  Patient and/or family verbalized understanding of discharge instructions and all questions answered.  Copy of AVS reviewed with patient and/or family.  Patient will return 6/10/205 for next appointment.  Patient discharged in stable condition accompanied by: self and daughter.  Departure Mode: Ambulatory.      Ashley Ferguson RN

## 2025-05-19 NOTE — PATIENT INSTRUCTIONS
- Neulasta injection will start tomorrow at 3:20 PM, approximately 27 hours after application today.  When the dose delivery starts, it will take about 45 minutes to complete.Neulasta Onpro On-Body should have green flashing light.  Call triage or on-call MD if injector flashes red or appears to be leaking.  Keep Onpro On-Body Neulasta 4 inches away from electrical equipment and avoid showering 4 hours prior to injection.     -You received an anti-nausea medication called Aloxi today. Aloxi is in the same drug class as one of your take home as needed anti-nausea medications called Ondanestron or Zofran. Do not take your Zofran prescription for the next three days because you are covered by the Aloxi you received today. You can begin to take your Zofran prescription as needed starting Thursday 5/22. If you need coverage for your nausea before then, feel free to use your compazine prescription.           [facility] Triage and after hours / weekends / holidays:  [phone]    Please call the triage or after hours line if you experience a temperature greater than or equal to 100.4, shaking chills, have uncontrolled nausea, vomiting and/or diarrhea, dizziness, shortness of breath, chest pain, bleeding, unexplained bruising, or if you have any other new/concerning symptoms, questions or concerns.      If you are having any concerning symptoms or wish to speak to a provider before your next infusion visit, please call triage to notify them so we can adequately serve you.     If you need a refill on a narcotic prescription or other medication, please call before your infusion appointment.           May 2025      [place] Monday Tuesday Wednesday Thursday Friday Saturday                       1     2     3       4     5     6     7     8    New Patient   10:05 AM   (40 min.)   Bro Carl MD   [facility] 9     10       11     12     13     14    ECHO COMPLETE   2:45 PM   (60 min.)    [id number]   [facility] 15     16  Happy Birthday!     17       18     19    Lab Central   6:30 AM   (15 min.)   [facility]   [facility]    Infusion 360   7:00 AM   (360 min.)   [facility] NURSE   [facility] 20     21     22     23     24       25     26     27    Return Palliative  11:05 AM   (40 min.)   Lourdes Joseph DO   [facility] 28     29     30     31 June 2025 Sunday Monday Tuesday Wednesday Thursday Friday Saturday   1     2     3     4     5     6     7       8     9     10    Lab Central   8:15 AM   (15 min.)   [facility]   [facility]    Return Patient   8:45 AM   (45 min.)   Lilo Mandujano PA-C   [facility]    Infusion 240  10:30 AM   (240 min.)   [facility] N[facility]SE   [facility] 11     12     13     14       15     16     17     18     19     20     21       22     23     24     25     26     27     28       29     30                                                 Recent Results (from the past 24 hours)   Comprehensive metabolic panel    Collection Time: 05/19/25  6:39 AM   Result Value Ref Range    Sodium 136 135 - 145 mmol/L    Potassium 4.2 3.4 - 5.3 mmol/L    Carbon Dioxide (CO2) 21 (L) 22 - 29 mmol/L    Anion Gap 14 7 - 15 mmol/L    Urea Nitrogen 16.4 8.0 - 23.0 mg/dL    Creatinine 0.78 0.51 - 0.95 mg/dL    GFR Estimate 84 >60 mL/min/1.73m2    Calcium 10.4 8.8 - 10.4 mg/dL    Chloride 101 98 - 107 mmol/L    Glucose 104 (H) 70 - 99 mg/dL    Alkaline Phosphatase 96 40 - 150 U/L    AST 16 0 - 45 U/L    ALT 9 0 - 50 U/L    Protein Total 7.6 6.4 - 8.3 g/dL    Albumin 4.5 3.5 - 5.2 g/dL    Bilirubin Total 0.4 <=1.2 mg/dL   CBC with platelets and differential    Collection Time: 05/19/25  6:39 AM   Result Value Ref  Range    WBC Count 5.5 4.0 - 11.0 10e3/uL    RBC Count 3.70 (L) 3.80 - 5.20 10e6/uL    Hemoglobin 12.6 11.7 - 15.7 g/dL    Hematocrit 37.6 35.0 - 47.0 %     (H) 78 - 100 fL    MCH 34.1 (H) 26.5 - 33.0 pg    MCHC 33.5 31.5 - 36.5 g/dL    RDW 15.6 (H) 10.0 - 15.0 %    Platelet Count 210 150 - 450 10e3/uL    % Neutrophils 71 %    % Lymphocytes 18 %    % Monocytes 8 %    % Eosinophils 1 %    % Basophils 1 %    % Immature Granulocytes 0 %    NRBCs per 100 WBC 0 <1 /100    Absolute Neutrophils 4.0 1.6 - 8.3 10e3/uL    Absolute Lymphocytes 1.0 0.8 - 5.3 10e3/uL    Absolute Monocytes 0.5 0.0 - 1.3 10e3/uL    Absolute Eosinophils 0.1 0.0 - 0.7 10e3/uL    Absolute Basophils 0.0 0.0 - 0.2 10e3/uL    Absolute Immature Granulocytes 0.0 <=0.4 10e3/uL    Absolute NRBCs 0.0 10e3/uL

## 2025-05-19 NOTE — NURSING NOTE
"Chief Complaint   Patient presents with    Port Draw     Labs drawn via port by RN in lab.  VS taken       Port accessed with 20 gauge 3/4\" Power needle by RN, labs collected, line flushed with saline and heparin.  Vitals taken. Pt checked in for appointment(s).    Cheyanne Dodge RN    "

## 2025-05-21 LAB — INTERPRETATION SERPL IEP-IMP: NORMAL

## 2025-05-25 DIAGNOSIS — G25.81 RESTLESS LEGS SYNDROME (RLS): ICD-10-CM

## 2025-05-26 NOTE — TELEPHONE ENCOUNTER
Received electronic request from pharmacy requesting refill of requip.     Last office visit: 2/28/25  Scheduled for follow up 5/27/25     Will route request to MD/ for review.

## 2025-05-27 ENCOUNTER — VIRTUAL VISIT (OUTPATIENT)
Dept: PALLIATIVE CARE | Facility: CLINIC | Age: 65
End: 2025-05-27
Attending: STUDENT IN AN ORGANIZED HEALTH CARE EDUCATION/TRAINING PROGRAM
Payer: COMMERCIAL

## 2025-05-27 VITALS — WEIGHT: 153 LBS | HEIGHT: 66 IN | BODY MASS INDEX: 24.59 KG/M2

## 2025-05-27 DIAGNOSIS — K52.1 DIARRHEA DUE TO DRUG: ICD-10-CM

## 2025-05-27 DIAGNOSIS — Z17.0 MALIGNANT NEOPLASM OF LOWER-OUTER QUADRANT OF LEFT BREAST OF FEMALE, ESTROGEN RECEPTOR POSITIVE (H): Primary | ICD-10-CM

## 2025-05-27 DIAGNOSIS — M54.50 ACUTE BILATERAL LOW BACK PAIN WITHOUT SCIATICA: ICD-10-CM

## 2025-05-27 DIAGNOSIS — C50.512 MALIGNANT NEOPLASM OF LOWER-OUTER QUADRANT OF LEFT BREAST OF FEMALE, ESTROGEN RECEPTOR POSITIVE (H): Primary | ICD-10-CM

## 2025-05-27 DIAGNOSIS — N64.4 BREAST PAIN: ICD-10-CM

## 2025-05-27 DIAGNOSIS — Z51.5 ENCOUNTER FOR PALLIATIVE CARE: ICD-10-CM

## 2025-05-27 DIAGNOSIS — Z79.891 ENCOUNTER FOR LONG-TERM USE OF OPIATE ANALGESIC: ICD-10-CM

## 2025-05-27 DIAGNOSIS — K13.79 MOUTH PAIN: ICD-10-CM

## 2025-05-27 PROCEDURE — G2211 COMPLEX E/M VISIT ADD ON: HCPCS | Performed by: STUDENT IN AN ORGANIZED HEALTH CARE EDUCATION/TRAINING PROGRAM

## 2025-05-27 PROCEDURE — 1125F AMNT PAIN NOTED PAIN PRSNT: CPT | Mod: 95 | Performed by: STUDENT IN AN ORGANIZED HEALTH CARE EDUCATION/TRAINING PROGRAM

## 2025-05-27 PROCEDURE — 98006 SYNCH AUDIO-VIDEO EST MOD 30: CPT | Performed by: STUDENT IN AN ORGANIZED HEALTH CARE EDUCATION/TRAINING PROGRAM

## 2025-05-27 RX ORDER — ROPINIROLE 1 MG/1
1.5-2 TABLET, FILM COATED ORAL AT BEDTIME
Qty: 180 TABLET | Refills: 1 | Status: SHIPPED | OUTPATIENT
Start: 2025-05-27

## 2025-05-27 RX ORDER — OXYCODONE HCL 5 MG/5 ML
5-7 SOLUTION, ORAL ORAL EVERY 4 HOURS PRN
Qty: 400 ML | Refills: 0 | Status: CANCELLED | OUTPATIENT
Start: 2025-05-27

## 2025-05-27 RX ORDER — OXYCODONE HYDROCHLORIDE 5 MG/1
5-10 TABLET ORAL EVERY 4 HOURS PRN
Qty: 150 TABLET | Refills: 0 | Status: SHIPPED | OUTPATIENT
Start: 2025-05-27

## 2025-05-27 ASSESSMENT — PAIN SCALES - GENERAL: PAINLEVEL_OUTOF10: MODERATE PAIN (4)

## 2025-05-27 NOTE — PATIENT INSTRUCTIONS
Recommendations:  -Recipe for baking soda mouth rinses to help with mouth sores:  -Add 2 teaspoons of baking soda to 1 cup warm water.  -Switch to mouthwash around in your mouth for at least 30 seconds before spitting it out.  -Can do this several times daily.    -Ok to continue oxycodone 5-10mg every 4 hours as needed.   -Emphasized alternative means of treating back pain, as this is likely not related to cancer. Suggested using a lidocaine patch daily.  -Back pain possibly linked to the diarrhea. Advised to trial imodium for diarrhea as needed.   -Advised barrier creams for bottom area irritated by loose stools.  -Continue Requip 2 mg nightly for now.  -Continue Zoloft 50 mg daily for now.  -Okay to continue Zofran and Compazine as needed for nausea.      Follow up: 6-8 weeks      *Please do not make any changes to medications that we prescribe, including pain medicines, without talking to our team*    Reasons to Call    If you are having worsening/uncontrolled symptoms we want you to call!    You or your other physicians make any changes to medications we have prescribed.  -Please call for refills 4-5 days before you will run out of medication.    Important Phone Numbers, including: Refills, scheduling, and general questions     Palliative Care RN: Elaine Chavez : [phone]  *For scheduling needs/follow up visits : [phone]  *After hours or on weekends- Will connect you with on call MD : [phone].

## 2025-05-27 NOTE — NURSING NOTE
Current patient location: [address] [place] [address]  [place] [address]    Is the patient currently in the state of [place]? YES    Visit mode: VIDEO    If the visit is dropped, the patient can be reconnected by:VIDEO VISIT: Send to e-mail at: [email or web address]    Will anyone else be joining the visit? NO  (If patient encounters technical issues they should call [phone])    Are changes needed to the allergy or medication list? No    Are refills needed on medications prescribed by this physician? Discuss with provider    Rooming Documentation:  Questionnaire(s) not done per department protocol    Reason for visit: QUIRINO BARNEY

## 2025-05-27 NOTE — LETTER
5/27/2025       RE: Linh Mustafa  [address]  [place] [address]     Dear Colleague,    Thank you for referring your patient[facility]. Please see a copy of my visit note below.    Palliative Care Progress Note    Patient Name: Linh Mustafa  Primary Provider: Madison Ruiz    Chief Complaint/Patient ID:   She has PMHx of esophageal SCC.  S/p endoscopic resection of SCC in situ lesion, and recommendation was to proceed with neoadjuvant chemoRT followed by definitive surgery.  Started concurrent chemo RT with carbo/Taxol 05/2024 (5 cycles).  -Hospitalized 06/4-06/18/24 with chest pain after radiation therapy, felt to be 2/2 mucositis.  Able to complete final 2 RT treatments inpatient. Currently [facility].    -04.2025 - Dx with T2 N0 M0 ER positive HER2 positive breast cancer. Planning 6 cycles of chemo (perjeta, herceptin, docetaxel, carboplatin). Likely lumpectomy, maybe radiation.       -Of note, buspar causes increased anger/irritability.    Last Palliative care appointment: 2/28/25 with me.      Reviewed: Yes.     Social History: Lives alone.  Has a daughter and son-in-law who live nearby.  Brother from [place] has been helping her with day-to-day tasks.  Identifies as Worship.    Advanced Care Planning: Has not completed an HCD.    Interim History:  Linh is seen today for follow up with Palliative Care via billable video visit.      Reviewed oncology note from 4/29.  Planning 6 cycles of chemo, each 3 weeks apart followed by surgery.    Reviewed surgical oncology note from 5/8.  Planning to check in after completion of chemo.  Leaning towards lumpectomy plus radiation.    First day of infusion was OK. Now having diarrhea. Harris when brushing teeth. Lips feel swollen. Sensitivity to hot and cold present. Not hungry at all. Drinking is going OK. Different than esophageal cancer  pain. Has not been doing rinses.    Pain in breast and pain in lower back. Until this weekend, was only using 5mg oxycodone at night. More recently every 4 hours.      Using lidocaine patches inconsistently for her back. Does find them helpful. Back pain comes and goes. Has wondered if it could be related to cramping from diarrhea.     Not currently taking anything for diarrhea. Having a few diarrheal stools about every 3 days.      Physical Exam:   Constitutional: Alert, pleasant, no apparent distress. Sitting up in chair.  Eyes: Sclera non-icteric, no eye discharge.  ENT: No nasal discharge. Ears grossly normal.  Respiratory: Unlabored respirations. Speaking in full sentences.  Musculoskeletal: Extremities appear normal- no gross deformities noted. No edema noted on upper body.   Skin: No suspicious lesions or rashes on visible skin.  Neurologic: Clear speech, no aphasia. No facial droop.  Psychiatric: Mentation appears normal, appropriate attention. Affect normal/bright. Does not appear anxious or depressed.    Key Data Reviewed:  LABS:   Lab Results   Component Value Date    WBC 5.5 05/19/2025    HGB 12.6 05/19/2025    HCT 37.6 05/19/2025     05/19/2025     05/19/2025    POTASSIUM 4.2 05/19/2025    CHLORIDE 101 05/19/2025    CO2 21 (L) 05/19/2025    BUN 16.4 05/19/2025    CR 0.78 05/19/2025     (H) 05/19/2025    SED 61 (H) 06/04/2024    DD 0.79 (H) 12/23/2022    NTBNPI 136 06/04/2024    AST 16 05/19/2025    ALT 9 05/19/2025    ALKPHOS 96 05/19/2025    BILITOTAL 0.4 05/19/2025    INR 0.98 06/04/2024       Impression & Recommendations & Counseling:  Linh Mustafa has a history of esophageal SCC s/p proximal resection and chemo RT.    Unfortunately now with diagnosis of breast cancer. Has started neoadjuvant chemotherapy.      Recommendations:  -Advised to start Salt and soda rinses for mouth pain.   -Ok to continue oxycodone 5-10mg every 4 hours as needed.   -Emphasized alternative means of  treating back pain, as this is likely not related to cancer. Suggested using a lidocaine patch daily.  -Back pain possibly linked to the diarrhea. Advised to trial imodium for diarrhea as needed. If back pain doesn't change dramatically, consider Bentyl Rx.  -Advised barrier creams for bottom area irritated by loose stools.  -Continue Requip 2 mg nightly for now.  -Continue Zoloft 50 mg daily for now.  -Okay to continue Zofran and Compazine as needed for nausea.          Follow up: 6-8 weeks      Video-Visit Details  Video Start Time: 11:30AM  Video End Time: 11:52AM    Originating Location (pt. Location): Home     Distant Location (provider location):  Offsite- Personal Home     Platform used for Video Visit: [employer]     Total time spent on day of encounter is 34 mins, including reviewing record, review of above studies, above visit with patient, symptomatic discussion as above, including medication adjustments/prescription management, and documentation.       The longitudinal plan of care for the diagnosis(es)/condition(s) as documented were addressed during this visit.?Due to the added complexity in care, I will continue to support Linh Mustafa in the subsequent management and with the ongoing continuity of care.        Lourdes Joseph DO  Palliative Medicine   [facility] ID [id number]    Some chart documentation performed using Dragon Voice recognition Software. Although reviewed after completion, some words and grammatical errors may remain.      Again, thank you for allowing me to participate in the care of your patient.      Sincerely,    Lourdes Joseph DO

## 2025-05-27 NOTE — PROGRESS NOTES
Palliative Care Progress Note    Patient Name: Linh Mustafa  Primary Provider: Madison Ruiz    Chief Complaint/Patient ID:   She has PMHx of esophageal SCC.  S/p endoscopic resection of SCC in situ lesion, and recommendation was to proceed with neoadjuvant chemoRT followed by definitive surgery.  Started concurrent chemo RT with carbo/Taxol 05/2024 (5 cycles).  -Hospitalized 06/4-06/18/24 with chest pain after radiation therapy, felt to be 2/2 mucositis.  Able to complete final 2 RT treatments inpatient. Currently [facility].    -04.2025 - Dx with T2 N0 M0 ER positive HER2 positive breast cancer. Planning 6 cycles of chemo (perjeta, herceptin, docetaxel, carboplatin). Likely lumpectomy, maybe radiation.       -Of note, buspar causes increased anger/irritability.    Last Palliative care appointment: 2/28/25 with me.      Reviewed: Yes.     Social History: Lives alone.  Has a daughter and son-in-law who live nearby.  Brother from [place] has been helping her with day-to-day tasks.  Identifies as Spiritism.    Advanced Care Planning: Has not completed an HCD.    Interim History:  Linh is seen today for follow up with Palliative Care via billable video visit.      Reviewed oncology note from 4/29.  Planning 6 cycles of chemo, each 3 weeks apart followed by surgery.    Reviewed surgical oncology note from 5/8.  Planning to check in after completion of chemo.  Leaning towards lumpectomy plus radiation.    First day of infusion was OK. Now having diarrhea. Harris when brushing teeth. Lips feel swollen. Sensitivity to hot and cold present. Not hungry at all. Drinking is going OK. Different than esophageal cancer pain. Has not been doing rinses.    Pain in breast and pain in lower back. Until this weekend, was only using 5mg oxycodone at night. More recently every 4 hours.      Using lidocaine patches inconsistently for her back. Does find them helpful. Back pain comes and goes. Has wondered if it could be related to  cramping from diarrhea.     Not currently taking anything for diarrhea. Having a few diarrheal stools about every 3 days.      Physical Exam:   Constitutional: Alert, pleasant, no apparent distress. Sitting up in chair.  Eyes: Sclera non-icteric, no eye discharge.  ENT: No nasal discharge. Ears grossly normal.  Respiratory: Unlabored respirations. Speaking in full sentences.  Musculoskeletal: Extremities appear normal- no gross deformities noted. No edema noted on upper body.   Skin: No suspicious lesions or rashes on visible skin.  Neurologic: Clear speech, no aphasia. No facial droop.  Psychiatric: Mentation appears normal, appropriate attention. Affect normal/bright. Does not appear anxious or depressed.    Key Data Reviewed:  LABS:   Lab Results   Component Value Date    WBC 5.5 05/19/2025    HGB 12.6 05/19/2025    HCT 37.6 05/19/2025     05/19/2025     05/19/2025    POTASSIUM 4.2 05/19/2025    CHLORIDE 101 05/19/2025    CO2 21 (L) 05/19/2025    BUN 16.4 05/19/2025    CR 0.78 05/19/2025     (H) 05/19/2025    SED 61 (H) 06/04/2024    DD 0.79 (H) 12/23/2022    NTBNPI 136 06/04/2024    AST 16 05/19/2025    ALT 9 05/19/2025    ALKPHOS 96 05/19/2025    BILITOTAL 0.4 05/19/2025    INR 0.98 06/04/2024       Impression & Recommendations & Counseling:  Linh Mustafa has a history of esophageal SCC s/p proximal resection and chemo RT.    Unfortunately now with diagnosis of breast cancer. Has started neoadjuvant chemotherapy.      Recommendations:  -Advised to start Salt and soda rinses for mouth pain.   -Ok to continue oxycodone 5-10mg every 4 hours as needed.   -Emphasized alternative means of treating back pain, as this is likely not related to cancer. Suggested using a lidocaine patch daily.  -Back pain possibly linked to the diarrhea. Advised to trial imodium for diarrhea as needed. If back pain doesn't change dramatically, consider Bentyl Rx.  -Advised barrier creams for bottom area irritated  by loose stools.  -Continue Requip 2 mg nightly for now.  -Continue Zoloft 50 mg daily for now.  -Okay to continue Zofran and Compazine as needed for nausea.          Follow up: 6-8 weeks      Video-Visit Details  Video Start Time: 11:30AM  Video End Time: 11:52AM    Originating Location (pt. Location): Home     Distant Location (provider location):  Offsite- Personal Home     Platform used for Video Visit: Vishal     Total time spent on day of encounter is 34 mins, including reviewing record, review of above studies, above visit with patient, symptomatic discussion as above, including medication adjustments/prescription management, and documentation.       The longitudinal plan of care for the diagnosis(es)/condition(s) as documented were addressed during this visit.?Due to the added complexity in care, I will continue to support Linh Mustafa in the subsequent management and with the ongoing continuity of care.        Lourdes Joseph, DO  Palliative Medicine   [id number]    Some chart documentation performed using Dragon Voice recognition Software. Although reviewed after completion, some words and grammatical errors may remain.

## 2025-05-31 DIAGNOSIS — R11.2 NAUSEA AND VOMITING, UNSPECIFIED VOMITING TYPE: ICD-10-CM

## 2025-05-31 DIAGNOSIS — F41.9 ANXIETY: ICD-10-CM

## 2025-05-31 DIAGNOSIS — K21.9 GASTROESOPHAGEAL REFLUX DISEASE WITHOUT ESOPHAGITIS: ICD-10-CM

## 2025-06-02 NOTE — TELEPHONE ENCOUNTER
Received electronic request from pharmacy requesting refill of nexium and sertraline.     Last office visit: 5/27/25  Scheduled for follow up per check out request.     Will route request to MD/ for review.

## 2025-06-10 ENCOUNTER — MYC MEDICAL ADVICE (OUTPATIENT)
Dept: ONCOLOGY | Facility: CLINIC | Age: 65
End: 2025-06-10

## 2025-06-10 ENCOUNTER — ONCOLOGY VISIT (OUTPATIENT)
Dept: ONCOLOGY | Facility: CLINIC | Age: 65
End: 2025-06-10
Attending: INTERNAL MEDICINE
Payer: COMMERCIAL

## 2025-06-10 ENCOUNTER — APPOINTMENT (OUTPATIENT)
Dept: LAB | Facility: CLINIC | Age: 65
End: 2025-06-10
Attending: INTERNAL MEDICINE
Payer: COMMERCIAL

## 2025-06-10 VITALS
OXYGEN SATURATION: 96 % | WEIGHT: 161.7 LBS | HEART RATE: 66 BPM | RESPIRATION RATE: 16 BRPM | TEMPERATURE: 98.5 F | BODY MASS INDEX: 26.1 KG/M2 | DIASTOLIC BLOOD PRESSURE: 74 MMHG | SYSTOLIC BLOOD PRESSURE: 135 MMHG

## 2025-06-10 VITALS — DIASTOLIC BLOOD PRESSURE: 77 MMHG | SYSTOLIC BLOOD PRESSURE: 160 MMHG | HEART RATE: 61 BPM | OXYGEN SATURATION: 95 %

## 2025-06-10 DIAGNOSIS — C50.512 MALIGNANT NEOPLASM OF LOWER-OUTER QUADRANT OF LEFT BREAST OF FEMALE, ESTROGEN RECEPTOR POSITIVE (H): Primary | ICD-10-CM

## 2025-06-10 DIAGNOSIS — E03.9 HYPOTHYROIDISM, UNSPECIFIED TYPE: ICD-10-CM

## 2025-06-10 DIAGNOSIS — R53.83 OTHER FATIGUE: ICD-10-CM

## 2025-06-10 DIAGNOSIS — Z17.0 MALIGNANT NEOPLASM OF LOWER-OUTER QUADRANT OF LEFT BREAST OF FEMALE, ESTROGEN RECEPTOR POSITIVE (H): Primary | ICD-10-CM

## 2025-06-10 DIAGNOSIS — C15.9 SQUAMOUS CELL CARCINOMA OF ESOPHAGUS (H): ICD-10-CM

## 2025-06-10 DIAGNOSIS — Z51.11 ENCOUNTER FOR ANTINEOPLASTIC CHEMOTHERAPY: ICD-10-CM

## 2025-06-10 LAB
ALBUMIN SERPL BCG-MCNC: 3.9 G/DL (ref 3.5–5.2)
ALP SERPL-CCNC: 114 U/L (ref 40–150)
ALT SERPL W P-5'-P-CCNC: 8 U/L (ref 0–50)
ANION GAP SERPL CALCULATED.3IONS-SCNC: 11 MMOL/L (ref 7–15)
AST SERPL W P-5'-P-CCNC: 15 U/L (ref 0–45)
BASOPHILS # BLD AUTO: 0 10E3/UL (ref 0–0.2)
BASOPHILS NFR BLD AUTO: 0 %
BILIRUB SERPL-MCNC: 0.5 MG/DL
BUN SERPL-MCNC: 7.1 MG/DL (ref 8–23)
CALCIUM SERPL-MCNC: 9.8 MG/DL (ref 8.8–10.4)
CHLORIDE SERPL-SCNC: 103 MMOL/L (ref 98–107)
CREAT SERPL-MCNC: 0.67 MG/DL (ref 0.51–0.95)
EGFRCR SERPLBLD CKD-EPI 2021: >90 ML/MIN/1.73M2
EOSINOPHIL # BLD AUTO: 0 10E3/UL (ref 0–0.7)
EOSINOPHIL NFR BLD AUTO: 1 %
ERYTHROCYTE [DISTWIDTH] IN BLOOD BY AUTOMATED COUNT: 15.2 % (ref 10–15)
GLUCOSE SERPL-MCNC: 98 MG/DL (ref 70–99)
HCO3 SERPL-SCNC: 25 MMOL/L (ref 22–29)
HCT VFR BLD AUTO: 30.8 % (ref 35–47)
HGB BLD-MCNC: 10.2 G/DL (ref 11.7–15.7)
IMM GRANULOCYTES # BLD: 0 10E3/UL
IMM GRANULOCYTES NFR BLD: 0 %
LYMPHOCYTES # BLD AUTO: 0.6 10E3/UL (ref 0.8–5.3)
LYMPHOCYTES NFR BLD AUTO: 12 %
MCH RBC QN AUTO: 33.7 PG (ref 26.5–33)
MCHC RBC AUTO-ENTMCNC: 33.1 G/DL (ref 31.5–36.5)
MCV RBC AUTO: 102 FL (ref 78–100)
MONOCYTES # BLD AUTO: 0.5 10E3/UL (ref 0–1.3)
MONOCYTES NFR BLD AUTO: 8 %
NEUTROPHILS # BLD AUTO: 4.4 10E3/UL (ref 1.6–8.3)
NEUTROPHILS NFR BLD AUTO: 79 %
NRBC # BLD AUTO: 0 10E3/UL
NRBC BLD AUTO-RTO: 0 /100
PLATELET # BLD AUTO: 203 10E3/UL (ref 150–450)
POTASSIUM SERPL-SCNC: 3.3 MMOL/L (ref 3.4–5.3)
PROT SERPL-MCNC: 7 G/DL (ref 6.4–8.3)
RBC # BLD AUTO: 3.03 10E6/UL (ref 3.8–5.2)
SODIUM SERPL-SCNC: 139 MMOL/L (ref 135–145)
T4 FREE SERPL-MCNC: 1.17 NG/DL (ref 0.9–1.7)
TSH SERPL DL<=0.005 MIU/L-ACNC: 14.43 UIU/ML (ref 0.3–4.2)
WBC # BLD AUTO: 5.5 10E3/UL (ref 4–11)

## 2025-06-10 PROCEDURE — 84439 ASSAY OF FREE THYROXINE: CPT | Performed by: PHYSICIAN ASSISTANT

## 2025-06-10 PROCEDURE — 250N000011 HC RX IP 250 OP 636: Performed by: PHYSICIAN ASSISTANT

## 2025-06-10 PROCEDURE — 80053 COMPREHEN METABOLIC PANEL: CPT | Performed by: INTERNAL MEDICINE

## 2025-06-10 PROCEDURE — 84443 ASSAY THYROID STIM HORMONE: CPT | Performed by: PHYSICIAN ASSISTANT

## 2025-06-10 PROCEDURE — 250N000013 HC RX MED GY IP 250 OP 250 PS 637: Performed by: PHYSICIAN ASSISTANT

## 2025-06-10 PROCEDURE — 85025 COMPLETE CBC W/AUTO DIFF WBC: CPT | Performed by: INTERNAL MEDICINE

## 2025-06-10 PROCEDURE — 96372 THER/PROPH/DIAG INJ SC/IM: CPT | Performed by: PHYSICIAN ASSISTANT

## 2025-06-10 PROCEDURE — 258N000003 HC RX IP 258 OP 636: Performed by: PHYSICIAN ASSISTANT

## 2025-06-10 PROCEDURE — G0463 HOSPITAL OUTPT CLINIC VISIT: HCPCS | Performed by: PHYSICIAN ASSISTANT

## 2025-06-10 PROCEDURE — 36591 DRAW BLOOD OFF VENOUS DEVICE: CPT | Performed by: INTERNAL MEDICINE

## 2025-06-10 RX ORDER — ALBUTEROL SULFATE 0.83 MG/ML
2.5 SOLUTION RESPIRATORY (INHALATION)
Status: DISCONTINUED | OUTPATIENT
Start: 2025-06-10 | End: 2025-06-10 | Stop reason: HOSPADM

## 2025-06-10 RX ORDER — POTASSIUM CHLORIDE 1500 MG/1
40 TABLET, EXTENDED RELEASE ORAL ONCE
Status: COMPLETED | OUTPATIENT
Start: 2025-06-10 | End: 2025-06-10

## 2025-06-10 RX ORDER — METHYLPREDNISOLONE SODIUM SUCCINATE 40 MG/ML
40 INJECTION INTRAMUSCULAR; INTRAVENOUS
Status: CANCELLED
Start: 2025-06-10

## 2025-06-10 RX ORDER — ACETAMINOPHEN 325 MG/1
650 TABLET ORAL
Status: CANCELLED
Start: 2025-06-10

## 2025-06-10 RX ORDER — DIPHENHYDRAMINE HYDROCHLORIDE 50 MG/ML
50 INJECTION, SOLUTION INTRAMUSCULAR; INTRAVENOUS
Status: COMPLETED | OUTPATIENT
Start: 2025-06-10 | End: 2025-06-10

## 2025-06-10 RX ORDER — MEPERIDINE HYDROCHLORIDE 25 MG/ML
25 INJECTION INTRAMUSCULAR; INTRAVENOUS; SUBCUTANEOUS
Status: DISCONTINUED | OUTPATIENT
Start: 2025-06-10 | End: 2025-06-10 | Stop reason: HOSPADM

## 2025-06-10 RX ORDER — ALBUTEROL SULFATE 90 UG/1
1-2 INHALANT RESPIRATORY (INHALATION)
Status: DISCONTINUED | OUTPATIENT
Start: 2025-06-10 | End: 2025-06-10 | Stop reason: HOSPADM

## 2025-06-10 RX ORDER — DIPHENHYDRAMINE HYDROCHLORIDE 50 MG/ML
50 INJECTION, SOLUTION INTRAMUSCULAR; INTRAVENOUS
Status: CANCELLED | OUTPATIENT
Start: 2025-06-10

## 2025-06-10 RX ORDER — HEPARIN SODIUM,PORCINE 10 UNIT/ML
5-20 VIAL (ML) INTRAVENOUS DAILY PRN
Status: CANCELLED | OUTPATIENT
Start: 2025-06-10

## 2025-06-10 RX ORDER — DIPHENHYDRAMINE HYDROCHLORIDE 50 MG/ML
25 INJECTION, SOLUTION INTRAMUSCULAR; INTRAVENOUS
Status: CANCELLED
Start: 2025-06-10

## 2025-06-10 RX ORDER — LORAZEPAM 2 MG/ML
0.5 INJECTION INTRAMUSCULAR EVERY 4 HOURS PRN
Status: CANCELLED | OUTPATIENT
Start: 2025-06-10

## 2025-06-10 RX ORDER — EPINEPHRINE 1 MG/ML
0.3 INJECTION, SOLUTION INTRAMUSCULAR; SUBCUTANEOUS EVERY 5 MIN PRN
Status: CANCELLED | OUTPATIENT
Start: 2025-06-10

## 2025-06-10 RX ORDER — METHYLPREDNISOLONE SODIUM SUCCINATE 40 MG/ML
40 INJECTION INTRAMUSCULAR; INTRAVENOUS
Status: DISCONTINUED | OUTPATIENT
Start: 2025-06-10 | End: 2025-06-10 | Stop reason: HOSPADM

## 2025-06-10 RX ORDER — EPINEPHRINE 1 MG/ML
0.3 INJECTION, SOLUTION, CONCENTRATE INTRAVENOUS EVERY 5 MIN PRN
Status: DISCONTINUED | OUTPATIENT
Start: 2025-06-10 | End: 2025-06-10 | Stop reason: HOSPADM

## 2025-06-10 RX ORDER — DIPHENHYDRAMINE HYDROCHLORIDE 50 MG/ML
25 INJECTION, SOLUTION INTRAMUSCULAR; INTRAVENOUS
Status: DISCONTINUED | OUTPATIENT
Start: 2025-06-10 | End: 2025-06-10 | Stop reason: HOSPADM

## 2025-06-10 RX ORDER — ALBUTEROL SULFATE 90 UG/1
1-2 INHALANT RESPIRATORY (INHALATION)
Status: CANCELLED
Start: 2025-06-10

## 2025-06-10 RX ORDER — HEPARIN SODIUM (PORCINE) LOCK FLUSH IV SOLN 100 UNIT/ML 100 UNIT/ML
5 SOLUTION INTRAVENOUS
Status: DISCONTINUED | OUTPATIENT
Start: 2025-06-10 | End: 2025-06-10 | Stop reason: HOSPADM

## 2025-06-10 RX ORDER — ALBUTEROL SULFATE 0.83 MG/ML
2.5 SOLUTION RESPIRATORY (INHALATION)
Status: CANCELLED | OUTPATIENT
Start: 2025-06-10

## 2025-06-10 RX ORDER — HEPARIN SODIUM (PORCINE) LOCK FLUSH IV SOLN 100 UNIT/ML 100 UNIT/ML
500 SOLUTION INTRAVENOUS ONCE
Status: COMPLETED | OUTPATIENT
Start: 2025-06-10 | End: 2025-06-10

## 2025-06-10 RX ORDER — LEVOTHYROXINE SODIUM 125 UG/1
125 TABLET ORAL
Qty: 60 TABLET | Refills: 1 | Status: SHIPPED | OUTPATIENT
Start: 2025-06-10

## 2025-06-10 RX ORDER — PALONOSETRON 0.05 MG/ML
0.25 INJECTION, SOLUTION INTRAVENOUS ONCE
Status: COMPLETED | OUTPATIENT
Start: 2025-06-10 | End: 2025-06-10

## 2025-06-10 RX ORDER — DIPHENHYDRAMINE HCL 25 MG
50 CAPSULE ORAL
Status: CANCELLED | OUTPATIENT
Start: 2025-06-10

## 2025-06-10 RX ORDER — HEPARIN SODIUM (PORCINE) LOCK FLUSH IV SOLN 100 UNIT/ML 100 UNIT/ML
5 SOLUTION INTRAVENOUS
Status: CANCELLED | OUTPATIENT
Start: 2025-06-10

## 2025-06-10 RX ORDER — PALONOSETRON 0.05 MG/ML
0.25 INJECTION, SOLUTION INTRAVENOUS ONCE
Status: CANCELLED | OUTPATIENT
Start: 2025-06-10

## 2025-06-10 RX ORDER — MEPERIDINE HYDROCHLORIDE 25 MG/ML
25 INJECTION INTRAMUSCULAR; INTRAVENOUS; SUBCUTANEOUS
Status: CANCELLED | OUTPATIENT
Start: 2025-06-10

## 2025-06-10 RX ORDER — DIPHENHYDRAMINE HYDROCHLORIDE 50 MG/ML
50 INJECTION, SOLUTION INTRAMUSCULAR; INTRAVENOUS
Status: CANCELLED
Start: 2025-06-10

## 2025-06-10 RX ADMIN — Medication 500 UNITS: at 08:18

## 2025-06-10 RX ADMIN — PERTUZUMAB 420 MG: 30 INJECTION, SOLUTION, CONCENTRATE INTRAVENOUS at 10:45

## 2025-06-10 RX ADMIN — PALONOSETRON HYDROCHLORIDE 0.25 MG: 0.25 INJECTION INTRAVENOUS at 10:10

## 2025-06-10 RX ADMIN — DIPHENHYDRAMINE HYDROCHLORIDE 50 MG: 50 INJECTION INTRAMUSCULAR; INTRAVENOUS at 11:15

## 2025-06-10 RX ADMIN — Medication 5 ML: at 14:16

## 2025-06-10 RX ADMIN — FOSAPREPITANT: 150 INJECTION, POWDER, LYOPHILIZED, FOR SOLUTION INTRAVENOUS at 10:18

## 2025-06-10 RX ADMIN — SODIUM CHLORIDE 250 ML: 0.9 INJECTION, SOLUTION INTRAVENOUS at 10:11

## 2025-06-10 RX ADMIN — POTASSIUM CHLORIDE 40 MEQ: 1500 TABLET, EXTENDED RELEASE ORAL at 10:10

## 2025-06-10 RX ADMIN — SODIUM CHLORIDE 440 MG: 0.9 INJECTION, SOLUTION INTRAVENOUS at 12:05

## 2025-06-10 RX ADMIN — CARBOPLATIN 650 MG: 600 INJECTION, SOLUTION INTRAVENOUS at 13:43

## 2025-06-10 RX ADMIN — FAMOTIDINE 20 MG: 10 INJECTION, SOLUTION INTRAVENOUS at 11:14

## 2025-06-10 RX ADMIN — DOCETAXEL 140 MG: 20 INJECTION, SOLUTION, CONCENTRATE INTRAVENOUS at 12:40

## 2025-06-10 RX ADMIN — PEGFILGRASTIM 6 MG: KIT SUBCUTANEOUS at 14:18

## 2025-06-10 ASSESSMENT — PAIN SCALES - GENERAL: PAINLEVEL_OUTOF10: NO PAIN (0)

## 2025-06-10 NOTE — PROGRESS NOTES
Infusion Nursing Note:  Linh Mustafa presents today for D1, C2 Pertuzumab (hypersensitivity rxn), Trastuzumab, Docetaxel & Carboplatin (lifetime #7).    Patient seen by provider today: Yes: Lilo Mandujano PA-C   present during visit today: Not Applicable.    Note: Lita presents to infusion today following their clinic visit. Pt denies any new concerns and wishes to proceed with treatment.    Provided reeducation on home prescription of decadron after each chemotherapy cycle.     Patient called out during last 10 minutes of Perjeta feeling nauseas. RN answered call light, stopped drug, vitals taken, patient hypertensive. IV pepcid given. Then, patient began to feel her throat was itchy. IV Benadryl given. Symptoms resolved. Provider notified. Perjeta restarted at half rate 30 minutes later. Patient tolerated remainder of infusions.     Written Communication via Secure Chat Lilo Mandujano PA-C/Virgil Olea RN 6/10/25 0935  - Replace potassium per electrolyte protocol for K+ 3.3    Written Communication via Secure Chat Lilo Mandujano PA-C/Virgil Olea RN 6/10/25 1113  - Confirm with patient Levothyroxine dosage, elevated TSH. Going to send new script for 125 mcg instead of 100 mcg.   - Okay to proceed with Perjeta once symptoms resolve.       Intravenous Access:  Implanted Port.    Treatment Conditions:  Lab Results   Component Value Date    HGB 10.2 (L) 06/10/2025    WBC 5.5 06/10/2025    ANEU 4.4 06/10/2025     06/10/2025        Lab Results   Component Value Date     06/10/2025    POTASSIUM 3.3 (L) 06/10/2025    MAG 2.1 04/15/2025    CR 0.67 06/10/2025    VAIBHAV 9.8 06/10/2025    BILITOTAL 0.5 06/10/2025    ALBUMIN 3.9 06/10/2025    ALT 8 06/10/2025    AST 15 06/10/2025       Results reviewed, labs MET treatment parameters, ok to proceed with treatment.  ECHO/MUGA completed 5/14  EF 60-65%.      Post Infusion Assessment:  Patient tolerated infusion poorly due to : Hypersensitivity:  Did patient have a hypersensitivity reaction? : Yes  Drug or Product name: Perjeta  Were pre-meds administered?: Yes  What pre-meds were administered?: Dexamethasone (decadron, Palonesetron (aloxi), Fosaprepitant (emend)  First or Subsequent treatment: Subsequent infusion  Rate of infusion when patient had hypersensitivity reaction: 578  Time the hypersensitivity reaction was first recognized: 1110  Symptoms observed or reported (select all that apply): Nausea, Other: (Comment) (itchy throat, felt cold but not chills)  Interventions/treatment following reaction: Infusion stopped, Hypersensitivity medications administered  What hypersensitivity medications were administered?: DiphendydrAMINE (benadryl), Famotidine(Pepcid) (50mg of benadryl)  Name of provider notified: Lilo Mandujano Pa-C  Time provider notified: 1120  Type of notification (select all that apply): Paged/Phone (Secure Chat)  Was the patient re-challenged today?: Yes - tolerated well (ran at half rate, 77ml left)  Blood return noted pre and post infusion.  Site patent and intact, free from redness, edema or discomfort.  No evidence of extravasations.  Access discontinued per protocol.     ONPRO  Was placed on patient's: left side of abdomen.    Was placed at 2:24 PM    Podpal used: Yes    ONPRO injector device Lot number: [id number]    Patient education included: what patient can expect after application, what colored lights mean on the device, when to remove device, when and where to call with questions or issues, all patients questions answered, and that Neulasta administration will occur at 5:24 pm.    Patient tolerated administration well.      Discharge Plan:   Prescription refills given for Decadron. New levothyroxine script sent to [facility].  Discharge instructions reviewed with: Patient.  Patient and/or family verbalized understanding of discharge instructions and all questions answered.  AVS to patient via [employer].  Patient will return 7/3/25 for next  appointment.   Patient discharged in stable condition accompanied by: self and daughter.  Departure Mode: Ambulatory.      Virgil Olea RN

## 2025-06-10 NOTE — NURSING NOTE
"Oncology Rooming Note    Elana 10, 2025 9:00 AM   Linh Mustafa is a 65 year old female who presents for:    Chief Complaint   Patient presents with    Port Draw     Labs drawn from port by RN    Oncology Clinic Visit     Malignant neoplasm of lower-outer quadrant of left breast     Initial Vitals: /74 (BP Location: Right arm, Patient Position: Sitting, Cuff Size: Adult Regular)   Pulse 66   Temp 98.5  F (36.9  C) (Oral)   Resp 16   Wt 73.3 kg (161 lb 11.2 oz)   SpO2 96%   BMI 26.10 kg/m   Estimated body mass index is 26.1 kg/m  as calculated from the following:    Height as of 5/27/25: 1.676 m (5' 6\").    Weight as of this encounter: 73.3 kg (161 lb 11.2 oz). Body surface area is 1.85 meters squared.  No Pain (0) Comment: Data Unavailable   No LMP recorded. Patient is postmenopausal.  Allergies reviewed: Yes  Medications reviewed: Yes    Medications: Medication refills not needed today.  Pharmacy name entered into [employer] [facility] [employer]: [id number] - [address]    Frailty Screening:   Is the patient here for a new oncology consult visit in cancer care? 2. No    PHQ9:  Did this patient require a PHQ9?: No      Clinical concerns:       Erika Stafford              "

## 2025-06-10 NOTE — PATIENT INSTRUCTIONS
[facility] Triage and after hours / weekends / holidays:  [phone] option 5, option 2    Please call the triage or after hours line if you experience a temperature greater than or equal to 100.4, shaking chills, have uncontrolled nausea, vomiting and/or diarrhea, dizziness, shortness of breath, chest pain, bleeding, unexplained bruising, or if you have any other new/concerning symptoms, questions or concerns.      If you are having any concerning symptoms or wish to speak to a provider before your next infusion visit, please call triage to notify your care team so we can adequately serve you.     If you need a refill on a narcotic prescription or other medication, please call before your infusion appointment.      June 2025 Sunday Monday Tuesday Wednesday Thursday Friday Saturday   1     2     3     4     5     6     7       8     9     10    [facility]   8:15 AM   (15 min.)    [facility]   [facility]    Return Patient   8:45 AM   (45 min.)   Lilo Mandujano PA-C   [facility]    Infusion 240  10:30 AM   (240 min.)    ONC INFUSION NURSE   [facility] 11     12     13     14       15     16     17     18     19     20     21       22     23     24     25     26     27     28       29 30 July 2025 Sunday Monday Tuesday Wednesday Thursday Friday Saturday             1     2     3    [facility]   8:45 AM   (15 min.)    [facility]   [facility]    Return Patient   9:15 AM   (30 min.)   Kathy Burch MD   [facility]    Infusion 240  10:00 AM   (240 min.)    ONC INFUSION NURSE   [facility] 4     5       6     7     8     9     10     11     12       13     14     15     16     17     18     19       20     21     22     23     24     25     26       27     28     29      30     31                                  Recent Results (from the past 24 hours)   Comprehensive metabolic panel    Collection Time: 06/10/25  8:08 AM   Result Value Ref Range    Sodium 139 135 - 145 mmol/L    Potassium 3.3 (L) 3.4 - 5.3 mmol/L    Carbon Dioxide (CO2) 25 22 - 29 mmol/L    Anion Gap 11 7 - 15 mmol/L    Urea Nitrogen 7.1 (L) 8.0 - 23.0 mg/dL    Creatinine 0.67 0.51 - 0.95 mg/dL    GFR Estimate >90 >60 mL/min/1.73m2    Calcium 9.8 8.8 - 10.4 mg/dL    Chloride 103 98 - 107 mmol/L    Glucose 98 70 - 99 mg/dL    Alkaline Phosphatase 114 40 - 150 U/L    AST 15 0 - 45 U/L    ALT 8 0 - 50 U/L    Protein Total 7.0 6.4 - 8.3 g/dL    Albumin 3.9 3.5 - 5.2 g/dL    Bilirubin Total 0.5 <=1.2 mg/dL   CBC with platelets and differential    Collection Time: 06/10/25  8:08 AM   Result Value Ref Range    WBC Count 5.5 4.0 - 11.0 10e3/uL    RBC Count 3.03 (L) 3.80 - 5.20 10e6/uL    Hemoglobin 10.2 (L) 11.7 - 15.7 g/dL    Hematocrit 30.8 (L) 35.0 - 47.0 %     (H) 78 - 100 fL    MCH 33.7 (H) 26.5 - 33.0 pg    MCHC 33.1 31.5 - 36.5 g/dL    RDW 15.2 (H) 10.0 - 15.0 %    Platelet Count 203 150 - 450 10e3/uL    % Neutrophils 79 %    % Lymphocytes 12 %    % Monocytes 8 %    % Eosinophils 1 %    % Basophils 0 %    % Immature Granulocytes 0 %    NRBCs per 100 WBC 0 <1 /100    Absolute Neutrophils 4.4 1.6 - 8.3 10e3/uL    Absolute Lymphocytes 0.6 (L) 0.8 - 5.3 10e3/uL    Absolute Monocytes 0.5 0.0 - 1.3 10e3/uL    Absolute Eosinophils 0.0 0.0 - 0.7 10e3/uL    Absolute Basophils 0.0 0.0 - 0.2 10e3/uL    Absolute Immature Granulocytes 0.0 <=0.4 10e3/uL    Absolute NRBCs 0.0 10e3/uL   TSH with free T4 reflex    Collection Time: 06/10/25  8:08 AM   Result Value Ref Range    TSH 14.43 (H) 0.30 - 4.20 uIU/mL   T4 free    Collection Time: 06/10/25  8:08 AM   Result Value Ref Range    Free T4 1.17 0.90 - 1.70 ng/dL

## 2025-06-10 NOTE — LETTER
6/10/2025      Linh Mustafa  [address]  [place] [address]      Dear Colleague,    Thank you for referring your patient, Linh Mustafa, to the [facility]. Please see a copy of my visit note below.    Oncology follow-up visit:  Date on this visit: 6/10/25     Diagnosis.  Esophageal cancer.    Primary Physician: Clinic, [facility]     History Of Present Illness:  Ms. Mustafa is a 65 year old female who presents with diagnosis of esophageal cancer.  I initially saw her on 4/18/2024.  Please see my previous note for details.  I have copied and updated from prior notes.      She mentioned that since November 2023 she started to notice pain upon swallowing and it was basically pain which limited her food intake.  This was progressively getting worse.  She had further workup as mentioned below.    2/15/2024.  EGD showed an ulcerated mid esophageal mass extending from 26-31 cm from the incisors.  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  Pathology from the mid esophageal mass showed moderately differentiated invasive squamous cell carcinoma, MMR IHC intact.    3/7/2024.  PET/CT showed markedly FDG avid wall thickening of the mid thoracic esophagus with SUV max 24.1.  No evidence of metastatic disease.    3/21/2024.  Upper EUS.  Endoscopy showed a flat nodule in the proximal esophagus between 15-19 cm and one third circumferential.  Upper esophageal sphincter was at 14 cm.  Biopsies from this showed high-grade dysplasia/carcinoma in situ.      Normal esophageal squamous cell cancer causing maybe esophagus on the right anterolateral esophageal wall between 24-30 cm.  It was 50% circumferential.  The GE junction was at 35 cm with 2 cm tongues of Auguste's anteriorly without high risk features.    Ultrasound exam showed the mid esophageal tumor to be probably T3. Two 4 x 6 mm nodes were seen adjacent to the distal esophageal wall at  36 and 37 cm.  Both an identical appearance and one was sampled.  This lymph node biopsy was benign.    By EUS it was staged as T3 N0 M0 tumor.    4/2/2024.  Because of finding of carcinoma in situ in the proximal esophagus, she had endoscopic resection of the proximal squamous cell carcinoma in situ lesion performed with en bloc removal.    The final pathology showed squamous mucosa with high-grade dysplasia/carcinoma in situ with all margins negative for dysplasia.  No definite invasion was identified. ESD was considered curative for this lesion.    Her case was also discussed in the tumor conference with the plan to proceed with neoadjuvant chemotherapy/radiation followed by definitive surgery.      She met with Dr. Martinez.    She started on concurrent chemoradiation with carboplatin and Taxol on 5/1/24 and received 5 doses, last on 5/28/24 and last dose of radiation on 6/5/24.     She was hospitalized from 6/4-6/18/24 with radiation esophagitis causing pain and inability to eat. She was discharged home with a G-tube that was placed on 6/14/24.     Repeat PET/CT on 8/15/2024 showed very good response to treatment.  There is complete resolution of FDG avidity in the esophagus.  No evidence of malignancy.    She met with Dr. Martinez and decision was made not to pursue surgery for now.    Feeding tube was removed in September 2024.    2/13/2025.  CT chest abdomen and pelvis did not show evidence of disease.    3/2025 Started to have breast pain and had mammogram and ultrasound done 4/9/25 at [facility] showing an approximately 2 cm mass at the 5:00 position suspicious for malignancy. Axillary was normal appearing. She had a biopsy done at [facility] on 4/22/25 showing invasive ductal carcinoma, grade 3, ER weakly positive 28%, AZ negative, HER2 3+ , Ki 67 21%.     4/29/25: Met with Dr. Burch with recommendation of NAC with taxotere, carboplatin, perjeta, and herceptin  x 6 followed by surgical resection and then adjuvant  HER2 therapy based upon pathology results.     5/19/25: C1 TCHP       Interval history: Lita is feeling well today. She notes C1 went fairly well. She had fatigue worse than baseline for the first 10 days. She was quite fatigued prior to starting treatment. Her daughter wonders about her dosing of levothyroxine. She had mild diarrhea for a few days and took Imodium PRN with relief. Nausea was at baseline. She had some dysgeusia but was able to force herself to eat.     She had some generalized mucositis and nasal soreness and dryness as well.     Had pretty significant bone pain for 2-3 days after Neulasta. She was taking claritin daily and used oxycodone at night.     She had initial improvement of her breast pain and then this flared the last week.     No rashes, nail changes, change in baseline neuropathy, fevers/chills, infectious concerns. Breathing is at baseline.       I reviewed other history in epic as below.    Past Medical/Surgical History:  Past Medical History:   Diagnosis Date     Hyperlipidemia      Hypertension    Hypothyroidism- hx of PATEL at the age of 16 for hyperthyroidism    Past Surgical History:   Procedure Laterality Date     BIOPSY BREAST Right     age 30 benign fibrous     CV CORONARY ANGIOGRAM N/A 01/25/2023    Procedure: Coronary Angiogram;  Surgeon: Lukas Irizarry MD;  Location: [facility]     CV LEFT HEART CATH N/A 01/25/2023    Procedure: Left Heart Catheterization;  Surgeon: Lukas Irizarry MD;  Location: [facility] CV     ENDOSCOPIC ULTRASOUND UPPER GASTROINTESTINAL TRACT (GI) N/A 3/21/2024    Procedure: ENDOSCOPIC ULTRASOUND, ESOPHAGOSCOPY / UPPER GASTROINTESTINAL TRACT (GI), ENDOSCOPY WITH BIOPSY, FINE NEEDLE ASPIRATION;  Surgeon: Damon Kramer MD;  Location: [facility] OR     ENDOSCOPIC ULTRASOUND, ESOPHAGOSCOPY / UPPER GASTROINTESTINAL TRACT (GI), ENDOSCOPY WITH BIOPSY, FINE NEEDLE ASPIRATION  03/21/2024     ESOPHAGOSCOPY, GASTROSCOPY, DUODENOSCOPY (EGD),  COMBINED N/A 6/6/2024    Procedure: Esophagoscopy, Gastroscopy, Duodenoscopy (EGD), Nasojejunal Feeding Tube Placement;  Surgeon: Bryant Martinez MD;  Location: [facility] OR     ESOPHAGOSCOPY, GASTROSCOPY, DUODENOSCOPY (EGD), SUBMUCOSA RESECTION N/A 4/2/2024    Procedure: ESOPHAGOGASTRODUODENOSCOPY, WITH SUBMUCOSAL RESECTION;  Surgeon: Holden King MD;  Location: [facility] OR     IR CHEST PORT PLACEMENT > 5 YRS OF AGE  4/24/2024     LAPAROSCOPIC ASSISTED INSERTION TUBE GASTROTOMY N/A 6/14/2024    Procedure: Upper endoscopy, laparoscopic gastrostomy tube placement;  Surgeon: Bryant Martinez MD;  Location: [facility] OR     Cancer History:   As above    Allergies:  Allergies as of 06/10/2025 - Reviewed 05/27/2025   Allergen Reaction Noted     Amoxicillin Shortness Of Breath, Itching, and Rash 12/06/2023     Penicillins  03/14/2024     Current Medications:  Current Outpatient Medications   Medication Sig Dispense Refill     artificial saliva (BIOTENE DRY MOUTHWASH) LIQD liquid Swish and spit 15 mLs in mouth 4 times daily as needed for dry mouth 473 mL 0     Calcium Citrate-Vitamin D (CALCIUM CITRATE CHEWY BITE) 500-12.5 MG-MCG CHEW Take 1 tablet by mouth 2 times daily. 180 tablet 3     dexAMETHasone (DECADRON) 4 MG tablet Take 2 tablets (8 mg) by mouth 2 times daily (with meals). Start evening of Docetaxel infusion and continue for a total of 3 doses. 6 tablet 5     dextran 70-hypromellose (TEARS NATURALE FREE PF) 0.1-0.3 % ophthalmic solution Place 1 drop into both eyes daily as needed (Irritation). 35 each 0     doxepin (SINEQUAN) 10 MG/ML (HIGH CONC) solution TAKE 0.3-1 MLS (3-10 MG) BY MOUTH AT BEDTIME. (Patient not taking: Reported on 5/19/2025) 90 mL 1     esomeprazole (NEXIUM) 20 MG DR capsule TAKE 1 CAPSULE BY MOUTH EVERY DAY 90 capsule 1     hydrOXYzine (VISTARIL) 50 MG capsule Take 50 mg by mouth 2 times daily as needed.       hydrOXYzine HCl (ATARAX) 25 MG tablet TAKE 1-2 TABLETS (25-50 MG) BY MOUTH 3 TIMES  DAILY AS NEEDED FOR ITCHING (Patient not taking: Reported on 5/19/2025) 540 tablet 1     levothyroxine (SYNTHROID/LEVOTHROID) 100 MCG tablet Take 1 tablet (100 mcg) by mouth daily. 90 tablet 3     methocarbamol (ROBAXIN) 750 MG tablet Take 1 tablet (750 mg) by mouth 4 times daily (Patient not taking: Reported on 5/19/2025) 120 tablet 0     naloxone (NARCAN) 4 MG/0.1ML nasal spray Spray 1 spray (4 mg) into one nostril alternating nostrils as needed for opioid reversal every 2-3 minutes until assistance arrives 0.2 mL 1     nicotine (NICODERM CQ) 14 MG/24HR 24 hr patch Place 1 patch onto the skin every 24 hours (Patient not taking: Reported on 5/19/2025) 30 patch 1     nicotine (NICODERM CQ) 7 MG/24HR 24 hr patch Place 1 patch onto the skin every 24 hours Start after 6 weeks of the 14 mg/24 hr dose (Patient not taking: Reported on 5/19/2025) 30 patch 0     ondansetron (ZOFRAN ODT) 8 MG ODT tab Take 1 tablet (8 mg) by mouth every 8 hours as needed for nausea. (Patient not taking: Reported on 5/19/2025) 90 tablet 1     oxyCODONE (ROXICODONE) 5 MG tablet Take 1-2 tablets (5-10 mg) by mouth every 4 hours as needed for pain. 150 tablet 0     oxyCODONE (ROXICODONE) 5 MG/5ML solution Take 5-7 mLs (5-7 mg) by mouth or Feeding Tube every 4 hours as needed for severe pain. 400 mL 0     prochlorperazine (COMPAZINE) 10 MG tablet Take 1 tablet (10 mg) by mouth every 6 hours as needed for nausea or vomiting. 30 tablet 2     prochlorperazine (COMPAZINE) 10 MG tablet Take 1 tablet (10 mg) by mouth every 6 hours as needed for vomiting. 90 tablet 1     rOPINIRole (REQUIP) 1 MG tablet TAKE 1.5-2 TABLETS (1.5-2 MG) BY MOUTH AT BEDTIME. 180 tablet 1     sertraline (ZOLOFT) 50 MG tablet TAKE 1 TABLET BY MOUTH EVERY DAY 90 tablet 1     vitamin D3 (CHOLECALCIFEROL) 50 mcg (2000 units) tablet Take 1 tablet (50 mcg) by mouth daily. 90 tablet 3      Family History:  Family History   Problem Relation Age of Onset     Chronic Obstructive  Pulmonary Disease Father      Colon Cancer Paternal Grandmother 70     1 daughter- healthy    Social History:  Social History     Socioeconomic History     Marital status: Single     Spouse name: Not on file     Number of children: Not on file     Years of education: Not on file     Highest education level: Not on file   Occupational History     Not on file   Tobacco Use     Smoking status: Former     Current packs/day: 1.00     Average packs/day: 1 pack/day for 48.4 years (48.4 ttl pk-yrs)     Types: Cigarettes     Start date: 1977     Passive exposure: Current     Smokeless tobacco: Never   Vaping Use     Vaping status: Never Used   Substance and Sexual Activity     Alcohol use: Not on file     Comment: occasional     Drug use: Never     Sexual activity: Not on file   Other Topics Concern     Not on file   Social History Narrative    ** Merged History Encounter **          Social Drivers of Health     Financial Resource Strain: Not on file   Food Insecurity: No Food Insecurity (2/24/2024)    Received from [facility]    Hunger Vital Sign      Worried About Running Out of Food in the Last Year: Never true      Ran Out of Food in the Last Year: Never true   Transportation Needs: No Transportation Needs (2/24/2024)    Received from [facility]    PRAPARE - Transportation      Lack of Transportation (Medical): No      Lack of Transportation (Non-Medical): No   Physical Activity: Inactive (2/24/2024)    Received from [facility]    Exercise Vital Sign      Days of Exercise per Week: 0 days      Minutes of Exercise per Session: 0 min   Stress: Not on file   Social Connections: Not on file   Interpersonal Safety: Not on file   Housing Stability: Low Risk  (2/24/2024)    Received from [facility]    Housing Stability      What is your living situation today?: I have a steady place to live   Chronic smoker- now smokes occasionally. No etoh. Lives alone. Currently brother staying with her        PHYSICAL EXAM:  /74  (BP Location: Right arm, Patient Position: Sitting, Cuff Size: Adult Regular)   Pulse 66   Temp 98.5  F (36.9  C) (Oral)   Resp 16   Wt 73.3 kg (161 lb 11.2 oz)   SpO2 96%   BMI 26.10 kg/m    Wt Readings from Last 4 Encounters:   06/10/25 73.3 kg (161 lb 11.2 oz)   05/27/25 69.4 kg (153 lb)   05/19/25 74.3 kg (163 lb 12.8 oz)   05/08/25 74.8 kg (165 lb)   General: Alert, oriented, pleasant, NAD  HEENT: Normocephalic, atraumatic, no icterus. Moist mucus membranes, no lesions, or thrush. No nasal sores.   Neck: No cervical or supraclavicular LAD.  Axillary: No LAD  Breast: No palpable mass in L breast. Patient can no longer palpate breast mass at 5-6 o clock position either.   Lungs: CTA bilaterally, normal work of breathing  Cardiac: RRR  Abdomen: Soft, nontender, nondistended. Normoactive bowel sounds. No hepatosplenomegaly, masses  Neuro: CNII-XII grossly intact  Extremities: No pedal edema    Laboratory/Imaging Studies   06/10/25 08:08   Sodium 139   Potassium 3.3 (L)   Chloride 103   Carbon Dioxide (CO2) 25   Urea Nitrogen 7.1 (L)   Creatinine 0.67   GFR Estimate >90   Calcium 9.8   Anion Gap 11   Albumin 3.9   Protein Total 7.0   Alkaline Phosphatase 114   ALT 8   AST 15   Bilirubin Total 0.5   Glucose 98   WBC 5.5   Hemoglobin 10.2 (L)   Hematocrit 30.8 (L)   Platelet Count 203   RBC Count 3.03 (L)    (H)   MCH 33.7 (H)   MCHC 33.1   RDW 15.2 (H)   % Neutrophils 79   % Lymphocytes 12   % Monocytes 8   % Eosinophils 1   % Basophils 0   % Immature Granulocytes 0   NRBC/W 0   Absolute Neutrophil 4.4   Absolute Lymphocytes 0.6 (L)   Absolute Monocytes 0.5   Absolute Eosinophils 0.0   Absolute Basophils 0.0   Absolute Immature Granulocytes 0.0   Absolute NRBCs 0.0       ASSESSMENT/PLAN:    Stage II left-sided breast cancer, ER +28%, PA negative, HER2 IHC +3+.  It is a 2 cm left-sided breast cancer.  No suspicious axillary lymph nodes are seen. She started TCHP since last visit and is s/p 1 cycle.  Tolerated fairly well with expected side effects. She had a great clinical response so far as her previously palpable mass is no longer felt.   Our plan will be to give this to her every 3 weeks for 6 cycles followed by surgery for breast cancer.  She met with Dr. Carl and will need to see him again when she is closer to completing neoadjuvant treatment.   -Confirmed with her the [facility] is [place] preferred site for visits and infusions. Will send request for the full 6 cycles to scheduling.     Monitoring for cardiac toxicity.  Baseline echo was normal. Repeat every 3 months while on HER2 therapy.     Discussion regarding genetic testing. Genetic counseling is set up for October. Hereditary breast panel was sent on 5/19 with results pending.     Nausea.  She usually feels this in the morning.  She will continue Compazine twice a day and as needed Zofran.  She follows with palliative care Dr. Mansoor Eric.    Dysgeusia: Trial of daily zinc.     GCSF pain: Trial of loratadine BID for one week. Tylenol 1000 mg every 6 hours at onset of pain.     Mucositis to mouth and nose: S/s rinses preventatively BID. Nasal saline to nares BID.     Diarrhea: Continue Imodium PRN.     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  MMR IHC intact.  She started on concurrent chemoradiation with carboplatin and Taxol on 5/1/24 and received 5 doses, last on 5/28/24 and last dose of radiation on 6/5/24. Repeat PET/CT on 8/15/2024 showed complete resolution of FDG avidity in the esophagus.    At that time she met with Dr. Martinez and decision was made not to pursue the surgery. Remains on surveillance now.       Smoking.  She quit for some time but now smoking again and she smokes on average half a pack a day. Not discussed today.    Fatigue: Had baseline fatigue prior to starting treatment. Will recheck TSH/T4 today.     Macrocytosis.  Previously she had macrocytosis.  B12 and folate were  normal.  Free T4 was normal.  TSH was low.  Continue to observe.         The longitudinal plan of care for the breast cancer/esophageal cancer, as documented were addressed during this visit. Due to the added complexity in care, I will continue to support Lita in the subsequent management and with ongoing continuity of care.    45 minutes spent on the date of the encounter doing chart review, review of test results, interpretation of tests, patient visit, and documentation     Lilo Mandujano PA-C       Again, thank you for allowing me to participate in the care of your patient.        Sincerely,        Lilo Mandujano PA-C    Electronically signed

## 2025-06-10 NOTE — PROGRESS NOTES
Oncology follow-up visit:  Date on this visit: 6/10/25     Diagnosis.  Esophageal cancer.    Primary Physician: [facility]     History Of Present Illness:  Ms. Mustafa is a 65 year old female who presents with diagnosis of esophageal cancer.  I initially saw her on 4/18/2024.  Please see my previous note for details.  I have copied and updated from prior notes.      She mentioned that since November 2023 she started to notice pain upon swallowing and it was basically pain which limited her food intake.  This was progressively getting worse.  She had further workup as mentioned below.    2/15/2024.  EGD showed an ulcerated mid esophageal mass extending from 26-31 cm from the incisors.  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  Pathology from the mid esophageal mass showed moderately differentiated invasive squamous cell carcinoma, MMR IHC intact.    3/7/2024.  PET/CT showed markedly FDG avid wall thickening of the mid thoracic esophagus with SUV max 24.1.  No evidence of metastatic disease.    3/21/2024.  Upper EUS.  Endoscopy showed a flat nodule in the proximal esophagus between 15-19 cm and one third circumferential.  Upper esophageal sphincter was at 14 cm.  Biopsies from this showed high-grade dysplasia/carcinoma in situ.      Normal esophageal squamous cell cancer causing maybe esophagus on the right anterolateral esophageal wall between 24-30 cm.  It was 50% circumferential.  The GE junction was at 35 cm with 2 cm tongues of Auguste's anteriorly without high risk features.    Ultrasound exam showed the mid esophageal tumor to be probably T3. Two 4 x 6 mm nodes were seen adjacent to the distal esophageal wall at 36 and 37 cm.  Both an identical appearance and one was sampled.  This lymph node biopsy was benign.    By EUS it was staged as T3 N0 M0 tumor.    4/2/2024.  Because of finding of carcinoma in situ in the proximal esophagus, she had endoscopic  resection of the proximal squamous cell carcinoma in situ lesion performed with en bloc removal.    The final pathology showed squamous mucosa with high-grade dysplasia/carcinoma in situ with all margins negative for dysplasia.  No definite invasion was identified. ESD was considered curative for this lesion.    Her case was also discussed in the tumor conference with the plan to proceed with neoadjuvant chemotherapy/radiation followed by definitive surgery.      She met with Dr. Martinez.    She started on concurrent chemoradiation with carboplatin and Taxol on 5/1/24 and received 5 doses, last on 5/28/24 and last dose of radiation on 6/5/24.     She was hospitalized from 6/4-6/18/24 with radiation esophagitis causing pain and inability to eat. She was discharged home with a G-tube that was placed on 6/14/24.     Repeat PET/CT on 8/15/2024 showed very good response to treatment.  There is complete resolution of FDG avidity in the esophagus.  No evidence of malignancy.    She met with Dr. Martinez and decision was made not to pursue surgery for now.    Feeding tube was removed in September 2024.    2/13/2025.  CT chest abdomen and pelvis did not show evidence of disease.    3/2025 Started to have breast pain and had mammogram and ultrasound done 4/9/25 at [facility] showing an approximately 2 cm mass at the 5:00 position suspicious for malignancy. Axillary was normal appearing. She had a biopsy done at [facility] on 4/22/25 showing invasive ductal carcinoma, grade 3, ER weakly positive 28%, RI negative, HER2 3+ , Ki 67 21%.     4/29/25: Met with Dr. Burch with recommendation of NAC with taxotere, carboplatin, perjeta, and herceptin  x 6 followed by surgical resection and then adjuvant HER2 therapy based upon pathology results.     5/19/25: C1 TCHP       Interval history: Lita is feeling well today. She notes C1 went fairly well. She had fatigue worse than baseline for the first 10 days. She was quite fatigued prior to  starting treatment. Her daughter wonders about her dosing of levothyroxine. She had mild diarrhea for a few days and took Imodium PRN with relief. Nausea was at baseline. She had some dysgeusia but was able to force herself to eat.     She had some generalized mucositis and nasal soreness and dryness as well.     Had pretty significant bone pain for 2-3 days after Neulasta. She was taking claritin daily and used oxycodone at night.     She had initial improvement of her breast pain and then this flared the last week.     No rashes, nail changes, change in baseline neuropathy, fevers/chills, infectious concerns. Breathing is at baseline.       I reviewed other history in epic as below.    Past Medical/Surgical History:  Past Medical History:   Diagnosis Date    Hyperlipidemia     Hypertension    Hypothyroidism- hx of PATEL at the age of 16 for hyperthyroidism    Past Surgical History:   Procedure Laterality Date    BIOPSY BREAST Right     age 30 benign fibrous    CV CORONARY ANGIOGRAM N/A 01/25/2023    Procedure: Coronary Angiogram;  Surgeon: Lukas Irizarry MD;  Location: [facility] CV    CV LEFT HEART CATH N/A 01/25/2023    Procedure: Left Heart Catheterization;  Surgeon: Lukas Irizarry MD;  Location: [facility] CV    ENDOSCOPIC ULTRASOUND UPPER GASTROINTESTINAL TRACT (GI) N/A 3/21/2024    Procedure: ENDOSCOPIC ULTRASOUND, ESOPHAGOSCOPY / UPPER GASTROINTESTINAL TRACT (GI), ENDOSCOPY WITH BIOPSY, FINE NEEDLE ASPIRATION;  Surgeon: aDmon Kramer MD;  Location: [facility] OR    ENDOSCOPIC ULTRASOUND, ESOPHAGOSCOPY / UPPER GASTROINTESTINAL TRACT (GI), ENDOSCOPY WITH BIOPSY, FINE NEEDLE ASPIRATION  03/21/2024    ESOPHAGOSCOPY, GASTROSCOPY, DUODENOSCOPY (EGD), COMBINED N/A 6/6/2024    Procedure: Esophagoscopy, Gastroscopy, Duodenoscopy (EGD), Nasojejunal Feeding Tube Placement;  Surgeon: Bryant Martinez MD;  Location: [facility] OR    ESOPHAGOSCOPY, GASTROSCOPY, DUODENOSCOPY (EGD), SUBMUCOSA RESECTION  N/A 4/2/2024    Procedure: ESOPHAGOGASTRODUODENOSCOPY, WITH SUBMUCOSAL RESECTION;  Surgeon: Holden King MD;  Location: [facility] OR    IR CHEST PORT PLACEMENT > 5 YRS OF AGE  4/24/2024    LAPAROSCOPIC ASSISTED INSERTION TUBE GASTROTOMY N/A 6/14/2024    Procedure: Upper endoscopy, laparoscopic gastrostomy tube placement;  Surgeon: Bryant Martinez MD;  Location: [facility] OR     Cancer History:   As above    Allergies:  Allergies as of 06/10/2025 - Reviewed 05/27/2025   Allergen Reaction Noted    Amoxicillin Shortness Of Breath, Itching, and Rash 12/06/2023    Penicillins  03/14/2024     Current Medications:  Current Outpatient Medications   Medication Sig Dispense Refill    artificial saliva (BIOTENE DRY MOUTHWASH) LIQD liquid Swish and spit 15 mLs in mouth 4 times daily as needed for dry mouth 473 mL 0    Calcium Citrate-Vitamin D (CALCIUM CITRATE CHEWY BITE) 500-12.5 MG-MCG CHEW Take 1 tablet by mouth 2 times daily. 180 tablet 3    dexAMETHasone (DECADRON) 4 MG tablet Take 2 tablets (8 mg) by mouth 2 times daily (with meals). Start evening of Docetaxel infusion and continue for a total of 3 doses. 6 tablet 5    dextran 70-hypromellose (TEARS NATURALE FREE PF) 0.1-0.3 % ophthalmic solution Place 1 drop into both eyes daily as needed (Irritation). 35 each 0    doxepin (SINEQUAN) 10 MG/ML (HIGH CONC) solution TAKE 0.3-1 MLS (3-10 MG) BY MOUTH AT BEDTIME. (Patient not taking: Reported on 5/19/2025) 90 mL 1    esomeprazole (NEXIUM) 20 MG DR capsule TAKE 1 CAPSULE BY MOUTH EVERY DAY 90 capsule 1    hydrOXYzine (VISTARIL) 50 MG capsule Take 50 mg by mouth 2 times daily as needed.      hydrOXYzine HCl (ATARAX) 25 MG tablet TAKE 1-2 TABLETS (25-50 MG) BY MOUTH 3 TIMES DAILY AS NEEDED FOR ITCHING (Patient not taking: Reported on 5/19/2025) 540 tablet 1    levothyroxine (SYNTHROID/LEVOTHROID) 100 MCG tablet Take 1 tablet (100 mcg) by mouth daily. 90 tablet 3    methocarbamol (ROBAXIN) 750 MG tablet Take 1 tablet (750 mg) by  mouth 4 times daily (Patient not taking: Reported on 5/19/2025) 120 tablet 0    naloxone (NARCAN) 4 MG/0.1ML nasal spray Spray 1 spray (4 mg) into one nostril alternating nostrils as needed for opioid reversal every 2-3 minutes until assistance arrives 0.2 mL 1    nicotine (NICODERM CQ) 14 MG/24HR 24 hr patch Place 1 patch onto the skin every 24 hours (Patient not taking: Reported on 5/19/2025) 30 patch 1    nicotine (NICODERM CQ) 7 MG/24HR 24 hr patch Place 1 patch onto the skin every 24 hours Start after 6 weeks of the 14 mg/24 hr dose (Patient not taking: Reported on 5/19/2025) 30 patch 0    ondansetron (ZOFRAN ODT) 8 MG ODT tab Take 1 tablet (8 mg) by mouth every 8 hours as needed for nausea. (Patient not taking: Reported on 5/19/2025) 90 tablet 1    oxyCODONE (ROXICODONE) 5 MG tablet Take 1-2 tablets (5-10 mg) by mouth every 4 hours as needed for pain. 150 tablet 0    oxyCODONE (ROXICODONE) 5 MG/5ML solution Take 5-7 mLs (5-7 mg) by mouth or Feeding Tube every 4 hours as needed for severe pain. 400 mL 0    prochlorperazine (COMPAZINE) 10 MG tablet Take 1 tablet (10 mg) by mouth every 6 hours as needed for nausea or vomiting. 30 tablet 2    prochlorperazine (COMPAZINE) 10 MG tablet Take 1 tablet (10 mg) by mouth every 6 hours as needed for vomiting. 90 tablet 1    rOPINIRole (REQUIP) 1 MG tablet TAKE 1.5-2 TABLETS (1.5-2 MG) BY MOUTH AT BEDTIME. 180 tablet 1    sertraline (ZOLOFT) 50 MG tablet TAKE 1 TABLET BY MOUTH EVERY DAY 90 tablet 1    vitamin D3 (CHOLECALCIFEROL) 50 mcg (2000 units) tablet Take 1 tablet (50 mcg) by mouth daily. 90 tablet 3      Family History:  Family History   Problem Relation Age of Onset    Chronic Obstructive Pulmonary Disease Father     Colon Cancer Paternal Grandmother 70     1 daughter- healthy    Social History:  Social History     Socioeconomic History    Marital status: Single     Spouse name: Not on file    Number of children: Not on file    Years of education: Not on file     Highest education level: Not on file   Occupational History    Not on file   Tobacco Use    Smoking status: Former     Current packs/day: 1.00     Average packs/day: 1 pack/day for 48.4 years (48.4 ttl pk-yrs)     Types: Cigarettes     Start date: 1977     Passive exposure: Current    Smokeless tobacco: Never   Vaping Use    Vaping status: Never Used   Substance and Sexual Activity    Alcohol use: Not on file     Comment: occasional    Drug use: Never    Sexual activity: Not on file   Other Topics Concern    Not on file   Social History Narrative    ** Merged History Encounter **          Social Drivers of Health     Financial Resource Strain: Not on file   Food Insecurity: No Food Insecurity (2/24/2024)    Received from [facility]    Hunger Vital Sign     Worried About Running Out of Food in the Last Year: Never true     Ran Out of Food in the Last Year: Never true   Transportation Needs: No Transportation Needs (2/24/2024)    Received from [facility]    PRAPARE - Transportation     Lack of Transportation (Medical): No     Lack of Transportation (Non-Medical): No   Physical Activity: Inactive (2/24/2024)    Received from [facility]    Exercise Vital Sign     Days of Exercise per Week: 0 days     Minutes of Exercise per Session: 0 min   Stress: Not on file   Social Connections: Not on file   Interpersonal Safety: Not on file   Housing Stability: Low Risk  (2/24/2024)    Received from [facility]    Housing Stability     What is your living situation today?: I have a steady place to live   Chronic smoker- now smokes occasionally. No etoh. Lives alone. Currently brother staying with her        PHYSICAL EXAM:  /74 (BP Location: Right arm, Patient Position: Sitting, Cuff Size: Adult Regular)   Pulse 66   Temp 98.5  F (36.9  C) (Oral)   Resp 16   Wt 73.3 kg (161 lb 11.2 oz)   SpO2 96%   BMI 26.10 kg/m    Wt Readings from Last 4 Encounters:   06/10/25 73.3 kg (161 lb 11.2 oz)   05/27/25 69.4 kg (153 lb)    05/19/25 74.3 kg (163 lb 12.8 oz)   05/08/25 74.8 kg (165 lb)   General: Alert, oriented, pleasant, NAD  HEENT: Normocephalic, atraumatic, no icterus. Moist mucus membranes, no lesions, or thrush. No nasal sores.   Neck: No cervical or supraclavicular LAD.  Axillary: No LAD  Breast: No palpable mass in L breast. Patient can no longer palpate breast mass at 5-6 o clock position either.   Lungs: CTA bilaterally, normal work of breathing  Cardiac: RRR  Abdomen: Soft, nontender, nondistended. Normoactive bowel sounds. No hepatosplenomegaly, masses  Neuro: CNII-XII grossly intact  Extremities: No pedal edema    Laboratory/Imaging Studies   06/10/25 08:08   Sodium 139   Potassium 3.3 (L)   Chloride 103   Carbon Dioxide (CO2) 25   Urea Nitrogen 7.1 (L)   Creatinine 0.67   GFR Estimate >90   Calcium 9.8   Anion Gap 11   Albumin 3.9   Protein Total 7.0   Alkaline Phosphatase 114   ALT 8   AST 15   Bilirubin Total 0.5   Glucose 98   WBC 5.5   Hemoglobin 10.2 (L)   Hematocrit 30.8 (L)   Platelet Count 203   RBC Count 3.03 (L)    (H)   MCH 33.7 (H)   MCHC 33.1   RDW 15.2 (H)   % Neutrophils 79   % Lymphocytes 12   % Monocytes 8   % Eosinophils 1   % Basophils 0   % Immature Granulocytes 0   NRBC/W 0   Absolute Neutrophil 4.4   Absolute Lymphocytes 0.6 (L)   Absolute Monocytes 0.5   Absolute Eosinophils 0.0   Absolute Basophils 0.0   Absolute Immature Granulocytes 0.0   Absolute NRBCs 0.0       ASSESSMENT/PLAN:    Stage II left-sided breast cancer, ER +28%, CT negative, HER2 IHC +3+.  It is a 2 cm left-sided breast cancer.  No suspicious axillary lymph nodes are seen. She started TCHP since last visit and is s/p 1 cycle. Tolerated fairly well with expected side effects. She had a great clinical response so far as her previously palpable mass is no longer felt.   Our plan will be to give this to her every 3 weeks for 6 cycles followed by surgery for breast cancer.  She met with Dr. Carl and will need to see him again  when she is closer to completing neoadjuvant treatment.   -Confirmed with her the [facility] is her preferred site for visits and infusions. Will send request for the full 6 cycles to scheduling.     Monitoring for cardiac toxicity.  Baseline echo was normal. Repeat every 3 months while on HER2 therapy.     Discussion regarding genetic testing. Genetic counseling is set up for October. Hereditary breast panel was sent on 5/19 with results pending.     Nausea.  She usually feels this in the morning.  She will continue Compazine twice a day and as needed Zofran.  She follows with palliative care Dr. Mansoor Eric.    Dysgeusia: Trial of daily zinc.     GCSF pain: Trial of loratadine BID for one week. Tylenol 1000 mg every 6 hours at onset of pain.     Mucositis to mouth and nose: S/s rinses preventatively BID. Nasal saline to nares BID.     Diarrhea: Continue Imodium PRN.     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  MMR IHC intact.  She started on concurrent chemoradiation with carboplatin and Taxol on 5/1/24 and received 5 doses, last on 5/28/24 and last dose of radiation on 6/5/24. Repeat PET/CT on 8/15/2024 showed complete resolution of FDG avidity in the esophagus.    At that time she met with Dr. Martinez and decision was made not to pursue the surgery. Remains on surveillance now.       Smoking.  She quit for some time but now smoking again and she smokes on average half a pack a day. Not discussed today.    Fatigue: Had baseline fatigue prior to starting treatment. Will recheck TSH/T4 today.     Macrocytosis.  Previously she had macrocytosis.  B12 and folate were normal.  Free T4 was normal.  TSH was low.  Continue to observe.         The longitudinal plan of care for the breast cancer/esophageal cancer, as documented were addressed during this visit. Due to the added complexity in care, I will continue to support Lita in the subsequent management and with  ongoing continuity of care.    45 minutes spent on the date of the encounter doing chart review, review of test results, interpretation of tests, patient visit, and documentation     Lilo Mandujano PA-C     ADDENDUM: TSH returned elevated at 14. T4 okay but since TSH >10 will increase levothyroxine to 125 mcg daily. -NG

## 2025-06-10 NOTE — NURSING NOTE
Chief Complaint   Patient presents with    Port Draw     Labs drawn from port by RN     Port accessed with 20 gauge, 3/4 inch, flat needle by RN, labs collected, line flushed with saline and heparin.  Vitals taken. Pt checked in for appointment(s).     Naida Sanchez, RN

## 2025-06-29 ENCOUNTER — HEALTH MAINTENANCE LETTER (OUTPATIENT)
Age: 65
End: 2025-06-29

## 2025-07-03 ENCOUNTER — ONCOLOGY VISIT (OUTPATIENT)
Dept: ONCOLOGY | Facility: CLINIC | Age: 65
End: 2025-07-03
Attending: INTERNAL MEDICINE
Payer: COMMERCIAL

## 2025-07-03 ENCOUNTER — HOSPITAL ENCOUNTER (EMERGENCY)
Facility: CLINIC | Age: 65
Discharge: HOME | End: 2025-07-03
Attending: STUDENT IN AN ORGANIZED HEALTH CARE EDUCATION/TRAINING PROGRAM | Admitting: STUDENT IN AN ORGANIZED HEALTH CARE EDUCATION/TRAINING PROGRAM
Payer: COMMERCIAL

## 2025-07-03 ENCOUNTER — DOCUMENTATION ONLY (OUTPATIENT)
Dept: ONCOLOGY | Facility: CLINIC | Age: 65
End: 2025-07-03

## 2025-07-03 ENCOUNTER — APPOINTMENT (OUTPATIENT)
Dept: LAB | Facility: CLINIC | Age: 65
End: 2025-07-03
Attending: INTERNAL MEDICINE
Payer: COMMERCIAL

## 2025-07-03 VITALS — HEART RATE: 83 BPM | SYSTOLIC BLOOD PRESSURE: 174 MMHG | DIASTOLIC BLOOD PRESSURE: 81 MMHG | OXYGEN SATURATION: 94 %

## 2025-07-03 VITALS
TEMPERATURE: 97.6 F | SYSTOLIC BLOOD PRESSURE: 204 MMHG | DIASTOLIC BLOOD PRESSURE: 84 MMHG | HEART RATE: 101 BPM | RESPIRATION RATE: 18 BRPM | OXYGEN SATURATION: 98 %

## 2025-07-03 VITALS
SYSTOLIC BLOOD PRESSURE: 155 MMHG | OXYGEN SATURATION: 97 % | TEMPERATURE: 97.8 F | BODY MASS INDEX: 25.42 KG/M2 | HEART RATE: 73 BPM | RESPIRATION RATE: 16 BRPM | DIASTOLIC BLOOD PRESSURE: 74 MMHG | WEIGHT: 157.5 LBS

## 2025-07-03 DIAGNOSIS — C15.9 SQUAMOUS CELL CARCINOMA OF ESOPHAGUS (H): ICD-10-CM

## 2025-07-03 DIAGNOSIS — Z51.11 ENCOUNTER FOR ANTINEOPLASTIC CHEMOTHERAPY: ICD-10-CM

## 2025-07-03 DIAGNOSIS — Z17.0 MALIGNANT NEOPLASM OF LOWER-OUTER QUADRANT OF LEFT BREAST OF FEMALE, ESTROGEN RECEPTOR POSITIVE (H): Primary | ICD-10-CM

## 2025-07-03 DIAGNOSIS — C50.512 MALIGNANT NEOPLASM OF LOWER-OUTER QUADRANT OF LEFT BREAST OF FEMALE, ESTROGEN RECEPTOR POSITIVE (H): Primary | ICD-10-CM

## 2025-07-03 DIAGNOSIS — T78.40XA ALLERGIC REACTION TO DRUG, INITIAL ENCOUNTER: ICD-10-CM

## 2025-07-03 LAB
ALBUMIN SERPL BCG-MCNC: 3.9 G/DL (ref 3.5–5.2)
ALP SERPL-CCNC: 138 U/L (ref 40–150)
ALT SERPL W P-5'-P-CCNC: 7 U/L (ref 0–50)
ANION GAP SERPL CALCULATED.3IONS-SCNC: 11 MMOL/L (ref 7–15)
AST SERPL W P-5'-P-CCNC: 15 U/L (ref 0–45)
BASOPHILS # BLD AUTO: 0 10E3/UL (ref 0–0.2)
BASOPHILS NFR BLD AUTO: 0 %
BILIRUB SERPL-MCNC: 0.3 MG/DL
BUN SERPL-MCNC: 8 MG/DL (ref 8–23)
CALCIUM SERPL-MCNC: 9.7 MG/DL (ref 8.8–10.4)
CHLORIDE SERPL-SCNC: 102 MMOL/L (ref 98–107)
CREAT SERPL-MCNC: 0.78 MG/DL (ref 0.51–0.95)
EGFRCR SERPLBLD CKD-EPI 2021: 84 ML/MIN/1.73M2
EOSINOPHIL # BLD AUTO: 0 10E3/UL (ref 0–0.7)
EOSINOPHIL NFR BLD AUTO: 0 %
ERYTHROCYTE [DISTWIDTH] IN BLOOD BY AUTOMATED COUNT: 16.8 % (ref 10–15)
GLUCOSE SERPL-MCNC: 95 MG/DL (ref 70–99)
HCO3 SERPL-SCNC: 24 MMOL/L (ref 22–29)
HCT VFR BLD AUTO: 27.3 % (ref 35–47)
HGB BLD-MCNC: 9.1 G/DL (ref 11.7–15.7)
IMM GRANULOCYTES # BLD: 0 10E3/UL
IMM GRANULOCYTES NFR BLD: 0 %
LYMPHOCYTES # BLD AUTO: 0.8 10E3/UL (ref 0.8–5.3)
LYMPHOCYTES NFR BLD AUTO: 14 %
MCH RBC QN AUTO: 34.5 PG (ref 26.5–33)
MCHC RBC AUTO-ENTMCNC: 33.3 G/DL (ref 31.5–36.5)
MCV RBC AUTO: 103 FL (ref 78–100)
MONOCYTES # BLD AUTO: 0.5 10E3/UL (ref 0–1.3)
MONOCYTES NFR BLD AUTO: 8 %
NEUTROPHILS # BLD AUTO: 4.4 10E3/UL (ref 1.6–8.3)
NEUTROPHILS NFR BLD AUTO: 77 %
NRBC # BLD AUTO: 0 10E3/UL
NRBC BLD AUTO-RTO: 0 /100
PLATELET # BLD AUTO: 218 10E3/UL (ref 150–450)
POTASSIUM SERPL-SCNC: 3.7 MMOL/L (ref 3.4–5.3)
PROT SERPL-MCNC: 7 G/DL (ref 6.4–8.3)
RBC # BLD AUTO: 2.64 10E6/UL (ref 3.8–5.2)
SODIUM SERPL-SCNC: 137 MMOL/L (ref 135–145)
WBC # BLD AUTO: 5.6 10E3/UL (ref 4–11)

## 2025-07-03 PROCEDURE — 84155 ASSAY OF PROTEIN SERUM: CPT | Performed by: INTERNAL MEDICINE

## 2025-07-03 PROCEDURE — 250N000011 HC RX IP 250 OP 636: Performed by: STUDENT IN AN ORGANIZED HEALTH CARE EDUCATION/TRAINING PROGRAM

## 2025-07-03 PROCEDURE — 250N000011 HC RX IP 250 OP 636: Performed by: INTERNAL MEDICINE

## 2025-07-03 PROCEDURE — 36591 DRAW BLOOD OFF VENOUS DEVICE: CPT | Performed by: INTERNAL MEDICINE

## 2025-07-03 PROCEDURE — 96372 THER/PROPH/DIAG INJ SC/IM: CPT | Performed by: INTERNAL MEDICINE

## 2025-07-03 PROCEDURE — 250N000009 HC RX 250

## 2025-07-03 PROCEDURE — 99284 EMERGENCY DEPT VISIT MOD MDM: CPT | Mod: GC | Performed by: STUDENT IN AN ORGANIZED HEALTH CARE EDUCATION/TRAINING PROGRAM

## 2025-07-03 PROCEDURE — 85004 AUTOMATED DIFF WBC COUNT: CPT | Performed by: INTERNAL MEDICINE

## 2025-07-03 PROCEDURE — 258N000003 HC RX IP 258 OP 636: Performed by: INTERNAL MEDICINE

## 2025-07-03 PROCEDURE — 99283 EMERGENCY DEPT VISIT LOW MDM: CPT | Mod: 25 | Performed by: STUDENT IN AN ORGANIZED HEALTH CARE EDUCATION/TRAINING PROGRAM

## 2025-07-03 PROCEDURE — 94640 AIRWAY INHALATION TREATMENT: CPT | Performed by: STUDENT IN AN ORGANIZED HEALTH CARE EDUCATION/TRAINING PROGRAM

## 2025-07-03 PROCEDURE — G0463 HOSPITAL OUTPT CLINIC VISIT: HCPCS | Performed by: INTERNAL MEDICINE

## 2025-07-03 RX ORDER — PALONOSETRON 0.05 MG/ML
0.25 INJECTION, SOLUTION INTRAVENOUS ONCE
Status: COMPLETED | OUTPATIENT
Start: 2025-07-03 | End: 2025-07-03

## 2025-07-03 RX ORDER — METHYLPREDNISOLONE SODIUM SUCCINATE 40 MG/ML
40 INJECTION INTRAMUSCULAR; INTRAVENOUS
Status: CANCELLED
Start: 2025-07-03

## 2025-07-03 RX ORDER — METHYLPREDNISOLONE SODIUM SUCCINATE 125 MG/2ML
125 INJECTION INTRAMUSCULAR; INTRAVENOUS ONCE
Status: CANCELLED
Start: 2025-07-03 | End: 2025-07-03

## 2025-07-03 RX ORDER — PALONOSETRON 0.05 MG/ML
0.25 INJECTION, SOLUTION INTRAVENOUS ONCE
Status: CANCELLED | OUTPATIENT
Start: 2025-07-03

## 2025-07-03 RX ORDER — DIPHENHYDRAMINE HYDROCHLORIDE 50 MG/ML
25 INJECTION, SOLUTION INTRAMUSCULAR; INTRAVENOUS ONCE
Status: CANCELLED
Start: 2025-07-03 | End: 2025-07-03

## 2025-07-03 RX ORDER — DIPHENHYDRAMINE HYDROCHLORIDE 50 MG/ML
25 INJECTION, SOLUTION INTRAMUSCULAR; INTRAVENOUS ONCE
Status: COMPLETED | OUTPATIENT
Start: 2025-07-03 | End: 2025-07-03

## 2025-07-03 RX ORDER — ALBUTEROL SULFATE 90 UG/1
1-2 INHALANT RESPIRATORY (INHALATION)
Status: DISCONTINUED | OUTPATIENT
Start: 2025-07-03 | End: 2025-07-03 | Stop reason: HOSPADM

## 2025-07-03 RX ORDER — MEPERIDINE HYDROCHLORIDE 25 MG/ML
25 INJECTION INTRAMUSCULAR; INTRAVENOUS; SUBCUTANEOUS
Status: CANCELLED | OUTPATIENT
Start: 2025-07-03

## 2025-07-03 RX ORDER — HEPARIN SODIUM (PORCINE) LOCK FLUSH IV SOLN 100 UNIT/ML 100 UNIT/ML
5 SOLUTION INTRAVENOUS EVERY 8 HOURS
Status: DISCONTINUED | OUTPATIENT
Start: 2025-07-03 | End: 2025-07-03 | Stop reason: HOSPADM

## 2025-07-03 RX ORDER — ALBUTEROL SULFATE 0.83 MG/ML
2.5 SOLUTION RESPIRATORY (INHALATION)
Status: DISCONTINUED | OUTPATIENT
Start: 2025-07-03 | End: 2025-07-03 | Stop reason: HOSPADM

## 2025-07-03 RX ORDER — ACETAMINOPHEN 325 MG/1
650 TABLET ORAL
Status: CANCELLED
Start: 2025-07-03

## 2025-07-03 RX ORDER — DIPHENHYDRAMINE HCL 25 MG
50 CAPSULE ORAL
Status: CANCELLED | OUTPATIENT
Start: 2025-07-03

## 2025-07-03 RX ORDER — EPINEPHRINE 1 MG/ML
0.3 INJECTION, SOLUTION, CONCENTRATE INTRAVENOUS EVERY 5 MIN PRN
Status: DISCONTINUED | OUTPATIENT
Start: 2025-07-03 | End: 2025-07-03 | Stop reason: HOSPADM

## 2025-07-03 RX ORDER — METHYLPREDNISOLONE SODIUM SUCCINATE 40 MG/ML
40 INJECTION INTRAMUSCULAR; INTRAVENOUS
Status: COMPLETED | OUTPATIENT
Start: 2025-07-03 | End: 2025-07-03

## 2025-07-03 RX ORDER — LORAZEPAM 2 MG/ML
0.5 INJECTION INTRAMUSCULAR EVERY 4 HOURS PRN
Status: CANCELLED | OUTPATIENT
Start: 2025-07-03

## 2025-07-03 RX ORDER — DIPHENHYDRAMINE HYDROCHLORIDE 50 MG/ML
25 INJECTION, SOLUTION INTRAMUSCULAR; INTRAVENOUS
Status: COMPLETED | OUTPATIENT
Start: 2025-07-03 | End: 2025-07-03

## 2025-07-03 RX ORDER — ALBUTEROL SULFATE 90 UG/1
1-2 INHALANT RESPIRATORY (INHALATION)
Status: CANCELLED
Start: 2025-07-03

## 2025-07-03 RX ORDER — HEPARIN SODIUM,PORCINE 10 UNIT/ML
5-20 VIAL (ML) INTRAVENOUS DAILY PRN
Status: CANCELLED | OUTPATIENT
Start: 2025-07-03

## 2025-07-03 RX ORDER — DIPHENHYDRAMINE HYDROCHLORIDE 50 MG/ML
50 INJECTION, SOLUTION INTRAMUSCULAR; INTRAVENOUS
Status: CANCELLED
Start: 2025-07-03

## 2025-07-03 RX ORDER — METHYLPREDNISOLONE SODIUM SUCCINATE 125 MG/2ML
125 INJECTION INTRAMUSCULAR; INTRAVENOUS ONCE
Status: COMPLETED | OUTPATIENT
Start: 2025-07-03 | End: 2025-07-03

## 2025-07-03 RX ORDER — DIPHENHYDRAMINE HYDROCHLORIDE 50 MG/ML
25 INJECTION, SOLUTION INTRAMUSCULAR; INTRAVENOUS
Status: CANCELLED
Start: 2025-07-03

## 2025-07-03 RX ORDER — IPRATROPIUM BROMIDE AND ALBUTEROL SULFATE 2.5; .5 MG/3ML; MG/3ML
3 SOLUTION RESPIRATORY (INHALATION) ONCE
Status: COMPLETED | OUTPATIENT
Start: 2025-07-03 | End: 2025-07-03

## 2025-07-03 RX ORDER — EPINEPHRINE 1 MG/ML
0.3 INJECTION, SOLUTION INTRAMUSCULAR; SUBCUTANEOUS EVERY 5 MIN PRN
Status: CANCELLED | OUTPATIENT
Start: 2025-07-03

## 2025-07-03 RX ORDER — ALBUTEROL SULFATE 90 UG/1
2 INHALANT RESPIRATORY (INHALATION) EVERY 4 HOURS PRN
Qty: 18 G | Refills: 0 | Status: SHIPPED | OUTPATIENT
Start: 2025-07-03

## 2025-07-03 RX ORDER — ALBUTEROL SULFATE 0.83 MG/ML
2.5 SOLUTION RESPIRATORY (INHALATION)
Status: CANCELLED | OUTPATIENT
Start: 2025-07-03

## 2025-07-03 RX ORDER — MEPERIDINE HYDROCHLORIDE 25 MG/ML
25 INJECTION INTRAMUSCULAR; INTRAVENOUS; SUBCUTANEOUS
Status: DISCONTINUED | OUTPATIENT
Start: 2025-07-03 | End: 2025-07-03 | Stop reason: HOSPADM

## 2025-07-03 RX ORDER — HEPARIN SODIUM (PORCINE) LOCK FLUSH IV SOLN 100 UNIT/ML 100 UNIT/ML
5-10 SOLUTION INTRAVENOUS ONCE
Status: COMPLETED | OUTPATIENT
Start: 2025-07-03 | End: 2025-07-03

## 2025-07-03 RX ORDER — DIPHENHYDRAMINE HYDROCHLORIDE 50 MG/ML
50 INJECTION, SOLUTION INTRAMUSCULAR; INTRAVENOUS
Status: COMPLETED | OUTPATIENT
Start: 2025-07-03 | End: 2025-07-03

## 2025-07-03 RX ORDER — HEPARIN SODIUM (PORCINE) LOCK FLUSH IV SOLN 100 UNIT/ML 100 UNIT/ML
5 SOLUTION INTRAVENOUS
Status: CANCELLED | OUTPATIENT
Start: 2025-07-03

## 2025-07-03 RX ORDER — HEPARIN SODIUM (PORCINE) LOCK FLUSH IV SOLN 100 UNIT/ML 100 UNIT/ML
5 SOLUTION INTRAVENOUS
Status: DISCONTINUED | OUTPATIENT
Start: 2025-07-03 | End: 2025-07-03 | Stop reason: HOSPADM

## 2025-07-03 RX ORDER — DIPHENHYDRAMINE HYDROCHLORIDE 50 MG/ML
50 INJECTION, SOLUTION INTRAMUSCULAR; INTRAVENOUS
Status: CANCELLED | OUTPATIENT
Start: 2025-07-03

## 2025-07-03 RX ADMIN — EPINEPHRINE 0.3 MG: 1 INJECTION INTRAMUSCULAR; INTRAVENOUS; SUBCUTANEOUS at 13:59

## 2025-07-03 RX ADMIN — ALBUTEROL SULFATE 2.5 MG: 0.83 SOLUTION RESPIRATORY (INHALATION) at 14:18

## 2025-07-03 RX ADMIN — DIPHENHYDRAMINE HYDROCHLORIDE 25 MG: 50 INJECTION, SOLUTION INTRAMUSCULAR; INTRAVENOUS at 14:00

## 2025-07-03 RX ADMIN — DIPHENHYDRAMINE HYDROCHLORIDE 25 MG: 50 INJECTION, SOLUTION INTRAMUSCULAR; INTRAVENOUS at 14:05

## 2025-07-03 RX ADMIN — EPINEPHRINE 0.3 MG: 1 INJECTION INTRAMUSCULAR; INTRAVENOUS; SUBCUTANEOUS at 14:08

## 2025-07-03 RX ADMIN — Medication 5 ML: at 19:33

## 2025-07-03 RX ADMIN — SODIUM CHLORIDE 440 MG: 0.9 INJECTION, SOLUTION INTRAVENOUS at 11:50

## 2025-07-03 RX ADMIN — SODIUM CHLORIDE 420 MG: 0.9 INJECTION, SOLUTION INTRAVENOUS at 11:18

## 2025-07-03 RX ADMIN — DOCETAXEL 140 MG: 20 INJECTION, SOLUTION, CONCENTRATE INTRAVENOUS at 12:25

## 2025-07-03 RX ADMIN — DIPHENHYDRAMINE HYDROCHLORIDE 25 MG: 50 INJECTION INTRAMUSCULAR; INTRAVENOUS at 10:31

## 2025-07-03 RX ADMIN — FOSAPREPITANT: 150 INJECTION, POWDER, LYOPHILIZED, FOR SOLUTION INTRAVENOUS at 10:42

## 2025-07-03 RX ADMIN — FAMOTIDINE 20 MG: 10 INJECTION, SOLUTION INTRAVENOUS at 14:00

## 2025-07-03 RX ADMIN — Medication 5 ML: at 09:29

## 2025-07-03 RX ADMIN — SODIUM CHLORIDE 250 ML: 0.9 INJECTION, SOLUTION INTRAVENOUS at 10:31

## 2025-07-03 RX ADMIN — CARBOPLATIN 600 MG: 10 INJECTION INTRAVENOUS at 13:25

## 2025-07-03 RX ADMIN — IPRATROPIUM BROMIDE AND ALBUTEROL SULFATE 3 ML: .5; 3 SOLUTION RESPIRATORY (INHALATION) at 16:17

## 2025-07-03 RX ADMIN — PALONOSETRON HYDROCHLORIDE 0.25 MG: 0.25 INJECTION INTRAVENOUS at 10:31

## 2025-07-03 RX ADMIN — METHYLPREDNISOLONE SODIUM SUCCINATE 40 MG: 40 INJECTION, POWDER, FOR SOLUTION INTRAMUSCULAR; INTRAVENOUS at 14:12

## 2025-07-03 RX ADMIN — METHYLPREDNISOLONE SODIUM SUCCINATE 125 MG: 125 INJECTION, POWDER, FOR SOLUTION INTRAMUSCULAR; INTRAVENOUS at 10:32

## 2025-07-03 ASSESSMENT — ACTIVITIES OF DAILY LIVING (ADL)
ADLS_ACUITY_SCORE: 59

## 2025-07-03 ASSESSMENT — PAIN SCALES - GENERAL: PAINLEVEL_OUTOF10: NO PAIN (0)

## 2025-07-03 NOTE — LETTER
7/3/2025      Linh Mustafa  [address]  [place] [address]      Dear Colleague,    Thank you for referring your patient, Linh Mustafa, to the [facility]. Please see a copy of my visit note below.    Oncology follow-up visit:  Date on this visit: 7/03/25     Diagnosis.  Esophageal cancer.    Primary Physician: Clinic, [facility]     History Of Present Illness:  Ms. Mustafa is a 65 year old female who presents with diagnosis of esophageal cancer.  I initially saw her on 4/18/2024.  Please see my previous note for details.  I have copied and updated from prior notes.      She mentioned that since November 2023 she started to notice pain upon swallowing and it was basically pain which limited her food intake.  This was progressively getting worse.  She had further workup as mentioned below.    2/15/2024.  EGD showed an ulcerated mid esophageal mass extending from 26-31 cm from the incisors.  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  Pathology from the mid esophageal mass showed moderately differentiated invasive squamous cell carcinoma, MMR IHC intact.    3/7/2024.  PET/CT showed markedly FDG avid wall thickening of the mid thoracic esophagus with SUV max 24.1.  No evidence of metastatic disease.    3/21/2024.  Upper EUS.  Endoscopy showed a flat nodule in the proximal esophagus between 15-19 cm and one third circumferential.  Upper esophageal sphincter was at 14 cm.  Biopsies from this showed high-grade dysplasia/carcinoma in situ.      Normal esophageal squamous cell cancer causing maybe esophagus on the right anterolateral esophageal wall between 24-30 cm.  It was 50% circumferential.  The GE junction was at 35 cm with 2 cm tongues of Auguste's anteriorly without high risk features.    Ultrasound exam showed the mid esophageal tumor to be probably T3. Two 4 x 6 mm nodes were seen adjacent to the distal esophageal wall at  36 and 37 cm.  Both an identical appearance and one was sampled.  This lymph node biopsy was benign.    By EUS it was staged as T3 N0 M0 tumor.    4/2/2024.  Because of finding of carcinoma in situ in the proximal esophagus, she had endoscopic resection of the proximal squamous cell carcinoma in situ lesion performed with en bloc removal.    The final pathology showed squamous mucosa with high-grade dysplasia/carcinoma in situ with all margins negative for dysplasia.  No definite invasion was identified. ESD was considered curative for this lesion.    Her case was also discussed in the tumor conference with the plan to proceed with neoadjuvant chemotherapy/radiation followed by definitive surgery.      She met with Dr. Martinez.    She started on concurrent chemoradiation with carboplatin and Taxol on 5/1/24 and received 5 doses, last on 5/28/24 and last dose of radiation on 6/5/24.     She was hospitalized from 6/4-6/18/24 with radiation esophagitis causing pain and inability to eat. She was discharged home with a G-tube that was placed on 6/14/24.     Repeat PET/CT on 8/15/2024 showed very good response to treatment.  There is complete resolution of FDG avidity in the esophagus.  No evidence of malignancy.    She met with Dr. Martinez and decision was made not to pursue surgery for now.    Feeding tube was removed in September 2024.    2/13/2025.  CT chest abdomen and pelvis did not show evidence of disease.    End of March 2025. She started to have pain in the left breast and had a mammogram/ultrasound done which showed a 2 cm suspicious lesion in the left breast at around 5 o'clock position.  The ultrasound showed normal left axillary lymph nodes.      4/22/2025.  Left breast biopsy showed invasive ductal carcinoma, Gregorio grade 3 with focally suspicious angiolymphatic invasion.  ER 28% positive.  AL negative.  HER2 by IHC was +3+.  Ki-67 index 21%.       We decided to start her on neoadjuvant  chemotherapy.    5/19/2025.  Cycle #1 of carboplatin/docetaxel/trastuzumab/pertuzumab    6/10/2025.  Cycle #2 of carboplatin/docetaxel/trastuzumab/pertuzumab      7/3/2025.  Cycle #3 of carboplatin/docetaxel/trastuzumab/pertuzumab is planned      Interval history.    She comes into the clinic today. Her daughter is also available.  I also reviewed notes from breast surgeon Dr. Carl.  She had a rough time with chemotherapy this time.  She had a skin rash and itching and Benadryl helped.  She also noticed dryness of the skin.  She had more fatigue.  Off-and-on she has had vomiting.  Sometimes she noticed diarrhea.  She took Zofran and Compazine which helped.  Imodium helps with diarrhea.  She had mouth sores and she used baking soda/salt rinses which helped.  She has some tingling and numbness in the fingers and this is is stable as compared to last time.  She thinks her breast lump is smaller.    ECOG 1    ROS:  A comprehensive ROS was otherwise neg    I reviewed other history in epic as below.    Past Medical/Surgical History:  Past Medical History:   Diagnosis Date     Hyperlipidemia      Hypertension    Hypothyroidism- hx of PATEL at the age of 16 for hyperthyroidism    Past Surgical History:   Procedure Laterality Date     BIOPSY BREAST Right     age 30 benign fibrous     CV CORONARY ANGIOGRAM N/A 01/25/2023    Procedure: Coronary Angiogram;  Surgeon: Lukas Irizarry MD;  Location: [facility]     CV LEFT HEART CATH N/A 01/25/2023    Procedure: Left Heart Catheterization;  Surgeon: Lukas Irizarry MD;  Location: [facility] CV     ENDOSCOPIC ULTRASOUND UPPER GASTROINTESTINAL TRACT (GI) N/A 3/21/2024    Procedure: ENDOSCOPIC ULTRASOUND, ESOPHAGOSCOPY / UPPER GASTROINTESTINAL TRACT (GI), ENDOSCOPY WITH BIOPSY, FINE NEEDLE ASPIRATION;  Surgeon: Damon Kramer MD;  Location: [facility] OR     ENDOSCOPIC ULTRASOUND, ESOPHAGOSCOPY / UPPER GASTROINTESTINAL TRACT (GI), ENDOSCOPY WITH BIOPSY, FINE NEEDLE  ASPIRATION  03/21/2024     ESOPHAGOSCOPY, GASTROSCOPY, DUODENOSCOPY (EGD), COMBINED N/A 6/6/2024    Procedure: Esophagoscopy, Gastroscopy, Duodenoscopy (EGD), Nasojejunal Feeding Tube Placement;  Surgeon: Bryant Martinez MD;  Location: [facility] OR     ESOPHAGOSCOPY, GASTROSCOPY, DUODENOSCOPY (EGD), SUBMUCOSA RESECTION N/A 4/2/2024    Procedure: ESOPHAGOGASTRODUODENOSCOPY, WITH SUBMUCOSAL RESECTION;  Surgeon: Holden King MD;  Location: [facility] OR     IR CHEST PORT PLACEMENT > 5 YRS OF AGE  4/24/2024     LAPAROSCOPIC ASSISTED INSERTION TUBE GASTROTOMY N/A 6/14/2024    Procedure: Upper endoscopy, laparoscopic gastrostomy tube placement;  Surgeon: Bryant Martinez MD;  Location: [facility] OR     Cancer History:   As above    Allergies:  Allergies as of 07/03/2025 - Reviewed 06/10/2025   Allergen Reaction Noted     Amoxicillin Shortness Of Breath, Itching, and Rash 12/06/2023     Penicillins  03/14/2024     Current Medications:  Current Outpatient Medications   Medication Sig Dispense Refill     artificial saliva (BIOTENE DRY MOUTHWASH) LIQD liquid Swish and spit 15 mLs in mouth 4 times daily as needed for dry mouth 473 mL 0     Calcium Citrate-Vitamin D (CALCIUM CITRATE CHEWY BITE) 500-12.5 MG-MCG CHEW Take 1 tablet by mouth 2 times daily. 180 tablet 3     dexAMETHasone (DECADRON) 4 MG tablet Take 2 tablets (8 mg) by mouth 2 times daily (with meals). Start evening of Docetaxel infusion and continue for a total of 3 doses. 6 tablet 5     dextran 70-hypromellose (TEARS NATURALE FREE PF) 0.1-0.3 % ophthalmic solution Place 1 drop into both eyes daily as needed (Irritation). 35 each 0     doxepin (SINEQUAN) 10 MG/ML (HIGH CONC) solution TAKE 0.3-1 MLS (3-10 MG) BY MOUTH AT BEDTIME. (Patient not taking: Reported on 6/10/2025) 90 mL 1     esomeprazole (NEXIUM) 20 MG DR capsule TAKE 1 CAPSULE BY MOUTH EVERY DAY (Patient not taking: Reported on 6/10/2025) 90 capsule 1     hydrOXYzine (VISTARIL) 50 MG capsule Take 50 mg by  mouth 2 times daily as needed.       hydrOXYzine HCl (ATARAX) 25 MG tablet TAKE 1-2 TABLETS (25-50 MG) BY MOUTH 3 TIMES DAILY AS NEEDED FOR ITCHING (Patient not taking: Reported on 6/10/2025) 540 tablet 1     levothyroxine (SYNTHROID/LEVOTHROID) 100 MCG tablet Take 1 tablet (100 mcg) by mouth daily. 90 tablet 3     levothyroxine (SYNTHROID/LEVOTHROID) 125 MCG tablet Take 1 tablet (125 mcg) by mouth every morning (before breakfast). 60 tablet 1     methocarbamol (ROBAXIN) 750 MG tablet Take 1 tablet (750 mg) by mouth 4 times daily (Patient not taking: Reported on 6/10/2025) 120 tablet 0     naloxone (NARCAN) 4 MG/0.1ML nasal spray Spray 1 spray (4 mg) into one nostril alternating nostrils as needed for opioid reversal every 2-3 minutes until assistance arrives 0.2 mL 1     nicotine (NICODERM CQ) 14 MG/24HR 24 hr patch Place 1 patch onto the skin every 24 hours (Patient not taking: Reported on 6/10/2025) 30 patch 1     nicotine (NICODERM CQ) 7 MG/24HR 24 hr patch Place 1 patch onto the skin every 24 hours Start after 6 weeks of the 14 mg/24 hr dose (Patient not taking: Reported on 6/10/2025) 30 patch 0     ondansetron (ZOFRAN ODT) 8 MG ODT tab Take 1 tablet (8 mg) by mouth every 8 hours as needed for nausea. (Patient not taking: Reported on 6/10/2025) 90 tablet 1     oxyCODONE (ROXICODONE) 5 MG tablet Take 1-2 tablets (5-10 mg) by mouth every 4 hours as needed for pain. 150 tablet 0     oxyCODONE (ROXICODONE) 5 MG/5ML solution Take 5-7 mLs (5-7 mg) by mouth or Feeding Tube every 4 hours as needed for severe pain. (Patient not taking: Reported on 6/10/2025) 400 mL 0     prochlorperazine (COMPAZINE) 10 MG tablet Take 1 tablet (10 mg) by mouth every 6 hours as needed for nausea or vomiting. 30 tablet 2     prochlorperazine (COMPAZINE) 10 MG tablet Take 1 tablet (10 mg) by mouth every 6 hours as needed for vomiting. 90 tablet 1     rOPINIRole (REQUIP) 1 MG tablet TAKE 1.5-2 TABLETS (1.5-2 MG) BY MOUTH AT BEDTIME. (Patient  not taking: Reported on 6/10/2025) 180 tablet 1     sertraline (ZOLOFT) 50 MG tablet TAKE 1 TABLET BY MOUTH EVERY DAY 90 tablet 1     vitamin D3 (CHOLECALCIFEROL) 50 mcg (2000 units) tablet Take 1 tablet (50 mcg) by mouth daily. 90 tablet 3      Family History:  Family History   Problem Relation Age of Onset     Chronic Obstructive Pulmonary Disease Father      Colon Cancer Paternal Grandmother 70     1 daughter- healthy    Social History:  Social History     Socioeconomic History     Marital status: Single     Spouse name: Not on file     Number of children: Not on file     Years of education: Not on file     Highest education level: Not on file   Occupational History     Not on file   Tobacco Use     Smoking status: Former     Current packs/day: 1.00     Average packs/day: 1 pack/day for 48.5 years (48.5 ttl pk-yrs)     Types: Cigarettes     Start date: 1977     Passive exposure: Current     Smokeless tobacco: Never   Vaping Use     Vaping status: Never Used   Substance and Sexual Activity     Alcohol use: Not on file     Comment: occasional     Drug use: Never     Sexual activity: Not on file   Other Topics Concern     Not on file   Social History Narrative    ** Merged History Encounter **          Social Drivers of Health     Financial Resource Strain: Not on file   Food Insecurity: No Food Insecurity (2/24/2024)    Received from [facility]    Hunger Vital Sign      Worried About Running Out of Food in the Last Year: Never true      Ran Out of Food in the Last Year: Never true   Transportation Needs: No Transportation Needs (2/24/2024)    Received from [facility]    PRAPARE - Transportation      Lack of Transportation (Medical): No      Lack of Transportation (Non-Medical): No   Physical Activity: Inactive (2/24/2024)    Received from [facility]    Exercise Vital Sign      Days of Exercise per Week: 0 days      Minutes of Exercise per Session: 0 min   Stress: Not on file   Social Connections: Not on file    Interpersonal Safety: Not on file   Housing Stability: Low Risk  (2/24/2024)    Received from [facility]    Housing Stability      What is your living situation today?: I have a steady place to live   Chronic smoker- now smokes occasionally. No etoh. Lives alone. Currently brother staying with her        Physical Exam:  There were no vitals taken for this visit.    Wt Readings from Last 4 Encounters:   06/10/25 73.3 kg (161 lb 11.2 oz)   05/27/25 69.4 kg (153 lb)   05/19/25 74.3 kg (163 lb 12.8 oz)   05/08/25 74.8 kg (165 lb)     CONSTITUTIONAL: no acute distress  EYES: PERRLA, there is pallor but no icterus.  ENT/MOUTH: no mouth lesions. Ears normal  CVS: s1s2 no m r g .   RESPIRATORY: clear to auscultation b/l  GI: soft non tender no hepatosplenomegaly  NEURO: AAOX3  Grossly non focal neuro exam  INTEGUMENT: no obvious rashes  LYMPHATIC: no palpable cervical, supraclavicular, axillary or inguinal LAD  MUSCULOSKELETAL: Unremarkable. No bony tenderness.   EXTREMITIES: no edema  PSYCH: Mentation, mood and affect are normal. Decision making capacity is intact        Laboratory/Imaging Studies      Reviewed  7/3/2025  CBC showed WBC 5.6.  Hemoglobin 9.1.  Platelets 218.  ANC 4.4.      Chemistry is unremarkable    On 6/10/2025      TSH was 14.4 but free T4 was 1.17.    5/14/2025.  Echocardiogram.  Left ventricular size, wall motion and function are normal. The ejection  fraction is 60-65%.  Right ventricular function, chamber size, wall motion, and thickness are  normal.  No significant valvular abnormalities present.  Compared to prior resting images of prior stress echo, no changes.        Left ankle x-ray 4/15/2025.     IMPRESSION: Small curvilinear, mildly distracted fracture tip of the lateral malleolus with adjacent soft tissue swelling. Ankle mortise is intact. Bones are demineralized.     CT chest abdomen and pelvis 2/13/2025  FINDINGS:   LUNGS AND PLEURA: Emphysematous change in both lungs. No nodules or  masses.     MEDIASTINUM/AXILLAE: No mediastinal mass or adenopathy. Mild thickening along the mid esophagus is noted stable.     CORONARY ARTERY CALCIFICATION: Moderate.     HEPATOBILIARY: Multiple small low-attenuation lesions in the liver the largest in segment 8 measuring 1 cm stable from previous examination. Cholelithiasis.     PANCREAS: Pancreas is normal.     SPLEEN: Normal.     ADRENAL GLANDS: Bilateral adrenal adenomas stable.     KIDNEYS/BLADDER: No stones in either kidney. No hydronephrosis.     BOWEL: Few colonic diverticula.     LYMPH NODES: No abnormal adenopathy in the abdomen or pelvis.     VASCULATURE: Atherosclerotic plaque throughout the aorta and iliac vessels.     PELVIC ORGANS: Normal.     MUSCULOSKELETAL: No skeletal lesions.                                                                      IMPRESSION:  1.  No definite change from previous.     2.  Small low-attenuation lesions in the liver stable possibly cysts.     3.  Mild thickening of the wall of the mid esophagus stable.     4.  No new findings.    ASSESSMENT/PLAN:    Stage II ( [id number])  left-sided breast cancer, ER +28%, LA negative, HER2 IHC +3+.  It is a 2 cm left-sided breast cancer.  No suspicious axillary lymph nodes are seen.        We discussed that HER2 positive stage II breast cancer is treated with neoadjuvant chemotherapy/targeted therapy followed by surgery and HER2 directed therapy is continued for about 1 year in total.      5/19/2025.  She started cycle #1 docetaxel/carboplatin/trastuzumab/pertuzumab ( [facility] ).    Cycle #2 was on 6/10/2025.    She has already met with Dr. Carl.    Our plan is to give her 6 cycles and then proceed with surgery.    She had more side effects with cycle #2 which are discussed below.  Today we will proceed with cycle #3.    Infusion reaction/skin rash and itching.  We will premedicate with Solu-Medrol and Benadryl.  She can take Benadryl as needed because previously it helped.    Mouth  sores.  Advised her to start using baking soda/salt rinses from today.    Nausea and vomiting.  Advised her to start taking Zofran and Compazine at night because she usually gets the symptoms early morning around 5 AM. She also follows with palliative care Dr. Mansoor Eric.    Diarrhea.  Use Imodium as needed.    Fatigue.  Cumulative fatigue from chemotherapy as well as anemia.    Anemia.  Chemotherapy-induced anemia.  Hemoglobin is 9.1.  Continue to observe.     Dry skin.  Advised her to use moisturizing lotions multiple times a day.      Monitoring for cardiac toxicity.  Baseline echocardiogram on 5/14/2025 was unremarkable.  We will periodically check echocardiogram because of potential cardiotoxicity from anti-HER2 medications.     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  MMR IHC intact.      She started on concurrent chemoradiation with carboplatin and Taxol on 5/1/24 and received 5 doses, last on 5/28/24 and last dose of radiation on 6/5/24.    Repeat PET/CT on 8/15/2024 showed complete resolution of FDG avidity in the esophagus.    At that time she met with Dr. Martinez and decision was made not to pursue the surgery.  Her feeding tube was removed.      CT scan on 2/13/2025 showed mild stable thickening of the mid esophagus.  No new lesions concerning for recurrence/metastasis are seen.  We will repeat CT scan in August.  Currently scheduled for 8/18/2025.        Discussion regarding genetic testing.  She had genetic testing ordered but insurance denied that.  She has filed an appeal.  She has a genetic counselor appointment on 10/1/2025.    Smoking.  She continues to smoke but less so and is smoking a few cigarettes a day.  Advised her to continue to work hard to completely quit smoking.      We did not address the following today.    Nutrition.  She was on tube feeds but now she is able to eat better.  She was losing weight but now has started to gain  weight which is good news.  She does not have any dysphagia or odynophagia anymore.      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  Now s/p endoscopic resection with clear margins free of dysplasia.  This has been adequately treated.  Continue to observe.      Macrocytosis.  Previously she had macrocytosis.  B12 and folate were normal.  Free T4 was normal.  TSH was low.  Continue to observe.     Return to clinic as scheduled    I answered all of her and her daughter's questions to their satisfaction.  They are agreeable and comfortable with the plan.    Kathy Burch MD     The longitudinal plan of care for the breast cancer/esophageal cancer, as documented were addressed during this visit. Due to the added complexity in care, I will continue to support Lita in the subsequent management and with ongoing continuity of care.        Again, thank you for allowing me to participate in the care of your patient.        Sincerely,        Kathy Burch MD    Electronically signed

## 2025-07-03 NOTE — DISCHARGE INSTRUCTIONS
Instructions from your doctor today:  Emergency Department (ED) testing is focused on the potential causes of your symptoms that are the most dangerous possibilities, and cannot cover every possibility. Based on the evaluation, it was deemed sufficiently safe to discharge and continue management through the clinics. Thus, follow-up is very important to assess for improvement/worsening, potential further testing, and potential treatment adjustments. If you were given opioid pain medications or other medications that can make you drowsy while in the ED, you should not drive for at least several hours and not until you feel completely back to normal.     Please make an appointment to follow up with:  - [facility] (phone: [phone]) in 3-5 days, we recommend repeat Pulmonary Function Tests as soon as possible.   - If you do not have a primary care provider, you can be seen in follow-up and establish care by calling any of the clinics below:     - [facility] (phone: [phone])     - Primary Care / [facility] (phone: [phone])   - Have your clinic provider review the results from today's visit with you again, including any potential follow-up or additional testing that may be needed based on the results. Occasionally, incidental findings are found on later review by radiologists that may need follow-up.     Return to the Emergency Department immediately if you have worsening symptoms, or any other urgent health concerns.

## 2025-07-03 NOTE — ED TRIAGE NOTES
Patient receiving chemo treatment, and per report had an allergic reaction. Among Hx is COPD, patient brought to ED via EMS on 2L O2. Prior to chemo patient received 25MG Benedryl. After patient became very flushed and began to have itching sensation to hands and arms. Daughter alerted staff. She received another 50MG Benedryl, Epinephrin IM 0.3MG X2, 125MG solumedrol followed by another 40MG Solumedrol. Patient has port to chest. Arrived alert and oriented X3. Sarkis Clement RN       Triage Assessment (Adult)       Row Name 07/03/25 1458          Triage Assessment    Airway WDL X  Hx COPD, currently 2L O2        Respiratory WDL    Respiratory WDL WDL        Skin Circulation/Temperature WDL    Skin Circulation/Temperature WDL WDL  Full skin assessment not completed        Cardiac WDL    Cardiac WDL WDL        Peripheral/Neurovascular WDL    Peripheral Neurovascular WDL WDL        Cognitive/Neuro/Behavioral WDL    Cognitive/Neuro/Behavioral WDL WDL

## 2025-07-03 NOTE — PATIENT INSTRUCTIONS
[facility] Triage and after hours / weekends / holidays:  [phone]    Please call the triage or after hours line if you experience a temperature greater than or equal to 100.4, shaking chills, have uncontrolled nausea, vomiting and/or diarrhea, dizziness, shortness of breath, chest pain, bleeding, unexplained bruising, or if you have any other new/concerning symptoms, questions or concerns.      If you are having any concerning symptoms or wish to speak to a provider before your next infusion visit, please call triage to notify them so we can adequately serve you.     If you need a refill on a narcotic prescription or other medication, please call before your infusion appointment.

## 2025-07-03 NOTE — NURSING NOTE
Chief Complaint   Patient presents with    Oncology Clinic Visit     Esophageal cancer    Blood Draw     Blood draw via  by RN       Port accessed with 20 G 3/4 inch needle by RN, labs collected, line flushed with saline and heparin.  Vitals taken. Pt checked in for appointment(s).     Ally Castle RN

## 2025-07-03 NOTE — ED PROVIDER NOTES
[place] [facility]ERGENCY DEPARTMENT ([facility])    7/03/25       ED PROVIDER NOTE  History     Chief Complaint   Patient presents with    Allergic Reaction     HPI  Linh Mustafa is a 65 year old female with a past medical history of penicillin allergy, amoxicillin allergy, malignant neoplasm of esophagus, malignant neoplasm of left breast, and hypertension, who presents to the ED for evaluation of allergic reaction.  Patient was receiving chemotherapy today and had an allergic reaction to the medication.  Prior to chemo she received 25 mg of Benadryl and became flushed, had itching in her hands and arms, reported desaturation.  She then received 50 mg of Benadryl, 2 doses of IM epinephrine, 125 mg Solu-Medrol followed by an additional 40 mg of Solu-Medrol.  She presents today feeling much better and is requesting to go home. She denies any shortness of breath, mouth swelling, difficulty breathing, rash, nausea/vomiting. She reports that she experienced some wheezing but has COPD at baseline. She received one duoneb in the ambulance on the way here. No other complaints at this time.       Past Medical History:   Diagnosis Date    Hyperlipidemia     Hypertension          Physical Exam   BP: (!) 204/84  Pulse: 101  Temp: 97.6  F (36.4  C)  Resp: 18  SpO2: 98 %  Physical Exam  Vitals and nursing note reviewed.   Constitutional:       General: She is not in acute distress.     Appearance: She is not diaphoretic.   HENT:      Head: Normocephalic and atraumatic.   Eyes:      Extraocular Movements: Extraocular movements intact.      Conjunctiva/sclera: Conjunctivae normal.   Cardiovascular:      Rate and Rhythm: Normal rate.      Heart sounds: Normal heart sounds.   Pulmonary:      Effort: Pulmonary effort is normal. No respiratory distress.      Breath sounds: Examination of the left-upper field reveals rhonchi. Examination of the left-middle field reveals rhonchi. Examination of the left-lower field reveals  rhonchi. Rhonchi present.   Abdominal:      Palpations: Abdomen is soft.      Tenderness: There is no abdominal tenderness.   Musculoskeletal:         General: No tenderness. Normal range of motion.      Cervical back: Normal range of motion and neck supple.   Skin:     General: Skin is warm.      Findings: No rash.           ED Course, Procedures, & Data      Procedures               Medications   ipratropium - albuterol 0.5 mg/2.5 mg (3mg)/3 mL (DUONEB) neb solution 3 mL (3 mLs Nebulization $Given 7/3/25 1617)   heparin lock flush 100 unit/mL injection 5-10 mL (5 mLs Intracatheter $Given 7/3/25 1933)          Critical care was not performed.     Medical Decision Making  The patient's presentation was of moderate complexity (an acute illness with systemic symptoms).    The patient's evaluation involved:  history and exam without other MDM data elements    The patient's management necessitated moderate risk (prescription drug management including medications given in the ED).    Assessment & Plan    Linh Mustafa is a 65 year old  female with a past medical history of penicillin allergy, amoxicillin allergy, malignant neoplasm of esophagus, malignant neoplasm of left breast, and hypertension, who presents to the ED for evaluation of allergic reaction. Vitals in the ED to 204/84, normothermic, pulse 101. Physical exam junky lung sounds in the left lung fields consistent with COPD, no presence of oral swelling.. Initial differential diagnosis includes but not limited to allergic reaction, anaphylaxis, COPD exacerbation, other. Nursing notes reviewed.    Prior to arrival patient had received adequate medication to stave off allergic reaction to chemotherapy regimen. With my history and physical exam she did not report any swelling of the mouth, tongue, or throat and no appreciable swelling observed. Patient did not report breathing difficulties. She has a history of tobacco dependence and was placed on 2 L nasal  canula to improve O2 sat. She was also provided with a duoneb during her stay here due to junky/emphysematous lung sound in left lung fields which improved o2 sat. However, I believe this condition to be likely chronic for her and not necessarily related to allergic reaction today. Observed patient for 4 hours since administration of epinephrine and she remained stable while in the emergency department. No appreciable rash or skin changes appreciated during her stay here. She was discharged with recommendations to follow up for repeat PFTs for reevaluation of possible COPD that could benefit from med management. She was sent home with albuterol inhaler to be used 2-4 puffs every 4 hours as needed. Patient understands and agrees with this plan.     The complete clinical picture is most consistent with allergic reaction. After counseling on the diagnosis, work-up, and treatment plan, the patient was discharged to home. The patient was advised to follow-up with PCP in 3-5 days. The patient was advised to return to the ED if worsening symptoms, new concerning symptoms, or any urgent health concerns. Patient seen and discussed with attending physician Dr. Bah and we are in agreement with the plan of care.      Final diagnoses:   Allergic reaction to drug, initial encounter     Discharge Medication List as of 7/3/2025  7:36 PM        START taking these medications    Details   albuterol (PROAIR HFA/PROVENTIL HFA/VENTOLIN HFA) 108 (90 Base) MCG/ACT inhaler Inhale 2 puffs into the lungs every 4 hours as needed for shortness of breath, wheezing or cough., Disp-18 g, R-0, E-PrescribePharmacy may dispense brand covered by insurance (Proair, or proventil or ventolin or generic albuterol inhaler)           --  Da Atwood PA-C   Emergency Medicine   [facility] EMERGENCY DEPARTMENT  7/3/2025      I have reviewed the nursing notes. I have reviewed the findings, diagnosis, plan and need for follow up with the  patient.    Discharge Medication List as of 7/3/2025  7:36 PM        START taking these medications    Details   albuterol (PROAIR HFA/PROVENTIL HFA/VENTOLIN HFA) 108 (90 Base) MCG/ACT inhaler Inhale 2 puffs into the lungs every 4 hours as needed for shortness of breath, wheezing or cough., Disp-18 g, R-0, E-PrescribePharmacy may dispense brand covered by insurance (Proair, or proventil or ventolin or generic albuterol inhaler)             Final diagnoses:   Allergic reaction to drug, initial encounter         [facility] EMERGENCY DEPARTMENT  7/3/2025    --    ED Attending Physician Attestation    I Naida Bah MD, cared for this patient with the Advanced Practice Provider ([facility]). I personally provided a substantive portion of the care for this patient, including approving the care plan for the number and complexity of problems addressed and taking responsibility related to the risk of complications and/or morbidity or mortality of patient management. Please see the [facility]'s documentation for full details.      Naida Bah MD  Emergency Medicine        Naida Bah MD  07/04/25 1246

## 2025-07-03 NOTE — NURSING NOTE
"Oncology Rooming Note    July 3, 2025 9:34 AM   Linh Mustafa is a 65 year old female who presents for:    Chief Complaint   Patient presents with    Oncology Clinic Visit     Esophageal cancer    Blood Draw     Blood draw via  by RN     Initial Vitals: BP (!) 155/74   Pulse 73   Temp 97.8  F (36.6  C) (Oral)   Resp 16   Wt 71.4 kg (157 lb 8 oz)   SpO2 97%   BMI 25.42 kg/m   Estimated body mass index is 25.42 kg/m  as calculated from the following:    Height as of 5/27/25: 1.676 m (5' 6\").    Weight as of this encounter: 71.4 kg (157 lb 8 oz). Body surface area is 1.82 meters squared.  No Pain (0) Comment: Data Unavailable   No LMP recorded. Patient is postmenopausal.  Allergies reviewed: Yes  Medications reviewed: Yes    Medications: Medication refills not needed today.  Pharmacy name entered into [email or web address]: [id number] - [address]    Frailty Screening:   Is the patient here for a new oncology consult visit in cancer care? 2. No    PHQ9:  Did this patient require a PHQ9?: No      Clinical concerns:        Rashida Mason              "

## 2025-07-03 NOTE — PROGRESS NOTES
Infusion Nursing Note:  Linh Mustafa presents today for Cycle 3 Day 1 Perjeta, Trazimera, Taxotere, Carboplatin (#8) and Patient seen by provider today: Yes: Dr. Burch   present during visit today: Not Applicable.    Note: Lita comes into the clinic today doing well overall and has no concerns to offer following her provider visit. She has no pain and denies s/s of infection.    Neulasta OnPro was not placed on patient since patient started reacting to her Carboplatin.    Reaction notes:  - Carboplatin with 2mls left of infusion, patient called out with itching palms and facial flushing  - 02 dropped low 90s, HR stayed consistent throughout reaction in the 80s, BP 170s/80s.  -  Pepcid 20mg, Solu-Medrol 40mg, Benadryl 50mg, Epi 0.6mg total given in infusion  - O2 sats still at 93% on 5L via mask and post nebulizer  - facial flushing and itching would not resolve  - Decision made to send patient to the ED    Intravenous Access:  Implanted Port.    Treatment Conditions:  Lab Results   Component Value Date    HGB 9.1 (L) 07/03/2025    WBC 5.6 07/03/2025    ANEU 4.4 07/03/2025     07/03/2025        Lab Results   Component Value Date     07/03/2025    POTASSIUM 3.7 07/03/2025    MAG 2.1 04/15/2025    CR 0.78 07/03/2025    VAIBHAV 9.7 07/03/2025    BILITOTAL 0.3 07/03/2025    ALBUMIN 3.9 07/03/2025    ALT 7 07/03/2025    AST 15 07/03/2025       Results reviewed, labs MET treatment parameters, ok to proceed with treatment.  Echo 5/14/25 LEF 60-65%      Post Infusion Assessment:  Patient tolerated infusion poorly due to : Hypersensitivity: Did patient have a hypersensitivity reaction? : Yes  Drug or Product name: Carboplatin (dose #8)  Were pre-meds administered?: Yes  What pre-meds were administered?: Diphenhydramine (Benadryl), Dexamethasone (decadron, Fosaprepitant (emend), Palonesetron (aloxi), Methylprednisolone  First or Subsequent treatment: Subsequent infusion  Rate of infusion when patient  had hypersensitivity reaction: 670  Time the hypersensitivity reaction was first recognized: 1357  Symptoms observed or reported (select all that apply): Flushing, Hypoxia, Shortness of breath, Itching, Other: (Comment) (wheezing in the lungs)  Interventions/treatment following reaction: Infusion stopped, Hypersensitivity medications administered, Oxygen applied, Rapid Response called, EMS called, Transported to hospital  What hypersensitivity medications were administered?: DiphendydrAMINE (benadryl), Epinephrine, Methylprednisolone, NaCl 0.9% Bolus, Famotidine(Pepcid), Other: (Comment) (Albuterol Nebulizer)  Name of provider notified: Dr. Burch  Time provider notified: 1011  Type of notification (select all that apply): Other: (Comment) (Inbasket)  Was the patient re-challenged today?: No - no re-challenge today .  Blood return noted pre and post infusion.  Site patent and intact, free from redness, edema or discomfort.  No evidence of extravasations.  Patient's port was left accessed for ED.     Discharge Plan:   Patient declined prescription refills.  Discharge instructions reviewed with: Patient and Family.  Patient and/or family verbalized understanding of discharge instructions and all questions answered.  AVS to patient via [employer].  Patient will return 7/24 for next appointment.   Patient discharged in stable condition accompanied by: self and daughter.  Departure Mode: Ambulatory.      Bere Hinkle RN

## 2025-07-03 NOTE — PROGRESS NOTES
Oncology follow-up visit:  Date on this visit: 7/03/25     Diagnosis.  Esophageal cancer.    [facility] Phys[facility]     History Of Present Illness:  Ms. Mustafa is a 65 year old female who presents with diagnosis of esophageal cancer.  I initially saw her on 4/18/2024.  Please see my previous note for details.  I have copied and updated from prior notes.      She mentioned that since November 2023 she started to notice pain upon swallowing and it was basically pain which limited her food intake.  This was progressively getting worse.  She had further workup as mentioned below.    2/15/2024.  EGD showed an ulcerated mid esophageal mass extending from 26-31 cm from the incisors.  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  Pathology from the mid esophageal mass showed moderately differentiated invasive squamous cell carcinoma, MMR IHC intact.    3/7/2024.  PET/CT showed markedly FDG avid wall thickening of the mid thoracic esophagus with SUV max 24.1.  No evidence of metastatic disease.    3/21/2024.  Upper EUS.  Endoscopy showed a flat nodule in the proximal esophagus between 15-19 cm and one third circumferential.  Upper esophageal sphincter was at 14 cm.  Biopsies from this showed high-grade dysplasia/carcinoma in situ.      Normal esophageal squamous cell cancer causing maybe esophagus on the right anterolateral esophageal wall between 24-30 cm.  It was 50% circumferential.  The GE junction was at 35 cm with 2 cm tongues of Auguste's anteriorly without high risk features.    Ultrasound exam showed the mid esophageal tumor to be probably T3. Two 4 x 6 mm nodes were seen adjacent to the distal esophageal wall at 36 and 37 cm.  Both an identical appearance and one was sampled.  This lymph node biopsy was benign.    By EUS it was staged as T3 N0 M0 tumor.    4/2/2024.  Because of finding of carcinoma in situ in the proximal esophagus, she had endoscopic  resection of the proximal squamous cell carcinoma in situ lesion performed with en bloc removal.    The final pathology showed squamous mucosa with high-grade dysplasia/carcinoma in situ with all margins negative for dysplasia.  No definite invasion was identified. ESD was considered curative for this lesion.    Her case was also discussed in the tumor conference with the plan to proceed with neoadjuvant chemotherapy/radiation followed by definitive surgery.      She met with Dr. Martinez.    She started on concurrent chemoradiation with carboplatin and Taxol on 5/1/24 and received 5 doses, last on 5/28/24 and last dose of radiation on 6/5/24.     She was hospitalized from 6/4-6/18/24 with radiation esophagitis causing pain and inability to eat. She was discharged home with a G-tube that was placed on 6/14/24.     Repeat PET/CT on 8/15/2024 showed very good response to treatment.  There is complete resolution of FDG avidity in the esophagus.  No evidence of malignancy.    She met with Dr. Martinez and decision was made not to pursue surgery for now.    Feeding tube was removed in September 2024.    2/13/2025.  CT chest abdomen and pelvis did not show evidence of disease.    End of March 2025. She started to have pain in the left breast and had a mammogram/ultrasound done which showed a 2 cm suspicious lesion in the left breast at around 5 o'clock position.  The ultrasound showed normal left axillary lymph nodes.      4/22/2025.  Left breast biopsy showed invasive ductal carcinoma, Franklin grade 3 with focally suspicious angiolymphatic invasion.  ER 28% positive.  KS negative.  HER2 by IHC was +3+.  Ki-67 index 21%.       We decided to start her on neoadjuvant chemotherapy.    5/19/2025.  Cycle #1 of carboplatin/docetaxel/trastuzumab/pertuzumab    6/10/2025.  Cycle #2 of carboplatin/docetaxel/trastuzumab/pertuzumab      7/3/2025.  Cycle #3 of carboplatin/docetaxel/trastuzumab/pertuzumab is planned      Interval  history.    She comes into the clinic today. Her daughter is also available.  I also reviewed notes from breast surgeon Dr. Carl.  She had a rough time with chemotherapy this time.  She had a skin rash and itching and Benadryl helped.  She also noticed dryness of the skin.  She had more fatigue.  Off-and-on she has had vomiting.  Sometimes she noticed diarrhea.  She took Zofran and Compazine which helped.  Imodium helps with diarrhea.  She had mouth sores and she used baking soda/salt rinses which helped.  She has some tingling and numbness in the fingers and this is is stable as compared to last time.  She thinks her breast lump is smaller.    ECOG 1    ROS:  A comprehensive ROS was otherwise neg    I reviewed other history in epic as below.    Past Medical/Surgical History:  Past Medical History:   Diagnosis Date    Hyperlipidemia     Hypertension    Hypothyroidism- hx of PATEL at the age of 16 for hyperthyroidism    Past Surgical History:   Procedure Laterality Date    BIOPSY BREAST Right     age 30 benign fibrous    CV CORONARY ANGIOGRAM N/A 01/25/2023    Procedure: Coronary Angiogram;  Surgeon: Lukas Irizarry MD;  Location: [facility]    CV LEFT HEART CATH N/A 01/25/2023    Procedure: Left Heart Catheterization;  Surgeon: Lukas Irizarry MD;  Location: [facility]    ENDOSCOPIC ULTRASOUND UPPER GASTROINTESTINAL TRACT (GI) N/A 3/21/2024    Procedure: ENDOSCOPIC ULTRASOUND, ESOPHAGOSCOPY / UPPER GASTROINTESTINAL TRACT (GI), ENDOSCOPY WITH BIOPSY, FINE NEEDLE ASPIRATION;  Surgeon: Damon Kramer MD;  Location:  OR    ENDOSCOPIC ULTRASOUND, ESOPHAGOSCOPY / UPPER GASTROINTESTINAL TRACT (GI), ENDOSCOPY WITH BIOPSY, FINE NEEDLE ASPIRATION  03/21/2024    ESOPHAGOSCOPY, GASTROSCOPY, DUODENOSCOPY (EGD), COMBINED N/A 6/6/2024    Procedure: Esophagoscopy, Gastroscopy, Duodenoscopy (EGD), Nasojejunal Feeding Tube Placement;  Surgeon: Bryant Martinez MD;  Location:  OR     ESOPHAGOSCOPY, GASTROSCOPY, DUODENOSCOPY (EGD), SUBMUCOSA RESECTION N/A 4/2/2024    Procedure: ESOPHAGOGASTRODUODENOSCOPY, WITH SUBMUCOSAL RESECTION;  Surgeon: Holden King MD;  Location: [facility] OR    IR CHEST PORT PLACEMENT > 5 YRS OF AGE  4/24/2024    LAPAROSCOPIC ASSISTED INSERTION TUBE GASTROTOMY N/A 6/14/2024    Procedure: Upper endoscopy, laparoscopic gastrostomy tube placement;  Surgeon: Bryant Martinez MD;  Location: [facility] OR     Cancer History:   As above    Allergies:  Allergies as of 07/03/2025 - Reviewed 06/10/2025   Allergen Reaction Noted    Amoxicillin Shortness Of Breath, Itching, and Rash 12/06/2023    Penicillins  03/14/2024     Current Medications:  Current Outpatient Medications   Medication Sig Dispense Refill    artificial saliva (BIOTENE DRY MOUTHWASH) LIQD liquid Swish and spit 15 mLs in mouth 4 times daily as needed for dry mouth 473 mL 0    Calcium Citrate-Vitamin D (CALCIUM CITRATE CHEWY BITE) 500-12.5 MG-MCG CHEW Take 1 tablet by mouth 2 times daily. 180 tablet 3    dexAMETHasone (DECADRON) 4 MG tablet Take 2 tablets (8 mg) by mouth 2 times daily (with meals). Start evening of Docetaxel infusion and continue for a total of 3 doses. 6 tablet 5    dextran 70-hypromellose (TEARS NATURALE FREE PF) 0.1-0.3 % ophthalmic solution Place 1 drop into both eyes daily as needed (Irritation). 35 each 0    doxepin (SINEQUAN) 10 MG/ML (HIGH CONC) solution TAKE 0.3-1 MLS (3-10 MG) BY MOUTH AT BEDTIME. (Patient not taking: Reported on 6/10/2025) 90 mL 1    esomeprazole (NEXIUM) 20 MG DR capsule TAKE 1 CAPSULE BY MOUTH EVERY DAY (Patient not taking: Reported on 6/10/2025) 90 capsule 1    hydrOXYzine (VISTARIL) 50 MG capsule Take 50 mg by mouth 2 times daily as needed.      hydrOXYzine HCl (ATARAX) 25 MG tablet TAKE 1-2 TABLETS (25-50 MG) BY MOUTH 3 TIMES DAILY AS NEEDED FOR ITCHING (Patient not taking: Reported on 6/10/2025) 540 tablet 1    levothyroxine (SYNTHROID/LEVOTHROID) 100 MCG tablet Take 1  tablet (100 mcg) by mouth daily. 90 tablet 3    levothyroxine (SYNTHROID/LEVOTHROID) 125 MCG tablet Take 1 tablet (125 mcg) by mouth every morning (before breakfast). 60 tablet 1    methocarbamol (ROBAXIN) 750 MG tablet Take 1 tablet (750 mg) by mouth 4 times daily (Patient not taking: Reported on 6/10/2025) 120 tablet 0    naloxone (NARCAN) 4 MG/0.1ML nasal spray Spray 1 spray (4 mg) into one nostril alternating nostrils as needed for opioid reversal every 2-3 minutes until assistance arrives 0.2 mL 1    nicotine (NICODERM CQ) 14 MG/24HR 24 hr patch Place 1 patch onto the skin every 24 hours (Patient not taking: Reported on 6/10/2025) 30 patch 1    nicotine (NICODERM CQ) 7 MG/24HR 24 hr patch Place 1 patch onto the skin every 24 hours Start after 6 weeks of the 14 mg/24 hr dose (Patient not taking: Reported on 6/10/2025) 30 patch 0    ondansetron (ZOFRAN ODT) 8 MG ODT tab Take 1 tablet (8 mg) by mouth every 8 hours as needed for nausea. (Patient not taking: Reported on 6/10/2025) 90 tablet 1    oxyCODONE (ROXICODONE) 5 MG tablet Take 1-2 tablets (5-10 mg) by mouth every 4 hours as needed for pain. 150 tablet 0    oxyCODONE (ROXICODONE) 5 MG/5ML solution Take 5-7 mLs (5-7 mg) by mouth or Feeding Tube every 4 hours as needed for severe pain. (Patient not taking: Reported on 6/10/2025) 400 mL 0    prochlorperazine (COMPAZINE) 10 MG tablet Take 1 tablet (10 mg) by mouth every 6 hours as needed for nausea or vomiting. 30 tablet 2    prochlorperazine (COMPAZINE) 10 MG tablet Take 1 tablet (10 mg) by mouth every 6 hours as needed for vomiting. 90 tablet 1    rOPINIRole (REQUIP) 1 MG tablet TAKE 1.5-2 TABLETS (1.5-2 MG) BY MOUTH AT BEDTIME. (Patient not taking: Reported on 6/10/2025) 180 tablet 1    sertraline (ZOLOFT) 50 MG tablet TAKE 1 TABLET BY MOUTH EVERY DAY 90 tablet 1    vitamin D3 (CHOLECALCIFEROL) 50 mcg (2000 units) tablet Take 1 tablet (50 mcg) by mouth daily. 90 tablet 3      Family History:  Family History    Problem Relation Age of Onset    Chronic Obstructive Pulmonary Disease Father     Colon Cancer Paternal Grandmother 70     1 daughter- healthy    Social History:  Social History     Socioeconomic History    Marital status: Single     Spouse name: Not on file    Number of children: Not on file    Years of education: Not on file    Highest education level: Not on file   Occupational History    Not on file   Tobacco Use    Smoking status: Former     Current packs/day: 1.00     Average packs/day: 1 pack/day for 48.5 years (48.5 ttl pk-yrs)     Types: Cigarettes     Start date: 1977     Passive exposure: Current    Smokeless tobacco: Never   Vaping Use    Vaping status: Never Used   Substance and Sexual Activity    Alcohol use: Not on file     Comment: occasional    Drug use: Never    Sexual activity: Not on file   Other Topics Concern    Not on file   Social History Narrative    ** Merged History Encounter **          Social Drivers of Health     Financial Resource Strain: Not on file   Food Insecurity: No Food Insecurity (2/24/2024)    Received from [facility]    Hunger Vital Sign     Worried About Running Out of Food in the Last Year: Never true     Ran Out of Food in the Last Year: Never true   Transportation Needs: No Transportation Needs (2/24/2024)    Received from [facility]    PRAPARE - Transportation     Lack of Transportation (Medical): No     Lack of Transportation (Non-Medical): No   Physical Activity: Inactive (2/24/2024)    Received from [facility]    Exercise Vital Sign     Days of Exercise per Week: 0 days     Minutes of Exercise per Session: 0 min   Stress: Not on file   Social Connections: Not on file   Interpersonal Safety: Not on file   Housing Stability: Low Risk  (2/24/2024)    Received from [facility]    Housing Stability     What is your living situation today?: I have a steady place to live   Chronic smoker- now smokes occasionally. No etoh. Lives alone. Currently brother staying with  her        Physical Exam:  There were no vitals taken for this visit.    Wt Readings from Last 4 Encounters:   06/10/25 73.3 kg (161 lb 11.2 oz)   05/27/25 69.4 kg (153 lb)   05/19/25 74.3 kg (163 lb 12.8 oz)   05/08/25 74.8 kg (165 lb)     CONSTITUTIONAL: no acute distress  EYES: PERRLA, there is pallor but no icterus.  ENT/MOUTH: no mouth lesions. Ears normal  CVS: s1s2 no m r g .   RESPIRATORY: clear to auscultation b/l  GI: soft non tender no hepatosplenomegaly  NEURO: AAOX3  Grossly non focal neuro exam  INTEGUMENT: no obvious rashes  LYMPHATIC: no palpable cervical, supraclavicular, axillary or inguinal LAD  MUSCULOSKELETAL: Unremarkable. No bony tenderness.   EXTREMITIES: no edema  PSYCH: Mentation, mood and affect are normal. Decision making capacity is intact        Laboratory/Imaging Studies      Reviewed  7/3/2025  CBC showed WBC 5.6.  Hemoglobin 9.1.  Platelets 218.  ANC 4.4.      Chemistry is unremarkable    On 6/10/2025      TSH was 14.4 but free T4 was 1.17.    5/14/2025.  Echocardiogram.  Left ventricular size, wall motion and function are normal. The ejection  fraction is 60-65%.  Right ventricular function, chamber size, wall motion, and thickness are  normal.  No significant valvular abnormalities present.  Compared to prior resting images of prior stress echo, no changes.        Left ankle x-ray 4/15/2025.     IMPRESSION: Small curvilinear, mildly distracted fracture tip of the lateral malleolus with adjacent soft tissue swelling. Ankle mortise is intact. Bones are demineralized.     CT chest abdomen and pelvis 2/13/2025  FINDINGS:   LUNGS AND PLEURA: Emphysematous change in both lungs. No nodules or masses.     MEDIASTINUM/AXILLAE: No mediastinal mass or adenopathy. Mild thickening along the mid esophagus is noted stable.     CORONARY ARTERY CALCIFICATION: Moderate.     HEPATOBILIARY: Multiple small low-attenuation lesions in the liver the largest in segment 8 measuring 1 cm stable from previous  examination. Cholelithiasis.     PANCREAS: Pancreas is normal.     SPLEEN: Normal.     ADRENAL GLANDS: Bilateral adrenal adenomas stable.     KIDNEYS/BLADDER: No stones in either kidney. No hydronephrosis.     BOWEL: Few colonic diverticula.     LYMPH NODES: No abnormal adenopathy in the abdomen or pelvis.     VASCULATURE: Atherosclerotic plaque throughout the aorta and iliac vessels.     PELVIC ORGANS: Normal.     MUSCULOSKELETAL: No skeletal lesions.                                                                      IMPRESSION:  1.  No definite change from previous.     2.  Small low-attenuation lesions in the liver stable possibly cysts.     3.  Mild thickening of the wall of the mid esophagus stable.     4.  No new findings.    ASSESSMENT/PLAN:    Stage II ( [id number])  left-sided breast cancer, ER +28%, IL negative, HER2 IHC +3+.  It is a 2 cm left-sided breast cancer.  No suspicious axillary lymph nodes are seen.        We discussed that HER2 positive stage II breast cancer is treated with neoadjuvant chemotherapy/targeted therapy followed by surgery and HER2 directed therapy is continued for about 1 year in total.      5/19/2025.  She started cycle #1 docetaxel/carboplatin/trastuzumab/pertuzumab ( [facility]P ).    Cycle #2 was on 6/10/2025.    She has already met with Dr. Carl.    Our plan is to give her 6 cycles and then proceed with surgery.    She had more side effects with cycle #2 which are discussed below.  Today we will proceed with cycle #3.    Infusion reaction/skin rash and itching.  We will premedicate with Solu-Medrol and Benadryl.  She can take Benadryl as needed because previously it helped.    Mouth sores.  Advised her to start using baking soda/salt rinses from today.    Nausea and vomiting.  Advised her to start taking Zofran and Compazine at night because she usually gets the symptoms early morning around 5 AM. She also follows with palliative care Dr. Mansoor Eric.    Diarrhea.  Use Imodium  as needed.    Fatigue.  Cumulative fatigue from chemotherapy as well as anemia.    Anemia.  Chemotherapy-induced anemia.  Hemoglobin is 9.1.  Continue to observe.     Dry skin.  Advised her to use moisturizing lotions multiple times a day.      Monitoring for cardiac toxicity.  Baseline echocardiogram on 5/14/2025 was unremarkable.  We will periodically check echocardiogram because of potential cardiotoxicity from anti-HER2 medications.     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  MMR IHC intact.      She started on concurrent chemoradiation with carboplatin and Taxol on 5/1/24 and received 5 doses, last on 5/28/24 and last dose of radiation on 6/5/24.    Repeat PET/CT on 8/15/2024 showed complete resolution of FDG avidity in the esophagus.    At that time she met with Dr. Martinez and decision was made not to pursue the surgery.  Her feeding tube was removed.      CT scan on 2/13/2025 showed mild stable thickening of the mid esophagus.  No new lesions concerning for recurrence/metastasis are seen.  We will repeat CT scan in August.  Currently scheduled for 8/18/2025.        Discussion regarding genetic testing.  She had genetic testing ordered but insurance denied that.  She has filed an appeal.  She has a genetic counselor appointment on 10/1/2025.    Smoking.  She continues to smoke but less so and is smoking a few cigarettes a day.  Advised her to continue to work hard to completely quit smoking.      We did not address the following today.    Nutrition.  She was on tube feeds but now she is able to eat better.  She was losing weight but now has started to gain weight which is good news.  She does not have any dysphagia or odynophagia anymore.      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  Now s/p endoscopic resection with clear margins free of dysplasia.  This has been adequately treated.  Continue to observe.      Macrocytosis.  Previously  she had macrocytosis.  B12 and folate were normal.  Free T4 was normal.  TSH was low.  Continue to observe.     Return to clinic as scheduled    I answered all of her and her daughter's questions to their satisfaction.  They are agreeable and comfortable with the plan.    Kathy Burch MD     The longitudinal plan of care for the breast cancer/esophageal cancer, as documented were addressed during this visit. Due to the added complexity in care, I will continue to support Lita in the subsequent management and with ongoing continuity of care.      Addendum  She had a severe reaction to carboplatin around the end of its infusion.  She was able to receive other chemotherapy.  She was sent to emergency room after that.  She was unable to get Onpro and we will give Neulasta in a couple of days.  Most likely going forward I will not give carboplatin due to severe reaction and we will continue with docetaxel/trastuzumab/pertuzumab.    Kathy Burch MD  7/3/2025

## 2025-07-03 NOTE — NURSING NOTE
Rapid Response Epic Documentation     Situation:   RRT requested to 2nd floor, [address].     Objective:    Upon arrival RN reported pt was receiving carboplatin when RN noticed pt's face/cheeks were becoming red and the pt reported itching in her hands.   RN discontinued infusion and request RRT.  Upon arrival,   pt was receiving rescue medications per anaphylaxis protocol, including 0.3 mg Epi IM. RRT noted pt was on NC at 3 lpm with O2 saturation of 91%.  RN reported pt was 97% on RA when she arrived for her appointment.     Assessment:      At pt side.  Pt continued to report itching in hands, but reported mild improvement.   Primary assessment: airway-open; breathing-normal/RR 18; lungs- bilateral wheezing noted throughout; circulation-strong/regular; skin-warm/pink/dry; PERLLA; A&O x 4; CMS x 4.      Pt's O2 saturation remained 92-93% on NC at 3 lpm.   RRT requested to provide albuterol nebulizer and RN agreed.  Pt was given nebulizer over approximately 15 minutes and reported improvement to breathing.  RR improved to 14 breaths per minute and wheezing was noted to be absent.       BP:  176/60, 172/86    Pulse: 86, 80  Respiration: 16, 14  SPO2: 91% RA, 94% via nebulizer at 4 lpm.    Glucose: NA  Mental Status: Alert  CMS: Intact  Stroke Scale: Not Applicable  EKG: Not Performed      Treatment:    Medication: Albuterol  Oxygen: Nebulizer      Disposition:      Transfer to Emergency Room Via: 911 via [facility] to [facility].     Protocol Used:     Allergic Reaction/Anaphylaxis

## 2025-07-05 ENCOUNTER — INFUSION THERAPY VISIT (OUTPATIENT)
Dept: ONCOLOGY | Facility: CLINIC | Age: 65
End: 2025-07-05
Attending: INTERNAL MEDICINE
Payer: COMMERCIAL

## 2025-07-05 VITALS
HEART RATE: 78 BPM | RESPIRATION RATE: 16 BRPM | OXYGEN SATURATION: 93 % | TEMPERATURE: 98.1 F | SYSTOLIC BLOOD PRESSURE: 115 MMHG | DIASTOLIC BLOOD PRESSURE: 72 MMHG

## 2025-07-05 DIAGNOSIS — C50.512 MALIGNANT NEOPLASM OF LOWER-OUTER QUADRANT OF LEFT BREAST OF FEMALE, ESTROGEN RECEPTOR POSITIVE (H): Primary | ICD-10-CM

## 2025-07-05 DIAGNOSIS — Z17.0 MALIGNANT NEOPLASM OF LOWER-OUTER QUADRANT OF LEFT BREAST OF FEMALE, ESTROGEN RECEPTOR POSITIVE (H): Primary | ICD-10-CM

## 2025-07-05 DIAGNOSIS — C15.9 SQUAMOUS CELL CARCINOMA OF ESOPHAGUS (H): ICD-10-CM

## 2025-07-05 DIAGNOSIS — Z51.11 ENCOUNTER FOR ANTINEOPLASTIC CHEMOTHERAPY: ICD-10-CM

## 2025-07-05 PROCEDURE — 250N000011 HC RX IP 250 OP 636: Mod: JZ | Performed by: INTERNAL MEDICINE

## 2025-07-05 PROCEDURE — 96372 THER/PROPH/DIAG INJ SC/IM: CPT | Performed by: INTERNAL MEDICINE

## 2025-07-05 RX ADMIN — PEGFILGRASTIM 6 MG: 6 INJECTION SUBCUTANEOUS at 09:05

## 2025-07-05 ASSESSMENT — PAIN SCALES - GENERAL: PAINLEVEL_OUTOF10: NO PAIN (0)

## 2025-07-05 NOTE — PROGRESS NOTES
Infusion Nursing Note:  Linh Mustafa presents today for C3 D3 Neulasta.    Patient seen by provider today: No   present during visit today: Not Applicable.    Note: Patient arrives feeling well overall. Reports mild fatigue. Denies chest pain, SOB, dizziness, fever, chills, N/V/D, or urinary concerns. Satting low 90s on RA today. Received Albuterol inhaler and will  today and use PRN.    Post Infusion Assessment:  Patient tolerated injection into LEFT arm without incident.       Discharge Plan:   Patient declined prescription refills.  Discharge instructions reviewed with: Patient and daughter.  Patient and/or family verbalized understanding of discharge instructions and all questions answered.  AVS to patient via [employer].  Patient will return 7/24 for next appointment.   Patient discharged in stable condition accompanied by: daughter.  Departure Mode: Ambulatory.      Indira Henning RN

## 2025-07-16 LAB
PATH REPORT.COMMENTS IMP SPEC: ABNORMAL
PATH REPORT.COMMENTS IMP SPEC: YES
PATH REPORT.FINAL DX SPEC: ABNORMAL
PATH REPORT.GROSS SPEC: ABNORMAL
PATH REPORT.MICROSCOPIC SPEC OTHER STN: ABNORMAL
PATH REPORT.RELEVANT HX SPEC: ABNORMAL
PATH REPORT.RELEVANT HX SPEC: ABNORMAL
PATH REPORT.SITE OF ORIGIN SPEC: ABNORMAL

## 2025-07-23 LAB
ABO + RH BLD: NORMAL
BLD GP AB SCN SERPL QL: NEGATIVE
SPECIMEN EXP DATE BLD: NORMAL

## 2025-07-24 ENCOUNTER — INFUSION THERAPY VISIT (OUTPATIENT)
Dept: ONCOLOGY | Facility: CLINIC | Age: 65
End: 2025-07-24
Attending: INTERNAL MEDICINE
Payer: COMMERCIAL

## 2025-07-24 ENCOUNTER — APPOINTMENT (OUTPATIENT)
Dept: LAB | Facility: CLINIC | Age: 65
End: 2025-07-24
Attending: INTERNAL MEDICINE
Payer: COMMERCIAL

## 2025-07-24 VITALS
TEMPERATURE: 98.2 F | OXYGEN SATURATION: 95 % | SYSTOLIC BLOOD PRESSURE: 146 MMHG | DIASTOLIC BLOOD PRESSURE: 74 MMHG | HEART RATE: 66 BPM | RESPIRATION RATE: 16 BRPM

## 2025-07-24 VITALS
BODY MASS INDEX: 24.39 KG/M2 | HEART RATE: 94 BPM | RESPIRATION RATE: 16 BRPM | DIASTOLIC BLOOD PRESSURE: 63 MMHG | TEMPERATURE: 97.7 F | OXYGEN SATURATION: 97 % | WEIGHT: 151.1 LBS | SYSTOLIC BLOOD PRESSURE: 107 MMHG

## 2025-07-24 DIAGNOSIS — R06.02 SOB (SHORTNESS OF BREATH): ICD-10-CM

## 2025-07-24 DIAGNOSIS — Z51.11 ENCOUNTER FOR ANTINEOPLASTIC CHEMOTHERAPY: ICD-10-CM

## 2025-07-24 DIAGNOSIS — E87.6 HYPOKALEMIA: ICD-10-CM

## 2025-07-24 DIAGNOSIS — D64.9 ANEMIA, UNSPECIFIED TYPE: ICD-10-CM

## 2025-07-24 DIAGNOSIS — C50.512 MALIGNANT NEOPLASM OF LOWER-OUTER QUADRANT OF LEFT BREAST OF FEMALE, ESTROGEN RECEPTOR POSITIVE (H): Primary | ICD-10-CM

## 2025-07-24 DIAGNOSIS — Z17.0 MALIGNANT NEOPLASM OF LOWER-OUTER QUADRANT OF LEFT BREAST OF FEMALE, ESTROGEN RECEPTOR POSITIVE (H): ICD-10-CM

## 2025-07-24 DIAGNOSIS — C15.9 SQUAMOUS CELL CARCINOMA OF ESOPHAGUS (H): ICD-10-CM

## 2025-07-24 DIAGNOSIS — Z17.0 MALIGNANT NEOPLASM OF LOWER-OUTER QUADRANT OF LEFT BREAST OF FEMALE, ESTROGEN RECEPTOR POSITIVE (H): Primary | ICD-10-CM

## 2025-07-24 DIAGNOSIS — C50.512 MALIGNANT NEOPLASM OF LOWER-OUTER QUADRANT OF LEFT BREAST OF FEMALE, ESTROGEN RECEPTOR POSITIVE (H): ICD-10-CM

## 2025-07-24 DIAGNOSIS — D64.9 ANEMIA, UNSPECIFIED TYPE: Primary | ICD-10-CM

## 2025-07-24 LAB
ALBUMIN SERPL BCG-MCNC: 3.7 G/DL (ref 3.5–5.2)
ALP SERPL-CCNC: 143 U/L (ref 40–150)
ALT SERPL W P-5'-P-CCNC: 6 U/L (ref 0–50)
ANION GAP SERPL CALCULATED.3IONS-SCNC: 15 MMOL/L (ref 7–15)
AST SERPL W P-5'-P-CCNC: 14 U/L (ref 0–45)
BASOPHILS # BLD AUTO: 0 10E3/UL (ref 0–0.2)
BASOPHILS NFR BLD AUTO: 0 %
BILIRUB SERPL-MCNC: 0.4 MG/DL
BLD PROD TYP BPU: NORMAL
BLOOD COMPONENT TYPE: NORMAL
BUN SERPL-MCNC: 10.8 MG/DL (ref 8–23)
CALCIUM SERPL-MCNC: 9.6 MG/DL (ref 8.8–10.4)
CHLORIDE SERPL-SCNC: 100 MMOL/L (ref 98–107)
CODING SYSTEM: NORMAL
CREAT SERPL-MCNC: 0.94 MG/DL (ref 0.51–0.95)
CROSSMATCH: NORMAL
EGFRCR SERPLBLD CKD-EPI 2021: 67 ML/MIN/1.73M2
EOSINOPHIL # BLD AUTO: 0 10E3/UL (ref 0–0.7)
EOSINOPHIL NFR BLD AUTO: 0 %
ERYTHROCYTE [DISTWIDTH] IN BLOOD BY AUTOMATED COUNT: 17.9 % (ref 10–15)
FERRITIN SERPL-MCNC: 729 NG/ML (ref 11–328)
GLUCOSE SERPL-MCNC: 102 MG/DL (ref 70–99)
HCO3 SERPL-SCNC: 22 MMOL/L (ref 22–29)
HCT VFR BLD AUTO: 21.8 % (ref 35–47)
HGB BLD-MCNC: 7.4 G/DL (ref 11.7–15.7)
IMM GRANULOCYTES # BLD: 0.1 10E3/UL
IMM GRANULOCYTES NFR BLD: 1 %
IRON BINDING CAPACITY (ROCHE): 193 UG/DL (ref 240–430)
IRON SATN MFR SERPL: 28 % (ref 15–46)
IRON SERPL-MCNC: 55 UG/DL (ref 37–145)
ISSUE DATE AND TIME: NORMAL
LYMPHOCYTES # BLD AUTO: 0.8 10E3/UL (ref 0.8–5.3)
LYMPHOCYTES NFR BLD AUTO: 9 %
MCH RBC QN AUTO: 35.1 PG (ref 26.5–33)
MCHC RBC AUTO-ENTMCNC: 33.9 G/DL (ref 31.5–36.5)
MCV RBC AUTO: 103 FL (ref 78–100)
MONOCYTES # BLD AUTO: 0.4 10E3/UL (ref 0–1.3)
MONOCYTES NFR BLD AUTO: 6 %
NEUTROPHILS # BLD AUTO: 6.8 10E3/UL (ref 1.6–8.3)
NEUTROPHILS NFR BLD AUTO: 84 %
NRBC # BLD AUTO: 0 10E3/UL
NRBC BLD AUTO-RTO: 0 /100
PLATELET # BLD AUTO: 167 10E3/UL (ref 150–450)
POTASSIUM SERPL-SCNC: 3 MMOL/L (ref 3.4–5.3)
PROT SERPL-MCNC: 7 G/DL (ref 6.4–8.3)
RBC # BLD AUTO: 2.11 10E6/UL (ref 3.8–5.2)
SODIUM SERPL-SCNC: 137 MMOL/L (ref 135–145)
UNIT ABO/RH: NORMAL
UNIT NUMBER: NORMAL
UNIT STATUS: NORMAL
UNIT TYPE ISBT: 6200
VIT B12 SERPL-MCNC: 3894 PG/ML (ref 232–1245)
WBC # BLD AUTO: 8.1 10E3/UL (ref 4–11)

## 2025-07-24 PROCEDURE — 96372 THER/PROPH/DIAG INJ SC/IM: CPT | Performed by: INTERNAL MEDICINE

## 2025-07-24 PROCEDURE — 82728 ASSAY OF FERRITIN: CPT

## 2025-07-24 PROCEDURE — 82607 VITAMIN B-12: CPT

## 2025-07-24 PROCEDURE — 86900 BLOOD TYPING SEROLOGIC ABO: CPT

## 2025-07-24 PROCEDURE — 36591 DRAW BLOOD OFF VENOUS DEVICE: CPT | Performed by: INTERNAL MEDICINE

## 2025-07-24 PROCEDURE — P9016 RBC LEUKOCYTES REDUCED: HCPCS | Performed by: INTERNAL MEDICINE

## 2025-07-24 PROCEDURE — 250N000011 HC RX IP 250 OP 636: Performed by: INTERNAL MEDICINE

## 2025-07-24 PROCEDURE — G0463 HOSPITAL OUTPT CLINIC VISIT: HCPCS | Performed by: INTERNAL MEDICINE

## 2025-07-24 PROCEDURE — 83550 IRON BINDING TEST: CPT

## 2025-07-24 PROCEDURE — 258N000003 HC RX IP 258 OP 636: Performed by: INTERNAL MEDICINE

## 2025-07-24 PROCEDURE — 85004 AUTOMATED DIFF WBC COUNT: CPT | Performed by: INTERNAL MEDICINE

## 2025-07-24 PROCEDURE — 82310 ASSAY OF CALCIUM: CPT | Performed by: INTERNAL MEDICINE

## 2025-07-24 RX ORDER — METHYLPREDNISOLONE SODIUM SUCCINATE 40 MG/ML
40 INJECTION INTRAMUSCULAR; INTRAVENOUS
Status: CANCELLED
Start: 2025-07-24

## 2025-07-24 RX ORDER — DIPHENHYDRAMINE HYDROCHLORIDE 50 MG/ML
50 INJECTION, SOLUTION INTRAMUSCULAR; INTRAVENOUS
Status: CANCELLED
Start: 2025-07-24

## 2025-07-24 RX ORDER — HEPARIN SODIUM (PORCINE) LOCK FLUSH IV SOLN 100 UNIT/ML 100 UNIT/ML
5 SOLUTION INTRAVENOUS
Status: CANCELLED | OUTPATIENT
Start: 2025-07-24

## 2025-07-24 RX ORDER — HEPARIN SODIUM (PORCINE) LOCK FLUSH IV SOLN 100 UNIT/ML 100 UNIT/ML
5 SOLUTION INTRAVENOUS
OUTPATIENT
Start: 2025-07-24

## 2025-07-24 RX ORDER — ALBUTEROL SULFATE 0.83 MG/ML
2.5 SOLUTION RESPIRATORY (INHALATION)
Status: CANCELLED | OUTPATIENT
Start: 2025-07-24

## 2025-07-24 RX ORDER — HEPARIN SODIUM,PORCINE 10 UNIT/ML
5-20 VIAL (ML) INTRAVENOUS DAILY PRN
OUTPATIENT
Start: 2025-07-24

## 2025-07-24 RX ORDER — EPINEPHRINE 1 MG/ML
0.3 INJECTION, SOLUTION INTRAMUSCULAR; SUBCUTANEOUS EVERY 5 MIN PRN
Status: CANCELLED | OUTPATIENT
Start: 2025-07-24

## 2025-07-24 RX ORDER — POTASSIUM CHLORIDE 1.5 G/1.58G
40 POWDER, FOR SOLUTION ORAL 2 TIMES DAILY
Qty: 120 PACKET | Refills: 1 | Status: SHIPPED | OUTPATIENT
Start: 2025-07-24

## 2025-07-24 RX ORDER — EPINEPHRINE 1 MG/ML
0.3 INJECTION, SOLUTION, CONCENTRATE INTRAVENOUS EVERY 5 MIN PRN
OUTPATIENT
Start: 2025-07-24

## 2025-07-24 RX ORDER — PALONOSETRON 0.05 MG/ML
0.25 INJECTION, SOLUTION INTRAVENOUS ONCE
Status: COMPLETED | OUTPATIENT
Start: 2025-07-24 | End: 2025-07-24

## 2025-07-24 RX ORDER — PALONOSETRON 0.05 MG/ML
0.25 INJECTION, SOLUTION INTRAVENOUS ONCE
Status: CANCELLED | OUTPATIENT
Start: 2025-07-24

## 2025-07-24 RX ORDER — ALBUTEROL SULFATE 90 UG/1
2 INHALANT RESPIRATORY (INHALATION) EVERY 4 HOURS PRN
Qty: 18 G | Refills: 0 | Status: SHIPPED | OUTPATIENT
Start: 2025-07-24

## 2025-07-24 RX ORDER — DIPHENHYDRAMINE HYDROCHLORIDE 50 MG/ML
50 INJECTION, SOLUTION INTRAMUSCULAR; INTRAVENOUS
Status: CANCELLED | OUTPATIENT
Start: 2025-07-24

## 2025-07-24 RX ORDER — ALBUTEROL SULFATE 90 UG/1
1-2 INHALANT RESPIRATORY (INHALATION)
Status: CANCELLED
Start: 2025-07-24

## 2025-07-24 RX ORDER — EPINEPHRINE 1 MG/ML
0.3 INJECTION, SOLUTION INTRAMUSCULAR; SUBCUTANEOUS EVERY 5 MIN PRN
OUTPATIENT
Start: 2025-07-24

## 2025-07-24 RX ORDER — HEPARIN SODIUM,PORCINE 10 UNIT/ML
5-20 VIAL (ML) INTRAVENOUS DAILY PRN
Status: CANCELLED | OUTPATIENT
Start: 2025-07-24

## 2025-07-24 RX ORDER — DIPHENHYDRAMINE HYDROCHLORIDE 50 MG/ML
50 INJECTION, SOLUTION INTRAMUSCULAR; INTRAVENOUS
Start: 2025-07-24

## 2025-07-24 RX ORDER — ACETAMINOPHEN 325 MG/1
650 TABLET ORAL
Status: CANCELLED
Start: 2025-07-24

## 2025-07-24 RX ORDER — DIPHENHYDRAMINE HCL 25 MG
50 CAPSULE ORAL
Status: CANCELLED | OUTPATIENT
Start: 2025-07-24

## 2025-07-24 RX ORDER — HEPARIN SODIUM (PORCINE) LOCK FLUSH IV SOLN 100 UNIT/ML 100 UNIT/ML
5 SOLUTION INTRAVENOUS
Status: COMPLETED | OUTPATIENT
Start: 2025-07-24 | End: 2025-07-24

## 2025-07-24 RX ORDER — DIPHENHYDRAMINE HYDROCHLORIDE 50 MG/ML
25 INJECTION, SOLUTION INTRAMUSCULAR; INTRAVENOUS
Status: CANCELLED
Start: 2025-07-24

## 2025-07-24 RX ORDER — PROCHLORPERAZINE MALEATE 10 MG
10 TABLET ORAL EVERY 6 HOURS PRN
Qty: 30 TABLET | Refills: 2 | Status: SHIPPED | OUTPATIENT
Start: 2025-07-24

## 2025-07-24 RX ORDER — HEPARIN SODIUM (PORCINE) LOCK FLUSH IV SOLN 100 UNIT/ML 100 UNIT/ML
5 SOLUTION INTRAVENOUS
Status: DISCONTINUED | OUTPATIENT
Start: 2025-07-24 | End: 2025-07-24 | Stop reason: HOSPADM

## 2025-07-24 RX ORDER — MEPERIDINE HYDROCHLORIDE 25 MG/ML
25 INJECTION INTRAMUSCULAR; INTRAVENOUS; SUBCUTANEOUS
Status: CANCELLED | OUTPATIENT
Start: 2025-07-24

## 2025-07-24 RX ORDER — LORAZEPAM 2 MG/ML
0.5 INJECTION INTRAMUSCULAR EVERY 4 HOURS PRN
Status: CANCELLED | OUTPATIENT
Start: 2025-07-24

## 2025-07-24 RX ADMIN — DOCETAXEL 140 MG: 20 INJECTION, SOLUTION, CONCENTRATE INTRAVENOUS at 12:41

## 2025-07-24 RX ADMIN — PEGFILGRASTIM 6 MG: KIT SUBCUTANEOUS at 13:50

## 2025-07-24 RX ADMIN — SODIUM CHLORIDE 250 ML: 0.9 INJECTION, SOLUTION INTRAVENOUS at 10:55

## 2025-07-24 RX ADMIN — PALONOSETRON HYDROCHLORIDE 0.25 MG: 0.25 INJECTION INTRAVENOUS at 10:55

## 2025-07-24 RX ADMIN — SODIUM CHLORIDE 440 MG: 0.9 INJECTION, SOLUTION INTRAVENOUS at 12:08

## 2025-07-24 RX ADMIN — FOSAPREPITANT: 150 INJECTION, POWDER, LYOPHILIZED, FOR SOLUTION INTRAVENOUS at 11:00

## 2025-07-24 RX ADMIN — PERTUZUMAB 420 MG: 30 INJECTION, SOLUTION, CONCENTRATE INTRAVENOUS at 11:35

## 2025-07-24 RX ADMIN — Medication 5 ML: at 08:35

## 2025-07-24 RX ADMIN — Medication 5 ML: at 15:17

## 2025-07-24 ASSESSMENT — PAIN SCALES - GENERAL: PAINLEVEL_OUTOF10: NO PAIN (0)

## 2025-07-24 NOTE — PROGRESS NOTES
Infusion Nursing Note:  Linh Mustafa presents today for Cycle 4 Day 1 pertuzumab (PERJETA), trastuzumab-qyyp (TRAZIMERA), docetaxel (TAXOTERE), and Neulasta On-Pro and 1 unit pRBCs.    Patient seen by provider today: Yes: Dr. Kathy Burch MD   present during visit today: Not Applicable.    Note: Patient prescribed oral potassium replacement for take home per Dr. Burch.  No additional replacement needed at this time.      Intravenous Access:  Implanted Port.    Treatment Conditions:  Lab Results   Component Value Date    HGB 7.4 (L) 07/24/2025    WBC 8.1 07/24/2025    ANEU 6.8 07/24/2025     07/24/2025        Lab Results   Component Value Date     07/24/2025    POTASSIUM 3.0 (L) 07/24/2025    MAG 2.1 04/15/2025    CR 0.94 07/24/2025    VAIBHAV 9.6 07/24/2025    BILITOTAL 0.4 07/24/2025    ALBUMIN 3.7 07/24/2025    ALT 6 07/24/2025    AST 14 07/24/2025       Results reviewed, labs MET treatment parameters, ok to proceed with treatment.  ECHO/MUGA completed 5/14/25  EF 60-65%.  Blood transfusion consent signed today.    Post Infusion Assessment:  Patient tolerated infusion without incident.  Blood return noted pre and post infusion.  Site patent and intact, free from redness, edema or discomfort.  No evidence of extravasations.  Access discontinued per protocol.     ONPRO  Was placed on patient's: left side of abdomen.    Was placed at 1350    Podpal used: Yes    ONPRO injector device Lot number: On MAR    Patient education included: what patient can expect after application, what colored lights mean on the device, when to remove device, when and where to call with questions or issues, all patients questions answered, and that Neulasta administration will occur at 1650 tomorrow.    Patient tolerated administration well.    Discharge Plan:   Prescription refills given for Compazine, Albuterol, and Potassium chloride.  Discharge instructions reviewed with: Patient and Family.  Patient and/or family  verbalized understanding of discharge instructions and all questions answered.  AVS to patient via [employer].  Patient will return 8/14/25 for next appointment.   Patient discharged in stable condition accompanied by: daughter.  Departure Mode: Ambulatory.      KAT HUNTER RN

## 2025-07-24 NOTE — NURSING NOTE
"Chief Complaint   Patient presents with    Oncology Clinic Visit      Malignant neoplasm of lower-outer quadrant of left breast     Port Draw     Labs drawn from port by rn.  VS taken.     Port accessed with 20 gauge 3/4\" Power needle and labs drawn by rn.  Port flushed with NS and heparin.  Pt tolerated well.  VS taken.  Pt checked in for next appt.    Isabela Demarco RN      "

## 2025-07-24 NOTE — PATIENT INSTRUCTIONS
Your Neulasta will start tomorrow Friday, 7/25 at approximately 4:50PM.  It will complete medication administration at approximately 5:50PM and will be ready to be removed then.  Neulasta Onpro On-Body should have green flashing light until it is ready to be removed.  Call [facility] triage if injector flashes red or appears to be leaking. Keep Onpro On-Body Neulasta 4 inches away from electrical equipment and avoid showering 4 hours prior to injection. You can take 10mg Claritin once daily for 3-5 days as needed for bone pain.      After Your Blood Transfusion  Discharge Instructions after you leave  After you have a blood transfusion,* watch for signs of a transfusion reaction for the next 48 hours.   Signs to watch for:  Shaking or chills  Fever above 100.4 F  Headache  Nausea (feeling sich to your stomach)  Hives  Itching  Swelling of the face or feeling flushed  Ongoing dry cough (nothing is coughed up)  Trouble breathing, or wheezing      Some signs of a reaction won't show up for a few days or up to 4 weeks.   These may include:  Fatigue (feeling very tired)  Dizziness  Pink or red urine    *A blood transfusion is when you receive red blood cells, platelets, plasma or cryoprecipitate.      Contact Numbers    [facility] Main Line (for Scheduling/Triage/After Hours Nurse Line): [phone]    Please call the [facility] nurse triage or the after hours nurse line if you experience a temperature greater than or equal to 100.5, shaking chills, have uncontrolled nausea, vomiting and/or diarrhea, dizziness, lightheadedness, shortness of breath, chest pain, bleeding, unexplained bruising, or if you have any other new/concerning symptoms, questions or concerns.     If you are having any concerning symptoms or wish to speak to a provider before your next infusion visit, please call triage to notify them so we can adequately serve you.     If you need any refills on medications (narcotics or other medications), please call before  your infusion appointment.          July 2025 Sunday Monday Tuesday Wednesday Thursday Friday Saturday             1     2     3    [facility]   8:45 AM   (15 min.)   [facility]   [facility]    Return Patient   9:15 AM   (30 min.)   Kathy Burch MD   [facility]    Infusion 240  10:00 AM   (240 min.)   [facility] NURSE   [facility]    Admission   2:57 PM   Naida Bah MD   [facility]   (Discharge: 7/3/2025) 4     5    Infusion 30   9:00 AM   (30 min.)   [facility]ION NURSE   [facility]   6     7     8     9     10     11     12       13     14     15     16     17     18     19       20     21     22     23     24    [facility]   8:30 AM   (15 min.)   [facility]   [facility]    Return Patient   8:45 AM   (30 min.)   Kathy Burch MD   [facility]    Infusion 240  10:30 AM   (240 min.)   [facility] INFUSION NURSE   [facility] 25     26       27     28     29    LAB   1:45 PM   (15 min.)   [facility]   [facility] 30    Return Patient   3:10 PM   (40 min.)   Lourdes Joseph DO   [facility] 31                             August 2025 Sunday Monday Tuesday Wednesday Thursday Friday Saturday                            1     2       3     4     5     6     7     8     9       10     11     12     13     14    [facility]   9:00 AM   (15 min.)   [facility]   [facility]    Return Patient   9:15 AM   (45 min.)   Lilo Mandujano PA-C   [facility]    Infusion 240  10:00 AM   (240 min.)   [facility] INFUSION NURSE   [facility] 15    RETURN ENDOCRINE  10:15 AM   (30 min.)   Tabitha Latif MBBS     [facility] [place] 16       17     18    Lab Central  11:45 AM   (15 min.)   [facility] INFUSION INFUSION LAB   [facility] [place]    CT CHEST/ABDOMEN/PELVIS W CONTRAST  12:30 PM   (20 min.)   [facility] CT 2   [facility] CT 19     20     21    Return Patient  12:15 PM   (30 min.)   Kathy Burch MD M [facility] 22     23       24     25     26     27     28     29     30       31                                                      Recent Results (from the past 24 hours)   Comprehensive metabolic panel    Collection Time: 07/24/25  8:42 AM   Result Value Ref Range    Sodium 137 135 - 145 mmol/L    Potassium 3.0 (L) 3.4 - 5.3 mmol/L    Carbon Dioxide (CO2) 22 22 - 29 mmol/L    Anion Gap 15 7 - 15 mmol/L    Urea Nitrogen 10.8 8.0 - 23.0 mg/dL    Creatinine 0.94 0.51 - 0.95 mg/dL    GFR Estimate 67 >60 mL/min/1.73m2    Calcium 9.6 8.8 - 10.4 mg/dL    Chloride 100 98 - 107 mmol/L    Glucose 102 (H) 70 - 99 mg/dL    Alkaline Phosphatase 143 40 - 150 U/L    AST 14 0 - 45 U/L    ALT 6 0 - 50 U/L    Protein Total 7.0 6.4 - 8.3 g/dL    Albumin 3.7 3.5 - 5.2 g/dL    Bilirubin Total 0.4 <=1.2 mg/dL   CBC with platelets and differential    Collection Time: 07/24/25  8:42 AM   Result Value Ref Range    WBC Count 8.1 4.0 - 11.0 10e3/uL    RBC Count 2.11 (L) 3.80 - 5.20 10e6/uL    Hemoglobin 7.4 (L) 11.7 - 15.7 g/dL    Hematocrit 21.8 (L) 35.0 - 47.0 %     (H) 78 - 100 fL    MCH 35.1 (H) 26.5 - 33.0 pg    MCHC 33.9 31.5 - 36.5 g/dL    RDW 17.9 (H) 10.0 - 15.0 %    Platelet Count 167 150 - 450 10e3/uL    % Neutrophils 84 %    % Lymphocytes 9 %    % Monocytes 6 %    % Eosinophils 0 %    % Basophils 0 %    % Immature Granulocytes 1 %    NRBCs per 100 WBC 0 <1 /100    Absolute Neutrophils 6.8 1.6 - 8.3 10e3/uL    Absolute Lymphocytes 0.8 0.8 - 5.3 10e3/uL    Absolute Monocytes 0.4 0.0 - 1.3 10e3/uL    Absolute Eosinophils 0.0 0.0  - 0.7 10e3/uL    Absolute Basophils 0.0 0.0 - 0.2 10e3/uL    Absolute Immature Granulocytes 0.1 <=0.4 10e3/uL    Absolute NRBCs 0.0 10e3/uL   Ferritin    Collection Time: 07/24/25  8:42 AM   Result Value Ref Range    Ferritin 729 (H) 11 - 328 ng/mL   Iron and iron binding capacity    Collection Time: 07/24/25  8:42 AM   Result Value Ref Range    Iron 55 37 - 145 ug/dL    Iron Binding Capacity 193 (L) 240 - 430 ug/dL    Iron Sat Index 28 15 - 46 %   Adult Type and Screen    Collection Time: 07/24/25  8:42 AM   Result Value Ref Range    ABO/RH(D) A POS     Antibody Screen Negative Negative    SPECIMEN EXPIRATION DATE 7/27/2025 11:59:00 PM CDT    Prepare red blood cells (unit)    Collection Time: 07/24/25  9:37 AM   Result Value Ref Range    Blood Component Type Red Blood Cells     Product Code [id number]     Unit Status Ready for issue     Unit Number [id number]     CROSSMATCH Compatible     CODING SYSTEM [id number]

## 2025-07-24 NOTE — NURSING NOTE
"Oncology Rooming Note    July 24, 2025 8:48 AM   Linh Mustafa is a 65 year old female who presents for:    Chief Complaint   Patient presents with    Oncology Clinic Visit      Malignant neoplasm of lower-outer quadrant of left breast     Port Draw     Labs drawn from port by rn.  VS taken.     Initial Vitals: /63   Pulse 94   Temp 97.7  F (36.5  C) (Oral)   Resp 16   Wt 68.5 kg (151 lb 1.6 oz)   SpO2 97%   BMI 24.39 kg/m   Estimated body mass index is 24.39 kg/m  as calculated from the following:    Height as of 5/27/25: 1.676 m (5' 6\").    Weight as of this encounter: 68.5 kg (151 lb 1.6 oz). Body surface area is 1.79 meters squared.  No Pain (0) Comment: Data Unavailable   No LMP recorded. Patient is postmenopausal.  Allergies reviewed: Yes  Medications reviewed: Yes    Medications: MEDICATION REFILLS NEEDED TODAY. Provider was notified.  Pharmacy name entered into [facility]: [id number] - [address]    PHQ9:  Did this patient require a PHQ9?: No      Clinical concerns: She has had allergic reactions to both of her previous Chemo treatments, Pts daughter states its always the last dose of treatment.   Refill - Compazine, Albuterol Inhaler.       Carlos Green            "

## 2025-07-24 NOTE — PROGRESS NOTES
Oncology follow-up visit:  Date on this visit: 7/24/25     Diagnosis.  Esophageal cancer.    Primary Physician: [facility]     History Of Present Illness:  Ms. Mustafa is a 65 year old female who presents with diagnosis of esophageal cancer.  I initially saw her on 4/18/2024.  Please see my previous note for details.  I have copied and updated from prior notes.      She mentioned that since November 2023 she started to notice pain upon swallowing and it was basically pain which limited her food intake.  This was progressively getting worse.  She had further workup as mentioned below.    2/15/2024.  EGD showed an ulcerated mid esophageal mass extending from 26-31 cm from the incisors.  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  Pathology from the mid esophageal mass showed moderately differentiated invasive squamous cell carcinoma, MMR IHC intact.    3/7/2024.  PET/CT showed markedly FDG avid wall thickening of the mid thoracic esophagus with SUV max 24.1.  No evidence of metastatic disease.    3/21/2024.  Upper EUS.  Endoscopy showed a flat nodule in the proximal esophagus between 15-19 cm and one third circumferential.  Upper esophageal sphincter was at 14 cm.  Biopsies from this showed high-grade dysplasia/carcinoma in situ.      Normal esophageal squamous cell cancer causing maybe esophagus on the right anterolateral esophageal wall between 24-30 cm.  It was 50% circumferential.  The GE junction was at 35 cm with 2 cm tongues of Auguste's anteriorly without high risk features.    Ultrasound exam showed the mid esophageal tumor to be probably T3. Two 4 x 6 mm nodes were seen adjacent to the distal esophageal wall at 36 and 37 cm.  Both an identical appearance and one was sampled.  This lymph node biopsy was benign.    By EUS it was staged as T3 N0 M0 tumor.    4/2/2024.  Because of finding of carcinoma in situ in the proximal esophagus, she had endoscopic  resection of the proximal squamous cell carcinoma in situ lesion performed with en bloc removal.    The final pathology showed squamous mucosa with high-grade dysplasia/carcinoma in situ with all margins negative for dysplasia.  No definite invasion was identified. ESD was considered curative for this lesion.    Her case was also discussed in the tumor conference with the plan to proceed with neoadjuvant chemotherapy/radiation followed by definitive surgery.      She met with Dr. Martinez.    She started on concurrent chemoradiation with carboplatin and Taxol on 5/1/24 and received 5 doses, last on 5/28/24 and last dose of radiation on 6/5/24.     She was hospitalized from 6/4-6/18/24 with radiation esophagitis causing pain and inability to eat. She was discharged home with a G-tube that was placed on 6/14/24.     Repeat PET/CT on 8/15/2024 showed very good response to treatment.  There is complete resolution of FDG avidity in the esophagus.  No evidence of malignancy.    She met with Dr. Martinez and decision was made not to pursue surgery for now.    Feeding tube was removed in September 2024.    2/13/2025.  CT chest abdomen and pelvis did not show evidence of disease.    End of March 2025. She started to have pain in the left breast and had a mammogram/ultrasound done which showed a 2 cm suspicious lesion in the left breast at around 5 o'clock position.  The ultrasound showed normal left axillary lymph nodes.      4/22/2025.  Left breast biopsy showed invasive ductal carcinoma, [place] grade 3 with focally suspicious angiolymphatic invasion.  ER 28% positive.  SD negative.  HER2 by IHC was +3+.  Ki-67 index 21%.       We decided to start her on neoadjuvant chemotherapy.    5/19/2025.  Cycle #1 of carboplatin/docetaxel/trastuzumab/pertuzumab    6/10/2025.  Cycle #2 of carboplatin/docetaxel/trastuzumab/pertuzumab      7/3/2025.  Cycle #3 of carboplatin/docetaxel/trastuzumab/pertuzumab.  She had significant  reaction to carboplatin at the end of the infusion.      7/24/2025.  Cycle #4 docetaxel/trastuzumab/pertuzumab is planned.  We stopped carboplatin due to severe reaction from it.      Interval history.    She comes into the clinic today. Her daughter is also available.  She had a very severe reaction to carboplatin around the end of the infusion requiring steroids and epinephrine.  She tells me that she has been feeling very tired and fatigued and takes naps.  She has been noticing generalized weakness especially of her upper extremities.  She has some numbness of her fingers.  Mild numbness of the feet.  She has nausea for which she takes Compazine.  Off-and-on she has had diarrhea for which she takes Imodium.  No fevers or shortness of breath.  Denies bleeding.  Breast lump is much smaller.  Sometimes she gets pain/discomfort in that area.      ECOG 1    ROS:  A comprehensive ROS was otherwise neg    I reviewed other history in epic as below.    Past Medical/Surgical History:  Past Medical History:   Diagnosis Date    Hyperlipidemia     Hypertension    Hypothyroidism- hx of PATEL at the age of 16 for hyperthyroidism    Past Surgical History:   Procedure Laterality Date    BIOPSY BREAST Right     age 30 benign fibrous    CV CORONARY ANGIOGRAM N/A 01/25/2023    Procedure: Coronary Angiogram;  Surgeon: Lukas Irizarry MD;  Location: [facility]    CV LEFT HEART CATH N/A 01/25/2023    Procedure: Left Heart Catheterization;  Surgeon: Lukas Irizarry MD;  [facility]: [facility] CV    ENDOSCOPIC ULTRASOUND UPPER GASTROINTESTINAL TRACT (GI) N/A 3/21/2024    Procedure: ENDOSCOPIC ULTRASOUND, ESOPHAGOSCOPY / UPPER GASTROINTESTINAL TRACT (GI), ENDOSCOPY WITH BIOPSY, FINE NEEDLE ASPIRATION;  Surgeon: Damon Kramer MD;  Location: [facility] OR    ENDOSCOPIC ULTRASOUND, ESOPHAGOSCOPY / UPPER GASTROINTESTINAL TRACT (GI), ENDOSCOPY WITH BIOPSY, FINE NEEDLE ASPIRATION  03/21/2024    ESOPHAGOSCOPY, GASTROSCOPY,  DUODENOSCOPY (EGD), COMBINED N/A 6/6/2024    Procedure: Esophagoscopy, Gastroscopy, Duodenoscopy (EGD), Nasojejunal Feeding Tube Placement;  Surgeon: Bryant Martinez MD;  Location: [facility] OR    ESOPHAGOSCOPY, GASTROSCOPY, DUODENOSCOPY (EGD), SUBMUCOSA RESECTION N/A 4/2/2024    Procedure: ESOPHAGOGASTRODUODENOSCOPY, WITH SUBMUCOSAL RESECTION;  Surgeon: Holden King MD;  Location: [facility] OR    IR CHEST PORT PLACEMENT > 5 YRS OF AGE  4/24/2024    LAPAROSCOPIC ASSISTED INSERTION TUBE GASTROTOMY N/A 6/14/2024    Procedure: Upper endoscopy, laparoscopic gastrostomy tube placement;  Surgeon: Bryant Martinez MD;  Location: [facility] OR     Cancer History:   As above    Allergies:  Allergies as of 07/24/2025 - Reviewed 07/24/2025   Allergen Reaction Noted    Amoxicillin Shortness Of Breath, Itching, and Rash 12/06/2023    Carboplatin Anaphylaxis 07/03/2025    Penicillins  03/14/2024     Current Medications:  Current Outpatient Medications   Medication Sig Dispense Refill    albuterol (PROAIR HFA/PROVENTIL HFA/VENTOLIN HFA) 108 (90 Base) MCG/ACT inhaler Inhale 2 puffs into the lungs every 4 hours as needed for shortness of breath, wheezing or cough. 18 g 0    artificial saliva (BIOTENE DRY MOUTHWASH) LIQD liquid Swish and spit 15 mLs in mouth 4 times daily as needed for dry mouth 473 mL 0    Calcium Citrate-Vitamin D (CALCIUM CITRATE CHEWY BITE) 500-12.5 MG-MCG CHEW Take 1 tablet by mouth 2 times daily. 180 tablet 3    dextran 70-hypromellose (TEARS NATURALE FREE PF) 0.1-0.3 % ophthalmic solution Place 1 drop into both eyes daily as needed (Irritation). 35 each 0    levothyroxine (SYNTHROID/LEVOTHROID) 125 MCG tablet Take 1 tablet (125 mcg) by mouth every morning (before breakfast). 60 tablet 1    naloxone (NARCAN) 4 MG/0.1ML nasal spray Spray 1 spray (4 mg) into one nostril alternating nostrils as needed for opioid reversal every 2-3 minutes until assistance arrives 0.2 mL 1    ondansetron (ZOFRAN ODT) 8 MG ODT tab  Take 1 tablet (8 mg) by mouth every 8 hours as needed for nausea. 90 tablet 1    oxyCODONE (ROXICODONE) 5 MG tablet Take 1-2 tablets (5-10 mg) by mouth every 4 hours as needed for pain. 150 tablet 0    prochlorperazine (COMPAZINE) 10 MG tablet Take 1 tablet (10 mg) by mouth every 6 hours as needed for nausea or vomiting. 30 tablet 2    prochlorperazine (COMPAZINE) 10 MG tablet Take 1 tablet (10 mg) by mouth every 6 hours as needed for vomiting. 90 tablet 1    sertraline (ZOLOFT) 50 MG tablet TAKE 1 TABLET BY MOUTH EVERY DAY 90 tablet 1    vitamin D3 (CHOLECALCIFEROL) 50 mcg (2000 units) tablet Take 1 tablet (50 mcg) by mouth daily. 90 tablet 3    dexAMETHasone (DECADRON) 4 MG tablet Take 2 tablets (8 mg) by mouth 2 times daily (with meals). Start evening of Docetaxel infusion and continue for a total of 3 doses. 6 tablet 5    doxepin (SINEQUAN) 10 MG/ML (HIGH CONC) solution TAKE 0.3-1 MLS (3-10 MG) BY MOUTH AT BEDTIME. (Patient not taking: Reported on 6/10/2025) 90 mL 1    esomeprazole (NEXIUM) 20 MG DR capsule TAKE 1 CAPSULE BY MOUTH EVERY DAY (Patient not taking: Reported on 7/24/2025) 90 capsule 1    hydrOXYzine (VISTARIL) 50 MG capsule Take 50 mg by mouth 2 times daily as needed.      hydrOXYzine HCl (ATARAX) 25 MG tablet TAKE 1-2 TABLETS (25-50 MG) BY MOUTH 3 TIMES DAILY AS NEEDED FOR ITCHING (Patient not taking: Reported on 7/24/2025) 540 tablet 1    levothyroxine (SYNTHROID/LEVOTHROID) 100 MCG tablet Take 1 tablet (100 mcg) by mouth daily. 90 tablet 3    methocarbamol (ROBAXIN) 750 MG tablet Take 1 tablet (750 mg) by mouth 4 times daily (Patient not taking: Reported on 6/10/2025) 120 tablet 0    nicotine (NICODERM CQ) 14 MG/24HR 24 hr patch Place 1 patch onto the skin every 24 hours (Patient not taking: Reported on 7/24/2025) 30 patch 1    nicotine (NICODERM CQ) 7 MG/24HR 24 hr patch Place 1 patch onto the skin every 24 hours Start after 6 weeks of the 14 mg/24 hr dose (Patient not taking: Reported on  7/24/2025) 30 patch 0    rOPINIRole (REQUIP) 1 MG tablet TAKE 1.5-2 TABLETS (1.5-2 MG) BY MOUTH AT BEDTIME. (Patient not taking: Reported on 6/10/2025) 180 tablet 1      Family History:  Family History   Problem Relation Age of Onset    Chronic Obstructive Pulmonary Disease Father     Colon Cancer Paternal Grandmother 70     1 daughter- healthy    Social History:  Social History     Socioeconomic History    Marital status: Single     Spouse name: Not on file    Number of children: Not on file    Years of education: Not on file    Highest education level: Not on file   Occupational History    Not on file   Tobacco Use    Smoking status: Former     Current packs/day: 1.00     Average packs/day: 1 pack/day for 48.6 years (48.6 ttl pk-yrs)     Types: Cigarettes     Start date: 1977     Passive exposure: Current    Smokeless tobacco: Never   Vaping Use    Vaping status: Never Used   Substance and Sexual Activity    Alcohol use: Not on file     Comment: occasional    Drug use: Never    Sexual activity: Not on file   Other Topics Concern    Not on file   Social History Narrative    ** Merged History Encounter **          Social Drivers of Health     Financial Resource Strain: Not on file   Food Insecurity: No Food Insecurity (2/24/2024)    Received from [facility]    Hunger Vital Sign     Worried About Running Out of Food in the Last Year: Never true     Ran Out of Food in the Last Year: Never true   Transportation Needs: No Transportation Needs (2/24/2024)    Received from [facility]    PRAPARE - Transportation     Lack of Transportation (Medical): No     Lack of Transportation (Non-Medical): No   Physical Activity: Inactive (2/24/2024)    Received from [facility]    Exercise Vital Sign     Days of Exercise per Week: 0 days     Minutes of Exercise per Session: 0 min   Stress: Not on file   Social Connections: Not on file   Interpersonal Safety: Not on file   Housing Stability: Low Risk  (2/24/2024)    Received from  [facility]    Housing Stability     What is your living situation today?: I have a steady place to live   Chronic smoker- now smokes occasionally. No etoh. Lives alone. Currently brother staying with her        Physical Exam:  /63   Pulse 94   Temp 97.7  F (36.5  C) (Oral)   Resp 16   Wt 68.5 kg (151 lb 1.6 oz)   SpO2 97%   BMI 24.39 kg/m      Wt Readings from Last 4 Encounters:   07/24/25 68.5 kg (151 lb 1.6 oz)   07/03/25 71.4 kg (157 lb 8 oz)   06/10/25 73.3 kg (161 lb 11.2 oz)   05/27/25 69.4 kg (153 lb)     CONSTITUTIONAL: No apparent distress  EYES: PERRLA, there is pallor but no icterus.  ENT/MOUTH: Ears unremarkable. No oral lesions  CVS: s1s2 normal  RESPIRATORY: Chest is clear  GI: Abdomen is benign  NEURO: Alert and oriented ×3  INTEGUMENT: no concerning skin rashes   LYMPHATIC: no palpable lymphadenopathy  MUSCULOSKELETAL: Unremarkable. No bony tenderness.   EXTREMITIES: no pedal edema  PSYCH: Mentation, mood and affect are appropriate  I could not palpate the breast lump although she has some tenderness in that area.        Laboratory/Imaging Studies      Reviewed  7/24/2025  CBC showed WBC 8.1.  Hemoglobin 7.4.  Platelets 167.  ANC 6.8.  Chemistry showed potassium 3.  Otherwise unremarkable.    Iron 55.  TIBC 193.  Iron saturation index 28%    On 6/10/2025      TSH was 14.4 but free T4 was 1.17.    5/14/2025.  Echocardiogram.  Left ventricular size, wall motion and function are normal. The ejection  fraction is 60-65%.  Right ventricular function, chamber size, wall motion, and thickness are  normal.  No significant valvular abnormalities present.  Compared to prior resting images of prior stress echo, no changes.        Left ankle x-ray 4/15/2025.     IMPRESSION: Small curvilinear, mildly distracted fracture tip of the lateral malleolus with adjacent soft tissue swelling. Ankle mortise is intact. Bones are demineralized.     CT chest abdomen and pelvis 2/13/2025  FINDINGS:   LUNGS AND  PLEURA: Emphysematous change in both lungs. No nodules or masses.     MEDIASTINUM/AXILLAE: No mediastinal mass or adenopathy. Mild thickening along the mid esophagus is noted stable.     CORONARY ARTERY CALCIFICATION: Moderate.     HEPATOBILIARY: Multiple small low-attenuation lesions in the liver the largest in segment 8 measuring 1 cm stable from previous examination. Cholelithiasis.     PANCREAS: Pancreas is normal.     SPLEEN: Normal.     ADRENAL GLANDS: Bilateral adrenal adenomas stable.     KIDNEYS/BLADDER: No stones in either kidney. No hydronephrosis.     BOWEL: Few colonic diverticula.     LYMPH NODES: No abnormal adenopathy in the abdomen or pelvis.     VASCULATURE: Atherosclerotic plaque throughout the aorta and iliac vessels.     PELVIC ORGANS: Normal.     MUSCULOSKELETAL: No skeletal lesions.                                                                      IMPRESSION:  1.  No definite change from previous.     2.  Small low-attenuation lesions in the liver stable possibly cysts.     3.  Mild thickening of the wall of the mid esophagus stable.     4.  No new findings.    ASSESSMENT/PLAN:    Stage II ( [id number])  left-sided breast cancer, ER +28%, WA negative, HER2 IHC 3+.  It is a 2 cm left-sided breast cancer.  No suspicious axillary lymph nodes are seen.        We discussed that HER2 positive stage II breast cancer is treated with neoadjuvant chemotherapy/targeted therapy followed by surgery and HER2 directed therapy is continued for about 1 year in total.      5/19/2025.  She started cycle #1 docetaxel/carboplatin/trastuzumab/pertuzumab ( [facility] ).    Cycle #2 was on 6/10/2025.  She had a significant reaction to carboplatin with cycle #3 around the end of the infusion requiring steroids as well as epinephrine.  This was despite premedicating her with Solu-Medrol and Benadryl.  So going forward I will not give her carboplatin and we will proceed with cycle #4 docetaxel/trastuzumab/pertuzumab  today.      Our plan is to give her 6 cycles and then proceed with surgery.    She has already met with Dr. Carl.    Extreme fatigue.  This is her main complaint.  I think this is a combination of chemotherapy plus significant anemia as well as hypokalemia.  I discussed about giving her blood transfusion and discussed the rationale as well as potential side effects of it.  She agrees to proceed.  She signed the consent.  We will give her 1 unit of packed red blood cells.  Previously TSH was also high but free T4 was normal.  Now on levothyroxine.      Hypokalemia.  I believe this is also contributing to the generalized weakness especially weakness of the arms.  We will give her potassium chloride 40 mEq twice daily.    Neuropathy.  This is somewhat worse.  We will stop carboplatin as mentioned above.    Nausea/vomiting.  Continue Zofran and Compazine.  Follow-up with palliative care Dr. Mansoor Eric.    Diarrhea.  She gets this off-and-on from chemotherapy.  She takes Imodium as needed which helps.        Monitoring for cardiac toxicity.  Baseline echocardiogram on 5/14/2025 was unremarkable.  We will periodically check echocardiogram because of potential cardiotoxicity from anti-HER2 medications.     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  MMR IHC intact.      She started on concurrent chemoradiation with carboplatin and Taxol on 5/1/24 and received 5 doses, last on 5/28/24 and last dose of radiation on 6/5/24.    Repeat PET/CT on 8/15/2024 showed complete resolution of FDG avidity in the esophagus.    At that time she met with Dr. Martinez and decision was made not to pursue the surgery.  Her feeding tube was removed.      CT scan on 2/13/2025 showed mild stable thickening of the mid esophagus.  No new lesions concerning for recurrence/metastasis are seen.  We will repeat CT scan in August.  Currently scheduled for 8/18/2025.        We did not address the  following today.    Discussion regarding genetic testing.  She had genetic testing ordered but insurance denied that.  She has filed an appeal.  She has a genetic counselor appointment on 10/1/2025.    Smoking.  She continues to smoke but less so and is smoking a few cigarettes a day.  Advised her to continue to work hard to completely quit smoking.      Nutrition.  She was on tube feeds but now she is able to eat better.  She was losing weight but now has started to gain weight which is good news.  She does not have any dysphagia or odynophagia anymore.      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  Now s/p endoscopic resection with clear margins free of dysplasia.  This has been adequately treated.  Continue to observe.      Macrocytosis.  Previously she had macrocytosis.  B12 and folate were normal.  Free T4 was normal.  TSH was low.  Continue to observe.     Return to clinic as scheduled    I answered all of her and her daughter's questions to their satisfaction.  They are agreeable and comfortable with the plan.    Kathy Burch MD     The longitudinal plan of care for the breast cancer/esophageal cancer, as documented were addressed during this visit. Due to the added complexity in care, I will continue to support Lita in the subsequent management and with ongoing continuity of care.

## 2025-07-24 NOTE — PATIENT INSTRUCTIONS
Chemo today- no carboplatin    1 U PRBC today    Start Potassium 40 mEq twice daily    RTC as scheduled

## 2025-07-24 NOTE — LETTER
7/24/2025      Linh Mustafa  [address]  [place] [address]      Dear Colleague,    Thank you for referring your patient, Linh Mustafa, to the [facility]. Please see a copy of my visit note below.    Oncology follow-up visit:  Date on this visit: 7/24/25     Diagnosis.  Esophageal cancer.    Primary Physician: Clinic, [facility]     History Of Present Illness:  Ms. Mustafa is a 65 year old female who presents with diagnosis of esophageal cancer.  I initially saw her on 4/18/2024.  Please see my previous note for details.  I have copied and updated from prior notes.      She mentioned that since November 2023 she started to notice pain upon swallowing and it was basically pain which limited her food intake.  This was progressively getting worse.  She had further workup as mentioned below.    2/15/2024.  EGD showed an ulcerated mid esophageal mass extending from 26-31 cm from the incisors.  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  Pathology from the mid esophageal mass showed moderately differentiated invasive squamous cell carcinoma, MMR IHC intact.    3/7/2024.  PET/CT showed markedly FDG avid wall thickening of the mid thoracic esophagus with SUV max 24.1.  No evidence of metastatic disease.    3/21/2024.  Upper EUS.  Endoscopy showed a flat nodule in the proximal esophagus between 15-19 cm and one third circumferential.  Upper esophageal sphincter was at 14 cm.  Biopsies from this showed high-grade dysplasia/carcinoma in situ.      Normal esophageal squamous cell cancer causing maybe esophagus on the right anterolateral esophageal wall between 24-30 cm.  It was 50% circumferential.  The GE junction was at 35 cm with 2 cm tongues of Auguste's anteriorly without high risk features.    Ultrasound exam showed the mid esophageal tumor to be probably T3. Two 4 x 6 mm nodes were seen adjacent to the distal esophageal wall at  36 and 37 cm.  Both an identical appearance and one was sampled.  This lymph node biopsy was benign.    By EUS it was staged as T3 N0 M0 tumor.    4/2/2024.  Because of finding of carcinoma in situ in the proximal esophagus, she had endoscopic resection of the proximal squamous cell carcinoma in situ lesion performed with en bloc removal.    The final pathology showed squamous mucosa with high-grade dysplasia/carcinoma in situ with all margins negative for dysplasia.  No definite invasion was identified. ESD was considered curative for this lesion.    Her case was also discussed in the tumor conference with the plan to proceed with neoadjuvant chemotherapy/radiation followed by definitive surgery.      She met with Dr. Martinez.    She started on concurrent chemoradiation with carboplatin and Taxol on 5/1/24 and received 5 doses, last on 5/28/24 and last dose of radiation on 6/5/24.     She was hospitalized from 6/4-6/18/24 with radiation esophagitis causing pain and inability to eat. She was discharged home with a G-tube that was placed on 6/14/24.     Repeat PET/CT on 8/15/2024 showed very good response to treatment.  There is complete resolution of FDG avidity in the esophagus.  No evidence of malignancy.    She met with Dr. Martinez and decision was made not to pursue surgery for now.    Feeding tube was removed in September 2024.    2/13/2025.  CT chest abdomen and pelvis did not show evidence of disease.    End of March 2025. She started to have pain in the left breast and had a mammogram/ultrasound done which showed a 2 cm suspicious lesion in the left breast at around 5 o'clock position.  The ultrasound showed normal left axillary lymph nodes.      4/22/2025.  Left breast biopsy showed invasive ductal carcinoma, Gregorio grade 3 with focally suspicious angiolymphatic invasion.  ER 28% positive.  HI negative.  HER2 by IHC was +3+.  Ki-67 index 21%.       We decided to start her on neoadjuvant  chemotherapy.    5/19/2025.  Cycle #1 of carboplatin/docetaxel/trastuzumab/pertuzumab    6/10/2025.  Cycle #2 of carboplatin/docetaxel/trastuzumab/pertuzumab      7/3/2025.  Cycle #3 of carboplatin/docetaxel/trastuzumab/pertuzumab.  She had significant reaction to carboplatin at the end of the infusion.      7/24/2025.  Cycle #4 docetaxel/trastuzumab/pertuzumab is planned.  We stopped carboplatin due to severe reaction from it.      Interval history.    She comes into the clinic today. Her daughter is also available.  She had a very severe reaction to carboplatin around the end of the infusion requiring steroids and epinephrine.  She tells me that she has been feeling very tired and fatigued and takes naps.  She has been noticing generalized weakness especially of her upper extremities.  She has some numbness of her fingers.  Mild numbness of the feet.  She has nausea for which she takes Compazine.  Off-and-on she has had diarrhea for which she takes Imodium.  No fevers or shortness of breath.  Denies bleeding.  Breast lump is much smaller.  Sometimes she gets pain/discomfort in that area.      ECOG 1    ROS:  A comprehensive ROS was otherwise neg    I reviewed other history in epic as below.    Past Medical/Surgical History:  Past Medical History:   Diagnosis Date     Hyperlipidemia      Hypertension    Hypothyroidism- hx of PATEL at the age of 16 for hyperthyroidism    Past Surgical History:   Procedure Laterality Date     BIOPSY BREAST Right     age 30 benign fibrous     CV CORONARY ANGIOGRAM N/A 01/25/2023    Procedure: Coronary Angiogram;  Surgeon: Lukas Irizarry MD;  Location: [facility] CV     CV LEFT HEART CATH N/A 01/25/2023    Procedure: Left Heart Catheterization;  Surgeon: Lukas Irizarry MD;  Location: [facility] CV     ENDOSCOPIC ULTRASOUND UPPER GASTROINTESTINAL TRACT (GI) N/A 3/21/2024    Procedure: ENDOSCOPIC ULTRASOUND, ESOPHAGOSCOPY / UPPER GASTROINTESTINAL TRACT (GI), ENDOSCOPY  WITH BIOPSY, FINE NEEDLE ASPIRATION;  Surgeon: Damon Kramer MD;  Location: [facility] OR     ENDOSCOPIC ULTRASOUND, ESOPHAGOSCOPY / UPPER GASTROINTESTINAL TRACT (GI), ENDOSCOPY WITH BIOPSY, FINE NEEDLE ASPIRATION  03/21/2024     ESOPHAGOSCOPY, GASTROSCOPY, DUODENOSCOPY (EGD), COMBINED N/A 6/6/2024    Procedure: Esophagoscopy, Gastroscopy, Duodenoscopy (EGD), Nasojejunal Feeding Tube Placement;  Surgeon: Bryant Martinez MD;  Location: [facility] OR     ESOPHAGOSCOPY, GASTROSCOPY, DUODENOSCOPY (EGD), SUBMUCOSA RESECTION N/A 4/2/2024    Procedure: ESOPHAGOGASTRODUODENOSCOPY, WITH SUBMUCOSAL RESECTION;  Surgeon: Holden King MD;  Location: [facility] OR     IR CHEST PORT PLACEMENT > 5 YRS OF AGE  4/24/2024     LAPAROSCOPIC ASSISTED INSERTION TUBE GASTROTOMY N/A 6/14/2024    Procedure: Upper endoscopy, laparoscopic gastrostomy tube placement;  Surgeon: Bryant Martinez MD;  Location: [facility] OR     Cancer History:   As above    Allergies:  Allergies as of 07/24/2025 - Reviewed 07/24/2025   Allergen Reaction Noted     Amoxicillin Shortness Of Breath, Itching, and Rash 12/06/2023     Carboplatin Anaphylaxis 07/03/2025     Penicillins  03/14/2024     Current Medications:  Current Outpatient Medications   Medication Sig Dispense Refill     albuterol (PROAIR HFA/PROVENTIL HFA/VENTOLIN HFA) 108 (90 Base) MCG/ACT inhaler Inhale 2 puffs into the lungs every 4 hours as needed for shortness of breath, wheezing or cough. 18 g 0     artificial saliva (BIOTENE DRY MOUTHWASH) LIQD liquid Swish and spit 15 mLs in mouth 4 times daily as needed for dry mouth 473 mL 0     Calcium Citrate-Vitamin D (CALCIUM CITRATE CHEWY BITE) 500-12.5 MG-MCG CHEW Take 1 tablet by mouth 2 times daily. 180 tablet 3     dextran 70-hypromellose (TEARS NATURALE FREE PF) 0.1-0.3 % ophthalmic solution Place 1 drop into both eyes daily as needed (Irritation). 35 each 0     levothyroxine (SYNTHROID/LEVOTHROID) 125 MCG tablet Take 1 tablet (125 mcg) by mouth every  morning (before breakfast). 60 tablet 1     naloxone (NARCAN) 4 MG/0.1ML nasal spray Spray 1 spray (4 mg) into one nostril alternating nostrils as needed for opioid reversal every 2-3 minutes until assistance arrives 0.2 mL 1     ondansetron (ZOFRAN ODT) 8 MG ODT tab Take 1 tablet (8 mg) by mouth every 8 hours as needed for nausea. 90 tablet 1     oxyCODONE (ROXICODONE) 5 MG tablet Take 1-2 tablets (5-10 mg) by mouth every 4 hours as needed for pain. 150 tablet 0     prochlorperazine (COMPAZINE) 10 MG tablet Take 1 tablet (10 mg) by mouth every 6 hours as needed for nausea or vomiting. 30 tablet 2     prochlorperazine (COMPAZINE) 10 MG tablet Take 1 tablet (10 mg) by mouth every 6 hours as needed for vomiting. 90 tablet 1     sertraline (ZOLOFT) 50 MG tablet TAKE 1 TABLET BY MOUTH EVERY DAY 90 tablet 1     vitamin D3 (CHOLECALCIFEROL) 50 mcg (2000 units) tablet Take 1 tablet (50 mcg) by mouth daily. 90 tablet 3     dexAMETHasone (DECADRON) 4 MG tablet Take 2 tablets (8 mg) by mouth 2 times daily (with meals). Start evening of Docetaxel infusion and continue for a total of 3 doses. 6 tablet 5     doxepin (SINEQUAN) 10 MG/ML (HIGH CONC) solution TAKE 0.3-1 MLS (3-10 MG) BY MOUTH AT BEDTIME. (Patient not taking: Reported on 6/10/2025) 90 mL 1     esomeprazole (NEXIUM) 20 MG DR capsule TAKE 1 CAPSULE BY MOUTH EVERY DAY (Patient not taking: Reported on 7/24/2025) 90 capsule 1     hydrOXYzine (VISTARIL) 50 MG capsule Take 50 mg by mouth 2 times daily as needed.       hydrOXYzine HCl (ATARAX) 25 MG tablet TAKE 1-2 TABLETS (25-50 MG) BY MOUTH 3 TIMES DAILY AS NEEDED FOR ITCHING (Patient not taking: Reported on 7/24/2025) 540 tablet 1     levothyroxine (SYNTHROID/LEVOTHROID) 100 MCG tablet Take 1 tablet (100 mcg) by mouth daily. 90 tablet 3     methocarbamol (ROBAXIN) 750 MG tablet Take 1 tablet (750 mg) by mouth 4 times daily (Patient not taking: Reported on 6/10/2025) 120 tablet 0     nicotine (NICODERM CQ) 14 MG/24HR 24  hr patch Place 1 patch onto the skin every 24 hours (Patient not taking: Reported on 7/24/2025) 30 patch 1     nicotine (NICODERM CQ) 7 MG/24HR 24 hr patch Place 1 patch onto the skin every 24 hours Start after 6 weeks of the 14 mg/24 hr dose (Patient not taking: Reported on 7/24/2025) 30 patch 0     rOPINIRole (REQUIP) 1 MG tablet TAKE 1.5-2 TABLETS (1.5-2 MG) BY MOUTH AT BEDTIME. (Patient not taking: Reported on 6/10/2025) 180 tablet 1      Family History:  Family History   Problem Relation Age of Onset     Chronic Obstructive Pulmonary Disease Father      Colon Cancer Paternal Grandmother 70     1 daughter- healthy    Social History:  Social History     Socioeconomic History     Marital status: Single     Spouse name: Not on file     Number of children: Not on file     Years of education: Not on file     Highest education level: Not on file   Occupational History     Not on file   Tobacco Use     Smoking status: Former     Current packs/day: 1.00     Average packs/day: 1 pack/day for 48.6 years (48.6 ttl pk-yrs)     Types: Cigarettes     Start date: 1977     Passive exposure: Current     Smokeless tobacco: Never   Vaping Use     Vaping status: Never Used   Substance and Sexual Activity     Alcohol use: Not on file     Comment: occasional     Drug use: Never     Sexual activity: Not on file   Other Topics Concern     Not on file   Social History Narrative    ** Merged History Encounter **          Social Drivers of Health     Financial Resource Strain: Not on file   Food Insecurity: No Food Insecurity (2/24/2024)    Received from [facility]    Hunger Vital Sign      Worried About Running Out of Food in the Last Year: Never true      Ran Out of Food in the Last Year: Never true   Transportation Needs: No Transportation Needs (2/24/2024)    Received from [facility]    PRAPARE - Transportation      Lack of Transportation (Medical): No      Lack of Transportation (Non-Medical): No   Physical Activity: Inactive  (2/24/2024)    Received from [facility]    Exercise Vital Sign      Days of Exercise per Week: 0 days      Minutes of Exercise per Session: 0 min   Stress: Not on file   Social Connections: Not on file   Interpersonal Safety: Not on file   Housing Stability: Low Risk  (2/24/2024)    Received from [facility]    Housing Stability      What is your living situation today?: I have a steady place to live   Chronic smoker- now smokes occasionally. No etoh. Lives alone. Currently brother staying with her        Physical Exam:  /63   Pulse 94   Temp 97.7  F (36.5  C) (Oral)   Resp 16   Wt 68.5 kg (151 lb 1.6 oz)   SpO2 97%   BMI 24.39 kg/m      Wt Readings from Last 4 Encounters:   07/24/25 68.5 kg (151 lb 1.6 oz)   07/03/25 71.4 kg (157 lb 8 oz)   06/10/25 73.3 kg (161 lb 11.2 oz)   05/27/25 69.4 kg (153 lb)     CONSTITUTIONAL: No apparent distress  EYES: PERRLA, there is pallor but no icterus.  ENT/MOUTH: Ears unremarkable. No oral lesions  CVS: s1s2 normal  RESPIRATORY: Chest is clear  GI: Abdomen is benign  NEURO: Alert and oriented ×3  INTEGUMENT: no concerning skin rashes   LYMPHATIC: no palpable lymphadenopathy  MUSCULOSKELETAL: Unremarkable. No bony tenderness.   EXTREMITIES: no pedal edema  PSYCH: Mentation, mood and affect are appropriate  I could not palpate the breast lump although she has some tenderness in that area.        Laboratory/Imaging Studies      Reviewed  7/24/2025  CBC showed WBC 8.1.  Hemoglobin 7.4.  Platelets 167.  ANC 6.8.  Chemistry showed potassium 3.  Otherwise unremarkable.    Iron 55.  TIBC 193.  Iron saturation index 28%    On 6/10/2025      TSH was 14.4 but free T4 was 1.17.    5/14/2025.  Echocardiogram.  Left ventricular size, wall motion and function are normal. The ejection  fraction is 60-65%.  Right ventricular function, chamber size, wall motion, and thickness are  normal.  No significant valvular abnormalities present.  Compared to prior resting images of prior  stress echo, no changes.        Left ankle x-ray 4/15/2025.     IMPRESSION: Small curvilinear, mildly distracted fracture tip of the lateral malleolus with adjacent soft tissue swelling. Ankle mortise is intact. Bones are demineralized.     CT chest abdomen and pelvis 2/13/2025  FINDINGS:   LUNGS AND PLEURA: Emphysematous change in both lungs. No nodules or masses.     MEDIASTINUM/AXILLAE: No mediastinal mass or adenopathy. Mild thickening along the mid esophagus is noted stable.     CORONARY ARTERY CALCIFICATION: Moderate.     HEPATOBILIARY: Multiple small low-attenuation lesions in the liver the largest in segment 8 measuring 1 cm stable from previous examination. Cholelithiasis.     PANCREAS: Pancreas is normal.     SPLEEN: Normal.     ADRENAL GLANDS: Bilateral adrenal adenomas stable.     KIDNEYS/BLADDER: No stones in either kidney. No hydronephrosis.     BOWEL: Few colonic diverticula.     LYMPH NODES: No abnormal adenopathy in the abdomen or pelvis.     VASCULATURE: Atherosclerotic plaque throughout the aorta and iliac vessels.     PELVIC ORGANS: Normal.     MUSCULOSKELETAL: No skeletal lesions.                                                                      IMPRESSION:  1.  No definite change from previous.     2.  Small low-attenuation lesions in the liver stable possibly cysts.     3.  Mild thickening of the wall of the mid esophagus stable.     4.  No new findings.    ASSESSMENT/PLAN:    Stage II ( [id number])  left-sided breast cancer, ER +28%, WV negative, HER2 IHC 3+.  It is a 2 cm left-sided breast cancer.  No suspicious axillary lymph nodes are seen.        We discussed that HER2 positive stage II breast cancer is treated with neoadjuvant chemotherapy/targeted therapy followed by surgery and HER2 directed therapy is continued for about 1 year in total.      5/19/2025.  She started cycle #1 docetaxel/carboplatin/trastuzumab/pertuzumab ( [facility]P ).    Cycle #2 was on 6/10/2025.  She had a significant  reaction to carboplatin with cycle #3 around the end of the infusion requiring steroids as well as epinephrine.  This was despite premedicating her with Solu-Medrol and Benadryl.  So going forward I will not give her carboplatin and we will proceed with cycle #4 docetaxel/trastuzumab/pertuzumab today.      Our plan is to give her 6 cycles and then proceed with surgery.    She has already met with Dr. Carl.    Extreme fatigue.  This is her main complaint.  I think this is a combination of chemotherapy plus significant anemia as well as hypokalemia.  I discussed about giving her blood transfusion and discussed the rationale as well as potential side effects of it.  She agrees to proceed.  She signed the consent.  We will give her 1 unit of packed red blood cells.  Previously TSH was also high but free T4 was normal.  Now on levothyroxine.      Hypokalemia.  I believe this is also contributing to the generalized weakness especially weakness of the arms.  We will give her potassium chloride 40 mEq twice daily.    Neuropathy.  This is somewhat worse.  We will stop carboplatin as mentioned above.    Nausea/vomiting.  Continue Zofran and Compazine.  Follow-up with palliative care Dr. Mansoor Eric.    Diarrhea.  She gets this off-and-on from chemotherapy.  She takes Imodium as needed which helps.        Monitoring for cardiac toxicity.  Baseline echocardiogram on 5/14/2025 was unremarkable.  We will periodically check echocardiogram because of potential cardiotoxicity from anti-HER2 medications.     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  MMR IHC intact.      She started on concurrent chemoradiation with carboplatin and Taxol on 5/1/24 and received 5 doses, last on 5/28/24 and last dose of radiation on 6/5/24.    Repeat PET/CT on 8/15/2024 showed complete resolution of FDG avidity in the esophagus.    At that time she met with Dr. Martinez and decision was made not  to pursue the surgery.  Her feeding tube was removed.      CT scan on 2/13/2025 showed mild stable thickening of the mid esophagus.  No new lesions concerning for recurrence/metastasis are seen.  We will repeat CT scan in August.  Currently scheduled for 8/18/2025.        We did not address the following today.    Discussion regarding genetic testing.  She had genetic testing ordered but insurance denied that.  She has filed an appeal.  She has a genetic counselor appointment on 10/1/2025.    Smoking.  She continues to smoke but less so and is smoking a few cigarettes a day.  Advised her to continue to work hard to completely quit smoking.      Nutrition.  She was on tube feeds but now she is able to eat better.  She was losing weight but now has started to gain weight which is good news.  She does not have any dysphagia or odynophagia anymore.      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  Now s/p endoscopic resection with clear margins free of dysplasia.  This has been adequately treated.  Continue to observe.      Macrocytosis.  Previously she had macrocytosis.  B12 and folate were normal.  Free T4 was normal.  TSH was low.  Continue to observe.     Return to clinic as scheduled    I answered all of her and her daughter's questions to their satisfaction.  They are agreeable and comfortable with the plan.    Kathy Burch MD     The longitudinal plan of care for the breast cancer/esophageal cancer, as documented were addressed during this visit. Due to the added complexity in care, I will continue to support Lita in the subsequent management and with ongoing continuity of care.          Again, thank you for allowing me to participate in the care of your patient.        Sincerely,        Kathy Burch MD    Electronically signed

## 2025-07-30 ENCOUNTER — VIRTUAL VISIT (OUTPATIENT)
Dept: ONCOLOGY | Facility: CLINIC | Age: 65
End: 2025-07-30
Attending: STUDENT IN AN ORGANIZED HEALTH CARE EDUCATION/TRAINING PROGRAM
Payer: COMMERCIAL

## 2025-07-30 ENCOUNTER — PATIENT OUTREACH (OUTPATIENT)
Dept: ONCOLOGY | Facility: CLINIC | Age: 65
End: 2025-07-30
Payer: COMMERCIAL

## 2025-07-30 VITALS — HEIGHT: 67 IN | WEIGHT: 143 LBS | BODY MASS INDEX: 22.44 KG/M2

## 2025-07-30 DIAGNOSIS — R11.2 CHEMOTHERAPY INDUCED NAUSEA AND VOMITING: ICD-10-CM

## 2025-07-30 DIAGNOSIS — Z17.0 MALIGNANT NEOPLASM OF LOWER-OUTER QUADRANT OF LEFT BREAST OF FEMALE, ESTROGEN RECEPTOR POSITIVE (H): Primary | ICD-10-CM

## 2025-07-30 DIAGNOSIS — R53.0 NEOPLASTIC MALIGNANT RELATED FATIGUE: ICD-10-CM

## 2025-07-30 DIAGNOSIS — C50.512 MALIGNANT NEOPLASM OF LOWER-OUTER QUADRANT OF LEFT BREAST OF FEMALE, ESTROGEN RECEPTOR POSITIVE (H): Primary | ICD-10-CM

## 2025-07-30 DIAGNOSIS — T45.1X5A CHEMOTHERAPY INDUCED NAUSEA AND VOMITING: ICD-10-CM

## 2025-07-30 DIAGNOSIS — G89.3 CANCER RELATED PAIN: ICD-10-CM

## 2025-07-30 DIAGNOSIS — F41.9 ANXIETY: ICD-10-CM

## 2025-07-30 DIAGNOSIS — Z51.5 ENCOUNTER FOR PALLIATIVE CARE: ICD-10-CM

## 2025-07-30 DIAGNOSIS — G25.81 RESTLESS LEGS SYNDROME (RLS): ICD-10-CM

## 2025-07-30 DIAGNOSIS — Z79.891 ENCOUNTER FOR LONG-TERM USE OF OPIATE ANALGESIC: ICD-10-CM

## 2025-07-30 RX ORDER — OLANZAPINE 2.5 MG/1
2.5 TABLET, FILM COATED ORAL AT BEDTIME
Qty: 90 TABLET | Refills: 0 | Status: SHIPPED | OUTPATIENT
Start: 2025-07-30

## 2025-07-30 ASSESSMENT — PAIN SCALES - GENERAL: PAINLEVEL_OUTOF10: MODERATE PAIN (5)

## 2025-07-30 NOTE — NURSING NOTE
Current patient location: [address] [place] [address]  [place] [address]    Is the patient currently in the state of [place]? YES    Visit mode: VIDEO    If the visit is dropped, the patient can be reconnected by:VIDEO VISIT: Text to cell phone:   Telephone Information:   Mobile [phone]    and VIDEO VISIT: Send to e-mail at: [email or web address]    Will anyone else be joining the visit? NO  (If patient encounters technical issues they should call [phone])    Are changes needed to the allergy or medication list? No    Are refills needed on medications prescribed by this physician? NO    Rooming Documentation:  Questionnaire(s) completed    Reason for visit: RECHECK (Having a lot of trouble eating. /Very tired )    Yulisa JENSENF

## 2025-07-30 NOTE — PROGRESS NOTES
Request for Onc Psych referral received via Clinic Distress Screen  [facility] Distress Thermometer (Last 8760 Hours)    [facility] Distress Thermometer    Row Name 07/30/25 1400       Distress Thermometer (Please describe the number (0-10) that best describes how much distress you have been experiencing in the past week, including today)   Thermometer (0-10) 7       Concerns (Have you had concerns about any of the items below in the past week, including today? (Jabier all that apply)   Physical Concerns Sleep;Pain;Fatigue Abnormal        Emotional Concerns Sadness or depression Abnormal        Social Concerns No concerns       Practical Concerns No concerns       Spiritual or Jain Concerns No concerns          Would you like to be contacted by a  based on any of your concerns? Yes Abnormal        Psychologist   Would you like to be contacted to schedule an appointment with a Psychologist based on your emotional concerns?            Mental Health referral entered as Onc Psych is unable to accommodate referrals at this time due to provider scheduling availability    Anxiety    Thyroid nodule  Under survelliance

## 2025-07-30 NOTE — PATIENT INSTRUCTIONS
Recommendations:  -Resume Esomeprazole for heartburn that could be contributing to nausea.  -Certified for medical cannabis today. Reviewed could also try edibles until certification is processed.  -New prescription sent for Zyprexa 2.5mg nightly to help with nausea, anxiety, and appetite.  -Suggest stopping hydroxyzine, as this could be contributing to hangover grogginess in AM and Zyprexa should also help calm the mind.  -Reviewed regular physical activity is the one thing that has been shown in medical literature to help improve fatigue related to cancer and cancer treatments.  I recommend starting some activity, such as 10 to 20 minutes 2-3 times weekly and then build up from here.    -Okay to continue Zofran and Compazine as needed for nausea.    -Ok to continue oxycodone 5-10mg every 4 hours as needed.   -Continue Requip 2 mg nightly for now.  -Continue Zoloft 50 mg daily for now.      Follow up: 6 weeks      *Please do not make any changes to medications that we prescribe, including pain medicines, without talking to our team*    Reasons to Call    If you are having worsening/uncontrolled symptoms we want you to call!    You or your other physicians make any changes to medications we have prescribed.  -Please call for refills 4-5 days before you will run out of medication.    Important Phone Numbers, including: Refills, scheduling, and general questions     Palliative Care RN: Elaine Chavez : [phone]  *For scheduling needs/follow up visits : [phone]  *After hours or on weekends- Will connect you with on call MD : [phone].

## 2025-07-30 NOTE — PROGRESS NOTES
Palliative Care Progress Note    Patient Name: Linh Mustafa  Primary Provider: Madison Ruiz    Chief Complaint/Patient ID:   She has PMHx of esophageal SCC.  S/p endoscopic resection of SCC in situ lesion, and recommendation was to proceed with neoadjuvant chemoRT followed by definitive surgery.  Started concurrent chemo RT with carbo/Taxol 05/2024 (5 cycles).  -Hospitalized 06/4-06/18/24 with chest pain after radiation therapy, felt to be 2/2 mucositis.  Able to complete final 2 RT treatments inpatient. Currently [facility].    -04.2025 - Dx with T2 N0 M0 ER positive HER2 positive breast cancer. Planning 6 cycles of chemo (perjeta, herceptin, docetaxel, carboplatin).  Carboplatin dropped after cycle 3.  Likely lumpectomy, maybe radiation.       -Of note, buspar causes increased anger/irritability.    Last Palliative care appointment: 5/27/25 with me.      Reviewed: Yes.     Social History: Lives alone.  Has a daughter and son-in-law who live nearby.  Brother from [place] has been helping her with day-to-day tasks.  Identifies as Holiness.    Advanced Care Planning: Has not completed an HCD.    Interim History:  Linh is seen today for follow up with Palliative Care via billable video visit.      Reviewed oncology note from 7/24.  Had a reaction to carboplatin after last infusion, so decision made to remove this from treatment regimen.  Gave 1 unit of RBCs for fatigue.    Says she's not doing great. Thankfully no allergic reaction without the carboplatin. Ongoing nausea and fatigue after her infusion.     Pain has been 4-5/10. Only oxy at night with occasional dose during the day. Maximum 3 tablets per day.    RLS symptoms returned after stopping Requip, so she resumed it.    Has restarted hydroxyzine for anxiety at nights.    Will be in bed until 2PM even with going to bed by 10PM    Was told to stop esomeprazole for nausea.  She also asked about cannabis.  Has been taking Zofran and Compazine.      Physical  Exam:   Constitutional: Alert, pleasant, no apparent distress. Sitting up in chair.  Eyes: Sclera non-icteric, no eye discharge.  ENT: No nasal discharge. Ears grossly normal.  Respiratory: Unlabored respirations. Speaking in full sentences.  Musculoskeletal: Extremities appear normal- no gross deformities noted. No edema noted on upper body.   Skin: No suspicious lesions or rashes on visible skin.  Neurologic: Clear speech, no aphasia. No facial droop.  Psychiatric: Mentation appears normal, appropriate attention. Affect normal/bright. Does not appear anxious or depressed.    Key Data Reviewed:  LABS:   Lab Results   Component Value Date    WBC 8.1 07/24/2025    HGB 7.4 (L) 07/24/2025    HCT 21.8 (L) 07/24/2025     07/24/2025     07/24/2025    POTASSIUM 3.0 (L) 07/24/2025    CHLORIDE 100 07/24/2025    CO2 22 07/24/2025    BUN 10.8 07/24/2025    CR 0.94 07/24/2025     (H) 07/24/2025    SED 61 (H) 06/04/2024    DD 0.79 (H) 12/23/2022    NTBNPI 136 06/04/2024    AST 14 07/24/2025    ALT 6 07/24/2025    ALKPHOS 143 07/24/2025    BILITOTAL 0.4 07/24/2025    INR 0.98 06/04/2024     GFR Estimate   Date Value Ref Range Status   07/24/2025 67 >60 mL/min/1.73m2 Final     Comment:     eGFR calculated using 2021 CKD-EPI equation.   04/29/2019 >60 >60 mL/min/1.73m2 Final       Impression & Recommendations & Counseling:  Linh Mustafa has a history of esophageal SCC s/p proximal resection and chemo RT. Now with diagnosis of breast cancer going through neoadjuvant chemotherapy.    Has been dealing with significant more blood in her provide struggles along with fatigue.  Discussed how some medication side effects could be contributing to the fatigue, including resuming the hydroxyzine.      Recommendations:  -Resume Esomeprazole for heartburn that could be contributing to nausea.  -Certified for medical cannabis today. Reviewed could also try edibles until certification is processed.  -New prescription sent  for Zyprexa 2.5mg nightly to help with nausea, anxiety, and appetite.  -Suggest stopping hydroxyzine, as this could be contributing to hangover grogginess in AM and Zyprexa should also help calm the mind.  -Reviewed regular physical activity is the one thing that has been shown in medical literature to help improve fatigue related to cancer and cancer treatments.  I recommend starting some activity, such as 10 to 20 minutes 2-3 times weekly and then build up from here.    -Okay to continue Zofran and Compazine as needed for nausea.    -Ok to continue oxycodone 5-10mg every 4 hours as needed.   -Continue Requip 2 mg nightly for now.  -Continue Zoloft 50 mg daily for now.        Follow up: 6 weeks      Video-Visit Details  Video Start Time: 3:15PM  Video End Time: 3:39PM    Originating Location (pt. Location): Home     Distant Location (provider location):  Offsite- Personal Home     Platform used for Video Visit: Vishal     Total time spent on day of encounter is 36 mins, including reviewing record, review of above studies, above visit with patient, symptomatic discussion as above, including medication adjustments/prescription management, and documentation.       The longitudinal plan of care for the diagnosis(es)/condition(s) as documented were addressed during this visit.?Due to the added complexity in care, I will continue to support Linh Mustafa in the subsequent management and with the ongoing continuity of care.        Lourdes Joseph,   Palliative Medicine   [id number]    Some chart documentation performed using Dragon Voice recognition Software. Although reviewed after completion, some words and grammatical errors may remain.

## 2025-07-30 NOTE — LETTER
7/30/2025      Linh Mustafa  [address]  [place] [address]      Dear Colleague,    Thank you for referring your patient, Linh Mustafa, to the [facility]. Please see a copy of my visit note below.    Palliative Care Progress Note    Patient Name: Linh Mustafa  Primary Provider: Madison Ruiz    Chief Complaint/Patient ID:   She has PMHx of esophageal SCC.  S/p endoscopic resection of SCC in situ lesion, and recommendation was to proceed with neoadjuvant chemoRT followed by definitive surgery.  Started concurrent chemo RT with carbo/Taxol 05/2024 (5 cycles).  -Hospitalized 06/4-06/18/24 with chest pain after radiation therapy, felt to be 2/2 mucositis.  Able to complete final 2 RT treatments inpatient. Currently [facility].    -04.2025 - Dx with T2 N0 M0 ER positive HER2 positive breast cancer. Planning 6 cycles of chemo (perjeta, herceptin, docetaxel, carboplatin).  Carboplatin dropped after cycle 3.  Likely lumpectomy, maybe radiation.       -Of note, buspar causes increased anger/irritability.    Last Palliative care appointment: 5/27/25 with me.      Reviewed: Yes.     Social History: Lives alone.  Has a daughter and son-in-law who live nearby.  Brother from [place] has been helping her with day-to-day tasks.  Identifies as Shinto.    Advanced Care Planning: Has not completed an HCD.    Interim History:  Linh is seen today for follow up with Palliative Care via billable video visit.      Reviewed oncology note from 7/24.  Had a reaction to carboplatin after last infusion, so decision made to remove this from treatment regimen.  Gave 1 unit of RBCs for fatigue.    Says she's not doing great. Thankfully no allergic reaction without the carboplatin. Ongoing nausea and fatigue after her infusion.     Pain has been 4-5/10. Only oxy at night with occasional dose during the day. Maximum 3 tablets per day.    RLS symptoms returned after stopping Requip, so she  resumed it.    Has restarted hydroxyzine for anxiety at nights.    Will be in bed until 2PM even with going to bed by 10PM    Was told to stop esomeprazole for nausea.  She also asked about cannabis.  Has been taking Zofran and Compazine.      Physical Exam:   Constitutional: Alert, pleasant, no apparent distress. Sitting up in chair.  Eyes: Sclera non-icteric, no eye discharge.  ENT: No nasal discharge. Ears grossly normal.  Respiratory: Unlabored respirations. Speaking in full sentences.  Musculoskeletal: Extremities appear normal- no gross deformities noted. No edema noted on upper body.   Skin: No suspicious lesions or rashes on visible skin.  Neurologic: Clear speech, no aphasia. No facial droop.  Psychiatric: Mentation appears normal, appropriate attention. Affect normal/bright. Does not appear anxious or depressed.    Key Data Reviewed:  LABS:   Lab Results   Component Value Date    WBC 8.1 07/24/2025    HGB 7.4 (L) 07/24/2025    HCT 21.8 (L) 07/24/2025     07/24/2025     07/24/2025    POTASSIUM 3.0 (L) 07/24/2025    CHLORIDE 100 07/24/2025    CO2 22 07/24/2025    BUN 10.8 07/24/2025    CR 0.94 07/24/2025     (H) 07/24/2025    SED 61 (H) 06/04/2024    DD 0.79 (H) 12/23/2022    NTBNPI 136 06/04/2024    AST 14 07/24/2025    ALT 6 07/24/2025    ALKPHOS 143 07/24/2025    BILITOTAL 0.4 07/24/2025    INR 0.98 06/04/2024     GFR Estimate   Date Value Ref Range Status   07/24/2025 67 >60 mL/min/1.73m2 Final     Comment:     eGFR calculated using 2021 CKD-EPI equation.   04/29/2019 >60 >60 mL/min/1.73m2 Final       Impression & Recommendations & Counseling:  Linh Mustafa has a history of esophageal SCC s/p proximal resection and chemo RT. Now with diagnosis of breast cancer going through neoadjuvant chemotherapy.    Has been dealing with significant more blood in her provide struggles along with fatigue.  Discussed how some medication side effects could be contributing to the fatigue,  including resuming the hydroxyzine.      Recommendations:  -Resume Esomeprazole for heartburn that could be contributing to nausea.  -Certified for medical cannabis today. Reviewed could also try edibles until certification is processed.  -New prescription sent for Zyprexa 2.5mg nightly to help with nausea, anxiety, and appetite.  -Suggest stopping hydroxyzine, as this could be contributing to hangover grogginess in AM and Zyprexa should also help calm the mind.  -Reviewed regular physical activity is the one thing that has been shown in medical literature to help improve fatigue related to cancer and cancer treatments.  I recommend starting some activity, such as 10 to 20 minutes 2-3 times weekly and then build up from here.    -Okay to continue Zofran and Compazine as needed for nausea.    -Ok to continue oxycodone 5-10mg every 4 hours as needed.   -Continue Requip 2 mg nightly for now.  -Continue Zoloft 50 mg daily for now.        Follow up: 6 weeks      Video-Visit Details  Video Start Time: 3:15PM  Video End Time: 3:39PM    Originating Location (pt. Location): Home     Distant Location (provider location):  Offsite- Personal Home     Platform used for Video Visit: Vishal     Total time spent on day of encounter is 36 mins, including reviewing record, review of above studies, above visit with patient, symptomatic discussion as above, including medication adjustments/prescription management, and documentation.       The longitudinal plan of care for the diagnosis(es)/condition(s) as documented were addressed during this visit.?Due to the added complexity in care, I will continue to support Linh Mustafa in the subsequent management and with the ongoing continuity of care.        Lourdes Joseph DO  Palliative Medicine   [id number]    Some chart documentation performed using Dragon Voice recognition Software. Although reviewed after completion, some words and grammatical errors may  remain.      Again, thank you for allowing me to participate in the care of your patient.        Sincerely,        Lourdes Joseph, DO    Electronically signed

## 2025-07-30 NOTE — LETTER
7/30/2025      Linh Mustafa  [address]  [place] [address]      Dear Colleague,    Thank you for referring your patient, Linh Mustafa, to the [facility]. Please see a copy of my visit note below.    Palliative Care Progress Note    Patient Name: Linh Mustafa  Primary Provider: Madison Ruiz    Chief Complaint/Patient ID:   She has PMHx of esophageal SCC.  S/p endoscopic resection of SCC in situ lesion, and recommendation was to proceed with neoadjuvant chemoRT followed by definitive surgery.  Started concurrent chemo RT with carbo/Taxol 05/2024 (5 cycles).  -Hospitalized 06/4-06/18/24 with chest pain after radiation therapy, felt to be 2/2 mucositis.  Able to complete final 2 RT treatments inpatient. Currently [facility].    -04.2025 - Dx with T2 N0 M0 ER positive HER2 positive breast cancer. Planning 6 cycles of chemo (perjeta, herceptin, docetaxel, carboplatin).  Carboplatin dropped after cycle 3.  Likely lumpectomy, maybe radiation.       -Of note, buspar causes increased anger/irritability.    Last Palliative care appointment: 5/27/25 with me.      Reviewed: Yes.     Social History: Lives alone.  Has a daughter and son-in-law who live nearby.  Brother from [place] has been helping her with day-to-day tasks.  Identifies as Latter day.    Advanced Care Planning: Has not completed an HCD.    Interim History:  Linh is seen today for follow up with Palliative Care via billable video visit.      Reviewed oncology note from 7/24.  Had a reaction to carboplatin after last infusion, so decision made to remove this from treatment regimen.  Gave 1 unit of RBCs for fatigue.    Says she's not doing great. Thankfully no allergic reaction without the carboplatin. Ongoing nausea and fatigue after her infusion.     Pain has been 4-5/10. Only oxy at night with occasional dose during the day. Maximum 3 tablets per day.    RLS symptoms returned after stopping Requip, so she  resumed it.    Has restarted hydroxyzine for anxiety at nights.    Will be in bed until 2PM even with going to bed by 10PM    Was told to stop esomeprazole for nausea.  She also asked about cannabis.  Has been taking Zofran and Compazine.      Physical Exam:   Constitutional: Alert, pleasant, no apparent distress. Sitting up in chair.  Eyes: Sclera non-icteric, no eye discharge.  ENT: No nasal discharge. Ears grossly normal.  Respiratory: Unlabored respirations. Speaking in full sentences.  Musculoskeletal: Extremities appear normal- no gross deformities noted. No edema noted on upper body.   Skin: No suspicious lesions or rashes on visible skin.  Neurologic: Clear speech, no aphasia. No facial droop.  Psychiatric: Mentation appears normal, appropriate attention. Affect normal/bright. Does not appear anxious or depressed.    Key Data Reviewed:  LABS:   Lab Results   Component Value Date    WBC 8.1 07/24/2025    HGB 7.4 (L) 07/24/2025    HCT 21.8 (L) 07/24/2025     07/24/2025     07/24/2025    POTASSIUM 3.0 (L) 07/24/2025    CHLORIDE 100 07/24/2025    CO2 22 07/24/2025    BUN 10.8 07/24/2025    CR 0.94 07/24/2025     (H) 07/24/2025    SED 61 (H) 06/04/2024    DD 0.79 (H) 12/23/2022    NTBNPI 136 06/04/2024    AST 14 07/24/2025    ALT 6 07/24/2025    ALKPHOS 143 07/24/2025    BILITOTAL 0.4 07/24/2025    INR 0.98 06/04/2024     GFR Estimate   Date Value Ref Range Status   07/24/2025 67 >60 mL/min/1.73m2 Final     Comment:     eGFR calculated using 2021 CKD-EPI equation.   04/29/2019 >60 >60 mL/min/1.73m2 Final       Impression & Recommendations & Counseling:  Linh Mustafa has a history of esophageal SCC s/p proximal resection and chemo RT. Now with diagnosis of breast cancer going through neoadjuvant chemotherapy.    Has been dealing with significant more blood in her provide struggles along with fatigue.  Discussed how some medication side effects could be contributing to the fatigue,  including resuming the hydroxyzine.      Recommendations:  -Resume Esomeprazole for heartburn that could be contributing to nausea.  -Certified for medical cannabis today. Reviewed could also try edibles until certification is processed.  -New prescription sent for Zyprexa 2.5mg nightly to help with nausea, anxiety, and appetite.  -Suggest stopping hydroxyzine, as this could be contributing to hangover grogginess in AM and Zyprexa should also help calm the mind.  -Reviewed regular physical activity is the one thing that has been shown in medical literature to help improve fatigue related to cancer and cancer treatments.  I recommend starting some activity, such as 10 to 20 minutes 2-3 times weekly and then build up from here.    -Okay to continue Zofran and Compazine as needed for nausea.    -Ok to continue oxycodone 5-10mg every 4 hours as needed.   -Continue Requip 2 mg nightly for now.  -Continue Zoloft 50 mg daily for now.        Follow up: 6 weeks      Video-Visit Details  Video Start Time: 3:15PM  Video End Time: 3:39PM    Originating Location (pt. Location): Home     Distant Location (provider location):  Offsite- Personal Home     Platform used for Video Visit: Vishal     Total time spent on day of encounter is 36 mins, including reviewing record, review of above studies, above visit with patient, symptomatic discussion as above, including medication adjustments/prescription management, and documentation.       The longitudinal plan of care for the diagnosis(es)/condition(s) as documented were addressed during this visit.?Due to the added complexity in care, I will continue to support Linh Mustafa in the subsequent management and with the ongoing continuity of care.        Lourdes Joseph DO  Palliative Medicine   [facility] [place] [id number]    Some chart documentation performed using Dragon Voice recognition Software. Although reviewed after completion, some words and grammatical errors may  remain.      Again, thank you for allowing me to participate in the care of your patient.        Sincerely,        Lourdes Joseph, DO    Electronically signed

## 2025-07-31 ENCOUNTER — PATIENT OUTREACH (OUTPATIENT)
Dept: CARE COORDINATION | Facility: CLINIC | Age: 65
End: 2025-07-31
Payer: COMMERCIAL

## 2025-08-04 ENCOUNTER — PATIENT OUTREACH (OUTPATIENT)
Dept: CARE COORDINATION | Facility: CLINIC | Age: 65
End: 2025-08-04

## 2025-08-04 ENCOUNTER — LAB (OUTPATIENT)
Dept: LAB | Facility: CLINIC | Age: 65
End: 2025-08-04
Payer: COMMERCIAL

## 2025-08-04 DIAGNOSIS — E89.0 POSTABLATIVE HYPOTHYROIDISM: ICD-10-CM

## 2025-08-04 DIAGNOSIS — E83.52 HYPERCALCEMIA: ICD-10-CM

## 2025-08-04 PROCEDURE — 80048 BASIC METABOLIC PNL TOTAL CA: CPT

## 2025-08-04 PROCEDURE — 84443 ASSAY THYROID STIM HORMONE: CPT

## 2025-08-04 PROCEDURE — 36415 COLL VENOUS BLD VENIPUNCTURE: CPT

## 2025-08-04 PROCEDURE — 82040 ASSAY OF SERUM ALBUMIN: CPT

## 2025-08-04 PROCEDURE — 83970 ASSAY OF PARATHORMONE: CPT

## 2025-08-04 PROCEDURE — 84439 ASSAY OF FREE THYROXINE: CPT

## 2025-08-05 LAB
ALBUMIN SERPL BCG-MCNC: 3.7 G/DL (ref 3.5–5.2)
ANION GAP SERPL CALCULATED.3IONS-SCNC: 13 MMOL/L (ref 7–15)
BUN SERPL-MCNC: 10 MG/DL (ref 8–23)
CALCIUM SERPL-MCNC: 9.3 MG/DL (ref 8.8–10.4)
CHLORIDE SERPL-SCNC: 99 MMOL/L (ref 98–107)
CREAT SERPL-MCNC: 0.88 MG/DL (ref 0.51–0.95)
EGFRCR SERPLBLD CKD-EPI 2021: 73 ML/MIN/1.73M2
GLUCOSE SERPL-MCNC: 112 MG/DL (ref 70–99)
HCO3 SERPL-SCNC: 24 MMOL/L (ref 22–29)
POTASSIUM SERPL-SCNC: 3.4 MMOL/L (ref 3.4–5.3)
PTH-INTACT SERPL-MCNC: 55 PG/ML (ref 15–65)
SODIUM SERPL-SCNC: 136 MMOL/L (ref 135–145)
T4 FREE SERPL-MCNC: 1.59 NG/DL (ref 0.9–1.7)
TSH SERPL DL<=0.005 MIU/L-ACNC: 5.78 UIU/ML (ref 0.3–4.2)

## 2025-08-11 ENCOUNTER — PATIENT OUTREACH (OUTPATIENT)
Dept: CARE COORDINATION | Facility: CLINIC | Age: 65
End: 2025-08-11
Payer: COMMERCIAL

## 2025-08-12 ENCOUNTER — VIRTUAL VISIT (OUTPATIENT)
Dept: ENDOCRINOLOGY | Facility: CLINIC | Age: 65
End: 2025-08-12
Payer: COMMERCIAL

## 2025-08-12 DIAGNOSIS — Z86.39: ICD-10-CM

## 2025-08-12 DIAGNOSIS — E89.0 POSTABLATIVE HYPOTHYROIDISM: Primary | ICD-10-CM

## 2025-08-12 PROCEDURE — G2211 COMPLEX E/M VISIT ADD ON: HCPCS | Performed by: STUDENT IN AN ORGANIZED HEALTH CARE EDUCATION/TRAINING PROGRAM

## 2025-08-12 PROCEDURE — 1126F AMNT PAIN NOTED NONE PRSNT: CPT | Mod: 95 | Performed by: STUDENT IN AN ORGANIZED HEALTH CARE EDUCATION/TRAINING PROGRAM

## 2025-08-12 PROCEDURE — 98006 SYNCH AUDIO-VIDEO EST MOD 30: CPT | Performed by: STUDENT IN AN ORGANIZED HEALTH CARE EDUCATION/TRAINING PROGRAM

## 2025-08-12 RX ORDER — LEVOTHYROXINE SODIUM 137 UG/1
137 TABLET ORAL
Qty: 90 TABLET | Refills: 3 | Status: SHIPPED | OUTPATIENT
Start: 2025-08-12

## 2025-08-12 ASSESSMENT — PAIN SCALES - GENERAL: PAINLEVEL_OUTOF10: NO PAIN (0)

## 2025-08-14 ENCOUNTER — INFUSION THERAPY VISIT (OUTPATIENT)
Dept: ONCOLOGY | Facility: CLINIC | Age: 65
End: 2025-08-14
Attending: INTERNAL MEDICINE
Payer: COMMERCIAL

## 2025-08-14 ENCOUNTER — LAB (OUTPATIENT)
Dept: LAB | Facility: CLINIC | Age: 65
End: 2025-08-14
Attending: PHYSICIAN ASSISTANT
Payer: COMMERCIAL

## 2025-08-14 VITALS
OXYGEN SATURATION: 97 % | HEART RATE: 70 BPM | TEMPERATURE: 98.2 F | DIASTOLIC BLOOD PRESSURE: 84 MMHG | SYSTOLIC BLOOD PRESSURE: 157 MMHG | RESPIRATION RATE: 18 BRPM

## 2025-08-14 DIAGNOSIS — Z51.11 ENCOUNTER FOR ANTINEOPLASTIC CHEMOTHERAPY: ICD-10-CM

## 2025-08-14 DIAGNOSIS — D63.0 ANEMIA IN NEOPLASTIC DISEASE: ICD-10-CM

## 2025-08-14 DIAGNOSIS — D64.9 ANEMIA, UNSPECIFIED TYPE: ICD-10-CM

## 2025-08-14 DIAGNOSIS — Z17.0 MALIGNANT NEOPLASM OF LOWER-OUTER QUADRANT OF LEFT BREAST OF FEMALE, ESTROGEN RECEPTOR POSITIVE (H): Primary | ICD-10-CM

## 2025-08-14 DIAGNOSIS — C15.9 SQUAMOUS CELL CARCINOMA OF ESOPHAGUS (H): ICD-10-CM

## 2025-08-14 DIAGNOSIS — C50.512 MALIGNANT NEOPLASM OF LOWER-OUTER QUADRANT OF LEFT BREAST OF FEMALE, ESTROGEN RECEPTOR POSITIVE (H): Primary | ICD-10-CM

## 2025-08-14 LAB
ABO + RH BLD: NORMAL
ALBUMIN SERPL BCG-MCNC: 3.3 G/DL (ref 3.5–5.2)
ALP SERPL-CCNC: 122 U/L (ref 40–150)
ALT SERPL W P-5'-P-CCNC: 7 U/L (ref 0–50)
ANION GAP SERPL CALCULATED.3IONS-SCNC: 15 MMOL/L (ref 7–15)
AST SERPL W P-5'-P-CCNC: 13 U/L (ref 0–45)
BASOPHILS # BLD AUTO: 0.03 10E3/UL (ref 0–0.2)
BASOPHILS NFR BLD AUTO: 0.5 %
BILIRUB SERPL-MCNC: 0.4 MG/DL
BLD GP AB SCN SERPL QL: NEGATIVE
BLD PROD TYP BPU: NORMAL
BLD PROD TYP BPU: NORMAL
BLOOD COMPONENT TYPE: NORMAL
BLOOD COMPONENT TYPE: NORMAL
BUN SERPL-MCNC: 8.9 MG/DL (ref 8–23)
CALCIUM SERPL-MCNC: 9.2 MG/DL (ref 8.8–10.4)
CHLORIDE SERPL-SCNC: 100 MMOL/L (ref 98–107)
CODING SYSTEM: NORMAL
CODING SYSTEM: NORMAL
CREAT SERPL-MCNC: 0.73 MG/DL (ref 0.51–0.95)
CROSSMATCH: NORMAL
CROSSMATCH: NORMAL
EGFRCR SERPLBLD CKD-EPI 2021: >90 ML/MIN/1.73M2
EOSINOPHIL # BLD AUTO: <0.03 10E3/UL (ref 0–0.7)
EOSINOPHIL NFR BLD AUTO: 0.2 %
ERYTHROCYTE [DISTWIDTH] IN BLOOD BY AUTOMATED COUNT: 19 % (ref 10–15)
GLUCOSE SERPL-MCNC: 94 MG/DL (ref 70–99)
HCO3 SERPL-SCNC: 25 MMOL/L (ref 22–29)
HCT VFR BLD AUTO: 20.5 % (ref 35–47)
HGB BLD-MCNC: 6.6 G/DL (ref 11.7–15.7)
IMM GRANULOCYTES # BLD: <0.03 10E3/UL
IMM GRANULOCYTES NFR BLD: 0.4 %
ISSUE DATE AND TIME: NORMAL
ISSUE DATE AND TIME: NORMAL
LYMPHOCYTES # BLD AUTO: 0.71 10E3/UL (ref 0.8–5.3)
LYMPHOCYTES NFR BLD AUTO: 13 %
MCH RBC QN AUTO: 33.5 PG (ref 26.5–33)
MCHC RBC AUTO-ENTMCNC: 32.2 G/DL (ref 31.5–36.5)
MCV RBC AUTO: 104.1 FL (ref 78–100)
MONOCYTES # BLD AUTO: 0.43 10E3/UL (ref 0–1.3)
MONOCYTES NFR BLD AUTO: 7.8 %
NEUTROPHILS # BLD AUTO: 4.28 10E3/UL (ref 1.6–8.3)
NEUTROPHILS NFR BLD AUTO: 78.1 %
NRBC # BLD AUTO: <0.03 10E3/UL
NRBC BLD AUTO-RTO: 0 /100
PLATELET # BLD AUTO: 217 10E3/UL (ref 150–450)
POTASSIUM SERPL-SCNC: 2.7 MMOL/L (ref 3.4–5.3)
PROT SERPL-MCNC: 6.4 G/DL (ref 6.4–8.3)
RBC # BLD AUTO: 1.97 10E6/UL (ref 3.8–5.2)
SODIUM SERPL-SCNC: 140 MMOL/L (ref 135–145)
SPECIMEN EXP DATE BLD: NORMAL
UNIT ABO/RH: NORMAL
UNIT ABO/RH: NORMAL
UNIT NUMBER: NORMAL
UNIT NUMBER: NORMAL
UNIT STATUS: NORMAL
UNIT STATUS: NORMAL
UNIT TYPE ISBT: 6200
UNIT TYPE ISBT: 6200
WBC # BLD AUTO: 5.48 10E3/UL (ref 4–11)

## 2025-08-14 PROCEDURE — 258N000003 HC RX IP 258 OP 636: Performed by: INTERNAL MEDICINE

## 2025-08-14 PROCEDURE — 250N000011 HC RX IP 250 OP 636: Performed by: PHYSICIAN ASSISTANT

## 2025-08-14 PROCEDURE — 36591 DRAW BLOOD OFF VENOUS DEVICE: CPT | Performed by: PHYSICIAN ASSISTANT

## 2025-08-14 PROCEDURE — 86850 RBC ANTIBODY SCREEN: CPT | Performed by: PHYSICIAN ASSISTANT

## 2025-08-14 PROCEDURE — 250N000011 HC RX IP 250 OP 636: Performed by: INTERNAL MEDICINE

## 2025-08-14 PROCEDURE — 84075 ASSAY ALKALINE PHOSPHATASE: CPT | Performed by: PHYSICIAN ASSISTANT

## 2025-08-14 PROCEDURE — 258N000003 HC RX IP 258 OP 636: Performed by: PHYSICIAN ASSISTANT

## 2025-08-14 PROCEDURE — 85025 COMPLETE CBC W/AUTO DIFF WBC: CPT | Performed by: PHYSICIAN ASSISTANT

## 2025-08-14 PROCEDURE — P9016 RBC LEUKOCYTES REDUCED: HCPCS

## 2025-08-14 PROCEDURE — P9016 RBC LEUKOCYTES REDUCED: HCPCS | Performed by: INTERNAL MEDICINE

## 2025-08-14 RX ORDER — DIPHENHYDRAMINE HYDROCHLORIDE 50 MG/ML
25 INJECTION, SOLUTION INTRAMUSCULAR; INTRAVENOUS
Start: 2025-08-14

## 2025-08-14 RX ORDER — MEPERIDINE HYDROCHLORIDE 25 MG/ML
25 INJECTION INTRAMUSCULAR; INTRAVENOUS; SUBCUTANEOUS
OUTPATIENT
Start: 2025-08-14

## 2025-08-14 RX ORDER — METHYLPREDNISOLONE SODIUM SUCCINATE 40 MG/ML
40 INJECTION INTRAMUSCULAR; INTRAVENOUS
Start: 2025-08-14

## 2025-08-14 RX ORDER — ALBUTEROL SULFATE 90 UG/1
1-2 INHALANT RESPIRATORY (INHALATION)
Start: 2025-08-14

## 2025-08-14 RX ORDER — HEPARIN SODIUM (PORCINE) LOCK FLUSH IV SOLN 100 UNIT/ML 100 UNIT/ML
5 SOLUTION INTRAVENOUS
OUTPATIENT
Start: 2025-08-14

## 2025-08-14 RX ORDER — EPINEPHRINE 1 MG/ML
0.3 INJECTION, SOLUTION, CONCENTRATE INTRAVENOUS EVERY 5 MIN PRN
OUTPATIENT
Start: 2025-08-14

## 2025-08-14 RX ORDER — ONDANSETRON 2 MG/ML
8 INJECTION INTRAMUSCULAR; INTRAVENOUS ONCE
Status: COMPLETED | OUTPATIENT
Start: 2025-08-14 | End: 2025-08-14

## 2025-08-14 RX ORDER — ONDANSETRON 2 MG/ML
8 INJECTION INTRAMUSCULAR; INTRAVENOUS ONCE
Start: 2025-08-14 | End: 2025-08-14

## 2025-08-14 RX ORDER — HEPARIN SODIUM,PORCINE 10 UNIT/ML
5-20 VIAL (ML) INTRAVENOUS DAILY PRN
OUTPATIENT
Start: 2025-08-14

## 2025-08-14 RX ORDER — DIPHENHYDRAMINE HYDROCHLORIDE 50 MG/ML
50 INJECTION, SOLUTION INTRAMUSCULAR; INTRAVENOUS
Start: 2025-08-14

## 2025-08-14 RX ORDER — ALBUTEROL SULFATE 0.83 MG/ML
2.5 SOLUTION RESPIRATORY (INHALATION)
OUTPATIENT
Start: 2025-08-14

## 2025-08-14 RX ORDER — HEPARIN SODIUM (PORCINE) LOCK FLUSH IV SOLN 100 UNIT/ML 100 UNIT/ML
5 SOLUTION INTRAVENOUS
Status: DISCONTINUED | OUTPATIENT
Start: 2025-08-14 | End: 2025-08-14 | Stop reason: HOSPADM

## 2025-08-14 RX ADMIN — POTASSIUM CHLORIDE 60 MEQ: 149 INJECTION, SOLUTION, CONCENTRATE INTRAVENOUS at 11:24

## 2025-08-14 RX ADMIN — ONDANSETRON 8 MG: 2 INJECTION INTRAMUSCULAR; INTRAVENOUS at 11:21

## 2025-08-14 RX ADMIN — FOSAPREPITANT 150 MG: 150 INJECTION, POWDER, LYOPHILIZED, FOR SOLUTION INTRAVENOUS at 14:30

## 2025-08-14 RX ADMIN — Medication 5 ML: at 15:39

## 2025-08-17 ENCOUNTER — MYC MEDICAL ADVICE (OUTPATIENT)
Dept: ONCOLOGY | Facility: CLINIC | Age: 65
End: 2025-08-17
Payer: COMMERCIAL

## 2025-08-18 ENCOUNTER — HOSPITAL ENCOUNTER (OUTPATIENT)
Dept: CT IMAGING | Facility: CLINIC | Age: 65
Discharge: HOME | End: 2025-08-18
Attending: INTERNAL MEDICINE
Payer: COMMERCIAL

## 2025-08-18 ENCOUNTER — RESULTS FOLLOW-UP (OUTPATIENT)
Dept: ONCOLOGY | Facility: CLINIC | Age: 65
End: 2025-08-18

## 2025-08-18 ENCOUNTER — HOSPITAL ENCOUNTER (OUTPATIENT)
Dept: MRI IMAGING | Facility: CLINIC | Age: 65
Discharge: HOME | End: 2025-08-18
Attending: PHYSICIAN ASSISTANT
Payer: COMMERCIAL

## 2025-08-18 ENCOUNTER — INFUSION THERAPY VISIT (OUTPATIENT)
Dept: INFUSION THERAPY | Facility: HOSPITAL | Age: 65
End: 2025-08-18
Attending: RADIOLOGY
Payer: COMMERCIAL

## 2025-08-18 DIAGNOSIS — C50.512 MALIGNANT NEOPLASM OF LOWER-OUTER QUADRANT OF LEFT BREAST OF FEMALE, ESTROGEN RECEPTOR POSITIVE (H): ICD-10-CM

## 2025-08-18 DIAGNOSIS — C15.9 SQUAMOUS CELL CARCINOMA OF ESOPHAGUS (H): ICD-10-CM

## 2025-08-18 DIAGNOSIS — Z17.0 MALIGNANT NEOPLASM OF LOWER-OUTER QUADRANT OF LEFT BREAST OF FEMALE, ESTROGEN RECEPTOR POSITIVE (H): ICD-10-CM

## 2025-08-18 DIAGNOSIS — R11.2 NAUSEA AND VOMITING, UNSPECIFIED VOMITING TYPE: ICD-10-CM

## 2025-08-18 LAB
ALBUMIN SERPL BCG-MCNC: 3.3 G/DL (ref 3.5–5.2)
ALP SERPL-CCNC: 117 U/L (ref 40–150)
ALT SERPL W P-5'-P-CCNC: 5 U/L (ref 0–50)
ANION GAP SERPL CALCULATED.3IONS-SCNC: 12 MMOL/L (ref 7–15)
AST SERPL W P-5'-P-CCNC: 15 U/L (ref 0–45)
BASOPHILS # BLD AUTO: <0.03 10E3/UL (ref 0–0.2)
BASOPHILS NFR BLD AUTO: 0.4 %
BILIRUB SERPL-MCNC: 0.4 MG/DL
BUN SERPL-MCNC: 8.6 MG/DL (ref 8–23)
CALCIUM SERPL-MCNC: 9.5 MG/DL (ref 8.8–10.4)
CHLORIDE SERPL-SCNC: 102 MMOL/L (ref 98–107)
CREAT SERPL-MCNC: 0.65 MG/DL (ref 0.51–0.95)
EGFRCR SERPLBLD CKD-EPI 2021: >90 ML/MIN/1.73M2
EOSINOPHIL # BLD AUTO: 0.04 10E3/UL (ref 0–0.7)
EOSINOPHIL NFR BLD AUTO: 0.7 %
ERYTHROCYTE [DISTWIDTH] IN BLOOD BY AUTOMATED COUNT: 20.4 % (ref 10–15)
GLUCOSE SERPL-MCNC: 109 MG/DL (ref 70–99)
HCO3 SERPL-SCNC: 24 MMOL/L (ref 22–29)
HCT VFR BLD AUTO: 28.6 % (ref 35–47)
HGB BLD-MCNC: 9.4 G/DL (ref 11.7–15.7)
IMM GRANULOCYTES # BLD: <0.03 10E3/UL
IMM GRANULOCYTES NFR BLD: 0.4 %
LYMPHOCYTES # BLD AUTO: 0.52 10E3/UL (ref 0.8–5.3)
LYMPHOCYTES NFR BLD AUTO: 9.6 %
MCH RBC QN AUTO: 32.4 PG (ref 26.5–33)
MCHC RBC AUTO-ENTMCNC: 32.9 G/DL (ref 31.5–36.5)
MCV RBC AUTO: 98.6 FL (ref 78–100)
MONOCYTES # BLD AUTO: 0.34 10E3/UL (ref 0–1.3)
MONOCYTES NFR BLD AUTO: 6.3 %
NEUTROPHILS # BLD AUTO: 4.48 10E3/UL (ref 1.6–8.3)
NEUTROPHILS NFR BLD AUTO: 82.6 %
NRBC # BLD AUTO: <0.03 10E3/UL
NRBC BLD AUTO-RTO: 0 /100
PLATELET # BLD AUTO: 180 10E3/UL (ref 150–450)
POTASSIUM SERPL-SCNC: 3.3 MMOL/L (ref 3.4–5.3)
PROT SERPL-MCNC: 6.3 G/DL (ref 6.4–8.3)
RBC # BLD AUTO: 2.9 10E6/UL (ref 3.8–5.2)
SODIUM SERPL-SCNC: 138 MMOL/L (ref 135–145)
WBC # BLD AUTO: 5.42 10E3/UL (ref 4–11)

## 2025-08-18 PROCEDURE — 70553 MRI BRAIN STEM W/O & W/DYE: CPT

## 2025-08-18 PROCEDURE — A9585 GADOBUTROL INJECTION: HCPCS | Performed by: PHYSICIAN ASSISTANT

## 2025-08-18 PROCEDURE — 250N000011 HC RX IP 250 OP 636: Performed by: RADIOLOGY

## 2025-08-18 PROCEDURE — 82310 ASSAY OF CALCIUM: CPT

## 2025-08-18 PROCEDURE — 250N000011 HC RX IP 250 OP 636: Performed by: INTERNAL MEDICINE

## 2025-08-18 PROCEDURE — 36591 DRAW BLOOD OFF VENOUS DEVICE: CPT

## 2025-08-18 PROCEDURE — 85004 AUTOMATED DIFF WBC COUNT: CPT

## 2025-08-18 PROCEDURE — 71260 CT THORAX DX C+: CPT

## 2025-08-18 PROCEDURE — 255N000002 HC RX 255 OP 636: Performed by: PHYSICIAN ASSISTANT

## 2025-08-18 RX ORDER — IOPAMIDOL 755 MG/ML
90 INJECTION, SOLUTION INTRAVASCULAR ONCE
Status: DISCONTINUED | OUTPATIENT
Start: 2025-08-18 | End: 2025-08-19 | Stop reason: HOSPADM

## 2025-08-18 RX ORDER — GADOBUTROL 604.72 MG/ML
6.8 INJECTION INTRAVENOUS ONCE
Status: COMPLETED | OUTPATIENT
Start: 2025-08-18 | End: 2025-08-18

## 2025-08-18 RX ORDER — IOPAMIDOL 755 MG/ML
90 INJECTION, SOLUTION INTRAVASCULAR ONCE
Status: COMPLETED | OUTPATIENT
Start: 2025-08-18 | End: 2025-08-18

## 2025-08-18 RX ORDER — HEPARIN SODIUM (PORCINE) LOCK FLUSH IV SOLN 100 UNIT/ML 100 UNIT/ML
500 SOLUTION INTRAVENOUS ONCE
Status: COMPLETED | OUTPATIENT
Start: 2025-08-18 | End: 2025-08-18

## 2025-08-18 RX ORDER — HEPARIN SODIUM (PORCINE) LOCK FLUSH IV SOLN 100 UNIT/ML 100 UNIT/ML
SOLUTION INTRAVENOUS
Status: COMPLETED
Start: 2025-08-18 | End: 2025-08-18

## 2025-08-18 RX ADMIN — GADOBUTROL 6.8 ML: 604.72 INJECTION INTRAVENOUS at 13:03

## 2025-08-18 RX ADMIN — IOPAMIDOL 90 ML: 755 INJECTION, SOLUTION INTRAVENOUS at 12:32

## 2025-08-18 RX ADMIN — HEPARIN SODIUM (PORCINE) LOCK FLUSH IV SOLN 100 UNIT/ML 500 UNITS: 100 SOLUTION at 13:35

## 2025-08-20 ENCOUNTER — HOSPITAL ENCOUNTER (OUTPATIENT)
Dept: CARDIOLOGY | Facility: CLINIC | Age: 65
Discharge: HOME | End: 2025-08-20
Attending: PHYSICIAN ASSISTANT
Payer: COMMERCIAL

## 2025-08-20 DIAGNOSIS — Z51.11 ENCOUNTER FOR ANTINEOPLASTIC CHEMOTHERAPY: ICD-10-CM

## 2025-08-20 DIAGNOSIS — C50.512 MALIGNANT NEOPLASM OF LOWER-OUTER QUADRANT OF LEFT BREAST OF FEMALE, ESTROGEN RECEPTOR POSITIVE (H): ICD-10-CM

## 2025-08-20 DIAGNOSIS — Z17.0 MALIGNANT NEOPLASM OF LOWER-OUTER QUADRANT OF LEFT BREAST OF FEMALE, ESTROGEN RECEPTOR POSITIVE (H): ICD-10-CM

## 2025-08-20 LAB — LVEF ECHO: NORMAL

## 2025-08-20 PROCEDURE — 93356 MYOCRD STRAIN IMG SPCKL TRCK: CPT

## 2025-08-20 PROCEDURE — 93306 TTE W/DOPPLER COMPLETE: CPT | Mod: 26 | Performed by: INTERNAL MEDICINE

## 2025-08-20 PROCEDURE — 93356 MYOCRD STRAIN IMG SPCKL TRCK: CPT | Performed by: INTERNAL MEDICINE

## 2025-08-21 ENCOUNTER — VIRTUAL VISIT (OUTPATIENT)
Dept: ONCOLOGY | Facility: CLINIC | Age: 65
End: 2025-08-21
Attending: INTERNAL MEDICINE
Payer: COMMERCIAL

## 2025-08-21 ENCOUNTER — INFUSION THERAPY VISIT (OUTPATIENT)
Dept: ONCOLOGY | Facility: CLINIC | Age: 65
End: 2025-08-21
Attending: PHYSICIAN ASSISTANT
Payer: COMMERCIAL

## 2025-08-21 ENCOUNTER — APPOINTMENT (OUTPATIENT)
Dept: LAB | Facility: CLINIC | Age: 65
End: 2025-08-21
Attending: PHYSICIAN ASSISTANT
Payer: COMMERCIAL

## 2025-08-21 VITALS
WEIGHT: 149.2 LBS | HEART RATE: 86 BPM | OXYGEN SATURATION: 95 % | SYSTOLIC BLOOD PRESSURE: 150 MMHG | TEMPERATURE: 98.1 F | BODY MASS INDEX: 23.42 KG/M2 | RESPIRATION RATE: 16 BRPM | HEIGHT: 67 IN | DIASTOLIC BLOOD PRESSURE: 65 MMHG

## 2025-08-21 DIAGNOSIS — Z17.0 MALIGNANT NEOPLASM OF LOWER-OUTER QUADRANT OF LEFT BREAST OF FEMALE, ESTROGEN RECEPTOR POSITIVE (H): Primary | ICD-10-CM

## 2025-08-21 DIAGNOSIS — J18.9 PNEUMONIA OF BOTH LUNGS DUE TO INFECTIOUS ORGANISM, UNSPECIFIED PART OF LUNG: Primary | ICD-10-CM

## 2025-08-21 DIAGNOSIS — Z51.11 ENCOUNTER FOR ANTINEOPLASTIC CHEMOTHERAPY: ICD-10-CM

## 2025-08-21 DIAGNOSIS — C15.9 SQUAMOUS CELL CARCINOMA OF ESOPHAGUS (H): ICD-10-CM

## 2025-08-21 DIAGNOSIS — C50.512 MALIGNANT NEOPLASM OF LOWER-OUTER QUADRANT OF LEFT BREAST OF FEMALE, ESTROGEN RECEPTOR POSITIVE (H): Primary | ICD-10-CM

## 2025-08-21 LAB
ABO + RH BLD: NORMAL
ALBUMIN SERPL BCG-MCNC: 3.5 G/DL (ref 3.5–5.2)
ALP SERPL-CCNC: 134 U/L (ref 40–150)
ALT SERPL W P-5'-P-CCNC: 8 U/L (ref 0–50)
ANION GAP SERPL CALCULATED.3IONS-SCNC: 12 MMOL/L (ref 7–15)
AST SERPL W P-5'-P-CCNC: 16 U/L (ref 0–45)
BASOPHILS # BLD AUTO: <0.03 10E3/UL (ref 0–0.2)
BASOPHILS NFR BLD AUTO: 0.3 %
BILIRUB SERPL-MCNC: 0.5 MG/DL
BLD GP AB SCN SERPL QL: NEGATIVE
BUN SERPL-MCNC: 7.7 MG/DL (ref 8–23)
CALCIUM SERPL-MCNC: 9.8 MG/DL (ref 8.8–10.4)
CHLORIDE SERPL-SCNC: 101 MMOL/L (ref 98–107)
CREAT SERPL-MCNC: 0.67 MG/DL (ref 0.51–0.95)
EGFRCR SERPLBLD CKD-EPI 2021: >90 ML/MIN/1.73M2
EOSINOPHIL # BLD AUTO: 0.04 10E3/UL (ref 0–0.7)
EOSINOPHIL NFR BLD AUTO: 0.7 %
ERYTHROCYTE [DISTWIDTH] IN BLOOD BY AUTOMATED COUNT: 20 % (ref 10–15)
GLUCOSE SERPL-MCNC: 117 MG/DL (ref 70–99)
HCO3 SERPL-SCNC: 25 MMOL/L (ref 22–29)
HCT VFR BLD AUTO: 30 % (ref 35–47)
HGB BLD-MCNC: 9.8 G/DL (ref 11.7–15.7)
IMM GRANULOCYTES # BLD: 0.03 10E3/UL
IMM GRANULOCYTES NFR BLD: 0.5 %
LYMPHOCYTES # BLD AUTO: 0.62 10E3/UL (ref 0.8–5.3)
LYMPHOCYTES NFR BLD AUTO: 10.7 %
MCH RBC QN AUTO: 32 PG (ref 26.5–33)
MCHC RBC AUTO-ENTMCNC: 32.7 G/DL (ref 31.5–36.5)
MCV RBC AUTO: 98 FL (ref 78–100)
MONOCYTES # BLD AUTO: 0.31 10E3/UL (ref 0–1.3)
MONOCYTES NFR BLD AUTO: 5.4 %
NEUTROPHILS # BLD AUTO: 4.76 10E3/UL (ref 1.6–8.3)
NEUTROPHILS NFR BLD AUTO: 82.4 %
NRBC # BLD AUTO: <0.03 10E3/UL
NRBC BLD AUTO-RTO: 0 /100
PLATELET # BLD AUTO: 218 10E3/UL (ref 150–450)
POTASSIUM SERPL-SCNC: 4.4 MMOL/L (ref 3.4–5.3)
PROT SERPL-MCNC: 6.8 G/DL (ref 6.4–8.3)
RBC # BLD AUTO: 3.06 10E6/UL (ref 3.8–5.2)
SODIUM SERPL-SCNC: 138 MMOL/L (ref 135–145)
SPECIMEN EXP DATE BLD: NORMAL
WBC # BLD AUTO: 5.78 10E3/UL (ref 4–11)

## 2025-08-21 PROCEDURE — 96372 THER/PROPH/DIAG INJ SC/IM: CPT | Performed by: PHYSICIAN ASSISTANT

## 2025-08-21 PROCEDURE — 36591 DRAW BLOOD OFF VENOUS DEVICE: CPT | Performed by: INTERNAL MEDICINE

## 2025-08-21 PROCEDURE — 82247 BILIRUBIN TOTAL: CPT | Performed by: PHYSICIAN ASSISTANT

## 2025-08-21 PROCEDURE — 86901 BLOOD TYPING SEROLOGIC RH(D): CPT | Performed by: INTERNAL MEDICINE

## 2025-08-21 PROCEDURE — 85004 AUTOMATED DIFF WBC COUNT: CPT | Performed by: PHYSICIAN ASSISTANT

## 2025-08-21 PROCEDURE — 250N000011 HC RX IP 250 OP 636: Performed by: PHYSICIAN ASSISTANT

## 2025-08-21 PROCEDURE — 250N000011 HC RX IP 250 OP 636: Mod: GT,95 | Performed by: INTERNAL MEDICINE

## 2025-08-21 PROCEDURE — 258N000003 HC RX IP 258 OP 636: Performed by: PHYSICIAN ASSISTANT

## 2025-08-21 RX ORDER — PALONOSETRON 0.05 MG/ML
0.25 INJECTION, SOLUTION INTRAVENOUS ONCE
Status: COMPLETED | OUTPATIENT
Start: 2025-08-21 | End: 2025-08-21

## 2025-08-21 RX ORDER — LEVOFLOXACIN 500 MG/1
500 TABLET, FILM COATED ORAL DAILY
Qty: 7 TABLET | Refills: 0 | Status: SHIPPED | OUTPATIENT
Start: 2025-08-21

## 2025-08-21 RX ORDER — HEPARIN SODIUM (PORCINE) LOCK FLUSH IV SOLN 100 UNIT/ML 100 UNIT/ML
5 SOLUTION INTRAVENOUS
Status: DISCONTINUED | OUTPATIENT
Start: 2025-08-21 | End: 2025-08-21 | Stop reason: HOSPADM

## 2025-08-21 RX ORDER — HEPARIN SODIUM (PORCINE) LOCK FLUSH IV SOLN 100 UNIT/ML 100 UNIT/ML
5 SOLUTION INTRAVENOUS EVERY 8 HOURS
Status: DISCONTINUED | OUTPATIENT
Start: 2025-08-21 | End: 2025-08-21 | Stop reason: HOSPADM

## 2025-08-21 RX ADMIN — Medication 5 ML: at 11:56

## 2025-08-21 RX ADMIN — PALONOSETRON HYDROCHLORIDE 0.25 MG: 0.25 INJECTION INTRAVENOUS at 13:13

## 2025-08-21 RX ADMIN — Medication 5 ML: at 15:47

## 2025-08-21 RX ADMIN — SODIUM CHLORIDE 440 MG: 0.9 INJECTION, SOLUTION INTRAVENOUS at 14:11

## 2025-08-21 RX ADMIN — DOCETAXEL 140 MG: 20 INJECTION, SOLUTION, CONCENTRATE INTRAVENOUS at 14:48

## 2025-08-21 RX ADMIN — FOSAPREPITANT: 150 INJECTION, POWDER, LYOPHILIZED, FOR SOLUTION INTRAVENOUS at 13:15

## 2025-08-21 RX ADMIN — SODIUM CHLORIDE 420 MG: 0.9 INJECTION, SOLUTION INTRAVENOUS at 13:36

## 2025-08-21 ASSESSMENT — PAIN SCALES - GENERAL: PAINLEVEL_OUTOF10: MILD PAIN (2)

## 2025-09-04 ENCOUNTER — LAB (OUTPATIENT)
Dept: LAB | Facility: CLINIC | Age: 65
End: 2025-09-04
Payer: COMMERCIAL

## 2025-09-04 DIAGNOSIS — Z17.0 MALIGNANT NEOPLASM OF LOWER-OUTER QUADRANT OF LEFT BREAST OF FEMALE, ESTROGEN RECEPTOR POSITIVE (H): Primary | ICD-10-CM

## 2025-09-04 DIAGNOSIS — C50.512 MALIGNANT NEOPLASM OF LOWER-OUTER QUADRANT OF LEFT BREAST OF FEMALE, ESTROGEN RECEPTOR POSITIVE (H): Primary | ICD-10-CM

## 2025-09-04 PROCEDURE — 81162 BRCA1&2 GEN FULL SEQ DUP/DEL: CPT | Performed by: SURGERY

## (undated) DEVICE — ELECTRODE KNIFE CERAMIC DUALKNIFE 1.5X2.8MMX230CM  KD-655U

## (undated) DEVICE — TUBING SUCTION 10'X3/16" N510

## (undated) DEVICE — KIT HAND CONTROL ACIST 014644 AR-P54

## (undated) DEVICE — SYR ANGIOGRAPHY MULTIUSE KIT ACIST 014612

## (undated) DEVICE — DRAIN SYSTEM ENTERAL W/ENFIT CONNECTORS 250ML EDS-250

## (undated) DEVICE — SLEEVE TR BAND RADIAL COMPRESSION DEVICE 24CM TRB24-REG

## (undated) DEVICE — Device

## (undated) DEVICE — APPLICATORS COTTON TIP 6"X2 STERILE LF C15053-006

## (undated) DEVICE — ENDO PROBE COVER ULTRASOUND BALLOON LATEX  MAJ-249

## (undated) DEVICE — SOL NACL 0.9% IRRIG 1000ML BOTTLE 07138-09

## (undated) DEVICE — ENDO SEALING CAP SMARTCAP

## (undated) DEVICE — SU VICRYL+ 0 27 UR6 VLT VCP603H

## (undated) DEVICE — GLOVE BIOGEL PI MICRO SZ 8.0 48580

## (undated) DEVICE — ENDO TUBING CO2 SMARTCAP STERILE DISP 100145CO2EXT

## (undated) DEVICE — BAG URINARY DRAIN 4000ML LF 153509

## (undated) DEVICE — LINEN GOWN XLG 5407

## (undated) DEVICE — CATH DIAGNOSTIC RADIAL 5FR TIG 4.0

## (undated) DEVICE — PAD CHUX UNDERPAD 23X24" 7136

## (undated) DEVICE — PACK ENDOSCOPY GI CUSTOM UMMC

## (undated) DEVICE — WIPES FOLEY CARE SURESTEP PROVON DFC100

## (undated) DEVICE — ENDO CAP AND TUBING STERILE FOR ENDOGATOR  100130

## (undated) DEVICE — CATH TRAY FOLEY SURESTEP 16FR W/URNE MTR STLK LATEX A303316A

## (undated) DEVICE — ELECTRODE ADULT PACING MULTI P-211-M1

## (undated) DEVICE — TUBE JEJUNOSTOMY ENFIT 16FR 8200-16

## (undated) DEVICE — SYR ENTERAL W/ENFIT CONNECTOR PURPLE 60ML 460SE

## (undated) DEVICE — GUIDEWIRE AMPLATZ SUPER STIFF 0.035"X180CM M001465250

## (undated) DEVICE — SOL NACL 0.9% IRRIG 1000ML BOTTLE 2F7124

## (undated) DEVICE — ENDO BITE BLOCK ADULT OMNI-BLOC

## (undated) DEVICE — CONNECTOR ENFIT KANGAROO TRANSITION 7650000

## (undated) DEVICE — ENDO SCOPE WARMER SEAL  C3101

## (undated) DEVICE — RAD INTR PEELAWAY 16FRX30CM

## (undated) DEVICE — PLUG CATH AND DRAIN TUBE PROTECTOR DYND12200

## (undated) DEVICE — ENDO TROCAR SLEEVE KII Z-THREADED 12X100MM CTS22

## (undated) DEVICE — GLOVE BIOGEL PI MICRO INDICATOR UNDERGLOVE SZ 8.5 48985

## (undated) DEVICE — KIT CONNECTOR FOR OLYMPUS ENDOSCOPES DEFENDO 100310

## (undated) DEVICE — SUCTION MANIFOLD NEPTUNE 2 SYS 4 PORT 0702-020-000

## (undated) DEVICE — SOL WATER IRRIG 1000ML BOTTLE 2F7114

## (undated) DEVICE — DRAPE SHEET REV FOLD 3/4 9349

## (undated) DEVICE — DRSG DRAIN 2X2" 7087

## (undated) DEVICE — DRSG PRIMAPORE 02X3" 7133

## (undated) DEVICE — SU VICRYL 0 UR-6 27" J603H

## (undated) DEVICE — WIRE GUIDE AMPLATZ SUPER STIFF 0.035"X180CM 46-501

## (undated) DEVICE — DRAPE IOBAN INCISE 23X17" 6650EZ

## (undated) DEVICE — SOL NACL 0.9% INJ 1000ML BAG 2B1324X

## (undated) DEVICE — DRSG STERI STRIP 1/2X4" R1547

## (undated) DEVICE — MANIFOLD KIT ANGIO AUTOMATED 014613

## (undated) DEVICE — LINEN TOWEL PACK X6 WHITE 5487

## (undated) DEVICE — SYR 30ML SLIP TIP W/O NDL 302833

## (undated) DEVICE — ESU KNIFE 2.8X0.9MMX230CM KD-612U

## (undated) DEVICE — WIRE GUIDE AMPLATZ SUPER STIFF 0.035"X260CM STR M001465090

## (undated) DEVICE — BLADE CLIPPER SGL USE 9680

## (undated) DEVICE — KIT ENDO FIRST STEP DISINFECTANT 200ML W/POUCH EP-4

## (undated) DEVICE — DEVICE SUTURE PASSER 14GA WECK EFX EFXSP2

## (undated) DEVICE — ESU GROUND PAD ADULT W/CORD E7507

## (undated) DEVICE — ENDO TROCAR FIRST ENTRY KII FIOS Z-THRD 12X100MM CTF73

## (undated) DEVICE — NDL PERC ENTRY THINWALL 18GA 9.0" G00273

## (undated) DEVICE — SU SILK 0 SH 30" K834H

## (undated) DEVICE — ESU ELEC BLADE E-SEP INSULATED NEPTUNE 70MM 0703-070-002

## (undated) DEVICE — JELLY LUBRICATING SURGILUBE 2OZ TUBE

## (undated) DEVICE — SU VICRYL 4-0 PS-2 18" UND J496H

## (undated) DEVICE — LINEN TOWEL PACK X5 5464

## (undated) DEVICE — ADH LIQUID MASTISOL TOPICAL VIAL 2-3ML 0523-48

## (undated) DEVICE — ENDO FCP GRASPING ROTATABLE 7.2MMX2.6MM FG-244NR

## (undated) DEVICE — ESU PENCIL SMOKE EVAC W/ROCKER SWITCH 0703-047-000

## (undated) DEVICE — ENDO SYSTEM WATER BOTTLE & TUBING W/CO2 FILTER 00711549

## (undated) DEVICE — TUBING SMOKE EVAC PNEUMOCLEAR HIGH FLOW 0620050250

## (undated) DEVICE — INTR PEELAWAY 20FRX20CM G06445 PLVW-20.0-38

## (undated) DEVICE — CUSTOM PACK CORONARY SAN5BCRHEA

## (undated) DEVICE — SHTH INTRO 0.021IN ID 6FR DIA

## (undated) DEVICE — ENDO FORCEP ENDOJAW BIOPSY 2.8MMX230CM FB-220U

## (undated) DEVICE — PREP CHLORAPREP 26ML TINTED HI-LITE ORANGE 930815

## (undated) DEVICE — ENDO NDL ASPIRATION ULTRASOUND 22GA ACQUIRE M00555540

## (undated) DEVICE — TUBE GASTROSTOMY MIC ENFIT 18FR 8100-18

## (undated) DEVICE — CATH VA INTR 14FRX14CM KIT LATEX 612613

## (undated) DEVICE — NDL SCLEROTHERAPY 25GA CARR-LOCK  00711811

## (undated) RX ORDER — ONDANSETRON 2 MG/ML
INJECTION INTRAMUSCULAR; INTRAVENOUS
Status: DISPENSED
Start: 2024-06-14

## (undated) RX ORDER — ONDANSETRON 2 MG/ML
INJECTION INTRAMUSCULAR; INTRAVENOUS
Status: DISPENSED
Start: 2024-06-06

## (undated) RX ORDER — HEPARIN SODIUM (PORCINE) LOCK FLUSH IV SOLN 100 UNIT/ML 100 UNIT/ML
SOLUTION INTRAVENOUS
Status: DISPENSED
Start: 2025-08-18

## (undated) RX ORDER — ACETAMINOPHEN 325 MG/1
TABLET ORAL
Status: DISPENSED
Start: 2024-06-06

## (undated) RX ORDER — ONDANSETRON 2 MG/ML
INJECTION INTRAMUSCULAR; INTRAVENOUS
Status: DISPENSED
Start: 2024-03-21

## (undated) RX ORDER — HYDROMORPHONE HCL IN WATER/PF 6 MG/30 ML
PATIENT CONTROLLED ANALGESIA SYRINGE INTRAVENOUS
Status: DISPENSED
Start: 2024-06-14

## (undated) RX ORDER — CEFAZOLIN SODIUM/WATER 2 G/20 ML
SYRINGE (ML) INTRAVENOUS
Status: DISPENSED
Start: 2024-06-06

## (undated) RX ORDER — IODINE AND POTASSIUM IODIDE 50; 100 MG/ML; MG/ML
LIQUID ORAL
Status: DISPENSED
Start: 2024-04-02

## (undated) RX ORDER — FENTANYL CITRATE-0.9 % NACL/PF 10 MCG/ML
PLASTIC BAG, INJECTION (ML) INTRAVENOUS
Status: DISPENSED
Start: 2024-04-02

## (undated) RX ORDER — HYDROMORPHONE HCL IN WATER/PF 6 MG/30 ML
PATIENT CONTROLLED ANALGESIA SYRINGE INTRAVENOUS
Status: DISPENSED
Start: 2024-06-06

## (undated) RX ORDER — FENTANYL CITRATE-0.9 % NACL/PF 10 MCG/ML
PLASTIC BAG, INJECTION (ML) INTRAVENOUS
Status: DISPENSED
Start: 2024-03-21

## (undated) RX ORDER — FENTANYL CITRATE-0.9 % NACL/PF 10 MCG/ML
PLASTIC BAG, INJECTION (ML) INTRAVENOUS
Status: DISPENSED
Start: 2024-06-14

## (undated) RX ORDER — FENTANYL CITRATE 50 UG/ML
INJECTION, SOLUTION INTRAMUSCULAR; INTRAVENOUS
Status: DISPENSED
Start: 2024-04-02

## (undated) RX ORDER — FENTANYL CITRATE 50 UG/ML
INJECTION, SOLUTION INTRAMUSCULAR; INTRAVENOUS
Status: DISPENSED
Start: 2023-01-25

## (undated) RX ORDER — FENTANYL CITRATE 50 UG/ML
INJECTION, SOLUTION INTRAMUSCULAR; INTRAVENOUS
Status: DISPENSED
Start: 2024-06-14

## (undated) RX ORDER — ONDANSETRON 2 MG/ML
INJECTION INTRAMUSCULAR; INTRAVENOUS
Status: DISPENSED
Start: 2024-04-02

## (undated) RX ORDER — OXYCODONE HYDROCHLORIDE 5 MG/1
TABLET ORAL
Status: DISPENSED
Start: 2024-04-02

## (undated) RX ORDER — EPHEDRINE SULFATE 50 MG/ML
INJECTION, SOLUTION INTRAMUSCULAR; INTRAVENOUS; SUBCUTANEOUS
Status: DISPENSED
Start: 2024-06-14

## (undated) RX ORDER — ENOXAPARIN SODIUM 100 MG/ML
INJECTION SUBCUTANEOUS
Status: DISPENSED
Start: 2024-06-14

## (undated) RX ORDER — PROPOFOL 10 MG/ML
INJECTION, EMULSION INTRAVENOUS
Status: DISPENSED
Start: 2024-04-02

## (undated) RX ORDER — CEFAZOLIN SODIUM 1 G/3ML
INJECTION, POWDER, FOR SOLUTION INTRAMUSCULAR; INTRAVENOUS
Status: DISPENSED
Start: 2024-06-06

## (undated) RX ORDER — DEXAMETHASONE SODIUM PHOSPHATE 4 MG/ML
INJECTION, SOLUTION INTRA-ARTICULAR; INTRALESIONAL; INTRAMUSCULAR; INTRAVENOUS; SOFT TISSUE
Status: DISPENSED
Start: 2024-04-02

## (undated) RX ORDER — TRIAMCINOLONE ACETONIDE 40 MG/ML
INJECTION, SUSPENSION INTRA-ARTICULAR; INTRAMUSCULAR
Status: DISPENSED
Start: 2024-04-02

## (undated) RX ORDER — PROPOFOL 10 MG/ML
INJECTION, EMULSION INTRAVENOUS
Status: DISPENSED
Start: 2024-06-06

## (undated) RX ORDER — PROPOFOL 10 MG/ML
INJECTION, EMULSION INTRAVENOUS
Status: DISPENSED
Start: 2024-06-14

## (undated) RX ORDER — FENTANYL CITRATE 50 UG/ML
INJECTION, SOLUTION INTRAMUSCULAR; INTRAVENOUS
Status: DISPENSED
Start: 2024-06-06

## (undated) RX ORDER — OXYCODONE HYDROCHLORIDE 10 MG/1
TABLET ORAL
Status: DISPENSED
Start: 2024-04-02

## (undated) RX ORDER — DIAZEPAM 5 MG
TABLET ORAL
Status: DISPENSED
Start: 2023-01-25

## (undated) RX ORDER — EPHEDRINE SULFATE 50 MG/ML
INJECTION, SOLUTION INTRAMUSCULAR; INTRAVENOUS; SUBCUTANEOUS
Status: DISPENSED
Start: 2024-06-06

## (undated) RX ORDER — GLYCOPYRROLATE 0.2 MG/ML
INJECTION, SOLUTION INTRAMUSCULAR; INTRAVENOUS
Status: DISPENSED
Start: 2024-04-02

## (undated) RX ORDER — HYDROMORPHONE HYDROCHLORIDE 1 MG/ML
INJECTION, SOLUTION INTRAMUSCULAR; INTRAVENOUS; SUBCUTANEOUS
Status: DISPENSED
Start: 2024-06-14

## (undated) RX ORDER — DEXAMETHASONE SODIUM PHOSPHATE 4 MG/ML
INJECTION, SOLUTION INTRA-ARTICULAR; INTRALESIONAL; INTRAMUSCULAR; INTRAVENOUS; SOFT TISSUE
Status: DISPENSED
Start: 2024-06-06

## (undated) RX ORDER — CEFAZOLIN SODIUM/WATER 2 G/20 ML
SYRINGE (ML) INTRAVENOUS
Status: DISPENSED
Start: 2024-06-14

## (undated) RX ORDER — SODIUM CHLORIDE, SODIUM LACTATE, POTASSIUM CHLORIDE, CALCIUM CHLORIDE 600; 310; 30; 20 MG/100ML; MG/100ML; MG/100ML; MG/100ML
INJECTION, SOLUTION INTRAVENOUS
Status: DISPENSED
Start: 2024-06-06

## (undated) RX ORDER — FENTANYL CITRATE 50 UG/ML
INJECTION, SOLUTION INTRAMUSCULAR; INTRAVENOUS
Status: DISPENSED
Start: 2024-04-24